# Patient Record
Sex: FEMALE | Race: WHITE | NOT HISPANIC OR LATINO | URBAN - METROPOLITAN AREA
[De-identification: names, ages, dates, MRNs, and addresses within clinical notes are randomized per-mention and may not be internally consistent; named-entity substitution may affect disease eponyms.]

---

## 2017-01-04 ENCOUNTER — AMBULATORY - HEALTHEAST (OUTPATIENT)
Dept: CARDIOLOGY | Facility: CLINIC | Age: 82
End: 2017-01-04

## 2017-01-04 DIAGNOSIS — I35.9 AORTIC VALVE DISORDER: ICD-10-CM

## 2017-01-21 ENCOUNTER — COMMUNICATION - HEALTHEAST (OUTPATIENT)
Dept: CARDIOLOGY | Facility: CLINIC | Age: 82
End: 2017-01-21

## 2017-01-21 DIAGNOSIS — I48.91 ATRIAL FIBRILLATION (H): ICD-10-CM

## 2017-02-13 ENCOUNTER — AMBULATORY - HEALTHEAST (OUTPATIENT)
Dept: CARDIOLOGY | Facility: CLINIC | Age: 82
End: 2017-02-13

## 2017-02-13 DIAGNOSIS — I35.9 AORTIC VALVE DISORDER: ICD-10-CM

## 2017-03-13 ENCOUNTER — AMBULATORY - HEALTHEAST (OUTPATIENT)
Dept: CARDIOLOGY | Facility: CLINIC | Age: 82
End: 2017-03-13

## 2017-03-13 DIAGNOSIS — I35.9 AORTIC VALVE DISORDER: ICD-10-CM

## 2017-03-15 ENCOUNTER — COMMUNICATION - HEALTHEAST (OUTPATIENT)
Dept: INTERNAL MEDICINE | Facility: CLINIC | Age: 82
End: 2017-03-15

## 2017-03-15 DIAGNOSIS — E53.8 VITAMIN B 12 DEFICIENCY: ICD-10-CM

## 2017-03-22 ENCOUNTER — COMMUNICATION - HEALTHEAST (OUTPATIENT)
Dept: ADMINISTRATIVE | Facility: CLINIC | Age: 82
End: 2017-03-22

## 2017-03-27 ENCOUNTER — OFFICE VISIT - HEALTHEAST (OUTPATIENT)
Dept: INTERNAL MEDICINE | Facility: CLINIC | Age: 82
End: 2017-03-27

## 2017-03-27 DIAGNOSIS — R06.00 DYSPNEA: ICD-10-CM

## 2017-03-27 DIAGNOSIS — Z95.2 H/O AORTIC VALVE REPLACEMENT: ICD-10-CM

## 2017-03-27 DIAGNOSIS — R00.0 TACHYCARDIA: ICD-10-CM

## 2017-03-27 DIAGNOSIS — I48.20 CHRONIC ATRIAL FIBRILLATION (H): ICD-10-CM

## 2017-03-27 DIAGNOSIS — I77.6 VASCULITIS (H): ICD-10-CM

## 2017-03-27 ASSESSMENT — MIFFLIN-ST. JEOR: SCORE: 995.2

## 2017-03-28 LAB
ATRIAL RATE - MUSE: 66 BPM
DIASTOLIC BLOOD PRESSURE - MUSE: NORMAL MMHG
INTERPRETATION ECG - MUSE: NORMAL
P AXIS - MUSE: NORMAL DEGREES
PR INTERVAL - MUSE: NORMAL MS
QRS DURATION - MUSE: 82 MS
QT - MUSE: 296 MS
QTC - MUSE: 391 MS
R AXIS - MUSE: 2 DEGREES
SYSTOLIC BLOOD PRESSURE - MUSE: NORMAL MMHG
T AXIS - MUSE: 97 DEGREES
VENTRICULAR RATE- MUSE: 105 BPM

## 2017-03-30 ENCOUNTER — AMBULATORY - HEALTHEAST (OUTPATIENT)
Dept: CARDIOLOGY | Facility: CLINIC | Age: 82
End: 2017-03-30

## 2017-03-30 DIAGNOSIS — Z95.0 PACEMAKER: ICD-10-CM

## 2017-04-04 ENCOUNTER — HOSPITAL ENCOUNTER (OUTPATIENT)
Dept: CARDIOLOGY | Facility: CLINIC | Age: 82
Discharge: HOME | End: 2017-04-04
Attending: INTERNAL MEDICINE

## 2017-04-04 DIAGNOSIS — R06.00 DYSPNEA: ICD-10-CM

## 2017-04-04 DIAGNOSIS — Z95.2 H/O AORTIC VALVE REPLACEMENT: ICD-10-CM

## 2017-04-04 ASSESSMENT — MIFFLIN-ST. JEOR: SCORE: 995.2

## 2017-04-05 LAB
AORTIC VALVE MEAN VELOCITY: 116 CM/S
ASCENDING AORTA: 3.2 CM
AV DIMENSIONLESS INDEX VTI: 0.9
AV MEAN GRADIENT: 6 MMHG
AV PEAK GRADIENT: 11 MMHG
AV VALVE AREA: 2.4 CM2
AV VELOCITY RATIO: 0.8
BSA FOR ECHO PROCEDURE: 1.6 M2
CV BLOOD PRESSURE: NORMAL MMHG
CV ECHO HEIGHT: 63 IN
CV ECHO WEIGHT: 127 LBS
DOP CALC AO PEAK VEL: 166 CM/S
DOP CALC AO VTI: 38.3 CM
DOP CALC LVOT AREA: 2.83 CM2
DOP CALC LVOT DIAMETER: 1.9 CM
DOP CALC LVOT PEAK VEL: 135 CM/S
DOP CALC LVOT STROKE VOLUME: 92.4 CM3
DOP CALCLVOT PEAK VEL VTI: 32.6 CM
EJECTION FRACTION: 58 % (ref 55–75)
FRACTIONAL SHORTENING: 45.2 % (ref 28–44)
INTERVENTRICULAR SEPTUM IN END DIASTOLE: 0.8 CM (ref 0.6–0.9)
IVS/PW RATIO: 1
LA AREA 1: 17.2 CM2
LA AREA 2: 19.8 CM2
LEFT ATRIUM LENGTH: 4.76 CM
LEFT ATRIUM SIZE: 3.9 CM
LEFT ATRIUM VOLUME INDEX: 38 ML/M2
LEFT ATRIUM VOLUME: 60.8 CM3
LEFT VENTRICLE CARDIAC INDEX: 5.5 L/MIN/M2
LEFT VENTRICLE CARDIAC OUTPUT: 8.9 L/MIN
LEFT VENTRICLE DIASTOLIC VOLUME INDEX: 66.3 CM3/M2 (ref 34–74)
LEFT VENTRICLE DIASTOLIC VOLUME: 106 CM3 (ref 46–106)
LEFT VENTRICLE HEART RATE: 96 BPM
LEFT VENTRICLE MASS INDEX: 63.3 G/M2
LEFT VENTRICLE SYSTOLIC VOLUME INDEX: 28.1 CM3/M2 (ref 11–31)
LEFT VENTRICLE SYSTOLIC VOLUME: 45 CM3 (ref 14–42)
LEFT VENTRICULAR INTERNAL DIMENSION IN DIASTOLE: 4.2 CM (ref 3.8–5.2)
LEFT VENTRICULAR INTERNAL DIMENSION IN SYSTOLE: 2.3 CM (ref 2.2–3.5)
LEFT VENTRICULAR MASS: 101.3 G
LEFT VENTRICULAR OUTFLOW TRACT MEAN GRADIENT: 4 MMHG
LEFT VENTRICULAR OUTFLOW TRACT MEAN VELOCITY: 96.8 CM/S
LEFT VENTRICULAR OUTFLOW TRACT PEAK GRADIENT: 7 MMHG
LEFT VENTRICULAR POSTERIOR WALL IN END DIASTOLE: 0.8 CM (ref 0.6–0.9)
LV STROKE VOLUME INDEX: 57.7 ML/M2
MV AVERAGE E/E' RATIO: 19.4 CM/S
MV DECELERATION TIME: 195 MS
MV E'TISSUE VEL-LAT: 10 CM/S
MV E'TISSUE VEL-MED: 4.84 CM/S
MV LATERAL E/E' RATIO: 14.4
MV MEDIAL E/E' RATIO: 29.8
MV PEAK E VELOCITY: 144 CM/S
NUC REST DIASTOLIC VOLUME INDEX: 2032 LBS
NUC REST SYSTOLIC VOLUME INDEX: 63 IN
PR MAX PG: 6 MMHG
PR PEAK VELOCITY: 121 CM/S
PR PEAK VELOCITY: 121 CM/S
TRICUSPID REGURGITATION PEAK PRESSURE GRADIENT: 36.7 MMHG
TRICUSPID VALVE ANULAR PLANE SYSTOLIC EXCURSION: 1.1 CM
TRICUSPID VALVE PEAK REGURGITANT VELOCITY: 303 CM/S

## 2017-04-10 ENCOUNTER — AMBULATORY - HEALTHEAST (OUTPATIENT)
Dept: CARDIOLOGY | Facility: CLINIC | Age: 82
End: 2017-04-10

## 2017-04-10 DIAGNOSIS — I35.9 AORTIC VALVE DISORDER: ICD-10-CM

## 2017-05-08 ENCOUNTER — AMBULATORY - HEALTHEAST (OUTPATIENT)
Dept: CARDIOLOGY | Facility: CLINIC | Age: 82
End: 2017-05-08

## 2017-05-08 DIAGNOSIS — I35.9 AORTIC VALVE DISORDER: ICD-10-CM

## 2017-05-09 ENCOUNTER — COMMUNICATION - HEALTHEAST (OUTPATIENT)
Dept: CARDIOLOGY | Facility: CLINIC | Age: 82
End: 2017-05-09

## 2017-05-09 DIAGNOSIS — I48.91 ATRIAL FIBRILLATION (H): ICD-10-CM

## 2017-05-11 ENCOUNTER — OFFICE VISIT - HEALTHEAST (OUTPATIENT)
Dept: INTERNAL MEDICINE | Facility: CLINIC | Age: 82
End: 2017-05-11

## 2017-05-11 DIAGNOSIS — I48.20 CHRONIC ATRIAL FIBRILLATION (H): ICD-10-CM

## 2017-05-11 DIAGNOSIS — I77.6 VASCULITIS (H): ICD-10-CM

## 2017-05-11 DIAGNOSIS — M19.041: ICD-10-CM

## 2017-05-11 DIAGNOSIS — R00.0 TACHYCARDIA: ICD-10-CM

## 2017-05-11 DIAGNOSIS — Z95.2 H/O AORTIC VALVE REPLACEMENT: ICD-10-CM

## 2017-05-11 ASSESSMENT — MIFFLIN-ST. JEOR: SCORE: 995.74

## 2017-05-15 ENCOUNTER — AMBULATORY - HEALTHEAST (OUTPATIENT)
Dept: CARDIOLOGY | Facility: CLINIC | Age: 82
End: 2017-05-15

## 2017-05-15 DIAGNOSIS — Z95.0 PACEMAKER: ICD-10-CM

## 2017-05-16 LAB — HCC DEVICE COMMENTS: NORMAL

## 2017-05-22 ENCOUNTER — COMMUNICATION - HEALTHEAST (OUTPATIENT)
Dept: INTERNAL MEDICINE | Facility: CLINIC | Age: 82
End: 2017-05-22

## 2017-05-22 ENCOUNTER — OFFICE VISIT - HEALTHEAST (OUTPATIENT)
Dept: CARDIOLOGY | Facility: CLINIC | Age: 82
End: 2017-05-22

## 2017-05-22 DIAGNOSIS — I48.20 CHRONIC ATRIAL FIBRILLATION (H): ICD-10-CM

## 2017-05-22 DIAGNOSIS — I25.83 CORONARY ATHEROSCLEROSIS DUE TO LIPID RICH PLAQUE: ICD-10-CM

## 2017-05-22 DIAGNOSIS — I77.6 VASCULITIS (H): ICD-10-CM

## 2017-05-22 DIAGNOSIS — I35.9 AORTIC VALVE DISORDER: ICD-10-CM

## 2017-05-22 DIAGNOSIS — I49.5 SINOATRIAL NODE DYSFUNCTION (H): ICD-10-CM

## 2017-05-22 ASSESSMENT — MIFFLIN-ST. JEOR: SCORE: 1004.27

## 2017-06-05 ENCOUNTER — AMBULATORY - HEALTHEAST (OUTPATIENT)
Dept: CARDIOLOGY | Facility: CLINIC | Age: 82
End: 2017-06-05

## 2017-06-05 DIAGNOSIS — I35.9 AORTIC VALVE DISORDER: ICD-10-CM

## 2017-06-12 ENCOUNTER — RECORDS - HEALTHEAST (OUTPATIENT)
Dept: ADMINISTRATIVE | Facility: OTHER | Age: 82
End: 2017-06-12

## 2017-07-03 ENCOUNTER — AMBULATORY - HEALTHEAST (OUTPATIENT)
Dept: CARDIOLOGY | Facility: CLINIC | Age: 82
End: 2017-07-03

## 2017-07-03 DIAGNOSIS — I35.9 AORTIC VALVE DISORDER: ICD-10-CM

## 2017-07-31 ENCOUNTER — AMBULATORY - HEALTHEAST (OUTPATIENT)
Dept: CARDIOLOGY | Facility: CLINIC | Age: 82
End: 2017-07-31

## 2017-07-31 DIAGNOSIS — I35.9 AORTIC VALVE DISORDER: ICD-10-CM

## 2017-08-01 ENCOUNTER — AMBULATORY - HEALTHEAST (OUTPATIENT)
Dept: CARDIOLOGY | Facility: CLINIC | Age: 82
End: 2017-08-01

## 2017-08-01 DIAGNOSIS — Z95.0 CARDIAC PACEMAKER IN SITU: ICD-10-CM

## 2017-08-01 LAB — HCC DEVICE COMMENTS: NORMAL

## 2017-08-01 ASSESSMENT — MIFFLIN-ST. JEOR: SCORE: 1004.27

## 2017-08-04 ENCOUNTER — COMMUNICATION - HEALTHEAST (OUTPATIENT)
Dept: CARDIOLOGY | Facility: CLINIC | Age: 82
End: 2017-08-04

## 2017-08-04 DIAGNOSIS — I48.91 ATRIAL FIBRILLATION (H): ICD-10-CM

## 2017-08-15 ENCOUNTER — COMMUNICATION - HEALTHEAST (OUTPATIENT)
Dept: INTERNAL MEDICINE | Facility: CLINIC | Age: 82
End: 2017-08-15

## 2017-08-15 DIAGNOSIS — I77.6 VASCULITIS (H): ICD-10-CM

## 2017-08-28 ENCOUNTER — AMBULATORY - HEALTHEAST (OUTPATIENT)
Dept: CARDIOLOGY | Facility: CLINIC | Age: 82
End: 2017-08-28

## 2017-08-28 DIAGNOSIS — I35.9 AORTIC VALVE DISORDER: ICD-10-CM

## 2017-09-25 ENCOUNTER — AMBULATORY - HEALTHEAST (OUTPATIENT)
Dept: CARDIOLOGY | Facility: CLINIC | Age: 82
End: 2017-09-25

## 2017-09-25 DIAGNOSIS — I35.9 AORTIC VALVE DISORDER: ICD-10-CM

## 2017-10-23 ENCOUNTER — AMBULATORY - HEALTHEAST (OUTPATIENT)
Dept: CARDIOLOGY | Facility: CLINIC | Age: 82
End: 2017-10-23

## 2017-10-23 DIAGNOSIS — I35.9 AORTIC VALVE DISORDER: ICD-10-CM

## 2017-10-30 ENCOUNTER — AMBULATORY - HEALTHEAST (OUTPATIENT)
Dept: CARDIOLOGY | Facility: CLINIC | Age: 82
End: 2017-10-30

## 2017-10-30 DIAGNOSIS — Z95.0 CARDIAC PACEMAKER IN SITU: ICD-10-CM

## 2017-10-30 LAB — HCC DEVICE COMMENTS: NORMAL

## 2017-11-13 ENCOUNTER — AMBULATORY - HEALTHEAST (OUTPATIENT)
Dept: CARDIOLOGY | Facility: CLINIC | Age: 82
End: 2017-11-13

## 2017-11-13 DIAGNOSIS — I35.9 AORTIC VALVE DISORDER: ICD-10-CM

## 2017-11-27 ENCOUNTER — COMMUNICATION - HEALTHEAST (OUTPATIENT)
Dept: CARDIOLOGY | Facility: CLINIC | Age: 82
End: 2017-11-27

## 2017-11-27 DIAGNOSIS — I48.91 ATRIAL FIBRILLATION (H): ICD-10-CM

## 2017-12-01 ENCOUNTER — RECORDS - HEALTHEAST (OUTPATIENT)
Dept: GENERAL RADIOLOGY | Facility: CLINIC | Age: 82
End: 2017-12-01

## 2017-12-01 ENCOUNTER — OFFICE VISIT - HEALTHEAST (OUTPATIENT)
Dept: INTERNAL MEDICINE | Facility: CLINIC | Age: 82
End: 2017-12-01

## 2017-12-01 DIAGNOSIS — I77.6 VASCULITIS (H): ICD-10-CM

## 2017-12-01 DIAGNOSIS — Z23 NEED FOR IMMUNIZATION AGAINST INFLUENZA: ICD-10-CM

## 2017-12-01 DIAGNOSIS — R07.89 OTHER CHEST PAIN: ICD-10-CM

## 2017-12-01 DIAGNOSIS — R10.11 RUQ ABDOMINAL PAIN: ICD-10-CM

## 2017-12-01 DIAGNOSIS — R07.89 ATYPICAL CHEST PAIN: ICD-10-CM

## 2017-12-01 DIAGNOSIS — Z95.2 S/P AVR: ICD-10-CM

## 2017-12-01 DIAGNOSIS — K21.9 GERD (GASTROESOPHAGEAL REFLUX DISEASE): ICD-10-CM

## 2017-12-01 DIAGNOSIS — I48.20 CHRONIC ATRIAL FIBRILLATION (H): ICD-10-CM

## 2017-12-01 ASSESSMENT — MIFFLIN-ST. JEOR: SCORE: 1008.81

## 2017-12-02 ENCOUNTER — COMMUNICATION - HEALTHEAST (OUTPATIENT)
Dept: INTERNAL MEDICINE | Facility: CLINIC | Age: 82
End: 2017-12-02

## 2017-12-02 DIAGNOSIS — I77.6 VASCULITIS (H): ICD-10-CM

## 2017-12-11 ENCOUNTER — AMBULATORY - HEALTHEAST (OUTPATIENT)
Dept: CARDIOLOGY | Facility: CLINIC | Age: 82
End: 2017-12-11

## 2017-12-11 DIAGNOSIS — I35.9 AORTIC VALVE DISORDER: ICD-10-CM

## 2017-12-12 ENCOUNTER — HOSPITAL ENCOUNTER (OUTPATIENT)
Dept: CT IMAGING | Facility: CLINIC | Age: 82
Discharge: HOME | End: 2017-12-12
Attending: INTERNAL MEDICINE

## 2017-12-12 DIAGNOSIS — R10.11 RUQ ABDOMINAL PAIN: ICD-10-CM

## 2017-12-22 ENCOUNTER — COMMUNICATION - HEALTHEAST (OUTPATIENT)
Dept: INTERNAL MEDICINE | Facility: CLINIC | Age: 82
End: 2017-12-22

## 2017-12-29 ENCOUNTER — AMBULATORY - HEALTHEAST (OUTPATIENT)
Dept: CARDIOLOGY | Facility: CLINIC | Age: 82
End: 2017-12-29

## 2017-12-29 DIAGNOSIS — I35.9 AORTIC VALVE DISORDER: ICD-10-CM

## 2018-01-29 ENCOUNTER — AMBULATORY - HEALTHEAST (OUTPATIENT)
Dept: CARDIOLOGY | Facility: CLINIC | Age: 83
End: 2018-01-29

## 2018-01-29 DIAGNOSIS — I35.9 AORTIC VALVE DISEASE: ICD-10-CM

## 2018-01-29 DIAGNOSIS — I35.9 AORTIC VALVE DISORDER: ICD-10-CM

## 2018-01-29 LAB — INTERNATIONAL NORMALIZATION RATIO, POC - HISTORICAL: 2.3

## 2018-02-06 ENCOUNTER — AMBULATORY - HEALTHEAST (OUTPATIENT)
Dept: CARDIOLOGY | Facility: CLINIC | Age: 83
End: 2018-02-06

## 2018-02-06 DIAGNOSIS — Z95.0 CARDIAC PACEMAKER IN SITU: ICD-10-CM

## 2018-02-06 LAB — HCC DEVICE COMMENTS: NORMAL

## 2018-02-26 ENCOUNTER — AMBULATORY - HEALTHEAST (OUTPATIENT)
Dept: CARDIOLOGY | Facility: CLINIC | Age: 83
End: 2018-02-26

## 2018-02-26 DIAGNOSIS — I35.9 AORTIC VALVE DISORDER: ICD-10-CM

## 2018-02-26 LAB — INTERNATIONAL NORMALIZATION RATIO, POC - HISTORICAL: 3

## 2018-02-27 ENCOUNTER — COMMUNICATION - HEALTHEAST (OUTPATIENT)
Dept: CARDIOLOGY | Facility: CLINIC | Age: 83
End: 2018-02-27

## 2018-02-27 ENCOUNTER — AMBULATORY - HEALTHEAST (OUTPATIENT)
Dept: CARDIOLOGY | Facility: CLINIC | Age: 83
End: 2018-02-27

## 2018-02-27 DIAGNOSIS — I48.91 ATRIAL FIBRILLATION (H): ICD-10-CM

## 2018-03-14 ENCOUNTER — COMMUNICATION - HEALTHEAST (OUTPATIENT)
Dept: CARDIOLOGY | Facility: CLINIC | Age: 83
End: 2018-03-14

## 2018-03-20 ENCOUNTER — COMMUNICATION - HEALTHEAST (OUTPATIENT)
Dept: INTERNAL MEDICINE | Facility: CLINIC | Age: 83
End: 2018-03-20

## 2018-03-20 DIAGNOSIS — I77.6 VASCULITIS (H): ICD-10-CM

## 2018-03-23 ENCOUNTER — OFFICE VISIT - HEALTHEAST (OUTPATIENT)
Dept: INTERNAL MEDICINE | Facility: CLINIC | Age: 83
End: 2018-03-23

## 2018-03-23 ENCOUNTER — COMMUNICATION - HEALTHEAST (OUTPATIENT)
Dept: INTERNAL MEDICINE | Facility: CLINIC | Age: 83
End: 2018-03-23
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
## 2018-03-23 DIAGNOSIS — S20.219A CHEST WALL CONTUSION: ICD-10-CM

## 2018-03-23 DIAGNOSIS — K43.9 ABDOMINAL WALL HERNIA: ICD-10-CM

## 2018-03-23 DIAGNOSIS — Z95.2 H/O AORTIC VALVE REPLACEMENT: ICD-10-CM

## 2018-03-23 DIAGNOSIS — I77.6 VASCULITIS (H): ICD-10-CM

## 2018-03-23 DIAGNOSIS — I49.5 SINOATRIAL NODE DYSFUNCTION (H): ICD-10-CM

## 2018-03-23 DIAGNOSIS — I48.20 CHRONIC ATRIAL FIBRILLATION (H): ICD-10-CM

## 2018-03-23 LAB
ALBUMIN SERPL-MCNC: 3.5 G/DL (ref 3.5–5)
ALP SERPL-CCNC: 83 U/L (ref 45–120)
ALT SERPL W P-5'-P-CCNC: 16 U/L (ref 0–45)
ANION GAP SERPL CALCULATED.3IONS-SCNC: 10 MMOL/L (ref 5–18)
AST SERPL W P-5'-P-CCNC: 23 U/L (ref 0–40)
BASOPHILS # BLD AUTO: 0.1 THOU/UL (ref 0–0.2)
BASOPHILS NFR BLD AUTO: 1 % (ref 0–2)
BILIRUB DIRECT SERPL-MCNC: 0.2 MG/DL
BILIRUB SERPL-MCNC: 0.5 MG/DL (ref 0–1)
BUN SERPL-MCNC: 18 MG/DL (ref 8–28)
C REACTIVE PROTEIN LHE: 0.3 MG/DL (ref 0–0.8)
CALCIUM SERPL-MCNC: 9.5 MG/DL (ref 8.5–10.5)
CHLORIDE BLD-SCNC: 99 MMOL/L (ref 98–107)
CO2 SERPL-SCNC: 27 MMOL/L (ref 22–31)
CREAT SERPL-MCNC: 0.79 MG/DL (ref 0.6–1.1)
EOSINOPHIL # BLD AUTO: 0.3 THOU/UL (ref 0–0.4)
EOSINOPHIL NFR BLD AUTO: 3 % (ref 0–6)
ERYTHROCYTE [DISTWIDTH] IN BLOOD BY AUTOMATED COUNT: 12 % (ref 11–14.5)
ERYTHROCYTE [SEDIMENTATION RATE] IN BLOOD BY WESTERGREN METHOD: 27 MM/HR (ref 0–20)
GFR SERPL CREATININE-BSD FRML MDRD: >60 ML/MIN/1.73M2
GLUCOSE BLD-MCNC: 117 MG/DL (ref 70–125)
HCT VFR BLD AUTO: 38.4 % (ref 35–47)
HGB BLD-MCNC: 13 G/DL (ref 12–16)
LYMPHOCYTES # BLD AUTO: 2.1 THOU/UL (ref 0.8–4.4)
LYMPHOCYTES NFR BLD AUTO: 19 % (ref 20–40)
MCH RBC QN AUTO: 33.6 PG (ref 27–34)
MCHC RBC AUTO-ENTMCNC: 33.8 G/DL (ref 32–36)
MCV RBC AUTO: 99 FL (ref 80–100)
MONOCYTES # BLD AUTO: 0.8 THOU/UL (ref 0–0.9)
MONOCYTES NFR BLD AUTO: 7 % (ref 2–10)
NEUTROPHILS # BLD AUTO: 7.6 THOU/UL (ref 2–7.7)
NEUTROPHILS NFR BLD AUTO: 70 % (ref 50–70)
PLATELET # BLD AUTO: 211 THOU/UL (ref 140–440)
PMV BLD AUTO: 8.2 FL (ref 7–10)
POTASSIUM BLD-SCNC: 3.9 MMOL/L (ref 3.5–5)
PROT SERPL-MCNC: 7.6 G/DL (ref 6–8)
RBC # BLD AUTO: 3.86 MILL/UL (ref 3.8–5.4)
SODIUM SERPL-SCNC: 136 MMOL/L (ref 136–145)
WBC: 10.9 THOU/UL (ref 4–11)

## 2018-03-23 ASSESSMENT — MIFFLIN-ST. JEOR: SCORE: 1009.17

## 2018-03-26 ENCOUNTER — AMBULATORY - HEALTHEAST (OUTPATIENT)
Dept: CARDIOLOGY | Facility: CLINIC | Age: 83
End: 2018-03-26

## 2018-03-26 DIAGNOSIS — I35.9 AORTIC VALVE DISORDER: ICD-10-CM

## 2018-03-26 LAB — INTERNATIONAL NORMALIZATION RATIO, POC - HISTORICAL: 3.8

## 2018-03-29 ENCOUNTER — COMMUNICATION - HEALTHEAST (OUTPATIENT)
Dept: INTERNAL MEDICINE | Facility: CLINIC | Age: 83
End: 2018-03-29

## 2018-03-29 DIAGNOSIS — E53.8 VITAMIN B 12 DEFICIENCY: ICD-10-CM

## 2018-04-09 ENCOUNTER — AMBULATORY - HEALTHEAST (OUTPATIENT)
Dept: CARDIOLOGY | Facility: CLINIC | Age: 83
End: 2018-04-09

## 2018-04-09 DIAGNOSIS — I35.9 AORTIC VALVE DISORDER: ICD-10-CM

## 2018-04-09 LAB — INTERNATIONAL NORMALIZATION RATIO, POC - HISTORICAL: 2

## 2018-04-25 ENCOUNTER — OFFICE VISIT - HEALTHEAST (OUTPATIENT)
Dept: CARDIOLOGY | Facility: CLINIC | Age: 83
End: 2018-04-25

## 2018-04-25 ENCOUNTER — AMBULATORY - HEALTHEAST (OUTPATIENT)
Dept: CARDIOLOGY | Facility: CLINIC | Age: 83
End: 2018-04-25

## 2018-04-25 DIAGNOSIS — Z95.0 CARDIAC PACEMAKER IN SITU: ICD-10-CM

## 2018-04-25 DIAGNOSIS — I77.6 VASCULITIS (H): ICD-10-CM

## 2018-04-25 DIAGNOSIS — I25.83 CORONARY ATHEROSCLEROSIS DUE TO LIPID RICH PLAQUE: ICD-10-CM

## 2018-04-25 DIAGNOSIS — I49.5 SINOATRIAL NODE DYSFUNCTION (H): ICD-10-CM

## 2018-04-25 DIAGNOSIS — Z95.2 H/O AORTIC VALVE REPLACEMENT: ICD-10-CM

## 2018-04-25 DIAGNOSIS — I35.9 AORTIC VALVE DISORDER: ICD-10-CM

## 2018-04-25 LAB — HCC DEVICE COMMENTS: NORMAL

## 2018-04-25 ASSESSMENT — MIFFLIN-ST. JEOR: SCORE: 1010.62

## 2018-05-07 ENCOUNTER — AMBULATORY - HEALTHEAST (OUTPATIENT)
Dept: CARDIOLOGY | Facility: CLINIC | Age: 83
End: 2018-05-07

## 2018-05-07 DIAGNOSIS — I35.9 AORTIC VALVE DISORDER: ICD-10-CM

## 2018-05-07 LAB — INTERNATIONAL NORMALIZATION RATIO, POC - HISTORICAL: 2.4

## 2018-05-10 ENCOUNTER — RECORDS - HEALTHEAST (OUTPATIENT)
Dept: ADMINISTRATIVE | Facility: OTHER | Age: 83
End: 2018-05-10

## 2018-05-25 ENCOUNTER — COMMUNICATION - HEALTHEAST (OUTPATIENT)
Dept: INTERNAL MEDICINE | Facility: CLINIC | Age: 83
End: 2018-05-25

## 2018-05-25 DIAGNOSIS — R00.0 TACHYCARDIA: ICD-10-CM

## 2018-06-04 ENCOUNTER — AMBULATORY - HEALTHEAST (OUTPATIENT)
Dept: CARDIOLOGY | Facility: CLINIC | Age: 83
End: 2018-06-04

## 2018-06-04 ENCOUNTER — HOSPITAL ENCOUNTER (OUTPATIENT)
Dept: CARDIOLOGY | Facility: CLINIC | Age: 83
Discharge: HOME | End: 2018-06-04
Attending: INTERNAL MEDICINE

## 2018-06-04 ENCOUNTER — COMMUNICATION - HEALTHEAST (OUTPATIENT)
Dept: CARDIOLOGY | Facility: CLINIC | Age: 83
End: 2018-06-04

## 2018-06-04 DIAGNOSIS — I35.9 AORTIC VALVE DISORDER: ICD-10-CM

## 2018-06-04 DIAGNOSIS — Z95.2 H/O AORTIC VALVE REPLACEMENT: ICD-10-CM

## 2018-06-04 LAB
AORTIC ROOT: 2.8 CM
AORTIC VALVE MEAN VELOCITY: 171 CM/S
ASCENDING AORTA: 3 CM
AV CUSP SEPERATION: 1 CM
AV CUSP SEPERATION: 1 CM
AV DIMENSIONLESS INDEX VTI: 0.5
AV MEAN GRADIENT: 12 MMHG
AV PEAK GRADIENT: 18 MMHG
AV VALVE AREA: 1.5 CM2
AV VELOCITY RATIO: 0.6
BSA FOR ECHO PROCEDURE: 1.62 M2
CV ECHO HEIGHT: 63 IN
CV ECHO WEIGHT: 130 LBS
DOP CALC AO PEAK VEL: 212 CM/S
DOP CALC AO VTI: 50.5 CM
DOP CALC LVOT AREA: 2.83 CM2
DOP CALC LVOT DIAMETER: 1.9 CM
DOP CALC LVOT PEAK VEL: 121 CM/S
DOP CALC LVOT STROKE VOLUME: 74 CM3
DOP CALC MV VTI: 35.6 CM
DOP CALCLVOT PEAK VEL VTI: 26.1 CM
EJECTION FRACTION: 68 % (ref 55–75)
FRACTIONAL SHORTENING: 36.6 % (ref 28–44)
INTERNATIONAL NORMALIZATION RATIO, POC - HISTORICAL: 2.2
INTERVENTRICULAR SEPTUM IN END DIASTOLE: 1.1 CM (ref 0.6–0.9)
IVS/PW RATIO: 1
LA AREA 1: 15.8 CM2
LA AREA 2: 18.7 CM2
LEFT ATRIUM LENGTH: 5.06 CM
LEFT ATRIUM SIZE: 3.6 CM
LEFT ATRIUM VOLUME INDEX: 30.6 ML/M2
LEFT ATRIUM VOLUME: 49.6 ML
LEFT VENTRICLE CARDIAC INDEX: 4.3 L/MIN/M2
LEFT VENTRICLE CARDIAC OUTPUT: 7 L/MIN
LEFT VENTRICLE DIASTOLIC VOLUME INDEX: 32.7 CM3/M2 (ref 34–74)
LEFT VENTRICLE DIASTOLIC VOLUME: 53 CM3 (ref 46–106)
LEFT VENTRICLE HEART RATE: 95 BPM
LEFT VENTRICLE MASS INDEX: 93.4 G/M2
LEFT VENTRICLE SYSTOLIC VOLUME INDEX: 10.5 CM3/M2 (ref 11–31)
LEFT VENTRICLE SYSTOLIC VOLUME: 17 CM3 (ref 14–42)
LEFT VENTRICULAR INTERNAL DIMENSION IN DIASTOLE: 4.1 CM (ref 3.8–5.2)
LEFT VENTRICULAR INTERNAL DIMENSION IN SYSTOLE: 2.6 CM (ref 2.2–3.5)
LEFT VENTRICULAR MASS: 151.3 G
LEFT VENTRICULAR OUTFLOW TRACT MEAN GRADIENT: 4 MMHG
LEFT VENTRICULAR OUTFLOW TRACT MEAN VELOCITY: 97.9 CM/S
LEFT VENTRICULAR OUTFLOW TRACT PEAK GRADIENT: 6 MMHG
LEFT VENTRICULAR POSTERIOR WALL IN END DIASTOLE: 1.1 CM (ref 0.6–0.9)
LV STROKE VOLUME INDEX: 45.7 ML/M2
MITRAL VALVE DECELERATION SLOPE: 6680 MM/S2
MITRAL VALVE E/A RATIO: 1.3
MITRAL VALVE MEAN INFLOW VELOCITY: 63 CM/S
MITRAL VALVE PEAK VELOCITY: 152 CM/S
MITRAL VALVE PRESSURE HALF-TIME: 68 MS
MV AREA VTI: 2.08 CM2
MV AVERAGE E/E' RATIO: 18.3 CM/S
MV DECELERATION TIME: 215 MS
MV E'TISSUE VEL-LAT: 8.87 CM/S
MV E'TISSUE VEL-MED: 7.21 CM/S
MV LATERAL E/E' RATIO: 16.6
MV MEAN GRADIENT: 2 MMHG
MV MEDIAL E/E' RATIO: 20.4
MV PEAK A VELOCITY: 109 CM/S
MV PEAK E VELOCITY: 147 CM/S
MV PEAK GRADIENT: 9.2 MMHG
MV VALVE AREA BY CONTINUITY EQUATION: 2.1 CM2
MV VALVE AREA PRESSURE 1/2 METHOD: 3.2 CM2
NUC REST DIASTOLIC VOLUME INDEX: 2080 LBS
NUC REST SYSTOLIC VOLUME INDEX: 63 IN
PR MAX PG: 7 MMHG
PR PEAK VELOCITY: 149 CM/S
TRICUSPID REGURGITATION PEAK PRESSURE GRADIENT: 26.4 MMHG
TRICUSPID VALVE ANULAR PLANE SYSTOLIC EXCURSION: 1.5 CM
TRICUSPID VALVE PEAK REGURGITANT VELOCITY: 257 CM/S

## 2018-06-04 ASSESSMENT — MIFFLIN-ST. JEOR: SCORE: 1008.81

## 2018-06-14 ENCOUNTER — COMMUNICATION - HEALTHEAST (OUTPATIENT)
Dept: INTERNAL MEDICINE | Facility: CLINIC | Age: 83
End: 2018-06-14

## 2018-06-14 DIAGNOSIS — I77.6 VASCULITIS (H): ICD-10-CM

## 2018-07-02 ENCOUNTER — AMBULATORY - HEALTHEAST (OUTPATIENT)
Dept: CARDIOLOGY | Facility: CLINIC | Age: 83
End: 2018-07-02

## 2018-07-02 DIAGNOSIS — I35.9 AORTIC VALVE DISORDER: ICD-10-CM

## 2018-07-02 LAB — INTERNATIONAL NORMALIZATION RATIO, POC - HISTORICAL: 3

## 2018-07-12 ENCOUNTER — COMMUNICATION - HEALTHEAST (OUTPATIENT)
Dept: CARDIOLOGY | Facility: CLINIC | Age: 83
End: 2018-07-12

## 2018-07-12 DIAGNOSIS — I48.91 ATRIAL FIBRILLATION (H): ICD-10-CM

## 2018-07-25 ENCOUNTER — AMBULATORY - HEALTHEAST (OUTPATIENT)
Dept: CARDIOLOGY | Facility: CLINIC | Age: 83
End: 2018-07-25

## 2018-07-25 DIAGNOSIS — Z95.0 CARDIAC PACEMAKER IN SITU: ICD-10-CM

## 2018-07-25 LAB
HCC DEVICE COMMENTS: NORMAL
HCC DEVICE IMPLANTING PROVIDER: NORMAL
HCC DEVICE MANUFACTURE: NORMAL
HCC DEVICE MODEL: NORMAL
HCC DEVICE SERIAL NUMBER: NORMAL
HCC DEVICE TYPE: NORMAL

## 2018-07-30 ENCOUNTER — AMBULATORY - HEALTHEAST (OUTPATIENT)
Dept: CARDIOLOGY | Facility: CLINIC | Age: 83
End: 2018-07-30

## 2018-07-30 DIAGNOSIS — I35.9 AORTIC VALVE DISORDER: ICD-10-CM

## 2018-07-30 LAB — INTERNATIONAL NORMALIZATION RATIO, POC - HISTORICAL: 2.1

## 2018-07-31 ENCOUNTER — COMMUNICATION - HEALTHEAST (OUTPATIENT)
Dept: INTERNAL MEDICINE | Facility: CLINIC | Age: 83
End: 2018-07-31

## 2018-07-31 DIAGNOSIS — I48.20 CHRONIC ATRIAL FIBRILLATION (H): ICD-10-CM

## 2018-08-27 ENCOUNTER — AMBULATORY - HEALTHEAST (OUTPATIENT)
Dept: CARDIOLOGY | Facility: CLINIC | Age: 83
End: 2018-08-27

## 2018-08-27 DIAGNOSIS — I35.9 AORTIC VALVE DISORDER: ICD-10-CM

## 2018-08-27 LAB — INTERNATIONAL NORMALIZATION RATIO, POC - HISTORICAL: 2.7

## 2018-09-07 ENCOUNTER — COMMUNICATION - HEALTHEAST (OUTPATIENT)
Dept: INTERNAL MEDICINE | Facility: CLINIC | Age: 83
End: 2018-09-07

## 2018-09-07 DIAGNOSIS — I77.6 VASCULITIS (H): ICD-10-CM

## 2018-09-14 ENCOUNTER — AMBULATORY - HEALTHEAST (OUTPATIENT)
Dept: INTERNAL MEDICINE | Facility: CLINIC | Age: 83
End: 2018-09-14

## 2018-09-14 ENCOUNTER — COMMUNICATION - HEALTHEAST (OUTPATIENT)
Dept: SCHEDULING | Facility: CLINIC | Age: 83
End: 2018-09-14

## 2018-09-24 ENCOUNTER — AMBULATORY - HEALTHEAST (OUTPATIENT)
Dept: CARDIOLOGY | Facility: CLINIC | Age: 83
End: 2018-09-24

## 2018-09-24 DIAGNOSIS — I35.9 AORTIC VALVE DISORDER: ICD-10-CM

## 2018-09-24 LAB — INTERNATIONAL NORMALIZATION RATIO, POC - HISTORICAL: 3

## 2018-10-17 ENCOUNTER — RECORDS - HEALTHEAST (OUTPATIENT)
Dept: ADMINISTRATIVE | Facility: OTHER | Age: 83
End: 2018-10-17

## 2018-10-22 ENCOUNTER — AMBULATORY - HEALTHEAST (OUTPATIENT)
Dept: CARDIOLOGY | Facility: CLINIC | Age: 83
End: 2018-10-22

## 2018-10-22 DIAGNOSIS — I35.9 AORTIC VALVE DISORDER: ICD-10-CM

## 2018-10-22 LAB — INTERNATIONAL NORMALIZATION RATIO, POC - HISTORICAL: 3.6

## 2018-10-31 ENCOUNTER — AMBULATORY - HEALTHEAST (OUTPATIENT)
Dept: CARDIOLOGY | Facility: CLINIC | Age: 83
End: 2018-10-31

## 2018-10-31 DIAGNOSIS — Z95.0 CARDIAC PACEMAKER IN SITU: ICD-10-CM

## 2018-11-02 ENCOUNTER — OFFICE VISIT - HEALTHEAST (OUTPATIENT)
Dept: INTERNAL MEDICINE | Facility: CLINIC | Age: 83
End: 2018-11-02

## 2018-11-02 ENCOUNTER — RECORDS - HEALTHEAST (OUTPATIENT)
Dept: GENERAL RADIOLOGY | Facility: CLINIC | Age: 83
End: 2018-11-02

## 2018-11-02 DIAGNOSIS — I25.9 IHD (ISCHEMIC HEART DISEASE): ICD-10-CM

## 2018-11-02 DIAGNOSIS — J41.0 SIMPLE CHRONIC BRONCHITIS (H): ICD-10-CM

## 2018-11-02 DIAGNOSIS — I77.6 VASCULITIS (H): ICD-10-CM

## 2018-11-02 DIAGNOSIS — Z95.2 H/O AORTIC VALVE REPLACEMENT: ICD-10-CM

## 2018-11-02 DIAGNOSIS — K90.9 MALABSORPTION: ICD-10-CM

## 2018-11-02 DIAGNOSIS — K43.9 ABDOMINAL WALL HERNIA: ICD-10-CM

## 2018-11-02 DIAGNOSIS — Z23 NEED FOR PROPHYLACTIC VACCINATION AND INOCULATION AGAINST INFLUENZA: ICD-10-CM

## 2018-11-02 DIAGNOSIS — I48.20 CHRONIC ATRIAL FIBRILLATION (H): ICD-10-CM

## 2018-11-02 DIAGNOSIS — R09.89 DECREASED RADIAL PULSE: ICD-10-CM

## 2018-11-02 LAB
ALBUMIN SERPL-MCNC: 3.7 G/DL (ref 3.5–5)
ALBUMIN SERPL-MCNC: 3.7 G/DL (ref 3.5–5)
ALP SERPL-CCNC: 78 U/L (ref 45–120)
ALT SERPL W P-5'-P-CCNC: 18 U/L (ref 0–45)
ANION GAP SERPL CALCULATED.3IONS-SCNC: 11 MMOL/L (ref 5–18)
AST SERPL W P-5'-P-CCNC: 27 U/L (ref 0–40)
BASOPHILS # BLD AUTO: 0.1 THOU/UL (ref 0–0.2)
BASOPHILS NFR BLD AUTO: 1 % (ref 0–2)
BILIRUB DIRECT SERPL-MCNC: 0.1 MG/DL
BILIRUB SERPL-MCNC: 0.5 MG/DL (ref 0–1)
BUN SERPL-MCNC: 15 MG/DL (ref 8–28)
C REACTIVE PROTEIN LHE: 1.2 MG/DL (ref 0–0.8)
CALCIUM SERPL-MCNC: 9.9 MG/DL (ref 8.5–10.5)
CHLORIDE BLD-SCNC: 103 MMOL/L (ref 98–107)
CO2 SERPL-SCNC: 27 MMOL/L (ref 22–31)
CREAT SERPL-MCNC: 0.79 MG/DL (ref 0.6–1.1)
EOSINOPHIL # BLD AUTO: 0.8 THOU/UL (ref 0–0.4)
EOSINOPHIL NFR BLD AUTO: 8 % (ref 0–6)
ERYTHROCYTE [DISTWIDTH] IN BLOOD BY AUTOMATED COUNT: 11.4 % (ref 11–14.5)
ERYTHROCYTE [SEDIMENTATION RATE] IN BLOOD BY WESTERGREN METHOD: 30 MM/HR (ref 0–20)
FOLATE SERPL-MCNC: 8.5 NG/ML
GFR SERPL CREATININE-BSD FRML MDRD: >60 ML/MIN/1.73M2
GLUCOSE BLD-MCNC: 113 MG/DL (ref 70–125)
HCT VFR BLD AUTO: 39.6 % (ref 35–47)
HGB BLD-MCNC: 13.5 G/DL (ref 12–16)
LYMPHOCYTES # BLD AUTO: 1.8 THOU/UL (ref 0.8–4.4)
LYMPHOCYTES NFR BLD AUTO: 19 % (ref 20–40)
MCH RBC QN AUTO: 34.4 PG (ref 27–34)
MCHC RBC AUTO-ENTMCNC: 34.1 G/DL (ref 32–36)
MCV RBC AUTO: 101 FL (ref 80–100)
MONOCYTES # BLD AUTO: 0.7 THOU/UL (ref 0–0.9)
MONOCYTES NFR BLD AUTO: 7 % (ref 2–10)
NEUTROPHILS # BLD AUTO: 6.2 THOU/UL (ref 2–7.7)
NEUTROPHILS NFR BLD AUTO: 65 % (ref 50–70)
PHOSPHATE SERPL-MCNC: 3.2 MG/DL (ref 2.5–4.5)
PLATELET # BLD AUTO: 219 THOU/UL (ref 140–440)
PMV BLD AUTO: 8.1 FL (ref 7–10)
POTASSIUM BLD-SCNC: 4.5 MMOL/L (ref 3.5–5)
PROT SERPL-MCNC: 7.5 G/DL (ref 6–8)
RBC # BLD AUTO: 3.92 MILL/UL (ref 3.8–5.4)
SODIUM SERPL-SCNC: 141 MMOL/L (ref 136–145)
WBC: 9.5 THOU/UL (ref 4–11)

## 2018-11-02 ASSESSMENT — MIFFLIN-ST. JEOR: SCORE: 981.77

## 2018-11-05 ENCOUNTER — COMMUNICATION - HEALTHEAST (OUTPATIENT)
Dept: INTERNAL MEDICINE | Facility: CLINIC | Age: 83
End: 2018-11-05

## 2018-11-05 ENCOUNTER — AMBULATORY - HEALTHEAST (OUTPATIENT)
Dept: CARDIOLOGY | Facility: CLINIC | Age: 83
End: 2018-11-05

## 2018-11-05 DIAGNOSIS — I35.9 AORTIC VALVE DISORDER: ICD-10-CM

## 2018-11-05 DIAGNOSIS — E53.8 VITAMIN B 12 DEFICIENCY: ICD-10-CM

## 2018-11-05 LAB — INTERNATIONAL NORMALIZATION RATIO, POC - HISTORICAL: 1.8

## 2018-11-13 ENCOUNTER — COMMUNICATION - HEALTHEAST (OUTPATIENT)
Dept: CARDIOLOGY | Facility: CLINIC | Age: 83
End: 2018-11-13

## 2018-11-13 DIAGNOSIS — I48.91 ATRIAL FIBRILLATION (H): ICD-10-CM

## 2018-11-19 ENCOUNTER — AMBULATORY - HEALTHEAST (OUTPATIENT)
Dept: CARDIOLOGY | Facility: CLINIC | Age: 83
End: 2018-11-19

## 2018-11-19 DIAGNOSIS — I35.9 AORTIC VALVE DISORDER: ICD-10-CM

## 2018-11-19 LAB — INTERNATIONAL NORMALIZATION RATIO, POC - HISTORICAL: 3.8

## 2018-11-29 ENCOUNTER — RECORDS - HEALTHEAST (OUTPATIENT)
Dept: ADMINISTRATIVE | Facility: OTHER | Age: 83
End: 2018-11-29

## 2018-12-03 ENCOUNTER — AMBULATORY - HEALTHEAST (OUTPATIENT)
Dept: CARDIOLOGY | Facility: CLINIC | Age: 83
End: 2018-12-03

## 2018-12-03 DIAGNOSIS — I35.9 AORTIC VALVE DISORDER: ICD-10-CM

## 2018-12-03 LAB — INTERNATIONAL NORMALIZATION RATIO, POC - HISTORICAL: 2.3

## 2018-12-08 ENCOUNTER — COMMUNICATION - HEALTHEAST (OUTPATIENT)
Dept: INTERNAL MEDICINE | Facility: CLINIC | Age: 83
End: 2018-12-08

## 2018-12-08 DIAGNOSIS — I77.6 VASCULITIS (H): ICD-10-CM

## 2019-01-02 ENCOUNTER — RECORDS - HEALTHEAST (OUTPATIENT)
Dept: GENERAL RADIOLOGY | Facility: CLINIC | Age: 84
End: 2019-01-02

## 2019-01-02 ENCOUNTER — COMMUNICATION - HEALTHEAST (OUTPATIENT)
Dept: SCHEDULING | Facility: CLINIC | Age: 84
End: 2019-01-02

## 2019-01-02 ENCOUNTER — OFFICE VISIT - HEALTHEAST (OUTPATIENT)
Dept: INTERNAL MEDICINE | Facility: CLINIC | Age: 84
End: 2019-01-02

## 2019-01-02 DIAGNOSIS — D84.9 IMMUNOSUPPRESSION (H): ICD-10-CM

## 2019-01-02 DIAGNOSIS — J18.9 PNEUMONIA, UNSPECIFIED ORGANISM: ICD-10-CM

## 2019-01-02 DIAGNOSIS — I25.9 IHD (ISCHEMIC HEART DISEASE): ICD-10-CM

## 2019-01-02 DIAGNOSIS — I77.6 VASCULITIS (H): ICD-10-CM

## 2019-01-02 DIAGNOSIS — Z95.2 H/O AORTIC VALVE REPLACEMENT: ICD-10-CM

## 2019-01-02 DIAGNOSIS — J18.9 PNEUMONIA OF BOTH LUNGS DUE TO INFECTIOUS ORGANISM, UNSPECIFIED PART OF LUNG: ICD-10-CM

## 2019-01-02 LAB
FLUAV AG SPEC QL IA: NORMAL
FLUBV AG SPEC QL IA: NORMAL

## 2019-01-02 ASSESSMENT — MIFFLIN-ST. JEOR: SCORE: 968.53

## 2019-01-03 LAB
ALBUMIN SERPL-MCNC: 3.7 G/DL (ref 3.5–5)
ANION GAP SERPL CALCULATED.3IONS-SCNC: 13 MMOL/L (ref 5–18)
BASOPHILS # BLD AUTO: 0.1 THOU/UL (ref 0–0.2)
BASOPHILS NFR BLD AUTO: 1 % (ref 0–2)
BUN SERPL-MCNC: 11 MG/DL (ref 8–28)
CALCIUM SERPL-MCNC: 9.4 MG/DL (ref 8.5–10.5)
CHLORIDE BLD-SCNC: 97 MMOL/L (ref 98–107)
CO2 SERPL-SCNC: 25 MMOL/L (ref 22–31)
CREAT SERPL-MCNC: 0.78 MG/DL (ref 0.6–1.1)
EOSINOPHIL # BLD AUTO: 0.5 THOU/UL (ref 0–0.4)
EOSINOPHIL NFR BLD AUTO: 6 % (ref 0–6)
ERYTHROCYTE [DISTWIDTH] IN BLOOD BY AUTOMATED COUNT: 11.9 % (ref 11–14.5)
GFR SERPL CREATININE-BSD FRML MDRD: >60 ML/MIN/1.73M2
GLUCOSE BLD-MCNC: 101 MG/DL (ref 70–125)
HCT VFR BLD AUTO: 41.6 % (ref 35–47)
HGB BLD-MCNC: 14.2 G/DL (ref 12–16)
LYMPHOCYTES # BLD AUTO: 1.4 THOU/UL (ref 0.8–4.4)
LYMPHOCYTES NFR BLD AUTO: 17 % (ref 20–40)
MCH RBC QN AUTO: 34.5 PG (ref 27–34)
MCHC RBC AUTO-ENTMCNC: 34.1 G/DL (ref 32–36)
MCV RBC AUTO: 101 FL (ref 80–100)
MONOCYTES # BLD AUTO: 1 THOU/UL (ref 0–0.9)
MONOCYTES NFR BLD AUTO: 12 % (ref 2–10)
NEUTROPHILS # BLD AUTO: 5.3 THOU/UL (ref 2–7.7)
NEUTROPHILS NFR BLD AUTO: 64 % (ref 50–70)
PHOSPHATE SERPL-MCNC: 3.3 MG/DL (ref 2.5–4.5)
PLATELET # BLD AUTO: 163 THOU/UL (ref 140–440)
PMV BLD AUTO: 9.1 FL (ref 7–10)
POTASSIUM BLD-SCNC: 4.1 MMOL/L (ref 3.5–5)
RBC # BLD AUTO: 4.11 MILL/UL (ref 3.8–5.4)
SODIUM SERPL-SCNC: 135 MMOL/L (ref 136–145)
WBC: 8.2 THOU/UL (ref 4–11)

## 2019-01-04 ENCOUNTER — COMMUNICATION - HEALTHEAST (OUTPATIENT)
Dept: INTERNAL MEDICINE | Facility: CLINIC | Age: 84
End: 2019-01-04

## 2019-01-04 DIAGNOSIS — J41.0 SIMPLE CHRONIC BRONCHITIS (H): ICD-10-CM

## 2019-01-11 ENCOUNTER — COMMUNICATION - HEALTHEAST (OUTPATIENT)
Dept: INTERNAL MEDICINE | Facility: CLINIC | Age: 84
End: 2019-01-11

## 2019-01-11 ENCOUNTER — AMBULATORY - HEALTHEAST (OUTPATIENT)
Dept: INTERNAL MEDICINE | Facility: CLINIC | Age: 84
End: 2019-01-11

## 2019-01-21 ENCOUNTER — AMBULATORY - HEALTHEAST (OUTPATIENT)
Dept: CARDIOLOGY | Facility: CLINIC | Age: 84
End: 2019-01-21

## 2019-01-21 DIAGNOSIS — I35.9 AORTIC VALVE DISORDER: ICD-10-CM

## 2019-01-21 LAB — INTERNATIONAL NORMALIZATION RATIO, POC - HISTORICAL: 3.1

## 2019-02-06 ENCOUNTER — AMBULATORY - HEALTHEAST (OUTPATIENT)
Dept: CARDIOLOGY | Facility: CLINIC | Age: 84
End: 2019-02-06

## 2019-02-06 DIAGNOSIS — Z95.0 CARDIAC PACEMAKER IN SITU: ICD-10-CM

## 2019-02-18 ENCOUNTER — AMBULATORY - HEALTHEAST (OUTPATIENT)
Dept: CARDIOLOGY | Facility: CLINIC | Age: 84
End: 2019-02-18

## 2019-02-18 DIAGNOSIS — I35.9 AORTIC VALVE DISORDER: ICD-10-CM

## 2019-02-18 LAB — INTERNATIONAL NORMALIZATION RATIO, POC - HISTORICAL: 1.6

## 2019-02-22 ENCOUNTER — COMMUNICATION - HEALTHEAST (OUTPATIENT)
Dept: INTERNAL MEDICINE | Facility: CLINIC | Age: 84
End: 2019-02-22

## 2019-02-22 DIAGNOSIS — K21.9 GASTROESOPHAGEAL REFLUX DISEASE, ESOPHAGITIS PRESENCE NOT SPECIFIED: ICD-10-CM

## 2019-02-26 ENCOUNTER — COMMUNICATION - HEALTHEAST (OUTPATIENT)
Dept: INTERNAL MEDICINE | Facility: CLINIC | Age: 84
End: 2019-02-26

## 2019-02-26 DIAGNOSIS — I77.6 VASCULITIS (H): ICD-10-CM

## 2019-03-04 ENCOUNTER — AMBULATORY - HEALTHEAST (OUTPATIENT)
Dept: CARDIOLOGY | Facility: CLINIC | Age: 84
End: 2019-03-04

## 2019-03-04 DIAGNOSIS — I35.9 AORTIC VALVE DISORDER: ICD-10-CM

## 2019-03-04 LAB — INTERNATIONAL NORMALIZATION RATIO, POC - HISTORICAL: 4.1

## 2019-03-07 ENCOUNTER — COMMUNICATION - HEALTHEAST (OUTPATIENT)
Dept: INTERNAL MEDICINE | Facility: CLINIC | Age: 84
End: 2019-03-07

## 2019-03-11 ENCOUNTER — AMBULATORY - HEALTHEAST (OUTPATIENT)
Dept: CARDIOLOGY | Facility: CLINIC | Age: 84
End: 2019-03-11

## 2019-03-11 DIAGNOSIS — I35.9 AORTIC VALVE DISORDER: ICD-10-CM

## 2019-03-11 LAB — INTERNATIONAL NORMALIZATION RATIO, POC - HISTORICAL: 1.8

## 2019-03-11 RX ORDER — NITROGLYCERIN 0.4 MG/1
TABLET SUBLINGUAL
Qty: 90 TABLET | Refills: 12 | Status: SHIPPED | OUTPATIENT
Start: 2019-03-11 | End: 2021-08-30

## 2019-03-27 ENCOUNTER — OFFICE VISIT - HEALTHEAST (OUTPATIENT)
Dept: INTERNAL MEDICINE | Facility: CLINIC | Age: 84
End: 2019-03-27

## 2019-03-27 DIAGNOSIS — I25.9 IHD (ISCHEMIC HEART DISEASE): ICD-10-CM

## 2019-03-27 DIAGNOSIS — R25.1 TREMOR: ICD-10-CM

## 2019-03-27 DIAGNOSIS — I48.20 CHRONIC ATRIAL FIBRILLATION (H): ICD-10-CM

## 2019-03-27 DIAGNOSIS — Z95.2 H/O AORTIC VALVE REPLACEMENT: ICD-10-CM

## 2019-03-27 DIAGNOSIS — I77.6 VASCULITIS (H): ICD-10-CM

## 2019-03-27 ASSESSMENT — MIFFLIN-ST. JEOR: SCORE: 981.77

## 2019-04-02 ENCOUNTER — AMBULATORY - HEALTHEAST (OUTPATIENT)
Dept: LAB | Facility: CLINIC | Age: 84
End: 2019-04-02

## 2019-04-02 DIAGNOSIS — I77.6 VASCULITIS (H): ICD-10-CM

## 2019-04-02 DIAGNOSIS — I25.9 IHD (ISCHEMIC HEART DISEASE): ICD-10-CM

## 2019-04-02 LAB
ANION GAP SERPL CALCULATED.3IONS-SCNC: 7 MMOL/L (ref 5–18)
BASOPHILS # BLD AUTO: 0 THOU/UL (ref 0–0.2)
BASOPHILS NFR BLD AUTO: 0 % (ref 0–2)
BUN SERPL-MCNC: 16 MG/DL (ref 8–28)
CALCIUM SERPL-MCNC: 9.2 MG/DL (ref 8.5–10.5)
CHLORIDE BLD-SCNC: 102 MMOL/L (ref 98–107)
CHOLEST SERPL-MCNC: 193 MG/DL
CO2 SERPL-SCNC: 30 MMOL/L (ref 22–31)
CREAT SERPL-MCNC: 0.81 MG/DL (ref 0.6–1.1)
EOSINOPHIL # BLD AUTO: 0.3 THOU/UL (ref 0–0.4)
EOSINOPHIL NFR BLD AUTO: 5 % (ref 0–6)
ERYTHROCYTE [DISTWIDTH] IN BLOOD BY AUTOMATED COUNT: 12 % (ref 11–14.5)
ERYTHROCYTE [SEDIMENTATION RATE] IN BLOOD BY WESTERGREN METHOD: 49 MM/HR (ref 0–20)
FASTING STATUS PATIENT QL REPORTED: YES
GFR SERPL CREATININE-BSD FRML MDRD: >60 ML/MIN/1.73M2
GLUCOSE BLD-MCNC: 90 MG/DL (ref 70–125)
HCT VFR BLD AUTO: 36.1 % (ref 35–47)
HDLC SERPL-MCNC: 58 MG/DL
HGB BLD-MCNC: 12.1 G/DL (ref 12–16)
LDLC SERPL CALC-MCNC: 106 MG/DL
LYMPHOCYTES # BLD AUTO: 1.4 THOU/UL (ref 0.8–4.4)
LYMPHOCYTES NFR BLD AUTO: 26 % (ref 20–40)
MCH RBC QN AUTO: 34 PG (ref 27–34)
MCHC RBC AUTO-ENTMCNC: 33.4 G/DL (ref 32–36)
MCV RBC AUTO: 102 FL (ref 80–100)
MONOCYTES # BLD AUTO: 0.6 THOU/UL (ref 0–0.9)
MONOCYTES NFR BLD AUTO: 10 % (ref 2–10)
NEUTROPHILS # BLD AUTO: 3.4 THOU/UL (ref 2–7.7)
NEUTROPHILS NFR BLD AUTO: 60 % (ref 50–70)
PLATELET # BLD AUTO: 181 THOU/UL (ref 140–440)
PMV BLD AUTO: 8.2 FL (ref 7–10)
POTASSIUM BLD-SCNC: 4.2 MMOL/L (ref 3.5–5)
RBC # BLD AUTO: 3.55 MILL/UL (ref 3.8–5.4)
SODIUM SERPL-SCNC: 139 MMOL/L (ref 136–145)
TRIGL SERPL-MCNC: 144 MG/DL
WBC: 5.7 THOU/UL (ref 4–11)

## 2019-04-05 ENCOUNTER — AMBULATORY - HEALTHEAST (OUTPATIENT)
Dept: INTERNAL MEDICINE | Facility: CLINIC | Age: 84
End: 2019-04-05

## 2019-04-05 ENCOUNTER — COMMUNICATION - HEALTHEAST (OUTPATIENT)
Dept: INTERNAL MEDICINE | Facility: CLINIC | Age: 84
End: 2019-04-05

## 2019-04-05 DIAGNOSIS — I25.10 ATHEROSCLEROSIS OF NATIVE CORONARY ARTERY OF NATIVE HEART WITHOUT ANGINA PECTORIS: ICD-10-CM

## 2019-04-08 ENCOUNTER — COMMUNICATION - HEALTHEAST (OUTPATIENT)
Dept: INTERNAL MEDICINE | Facility: CLINIC | Age: 84
End: 2019-04-08

## 2019-04-08 ENCOUNTER — AMBULATORY - HEALTHEAST (OUTPATIENT)
Dept: CARDIOLOGY | Facility: CLINIC | Age: 84
End: 2019-04-08

## 2019-04-08 DIAGNOSIS — I35.9 AORTIC VALVE DISORDER: ICD-10-CM

## 2019-04-08 LAB — INTERNATIONAL NORMALIZATION RATIO, POC - HISTORICAL: 4.6

## 2019-04-22 ENCOUNTER — AMBULATORY - HEALTHEAST (OUTPATIENT)
Dept: CARDIOLOGY | Facility: CLINIC | Age: 84
End: 2019-04-22

## 2019-04-22 DIAGNOSIS — I35.9 AORTIC VALVE DISORDER: ICD-10-CM

## 2019-04-22 LAB — INTERNATIONAL NORMALIZATION RATIO, POC - HISTORICAL: 2.1

## 2019-05-06 ENCOUNTER — AMBULATORY - HEALTHEAST (OUTPATIENT)
Dept: CARDIOLOGY | Facility: CLINIC | Age: 84
End: 2019-05-06

## 2019-05-06 DIAGNOSIS — I35.9 AORTIC VALVE DISORDER: ICD-10-CM

## 2019-05-06 LAB — INTERNATIONAL NORMALIZATION RATIO, POC - HISTORICAL: 2.3

## 2019-05-07 ENCOUNTER — AMBULATORY - HEALTHEAST (OUTPATIENT)
Dept: CARDIOLOGY | Facility: CLINIC | Age: 84
End: 2019-05-07

## 2019-05-07 ENCOUNTER — OFFICE VISIT - HEALTHEAST (OUTPATIENT)
Dept: CARDIOLOGY | Facility: CLINIC | Age: 84
End: 2019-05-07

## 2019-05-07 DIAGNOSIS — I25.83 CORONARY ATHEROSCLEROSIS DUE TO LIPID RICH PLAQUE: ICD-10-CM

## 2019-05-07 DIAGNOSIS — I48.20 CHRONIC ATRIAL FIBRILLATION (H): ICD-10-CM

## 2019-05-07 DIAGNOSIS — Z95.0 CARDIAC PACEMAKER IN SITU: ICD-10-CM

## 2019-05-07 DIAGNOSIS — Z95.2 H/O AORTIC VALVE REPLACEMENT: ICD-10-CM

## 2019-05-07 DIAGNOSIS — I77.6 VASCULITIS (H): ICD-10-CM

## 2019-05-07 DIAGNOSIS — I49.5 SINOATRIAL NODE DYSFUNCTION (H): ICD-10-CM

## 2019-05-07 DIAGNOSIS — Z95.1 S/P CABG (CORONARY ARTERY BYPASS GRAFT): ICD-10-CM

## 2019-05-07 ASSESSMENT — MIFFLIN-ST. JEOR: SCORE: 977.51

## 2019-05-20 ENCOUNTER — HOSPITAL ENCOUNTER (OUTPATIENT)
Dept: CARDIOLOGY | Facility: CLINIC | Age: 84
Discharge: HOME | End: 2019-05-20
Attending: INTERNAL MEDICINE

## 2019-05-20 DIAGNOSIS — Z95.2 H/O AORTIC VALVE REPLACEMENT: ICD-10-CM

## 2019-05-20 ASSESSMENT — MIFFLIN-ST. JEOR: SCORE: 977.05

## 2019-05-21 LAB
AORTIC ROOT: 3.1 CM
AORTIC VALVE MEAN VELOCITY: 174 CM/S
ASCENDING AORTA: 2.9 CM
AV DIMENSIONLESS INDEX VTI: 0.4
AV MEAN GRADIENT: 13 MMHG
AV PEAK GRADIENT: 21.5 MMHG
AV VALVE AREA: 1.1 CM2
AV VELOCITY RATIO: 0.4
BSA FOR ECHO PROCEDURE: 1.57 M2
CV BLOOD PRESSURE: ABNORMAL MMHG
CV ECHO HEIGHT: 63 IN
CV ECHO WEIGHT: 123 LBS
DOP CALC AO PEAK VEL: 232 CM/S
DOP CALC AO VTI: 52 CM
DOP CALC LVOT AREA: 2.54 CM2
DOP CALC LVOT DIAMETER: 1.8 CM
DOP CALC LVOT PEAK VEL: 96.9 CM/S
DOP CALC LVOT STROKE VOLUME: 57.7 CM3
DOP CALC MV VTI: 37.4 CM
DOP CALCLVOT PEAK VEL VTI: 22.7 CM
EJECTION FRACTION: 54 % (ref 55–75)
FRACTIONAL SHORTENING: 26.8 % (ref 28–44)
INTERVENTRICULAR SEPTUM IN END DIASTOLE: 1 CM (ref 0.6–0.9)
IVS/PW RATIO: 1.3
LA AREA 1: 18.9 CM2
LA AREA 2: 22.5 CM2
LEFT ATRIUM LENGTH: 5.79 CM
LEFT ATRIUM SIZE: 4.8 CM
LEFT ATRIUM VOLUME INDEX: 39.8 ML/M2
LEFT ATRIUM VOLUME: 62.4 ML
LEFT VENTRICLE CARDIAC INDEX: 2.9 L/MIN/M2
LEFT VENTRICLE CARDIAC OUTPUT: 4.5 L/MIN
LEFT VENTRICLE DIASTOLIC VOLUME INDEX: 40.1 CM3/M2 (ref 29–61)
LEFT VENTRICLE DIASTOLIC VOLUME: 63 CM3 (ref 46–106)
LEFT VENTRICLE HEART RATE: 78 BPM
LEFT VENTRICLE MASS INDEX: 72.7 G/M2
LEFT VENTRICLE SYSTOLIC VOLUME INDEX: 18.5 CM3/M2 (ref 8–24)
LEFT VENTRICLE SYSTOLIC VOLUME: 29 CM3 (ref 14–42)
LEFT VENTRICULAR INTERNAL DIMENSION IN DIASTOLE: 4.1 CM (ref 3.8–5.2)
LEFT VENTRICULAR INTERNAL DIMENSION IN SYSTOLE: 3 CM (ref 2.2–3.5)
LEFT VENTRICULAR MASS: 114.1 G
LEFT VENTRICULAR OUTFLOW TRACT MEAN GRADIENT: 2 MMHG
LEFT VENTRICULAR OUTFLOW TRACT MEAN VELOCITY: 62 CM/S
LEFT VENTRICULAR OUTFLOW TRACT PEAK GRADIENT: 4 MMHG
LEFT VENTRICULAR POSTERIOR WALL IN END DIASTOLE: 0.8 CM (ref 0.6–0.9)
LV STROKE VOLUME INDEX: 36.8 ML/M2
MITRAL VALVE DECELERATION SLOPE: 4050 MM/S2
MITRAL VALVE MEAN INFLOW VELOCITY: 84.4 CM/S
MITRAL VALVE PEAK VELOCITY: 161 CM/S
MITRAL VALVE PRESSURE HALF-TIME: 116 MS
MV AREA VTI: 1.54 CM2
MV AVERAGE E/E' RATIO: 14.9 CM/S
MV DECELERATION TIME: 257 MS
MV E'TISSUE VEL-LAT: 12 CM/S
MV E'TISSUE VEL-MED: 7.12 CM/S
MV LATERAL E/E' RATIO: 11.8
MV MEAN GRADIENT: 4 MMHG
MV MEDIAL E/E' RATIO: 19.9
MV PEAK E VELOCITY: 142 CM/S
MV PEAK GRADIENT: 10.4 MMHG
MV VALVE AREA BY CONTINUITY EQUATION: 1.5 CM2
MV VALVE AREA PRESSURE 1/2 METHOD: 1.9 CM2
NUC REST DIASTOLIC VOLUME INDEX: 1968 LBS
NUC REST SYSTOLIC VOLUME INDEX: 63 IN
TRICUSPID REGURGITATION PEAK PRESSURE GRADIENT: 28.9 MMHG
TRICUSPID VALVE ANULAR PLANE SYSTOLIC EXCURSION: 1.5 CM
TRICUSPID VALVE PEAK REGURGITANT VELOCITY: 269 CM/S

## 2019-05-24 ENCOUNTER — COMMUNICATION - HEALTHEAST (OUTPATIENT)
Dept: ANTICOAGULATION | Facility: CLINIC | Age: 84
End: 2019-05-24

## 2019-05-24 DIAGNOSIS — I35.9 AORTIC VALVE DISORDER: ICD-10-CM

## 2019-05-24 DIAGNOSIS — Z95.2 H/O AORTIC VALVE REPLACEMENT: ICD-10-CM

## 2019-05-24 DIAGNOSIS — I48.20 CHRONIC ATRIAL FIBRILLATION (H): ICD-10-CM

## 2019-06-03 ENCOUNTER — COMMUNICATION - HEALTHEAST (OUTPATIENT)
Dept: ANTICOAGULATION | Facility: CLINIC | Age: 84
End: 2019-06-03

## 2019-06-03 ENCOUNTER — AMBULATORY - HEALTHEAST (OUTPATIENT)
Dept: CARDIOLOGY | Facility: CLINIC | Age: 84
End: 2019-06-03

## 2019-06-03 DIAGNOSIS — I48.20 CHRONIC ATRIAL FIBRILLATION (H): ICD-10-CM

## 2019-06-03 DIAGNOSIS — Z95.2 H/O AORTIC VALVE REPLACEMENT: ICD-10-CM

## 2019-06-03 DIAGNOSIS — Z79.01 LONG TERM CURRENT USE OF ANTICOAGULANT THERAPY: ICD-10-CM

## 2019-06-03 LAB — POC INR - HE - HISTORICAL: 1.6 (ref 0.9–1.1)

## 2019-06-17 ENCOUNTER — COMMUNICATION - HEALTHEAST (OUTPATIENT)
Dept: ANTICOAGULATION | Facility: CLINIC | Age: 84
End: 2019-06-17

## 2019-06-17 ENCOUNTER — AMBULATORY - HEALTHEAST (OUTPATIENT)
Dept: CARDIOLOGY | Facility: CLINIC | Age: 84
End: 2019-06-17

## 2019-06-17 DIAGNOSIS — Z95.2 H/O AORTIC VALVE REPLACEMENT: ICD-10-CM

## 2019-06-17 DIAGNOSIS — I48.91 ATRIAL FIBRILLATION (H): ICD-10-CM

## 2019-06-17 DIAGNOSIS — I48.20 CHRONIC ATRIAL FIBRILLATION (H): ICD-10-CM

## 2019-06-17 LAB — POC INR - HE - HISTORICAL: 2.1 (ref 0.9–1.1)

## 2019-06-27 ENCOUNTER — COMMUNICATION - HEALTHEAST (OUTPATIENT)
Dept: INTERNAL MEDICINE | Facility: CLINIC | Age: 84
End: 2019-06-27

## 2019-06-27 DIAGNOSIS — R00.0 TACHYCARDIA: ICD-10-CM

## 2019-07-11 ENCOUNTER — RECORDS - HEALTHEAST (OUTPATIENT)
Dept: ADMINISTRATIVE | Facility: OTHER | Age: 84
End: 2019-07-11

## 2019-07-22 ENCOUNTER — AMBULATORY - HEALTHEAST (OUTPATIENT)
Dept: CARDIOLOGY | Facility: CLINIC | Age: 84
End: 2019-07-22

## 2019-07-22 ENCOUNTER — COMMUNICATION - HEALTHEAST (OUTPATIENT)
Dept: ANTICOAGULATION | Facility: CLINIC | Age: 84
End: 2019-07-22

## 2019-07-22 DIAGNOSIS — Z95.2 H/O AORTIC VALVE REPLACEMENT: ICD-10-CM

## 2019-07-22 DIAGNOSIS — I48.20 CHRONIC ATRIAL FIBRILLATION (H): ICD-10-CM

## 2019-07-22 DIAGNOSIS — I48.91 ATRIAL FIBRILLATION (H): ICD-10-CM

## 2019-07-22 LAB — POC INR - HE - HISTORICAL: 4.8 (ref 0.9–1.1)

## 2019-07-30 ENCOUNTER — AMBULATORY - HEALTHEAST (OUTPATIENT)
Dept: LAB | Facility: CLINIC | Age: 84
End: 2019-07-30

## 2019-07-30 ENCOUNTER — COMMUNICATION - HEALTHEAST (OUTPATIENT)
Dept: ANTICOAGULATION | Facility: CLINIC | Age: 84
End: 2019-07-30

## 2019-07-30 DIAGNOSIS — Z95.2 H/O AORTIC VALVE REPLACEMENT: ICD-10-CM

## 2019-07-30 DIAGNOSIS — I48.91 ATRIAL FIBRILLATION (H): ICD-10-CM

## 2019-07-30 DIAGNOSIS — I48.20 CHRONIC ATRIAL FIBRILLATION (H): ICD-10-CM

## 2019-07-30 LAB — INR PPP: 2 (ref 0.9–1.1)

## 2019-08-07 ENCOUNTER — AMBULATORY - HEALTHEAST (OUTPATIENT)
Dept: CARDIOLOGY | Facility: CLINIC | Age: 84
End: 2019-08-07

## 2019-08-07 DIAGNOSIS — Z95.0 CARDIAC PACEMAKER IN SITU: ICD-10-CM

## 2019-08-13 ENCOUNTER — COMMUNICATION - HEALTHEAST (OUTPATIENT)
Dept: ANTICOAGULATION | Facility: CLINIC | Age: 84
End: 2019-08-13

## 2019-08-13 ENCOUNTER — AMBULATORY - HEALTHEAST (OUTPATIENT)
Dept: LAB | Facility: CLINIC | Age: 84
End: 2019-08-13

## 2019-08-13 DIAGNOSIS — I48.91 ATRIAL FIBRILLATION (H): ICD-10-CM

## 2019-08-13 DIAGNOSIS — I48.20 CHRONIC ATRIAL FIBRILLATION (H): ICD-10-CM

## 2019-08-13 DIAGNOSIS — Z95.2 H/O AORTIC VALVE REPLACEMENT: ICD-10-CM

## 2019-08-13 LAB — INR PPP: 2.8 (ref 0.9–1.1)

## 2019-09-06 ENCOUNTER — OFFICE VISIT - HEALTHEAST (OUTPATIENT)
Dept: INTERNAL MEDICINE | Facility: CLINIC | Age: 84
End: 2019-09-06

## 2019-09-06 DIAGNOSIS — I49.5 SINOATRIAL NODE DYSFUNCTION (H): ICD-10-CM

## 2019-09-06 DIAGNOSIS — I25.9 IHD (ISCHEMIC HEART DISEASE): ICD-10-CM

## 2019-09-06 DIAGNOSIS — I77.6 VASCULITIS (H): ICD-10-CM

## 2019-09-06 DIAGNOSIS — I48.91 ATRIAL FIBRILLATION, UNSPECIFIED TYPE (H): ICD-10-CM

## 2019-09-06 ASSESSMENT — MIFFLIN-ST. JEOR: SCORE: 986.31

## 2019-09-10 ENCOUNTER — COMMUNICATION - HEALTHEAST (OUTPATIENT)
Dept: ANTICOAGULATION | Facility: CLINIC | Age: 84
End: 2019-09-10

## 2019-09-10 ENCOUNTER — AMBULATORY - HEALTHEAST (OUTPATIENT)
Dept: LAB | Facility: CLINIC | Age: 84
End: 2019-09-10

## 2019-09-10 DIAGNOSIS — I48.20 CHRONIC ATRIAL FIBRILLATION (H): ICD-10-CM

## 2019-09-10 DIAGNOSIS — Z95.2 H/O AORTIC VALVE REPLACEMENT: ICD-10-CM

## 2019-09-10 DIAGNOSIS — I48.91 ATRIAL FIBRILLATION, UNSPECIFIED TYPE (H): ICD-10-CM

## 2019-09-10 LAB — INR PPP: 1.6 (ref 0.9–1.1)

## 2019-09-24 ENCOUNTER — COMMUNICATION - HEALTHEAST (OUTPATIENT)
Dept: ANTICOAGULATION | Facility: CLINIC | Age: 84
End: 2019-09-24

## 2019-09-24 ENCOUNTER — AMBULATORY - HEALTHEAST (OUTPATIENT)
Dept: NURSING | Facility: CLINIC | Age: 84
End: 2019-09-24

## 2019-09-24 ENCOUNTER — AMBULATORY - HEALTHEAST (OUTPATIENT)
Dept: LAB | Facility: CLINIC | Age: 84
End: 2019-09-24

## 2019-09-24 DIAGNOSIS — Z95.2 H/O AORTIC VALVE REPLACEMENT: ICD-10-CM

## 2019-09-24 DIAGNOSIS — Z23 FLU VACCINE NEED: ICD-10-CM

## 2019-09-24 DIAGNOSIS — I48.20 CHRONIC ATRIAL FIBRILLATION (H): ICD-10-CM

## 2019-09-24 DIAGNOSIS — I48.91 ATRIAL FIBRILLATION (H): ICD-10-CM

## 2019-09-24 LAB — INR PPP: 2.5 (ref 0.9–1.1)

## 2019-10-08 ENCOUNTER — COMMUNICATION - HEALTHEAST (OUTPATIENT)
Dept: ANTICOAGULATION | Facility: CLINIC | Age: 84
End: 2019-10-08

## 2019-10-08 ENCOUNTER — AMBULATORY - HEALTHEAST (OUTPATIENT)
Dept: LAB | Facility: CLINIC | Age: 84
End: 2019-10-08

## 2019-10-08 DIAGNOSIS — I48.91 ATRIAL FIBRILLATION (H): ICD-10-CM

## 2019-10-08 DIAGNOSIS — Z95.2 H/O AORTIC VALVE REPLACEMENT: ICD-10-CM

## 2019-10-08 DIAGNOSIS — I48.20 CHRONIC ATRIAL FIBRILLATION (H): ICD-10-CM

## 2019-10-08 LAB — INR PPP: 2 (ref 0.9–1.1)

## 2019-11-05 ENCOUNTER — COMMUNICATION - HEALTHEAST (OUTPATIENT)
Dept: ANTICOAGULATION | Facility: CLINIC | Age: 84
End: 2019-11-05

## 2019-11-05 ENCOUNTER — AMBULATORY - HEALTHEAST (OUTPATIENT)
Dept: LAB | Facility: CLINIC | Age: 84
End: 2019-11-05

## 2019-11-05 DIAGNOSIS — I48.91 ATRIAL FIBRILLATION (H): ICD-10-CM

## 2019-11-05 DIAGNOSIS — Z95.2 H/O AORTIC VALVE REPLACEMENT: ICD-10-CM

## 2019-11-05 DIAGNOSIS — I48.20 CHRONIC ATRIAL FIBRILLATION (H): ICD-10-CM

## 2019-11-05 LAB — INR PPP: 1.9 (ref 0.9–1.1)

## 2019-11-07 ENCOUNTER — AMBULATORY - HEALTHEAST (OUTPATIENT)
Dept: CARDIOLOGY | Facility: CLINIC | Age: 84
End: 2019-11-07

## 2019-11-07 DIAGNOSIS — Z95.0 CARDIAC PACEMAKER IN SITU: ICD-10-CM

## 2019-11-07 DIAGNOSIS — I48.91 ATRIAL FIBRILLATION, UNSPECIFIED TYPE (H): ICD-10-CM

## 2019-11-12 ENCOUNTER — COMMUNICATION - HEALTHEAST (OUTPATIENT)
Dept: INTERNAL MEDICINE | Facility: CLINIC | Age: 84
End: 2019-11-12

## 2019-11-13 ENCOUNTER — COMMUNICATION - HEALTHEAST (OUTPATIENT)
Dept: INTERNAL MEDICINE | Facility: CLINIC | Age: 84
End: 2019-11-13

## 2019-11-13 DIAGNOSIS — E53.8 VITAMIN B 12 DEFICIENCY: ICD-10-CM

## 2019-11-14 ENCOUNTER — OFFICE VISIT - HEALTHEAST (OUTPATIENT)
Dept: INTERNAL MEDICINE | Facility: CLINIC | Age: 84
End: 2019-11-14

## 2019-11-14 DIAGNOSIS — J98.01 BRONCHOSPASM: ICD-10-CM

## 2019-11-14 DIAGNOSIS — I77.6 VASCULITIS (H): ICD-10-CM

## 2019-11-14 DIAGNOSIS — I25.83 CORONARY ATHEROSCLEROSIS DUE TO LIPID RICH PLAQUE: ICD-10-CM

## 2019-11-14 DIAGNOSIS — K21.00 GASTROESOPHAGEAL REFLUX DISEASE WITH ESOPHAGITIS: ICD-10-CM

## 2019-11-14 DIAGNOSIS — I48.91 ATRIAL FIBRILLATION, UNSPECIFIED TYPE (H): ICD-10-CM

## 2019-11-14 DIAGNOSIS — I49.5 SINOATRIAL NODE DYSFUNCTION (H): ICD-10-CM

## 2019-11-14 LAB
ALBUMIN SERPL-MCNC: 3.7 G/DL (ref 3.5–5)
ALP SERPL-CCNC: 70 U/L (ref 45–120)
ALT SERPL W P-5'-P-CCNC: 14 U/L (ref 0–45)
ANION GAP SERPL CALCULATED.3IONS-SCNC: 11 MMOL/L (ref 5–18)
AST SERPL W P-5'-P-CCNC: 27 U/L (ref 0–40)
BASOPHILS # BLD AUTO: 0 THOU/UL (ref 0–0.2)
BASOPHILS NFR BLD AUTO: 0 % (ref 0–2)
BILIRUB DIRECT SERPL-MCNC: 0.3 MG/DL
BILIRUB SERPL-MCNC: 0.6 MG/DL (ref 0–1)
BUN SERPL-MCNC: 16 MG/DL (ref 8–28)
CALCIUM SERPL-MCNC: 9.2 MG/DL (ref 8.5–10.5)
CHLORIDE BLD-SCNC: 105 MMOL/L (ref 98–107)
CHOLEST SERPL-MCNC: 142 MG/DL
CO2 SERPL-SCNC: 27 MMOL/L (ref 22–31)
CREAT SERPL-MCNC: 0.75 MG/DL (ref 0.6–1.1)
EOSINOPHIL # BLD AUTO: 0.2 THOU/UL (ref 0–0.4)
EOSINOPHIL NFR BLD AUTO: 2 % (ref 0–6)
ERYTHROCYTE [DISTWIDTH] IN BLOOD BY AUTOMATED COUNT: 12.4 % (ref 11–14.5)
ERYTHROCYTE [SEDIMENTATION RATE] IN BLOOD BY WESTERGREN METHOD: 23 MM/HR (ref 0–20)
FASTING STATUS PATIENT QL REPORTED: YES
GFR SERPL CREATININE-BSD FRML MDRD: >60 ML/MIN/1.73M2
GLUCOSE BLD-MCNC: 104 MG/DL (ref 70–125)
HCT VFR BLD AUTO: 35.4 % (ref 35–47)
HDLC SERPL-MCNC: 69 MG/DL
HGB BLD-MCNC: 12.2 G/DL (ref 12–16)
LDLC SERPL CALC-MCNC: 54 MG/DL
LYMPHOCYTES # BLD AUTO: 1.4 THOU/UL (ref 0.8–4.4)
LYMPHOCYTES NFR BLD AUTO: 14 % (ref 20–40)
MCH RBC QN AUTO: 34 PG (ref 27–34)
MCHC RBC AUTO-ENTMCNC: 34.4 G/DL (ref 32–36)
MCV RBC AUTO: 99 FL (ref 80–100)
MONOCYTES # BLD AUTO: 0.6 THOU/UL (ref 0–0.9)
MONOCYTES NFR BLD AUTO: 6 % (ref 2–10)
NEUTROPHILS # BLD AUTO: 7.6 THOU/UL (ref 2–7.7)
NEUTROPHILS NFR BLD AUTO: 78 % (ref 50–70)
PLATELET # BLD AUTO: 219 THOU/UL (ref 140–440)
PMV BLD AUTO: 8.7 FL (ref 7–10)
POTASSIUM BLD-SCNC: 3.7 MMOL/L (ref 3.5–5)
PROT SERPL-MCNC: 7.1 G/DL (ref 6–8)
RBC # BLD AUTO: 3.57 MILL/UL (ref 3.8–5.4)
SODIUM SERPL-SCNC: 143 MMOL/L (ref 136–145)
TRIGL SERPL-MCNC: 96 MG/DL
WBC: 9.7 THOU/UL (ref 4–11)

## 2019-11-14 ASSESSMENT — MIFFLIN-ST. JEOR: SCORE: 995.2

## 2019-11-15 ENCOUNTER — COMMUNICATION - HEALTHEAST (OUTPATIENT)
Dept: INTERNAL MEDICINE | Facility: CLINIC | Age: 84
End: 2019-11-15

## 2019-11-19 ENCOUNTER — COMMUNICATION - HEALTHEAST (OUTPATIENT)
Dept: ANTICOAGULATION | Facility: CLINIC | Age: 84
End: 2019-11-19

## 2019-11-19 ENCOUNTER — AMBULATORY - HEALTHEAST (OUTPATIENT)
Dept: LAB | Facility: CLINIC | Age: 84
End: 2019-11-19

## 2019-11-19 DIAGNOSIS — I48.91 ATRIAL FIBRILLATION, UNSPECIFIED TYPE (H): ICD-10-CM

## 2019-11-19 DIAGNOSIS — Z95.2 H/O AORTIC VALVE REPLACEMENT: ICD-10-CM

## 2019-11-19 DIAGNOSIS — I48.20 CHRONIC ATRIAL FIBRILLATION (H): ICD-10-CM

## 2019-11-19 LAB — INR PPP: 2.2 (ref 0.9–1.1)

## 2019-12-03 ENCOUNTER — AMBULATORY - HEALTHEAST (OUTPATIENT)
Dept: LAB | Facility: CLINIC | Age: 84
End: 2019-12-03

## 2019-12-03 ENCOUNTER — COMMUNICATION - HEALTHEAST (OUTPATIENT)
Dept: ANTICOAGULATION | Facility: CLINIC | Age: 84
End: 2019-12-03

## 2019-12-03 DIAGNOSIS — I48.20 CHRONIC ATRIAL FIBRILLATION (H): ICD-10-CM

## 2019-12-03 DIAGNOSIS — Z95.2 H/O AORTIC VALVE REPLACEMENT: ICD-10-CM

## 2019-12-03 DIAGNOSIS — I48.91 ATRIAL FIBRILLATION, UNSPECIFIED TYPE (H): ICD-10-CM

## 2019-12-03 LAB — INR PPP: 2.4 (ref 0.9–1.1)

## 2019-12-23 ENCOUNTER — COMMUNICATION - HEALTHEAST (OUTPATIENT)
Dept: CARDIOLOGY | Facility: CLINIC | Age: 84
End: 2019-12-23

## 2019-12-23 ENCOUNTER — AMBULATORY - HEALTHEAST (OUTPATIENT)
Dept: CARDIOLOGY | Facility: CLINIC | Age: 84
End: 2019-12-23

## 2019-12-23 DIAGNOSIS — I48.91 ATRIAL FIBRILLATION, UNSPECIFIED TYPE (H): ICD-10-CM

## 2019-12-23 DIAGNOSIS — Z95.0 CARDIAC PACEMAKER IN SITU: ICD-10-CM

## 2019-12-26 ENCOUNTER — OFFICE VISIT - HEALTHEAST (OUTPATIENT)
Dept: CARDIOLOGY | Facility: CLINIC | Age: 84
End: 2019-12-26

## 2019-12-26 DIAGNOSIS — Z95.1 S/P CABG (CORONARY ARTERY BYPASS GRAFT): ICD-10-CM

## 2019-12-26 DIAGNOSIS — I48.20 CHRONIC ATRIAL FIBRILLATION (H): ICD-10-CM

## 2019-12-26 DIAGNOSIS — Z95.2 H/O AORTIC VALVE REPLACEMENT: ICD-10-CM

## 2019-12-26 DIAGNOSIS — I25.83 CORONARY ATHEROSCLEROSIS DUE TO LIPID RICH PLAQUE: ICD-10-CM

## 2019-12-26 DIAGNOSIS — R06.09 DOE (DYSPNEA ON EXERTION): ICD-10-CM

## 2019-12-26 DIAGNOSIS — R60.9 EDEMA, UNSPECIFIED TYPE: ICD-10-CM

## 2019-12-26 DIAGNOSIS — Z95.0 CARDIAC PACEMAKER IN SITU: ICD-10-CM

## 2019-12-26 DIAGNOSIS — I49.5 SINOATRIAL NODE DYSFUNCTION (H): ICD-10-CM

## 2019-12-26 DIAGNOSIS — R07.89 OTHER CHEST PAIN: ICD-10-CM

## 2019-12-26 LAB
ANION GAP SERPL CALCULATED.3IONS-SCNC: 10 MMOL/L (ref 5–18)
BUN SERPL-MCNC: 20 MG/DL (ref 8–28)
CALCIUM SERPL-MCNC: 9.1 MG/DL (ref 8.5–10.5)
CHLORIDE BLD-SCNC: 102 MMOL/L (ref 98–107)
CO2 SERPL-SCNC: 27 MMOL/L (ref 22–31)
CREAT SERPL-MCNC: 0.81 MG/DL (ref 0.6–1.1)
GFR SERPL CREATININE-BSD FRML MDRD: >60 ML/MIN/1.73M2
GLUCOSE BLD-MCNC: 85 MG/DL (ref 70–125)
POTASSIUM BLD-SCNC: 4 MMOL/L (ref 3.5–5)
SODIUM SERPL-SCNC: 139 MMOL/L (ref 136–145)

## 2019-12-26 ASSESSMENT — MIFFLIN-ST. JEOR: SCORE: 977.05

## 2019-12-27 LAB
ATRIAL RATE - MUSE: 107 BPM
BNP SERPL-MCNC: 188 PG/ML (ref 0–163)
DIASTOLIC BLOOD PRESSURE - MUSE: NORMAL
INTERPRETATION ECG - MUSE: NORMAL
P AXIS - MUSE: NORMAL
PR INTERVAL - MUSE: NORMAL
QRS DURATION - MUSE: 90 MS
QT - MUSE: 344 MS
QTC - MUSE: 446 MS
R AXIS - MUSE: 14 DEGREES
SYSTOLIC BLOOD PRESSURE - MUSE: NORMAL
T AXIS - MUSE: 78 DEGREES
VENTRICULAR RATE- MUSE: 101 BPM

## 2019-12-31 ENCOUNTER — COMMUNICATION - HEALTHEAST (OUTPATIENT)
Dept: ANTICOAGULATION | Facility: CLINIC | Age: 84
End: 2019-12-31

## 2019-12-31 ENCOUNTER — AMBULATORY - HEALTHEAST (OUTPATIENT)
Dept: LAB | Facility: CLINIC | Age: 84
End: 2019-12-31

## 2019-12-31 DIAGNOSIS — Z95.2 H/O AORTIC VALVE REPLACEMENT: ICD-10-CM

## 2019-12-31 DIAGNOSIS — I48.20 CHRONIC ATRIAL FIBRILLATION (H): ICD-10-CM

## 2019-12-31 LAB — INR PPP: 2.5 (ref 0.9–1.1)

## 2020-01-06 ENCOUNTER — COMMUNICATION - HEALTHEAST (OUTPATIENT)
Dept: INTERNAL MEDICINE | Facility: CLINIC | Age: 85
End: 2020-01-06

## 2020-01-06 DIAGNOSIS — R00.0 TACHYCARDIA: ICD-10-CM

## 2020-01-17 ENCOUNTER — COMMUNICATION - HEALTHEAST (OUTPATIENT)
Dept: INTERNAL MEDICINE | Facility: CLINIC | Age: 85
End: 2020-01-17

## 2020-01-17 DIAGNOSIS — I77.6 VASCULITIS (H): ICD-10-CM

## 2020-02-11 ENCOUNTER — AMBULATORY - HEALTHEAST (OUTPATIENT)
Dept: CARDIOLOGY | Facility: CLINIC | Age: 85
End: 2020-02-11

## 2020-02-11 ENCOUNTER — OFFICE VISIT - HEALTHEAST (OUTPATIENT)
Dept: CARDIOLOGY | Facility: CLINIC | Age: 85
End: 2020-02-11

## 2020-02-11 DIAGNOSIS — I50.9 CONGESTIVE HEART FAILURE (H): ICD-10-CM

## 2020-02-11 DIAGNOSIS — Z95.0 CARDIAC PACEMAKER IN SITU: ICD-10-CM

## 2020-02-11 DIAGNOSIS — I49.5 SINOATRIAL NODE DYSFUNCTION (H): ICD-10-CM

## 2020-02-11 DIAGNOSIS — I77.6 VASCULITIS (H): ICD-10-CM

## 2020-02-11 DIAGNOSIS — E78.00 PURE HYPERCHOLESTEROLEMIA: ICD-10-CM

## 2020-02-11 DIAGNOSIS — I35.9 AORTIC VALVE DISORDER: ICD-10-CM

## 2020-02-11 DIAGNOSIS — I48.20 CHRONIC ATRIAL FIBRILLATION (H): ICD-10-CM

## 2020-02-11 DIAGNOSIS — I25.83 CORONARY ATHEROSCLEROSIS DUE TO LIPID RICH PLAQUE: ICD-10-CM

## 2020-02-11 ASSESSMENT — MIFFLIN-ST. JEOR: SCORE: 977.05

## 2020-02-18 ENCOUNTER — AMBULATORY - HEALTHEAST (OUTPATIENT)
Dept: LAB | Facility: CLINIC | Age: 85
End: 2020-02-18

## 2020-02-18 ENCOUNTER — COMMUNICATION - HEALTHEAST (OUTPATIENT)
Dept: ANTICOAGULATION | Facility: CLINIC | Age: 85
End: 2020-02-18

## 2020-02-18 DIAGNOSIS — I48.20 CHRONIC ATRIAL FIBRILLATION (H): ICD-10-CM

## 2020-02-18 DIAGNOSIS — Z95.2 H/O AORTIC VALVE REPLACEMENT: ICD-10-CM

## 2020-02-18 LAB — INR PPP: 3.3 (ref 0.9–1.1)

## 2020-02-26 ENCOUNTER — COMMUNICATION - HEALTHEAST (OUTPATIENT)
Dept: ANTICOAGULATION | Facility: CLINIC | Age: 85
End: 2020-02-26

## 2020-02-26 ENCOUNTER — OFFICE VISIT - HEALTHEAST (OUTPATIENT)
Dept: INTERNAL MEDICINE | Facility: CLINIC | Age: 85
End: 2020-02-26

## 2020-02-26 DIAGNOSIS — I35.9 AORTIC VALVE DISORDER: ICD-10-CM

## 2020-02-26 DIAGNOSIS — Z95.2 H/O AORTIC VALVE REPLACEMENT: ICD-10-CM

## 2020-02-26 DIAGNOSIS — I25.83 CORONARY ATHEROSCLEROSIS DUE TO LIPID RICH PLAQUE: ICD-10-CM

## 2020-02-26 DIAGNOSIS — I48.20 CHRONIC ATRIAL FIBRILLATION (H): ICD-10-CM

## 2020-02-26 DIAGNOSIS — K21.9 GASTROESOPHAGEAL REFLUX DISEASE, ESOPHAGITIS PRESENCE NOT SPECIFIED: ICD-10-CM

## 2020-02-26 DIAGNOSIS — R13.10 DYSPHAGIA, UNSPECIFIED TYPE: ICD-10-CM

## 2020-02-26 DIAGNOSIS — Z95.2 AORTIC VALVE REPLACED: ICD-10-CM

## 2020-02-26 DIAGNOSIS — I77.6 VASCULITIS (H): ICD-10-CM

## 2020-02-26 LAB — INR PPP: 1.8 (ref 0.9–1.1)

## 2020-02-26 ASSESSMENT — MIFFLIN-ST. JEOR: SCORE: 977.05

## 2020-02-28 ENCOUNTER — COMMUNICATION - HEALTHEAST (OUTPATIENT)
Dept: INTERNAL MEDICINE | Facility: CLINIC | Age: 85
End: 2020-02-28

## 2020-02-28 ENCOUNTER — AMBULATORY - HEALTHEAST (OUTPATIENT)
Dept: LAB | Facility: CLINIC | Age: 85
End: 2020-02-28

## 2020-02-28 DIAGNOSIS — I48.20 CHRONIC ATRIAL FIBRILLATION (H): ICD-10-CM

## 2020-02-28 DIAGNOSIS — I77.6 VASCULITIS (H): ICD-10-CM

## 2020-02-28 LAB
ALBUMIN SERPL-MCNC: 4 G/DL (ref 3.5–5)
ALBUMIN UR-MCNC: NEGATIVE MG/DL
ALP SERPL-CCNC: 75 U/L (ref 45–120)
ALT SERPL W P-5'-P-CCNC: 20 U/L (ref 0–45)
ANION GAP SERPL CALCULATED.3IONS-SCNC: 11 MMOL/L (ref 5–18)
APPEARANCE UR: CLEAR
AST SERPL W P-5'-P-CCNC: 35 U/L (ref 0–40)
BASOPHILS # BLD AUTO: 0 THOU/UL (ref 0–0.2)
BASOPHILS NFR BLD AUTO: 0 % (ref 0–2)
BILIRUB DIRECT SERPL-MCNC: 0.3 MG/DL
BILIRUB SERPL-MCNC: 0.8 MG/DL (ref 0–1)
BILIRUB UR QL STRIP: NEGATIVE
BUN SERPL-MCNC: 18 MG/DL (ref 8–28)
C REACTIVE PROTEIN LHE: 0.1 MG/DL (ref 0–0.8)
CALCIUM SERPL-MCNC: 9.4 MG/DL (ref 8.5–10.5)
CHLORIDE BLD-SCNC: 100 MMOL/L (ref 98–107)
CO2 SERPL-SCNC: 28 MMOL/L (ref 22–31)
COLOR UR AUTO: NORMAL
CREAT SERPL-MCNC: 0.79 MG/DL (ref 0.6–1.1)
EOSINOPHIL # BLD AUTO: 0.3 THOU/UL (ref 0–0.4)
EOSINOPHIL NFR BLD AUTO: 3 % (ref 0–6)
ERYTHROCYTE [DISTWIDTH] IN BLOOD BY AUTOMATED COUNT: 14.1 % (ref 11–14.5)
ERYTHROCYTE [SEDIMENTATION RATE] IN BLOOD BY WESTERGREN METHOD: 21 MM/HR (ref 0–20)
GFR SERPL CREATININE-BSD FRML MDRD: >60 ML/MIN/1.73M2
GLUCOSE BLD-MCNC: 90 MG/DL (ref 70–125)
GLUCOSE UR STRIP-MCNC: NEGATIVE MG/DL
HCT VFR BLD AUTO: 37.4 % (ref 35–47)
HGB BLD-MCNC: 12.3 G/DL (ref 12–16)
HGB UR QL STRIP: NEGATIVE
KETONES UR STRIP-MCNC: NEGATIVE MG/DL
LEUKOCYTE ESTERASE UR QL STRIP: NEGATIVE
LYMPHOCYTES # BLD AUTO: 1.7 THOU/UL (ref 0.8–4.4)
LYMPHOCYTES NFR BLD AUTO: 20 % (ref 20–40)
MCH RBC QN AUTO: 32.6 PG (ref 27–34)
MCHC RBC AUTO-ENTMCNC: 32.9 G/DL (ref 32–36)
MCV RBC AUTO: 99 FL (ref 80–100)
MONOCYTES # BLD AUTO: 0.9 THOU/UL (ref 0–0.9)
MONOCYTES NFR BLD AUTO: 10 % (ref 2–10)
NEUTROPHILS # BLD AUTO: 5.8 THOU/UL (ref 2–7.7)
NEUTROPHILS NFR BLD AUTO: 67 % (ref 50–70)
NITRATE UR QL: NEGATIVE
PH UR STRIP: 7 [PH] (ref 4.5–8)
PLATELET # BLD AUTO: 228 THOU/UL (ref 140–440)
PMV BLD AUTO: 10.8 FL (ref 8.5–12.5)
POTASSIUM BLD-SCNC: 4.3 MMOL/L (ref 3.5–5)
PROT SERPL-MCNC: 7.1 G/DL (ref 6–8)
RBC # BLD AUTO: 3.77 MILL/UL (ref 3.8–5.4)
SODIUM SERPL-SCNC: 139 MMOL/L (ref 136–145)
SP GR UR STRIP: 1 (ref 1–1.03)
UROBILINOGEN UR STRIP-ACNC: NORMAL
WBC: 8.7 THOU/UL (ref 4–11)

## 2020-03-05 ENCOUNTER — COMMUNICATION - HEALTHEAST (OUTPATIENT)
Dept: ANTICOAGULATION | Facility: CLINIC | Age: 85
End: 2020-03-05

## 2020-03-05 DIAGNOSIS — I48.20 CHRONIC ATRIAL FIBRILLATION (H): ICD-10-CM

## 2020-03-06 ENCOUNTER — HOSPITAL ENCOUNTER (OUTPATIENT)
Dept: CARDIOLOGY | Facility: CLINIC | Age: 85
Discharge: HOME | End: 2020-03-06
Attending: INTERNAL MEDICINE

## 2020-03-06 DIAGNOSIS — I35.9 AORTIC VALVE DISORDER: ICD-10-CM

## 2020-03-06 LAB
AORTIC ROOT: 2.9 CM
AORTIC VALVE MEAN VELOCITY: 108 CM/S
ASCENDING AORTA: 3 CM
AV DIMENSIONLESS INDEX VTI: 0.5
AV MEAN GRADIENT: 6 MMHG
AV PEAK GRADIENT: 10.4 MMHG
AV VALVE AREA: 1.1 CM2
AV VELOCITY RATIO: 0.5
BSA FOR ECHO PROCEDURE: 1.57 M2
CV BLOOD PRESSURE: ABNORMAL MMHG
CV ECHO HEIGHT: 63 IN
CV ECHO WEIGHT: 123 LBS
DOP CALC AO PEAK VEL: 161 CM/S
DOP CALC AO VTI: 39.6 CM
DOP CALC LVOT AREA: 2.27 CM2
DOP CALC LVOT DIAMETER: 1.7 CM
DOP CALC LVOT PEAK VEL: 80.7 CM/S
DOP CALC LVOT STROKE VOLUME: 42.9 CM3
DOP CALCLVOT PEAK VEL VTI: 18.9 CM
ECHO EJECTION FRACTION ESTIMATED: 50 %
EJECTION FRACTION: 43 % (ref 55–75)
FRACTIONAL SHORTENING: 26 % (ref 28–44)
INTERVENTRICULAR SEPTUM IN END DIASTOLE: 0.91 CM (ref 0.6–0.9)
IVS/PW RATIO: 0.8
LA AREA 1: 18.5 CM2
LA AREA 2: 21.7 CM2
LEFT ATRIUM LENGTH: 5.2 CM
LEFT ATRIUM SIZE: 3.8 CM
LEFT ATRIUM TO AORTIC ROOT RATIO: 1.31 NO UNITS
LEFT ATRIUM VOLUME INDEX: 41.8 ML/M2
LEFT ATRIUM VOLUME: 65.6 ML
LEFT VENTRICLE CARDIAC INDEX: 1.8 L/MIN/M2
LEFT VENTRICLE CARDIAC OUTPUT: 2.8 L/MIN
LEFT VENTRICLE DIASTOLIC VOLUME INDEX: 54.5 CM3/M2 (ref 29–61)
LEFT VENTRICLE DIASTOLIC VOLUME: 85.6 CM3 (ref 46–106)
LEFT VENTRICLE HEART RATE: 65 BPM
LEFT VENTRICLE MASS INDEX: 100.1 G/M2
LEFT VENTRICLE SYSTOLIC VOLUME INDEX: 30.9 CM3/M2 (ref 8–24)
LEFT VENTRICLE SYSTOLIC VOLUME: 48.5 CM3 (ref 14–42)
LEFT VENTRICULAR INTERNAL DIMENSION IN DIASTOLE: 4.5 CM (ref 3.8–5.2)
LEFT VENTRICULAR INTERNAL DIMENSION IN SYSTOLE: 3.33 CM (ref 2.2–3.5)
LEFT VENTRICULAR MASS: 157.2 G
LEFT VENTRICULAR OUTFLOW TRACT MEAN GRADIENT: 2 MMHG
LEFT VENTRICULAR OUTFLOW TRACT MEAN VELOCITY: 59.1 CM/S
LEFT VENTRICULAR OUTFLOW TRACT PEAK GRADIENT: 3 MMHG
LEFT VENTRICULAR POSTERIOR WALL IN END DIASTOLE: 1.13 CM (ref 0.6–0.9)
LV STROKE VOLUME INDEX: 27.3 ML/M2
MV AVERAGE E/E' RATIO: 21.8 CM/S
MV DECELERATION TIME: 219 MS
MV E'TISSUE VEL-LAT: 8.19 CM/S
MV E'TISSUE VEL-MED: 5.84 CM/S
MV LATERAL E/E' RATIO: 18.7
MV MEDIAL E/E' RATIO: 26.2
MV PEAK E VELOCITY: 153 CM/S
NUC REST DIASTOLIC VOLUME INDEX: 1968 LBS
NUC REST SYSTOLIC VOLUME INDEX: 63 IN
TRICUSPID REGURGITATION PEAK PRESSURE GRADIENT: 27.9 MMHG
TRICUSPID VALVE ANULAR PLANE SYSTOLIC EXCURSION: 0.9 CM
TRICUSPID VALVE PEAK REGURGITANT VELOCITY: 264 CM/S

## 2020-03-06 ASSESSMENT — MIFFLIN-ST. JEOR: SCORE: 977.05

## 2020-03-09 ENCOUNTER — COMMUNICATION - HEALTHEAST (OUTPATIENT)
Dept: CARDIOLOGY | Facility: CLINIC | Age: 85
End: 2020-03-09

## 2020-03-09 DIAGNOSIS — I48.91 ATRIAL FIBRILLATION (H): ICD-10-CM

## 2020-03-17 ENCOUNTER — AMBULATORY - HEALTHEAST (OUTPATIENT)
Dept: LAB | Facility: CLINIC | Age: 85
End: 2020-03-17

## 2020-03-17 ENCOUNTER — COMMUNICATION - HEALTHEAST (OUTPATIENT)
Dept: ANTICOAGULATION | Facility: CLINIC | Age: 85
End: 2020-03-17

## 2020-03-17 DIAGNOSIS — I48.20 CHRONIC ATRIAL FIBRILLATION (H): ICD-10-CM

## 2020-03-17 DIAGNOSIS — Z95.2 H/O AORTIC VALVE REPLACEMENT: ICD-10-CM

## 2020-03-17 LAB — INR PPP: 2.8 (ref 0.9–1.1)

## 2020-03-19 ENCOUNTER — COMMUNICATION - HEALTHEAST (OUTPATIENT)
Dept: INTERNAL MEDICINE | Facility: CLINIC | Age: 85
End: 2020-03-19

## 2020-03-27 ENCOUNTER — COMMUNICATION - HEALTHEAST (OUTPATIENT)
Dept: INTERNAL MEDICINE | Facility: CLINIC | Age: 85
End: 2020-03-27

## 2020-03-27 DIAGNOSIS — E53.8 VITAMIN B 12 DEFICIENCY: ICD-10-CM

## 2020-04-02 ENCOUNTER — COMMUNICATION - HEALTHEAST (OUTPATIENT)
Dept: INTERNAL MEDICINE | Facility: CLINIC | Age: 85
End: 2020-04-02

## 2020-04-02 DIAGNOSIS — R00.0 TACHYCARDIA: ICD-10-CM

## 2020-04-10 ENCOUNTER — COMMUNICATION - HEALTHEAST (OUTPATIENT)
Dept: INTERNAL MEDICINE | Facility: CLINIC | Age: 85
End: 2020-04-10

## 2020-04-10 DIAGNOSIS — I25.10 ATHEROSCLEROSIS OF NATIVE CORONARY ARTERY OF NATIVE HEART WITHOUT ANGINA PECTORIS: ICD-10-CM

## 2020-04-10 DIAGNOSIS — K21.9 GASTROESOPHAGEAL REFLUX DISEASE, ESOPHAGITIS PRESENCE NOT SPECIFIED: ICD-10-CM

## 2020-04-28 ENCOUNTER — COMMUNICATION - HEALTHEAST (OUTPATIENT)
Dept: ANTICOAGULATION | Facility: CLINIC | Age: 85
End: 2020-04-28

## 2020-04-28 ENCOUNTER — AMBULATORY - HEALTHEAST (OUTPATIENT)
Dept: LAB | Facility: CLINIC | Age: 85
End: 2020-04-28

## 2020-04-28 DIAGNOSIS — I48.20 CHRONIC ATRIAL FIBRILLATION (H): ICD-10-CM

## 2020-04-28 DIAGNOSIS — Z95.2 H/O AORTIC VALVE REPLACEMENT: ICD-10-CM

## 2020-04-28 LAB — INR PPP: 2.4 (ref 0.9–1.1)

## 2020-05-12 ENCOUNTER — AMBULATORY - HEALTHEAST (OUTPATIENT)
Dept: CARDIOLOGY | Facility: CLINIC | Age: 85
End: 2020-05-12

## 2020-05-12 DIAGNOSIS — I49.5 SINOATRIAL NODE DYSFUNCTION (H): ICD-10-CM

## 2020-05-12 DIAGNOSIS — Z95.0 CARDIAC PACEMAKER IN SITU: ICD-10-CM

## 2020-06-11 ENCOUNTER — COMMUNICATION - HEALTHEAST (OUTPATIENT)
Dept: ANTICOAGULATION | Facility: CLINIC | Age: 85
End: 2020-06-11

## 2020-06-16 ENCOUNTER — AMBULATORY - HEALTHEAST (OUTPATIENT)
Dept: LAB | Facility: CLINIC | Age: 85
End: 2020-06-16

## 2020-06-16 ENCOUNTER — COMMUNICATION - HEALTHEAST (OUTPATIENT)
Dept: ANTICOAGULATION | Facility: CLINIC | Age: 85
End: 2020-06-16

## 2020-06-16 DIAGNOSIS — I48.20 CHRONIC ATRIAL FIBRILLATION (H): ICD-10-CM

## 2020-06-16 DIAGNOSIS — Z95.2 H/O AORTIC VALVE REPLACEMENT: ICD-10-CM

## 2020-06-16 LAB — INR PPP: 3 (ref 0.9–1.1)

## 2020-06-25 ENCOUNTER — COMMUNICATION - HEALTHEAST (OUTPATIENT)
Dept: INTERNAL MEDICINE | Facility: CLINIC | Age: 85
End: 2020-06-25

## 2020-06-25 DIAGNOSIS — I48.20 CHRONIC ATRIAL FIBRILLATION (H): ICD-10-CM

## 2020-06-25 DIAGNOSIS — Z95.2 H/O AORTIC VALVE REPLACEMENT: ICD-10-CM

## 2020-06-29 ENCOUNTER — COMMUNICATION - HEALTHEAST (OUTPATIENT)
Dept: ANTICOAGULATION | Facility: CLINIC | Age: 85
End: 2020-06-29

## 2020-06-29 ENCOUNTER — AMBULATORY - HEALTHEAST (OUTPATIENT)
Dept: LAB | Facility: CLINIC | Age: 85
End: 2020-06-29

## 2020-06-29 DIAGNOSIS — I48.20 CHRONIC ATRIAL FIBRILLATION (H): ICD-10-CM

## 2020-06-29 DIAGNOSIS — Z95.2 H/O AORTIC VALVE REPLACEMENT: ICD-10-CM

## 2020-06-29 LAB — INR PPP: 2.7 (ref 0.9–1.1)

## 2020-07-13 ENCOUNTER — OFFICE VISIT - HEALTHEAST (OUTPATIENT)
Dept: INTERNAL MEDICINE | Facility: CLINIC | Age: 85
End: 2020-07-13

## 2020-07-13 ENCOUNTER — COMMUNICATION - HEALTHEAST (OUTPATIENT)
Dept: ANTICOAGULATION | Facility: CLINIC | Age: 85
End: 2020-07-13

## 2020-07-13 DIAGNOSIS — R25.1 TREMOR: ICD-10-CM

## 2020-07-13 DIAGNOSIS — I35.9 AORTIC VALVE DISORDER: ICD-10-CM

## 2020-07-13 DIAGNOSIS — J69.0 ASPIRATION PNEUMONIA DUE TO GASTRIC SECRETIONS, UNSPECIFIED LATERALITY, UNSPECIFIED PART OF LUNG (H): ICD-10-CM

## 2020-07-13 DIAGNOSIS — Z95.2 H/O AORTIC VALVE REPLACEMENT: ICD-10-CM

## 2020-07-13 DIAGNOSIS — F41.9 ANXIETY: ICD-10-CM

## 2020-07-13 DIAGNOSIS — I48.20 CHRONIC ATRIAL FIBRILLATION (H): ICD-10-CM

## 2020-07-13 DIAGNOSIS — Z90.3 S/P PARTIAL GASTRECTOMY: ICD-10-CM

## 2020-07-13 DIAGNOSIS — E53.8 VITAMIN B 12 DEFICIENCY: ICD-10-CM

## 2020-07-13 DIAGNOSIS — R20.8 BURNING SENSATION OF FEET: ICD-10-CM

## 2020-07-13 DIAGNOSIS — Z95.1 S/P CABG (CORONARY ARTERY BYPASS GRAFT): ICD-10-CM

## 2020-07-13 LAB
BASOPHILS # BLD AUTO: 0.1 THOU/UL (ref 0–0.2)
BASOPHILS NFR BLD AUTO: 1 % (ref 0–2)
EOSINOPHIL # BLD AUTO: 0.2 THOU/UL (ref 0–0.4)
EOSINOPHIL NFR BLD AUTO: 3 % (ref 0–6)
ERYTHROCYTE [DISTWIDTH] IN BLOOD BY AUTOMATED COUNT: 12 % (ref 11–14.5)
ERYTHROCYTE [SEDIMENTATION RATE] IN BLOOD BY WESTERGREN METHOD: 23 MM/HR (ref 0–20)
HCT VFR BLD AUTO: 36.4 % (ref 35–47)
HGB BLD-MCNC: 12.6 G/DL (ref 12–16)
LYMPHOCYTES # BLD AUTO: 1.8 THOU/UL (ref 0.8–4.4)
LYMPHOCYTES NFR BLD AUTO: 20 % (ref 20–40)
MCH RBC QN AUTO: 33.1 PG (ref 27–34)
MCHC RBC AUTO-ENTMCNC: 34.5 G/DL (ref 32–36)
MCV RBC AUTO: 96 FL (ref 80–100)
MONOCYTES # BLD AUTO: 0.6 THOU/UL (ref 0–0.9)
MONOCYTES NFR BLD AUTO: 6 % (ref 2–10)
NEUTROPHILS # BLD AUTO: 6.1 THOU/UL (ref 2–7.7)
NEUTROPHILS NFR BLD AUTO: 71 % (ref 50–70)
PLATELET # BLD AUTO: 208 THOU/UL (ref 140–440)
PMV BLD AUTO: 8.1 FL (ref 7–10)
RBC # BLD AUTO: 3.8 MILL/UL (ref 3.8–5.4)
WBC: 8.7 THOU/UL (ref 4–11)

## 2020-07-13 ASSESSMENT — MIFFLIN-ST. JEOR: SCORE: 972.52

## 2020-07-14 LAB
ALBUMIN SERPL-MCNC: 3.8 G/DL (ref 3.5–5)
ALP SERPL-CCNC: 70 U/L (ref 45–120)
ALT SERPL W P-5'-P-CCNC: 16 U/L (ref 0–45)
ANION GAP SERPL CALCULATED.3IONS-SCNC: 6 MMOL/L (ref 5–18)
AST SERPL W P-5'-P-CCNC: 25 U/L (ref 0–40)
BILIRUB SERPL-MCNC: 0.5 MG/DL (ref 0–1)
BUN SERPL-MCNC: 16 MG/DL (ref 8–28)
CALCIUM SERPL-MCNC: 9.6 MG/DL (ref 8.5–10.5)
CHLORIDE BLD-SCNC: 99 MMOL/L (ref 98–107)
CO2 SERPL-SCNC: 27 MMOL/L (ref 22–31)
CREAT SERPL-MCNC: 1.05 MG/DL (ref 0.6–1.1)
GFR SERPL CREATININE-BSD FRML MDRD: 50 ML/MIN/1.73M2
GLUCOSE BLD-MCNC: 107 MG/DL (ref 70–125)
POTASSIUM BLD-SCNC: 4 MMOL/L (ref 3.5–5)
PROT SERPL-MCNC: 7.2 G/DL (ref 6–8)
SODIUM SERPL-SCNC: 132 MMOL/L (ref 136–145)

## 2020-07-16 LAB — METHYLMALONATE SERPL-SCNC: 0.18 UMOL/L (ref 0–0.4)

## 2020-07-20 ENCOUNTER — COMMUNICATION - HEALTHEAST (OUTPATIENT)
Dept: INTERNAL MEDICINE | Facility: CLINIC | Age: 85
End: 2020-07-20

## 2020-08-06 ENCOUNTER — COMMUNICATION - HEALTHEAST (OUTPATIENT)
Dept: CARDIOLOGY | Facility: CLINIC | Age: 85
End: 2020-08-06

## 2020-08-06 DIAGNOSIS — I48.91 ATRIAL FIBRILLATION (H): ICD-10-CM

## 2020-08-12 ENCOUNTER — AMBULATORY - HEALTHEAST (OUTPATIENT)
Dept: CARDIOLOGY | Facility: CLINIC | Age: 85
End: 2020-08-12

## 2020-08-12 DIAGNOSIS — Z95.0 CARDIAC PACEMAKER IN SITU: ICD-10-CM

## 2020-08-12 DIAGNOSIS — I49.5 SINOATRIAL NODE DYSFUNCTION (H): ICD-10-CM

## 2020-08-17 ENCOUNTER — RECORDS - HEALTHEAST (OUTPATIENT)
Dept: ADMINISTRATIVE | Facility: OTHER | Age: 85
End: 2020-08-17

## 2020-08-18 ENCOUNTER — COMMUNICATION - HEALTHEAST (OUTPATIENT)
Dept: ANTICOAGULATION | Facility: CLINIC | Age: 85
End: 2020-08-18

## 2020-08-18 ENCOUNTER — AMBULATORY - HEALTHEAST (OUTPATIENT)
Dept: LAB | Facility: CLINIC | Age: 85
End: 2020-08-18

## 2020-08-18 DIAGNOSIS — Z95.2 H/O AORTIC VALVE REPLACEMENT: ICD-10-CM

## 2020-08-18 DIAGNOSIS — I48.20 CHRONIC ATRIAL FIBRILLATION (H): ICD-10-CM

## 2020-08-18 LAB — INR PPP: 1.9 (ref 0.9–1.1)

## 2020-09-10 ENCOUNTER — AMBULATORY - HEALTHEAST (OUTPATIENT)
Dept: LAB | Facility: CLINIC | Age: 85
End: 2020-09-10

## 2020-09-10 ENCOUNTER — COMMUNICATION - HEALTHEAST (OUTPATIENT)
Dept: ANTICOAGULATION | Facility: CLINIC | Age: 85
End: 2020-09-10

## 2020-09-10 DIAGNOSIS — Z95.2 H/O AORTIC VALVE REPLACEMENT: ICD-10-CM

## 2020-09-10 DIAGNOSIS — I48.20 CHRONIC ATRIAL FIBRILLATION (H): ICD-10-CM

## 2020-09-10 LAB — INR PPP: 4 (ref 0.9–1.1)

## 2020-09-22 ENCOUNTER — AMBULATORY - HEALTHEAST (OUTPATIENT)
Dept: LAB | Facility: CLINIC | Age: 85
End: 2020-09-22

## 2020-09-22 ENCOUNTER — COMMUNICATION - HEALTHEAST (OUTPATIENT)
Dept: ANTICOAGULATION | Facility: CLINIC | Age: 85
End: 2020-09-22

## 2020-09-22 DIAGNOSIS — I48.20 CHRONIC ATRIAL FIBRILLATION (H): ICD-10-CM

## 2020-09-22 DIAGNOSIS — Z95.2 H/O AORTIC VALVE REPLACEMENT: ICD-10-CM

## 2020-10-05 LAB — INR PPP: 2.4 (ref 0.9–1.1)

## 2020-10-12 ENCOUNTER — COMMUNICATION - HEALTHEAST (OUTPATIENT)
Dept: ANTICOAGULATION | Facility: CLINIC | Age: 85
End: 2020-10-12

## 2020-10-15 ENCOUNTER — COMMUNICATION - HEALTHEAST (OUTPATIENT)
Dept: ANTICOAGULATION | Facility: CLINIC | Age: 85
End: 2020-10-15

## 2020-10-15 ENCOUNTER — AMBULATORY - HEALTHEAST (OUTPATIENT)
Dept: LAB | Facility: CLINIC | Age: 85
End: 2020-10-15

## 2020-10-15 DIAGNOSIS — I48.20 CHRONIC ATRIAL FIBRILLATION (H): ICD-10-CM

## 2020-10-15 DIAGNOSIS — Z95.2 H/O AORTIC VALVE REPLACEMENT: ICD-10-CM

## 2020-10-15 LAB — INR PPP: 3.6 (ref 0.9–1.1)

## 2020-10-16 ENCOUNTER — COMMUNICATION - HEALTHEAST (OUTPATIENT)
Dept: INTERNAL MEDICINE | Facility: CLINIC | Age: 85
End: 2020-10-16

## 2020-10-16 DIAGNOSIS — R20.8 BURNING SENSATION OF FEET: ICD-10-CM

## 2020-10-18 RX ORDER — GABAPENTIN 300 MG/1
CAPSULE ORAL
Qty: 60 CAPSULE | Refills: 11 | Status: SHIPPED | OUTPATIENT
Start: 2020-10-18 | End: 2021-10-28

## 2020-10-27 ENCOUNTER — COMMUNICATION - HEALTHEAST (OUTPATIENT)
Dept: ANTICOAGULATION | Facility: CLINIC | Age: 85
End: 2020-10-27

## 2020-10-27 ENCOUNTER — AMBULATORY - HEALTHEAST (OUTPATIENT)
Dept: LAB | Facility: CLINIC | Age: 85
End: 2020-10-27

## 2020-10-27 DIAGNOSIS — Z95.2 H/O AORTIC VALVE REPLACEMENT: ICD-10-CM

## 2020-10-27 DIAGNOSIS — I48.20 CHRONIC ATRIAL FIBRILLATION (H): ICD-10-CM

## 2020-10-27 LAB — INR PPP: 1.8 (ref 0.9–1.1)

## 2020-11-10 ENCOUNTER — COMMUNICATION - HEALTHEAST (OUTPATIENT)
Dept: INTERNAL MEDICINE | Facility: CLINIC | Age: 85
End: 2020-11-10

## 2020-11-12 ENCOUNTER — AMBULATORY - HEALTHEAST (OUTPATIENT)
Dept: CARDIOLOGY | Facility: CLINIC | Age: 85
End: 2020-11-12

## 2020-11-12 DIAGNOSIS — Z95.0 CARDIAC PACEMAKER IN SITU: ICD-10-CM

## 2020-11-12 DIAGNOSIS — I49.5 SINOATRIAL NODE DYSFUNCTION (H): ICD-10-CM

## 2020-11-13 ENCOUNTER — AMBULATORY - HEALTHEAST (OUTPATIENT)
Dept: LAB | Facility: CLINIC | Age: 85
End: 2020-11-13

## 2020-11-13 ENCOUNTER — COMMUNICATION - HEALTHEAST (OUTPATIENT)
Dept: ANTICOAGULATION | Facility: CLINIC | Age: 85
End: 2020-11-13

## 2020-11-13 DIAGNOSIS — Z95.2 H/O AORTIC VALVE REPLACEMENT: ICD-10-CM

## 2020-11-13 DIAGNOSIS — I48.20 CHRONIC ATRIAL FIBRILLATION (H): ICD-10-CM

## 2020-11-13 LAB — INR PPP: 2.6 (ref 0.9–1.1)

## 2020-12-01 ENCOUNTER — AMBULATORY - HEALTHEAST (OUTPATIENT)
Dept: LAB | Facility: CLINIC | Age: 85
End: 2020-12-01

## 2020-12-01 ENCOUNTER — COMMUNICATION - HEALTHEAST (OUTPATIENT)
Dept: ANTICOAGULATION | Facility: CLINIC | Age: 85
End: 2020-12-01

## 2020-12-01 DIAGNOSIS — I48.20 CHRONIC ATRIAL FIBRILLATION (H): ICD-10-CM

## 2020-12-01 DIAGNOSIS — Z95.2 H/O AORTIC VALVE REPLACEMENT: ICD-10-CM

## 2020-12-01 LAB — INR PPP: 4.1 (ref 0.9–1.1)

## 2020-12-08 ENCOUNTER — COMMUNICATION - HEALTHEAST (OUTPATIENT)
Dept: ANTICOAGULATION | Facility: CLINIC | Age: 85
End: 2020-12-08

## 2020-12-08 ENCOUNTER — AMBULATORY - HEALTHEAST (OUTPATIENT)
Dept: LAB | Facility: CLINIC | Age: 85
End: 2020-12-08

## 2020-12-08 DIAGNOSIS — I48.20 CHRONIC ATRIAL FIBRILLATION (H): ICD-10-CM

## 2020-12-08 DIAGNOSIS — Z95.2 H/O AORTIC VALVE REPLACEMENT: ICD-10-CM

## 2020-12-08 LAB — INR PPP: 1.8 (ref 0.9–1.1)

## 2020-12-18 ENCOUNTER — COMMUNICATION - HEALTHEAST (OUTPATIENT)
Dept: INTERNAL MEDICINE | Facility: CLINIC | Age: 85
End: 2020-12-18

## 2020-12-18 DIAGNOSIS — R00.0 TACHYCARDIA: ICD-10-CM

## 2020-12-19 RX ORDER — METOPROLOL SUCCINATE 25 MG/1
TABLET, EXTENDED RELEASE ORAL
Qty: 45 TABLET | Refills: 1 | Status: SHIPPED | OUTPATIENT
Start: 2020-12-19 | End: 2021-09-24

## 2020-12-28 ENCOUNTER — COMMUNICATION - HEALTHEAST (OUTPATIENT)
Dept: ANTICOAGULATION | Facility: CLINIC | Age: 85
End: 2020-12-28

## 2020-12-28 ENCOUNTER — AMBULATORY - HEALTHEAST (OUTPATIENT)
Dept: LAB | Facility: CLINIC | Age: 85
End: 2020-12-28

## 2020-12-28 DIAGNOSIS — Z95.2 H/O AORTIC VALVE REPLACEMENT: ICD-10-CM

## 2020-12-28 DIAGNOSIS — I48.91 ATRIAL FIBRILLATION (H): ICD-10-CM

## 2020-12-28 DIAGNOSIS — I48.20 CHRONIC ATRIAL FIBRILLATION (H): ICD-10-CM

## 2020-12-28 LAB — INR PPP: 1.8 (ref 0.9–1.1)

## 2021-01-14 ENCOUNTER — COMMUNICATION - HEALTHEAST (OUTPATIENT)
Dept: INTERNAL MEDICINE | Facility: CLINIC | Age: 86
End: 2021-01-14

## 2021-01-14 DIAGNOSIS — Z79.01 LONG TERM (CURRENT) USE OF ANTICOAGULANTS: ICD-10-CM

## 2021-01-14 DIAGNOSIS — Z95.2 H/O AORTIC VALVE REPLACEMENT: ICD-10-CM

## 2021-01-14 DIAGNOSIS — I48.20 CHRONIC ATRIAL FIBRILLATION (H): ICD-10-CM

## 2021-01-15 ENCOUNTER — COMMUNICATION - HEALTHEAST (OUTPATIENT)
Dept: ANTICOAGULATION | Facility: CLINIC | Age: 86
End: 2021-01-15

## 2021-01-15 ENCOUNTER — OFFICE VISIT - HEALTHEAST (OUTPATIENT)
Dept: INTERNAL MEDICINE | Facility: CLINIC | Age: 86
End: 2021-01-15

## 2021-01-15 DIAGNOSIS — I50.9 ACUTE ON CHRONIC CONGESTIVE HEART FAILURE, UNSPECIFIED HEART FAILURE TYPE (H): ICD-10-CM

## 2021-01-15 DIAGNOSIS — I48.19 PERSISTENT ATRIAL FIBRILLATION (H): ICD-10-CM

## 2021-01-15 DIAGNOSIS — Z95.2 H/O AORTIC VALVE REPLACEMENT: ICD-10-CM

## 2021-01-15 LAB — INR PPP: 1.9 (ref 0.9–1.1)

## 2021-01-15 ASSESSMENT — MIFFLIN-ST. JEOR: SCORE: 981.59

## 2021-02-03 ENCOUNTER — AMBULATORY - HEALTHEAST (OUTPATIENT)
Dept: LAB | Facility: CLINIC | Age: 86
End: 2021-02-03

## 2021-02-03 ENCOUNTER — COMMUNICATION - HEALTHEAST (OUTPATIENT)
Dept: ANTICOAGULATION | Facility: CLINIC | Age: 86
End: 2021-02-03

## 2021-02-03 ENCOUNTER — COMMUNICATION - HEALTHEAST (OUTPATIENT)
Dept: CARDIOLOGY | Facility: CLINIC | Age: 86
End: 2021-02-03

## 2021-02-03 ENCOUNTER — OFFICE VISIT - HEALTHEAST (OUTPATIENT)
Dept: CARDIOLOGY | Facility: CLINIC | Age: 86
End: 2021-02-03

## 2021-02-03 DIAGNOSIS — I48.19 PERSISTENT ATRIAL FIBRILLATION (H): ICD-10-CM

## 2021-02-03 DIAGNOSIS — Z95.2 H/O AORTIC VALVE REPLACEMENT: ICD-10-CM

## 2021-02-03 DIAGNOSIS — Z95.1 S/P CABG (CORONARY ARTERY BYPASS GRAFT): ICD-10-CM

## 2021-02-03 DIAGNOSIS — I50.9 ACUTE ON CHRONIC CONGESTIVE HEART FAILURE, UNSPECIFIED HEART FAILURE TYPE (H): ICD-10-CM

## 2021-02-03 DIAGNOSIS — Z95.0 CARDIAC PACEMAKER IN SITU: ICD-10-CM

## 2021-02-03 DIAGNOSIS — I48.20 CHRONIC ATRIAL FIBRILLATION (H): ICD-10-CM

## 2021-02-03 DIAGNOSIS — I77.6 VASCULITIS (H): ICD-10-CM

## 2021-02-03 DIAGNOSIS — I25.83 CORONARY ATHEROSCLEROSIS DUE TO LIPID RICH PLAQUE: ICD-10-CM

## 2021-02-03 DIAGNOSIS — R06.09 DOE (DYSPNEA ON EXERTION): ICD-10-CM

## 2021-02-03 LAB — INR PPP: 2 (ref 0.9–1.1)

## 2021-02-11 ENCOUNTER — AMBULATORY - HEALTHEAST (OUTPATIENT)
Dept: CARDIOLOGY | Facility: CLINIC | Age: 86
End: 2021-02-11

## 2021-02-11 DIAGNOSIS — I48.19 PERSISTENT ATRIAL FIBRILLATION (H): ICD-10-CM

## 2021-02-11 DIAGNOSIS — Z95.0 CARDIAC PACEMAKER IN SITU: ICD-10-CM

## 2021-02-18 ENCOUNTER — AMBULATORY - HEALTHEAST (OUTPATIENT)
Dept: NURSING | Facility: CLINIC | Age: 86
End: 2021-02-18

## 2021-02-19 ENCOUNTER — COMMUNICATION - HEALTHEAST (OUTPATIENT)
Dept: ANTICOAGULATION | Facility: CLINIC | Age: 86
End: 2021-02-19

## 2021-02-19 ENCOUNTER — HOSPITAL ENCOUNTER (OUTPATIENT)
Dept: CARDIOLOGY | Facility: HOSPITAL | Age: 86
Discharge: HOME | End: 2021-02-19
Attending: INTERNAL MEDICINE

## 2021-02-19 DIAGNOSIS — Z95.2 H/O AORTIC VALVE REPLACEMENT: ICD-10-CM

## 2021-02-19 DIAGNOSIS — I48.19 PERSISTENT ATRIAL FIBRILLATION (H): ICD-10-CM

## 2021-02-19 DIAGNOSIS — Z79.01 LONG TERM (CURRENT) USE OF ANTICOAGULANTS: ICD-10-CM

## 2021-02-19 LAB
AORTIC ROOT: 2.9 CM
AORTIC VALVE MEAN VELOCITY: 117 CM/S
ASCENDING AORTA: 3.9 CM
AV DIMENSIONLESS INDEX VTI: 0.6
AV MEAN GRADIENT: 6 MMHG
AV PEAK GRADIENT: 11.4 MMHG
AV VALVE AREA: 1.8 CM2
AV VELOCITY RATIO: 0.7
BSA FOR ECHO PROCEDURE: 1.58 M2
CV BLOOD PRESSURE: ABNORMAL MMHG
CV ECHO HEIGHT: 63 IN
CV ECHO WEIGHT: 124 LBS
DOP CALC AO PEAK VEL: 169 CM/S
DOP CALC AO VTI: 38.1 CM
DOP CALC LVOT AREA: 2.83 CM2
DOP CALC LVOT DIAMETER: 1.9 CM
DOP CALC LVOT PEAK VEL: 124 CM/S
DOP CALC LVOT STROKE VOLUME: 69.7 CM3
DOP CALC MV VTI: 39.6 CM
DOP CALCLVOT PEAK VEL VTI: 24.6 CM
EJECTION FRACTION: 50 % (ref 55–75)
FRACTIONAL SHORTENING: 29.5 % (ref 28–44)
INTERVENTRICULAR SEPTUM IN END DIASTOLE: 0.96 CM (ref 0.6–0.9)
IVS/PW RATIO: 1
LA AREA 1: 25.3 CM2
LA AREA 2: 19.2 CM2
LEFT ATRIUM LENGTH: 5.4 CM
LEFT ATRIUM SIZE: 4.9 CM
LEFT ATRIUM VOLUME INDEX: 48.4 ML/M2
LEFT ATRIUM VOLUME: 76.5 ML
LEFT VENTRICLE CARDIAC INDEX: 3 L/MIN/M2
LEFT VENTRICLE CARDIAC OUTPUT: 4.8 L/MIN
LEFT VENTRICLE DIASTOLIC VOLUME INDEX: 56.6 CM3/M2 (ref 29–61)
LEFT VENTRICLE DIASTOLIC VOLUME: 89.5 CM3 (ref 46–106)
LEFT VENTRICLE HEART RATE: 69 BPM
LEFT VENTRICLE MASS INDEX: 90.9 G/M2
LEFT VENTRICLE SYSTOLIC VOLUME INDEX: 28.1 CM3/M2 (ref 8–24)
LEFT VENTRICLE SYSTOLIC VOLUME: 44.4 CM3 (ref 14–42)
LEFT VENTRICULAR INTERNAL DIMENSION IN DIASTOLE: 4.55 CM (ref 3.8–5.2)
LEFT VENTRICULAR INTERNAL DIMENSION IN SYSTOLE: 3.21 CM (ref 2.2–3.5)
LEFT VENTRICULAR MASS: 143.6 G
LEFT VENTRICULAR OUTFLOW TRACT MEAN GRADIENT: 3 MMHG
LEFT VENTRICULAR OUTFLOW TRACT MEAN VELOCITY: 85.5 CM/S
LEFT VENTRICULAR OUTFLOW TRACT PEAK GRADIENT: 6 MMHG
LEFT VENTRICULAR POSTERIOR WALL IN END DIASTOLE: 0.93 CM (ref 0.6–0.9)
LV STROKE VOLUME INDEX: 44.1 ML/M2
MITRAL VALVE DECELERATION SLOPE: 7810 MM/S2
MITRAL VALVE MEAN INFLOW VELOCITY: 94.8 CM/S
MITRAL VALVE PEAK VELOCITY: 171 CM/S
MITRAL VALVE PRESSURE HALF-TIME: 53 MS
MV AREA VTI: 1.76 CM2
MV AVERAGE E/E' RATIO: 19.2 CM/S
MV DECELERATION TIME: 141 MS
MV E'TISSUE VEL-LAT: 10.3 CM/S
MV E'TISSUE VEL-MED: 7.06 CM/S
MV LATERAL E/E' RATIO: 16.2
MV MEAN GRADIENT: 5 MMHG
MV MEDIAL E/E' RATIO: 23.7
MV PEAK E VELOCITY: 167 CM/S
MV PEAK GRADIENT: 11.7 MMHG
MV VALVE AREA BY CONTINUITY EQUATION: 1.8 CM2
MV VALVE AREA PRESSURE 1/2 METHOD: 4.2 CM2
NUC REST DIASTOLIC VOLUME INDEX: 1984 LBS
NUC REST SYSTOLIC VOLUME INDEX: 63 IN
PR MAX PG: 6 MMHG
PR PEAK VELOCITY: 125 CM/S
TRICUSPID REGURGITATION PEAK PRESSURE GRADIENT: 55.7 MMHG
TRICUSPID VALVE ANULAR PLANE SYSTOLIC EXCURSION: 1.5 CM
TRICUSPID VALVE PEAK REGURGITANT VELOCITY: 373 CM/S

## 2021-02-19 ASSESSMENT — MIFFLIN-ST. JEOR: SCORE: 981.59

## 2021-02-22 ENCOUNTER — COMMUNICATION - HEALTHEAST (OUTPATIENT)
Dept: INTERNAL MEDICINE | Facility: CLINIC | Age: 86
End: 2021-02-22

## 2021-02-22 DIAGNOSIS — E53.8 VITAMIN B 12 DEFICIENCY: ICD-10-CM

## 2021-02-23 RX ORDER — CYANOCOBALAMIN 1000 UG/ML
INJECTION, SOLUTION INTRAMUSCULAR; SUBCUTANEOUS
Qty: 3 ML | Refills: 3 | Status: SHIPPED | OUTPATIENT
Start: 2021-02-23 | End: 2022-03-29

## 2021-02-25 ENCOUNTER — COMMUNICATION - HEALTHEAST (OUTPATIENT)
Dept: ANTICOAGULATION | Facility: CLINIC | Age: 86
End: 2021-02-25

## 2021-03-02 ENCOUNTER — AMBULATORY - HEALTHEAST (OUTPATIENT)
Dept: LAB | Facility: CLINIC | Age: 86
End: 2021-03-02

## 2021-03-02 ENCOUNTER — COMMUNICATION - HEALTHEAST (OUTPATIENT)
Dept: ANTICOAGULATION | Facility: CLINIC | Age: 86
End: 2021-03-02

## 2021-03-02 DIAGNOSIS — I48.19 PERSISTENT ATRIAL FIBRILLATION (H): ICD-10-CM

## 2021-03-02 DIAGNOSIS — Z79.01 LONG TERM (CURRENT) USE OF ANTICOAGULANTS: ICD-10-CM

## 2021-03-02 DIAGNOSIS — Z95.2 H/O AORTIC VALVE REPLACEMENT: ICD-10-CM

## 2021-03-02 LAB — INR PPP: 2.4 (ref 0.9–1.1)

## 2021-03-11 ENCOUNTER — AMBULATORY - HEALTHEAST (OUTPATIENT)
Dept: NURSING | Facility: CLINIC | Age: 86
End: 2021-03-11

## 2021-03-22 ENCOUNTER — COMMUNICATION - HEALTHEAST (OUTPATIENT)
Dept: INTERNAL MEDICINE | Facility: CLINIC | Age: 86
End: 2021-03-22

## 2021-03-22 DIAGNOSIS — I25.10 ATHEROSCLEROSIS OF NATIVE CORONARY ARTERY OF NATIVE HEART WITHOUT ANGINA PECTORIS: ICD-10-CM

## 2021-03-23 RX ORDER — ATORVASTATIN CALCIUM 10 MG/1
10 TABLET, FILM COATED ORAL DAILY
Qty: 90 TABLET | Refills: 3 | Status: ON HOLD | OUTPATIENT
Start: 2021-03-23 | End: 2021-09-28

## 2021-03-30 ENCOUNTER — COMMUNICATION - HEALTHEAST (OUTPATIENT)
Dept: ANTICOAGULATION | Facility: CLINIC | Age: 86
End: 2021-03-30

## 2021-03-30 ENCOUNTER — OFFICE VISIT - HEALTHEAST (OUTPATIENT)
Dept: INTERNAL MEDICINE | Facility: CLINIC | Age: 86
End: 2021-03-30

## 2021-03-30 DIAGNOSIS — E55.9 VITAMIN D DEFICIENCY: ICD-10-CM

## 2021-03-30 DIAGNOSIS — Z79.01 LONG TERM (CURRENT) USE OF ANTICOAGULANTS: ICD-10-CM

## 2021-03-30 DIAGNOSIS — Z95.1 S/P CABG (CORONARY ARTERY BYPASS GRAFT): ICD-10-CM

## 2021-03-30 DIAGNOSIS — I48.19 PERSISTENT ATRIAL FIBRILLATION (H): ICD-10-CM

## 2021-03-30 DIAGNOSIS — E78.5 HYPERLIPIDEMIA LDL GOAL <100: ICD-10-CM

## 2021-03-30 DIAGNOSIS — Z95.2 H/O AORTIC VALVE REPLACEMENT: ICD-10-CM

## 2021-03-30 DIAGNOSIS — R06.02 SHORTNESS OF BREATH: ICD-10-CM

## 2021-03-30 DIAGNOSIS — I27.20 PULMONARY HYPERTENSION (H): ICD-10-CM

## 2021-03-30 LAB
ALBUMIN SERPL-MCNC: 3.6 G/DL (ref 3.5–5)
ALP SERPL-CCNC: 82 U/L (ref 45–120)
ALT SERPL W P-5'-P-CCNC: 12 U/L (ref 0–45)
ANION GAP SERPL CALCULATED.3IONS-SCNC: 9 MMOL/L (ref 5–18)
AST SERPL W P-5'-P-CCNC: 24 U/L (ref 0–40)
BASOPHILS # BLD AUTO: 0 THOU/UL (ref 0–0.2)
BASOPHILS NFR BLD AUTO: 1 % (ref 0–2)
BILIRUB SERPL-MCNC: 0.7 MG/DL (ref 0–1)
BNP SERPL-MCNC: 482 PG/ML (ref 0–167)
BUN SERPL-MCNC: 15 MG/DL (ref 8–28)
CALCIUM SERPL-MCNC: 9 MG/DL (ref 8.5–10.5)
CHLORIDE BLD-SCNC: 104 MMOL/L (ref 98–107)
CO2 SERPL-SCNC: 27 MMOL/L (ref 22–31)
CREAT SERPL-MCNC: 0.8 MG/DL (ref 0.6–1.1)
EOSINOPHIL # BLD AUTO: 0.3 THOU/UL (ref 0–0.4)
EOSINOPHIL NFR BLD AUTO: 4 % (ref 0–6)
ERYTHROCYTE [DISTWIDTH] IN BLOOD BY AUTOMATED COUNT: 13.3 % (ref 11–14.5)
GFR SERPL CREATININE-BSD FRML MDRD: >60 ML/MIN/1.73M2
GLUCOSE BLD-MCNC: 85 MG/DL (ref 70–125)
HCT VFR BLD AUTO: 37.5 % (ref 35–47)
HGB BLD-MCNC: 12.1 G/DL (ref 12–16)
IMM GRANULOCYTES # BLD: 0 THOU/UL
IMM GRANULOCYTES NFR BLD: 0 %
INR PPP: 2.4 (ref 0.9–1.1)
LDLC SERPL CALC-MCNC: 54 MG/DL
LYMPHOCYTES # BLD AUTO: 1.7 THOU/UL (ref 0.8–4.4)
LYMPHOCYTES NFR BLD AUTO: 22 % (ref 20–40)
MCH RBC QN AUTO: 30.3 PG (ref 27–34)
MCHC RBC AUTO-ENTMCNC: 32.3 G/DL (ref 32–36)
MCV RBC AUTO: 94 FL (ref 80–100)
MONOCYTES # BLD AUTO: 0.8 THOU/UL (ref 0–0.9)
MONOCYTES NFR BLD AUTO: 10 % (ref 2–10)
NEUTROPHILS # BLD AUTO: 5 THOU/UL (ref 2–7.7)
NEUTROPHILS NFR BLD AUTO: 64 % (ref 50–70)
PLATELET # BLD AUTO: 197 THOU/UL (ref 140–440)
PMV BLD AUTO: 10.3 FL (ref 7–10)
POTASSIUM BLD-SCNC: 5.1 MMOL/L (ref 3.5–5)
PROT SERPL-MCNC: 7.3 G/DL (ref 6–8)
RBC # BLD AUTO: 4 MILL/UL (ref 3.8–5.4)
SODIUM SERPL-SCNC: 140 MMOL/L (ref 136–145)
TSH SERPL DL<=0.005 MIU/L-ACNC: 3.43 UIU/ML (ref 0.3–5)
WBC: 7.9 THOU/UL (ref 4–11)

## 2021-03-31 ENCOUNTER — COMMUNICATION - HEALTHEAST (OUTPATIENT)
Dept: INTERNAL MEDICINE | Facility: CLINIC | Age: 86
End: 2021-03-31

## 2021-03-31 LAB — 25(OH)D3 SERPL-MCNC: 16.8 NG/ML (ref 30–80)

## 2021-04-08 ENCOUNTER — AMBULATORY - HEALTHEAST (OUTPATIENT)
Dept: CARDIOLOGY | Facility: CLINIC | Age: 86
End: 2021-04-08

## 2021-04-08 ENCOUNTER — OFFICE VISIT - HEALTHEAST (OUTPATIENT)
Dept: CARDIOLOGY | Facility: CLINIC | Age: 86
End: 2021-04-08

## 2021-04-08 DIAGNOSIS — I25.83 CORONARY ATHEROSCLEROSIS DUE TO LIPID RICH PLAQUE: ICD-10-CM

## 2021-04-08 DIAGNOSIS — Z95.0 CARDIAC PACEMAKER IN SITU: ICD-10-CM

## 2021-04-08 DIAGNOSIS — I77.6 VASCULITIS (H): ICD-10-CM

## 2021-04-08 DIAGNOSIS — I50.9 ACUTE ON CHRONIC CONGESTIVE HEART FAILURE, UNSPECIFIED HEART FAILURE TYPE (H): ICD-10-CM

## 2021-04-08 DIAGNOSIS — I48.91 ATRIAL FIBRILLATION (H): ICD-10-CM

## 2021-04-08 DIAGNOSIS — I48.19 PERSISTENT ATRIAL FIBRILLATION (H): ICD-10-CM

## 2021-04-08 DIAGNOSIS — I35.9 AORTIC VALVE DISORDER: ICD-10-CM

## 2021-04-08 DIAGNOSIS — Z95.1 S/P CABG (CORONARY ARTERY BYPASS GRAFT): ICD-10-CM

## 2021-04-08 DIAGNOSIS — I49.5 SINOATRIAL NODE DYSFUNCTION (H): ICD-10-CM

## 2021-04-15 ENCOUNTER — OFFICE VISIT - HEALTHEAST (OUTPATIENT)
Dept: CARDIOLOGY | Facility: CLINIC | Age: 86
End: 2021-04-15

## 2021-04-15 ENCOUNTER — COMMUNICATION - HEALTHEAST (OUTPATIENT)
Dept: CARDIOLOGY | Facility: CLINIC | Age: 86
End: 2021-04-15

## 2021-04-15 ENCOUNTER — COMMUNICATION - HEALTHEAST (OUTPATIENT)
Dept: INTERNAL MEDICINE | Facility: CLINIC | Age: 86
End: 2021-04-15

## 2021-04-15 DIAGNOSIS — I25.83 CORONARY ATHEROSCLEROSIS DUE TO LIPID RICH PLAQUE: ICD-10-CM

## 2021-04-15 DIAGNOSIS — Z95.2 H/O AORTIC VALVE REPLACEMENT: ICD-10-CM

## 2021-04-15 DIAGNOSIS — Z79.01 LONG TERM (CURRENT) USE OF ANTICOAGULANTS: ICD-10-CM

## 2021-04-15 DIAGNOSIS — I48.20 CHRONIC ATRIAL FIBRILLATION (H): ICD-10-CM

## 2021-04-15 DIAGNOSIS — I50.9 CHRONIC CONGESTIVE HEART FAILURE, UNSPECIFIED HEART FAILURE TYPE (H): ICD-10-CM

## 2021-04-15 DIAGNOSIS — K21.9 GASTROESOPHAGEAL REFLUX DISEASE: ICD-10-CM

## 2021-04-15 RX ORDER — SUCRALFATE 1 G/1
TABLET ORAL
Qty: 120 TABLET | Refills: 11 | Status: SHIPPED | OUTPATIENT
Start: 2021-04-15 | End: 2022-04-22

## 2021-04-15 ASSESSMENT — MIFFLIN-ST. JEOR: SCORE: 972.97

## 2021-04-16 RX ORDER — WARFARIN SODIUM 1 MG/1
TABLET ORAL
Qty: 120 TABLET | Refills: 1 | Status: SHIPPED | OUTPATIENT
Start: 2021-04-16 | End: 2021-09-24 | Stop reason: DRUGHIGH

## 2021-04-24 ENCOUNTER — HOSPITAL ENCOUNTER (OUTPATIENT)
Dept: CT IMAGING | Facility: CLINIC | Age: 86
Discharge: HOME | End: 2021-04-24
Attending: INTERNAL MEDICINE

## 2021-04-24 DIAGNOSIS — I25.83 CORONARY ATHEROSCLEROSIS DUE TO LIPID RICH PLAQUE: ICD-10-CM

## 2021-04-24 ASSESSMENT — MIFFLIN-ST. JEOR: SCORE: 972.52

## 2021-04-26 LAB
BSA FOR ECHO PROCEDURE: 1.57 M2
LEFT VENTRICLE HEART RATE: 76 BPM

## 2021-04-29 ENCOUNTER — AMBULATORY - HEALTHEAST (OUTPATIENT)
Dept: LAB | Facility: CLINIC | Age: 86
End: 2021-04-29

## 2021-04-29 ENCOUNTER — COMMUNICATION - HEALTHEAST (OUTPATIENT)
Dept: ANTICOAGULATION | Facility: CLINIC | Age: 86
End: 2021-04-29

## 2021-04-29 DIAGNOSIS — I48.91 ATRIAL FIBRILLATION (H): ICD-10-CM

## 2021-04-29 DIAGNOSIS — Z95.2 H/O AORTIC VALVE REPLACEMENT: ICD-10-CM

## 2021-04-29 DIAGNOSIS — I48.19 PERSISTENT ATRIAL FIBRILLATION (H): ICD-10-CM

## 2021-04-29 DIAGNOSIS — Z79.01 LONG TERM (CURRENT) USE OF ANTICOAGULANTS: ICD-10-CM

## 2021-04-29 DIAGNOSIS — I50.9 CHRONIC CONGESTIVE HEART FAILURE, UNSPECIFIED HEART FAILURE TYPE (H): ICD-10-CM

## 2021-04-29 LAB
ANION GAP SERPL CALCULATED.3IONS-SCNC: 10 MMOL/L (ref 5–18)
BUN SERPL-MCNC: 25 MG/DL (ref 8–28)
CALCIUM SERPL-MCNC: 9.2 MG/DL (ref 8.5–10.5)
CHLORIDE BLD-SCNC: 100 MMOL/L (ref 98–107)
CO2 SERPL-SCNC: 31 MMOL/L (ref 22–31)
CREAT SERPL-MCNC: 0.94 MG/DL (ref 0.6–1.1)
GFR SERPL CREATININE-BSD FRML MDRD: 57 ML/MIN/1.73M2
GLUCOSE BLD-MCNC: 68 MG/DL (ref 70–125)
INR PPP: 2.4 (ref 0.9–1.1)
POTASSIUM BLD-SCNC: 4.9 MMOL/L (ref 3.5–5)
SODIUM SERPL-SCNC: 141 MMOL/L (ref 136–145)

## 2021-05-20 ENCOUNTER — RECORDS - HEALTHEAST (OUTPATIENT)
Dept: ADMINISTRATIVE | Facility: OTHER | Age: 86
End: 2021-05-20

## 2021-05-20 ENCOUNTER — OFFICE VISIT - HEALTHEAST (OUTPATIENT)
Dept: CARDIOLOGY | Facility: CLINIC | Age: 86
End: 2021-05-20

## 2021-05-20 DIAGNOSIS — I77.6 VASCULITIS (H): ICD-10-CM

## 2021-05-20 DIAGNOSIS — Z95.0 CARDIAC PACEMAKER IN SITU: ICD-10-CM

## 2021-05-20 DIAGNOSIS — I48.21 PERMANENT ATRIAL FIBRILLATION (H): ICD-10-CM

## 2021-05-20 DIAGNOSIS — I25.83 CORONARY ATHEROSCLEROSIS DUE TO LIPID RICH PLAQUE: ICD-10-CM

## 2021-05-20 DIAGNOSIS — I50.9 CHRONIC CONGESTIVE HEART FAILURE, UNSPECIFIED HEART FAILURE TYPE (H): ICD-10-CM

## 2021-05-20 DIAGNOSIS — E78.00 PURE HYPERCHOLESTEROLEMIA: ICD-10-CM

## 2021-05-20 DIAGNOSIS — I35.9 AORTIC VALVE DISORDER: ICD-10-CM

## 2021-05-20 DIAGNOSIS — Z95.1 S/P CABG (CORONARY ARTERY BYPASS GRAFT): ICD-10-CM

## 2021-05-25 ENCOUNTER — COMMUNICATION - HEALTHEAST (OUTPATIENT)
Dept: CARDIOLOGY | Facility: CLINIC | Age: 86
End: 2021-05-25

## 2021-05-25 DIAGNOSIS — I50.9 CHRONIC CONGESTIVE HEART FAILURE, UNSPECIFIED HEART FAILURE TYPE (H): ICD-10-CM

## 2021-05-25 RX ORDER — FUROSEMIDE 20 MG
TABLET ORAL
Qty: 180 TABLET | Refills: 1 | Status: SHIPPED | OUTPATIENT
Start: 2021-05-25 | End: 2021-09-24 | Stop reason: DRUGHIGH

## 2021-05-26 ENCOUNTER — RECORDS - HEALTHEAST (OUTPATIENT)
Dept: ADMINISTRATIVE | Facility: CLINIC | Age: 86
End: 2021-05-26

## 2021-05-27 VITALS — SYSTOLIC BLOOD PRESSURE: 110 MMHG | RESPIRATION RATE: 16 BRPM | DIASTOLIC BLOOD PRESSURE: 72 MMHG | HEART RATE: 76 BPM

## 2021-05-27 VITALS — WEIGHT: 124.6 LBS

## 2021-05-27 NOTE — TELEPHONE ENCOUNTER
----- Message from Hayder Bailey MD sent at 4/5/2019  4:36 PM CDT -----  Please call    I enjoyed seeing you in the office at your appointment.  Your lab looks okay  Your sed rate is high due to your vasculitis.  You need to stay on the methotrexate.  I was very impressed with your artery pulsations.  You are doing well from this standpoint    Your cholesterol is high.  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  We did stop this because of all the difficulty you had been having in the past.  I did send this into your pharmacy.  It is atorvastatin 1 tablet daily.  Keep filling it.  If you have any issues with it do not hesitate to call me.  Please come in fasting at your next appointment if able

## 2021-05-27 NOTE — PROGRESS NOTES
INR 4.6 hold Warfarin  tue and then 1 mg daily. Retest in one week. After talking with pt and discussing history of greens/salads and medication change. Pt will  continue  with current diet and dosing of Warfarin.  Continue with moderation of Vit K and green leafy vegetables. Cautioned to call with increase bruising or bleeding. Reminded to call with medication change especially antibiotic. Call with any questions or concerns or any up coming procedures. Cautioned about using Herbal medication.

## 2021-05-27 NOTE — TELEPHONE ENCOUNTER
No answer and no voice mail. Letter with exact information mailed today.    Cheyanne Jordan, CMA

## 2021-05-27 NOTE — PROGRESS NOTES
Please call    I enjoyed seeing you in the office at your appointment.  Your lab looks okay  Your sed rate is high due to your vasculitis.  You need to stay on the methotrexate.  I was very impressed with your artery pulsations.  You are doing well from this standpoint    Your cholesterol is high.  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  We did stop this because of all the difficulty you had been having in the past.  I did send this into your pharmacy.  It is atorvastatin 1 tablet daily.  Keep filling it.  If you have any issues with it do not hesitate to call me.  Please come in fasting at your next appointment if able

## 2021-05-28 ENCOUNTER — RECORDS - HEALTHEAST (OUTPATIENT)
Dept: ADMINISTRATIVE | Facility: CLINIC | Age: 86
End: 2021-05-28

## 2021-05-28 NOTE — PROGRESS NOTES
In clinic device check with Device RN and follow-up with Dr. Antoine.  Please see link for full device report.  Patient was informed of results and next follow up during today's visit.

## 2021-05-28 NOTE — PATIENT INSTRUCTIONS - HE
INR 2.1 continue on 1 mg daily. After talking with pt and discussing history of greens/salads and medication change. Pt will  continue  with current diet and dosing of Warfarin.  Continue with moderation of Vit K and green leafy vegetables. Cautioned to call with increase bruising or bleeding. Reminded to call with medication change especially antibiotic. Call with any questions or concerns or any up coming procedures. Cautioned about using Herbal medication.

## 2021-05-28 NOTE — PATIENT INSTRUCTIONS - HE
Ms Constanza Coles,  I enjoyed visiting with you again today.  I am glad to hear you are doing well.  Per our conversation we will check the echo.  I will plan on seeing you 1 year or sooner if needed.  Mathew Antoine

## 2021-05-28 NOTE — PROGRESS NOTES
[facility] Follow-up Note    Assessment & Plan        1. Coronary atherosclerosis due to lipid rich plaque  -angiography in 2005 showed normal left main, 50% proximal left anterior descending with a distal 90% left anterior descending lesion, circumflex was normal as was the right coronary artery.  She had a LIMA to the LAD and a vein graft to the first diagonal with her valve surgery in 2005.  No significant symptoms and continue to work on prevention.   2. S/P CABG (coronary artery bypass graft) -as above LIMA and diagonal graft during aortic valve replacement.  If this was 14 years ago and might consider repeat stress testing yearly.   3. Sick Sinus Syndrome-Medtronic pacemaker with Medtronic leads placed in 2011 with about 7% ventricular pacing.  Battery good for another 4 years.   4. H/O aortic valve replacement -severe aortic stenosis with Forte pericardial magna valve placed in 2005.  Performing echo is yearly and she is aware possibly needing a TAVR.   5. Chronic atrial fibrillation (H) -permanent not valvular and asymptomatic on chronic Coumadin.  Does have periods of tachycardia and may need to institute digoxin.   6. Vasculitis (H) -on chronic methotrexate for this.   7.  Pure hypercholesterolemia- recently placed on Lipitor 10 mg with LDL initially of 106 and repeat pending.  8.  Tachycardia- on beta-blocker but if worsens would add digoxin or consider AV aleida ablation.    Plan  1.  Check echocardiogram and TAVR when needed.  2.  Fasting lipids pending in about 3 months.  3.  Follow-up in the 1 year with device check or sooner if needed.  4.  In 2020 we will start stress testing yearly given that she will be 15 years out from bypass surgery.    Subjective  CC: 83-year-old white female here for yearly follow-up today.  She has having some shortness of breath and heavy activity, difficult for her to walk long distances.  We discussed and signed disability parking for her today.  She  "also states that may be several times a week for a few seconds she will feel her heart racing but is not bothersome enough.  There is no syncope, dizziness, significant angina, PND, orthopnea or peripheral edema.    Medications  Current Outpatient Medications   Medication Sig     atorvastatin (LIPITOR) 10 MG tablet Take 1 tablet (10 mg total) by mouth daily.     cyanocobalamin 1,000 mcg/mL injection INJECT 1 ML (1,000 MCG TOTAL) INTO THE SHOULDER, THIGH, OR BUTTOCKS EVERY 30 DAYS.     methotrexate 2.5 MG tablet Take 5 tablets (12.5 mg total) by mouth once a week.     metoprolol succinate (TOPROL-XL) 25 MG TAKE 1/2 TABLET (12.5MG) DAILY     nitroglycerin (NITROSTAT) 0.4 MG SL tablet DISSOLVE 1 TABLET UNDER THE TONGUE AS NEEDED FOR CHEST PAIN     sucralfate (CARAFATE) 1 gram tablet TAKE 1 TABLET BY MOUTH FOUR TIMES DAILY     triamterene-hydrochlorothiazide (DYAZIDE) 37.5-25 mg per capsule TAKE 1 CAPSULE BY MOUTH AS NEEDED.     warfarin (COUMADIN/JANTOVEN) 2 MG tablet TAKE 1/2 TABLET (1MG) BY MOUTH ON TUES, THURS, AND SAT. TAKE 1 TABLET (2MG) ON ALL OTHER DAYS.       Objective  /60 (Patient Site: Left Arm, Patient Position: Sitting, Cuff Size: Adult Regular)   Pulse 80   Resp 16   Ht 5' 3\" (1.6 m)   Wt 123 lb 1.6 oz (55.8 kg)   LMP  (LMP Unknown)   BMI 21.81 kg/m      General Appearance:    Alert, cooperative, no distress, appears stated age   Head:    Normocephalic, without obvious abnormality, atraumatic   Throat:   Lips, mucosa, and tongue normal; teeth and gums normal   Neck:   Supple, symmetrical, trachea midline, no adenopathy;        thyroid:  No enlargement/tenderness/nodules; no carotid    bruit or JVD   Back:     Symmetric, no curvature, ROM normal, no CVA tenderness   Lungs:     Clear to auscultation bilaterally, respirations unlabored   Chest wall:    No tenderness, midline sternotomy scar and left-sided pacer noted   Heart:   Irregularly irregular, S1 and S2 normal, no murmur, rub   or gallop "   Abdomen:     Soft, non-tender, bowel sounds active all four quadrants,     no masses, no organomegaly   Extremities:   Normal, atraumatic, no cyanosis or edema   Pulses:   2+ and symmetric all extremities   Skin:   Skin color, texture, turgor normal, no rashes or lesions     Results    Lab Results personally reviewed   Lab Results   Component Value Date    CHOL 193 04/02/2019     Lab Results   Component Value Date    HDL 58 04/02/2019     Lab Results   Component Value Date    LDLCALC 106 04/02/2019     Lab Results   Component Value Date    TRIG 144 04/02/2019     Lab Results   Component Value Date    WBC 5.7 04/02/2019    HGB 12.1 04/02/2019    HCT 36.1 04/02/2019     04/02/2019     Lab Results   Component Value Date    CREATININE 0.81 04/02/2019    BUN 16 04/02/2019     04/02/2019    K 4.2 04/02/2019    CO2 30 04/02/2019     Review of Systems:   General: WNL  Eyes: WNL  Ears/Nose/Throat: WNL  Lungs: WNL  Heart: Shortness of Breath with activity, Irregular Heartbeat  Stomach: Constipation  Bladder: WNL  Muscle/Joints: Joint Pain  Skin: WNL  Nervous System: Loss of Balance  Mental Health: WNL     Blood: Easy Bruising

## 2021-05-28 NOTE — PROGRESS NOTES
INR 2.1 take 1 mg daily and retest 5/7. After talking with pt and discussing history of greens/salads and medication change. Pt will  continue  with current diet and dosing of Warfarin.  Continue with moderation of Vit K and green leafy vegetables. Cautioned to call with increase bruising or bleeding. Reminded to call with medication change especially antibiotic. Call with any questions or concerns or any up coming procedures. Cautioned about using Herbal medication.

## 2021-05-28 NOTE — PROGRESS NOTES
INR 2.3 pt has been taking 2 mg on tue and 1 mg all other days. Continue that way and retest in 4 weeks. After talking with pt and discussing history of greens/salads and medication change. Pt will  continue  with current diet and dosing of Warfarin.  Continue with moderation of Vit K and green leafy vegetables. Cautioned to call with increase bruising or bleeding. Reminded to call with medication change especially antibiotic. Call with any questions or concerns or any up coming procedures. Cautioned about using Herbal medication.

## 2021-05-29 NOTE — TELEPHONE ENCOUNTER
ANTICOAGULATION  MANAGEMENT    Assessment     Today's INR result of 1.6 is Subtherapeutic (goal INR of 2.0-3.0)        Missed dose(s) may be affecting INR    Increased greens/vitamin K intake may be affecting INR - not planning to continue moving forward.    No new medication/supplements affecting INR    Continues to tolerate warfarin with no reported s/s of bleeding or thromboembolism     Previous INR was Therapeutic     Patient introduced to [facility]    Plan:     Spoke with Constanza regarding INR result and instructed:   Patient reported that she already took her 1 mg warfarin dose today    Warfarin Dosing Instructions:  take additional 1 mg today for today of 2 mg booster dose then continue current warfarin dose    2 mg every Tue; 1 mg all other days     (0 % change)    Instructed patient to follow up no later than: 1-2 weeks, appointment made.    Education provided: importance of consistent vitamin K intake, impact of vitamin K foods on INR, vitamin K content of foods, importance of therapeutic range and importance of taking warfarin as instructed    Constanza verbalizes understanding and agrees to warfarin dosing plan.    Instructed to call the [facility] for any changes, questions or concerns. (#[phone])   ?   Dorcas Nuñez RN    Subjective/Objective:      Constanza Coles, a 84 y.o. female is on warfarin.     Constanza reports:     Home warfarin dose: verbally confirmed home dose with Constanza and updated on anticoagulation calendar     Missed doses: Yes: yesterday     Medication changes:  No     S/S of bleeding or thromboembolism:  No     New Injury or illness:  No     Changes in diet or alcohol consumption:  Yes: more greens ( brussels sprouts) not planning to continue moving forward.     Upcoming surgery, procedure or cardioversion:  No    Anticoagulation Episode Summary     Current INR goal:   2.0-3.0   TTR:   67.4 % (4.5 y)   Next INR check:   6/17/2019   INR from last check:   1.60! (6/3/2019)   Weekly  max warfarin dose:      Target end date:      INR check location:      Preferred lab:      Send INR reminders to:   [facility]    Indications    Atrial fibrillation (H) [I48.91]  H/O aortic valve replacement [Z95.2]           Comments:            Anticoagulation Care Providers     Provider Role Specialty Phone number    Hayder Bailey MD [facility] Internal [facility] [phone]

## 2021-05-29 NOTE — TELEPHONE ENCOUNTER
ANTICOAGULATION  MANAGEMENT    Assessment     Today's INR result of 2.1 is Therapeutic (goal INR of 2.0-3.0)        Warfarin taken as previously instructed    Decreased greens/vitamin K intake may be affecting INR    No new medication/supplements affecting INR    Continues to tolerate warfarin with no reported s/s of bleeding or thromboembolism     Previous INR was Subtherapeutic most likely due to missed dose and increased vitamin k intake.    Plan:     Spoke with Constanza regarding INR result and instructed:     Warfarin Dosing Instructions:  Continue current warfarin dose    2 mg every Tue; 1 mg all other days     (0 % change)    Instructed patient to follow up no later than: 2 weeks for stability - patient stated that she is not coming in 2 weeks but will come in 4 weeks instead. Appointment made for 7/15/19.    Education provided: importance of consistent vitamin K intake, impact of vitamin K foods on INR, potential interaction between warfarin and alcohol, importance of therapeutic range, target INR goal and significance of current INR result and importance of following up for INR monitoring at instructed interval    Constazna verbalizes understanding and agrees to warfarin dosing plan.    Instructed to call the [facility] for any changes, questions or concerns. (#[phone])   ?   Dorcas Nuñez RN    Subjective/Objective:      Constanza BACON Frankit, a 84 y.o. female is on warfarin.     Constanza reports:     Home warfarin dose: as updated on anticoagulation calendar per template     Missed doses: No     Medication changes:  No     S/S of bleeding or thromboembolism:  No     New Injury or illness:  No     Changes in diet or alcohol consumption:  No     Upcoming surgery, procedure or cardioversion:  No    Anticoagulation Episode Summary     Current INR goal:   2.0-3.0   TTR:   67.0 % (4.5 y)   Next INR check:   7/1/2019   INR from last check:   2.10 (6/17/2019)   Weekly max warfarin dose:      Target end date:      INR  check location:      Preferred lab:      Send INR reminders to:   [facility]    Indications    Atrial fibrillation (H) [I48.91]  H/O aortic valve replacement [Z95.2]           Comments:            Anticoagulation Care Providers     Provider Role Specialty Phone number    Hayder Bailey MD [facility] Internal Medicine [phone]

## 2021-05-29 NOTE — TELEPHONE ENCOUNTER
Anticoagulation Transition of Care    Constanza Coles was referred to transition management of warfarin from [facility] to [facility].    Warfarin indication: Atrial Fibrillation   Current INR goal 2.0-3.0   Date of last primary care office visit 3/27/19     If you are agreeable, [facility] will assume warfarin management for your patient with you as the listed referring provider.     Please confirm agreement by routing back your response. No further action is necessary (referral orders,etc).    Dorcas Nuñez RN

## 2021-05-30 ENCOUNTER — RECORDS - HEALTHEAST (OUTPATIENT)
Dept: ADMINISTRATIVE | Facility: CLINIC | Age: 86
End: 2021-05-30

## 2021-05-30 VITALS — WEIGHT: 127 LBS | BODY MASS INDEX: 22.5 KG/M2 | HEIGHT: 63 IN

## 2021-05-30 VITALS — BODY MASS INDEX: 22.5 KG/M2 | WEIGHT: 127 LBS | HEIGHT: 63 IN

## 2021-05-30 NOTE — TELEPHONE ENCOUNTER
ANTICOAGULATION  MANAGEMENT    Assessment     Today's INR result of 2.0 is Therapeutic (goal INR of 2.0-3.0)        Warfarin taken as previously instructed    No new diet changes affecting INR    No new medication/supplements affecting INR    Continues to tolerate warfarin with no reported s/s of bleeding or thromboembolism     Previous INR was Supratherapeutic    Plan:     Spoke with Constanza regarding INR result and instructed:     Warfarin Dosing Instructions:  Continue current warfarin dose 2 mg daily on Tues; and 1 mg daily rest of week  (0 % change)    Instructed patient to follow up no later than: 2 weeks    Education provided: target INR goal and significance of current INR result, importance of following up for INR monitoring at instructed interval, importance of taking warfarin as instructed and importance of notifying clinic for changes in medications    Constanza verbalizes understanding and agrees to warfarin dosing plan.    Instructed to call the [facility] for any changes, questions or concerns. (#[phone])   ?   Georgina Cullen RN    Subjective/Objective:      Constanza Doant, a 84 y.o. female is on warfarin.     Constanza reports:     Home warfarin dose: verbally confirmed home dose with Constanza and updated on anticoagulation calendar     Missed doses: No     Medication changes:  No     S/S of bleeding or thromboembolism:  No     New Injury or illness:  No     Changes in diet or alcohol consumption:  No     Upcoming surgery, procedure or cardioversion:  No    Anticoagulation Episode Summary     Current INR goal:   2.0-3.0   TTR:   66.2 % (4.6 y)   Next INR check:   8/13/2019   INR from last check:   2.00 (7/30/2019)   Weekly max warfarin dose:      Target end date:      INR check location:      Preferred lab:      Send INR reminders to:   [facility]    Indications    Atrial fibrillation (H) [I48.91]  H/O aortic valve replacement [Z95.2]           Comments:            Anticoagulation Care  Providers     Provider Role Specialty Phone number    Hayder Bailey MD [facility] Internal Medicine [phone]

## 2021-05-30 NOTE — TELEPHONE ENCOUNTER
ANTICOAGULATION  MANAGEMENT    Assessment     Today's INR result of 4.8 is Supratherapeutic (goal INR of 2.0-3.0)        Warfarin taken as previously instructed    No new diet changes affecting INR     Day 9 of 10 of Levaquin doses for UTI    Interaction between Levaquin and warfarin may be affecting INR    Continues to tolerate warfarin with no reported s/s of bleeding or thromboembolism     Previous INR was Therapeutic    Plan:     Spoke with Constanza regarding INR result and instructed:   Patient already took her dose this morning    Warfarin Dosing Instructions:  hold warfarin dose tomorrow then continue current warfarin dose    2 mg every Tue; 1 mg all other days      (0 % change)  Also instructed to eat greens/vit k to help bring INR down - patient stated that she has brussels sprouts ( high vit k content)    Instructed patient to follow up no later than: 7-10 days, appointment made.  Instructed to have INR done sooner if signs and symptoms of bleeding is noted.    Education provided: importance of therapeutic range, target INR goal and significance of current INR result, potential interaction between warfarin and levofloxacin and monitoring for bleeding signs and symptoms discussed the importance of having INR check on day 4 of taking antibiotic.    Constanza verbalizes understanding and agrees to warfarin dosing plan.    Instructed to call the [facility] for any changes, questions or concerns. (#[phone])   ?   Dorcas Nuñez RN    Subjective/Objective:      Constanza Doant, a 84 y.o. female is on warfarin.     Constanza reports:     Home warfarin dose: as updated on anticoagulation calendar per template     Missed doses: No     Medication changes:  Yes: Levofloxacin 500 mg 1 daily for 10 days - last dose will be tomorrow.     S/S of bleeding or thromboembolism:  No     New Injury or illness:  Yes: bladder infection     Changes in diet or alcohol consumption:  No     Upcoming surgery, procedure or  cardioversion:  No    Anticoagulation Episode Summary     Current INR goal:   2.0-3.0   TTR:   66.3 % (4.6 y)   Next INR check:   8/1/2019   INR from last check:   4.80! (7/22/2019)   Weekly max warfarin dose:      Target end date:      INR check location:      Preferred lab:      Send INR reminders to:   [facility]    Indications    Atrial fibrillation (H) [I48.91]  H/O aortic valve replacement [Z95.2]           Comments:            Anticoagulation Care Providers     Provider Role Specialty Phone number    Hayder Bailey MD [facility] Internal Medicine [phone]

## 2021-05-30 NOTE — TELEPHONE ENCOUNTER
Refill Approved    Rx renewed per Medication Renewal Policy. Medication was last renewed on 5/28/18.    Tali Mcdaniel, [facility] Triage/Med Refill 6/27/2019     Requested Prescriptions   Pending Prescriptions Disp Refills     metoprolol succinate (TOPROL-XL) 25 MG [Pharmacy Med Name: METOPROLOL SUCC ER 25 TAB** 25 TAB] 30 tablet 11     Sig: TAKE 1/2 TABLET (12.5MG) DAILY       Beta-Blockers Refill Protocol Passed - 6/27/2019  2:33 PM        Passed - PCP or prescribing provider visit in past 12 months or next 3 months     Last office visit with prescriber/PCP: 3/27/2019 Hayder Bailey MD OR same dept: 3/27/2019 Hayder Bailey MD OR same specialty: 3/27/2019 Hayder Bailey MD  Last physical: Visit date not found Last MTM visit: Visit date not found   Next visit within 3 mo: Visit date not found  Next physical within 3 mo: Visit date not found  Prescriber OR PCP: Hayder Bailey MD  Last diagnosis associated with med order: 1. Tachycardia  - metoprolol succinate (TOPROL-XL) 25 MG [Pharmacy Med Name: METOPROLOL SUCC ER 25 TAB** 25 TAB]; TAKE 1/2 TABLET (12.5MG) DAILY  Dispense: 30 tablet; Refill: 11    If protocol passes may refill for 12 months if within 3 months of last provider visit (or a total of 15 months).             Passed - Blood pressure filed in past 12 months     BP Readings from Last 1 Encounters:   05/20/19 109/63

## 2021-05-31 VITALS — WEIGHT: 129 LBS | HEIGHT: 63 IN | BODY MASS INDEX: 22.86 KG/M2

## 2021-05-31 VITALS — HEIGHT: 63 IN | BODY MASS INDEX: 23.04 KG/M2 | WEIGHT: 130 LBS

## 2021-05-31 VITALS — BODY MASS INDEX: 22.86 KG/M2 | WEIGHT: 129 LBS | HEIGHT: 63 IN

## 2021-05-31 VITALS — WEIGHT: 127.12 LBS | HEIGHT: 63 IN | BODY MASS INDEX: 22.52 KG/M2

## 2021-05-31 NOTE — TELEPHONE ENCOUNTER
ANTICOAGULATION  MANAGEMENT    Assessment     Today's INR result of 2.8 is Therapeutic (goal INR of 2.0-3.0)        Warfarin taken as previously instructed    No new diet changes affecting INR    No new medication/supplements affecting INR    Continues to tolerate warfarin with no reported s/s of bleeding or thromboembolism     Previous INR was Therapeutic    Plan:     Left a detailed message for Constanza regarding INR result and instructed:     Warfarin Dosing Instructions:  Continue current warfarin dose    2 mg every Tue; 1 mg all other days      (0 % change)    Instructed patient to follow up no later than: 4 weeks.    Education provided: importance of therapeutic range and target INR goal and significance of current INR result        Instructed to call the [facility] for any changes, questions or concerns. (#[phone])   ?   Dorcas Nuñez RN    Subjective/Objective:      Constanza BACON Frankit, a 84 y.o. female is on warfarin.     Constanza reports:     Home warfarin dose: as updated on anticoagulation calendar per template     Missed doses: No     Medication changes:  No     S/S of bleeding or thromboembolism:  No     New Injury or illness:  No     Changes in diet or alcohol consumption:  No     Upcoming surgery, procedure or cardioversion:  No    Anticoagulation Episode Summary     Current INR goal:   2.0-3.0   TTR:   66.4 % (4.7 y)   Next INR check:   9/10/2019   INR from last check:   2.80 (8/13/2019)   Weekly max warfarin dose:      Target end date:      INR check location:      Preferred lab:      Send INR reminders to:   [facility]    Indications    Atrial fibrillation (H) [I48.91]  H/O aortic valve replacement [Z95.2]           Comments:            Anticoagulation Care Providers     Provider Role Specialty Phone number    Hayder Bailey MD [facility] Internal Medicine [phone]

## 2021-06-01 VITALS — WEIGHT: 130.4 LBS | BODY MASS INDEX: 23.11 KG/M2 | HEIGHT: 63 IN

## 2021-06-01 VITALS — WEIGHT: 130 LBS | HEIGHT: 63 IN | BODY MASS INDEX: 23.04 KG/M2

## 2021-06-01 VITALS — WEIGHT: 130.08 LBS | HEIGHT: 63 IN | BODY MASS INDEX: 23.05 KG/M2

## 2021-06-01 NOTE — PROGRESS NOTES
OFFICE VISIT NOTE  Constanza Coles   84 y.o. female            Assessment/Plan for  Constanza Coles is a 84 y.o. female.  No Patient Care Coordination Note on file.       1. IHD (ischemic heart disease)  Stable without angina  Tolerating Lipitor  Check total cholesterol-nonfasting today  - Cholesterol, Total    2. Atrial fibrillation, unspecified type (H)  Chronic.  On chronic Coumadin.    3. Vasculitis (H)  Controlled symptomatology on methotrexate  - HM1(CBC and Differential)  - Renal Function Profile  - Erythrocyte Sedimentation Rate  - C-Reactive Protein  - HM1 (CBC with Diff)    4. Sick Sinus Syndrome  Pacemaker    5.  Essential tremor.  On beta-blocker.  She can increase PRN.  We discussed primidone    Plan:  Continue current Rx  Laboratory  Clinic return 3 months  Primidone as needed tremor-declined at this time  Consider Valium.  Declined  There are no Patient Instructions on file for this visit.    Diagnoses and all orders for this visit:    IHD (ischemic heart disease)  -     Cholesterol, Total    Atrial fibrillation, unspecified type (H)    Vasculitis (H)  -     HM1(CBC and Differential)  -     Renal Function Profile  -     Erythrocyte Sedimentation Rate  -     C-Reactive Protein  -     HM1 (CBC with Diff)    Sick Sinus Syndrome        Medications after visit  Current Outpatient Medications   Medication Sig Dispense Refill     atorvastatin (LIPITOR) 10 MG tablet Take 1 tablet (10 mg total) by mouth daily. 30 tablet 11     cyanocobalamin 1,000 mcg/mL injection INJECT 1 ML (1,000 MCG TOTAL) INTO THE SHOULDER, THIGH, OR BUTTOCKS EVERY 30 DAYS. 1 mL 4     methotrexate 2.5 MG tablet Take 5 tablets (12.5 mg total) by mouth once a week. 72 tablet 3     metoprolol succinate (TOPROL-XL) 25 MG TAKE 1/2 TABLET (12.5MG) DAILY 45 tablet 2     nitroglycerin (NITROSTAT) 0.4 MG SL tablet DISSOLVE 1 TABLET UNDER THE TONGUE AS NEEDED FOR CHEST PAIN 90 tablet 12     sucralfate (CARAFATE) 1 gram tablet TAKE 1 TABLET BY MOUTH  FOUR TIMES DAILY 360 tablet 11     triamterene-hydrochlorothiazide (DYAZIDE) 37.5-25 mg per capsule TAKE 1 CAPSULE BY MOUTH AS NEEDED. 90 capsule 3     warfarin (COUMADIN/JANTOVEN) 2 MG tablet TAKE 1/2 TABLET (1MG) BY MOUTH ON TUES, THURS, AND SAT. TAKE 1 TABLET (2MG) ON ALL OTHER DAYS. 90 tablet 11     No current facility-administered medications for this visit.                       Hayder Bailey MD  Internal medicine  [facility]  [phone]  [email or web address]    Much or all of the text in this note was generated through the use of Dragon Dictate voice-to-text software. Errors in spelling or words which seem out of context are unintentional.   Sound alike errors, in particular, may have escaped editing.                 Subjective:   Chief Complaint:  Follow-up and Tremors (Bilateral hands)    Patient notes tremor  She notes it is worsening  She has been on long-standing beta-blocker which is been helpful  She has no movement issues    Atrial Fibrillation    -patient has no cardiovascular symptoms such as palpitations, PND, orthopnea, dyspnea, dizziness, syncope.  Patient, weakness, hemiparesis, dysarthria, ataxia.  No embolic symptomatology such as leg pain or cardiac/neurologic symptoms.  Coumadin monitoring-the patient is compliant with taking their Coumadin as directed.  There is no problem with bleeding. There is no excessive bruising, epistaxis, gum bleeding, hemoptysis, melena, hematochezia, hematuria.  The schedule of Coumadin is controlled through ambulatory anticoagulation clinic.  INR is noted in the chart and reviewed      Status post aortic valve replacement.  Bioprosthetic no symptoms of TIA or congestive heart failure    Vasculitis on methotrexate.  Compliant.  No respiratory issues.  No claudication    Review of Systems:     Extensive 10-point review of systems was performed. Please see the HPI for problem specific pertinent review of systems.     Patient  does note her appetite is fine there is no dysphasia    Otherwise, the following systems are noncontributory including constitutional, eyes, ears, nose and throat, cardiovascular, respiratory, gastrointestinal, genitourinary, musculoskeletal,neurological, skin and/or breast, endocrine, hematologic/lymph, allergic/immunologic and psychiatric.              Medications:  Current Outpatient Medications on File Prior to Visit   Medication Sig     atorvastatin (LIPITOR) 10 MG tablet Take 1 tablet (10 mg total) by mouth daily.     cyanocobalamin 1,000 mcg/mL injection INJECT 1 ML (1,000 MCG TOTAL) INTO THE SHOULDER, THIGH, OR BUTTOCKS EVERY 30 DAYS.     methotrexate 2.5 MG tablet Take 5 tablets (12.5 mg total) by mouth once a week.     metoprolol succinate (TOPROL-XL) 25 MG TAKE 1/2 TABLET (12.5MG) DAILY     nitroglycerin (NITROSTAT) 0.4 MG SL tablet DISSOLVE 1 TABLET UNDER THE TONGUE AS NEEDED FOR CHEST PAIN     sucralfate (CARAFATE) 1 gram tablet TAKE 1 TABLET BY MOUTH FOUR TIMES DAILY     triamterene-hydrochlorothiazide (DYAZIDE) 37.5-25 mg per capsule TAKE 1 CAPSULE BY MOUTH AS NEEDED.     warfarin (COUMADIN/JANTOVEN) 2 MG tablet TAKE 1/2 TABLET (1MG) BY MOUTH ON TUES, THURS, AND SAT. TAKE 1 TABLET (2MG) ON ALL OTHER DAYS.     No current facility-administered medications on file prior to visit.             Allergies:  Allergies   Allergen Reactions     Erythromycin Base      Pt is not sure she is allergic to this     Levaquin [Levofloxacin] Nausea And Vomiting     Vancomycin Other (See Comments)     Red man syndrome     Xanax [Alprazolam] Other (See Comments)     confusion     Sulfa (Sulfonamide Antibiotics) Rash       PSFHx: Tobacco Status:  She  reports that she quit smoking about 39 years ago. She has a 30.00 pack-year smoking history. She has never used smokeless tobacco.   Alcohol Status:    Social History     Substance and Sexual Activity   Alcohol Use Yes       reports that she quit smoking about 39 years ago.  "She has a 30.00 pack-year smoking history. She has never used smokeless tobacco. She reports that she drinks alcohol. She reports that she does not use drugs.    Objective:    Pulse 66   Ht 5' 3\" (1.6 m)   Wt 125 lb 0.6 oz (56.7 kg)   LMP  (LMP Unknown)   SpO2 98%   Breastfeeding? No   BMI 22.15 kg/m    Weight:   Wt Readings from Last 3 Encounters:   09/06/19 125 lb 0.6 oz (56.7 kg)   05/20/19 123 lb (55.8 kg)   05/07/19 123 lb 1.6 oz (55.8 kg)     BP Readings from Last 10 Encounters:   05/20/19 109/63   05/07/19 100/60   03/27/19 100/70   11/02/18 138/76   04/25/18 114/60   03/23/18 128/74   12/01/17 110/80   08/01/17 102/70   05/22/17 120/70   05/11/17 114/68     Vitals:    09/06/19 1252   Pulse: 66   SpO2: 98%   Weight: 125 lb 0.6 oz (56.7 kg)   Height: 5' 3\" (1.6 m)         General-appears well, no acute distress.  Pulses are regular.  Rate 78  Lungs clear no wheeze  Cardiac no murmur.  Pulse irregular  Extremities no edema    Readily palpable radial pulses      Review of clinical lab tests  Lab Results   Component Value Date    WBC 5.7 04/02/2019    HGB 12.1 04/02/2019    HCT 36.1 04/02/2019     04/02/2019    CHOL 193 04/02/2019    TRIG 144 04/02/2019    HDL 58 04/02/2019    ALT 18 11/02/2018    AST 27 11/02/2018     04/02/2019    K 4.2 04/02/2019     04/02/2019    CREATININE 0.81 04/02/2019    BUN 16 04/02/2019    CO2 30 04/02/2019    TSH 3.19 03/27/2017    INR 2.80 (H) 08/13/2019       Glucose   Date/Time Value Ref Range Status   04/02/2019 09:15 AM 90 70 - 125 mg/dL Final   01/02/2019 04:20  70 - 125 mg/dL Final   11/02/2018 02:55  70 - 125 mg/dL Final   03/23/2018 03:26  70 - 125 mg/dL Final   12/01/2017 04:09  70 - 125 mg/dL Final   03/27/2017 04:20 PM 85 70 - 125 mg/dL Final   10/14/2016 03:58  70 - 125 mg/dL Final   04/01/2016 03:09  70 - 125 mg/dL Final   10/28/2015 04:21 PM 90 74 - 125 mg/dL Final   08/09/2011 06:33 AM 87 70 - 125 mg/dL Final "     Comment:          Fasting Glucose reference range is 70-99 mg/dL per       American Diabetes Association (ADA) guidelines.     No results found for this or any previous visit (from the past 24 hour(s)).    RADIOLOGY: No results found.    Review of recent consultation-

## 2021-06-01 NOTE — TELEPHONE ENCOUNTER
ANTICOAGULATION  MANAGEMENT    Assessment     Today's INR result of 2.5 is Therapeutic (goal INR of 2.0-3.0)        Warfarin taken as previously instructed    No new diet changes affecting INR    No new medication/supplements affecting INR    Continues to tolerate warfarin with no reported s/s of bleeding or thromboembolism     Previous INR was Subtherapeutic    Plan:     Left a detailed message for Constanza regarding INR result and instructed:     Warfarin Dosing Instructions:  Continue current warfarin dose 2 mg daily on Tues; and 1 mg daily rest of week  (0 % change)    Instructed patient to follow up no later than: two weeks    Education provided: importance of therapeutic range    Instructed to call the [facility] for any changes, questions or concerns. (#[phone])   ?   Pura Fowler RN    Subjective/Objective:      Constanza Doant, a 84 y.o. female is on warfarin.     Constanza reports:     Home warfarin dose: as updated on anticoagulation calendar per template     Missed doses: No     Medication changes:  No     S/S of bleeding or thromboembolism:  No     New Injury or illness:  No     Changes in diet or alcohol consumption:  No     Upcoming surgery, procedure or cardioversion:  No    Anticoagulation Episode Summary     Current INR goal:   2.0-3.0   TTR:   52.1 %   Next INR check:   10/8/2019   INR from last check:   2.50 (9/24/2019)   Weekly max warfarin dose:      Target end date:      INR check location:      Preferred lab:      Send INR reminders to:   [facility]    Indications    Atrial fibrillation (H) [I48.91]  H/O aortic valve replacement [Z95.2]           Comments:            Anticoagulation Care Providers     Provider Role Specialty Phone number    Hayder Bailey MD [facility] Internal Medicine [phone]

## 2021-06-01 NOTE — TELEPHONE ENCOUNTER
ANTICOAGULATION  MANAGEMENT    Assessment     Today's INR result of 1.6 is Subtherapeutic (goal INR of 2.0-3.0)        Warfarin taken as previously instructed    No new diet changes affecting INR     Patient reported that she was taking levofloxacin 500 mg daily for total of 10 days for bladder infection and last dose was taken a week ago.    Interaction between Levofloxacin and warfarin may be affecting INR    Continues to tolerate warfarin with no reported s/s of bleeding or thromboembolism     Previous INR was Therapeutic    Plan:     Spoke with Constanza regarding INR result and instructed:     Warfarin Dosing Instructions:  take one time booster dose of 3 mg today then continue current warfarin dose    2 mg every Tue; 1 mg all other days      (0 % change)    Instructed patient to follow up no later than: 1-2 weeks, appointment made    Education provided: importance of therapeutic range, target INR goal and significance of current INR result, importance of notifying clinic for changes in medications, monitoring for clotting signs and symptoms and when to seek medical attention/emergency care    Constanza verbalizes understanding and agrees to warfarin dosing plan.    Instructed to call the [facility] for any changes, questions or concerns. (#[phone])   ?   Dorcas Nuñez RN    Subjective/Objective:      Constanza Coles, a 84 y.o. female is on warfarin.     Constanza reports:     Home warfarin dose: as updated on anticoagulation calendar per template     Missed doses: No     Medication changes:  Yes: see above     S/S of bleeding or thromboembolism:  No     New Injury or illness:  Yes: bladder infection as reported by patient.     Changes in diet or alcohol consumption:  No     Upcoming surgery, procedure or cardioversion:  No    Anticoagulation Episode Summary     Current INR goal:   2.0-3.0   TTR:   53.8 %   Next INR check:   9/24/2019   INR from last check:   1.60! (9/10/2019)   Weekly max warfarin dose:       Target end date:      INR check location:      Preferred lab:      Send INR reminders to:   [facility]    Indications    Atrial fibrillation (H) [I48.91]  H/O aortic valve replacement [Z95.2]           Comments:            Anticoagulation Care Providers     Provider Role Specialty Phone number    Hayder Bailey MD [facility] Internal Medicine [phone]

## 2021-06-02 VITALS — BODY MASS INDEX: 21.98 KG/M2 | WEIGHT: 124.04 LBS | HEIGHT: 63 IN

## 2021-06-02 VITALS — BODY MASS INDEX: 21.46 KG/M2 | HEIGHT: 63 IN | WEIGHT: 121.12 LBS

## 2021-06-02 NOTE — TELEPHONE ENCOUNTER
ANTICOAGULATION  MANAGEMENT    Assessment     Today's INR result of 2.0 is Therapeutic (goal INR of 2.0-3.0)        Warfarin taken as previously instructed    No new diet changes affecting INR    No new medication/supplements affecting INR    Continues to tolerate warfarin with no reported s/s of bleeding or thromboembolism     Previous INR was Therapeutic    Plan:     Left a detailed message for Constanza regarding INR result and instructed:     Warfarin Dosing Instructions:  Continue current warfarin dose    2 mg every Tue; 1 mg all other days     (0 % change)    Instructed patient to follow up no later than: 3-4 weeks    Education provided: importance of therapeutic range and target INR goal and significance of current INR result        Instructed to call the [facility] for any changes, questions or concerns. (#[phone])   ?   Dorcas Nuñez RN    Subjective/Objective:      Constanza BACON Frankit, a 84 y.o. female is on warfarin.     Constanza reports:     Home warfarin dose: as updated on anticoagulation calendar per template     Missed doses: No     Medication changes:  No     S/S of bleeding or thromboembolism:  No     New Injury or illness:  No     Changes in diet or alcohol consumption:  No     Upcoming surgery, procedure or cardioversion:  No    Anticoagulation Episode Summary     Current INR goal:   2.0-3.0   TTR:   56.0 %   Next INR check:   11/5/2019   INR from last check:   2.00 (10/8/2019)   Weekly max warfarin dose:      Target end date:      INR check location:      Preferred lab:      Send INR reminders to:   [facility]    Indications    Atrial fibrillation (H) [I48.91]  H/O aortic valve replacement [Z95.2]           Comments:            Anticoagulation Care Providers     Provider Role Specialty Phone number    Hayder Bailey MD [facility] Internal Medicine [phone]

## 2021-06-03 ENCOUNTER — RECORDS - HEALTHEAST (OUTPATIENT)
Dept: ADMINISTRATIVE | Facility: CLINIC | Age: 86
End: 2021-06-03

## 2021-06-03 VITALS — WEIGHT: 125.04 LBS | BODY MASS INDEX: 22.16 KG/M2 | HEIGHT: 63 IN | OXYGEN SATURATION: 98 % | HEART RATE: 66 BPM

## 2021-06-03 VITALS — BODY MASS INDEX: 21.79 KG/M2 | WEIGHT: 123 LBS | HEIGHT: 63 IN

## 2021-06-03 VITALS — HEIGHT: 63 IN | BODY MASS INDEX: 21.81 KG/M2 | WEIGHT: 123.1 LBS

## 2021-06-03 NOTE — TELEPHONE ENCOUNTER
ANTICOAGULATION  MANAGEMENT    Assessment     Today's INR result of 2.2 is Therapeutic (goal INR of 2.0-3.0)        Warfarin taken as previously instructed    No new diet changes affecting INR     Noted new prescription on 11/14: Cefuroxime 250 mg two times a day for 10 days    No interaction expected between cefuroxime and warfarin    Continues to tolerate warfarin with no reported s/s of bleeding or thromboembolism     Previous INR was Subtherapeutic    Plan:     Left a detailed message for Constanza regarding INR result and instructed:     Warfarin Dosing Instructions:  Continue current warfarin dose    2 mg every Tue, Fri; 1 mg all other days       (0 % change)    Instructed patient to follow up no later than: 1-2 weeks.    Education provided: importance of therapeutic range and no interaction anticipated between warfarin and cefuroxime      Instructed to call the [facility] for any changes, questions or concerns. [phone])   ?   Dorcas Nuñez RN    Subjective/Objective:      Constanza BACON Frankit, a 84 y.o. female is on warfarin.     Constanza reports:     Home warfarin dose: as updated on anticoagulation calendar per template     Missed doses: No     Medication changes:  No     S/S of bleeding or thromboembolism:  No     New Injury or illness:  No     Changes in diet or alcohol consumption:  No     Upcoming surgery, procedure or cardioversion:  No    Anticoagulation Episode Summary     Current INR goal:   2.0-3.0   TTR:   54.5 %   Next INR check:   12/3/2019   INR from last check:   2.20 (11/19/2019)   Weekly max warfarin dose:      Target end date:      INR check location:      Preferred lab:      Send INR reminders to:   [facility]    Indications    Atrial fibrillation (H) [I48.91]  H/O aortic valve replacement [Z95.2]           Comments:            Anticoagulation Care Providers     Provider Role Specialty Phone number    Hayder Bailey MD [facility] Internal Medicine [phone]

## 2021-06-03 NOTE — TELEPHONE ENCOUNTER
ANTICOAGULATION  MANAGEMENT    Assessment     Today's INR result of 2.4 is Therapeutic (goal INR of 2.0-3.0)        Warfarin taken differently than instructed, but no impact to total weekly dose    No new diet changes affecting INR    No new medication/supplements affecting INR    Continues to tolerate warfarin with no reported s/s of bleeding or thromboembolism     Previous INR was Therapeutic    Plan:     Spoke with Constanza regarding INR result and instructed:     Warfarin Dosing Instructions:  Continue current warfarin dose    2 mg every Tue, Sat; 1 mg all other days     (0 % change)    Instructed patient to follow up no later than: 4 weeks. Appointment made    Education provided: importance of therapeutic range and target INR goal and significance of current INR result    Constanza verbalizes understanding and agrees to warfarin dosing plan.    Instructed to call the [facility] for any changes, questions or concerns. (#[phone])   ?   Dorcas Nuñez RN    Subjective/Objective:      Constanza BACON Frankit, a 84 y.o. female is on warfarin.     Constanza reports:     Home warfarin dose: as updated on anticoagulation calendar per template     Missed doses: No     Medication changes:  No     S/S of bleeding or thromboembolism:  No     New Injury or illness:  No     Changes in diet or alcohol consumption:  No     Upcoming surgery, procedure or cardioversion:  No    Anticoagulation Episode Summary     Current INR goal:   2.0-3.0   TTR:   56.5 % (1 y)   Next INR check:   12/31/2019   INR from last check:   2.40 (12/3/2019)   Weekly max warfarin dose:      Target end date:      INR check location:      Preferred lab:      Send INR reminders to:   [facility]    Indications    Atrial fibrillation (H) [I48.91]  H/O aortic valve replacement [Z95.2]           Comments:            Anticoagulation Care Providers     Provider Role Specialty Phone number    Hayder Bailey MD [facility] Internal Medicine [phone]

## 2021-06-03 NOTE — TELEPHONE ENCOUNTER
Refill Given    Refill given per Policy, patient informed they are overdue for Labs   OV 9/6/19    Rachel Andres, [facility] Triage/Med Refill 11/13/2019    Requested Prescriptions   Pending Prescriptions Disp Refills     cyanocobalamin 1,000 mcg/mL injection [Pharmacy Med Name: CYANOCOBALAMIN 1,000 MCG/ML 1000 SOLN] 1 mL 4     Sig: INJECT 1 ML (1,000 MCG TOTAL) INTO THE SHOULDER, THIGH, OR BUTTOCKS EVERY 30 DAYS.       Cyanocobalamin (Vitamin B12)  Refill Protocol Failed - 11/13/2019  1:55 PM        Failed - Vitamin B12 level in last 12 months     No results found for: [id number]          Passed - PCP or prescribing provider visit in past 12 months       Last office visit with prescriber/PCP: 9/6/2019 Hayder Bailey MD OR same dept: 9/6/2019 Hayder Bailey MD OR same specialty: 9/6/2019 Hayder Bailey MD Last physical: Visit date not found Last MTM visit: Visit date not found    Next visit within 3 mo: Visit date not found  Next physical within 3 mo: Visit date not found  Prescriber OR PCP: Hayder Bailey MD  Last diagnosis associated with med order: 1. Vitamin B 12 deficiency  - cyanocobalamin 1,000 mcg/mL injection [Pharmacy Med Name: CYANOCOBALAMIN 1,000 MCG/ML 1000 SOLN]; INJECT 1 ML (1,000 MCG TOTAL) INTO THE SHOULDER, THIGH, OR BUTTOCKS EVERY 30 DAYS.  Dispense: 1 mL; Refill: 4               Passed - CBC in last 12 months     WBC   Date Value Ref Range Status   04/02/2019 5.7 4.0 - 11.0 thou/uL Final     RBC   Date Value Ref Range Status   04/02/2019 3.55 (L) 3.80 - 5.40 mill/uL Final     Hemoglobin   Date Value Ref Range Status   04/02/2019 12.1 12.0 - 16.0 g/dL Final     Hematocrit   Date Value Ref Range Status   04/02/2019 36.1 35.0 - 47.0 % Final     MCV   Date Value Ref Range Status   04/02/2019 102 (H) 80 - 100 fL Final     MCH   Date Value Ref Range Status   04/02/2019 34.0 27.0 - 34.0 pg Final     MCHC   Date Value Ref Range Status   04/02/2019 33.4 32.0 - 36.0 g/dL Final     RDW    Date Value Ref Range Status   04/02/2019 12.0 11.0 - 14.5 % Final     Platelets   Date Value Ref Range Status   04/02/2019 181 140 - 440 thou/uL Final     MPV   Date Value Ref Range Status   04/02/2019 8.2 7.0 - 10.0 fL Final

## 2021-06-03 NOTE — TELEPHONE ENCOUNTER
New Appointment Needed  What is the reason for the visit:    Same Date/Next Day Appt Request  What is the reason for your visit?: follow up to labwork and has other medical issues    Provider Preference: PCP only  How soon do you need to be seen?: tomorrow  Waitlist offered?: No  Okay to leave a detailed message:  Yes

## 2021-06-03 NOTE — TELEPHONE ENCOUNTER
Spoke with the patient and we were able to schedule an appointment for the patient on Thursday, 11/14/19 at 10:40 am.  She verbalized understanding and had no further questions at this time.  Kiana CHEN CMA/JIMMY....................11:20 AM

## 2021-06-03 NOTE — TELEPHONE ENCOUNTER
ANTICOAGULATION  MANAGEMENT    Assessment     Today's INR result of 1.9 is Subtherapeutic (goal INR of 2.0-3.0)        Warfarin taken as previously instructed    No new diet changes affecting INR    No new medication/supplements affecting INR    Continues to tolerate warfarin with no reported s/s of bleeding or thromboembolism     Previous INR was low Therapeutic.    Will plan to adjust doses due to INR is noted to be trending down.    Plan:     Left a detailed message for Constanza regarding INR result and instructed:     Warfarin Dosing Instructions:  Change warfarin dose to    2 mg every Tue, Fri; 1 mg all other days      (12.5 % change) - this is the closest dose change considering patient's current tablet size.    Instructed patient to follow up no later than: 1-2 weeks.    Education provided: importance of therapeutic range and target INR goal and significance of current INR result    Instructed to call the [facility] for any changes, questions or concerns. (#[phone])   ?   Dorcas Nuñez RN    Subjective/Objective:      Constanza BACON Nikont, a 84 y.o. female is on warfarin.     Constanza reports:     Home warfarin dose: as updated on anticoagulation calendar per template     Missed doses: No     Medication changes:  No     S/S of bleeding or thromboembolism:  No     New Injury or illness:  No     Changes in diet or alcohol consumption:  No     Upcoming surgery, procedure or cardioversion:  No    Anticoagulation Episode Summary     Current INR goal:   2.0-3.0   TTR:   53.8 %   Next INR check:   11/19/2019   INR from last check:   1.90! (11/5/2019)   Weekly max warfarin dose:      Target end date:      INR check location:      Preferred lab:      Send INR reminders to:   [facility]    Indications    Atrial fibrillation (H) [I48.91]  H/O aortic valve replacement [Z95.2]           Comments:            Anticoagulation Care Providers     Provider Role Specialty Phone number    Hayder Bailey MD  [facility] Internal Medicine [phone]

## 2021-06-04 VITALS
WEIGHT: 123 LBS | DIASTOLIC BLOOD PRESSURE: 90 MMHG | BODY MASS INDEX: 21.79 KG/M2 | HEIGHT: 63 IN | RESPIRATION RATE: 18 BRPM | SYSTOLIC BLOOD PRESSURE: 160 MMHG | HEART RATE: 98 BPM

## 2021-06-04 VITALS
HEART RATE: 86 BPM | WEIGHT: 127 LBS | BODY MASS INDEX: 22.5 KG/M2 | DIASTOLIC BLOOD PRESSURE: 60 MMHG | SYSTOLIC BLOOD PRESSURE: 110 MMHG | OXYGEN SATURATION: 95 % | HEIGHT: 63 IN

## 2021-06-04 VITALS — HEIGHT: 63 IN | BODY MASS INDEX: 21.79 KG/M2 | WEIGHT: 123 LBS

## 2021-06-04 NOTE — TELEPHONE ENCOUNTER
ANTICOAGULATION  MANAGEMENT    Assessment     Today's INR result of 2.5 is Therapeutic (goal INR of 2.0-3.0)        Warfarin taken as previously instructed    No new diet changes affecting INR    No new medication/supplements affecting INR    Continues to tolerate warfarin with no reported s/s of bleeding or thromboembolism     Previous INR was Therapeutic    Plan:     Left a detailed message for Constanza regarding INR result and instructed:     Warfarin Dosing Instructions:  Continue current warfarin dose    2 mg every Tue, Sat; 1 mg all other days       (0 % change)    Instructed patient to follow up no later than: 4-6 weeks.    Education provided: importance of therapeutic range        Instructed to call the [facility] for any changes, questions or concerns. (#[phone])   ?   Dorcas Nuñez RN    Subjective/Objective:      Constanza BACON Frankit, a 84 y.o. female is on warfarin.     Constanza reports:     Home warfarin dose: as updated on anticoagulation calendar per template     Missed doses: No     Medication changes:  No     S/S of bleeding or thromboembolism:  No     New Injury or illness:  No     Changes in diet or alcohol consumption:  No     Upcoming surgery, procedure or cardioversion:  No    Anticoagulation Episode Summary     Current INR goal:   2.0-3.0   TTR:   56.5 % (1 y)   Next INR check:   2/11/2020   INR from last check:   2.50 (12/31/2019)   Weekly max warfarin dose:      Target end date:      INR check location:      Preferred lab:      Send INR reminders to:   [facility]    Indications    Atrial fibrillation (H) [I48.91]  H/O aortic valve replacement [Z95.2]           Comments:            Anticoagulation Care Providers     Provider Role Specialty Phone number    Hayder Bailey MD [facility] [phone]

## 2021-06-04 NOTE — TELEPHONE ENCOUNTER
----- Message from Shelby Pacheco sent at 12/23/2019 10:09 AM CST -----  Regarding: LBF Pt  General phone call:    Caller: Constanza  Primary cardiologist: STERLING  Detailed reason for call: Constanza is experiencing breathing issues, swelling, and states she is in afib 90% of the time. LBF is currently booking out into February.    Best phone number: [phone]  Best time to contact: today  Ok to leave a detailed message? yes  Device? yes    Additional Info:       Called patient to address concerns. Pt reports over the last few weeks, she has felt more short of breath than usual, especially with exertion. She states that she has noticed her ankles swelling in the afternoon/evening. She does not weigh herself daily. She denies chest pain or severe shortness of breath. She has been able to sleep. She states that she believes she has been in Afib more lately- she has not sent in an extra device check. She is agreeable to RAC appt as she cannot see LBF until Feb. Per Device RN- sent in remote check for rhythm issues. Msg sent to device specialist to have this arranged. [place] [place]

## 2021-06-05 VITALS — BODY MASS INDEX: 21.97 KG/M2 | HEIGHT: 63 IN | WEIGHT: 124 LBS

## 2021-06-05 VITALS — BODY MASS INDEX: 21.62 KG/M2 | WEIGHT: 122 LBS | HEIGHT: 63 IN

## 2021-06-05 NOTE — TELEPHONE ENCOUNTER
RN cannot approve Refill Request    RN can NOT refill this medication Protocol failed and NO refill given.      Tali Mcdaniel, [facility] Triage/Med Refill 1/17/2020    Requested Prescriptions   Pending Prescriptions Disp Refills     methotrexate 2.5 MG tablet [Pharmacy Med Name: METHOTREXATE SOD 2.5 MG ^^ 2.5 TAB] 72 tablet 3     Sig: TAKE 5 TABLETS (12.5 MG TOTAL) BY MOUTH ONCE A WEEK.       Methotrexate Refill Protocol Failed - 1/17/2020 11:00 AM        Failed - LFT or AST or ALT monthly for 3 months, then every 3 months                             Albumin   Date Value Ref Range Status   11/14/2019 3.7 3.5 - 5.0 g/dL Final     Bilirubin, Total   Date Value Ref Range Status   11/14/2019 0.6 0.0 - 1.0 mg/dL Final     Bilirubin, Direct   Date Value Ref Range Status   11/14/2019 0.3 <=0.5 mg/dL Final     Alkaline Phosphatase   Date Value Ref Range Status   11/14/2019 70 45 - 120 U/L Final     AST   Date Value Ref Range Status   11/14/2019 27 0 - 40 U/L Final     ALT   Date Value Ref Range Status   11/14/2019 14 0 - 45 U/L Final     Protein, Total   Date Value Ref Range Status   11/14/2019 7.1 6.0 - 8.0 g/dL Final                Passed - PCP or prescribing provider visit in past 6 months       Last office visit with prescriber/PCP: 11/14/2019 OR same dept: 11/14/2019 Hayder Bailey MD OR same specialty: 11/14/2019 Hayder Bailey MD  Last physical: Visit date not found  Last MTM visit: Visit date not found        Next appt within 3 mo: Visit date not found  Next physical within 3 mo: Visit date not found  Prescriber OR PCP: Hayder Bailey MD  Last diagnosis associated with med order: 1. Vasculitis (H)  - methotrexate 2.5 MG tablet [Pharmacy Med Name: METHOTREXATE SOD 2.5 MG ^^ 2.5 TAB]; Take 5 tablets (12.5 mg total) by mouth once a week.  Dispense: 72 tablet; Refill: 3     If protocol passes may refill x 1 month (Dose limit of 30mg per week for Methotrexate)          Passed - CBC w/Diff & Plts (Hm1) monthly  for 3 months, then every 3 months     WBC   Date Value Ref Range Status   11/14/2019 9.7 4.0 - 11.0 thou/uL Final     RBC   Date Value Ref Range Status   11/14/2019 3.57 (L) 3.80 - 5.40 mill/uL Final     Hemoglobin   Date Value Ref Range Status   11/14/2019 12.2 12.0 - 16.0 g/dL Final     Hematocrit   Date Value Ref Range Status   11/14/2019 35.4 35.0 - 47.0 % Final     MCV   Date Value Ref Range Status   11/14/2019 99 80 - 100 fL Final     MCH   Date Value Ref Range Status   11/14/2019 34.0 27.0 - 34.0 pg Final     MCHC   Date Value Ref Range Status   11/14/2019 34.4 32.0 - 36.0 g/dL Final     RDW   Date Value Ref Range Status   11/14/2019 12.4 11.0 - 14.5 % Final     Platelets   Date Value Ref Range Status   11/14/2019 219 140 - 440 thou/uL Final     MPV   Date Value Ref Range Status   11/14/2019 8.7 7.0 - 10.0 fL Final     Neutrophils %   Date Value Ref Range Status   11/14/2019 78 (H) 50 - 70 % Final     Lymphocytes %   Date Value Ref Range Status   11/14/2019 14 (L) 20 - 40 % Final     Monocytes %   Date Value Ref Range Status   11/14/2019 6 2 - 10 % Final     Eosinophils %   Date Value Ref Range Status   11/14/2019 2 0 - 6 % Final     Basophils %   Date Value Ref Range Status   11/14/2019 0 0 - 2 % Final     Neutrophils Absolute   Date Value Ref Range Status   11/14/2019 7.6 2.0 - 7.7 thou/uL Final     Lymphocytes Absolute   Date Value Ref Range Status   11/14/2019 1.4 0.8 - 4.4 thou/uL Final     Monocytes Absolute   Date Value Ref Range Status   11/14/2019 0.6 0.0 - 0.9 thou/uL Final     Eosinophils Absolute   Date Value Ref Range Status   11/14/2019 0.2 0.0 - 0.4 thou/uL Final     Basophils Absolute   Date Value Ref Range Status   11/14/2019 0.0 0.0 - 0.2 thou/uL Final                Passed - Serum creatinine in last 12 months     Creatinine   Date Value Ref Range Status   12/26/2019 0.81 0.60 - 1.10 mg/dL Final

## 2021-06-05 NOTE — TELEPHONE ENCOUNTER
Refill Approved    Rx renewed per Medication Renewal Policy. Medication was last renewed on 6/27/19.    Tali Mcdaniel, [facility] Triage/Med Refill 1/7/2020     Requested Prescriptions   Pending Prescriptions Disp Refills     metoprolol succinate (TOPROL-XL) 25 MG [Pharmacy Med Name: METOPROLOL SUCC ER 25 TAB** 25 TAB] 45 tablet 2     Sig: TAKE 1/2 TABLET (12.5MG) BY MOUTH DAILY       Beta-Blockers Refill Protocol Passed - 1/6/2020  2:14 PM        Passed - PCP or prescribing provider visit in past 12 months or next 3 months     Last office visit with prescriber/PCP: 11/14/2019 Hayder Bailey MD OR same dept: 11/14/2019 Hayder Bailey MD OR same specialty: 11/14/2019 Hayder Bailey MD  Last physical: Visit date not found Last MTM visit: Visit date not found   Next visit within 3 mo: Visit date not found  Next physical within 3 mo: Visit date not found  Prescriber OR PCP: Hayder Bailey MD  Last diagnosis associated with med order: 1. Tachycardia  - metoprolol succinate (TOPROL-XL) 25 MG [Pharmacy Med Name: METOPROLOL SUCC ER 25 TAB** 25 TAB]; TAKE 1/2 TABLET (12.5MG) BY MOUTH DAILY  Dispense: 45 tablet; Refill: 2    If protocol passes may refill for 12 months if within 3 months of last provider visit (or a total of 15 months).             Passed - Blood pressure filed in past 12 months     BP Readings from Last 1 Encounters:   12/26/19 160/90

## 2021-06-06 NOTE — PATIENT INSTRUCTIONS - HE
Ms Constanza Coles,  I enjoyed visiting with you again today.  I am glad to hear you are doing well.  Per our conversation recheck the echo of the heart  Valve.  I will plan on seeing you 1 year or sooner if needed.  Mathew Antoine

## 2021-06-06 NOTE — TELEPHONE ENCOUNTER
ANTICOAGULATION  MANAGEMENT    Assessment   2/26 INR result of 1.8 is Subtherapeutic (goal INR of 2.0-3.0)        Warfarin taken as previously instructed    No new diet changes affecting INR    No new medication/supplements affecting INR    Continues to tolerate warfarin with no reported s/s of bleeding or thromboembolism     Previous INR was Supratherapeutic dose adjusted on 2/18.    Plan:     Spoke with Constanza regarding INR result and instructed:     Warfarin Dosing Instructions:  Change warfarin dose to    2 mg every Tue, Sat; 1 mg all other days      (12 % change) dose adjusted back to patient's previous doses     Instructed patient to follow up no later than: 1-2 weeks - patient prefers to call to make appointment.    Education provided: importance of therapeutic range, target INR goal and significance of current INR result and importance of following up for INR monitoring at instructed interval    Constanza verbalizes understanding and agrees to warfarin dosing plan.    Instructed to call the [facility] for any changes, questions or concerns. (#[phone])   ?   Dorcas Nuñez RN    Subjective/Objective:      Constanza Doant, a 84 y.o. female is on warfarin.     Constanza reports:     Home warfarin dose: verbally confirmed home dose with Constanza and updated on anticoagulation calendar     Missed doses: No     Medication changes:  No     S/S of bleeding or thromboembolism:  No     New Injury or illness:  No     Changes in diet or alcohol consumption:  No     Upcoming surgery, procedure or cardioversion:  No    Anticoagulation Episode Summary     Current INR goal:   2.0-3.0   TTR:   55.8 % (1 y)   Next INR check:   3/11/2020   INR from last check:   1.80! (2/26/2020)   Weekly max warfarin dose:      Target end date:      INR check location:      Preferred lab:      Send INR reminders to:   [facility]    Indications    Atrial fibrillation (H) [I48.91]  H/O aortic valve replacement [Z95.2]            Comments:            Anticoagulation Care Providers     Provider Role Specialty Phone number    Hayder Bailey MD [facility] [phone]

## 2021-06-06 NOTE — PROGRESS NOTES
OFFICE VISIT NOTE  Constanza Coles   84 y.o. female            Assessment/Plan for  Constanza Coles is a 84 y.o. female.  No Patient Care Coordination Note on file.       1. Chronic atrial fibrillation  Stable-doing well  Cardiology following-question need for TAVR.  She has a bioprosthesis of longstanding   Pacer, coumadin  - INR    2. Vasculitis (H)  Large vessel  Dx angiogram 2011  mtx with excellent results  Marked improvement and return of pulses  Will decrease to 10mg per week  Rheum consult  -continue or discontinue    3. Gastroesophageal reflux disease, esophagitis presence not specified  Regular use two times a day carafate    4. Dysphagia, unspecified type  Upper gi endo    5. Coronary atherosclerosis due to lipid rich plaque  Stable without angina  This was asvd, not vasculitis      6. Aortic Stenosis  Bioprosthetic  Upcoming echo    7. Aortic valve replaced-bioprosthetic          Plan:    Lab  Rheum  ugi endo  Two times a day carafate  Decrease mtx 10mg a week  Mamta De Leon 0 report from [facility] 2011      Hayder Bailey MD  Internal medicine  [facility]  [phone]  [email or web address]      There are no Patient Instructions on file for this visit.    Diagnoses and all orders for this visit:    Dysphagia, unspecified type    Chronic atrial fibrillation  -     INR    Vasculitis (H)    Gastroesophageal reflux disease, esophagitis presence not specified    Coronary atherosclerosis due to lipid rich plaque    Aortic Stenosis    Aortic valve replaced        Medications after visit  Current Outpatient Medications   Medication Sig Dispense Refill     atorvastatin (LIPITOR) 10 MG tablet Take 1 tablet (10 mg total) by mouth daily. 30 tablet 11     cyanocobalamin 1,000 mcg/mL injection INJECT 1 ML (1,000 MCG TOTAL) INTO THE SHOULDER, THIGH, OR BUTTOCKS EVERY 30 DAYS. 1 mL 3     methotrexate 2.5 MG tablet TAKE 5 TABLETS (12.5 MG TOTAL) BY MOUTH ONCE A WEEK. 72 tablet 3      metoprolol succinate (TOPROL-XL) 25 MG TAKE 1/2 TABLET (12.5MG) BY MOUTH DAILY 45 tablet 3     nitroglycerin (NITROSTAT) 0.4 MG SL tablet DISSOLVE 1 TABLET UNDER THE TONGUE AS NEEDED FOR CHEST PAIN 90 tablet 12     sucralfate (CARAFATE) 1 gram tablet TAKE 1 TABLET BY MOUTH FOUR TIMES DAILY 360 tablet 11     triamterene-hydrochlorothiazide (DYAZIDE) 37.5-25 mg per capsule TAKE 1 CAPSULE BY MOUTH AS NEEDED. 90 capsule 3     warfarin (COUMADIN/JANTOVEN) 2 MG tablet TAKE 1/2 TABLET (1MG) BY MOUTH ON TUES, THURS, AND SAT. TAKE 1 TABLET (2MG) ON ALL OTHER DAYS. 90 tablet 11     No current facility-administered medications for this visit.              This provider spent greater than 40 in for follow-up    Min. face-to-face time with the patient and/or his family.  More than half this time was spent in counseling, discussing, and or coordination of care which was consistent with the nature of this patient's problems which are listed and described in the assessment and plan.        Hayder Bailey MD  Internal medicine  [facility]  [phone]  [email or web address]    Much or all of the text in this note was generated through the use of Dragon Dictate voice-to-text software. Errors in spelling or words which seem out of context are unintentional.   Sound alike errors, in particular, may have escaped editing.                 Subjective:   Chief Complaint:  Follow-up (3 mo. f/u-pt would like to discuss who she should see after you retire)    She is doing well  Her endurance is pretty good  Her bioprosthetic aortic valve is been there for some time and there is some question whether she will need a TAVR      ISCHEMIC HEART DISEASE- Other than reported, the patient is not having any angina, chest pain, epigastric pain, dyspnea, palpitation, lightheadedness, syncope, excessive fatigue, exertional diaphoresis.-The patient is taking medication as prescribed. No change in nitroglycerin  usage    History of vasculitis  Dates to 2011  Had both arm and leg claudication  She had occluded pulses  With treatment-methotrexate these have open and she is done beautifully  Diagnosis by Dr. Leonides Caraballo  Had diagnostic angiogram.  By my recollection serology was negative.  This was clearly large vessel vasculitis.  She never had headache or temporal artery symptomatology.      Hypertension-There are no cardiovascular, respiratory, neurologic complaints.No claudication.There is no orthostasis.Patient is compliant with medications.  Medications reviewed.   No side effects from medication.      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 Please see the HPI for problem specific pertinent review of systems.     Patient does note no melena nor hematochezia.    Otherwise, the following systems are noncontributory including constitutional, eyes, ears, nose and throat, cardiovascular, respiratory, gastrointestinal, genitourinary, musculoskeletal,neurological, skin and/or breast, endocrine, hematologic/lymph, allergic/immunologic and psychiatric.              Medications:  Current Outpatient Medications on File Prior to Visit   Medication Sig     atorvastatin (LIPITOR) 10 MG tablet Take 1 tablet (10 mg total) by mouth daily.     cyanocobalamin 1,000 mcg/mL injection INJECT 1 ML (1,000 MCG TOTAL) INTO THE SHOULDER, THIGH, OR BUTTOCKS EVERY 30 DAYS.     methotrexate 2.5 MG tablet TAKE 5 TABLETS (12.5 MG TOTAL) BY MOUTH ONCE A WEEK.     metoprolol succinate (TOPROL-XL) 25 MG TAKE 1/2 TABLET (12.5MG) BY MOUTH DAILY     nitroglycerin (NITROSTAT) 0.4 MG SL tablet DISSOLVE 1 TABLET UNDER THE TONGUE AS NEEDED FOR CHEST PAIN     sucralfate (CARAFATE) 1 gram  "tablet TAKE 1 TABLET BY MOUTH FOUR TIMES DAILY     triamterene-hydrochlorothiazide (DYAZIDE) 37.5-25 mg per capsule TAKE 1 CAPSULE BY MOUTH AS NEEDED.     warfarin (COUMADIN/JANTOVEN) 2 MG tablet TAKE 1/2 TABLET (1MG) BY MOUTH ON TUES, THURS, AND SAT. TAKE 1 TABLET (2MG) ON ALL OTHER DAYS.     No current facility-administered medications on file prior to visit.             Allergies:  Allergies   Allergen Reactions     Erythromycin Base      Pt is not sure she is allergic to this     Levaquin [Levofloxacin] Nausea And Vomiting     Vancomycin Other (See Comments)     Red man syndrome     Xanax [Alprazolam] Other (See Comments)     confusion     Sulfa (Sulfonamide Antibiotics) Rash       PSFHx: Tobacco Status:  She  reports that she quit smoking about 40 years ago. She has a 30.00 pack-year smoking history. She has never used smokeless tobacco.   Alcohol Status:    Social History     Substance and Sexual Activity   Alcohol Use Yes       reports that she quit smoking about 40 years ago. She has a 30.00 pack-year smoking history. She has never used smokeless tobacco. She reports current alcohol use. She reports that she does not use drugs.    Objective:    /68   Pulse 89   Ht 5' 3\" (1.6 m)   Wt 123 lb (55.8 kg)   LMP  (LMP Unknown)   SpO2 98% Comment: RA  BMI 21.79 kg/m    Weight:   Wt Readings from Last 3 Encounters:   02/26/20 123 lb (55.8 kg)   02/11/20 123 lb (55.8 kg)   12/26/19 123 lb (55.8 kg)     BP Readings from Last 10 Encounters:   02/26/20 128/68   12/26/19 160/90   11/14/19 110/60   05/20/19 109/63   05/07/19 100/60   03/27/19 100/70   11/02/18 138/76   04/25/18 114/60   03/23/18 128/74   12/01/17 110/80         General-appears well, no acute distress.  Pulses regular  Color is good  Good radial brachial femoral pulses  Carotid arteries normal  Pulse irregular regular  Lungs clear  Cardiac regular without murmur-aortic stenosis or aortic insufficiency  Abdomen benign  Extremities no " edema      Review of clinical lab tests  Lab Results   Component Value Date    WBC 9.7 11/14/2019    HGB 12.2 11/14/2019    HCT 35.4 11/14/2019     11/14/2019    CHOL 142 11/14/2019    TRIG 96 11/14/2019    HDL 69 11/14/2019    ALT 14 11/14/2019    AST 27 11/14/2019     12/26/2019    K 4.0 12/26/2019     12/26/2019    CREATININE 0.81 12/26/2019    BUN 20 12/26/2019    CO2 27 12/26/2019    TSH 3.19 03/27/2017    INR 1.80 (H) 02/26/2020     Lab Results   Component Value Date    SEDRATE 23 (H) 11/14/2019     Lab Results   Component Value Date    CRP 1.2 (H) 11/02/2018          Glucose   Date/Time Value Ref Range Status   12/26/2019 12:05 PM 85 70 - 125 mg/dL Final   11/14/2019 12:07  70 - 125 mg/dL Final   04/02/2019 09:15 AM 90 70 - 125 mg/dL Final   01/02/2019 04:20  70 - 125 mg/dL Final   11/02/2018 02:55  70 - 125 mg/dL Final   03/23/2018 03:26  70 - 125 mg/dL Final   12/01/2017 04:09  70 - 125 mg/dL Final   03/27/2017 04:20 PM 85 70 - 125 mg/dL Final   10/14/2016 03:58  70 - 125 mg/dL Final   04/01/2016 03:09  70 - 125 mg/dL Final     Recent Results (from the past 24 hour(s))   INR   Result Value Ref Range    INR 1.80 (H) 0.90 - 1.10       RADIOLOGY: No results found.    Review of recent consultation-

## 2021-06-06 NOTE — TELEPHONE ENCOUNTER
ANTICOAGULATION  MANAGEMENT    Assessment     Today's INR result of 3.3 is Supratherapeutic (goal INR of 2.0-3.0)        Warfarin taken as previously instructed    Increased greens/vitamin K intake may be affecting INR - will plan to adjust doses due to INR is still high despite increased greens intake.    No new medication/supplements affecting INR    Continues to tolerate warfarin with no reported s/s of bleeding or thromboembolism     Previous INR was Therapeutic    Plan:     Spoke with Constanza regarding INR result and instructed:   Patient reported that she already took her warfarin 2 mg dose this morning    Warfarin Dosing Instructions:  Change warfarin dose to    2 mg every Tue; 1 mg all other days      (11 % change)    Instructed patient to follow up no later than: made aware that INR will be checked during scheudled visit with PCP on 2/26/2020.    Education provided: importance of therapeutic range and target INR goal and significance of current INR result    Constanza verbalizes understanding and agrees to warfarin dosing plan.    Instructed to call the [facility] for any changes, questions or concerns. (#[phone])   ?   Dorcas Nuñez RN    Subjective/Objective:      Constanza BACON Frankit, a 84 y.o. female is on warfarin.     Constanza reports:     Home warfarin dose: as updated on anticoagulation calendar per template     Missed doses: No     Medication changes:  No     S/S of bleeding or thromboembolism:  No     New Injury or illness:  No     Changes in diet or alcohol consumption:  Yes: per patient she increased her greens intake.     Upcoming surgery, procedure or cardioversion:  No    Anticoagulation Episode Summary     Current INR goal:   2.0-3.0   TTR:   55.7 % (1 y)   Next INR check:   3/3/2020   INR from last check:   3.30! (2/18/2020)   Weekly max warfarin dose:      Target end date:      INR check location:      Preferred lab:      Send INR reminders to:   [facility]     Atrial fibrillation (H) [I48.91]  H/O aortic valve replacement [Z95.2]           Comments:            Anticoagulation Care Providers     Provider Role Specialty Phone number    Hayder Bailey MD [facility] [phone]

## 2021-06-06 NOTE — PROGRESS NOTES
In clinic device check with Device RN and Dr. Antoine.  Please see link for full device report.  Patient was informed of results and next follow up during today's visit.

## 2021-06-06 NOTE — TELEPHONE ENCOUNTER
Who is calling:  Patient  Reason for Call:  Added Same Day Lab Appointment  Date of last appointment with primary care:   Okay to leave a detailed message: Not needed

## 2021-06-06 NOTE — TELEPHONE ENCOUNTER
Anticoagulation Management    Unable to reach Constanza today.    Today's INR result of 1.8 is Subtherapeutic (goal INR of 2.0-3.0).     Follow up required to discuss dosing instructions and confirm understanding of instructions.    Left message to continue current dose of warfarin 1 mg tonight.       ACN to follow up    Dorcas Nuñez RN

## 2021-06-06 NOTE — TELEPHONE ENCOUNTER
"Anticoagulation Annual Referral Renewal Review    Constanza Coles's chart reviewed for annual renewal of referral to anticoagulation monitoring.        Criteria for anticoagulation nurse and/or pharmacist renewal met   Warfarin indication: Atrial Fibrillation Yes, per indication   Current with INR monitoring/compliant Yes Yes   Date of last office visit 2/26/2020 Yes, had office visit within last year   Time in Therapeutic Range (TTR) 55.8 % No, TTR < 60 %       Constanza Coles did NOT meet all criteria for anticoagulation management program initiated renewal and requires provider review. Using dot phrase, \".acmrenewalprovider\", please advise if Constanza's anticoagulation management referral should be renewed or if patient should be seen in office to review anticoagulation therapy      Dorcas Nuñez RN  2:09 PM      "

## 2021-06-07 NOTE — TELEPHONE ENCOUNTER
RN cannot approve Refill Request    RN can NOT refill this medication Protocol failed and NO refill given.       Tali Mcdaniel, [facility] Triage/Med Refill 3/30/2020    Requested Prescriptions   Pending Prescriptions Disp Refills     cyanocobalamin 1,000 mcg/mL injection [Pharmacy Med Name: CYANOCOBALAMIN 1,000 MCG/ML 1000 SOLN] 1 mL 3     Sig: INJECT 1 ML (1,000 MCG TOTAL) INTO THE SHOULDER, THIGH, OR BUTTOCKS EVERY 30 DAYS.       Cyanocobalamin (Vitamin B12)  Refill Protocol Failed - 3/27/2020  4:39 PM        Failed - Vitamin B12 level in last 12 months     No results found for: [id number]          Passed - PCP or prescribing provider visit in past 12 months       Last office visit with prescriber/PCP: 2/26/2020 Hayder Bailey MD OR same dept: 2/26/2020 Hayder Bailey MD OR same specialty: 2/26/2020 Hayder Bailey MD Last physical: Visit date not found Last MTM visit: Visit date not found    Next visit within 3 mo: Visit date not found  Next physical within 3 mo: Visit date not found  Prescriber OR PCP: Hayder Bailey MD  Last diagnosis associated with med order: 1. Vitamin B 12 deficiency  - cyanocobalamin 1,000 mcg/mL injection [Pharmacy Med Name: CYANOCOBALAMIN 1,000 MCG/ML 1000 SOLN]; INJECT 1 ML (1,000 MCG TOTAL) INTO THE SHOULDER, THIGH, OR BUTTOCKS EVERY 30 DAYS.  Dispense: 1 mL; Refill: 3               Passed - CBC in last 12 months     WBC   Date Value Ref Range Status   02/28/2020 8.7 4.0 - 11.0 thou/uL Final     RBC   Date Value Ref Range Status   02/28/2020 3.77 (L) 3.80 - 5.40 mill/uL Final     Hemoglobin   Date Value Ref Range Status   02/28/2020 12.3 12.0 - 16.0 g/dL Final     Hematocrit   Date Value Ref Range Status   02/28/2020 37.4 35.0 - 47.0 % Final     MCV   Date Value Ref Range Status   02/28/2020 99 80 - 100 fL Final     MCH   Date Value Ref Range Status   02/28/2020 32.6 27.0 - 34.0 pg Final     MCHC   Date Value Ref Range Status   02/28/2020 32.9 32.0 - 36.0 g/dL Final     RDW    Date Value Ref Range Status   02/28/2020 14.1 11.0 - 14.5 % Final     Platelets   Date Value Ref Range Status   02/28/2020 228 140 - 440 thou/uL Final     MPV   Date Value Ref Range Status   02/28/2020 10.8 8.5 - 12.5 fL Final

## 2021-06-07 NOTE — TELEPHONE ENCOUNTER
Refill Request  Did you contact pharmacy: No  Medication name:   Requested Prescriptions     Pending Prescriptions Disp Refills     metoprolol succinate (TOPROL-XL) 25 MG 45 tablet 3     Who prescribed the medication: Hayder Bailey MD   Requested Pharmacy: [facility]   Is patient out of medication: Yes  Patient notified refills processed in 3 business days:  no  Okay to leave a detailed message: yes

## 2021-06-07 NOTE — TELEPHONE ENCOUNTER
ANTICOAGULATION  MANAGEMENT    Assessment     Today's INR result of 2.4 is Therapeutic (goal INR of 2.0-3.0)        Warfarin taken as previously instructed    No new diet changes affecting INR    No new medication/supplements affecting INR    Continues to tolerate warfarin with no reported s/s of bleeding or thromboembolism     Previous INR was Therapeutic    Plan:     Left a detailed message for Constanza regarding INR result and instructed:     Warfarin Dosing Instructions:  Continue current warfarin dose    2 mg every Tue, Sat; 1 mg all other days        (0 % change)    Instructed patient to follow up no later than: 4-6 weeks    Education provided: importance of therapeutic range        Instructed to call the [facility] for any changes, questions or concerns. [phone])   ?   Dorcas Nuñez RN    Subjective/Objective:      Constanza BACON Frankit, a 84 y.o. female is on warfarin.     Constanza reports:     Home warfarin dose: as updated on anticoagulation calendar per template     Missed doses: No     Medication changes:  No     S/S of bleeding or thromboembolism:  No     New Injury or illness:  No     Changes in diet or alcohol consumption:  No     Upcoming surgery, procedure or cardioversion:  No    Anticoagulation Episode Summary     Current INR goal:   2.0-3.0   TTR:   64.6 % (1 y)   Next INR check:   6/2/2020   INR from last check:   2.40 (4/28/2020)   Weekly max warfarin dose:      Target end date:      INR check location:      Preferred lab:      Send INR reminders to:   [facility]    Indications    Atrial fibrillation (H) [I48.91]  H/O aortic valve replacement [Z95.2]           Comments:            Anticoagulation Care Providers     Provider Role Specialty Phone number    Hayder Bailey MD [facility] Internal Medicine [phone]

## 2021-06-07 NOTE — TELEPHONE ENCOUNTER
Patient is overdue for her 3 year Cologuard recall. Last done 10/19/2016.     Please order cologuard if you are in agreement with care plan.     Thank you,    Specialty Scheduling

## 2021-06-07 NOTE — TELEPHONE ENCOUNTER
Refill Approved    Rx renewed per Medication Renewal Policy. Medication was last renewed on 4/5/19.    Tali Mcdaniel, [facility] Triage/Med Refill 4/10/2020     Requested Prescriptions   Pending Prescriptions Disp Refills     sucralfate (CARAFATE) 1 gram tablet [Pharmacy Med Name: SUCRALFATE 1 GM TABS 1 TAB] 360 tablet 11     Sig: TAKE 1 TABLET BY MOUTH FOUR TIMES DAILY       GI Medications Refill Protocol Passed - 4/10/2020 12:27 PM        Passed - PCP or prescribing provider visit in last 12 or next 3 months.     Last office visit with prescriber/PCP: 2/26/2020 Hayder Bailey MD OR same dept: 2/26/2020 Hayder Bailey MD OR same specialty: 2/26/2020 Hayder Bailey MD  Last physical: Visit date not found Last MTM visit: Visit date not found   Next visit within 3 mo: Visit date not found  Next physical within 3 mo: Visit date not found  Prescriber OR PCP: Hayder Bailey MD  Last diagnosis associated with med order: 1. Gastroesophageal reflux disease, esophagitis presence not specified  - sucralfate (CARAFATE) 1 gram tablet [Pharmacy Med Name: SUCRALFATE 1 GM TABS 1 TAB]; TAKE 1 TABLET BY MOUTH FOUR TIMES DAILY  Dispense: 360 tablet; Refill: 11    2. Atherosclerosis of native coronary artery of native heart without angina pectoris  - atorvastatin (LIPITOR) 10 MG tablet [Pharmacy Med Name: ATORVASTATIN 10 MG TABLET** 10 TAB]; Take 1 tablet (10 mg total) by mouth daily.  Dispense: 30 tablet; Refill: 11    If protocol passes may refill for 12 months if within 3 months of last provider visit (or a total of 15 months).                atorvastatin (LIPITOR) 10 MG tablet [Pharmacy Med Name: ATORVASTATIN 10 MG TABLET** 10 TAB] 30 tablet 11     Sig: TAKE 1 TABLET (10 MG TOTAL) BY MOUTH DAILY.       Statins Refill Protocol (Hmg CoA Reductase Inhibitors) Passed - 4/10/2020 12:27 PM        Passed - PCP or prescribing provider visit in past 12 months      Last office visit with prescriber/PCP: 2/26/2020 Hayder Bailey  MD [facility] OR same dept: 2/26/2020 Hayder Bailey MD OR same specialty: 2/26/2020 Hayder Bailey MD  Last physical: Visit date not found Last MTM visit: Visit date not found   Next visit within 3 mo: Visit date not found  Next physical within 3 mo: Visit date not found  Prescriber OR PCP: Hayder Bailey MD  Last diagnosis associated with med order: 1. Gastroesophageal reflux disease, esophagitis presence not specified  - sucralfate (CARAFATE) 1 gram tablet [Pharmacy Med Name: SUCRALFATE 1 GM TABS 1 TAB]; TAKE 1 TABLET BY MOUTH FOUR TIMES DAILY  Dispense: 360 tablet; Refill: 11    2. Atherosclerosis of native coronary artery of native heart without angina pectoris  - atorvastatin (LIPITOR) 10 MG tablet [Pharmacy Med Name: ATORVASTATIN 10 MG TABLET** 10 TAB]; Take 1 tablet (10 mg total) by mouth daily.  Dispense: 30 tablet; Refill: 11    If protocol passes may refill for 12 months if within 3 months of last provider visit (or a total of 15 months).

## 2021-06-08 ENCOUNTER — AMBULATORY - HEALTHEAST (OUTPATIENT)
Dept: LAB | Facility: CLINIC | Age: 86
End: 2021-06-08

## 2021-06-08 ENCOUNTER — COMMUNICATION - HEALTHEAST (OUTPATIENT)
Dept: ANTICOAGULATION | Facility: CLINIC | Age: 86
End: 2021-06-08

## 2021-06-08 DIAGNOSIS — I48.19 PERSISTENT ATRIAL FIBRILLATION (H): ICD-10-CM

## 2021-06-08 DIAGNOSIS — Z95.2 H/O AORTIC VALVE REPLACEMENT: ICD-10-CM

## 2021-06-08 DIAGNOSIS — Z79.01 LONG TERM (CURRENT) USE OF ANTICOAGULANTS: ICD-10-CM

## 2021-06-08 DIAGNOSIS — I48.21 PERMANENT ATRIAL FIBRILLATION (H): ICD-10-CM

## 2021-06-08 LAB — INR PPP: 2 (ref 0.9–1.1)

## 2021-06-08 NOTE — TELEPHONE ENCOUNTER
ANTICOAGULATION  MANAGEMENT PROGRAM    Constanza Coles is overdue for INR check.     Left message to call and schedule INR appointment as soon as possible.      Dorcas Nuñez RN

## 2021-06-08 NOTE — PROGRESS NOTES
INR stable. Discussed continuing management of dose of Warfarin and returning in one month . No changes to diet needed at this time. Continue moderate intake of Vitamin K and call if increase bruising or unexplained bleeding. Call with medication changes or upcoming procedures.

## 2021-06-08 NOTE — TELEPHONE ENCOUNTER
ANTICOAGULATION  MANAGEMENT    Assessment     Today's INR result of 3.0 is Therapeutic (goal INR of 2.0-3.0)        Warfarin taken as previously instructed    No new diet changes affecting INR    No new medication/supplements affecting INR    Continues to tolerate warfarin with no reported s/s of bleeding or thromboembolism     Previous INR was Therapeutic    Plan:     Left a detailed message for Constanza regarding INR result and instructed:     Warfarin Dosing Instructions:  Continue current warfarin dose    2 mg every Tue, Sat; 1 mg all other days        (0 % change)    Instructed patient to follow up no later than: 6 weeks    Education provided: importance of consistent vitamin K intake and importance of therapeutic range      Instructed to call the [facility] for any changes, questions or concerns. (#[phone])   ?   Dorcas Nuñez RN    Subjective/Objective:      Constanza Doant, a 85 y.o. female is on warfarin.     Constanza reports:     Home warfarin dose: as updated on anticoagulation calendar per template     Missed doses: No     Medication changes:  No     S/S of bleeding or thromboembolism:  No     New Injury or illness:  No     Changes in diet or alcohol consumption:  No     Upcoming surgery, procedure or cardioversion:  No    Anticoagulation Episode Summary     Current INR goal:   2.0-3.0   TTR:   72.1 % (1 y)   Next INR check:   7/28/2020   INR from last check:   3.00 (6/16/2020)   Weekly max warfarin dose:      Target end date:      INR check location:      Preferred lab:      Send INR reminders to:   [facility]    Indications    Atrial fibrillation (H) [I48.91]  H/O aortic valve replacement [Z95.2]           Comments:            Anticoagulation Care Providers     Provider Role Specialty Phone number    Hayder Bailey MD [facility] Internal Medicine [phone]

## 2021-06-09 NOTE — TELEPHONE ENCOUNTER
ACN called and spoke with patient and she stated that she will have oral procedure on 6/30 with [facility] [phone].    She stated that she did not get instructions regarding her warfarin doses.    Made aware that ACN will call clinic and get the information.    Dorcas Nuñez RN

## 2021-06-09 NOTE — PROGRESS NOTES
Patient denies any changes in medication or diet.  Denies any signs or symptoms of bleeding.    Will recheck in 1 month.  GLEN

## 2021-06-09 NOTE — TELEPHONE ENCOUNTER
ANTICOAGULATION  MANAGEMENT    Assessment     Today's INR result of 2.7 is Therapeutic (goal INR of 2.0-3.0)        Warfarin taken as previously instructed    No new diet changes affecting INR    No new medication/supplements affecting INR    Continues to tolerate warfarin with no reported s/s of bleeding or thromboembolism     Previous INR was Therapeutic     6/30 Dental procedure ( roots remove from gums ) per [facility], INR needs to be within range - INR faxed to [phone]    Plan:     Spoke with Constanza regarding INR result and instructed:     Warfarin Dosing Instructions:  Continue current warfarin dose    2 mg every Tue, Sat; 1 mg all other days     (0 % change)    Instructed patient to follow up no later than: 4 - 6  weeks    Education provided: importance of therapeutic range and target INR goal and significance of current INR result    Constanza verbalizes understanding and agrees to warfarin dosing plan.    Instructed to call the [facility] for any changes, questions or concerns. (#[phone])   ?   Dorcas Nuñez RN    Subjective/Objective:      Constanza Doant, a 85 y.o. female is on warfarin.     Constanza reports:     Home warfarin dose: as updated on anticoagulation calendar per template     Missed doses: No     Medication changes:  No     S/S of bleeding or thromboembolism:  No     New Injury or illness:  No     Changes in diet or alcohol consumption:  No     Upcoming surgery, procedure or cardioversion:  Yes: dental procedure    Anticoagulation Episode Summary     Current INR goal:   2.0-3.0   TTR:   77.6 % (10.9 mo)   Next INR check:   8/10/2020   INR from last check:   2.70 (6/29/2020)   Weekly max warfarin dose:      Target end date:      INR check location:      Preferred lab:      Send INR reminders to:   [facility]    Indications    Atrial fibrillation (H) [I48.91]  H/O aortic valve replacement [Z95.2]           Comments:            Anticoagulation Care  Providers     Provider Role Specialty Phone number    Hayder Bailey MD [facility] Internal Medicine [phone]
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
## 2021-06-09 NOTE — TELEPHONE ENCOUNTER
ANTICOAGULATION  MANAGEMENT - BPA Interacting Medication Review    Interacting medication(s): Levofloxacin with warfarin.    Duration: 10 days, 7/13 to 7/23     New medication?:  Yes, interaction per [employer]romed[employer], may increase INR and risk of bleeding.    Plan:    Spoke with Constanza  regarding potential interaction. She stated that the medication is only for as needed for  worsening of symptom.    Warfarin instructions: continue current warfarin dose    Follow up INR recommended in:  Instructed patient to have INR checked 4 days of taking it and to call [facility] for update.     Anticoagulation calendar updated    Dorcas Nuñez RN

## 2021-06-09 NOTE — PROGRESS NOTES
OFFICE VISIT NOTE  Constanza Coles   81 y.o. female      Subjective:   Chief Complaint:  medication check and Atrial Fibrillation    Patient has noted tachycardia.  Up to 150 in the gym.  Has a pacemaker.  Chronic A. fib.  History of AVR approximately 2005.  On chronic warfarin.  No bleeding.  Is not on any rate control agent.  Appetite is good.  No melena.  No OTC medication cold medication thyroid dietary etc.  Non-smoker    She is getting short of breath from this.  No PND, orthopnea, edema.  No angina.  No TIA symptoms.    Patient is not having any arm claudication.  She is on methotrexate.  She has never had any immunosuppression from this.    Review of Systems:     Extensive 10-point review of systems was performed. Please see the HPI for problem specific pertinent review of systems.     Patient does note no diarrhea or constipation    History of hypertension.   Hypertension-There are no cardiovascular, respiratory, neurologic complaints.No claudication.There is no orthostasis.Patient is compliant with medications.  Medications reviewed.   No side effects from medication.    Otherwise, the following systems are noncontributory including constitutional, eyes, ears, nose and throat, cardiovascular, respiratory, gastrointestinal, genitourinary, musculoskeletal,neurological, skin and/or breast, endocrine, hematologic/lymph, allergic/immunologic and psychiatric.              Medications:  Current Outpatient Prescriptions   Medication Sig Dispense Refill     cyanocobalamin 1,000 mcg/mL injection INJECT 1 ML (1,000 MCG TOTAL) INTO THE SHOULDER, THIGH, OR BUTTOCKS EVERY 30 DAYS. 1 mL 5     methotrexate 2.5 MG tablet Take 5 tablets (12.5 mg total) by mouth once a week. 72 tablet 0     nitroglycerin (NITROSTAT) 0.4 MG SL tablet DISSOLVE 1 TABLET UNDER THE TONGUE AS NEEDED FOR CHEST PAIN 90 tablet 12     sucralfate (CARAFATE) 1 gram tablet TAKE 1 TABLET BY MOUTH FOUR TIMES DAILY 360 tablet PRN      triamterene-hydrochlorothiazide (DYAZIDE) 37.5-25 mg per capsule Take 1 capsule by mouth as needed. 90 capsule 3     warfarin (COUMADIN) 2 MG tablet Take 1 mg Tue and Sat and 2 mg all other day of the week. 90 tablet 0     metoprolol succinate (TOPROL XL) 25 MG Take 1 tablet (25 mg total) by mouth daily. 30 tablet 11     No current facility-administered medications for this visit.      Current Outpatient Prescriptions on File Prior to Visit   Medication Sig     cyanocobalamin 1,000 mcg/mL injection INJECT 1 ML (1,000 MCG TOTAL) INTO THE SHOULDER, THIGH, OR BUTTOCKS EVERY 30 DAYS.     methotrexate 2.5 MG tablet Take 5 tablets (12.5 mg total) by mouth once a week.     nitroglycerin (NITROSTAT) 0.4 MG SL tablet DISSOLVE 1 TABLET UNDER THE TONGUE AS NEEDED FOR CHEST PAIN     sucralfate (CARAFATE) 1 gram tablet TAKE 1 TABLET BY MOUTH FOUR TIMES DAILY     triamterene-hydrochlorothiazide (DYAZIDE) 37.5-25 mg per capsule Take 1 capsule by mouth as needed.     warfarin (COUMADIN) 2 MG tablet Take 1 mg Tue and Sat and 2 mg all other day of the week.     [DISCONTINUED] methotrexate 2.5 MG tablet TAKE 5 TABLETS (12.5 MG TOTAL) BY MOUTH ONCE A WEEK.     No current facility-administered medications on file prior to visit.        Allergies:  Allergies   Allergen Reactions     Erythromycin Base      Pt is not sure she is allergic to this     Levaquin [Levofloxacin] Nausea And Vomiting     Vancomycin Other (See Comments)     Red man syndrome     Xanax [Alprazolam] Other (See Comments)     confusion     Sulfa (Sulfonamide Antibiotics) Rash       PSFHx: Tobacco Status:  She  reports that she quit smoking about 37 years ago. She has a 30.00 pack-year smoking history. She does not have any smokeless tobacco history on file.   Alcohol Status:    History   Alcohol Use     Yes       reports that she quit smoking about 37 years ago. She has a 30.00 pack-year smoking history. She does not have any smokeless tobacco history on file. She  "reports that she drinks alcohol. Her drug history is not on file.    Objective:    /70  Pulse (!) 105  Ht 5' 3\" (1.6 m)  Wt 127 lb (57.6 kg)  LMP  (LMP Unknown)  SpO2 95%  BMI 22.5 kg/m2  Weight:   Wt Readings from Last 3 Encounters:   03/27/17 127 lb (57.6 kg)   10/14/16 125 lb 1.9 oz (56.8 kg)   07/13/16 124 lb (56.2 kg)     BP Readings from Last 3 Encounters:   03/27/17 110/70   10/14/16 100/70   06/30/16 110/70         General-appears well, no acute distress.  Thin woman looking good.  Good color  Excellent radial arteries bilateral.  Excellent brachial artery bilaterally which is a major plus  Red fingers.  Neck no goiter or adenopathy  Apical pulse irregularly irregular.  Heart rate 110.  Lungs are clear  Cardiac irregular  No aortic insufficiency or murmur noted  PMI midclavicular line  No neck vein distention  No edema    EKG personally reviewed.  A. fib.  QS V1 and V2.  Old ECG for comparison shows a lot of pacemaker rhythm.  Cannot find a narrow complex on her old ECGs      Review of clinical lab tests  Lab Results   Component Value Date    WBC 8.3 10/14/2016    HGB 13.5 10/14/2016    HCT 38.8 10/14/2016     10/14/2016    ALT 18 04/01/2016    AST 31 04/01/2016     10/14/2016    K 4.8 10/14/2016     10/14/2016    CREATININE 0.92 10/14/2016    BUN 13 10/14/2016    CO2 26 10/14/2016    INR 3.2 03/13/2017         Recent Results (from the past 24 hour(s))   Electrocardiogram Perform and Read   Result Value Ref Range    SYSTOLIC BLOOD PRESSURE  mmHg    DIASTOLIC BLOOD PRESSURE  mmHg    VENTRICULAR RATE 105 BPM    ATRIAL RATE 66 BPM    P-R INTERVAL  ms    QRS DURATION 82 ms    Q-T INTERVAL 296 ms    QTC CALCULATION (BEZET) 391 ms    P Axis  degrees    R AXIS 2 degrees    T AXIS 97 degrees    MUSE DIAGNOSIS       Atrial fibrillation with rapid ventricular response with occasional ventricular-paced complexes  Septal infarct , age undetermined  Abnormal ECG  When compared with ECG of " 25-MAR-2015 13:14,  Vent. rate has increased BY  34 BPM                Assessment/Plan for  Constanza Coles is a 81 y.o. female.  No Patient Care Coordination Note on file.       1. Chronic atrial fibrillation  Rapid tachycardia.  Rx beta-blocker low dose.  Should be fine now that she has good circulation in her extremities.  Start with 25 mg.  Will check a hemoglobin.  I will check a BNP because of the associated dyspnea.  I think if we rate control her she will feel better.  I will get a cardiac echo.  I will have cardiology see her.  I will recheck an INR.  - HM1(CBC and Differential)  - Hepatic Profile  - Basic Metabolic Panel  - Erythrocyte Sedimentation Rate  - XR Chest PA and Lateral; Future  - HM1 (CBC with Diff)    2. Vasculitis  Clinical improvement with excellent radial pulses.  Check CBC liver    3. Dyspnea  Note above.  Check echo.  I think this is due to her tachycardia  - BNP(B-type Natriuretic Peptide)  - Echo Complete; Future    4. H/O aortic valve replacement  Clinically doing well without AI.  - Echo Complete; Future    5. Tachycardia  Note above  - Thyroid Cascade  - Electrocardiogram Perform and Read  - metoprolol succinate (TOPROL XL) 25 MG; Take 1 tablet (25 mg total) by mouth daily.  Dispense: 30 tablet; Refill: 11  - Ambulatory referral to Cardiology         Plan:  As outlined  Laboratory including BNP is thyroid  Metoprolol  Cardiac echo  Cardiology 2 weeks  Return visit 6 weeks      Diagnoses and all orders for this visit:    Chronic atrial fibrillation  -     HM1(CBC and Differential)  -     Hepatic Profile  -     Basic Metabolic Panel  -     Erythrocyte Sedimentation Rate  -     XR Chest PA and Lateral; Future; Expected date: 3/27/17  -     HM1 (CBC with Diff)    Vasculitis    Dyspnea  -     BNP(B-type Natriuretic Peptide)  -     Echo Complete; Future; Expected date: 3/27/17    H/O aortic valve replacement  -     Echo Complete; Future; Expected date: 3/27/17  -     INR    Tachycardia  -      Thyroid Cascade  -     Electrocardiogram Perform and Read  -     metoprolol succinate (TOPROL XL) 25 MG; Take 1 tablet (25 mg total) by mouth daily.  Dispense: 30 tablet; Refill: 11  -     Ambulatory referral to Cardiology    Other orders  -     Cancel: Electrocardiogram Perform - Clinic            This provider spent greater than 40 min. face-to-face time with the patient and/or his family.  More than half this time was spent in counseling and or coordination of care with other providers or agencies which were consistent with the nature of this patient's problems which are listed and described in the assessment and plan.    Hayder Bailey MD  Internal medicine  [facility]  [phone]  [email or web address]      This is an electronically verified report by Hayder Bailey M.D.  (Note created with Dragon voice recognition and unintended spelling errors and word substitutions may occur)

## 2021-06-09 NOTE — TELEPHONE ENCOUNTER
"FYI - Status Update  Who is Calling: Patient  Update: Is scheduled for oral surgery 6/30/20, to have some \"roots\" removed from her gums. Requests call back from [facility] to discuss Warfarin dosing.  Okay to leave a detailed message?:  No    "

## 2021-06-09 NOTE — TELEPHONE ENCOUNTER
INR result faxed to [facility] at     Dorcas Nuñez RN

## 2021-06-09 NOTE — PATIENT INSTRUCTIONS - HE
Vit d 2000 units a day   calcium TUMS up to 100O mg a day for your bones   You are due for a pneumonia 23   You are also eligible for shinlgles shot   You are due for dexa scan for osteoporosis screening .

## 2021-06-09 NOTE — TELEPHONE ENCOUNTER
[facility] called and left a detailed message for Constanza and updated of information below.  Instructed to continue current warfarin doses of warfarin then increase greens intake for the weekend. Then call  to schedule INR appointment for 6/29.  Instructed to call [facility] back for any questions or concerns.    Dorcas Nuñez RN

## 2021-06-09 NOTE — TELEPHONE ENCOUNTER
ACN called [facility] and spoke with Shruthi and she stated that as long as the patient's INR is within range , they will do the procedure. They just require INR to be done 24 hours prior to procedure and faxed to [phone].    ACN will update the patient.    Dorcas Nuñez RN

## 2021-06-10 NOTE — PROGRESS NOTES
[facility] Follow-up Note    Assessment & Plan        1. Chronic atrial fibrillation -this is now permanent.  She has 100% on device check and it is asymptomatic.  Of course she does have the pacemaker She is on Coumadin currently and primary clinic is adjusting.     2. Sick Sinus Syndrome -she has permanent programmable pacemaker in place and recent check shows good function.     3. Aortic Stenosis  -this was severe and she underwent a bioprosthetic aortic valve replacement in January 2005 and echo shows this is working well.  Since we are more than 10 years out recheck echo yearly.  Most recent echo just done by Dr. Sanderson showed no issues.     4. Coronary atherosclerosis due to lipid rich plaque -angiography in 2005 showed normal left main, 50% proximal LAD lesion with a distal 90% LAD lesion, circumflex and right coronary artery had no significant disease.  She had LIMA to LAD and a vein graft to the first diagonal.     5.  Vasculitis-on methotrexate and defer to primary.    Plan  1.  Continue current medications.  2.  Follow-up with me one year with repeat echo or sooner if needed.    Subjective  CC: 82-year-old white female being seen in yearly follow-up today.  Since I seen her she developed some palpitations, device check showed atrial fibrillation with heart rates above 100.  She is placed on metoprolol by primary and had an echocardiogram.  Since then she has felt well with no major fatigue, syncope, dizziness, chest discomfort or peripheral edema.    Medications  Current Outpatient Prescriptions   Medication Sig     cyanocobalamin 1,000 mcg/mL injection INJECT 1 ML (1,000 MCG TOTAL) INTO THE SHOULDER, THIGH, OR BUTTOCKS EVERY 30 DAYS.     methotrexate 2.5 MG tablet Take 5 tablets (12.5 mg total) by mouth once a week.     metoprolol succinate (TOPROL XL) 25 MG Take 0.5 tablets (12.5 mg total) by mouth daily.     nitroglycerin (NITROSTAT) 0.4 MG SL tablet DISSOLVE 1 TABLET UNDER THE  "TONGUE AS NEEDED FOR CHEST PAIN     sucralfate (CARAFATE) 1 gram tablet TAKE 1 TABLET BY MOUTH FOUR TIMES DAILY (Patient taking differently: TAKE 1 TABLET BY MOUTH FOUR TIMES DAILY AS NEEDED)     triamterene-hydrochlorothiazide (DYAZIDE) 37.5-25 mg per capsule Take 1 capsule by mouth as needed.     warfarin (COUMADIN) 2 MG tablet TAKE 1/2 TABLET (1MG) ON TUES & SAT. TAKE 1 TABLET (2MG) ALL OTHER DAYS OF THE WEEK.     methotrexate 2.5 MG tablet TAKE 5 TABLETS (12.5 MG TOTAL) BY MOUTH ONCE A WEEK.       Objective  /70 (Patient Site: Left Arm, Patient Position: Sitting, Cuff Size: Adult Regular)  Pulse 72  Resp 16  Ht 5' 3\" (1.6 m)  Wt 129 lb (58.5 kg)  LMP  (LMP Unknown)  BMI 22.85 kg/m2    General Appearance:    Alert, cooperative, no distress, appears stated age   Head:    Normocephalic, without obvious abnormality, atraumatic   Throat:   Lips, mucosa, and tongue normal; teeth and gums normal   Neck:   Supple, symmetrical, trachea midline, no adenopathy;        thyroid:  No enlargement/tenderness/nodules; no carotid    bruit or JVD   Back:     Symmetric, no curvature, ROM normal, no CVA tenderness   Lungs:     Clear to auscultation bilaterally, respirations unlabored   Chest wall:    No tenderness, midline sternotomy scar as well as left-sided pacemaker    Heart:    Regular rate and rhythm, S1 and S2 normal, no murmur, rub   or gallop   Abdomen:     Soft, non-tender, bowel sounds active all four quadrants,     no masses, no organomegaly   Extremities:   Normal, atraumatic, no cyanosis or edema   Pulses:   2+ and symmetric all upper extremities, absent lower    Skin:   Skin color, texture, turgor normal, no rashes or lesions     Results    Lab Results personally reviewed No results found for: CHOL  No results found for: HDL  No results found for: LDLCALC  No results found for: TRIG  Lab Results   Component Value Date    WBC 11.8 (H) 03/27/2017    HGB 13.1 03/27/2017    HCT 37.8 03/27/2017     " 03/27/2017     Lab Results   Component Value Date    CREATININE 0.82 03/27/2017    BUN 14 03/27/2017     03/27/2017    K 3.9 03/27/2017    CO2 28 03/27/2017     Review of Systems:   General: WNL  Eyes: WNL  Ears/Nose/Throat: WNL  Lungs: WNL  Heart: Arm Pain, Irregular Heartbeat  Stomach: WNL  Bladder: WNL  Muscle/Joints: Joint Pain  Skin: WNL  Nervous System: Dizziness  Mental Health: WNL     Blood: Easy Bruising

## 2021-06-10 NOTE — TELEPHONE ENCOUNTER
ANTICOAGULATION  MANAGEMENT    Assessment     Today's INR result of 1.9 is Subtherapeutic (goal INR of 2.0-3.0)        Warfarin taken as previously instructed    No new diet changes affecting INR    No new medication/supplements affecting INR    Continues to tolerate warfarin with no reported s/s of bleeding or thromboembolism     Previous INR was Therapeutic    Plan:     Left detailed message for Constanza regarding INR result and instructed:     Warfarin Dosing Instructions:  Continue current warfarin dose    2 mg every Tue, Sat; 1 mg all other days       (0 % change)    Instructed patient to follow up no later than: 2 weeks    Education provided: importance of therapeutic range        Instructed to call the [facility] for any changes, questions or concerns. (#[phone])   ?   Dorcas Nuñez RN    Subjective/Objective:      Constanza Doant, a 85 y.o. female is on warfarin.     Constanza reports:     Home warfarin dose: as updated on anticoagulation calendar per template     Missed doses: No     Medication changes:  No     S/S of bleeding or thromboembolism:  No     New Injury or illness:  No     Changes in diet or alcohol consumption:  No     Upcoming surgery, procedure or cardioversion:  No    Anticoagulation Episode Summary     Current INR goal:   2.0-3.0   TTR:   78.2 % (1 y)   Next INR check:   9/1/2020   INR from last check:   1.90! (8/18/2020)   Weekly max warfarin dose:      Target end date:      INR check location:      Preferred lab:      Send INR reminders to:   [facility]    Indications    Atrial fibrillation (H) [I48.91]  H/O aortic valve replacement [Z95.2]           Comments:            Anticoagulation Care Providers     Provider Role Specialty Phone number    Hayder Bailey MD [facility] Internal [facility] [phone]

## 2021-06-10 NOTE — PROGRESS NOTES
OFFICE VISIT NOTE  Constanza Doant   81 y.o. female      Subjective:   Chief Complaint:  Atrial Fibrillation (Routine visit)    Constanza feels much better on the metoprolol.  25 mg made her tired.  She is cutting to 12.5  She has had chronic A. fib.  She is no longer having tachycardia in the gym.  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    She has no PND orthopnea.  Her breathing is better on the low-dose metoprolol    She does have a slight tremor recent thyroid was negative.  There is no stiffness nor family history of Parkinson's    Patient is on methotrexate for vasculitis with excellent response and restitution of her upper extremity circulation.  She had stopped it a while will go a number of years ago and she started to have closure of her vessels.  She is back on it without any undue effects.  She has had normal liver profiles and unremarkable CBCs    Patient does report pain in her right second MCP joint.  She does have pain in her other joints.  Her right hand will lock on occasion    Review of Systems:     Extensive 10-point review of systems was performed. Please see the HPI for problem specific pertinent review of systems.     Patient does note unremarkable urination no hematuria    Otherwise, the following systems are noncontributory including constitutional, eyes, ears, nose and throat, cardiovascular, respiratory, gastrointestinal, genitourinary, musculoskeletal,neurological, skin and/or breast, endocrine, hematologic/lymph, allergic/immunologic and psychiatric.              Medications:  Current Outpatient Prescriptions   Medication Sig Dispense Refill     cyanocobalamin 1,000 mcg/mL injection INJECT 1 ML (1,000 MCG TOTAL) INTO THE SHOULDER, THIGH, OR BUTTOCKS EVERY 30 DAYS. 1 mL 5      methotrexate 2.5 MG tablet Take 5 tablets (12.5 mg total) by mouth once a week. 72 tablet 0     metoprolol succinate (TOPROL XL) 25 MG Take 0.5 tablets (12.5 mg total) by mouth daily. 30 tablet 11     nitroglycerin (NITROSTAT) 0.4 MG SL tablet DISSOLVE 1 TABLET UNDER THE TONGUE AS NEEDED FOR CHEST PAIN 90 tablet 12     sucralfate (CARAFATE) 1 gram tablet TAKE 1 TABLET BY MOUTH FOUR TIMES DAILY 360 tablet PRN     triamterene-hydrochlorothiazide (DYAZIDE) 37.5-25 mg per capsule Take 1 capsule by mouth as needed. 90 capsule 3     warfarin (COUMADIN) 2 MG tablet TAKE 1/2 TABLET (1MG) ON TUES & SAT. TAKE 1 TABLET (2MG) ALL OTHER DAYS OF THE WEEK. 90 tablet 0     No current facility-administered medications for this visit.      Current Outpatient Prescriptions on File Prior to Visit   Medication Sig     cyanocobalamin 1,000 mcg/mL injection INJECT 1 ML (1,000 MCG TOTAL) INTO THE SHOULDER, THIGH, OR BUTTOCKS EVERY 30 DAYS.     methotrexate 2.5 MG tablet Take 5 tablets (12.5 mg total) by mouth once a week.     nitroglycerin (NITROSTAT) 0.4 MG SL tablet DISSOLVE 1 TABLET UNDER THE TONGUE AS NEEDED FOR CHEST PAIN     sucralfate (CARAFATE) 1 gram tablet TAKE 1 TABLET BY MOUTH FOUR TIMES DAILY     triamterene-hydrochlorothiazide (DYAZIDE) 37.5-25 mg per capsule Take 1 capsule by mouth as needed.     warfarin (COUMADIN) 2 MG tablet TAKE 1/2 TABLET (1MG) ON TUES & SAT. TAKE 1 TABLET (2MG) ALL OTHER DAYS OF THE WEEK.     [DISCONTINUED] metoprolol succinate (TOPROL XL) 25 MG Take 1 tablet (25 mg total) by mouth daily.     No current facility-administered medications on file prior to visit.        Allergies:  Allergies   Allergen Reactions     Erythromycin Base      Pt is not sure she is allergic to this     Levaquin [Levofloxacin] Nausea And Vomiting     Vancomycin Other (See Comments)     Red man syndrome     Xanax [Alprazolam] Other (See Comments)     confusion     Sulfa (Sulfonamide Antibiotics) Rash       PSFHx: Tobacco Status:  " She  reports that she quit smoking about 37 years ago. She has a 30.00 pack-year smoking history. She does not have any smokeless tobacco history on file.   Alcohol Status:    History   Alcohol Use     Yes       reports that she quit smoking about 37 years ago. She has a 30.00 pack-year smoking history. She does not have any smokeless tobacco history on file. She reports that she drinks alcohol. Her drug history is not on file.    Objective:    /68 (Patient Site: Left Arm, Patient Position: Sitting, Cuff Size: Adult Regular)  Pulse 80  Ht 5' 3\" (1.6 m)  Wt 127 lb 1.9 oz (57.7 kg)  LMP  (LMP Unknown)  SpO2 98%  Breastfeeding? No  BMI 22.52 kg/m2  Weight:   Wt Readings from Last 3 Encounters:   17 127 lb 1.9 oz (57.7 kg)   17 127 lb (57.6 kg)   17 127 lb (57.6 kg)     BP Readings from Last 3 Encounters:   17 114/68   17 114/70   17 110/70         General-appears well, no acute distress.  Right second MCP arthritis.  No synovitis  Some lumbrical atrophy  No obvious tenosynovitis    Pulses are regular.  Apical 80  Lungs clear  I do not hear a murmur of aortic insufficiency  She has good pulses in both her brachial arteries.  Her blood pressure is normal.  Extremities no edema or rash        Review of clinical lab tests  Lab Results   Component Value Date    WBC 11.8 (H) 2017    HGB 13.1 2017    HCT 37.8 2017     2017    ALT 14 2017    AST 23 2017     2017    K 3.9 2017     2017    CREATININE 0.82 2017    BUN 14 2017    CO2 28 2017    TSH 3.19 2017    INR 3.0 (!) 2017      .bnp  Lab Results   Component Value Date     2017       Reading physician: Joao Gonzales MD Ordering physician: Hayder Bailey MD Study date: 17   Patient Information   Patient Name MRN Sex              Age   Constanza Coles [id number] Female 1935 (81 y.o.)   Indications "   Dyspnea, SOB, Wheezing, Tachypnea   Dx: Dyspnea [R06.00 (ICD-10-CM)]; H/O aortic valve replacement [Z95.2 (ICD-10-CM)]   Summary        Left Ventricle: Normal size.The calculated left ventricular ejection fraction is 58%. This represents a normal ejection fraction. The left ventricular wall thickness is normal. E/e' > 15, suggesting elevated LV filling pressures.    Right Ventricle: The right ventricle is mildly dilated. Difficult to evaluate right ventricular systolic function because of technical limitations. TAPSE being low suggest reduced right ventricular systolic function. TAPSE is abnormal, which is consistent with abnormal right ventricular systolic function. Pacer lead is present in the ventricle.    Aortic Valve: No stenosis. No regurgitation is present. There is a bioprosthetic valve present. Mean gradient across the bioprosthesis is 6 mmHg. This would be considered normal for a bioprosthetic valve.      When compared to echocardiogram from March 18, 2016, the mean gradient across the bioprosthetic valve is mildly increased. The right ventricle appears mildly dilated on the current study and the systolic function may be reduced (TAPSE is reduced). The right ventricular systolic pressure estimate is similar to the prior echo.                Assessment/Plan for  Constazna Coles is a 81 y.o. female.  No Patient Care Coordination Note on file.       There are no diagnoses linked to this encounter.   1.  Aortic valve replacement doing well  2.  Chronic A. fib with recent tachycardia-normal thyroid hemoglobin.  Responded well to 12.5 mg of metoprolol  3.  Vasculitis responding to methotrexate  4.  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  On her Coumadin I would not like her on nonsteroidal anti-inflammatories.    Plan:  Continue current medication  Amoxicillin prophylaxis-spoke with dentist through medical assistant  Referral Dr. Gray Cavazos-cortisone injection  Continue metoprolol 12.5 daily  Keep  cardiology follow-up later this month  Clinic visit every 3 months.-Medication monitoring on methotrexate    There are no Patient Instructions on file for this visit.      Diagnoses and all orders for this visit:    Chronic atrial fibrillation    Arthritis of hand, right, degenerative  -     Ambulatory referral to Orthopedics    Tachycardia  -     metoprolol succinate (TOPROL XL) 25 MG; Take 0.5 tablets (12.5 mg total) by mouth daily.  Dispense: 30 tablet; Refill: 11    Vasculitis    H/O aortic valve replacement              Hayder Bailey MD  Internal medicine  [facility]  [phone]  [email or web address]      This is an electronically verified report by Hayder Bailey M.D.  (Note created with Dragon voice recognition and unintended spelling errors and word substitutions may occur)

## 2021-06-11 NOTE — TELEPHONE ENCOUNTER
ANTICOAGULATION  MANAGEMENT    Assessment     Today's INR result of 2.4 is Therapeutic (goal INR of 2.0-3.0)        Warfarin taken as previously instructed    No new diet changes affecting INR    No new medication/supplements affecting INR    Continues to tolerate warfarin with no reported s/s of bleeding or thromboembolism     Previous INR was Supratherapeutic    Plan:     Left detailed message for Constanza regarding INR result and instructed:     Warfarin Dosing Instructions:  Continue current warfarin dose    2 mg every Tue, Sat; 1 mg all other days        (0 % change)    Instructed patient to follow up no later than: 2 weeks.    Education provided: importance of therapeutic range        Instructed to call the [facility] for any changes, questions or concerns. (#[phone])   ?   Dorcas Nuñez RN    Subjective/Objective:      Constanza Doant, a 85 y.o. female is on warfarin.     Constanza reports:     Home warfarin dose: as updated on anticoagulation calendar per template     Missed doses: No     Medication changes:  No     S/S of bleeding or thromboembolism:  No     New Injury or illness:  No     Changes in diet or alcohol consumption:  No     Upcoming surgery, procedure or cardioversion:  No    Anticoagulation Episode Summary     Current INR goal:   2.0-3.0   TTR:   77.1 % (1 y)   Next INR check:   10/6/2020   INR from last check:   2.40 (9/22/2020)   Weekly max warfarin dose:      Target end date:      INR check location:      Preferred lab:      Send INR reminders to:   [facility]    Indications    Atrial fibrillation (H) [I48.91]  H/O aortic valve replacement [Z95.2]           Comments:            Anticoagulation Care Providers     Provider Role Specialty Phone number    Hayder Bailey MD [facility] Internal Medicine [phone]

## 2021-06-11 NOTE — TELEPHONE ENCOUNTER
ANTICOAGULATION  MANAGEMENT    Assessment     Today's INR result of 4.0 is Supratherapeutic (goal INR of 2.0-3.0)        Warfarin taken as previously instructed    No new diet changes affecting INR    No new medication/supplements affecting INR    Continues to tolerate warfarin with no reported s/s of bleeding or thromboembolism     Previous INR was Subtherapeutic    Plan:     Left detailed message for Constanza regarding INR result and instructed:     Warfarin Dosing Instructions:  Hold warfarin dose today then continue current warfarin dose    2 mg every Tue, Sat; 1 mg all other days      (0 % change)    Instructed patient to follow up no later than: 7-10 days    Education provided: importance of therapeutic range and monitoring for bleeding signs and symptoms      Instructed to call the [facility] for any changes, questions or concerns. (#[phone])   ?   Dorcas Nuñez RN    Subjective/Objective:      Constanza BACON Frankit, a 85 y.o. female is on warfarin.     Constanza reports:     Home warfarin dose: as updated on anticoagulation calendar per template     Missed doses: No     Medication changes:  No     S/S of bleeding or thromboembolism:  No     New Injury or illness:  No     Changes in diet or alcohol consumption:  No     Upcoming surgery, procedure or cardioversion:  No    Anticoagulation Episode Summary     Current INR goal:   2.0-3.0   TTR:   77.6 % (1 y)   Next INR check:   9/18/2020   INR from last check:   4.00! (9/10/2020)   Weekly max warfarin dose:      Target end date:      INR check location:      Preferred lab:      Send INR reminders to:   [facility]    Indications    Atrial fibrillation (H) [I48.91]  H/O aortic valve replacement [Z95.2]           Comments:            Anticoagulation Care Providers     Provider Role Specialty Phone number    Hayder Bailey MD [facility] Internal Medicine [phone]

## 2021-06-12 NOTE — TELEPHONE ENCOUNTER
ANTICOAGULATION  MANAGEMENT PROGRAM    Constanza Coles is overdue for INR check.     Spoke with Constanza and scheduled INR appointment on 10/15.      Dorcas Nuñez RN

## 2021-06-12 NOTE — PROGRESS NOTES
INR 3.2 decrease dose to 1 mg Tue, thur, sat and 2 mg all other days. Retest in 4 weeks. INR stable. Discussed continuing management of dose of Warfarin and returning in one month . No changes to diet needed at this time. Continue moderate intake of Vitamin K and call if increase bruising or unexplained bleeding. Call with medication changes or upcoming procedures.

## 2021-06-12 NOTE — TELEPHONE ENCOUNTER
Who is calling:  Constanza  Reason for Call:  Patient returning call to Dorcas Salmon  Date of last appointment with primary care: 7/13/2020  Okay to leave a detailed message: Yes

## 2021-06-12 NOTE — PROGRESS NOTES
INR stable. Discussed continuing management of dose of Warfarin and returning in one month . No changes to diet needed at this time. Continue moderate intake of Vitamin K and call if increase bruising or unexplained bleeding. Call with medication changes or upcoming procedures.  INR 2.7

## 2021-06-12 NOTE — TELEPHONE ENCOUNTER
Patient called and back and she stated that she already took her dose yesterday and reported that he  is the one cutting her pills but she is noticing that the pills are cut unevenly some bigger than other. Recommended to change to change to 1 mg to avoid issues.    [facility]    Instructed to hold warfarin dose today then change back doses to 2 mg Tues , Sat then 1 mg all other days -using the 1 mg tablets.    INR recheck in 1-2 weeks, appointment made.    Dorcas Nuñez RN

## 2021-06-12 NOTE — TELEPHONE ENCOUNTER
Refill Approved    Rx renewed per Medication Renewal Policy. Medication was last renewed on 7/13/2020.    Judi Mckeon, [facility] Triage/Med Refill 10/18/2020     Requested Prescriptions   Pending Prescriptions Disp Refills     gabapentin (NEURONTIN) 300 MG capsule [Pharmacy Med Name: GABAPENTIN 300 MG CAP** 300 Capsule] 60 capsule 3     Sig: TAKE 1 CAPSULE (300 MG TOTAL) BY MOUTH 2 TIMES A DAY.       Gabapentin/Levetiracetam/Tiagabine Refill Protocol  Passed - 10/16/2020  2:41 PM        Passed - PCP or prescribing provider visit in past 12 months or next 3 months     Last office visit with prescriber/PCP: 7/13/2020 Yun Jacinto MD OR same dept: 7/13/2020 Yun Jacinto MD OR same specialty: 7/13/2020 Yun Jacinto MD  Last physical: Visit date not found Last MTM visit: Visit date not found   Next visit within 3 mo: Visit date not found  Next physical within 3 mo: Visit date not found  Prescriber OR PCP: Yun Jacinto MD  Last diagnosis associated with med order: 1. Burning sensation of feet  - gabapentin (NEURONTIN) 300 MG capsule [Pharmacy Med Name: GABAPENTIN 300 MG CAP** 300 Capsule]; TAKE 1 CAPSULE (300 MG TOTAL) BY MOUTH 2 TIMES A DAY.  Dispense: 60 capsule; Refill: 3    If protocol passes may refill for 12 months if within 3 months of last provider visit (or a total of 15 months).

## 2021-06-13 NOTE — TELEPHONE ENCOUNTER
Refill Approved    Rx renewed per Medication Renewal Policy. Medication was last renewed on 1/7/20, last OV 7/13/20.    Aura Tyson, [facility] Triage/Med Refill 12/19/2020     Requested Prescriptions   Pending Prescriptions Disp Refills     metoprolol succinate (TOPROL-XL) 25 MG [Pharmacy Med Name: METOPROLOL SUCC ER 25MG ** 25 Tablet] 45 tablet 3     Sig: TAKE 1/2 TABLET (12.5MG) BY MOUTH DAILY       Beta-Blockers Refill Protocol Passed - 12/18/2020 12:08 PM        Passed - PCP or prescribing provider visit in past 12 months or next 3 months     Last office visit with prescriber/PCP: 2/26/2020 Hayder Bailey MD OR same dept: 7/13/2020 Yun Jacinto MD OR same specialty: 7/13/2020 Yun Jacinto MD  Last physical: Visit date not found Last MTM visit: Visit date not found   Next visit within 3 mo: Visit date not found  Next physical within 3 mo: Visit date not found  Prescriber OR PCP: Hayder Bailey MD  Last diagnosis associated with med order: 1. Tachycardia  - metoprolol succinate (TOPROL-XL) 25 MG [Pharmacy Med Name: METOPROLOL SUCC ER 25MG ** 25 Tablet]; TAKE 1/2 TABLET (12.5MG) BY MOUTH DAILY  Dispense: 45 tablet; Refill: 3    If protocol passes may refill for 12 months if within 3 months of last provider visit (or a total of 15 months).             Passed - Blood pressure filed in past 12 months     BP Readings from Last 1 Encounters:   07/13/20 132/82

## 2021-06-13 NOTE — TELEPHONE ENCOUNTER
ANTICOAGULATION  MANAGEMENT    Assessment     Today's INR result of 2.6 is Therapeutic (goal INR of 2.0-3.0)        Warfarin taken as previously instructed    No new diet changes affecting INR    No new medication/supplements affecting INR    Continues to tolerate warfarin with no reported s/s of bleeding or thromboembolism     Previous INR was Subtherapeutic    Plan:     Left detailed message for Constanza regarding INR result and instructed:     Warfarin Dosing Instructions:  Continue current warfarin dose    2 mg every Tue, Sat; 1 mg all other days       (0 % change)    Instructed patient to follow up no later than: 1-2 weeks    Education provided: importance of therapeutic range        Instructed to call the [facility] for any changes, questions or concerns. (#[phone])   ?   Dorcas Nuñez RN    Subjective/Objective:      Constanza BACON Frankit, a 85 y.o. female is on warfarin.     Constanza reports:     Home warfarin dose: as updated on anticoagulation calendar per template     Missed doses: No     Medication changes:  No     S/S of bleeding or thromboembolism:  No     New Injury or illness:  No     Changes in diet or alcohol consumption:  No     Upcoming surgery, procedure or cardioversion:  No    Anticoagulation Episode Summary     Current INR goal:  2.0-3.0   TTR:  80.3 % (1 y)   Next INR check:  11/27/2020   INR from last check:  2.60 (11/13/2020)   Weekly max warfarin dose:     Target end date:     INR check location:     Preferred lab:     Send INR reminders to:  [facility]    Indications    Atrial fibrillation (H) [I48.91]  H/O aortic valve replacement [Z95.2]           Comments:           Anticoagulation Care Providers     Provider Role Specialty Phone number    Hayder Bailey MD Referring Internal Medicine [phone]    Yun Jacinto MD Responsible Internal Medicine [phone]

## 2021-06-14 NOTE — PROGRESS NOTES
INR 3.4 pt had increased ETOH this weekend. Will increase salads and greens and decrease wine. After talking with pt and discussing history of greens/salads and medication change. Pt will  continue  with current diet and dosing of Warfarin.  Continue with moderation of Vit K and green leafy vegetables. Cautioned to call with increase bruising or bleeding. Reminded to call with medication change especially antibiotic. Call with any questions or concerns or any up coming procedures. Cautioned about using Herbal medication.

## 2021-06-14 NOTE — PATIENT INSTRUCTIONS - HE
Ms Constanza Coles,  I enjoyed visiting with you again today.  I am glad to hear you are doing well.  Per our conversation we will check the echo now and yearly.  I will plan on seeing you 1 year or sooner if needed.  Mathew Antoine

## 2021-06-14 NOTE — TELEPHONE ENCOUNTER
Anticoagulation Management    Unable to reach Constanza today.    Today's INR result of 1.90 is Subtherapeutic (goal INR of 2.0-3.0).     Follow up required to assess for changes .    Left message to take a booster dose of warfarin,  2 mg tonight.    - if warfarin was already taken today, then take the 2 tabs on Saturday 1/16.   - thereafter, resume usual wkly warfarin dose.   - will recheck INR on 2/3/21 during f/u visit with Cardiology @ [facility].    ACN to follow up - on Mon. 1/18/21.    Erika Simental RN

## 2021-06-15 NOTE — TELEPHONE ENCOUNTER
----- Message from Mariel Antoine MD sent at 2/3/2021  2:02 PM CST -----  I am setting is 85-year-old lady up for repeat echo given her aortic valve replacement, can you call her and see if she be willing to have a pharmacological stress nuclear given her bypass was over 15 years ago?  She is declined in the past and I forgot to ask her again today.LF            Called patient and LM to see if she would be interested in a stress test this year. LM for her to call back to discuss. -[facility]

## 2021-06-15 NOTE — TELEPHONE ENCOUNTER
===View-only below this line===  ----- Message -----  From: Nafisa Bullock  Sent: 2/12/2021  11:08 AM CST  To: Edel García RN    Pt called today to say she didn't want the stress test and I gave her information on how to schedule her covid vaccine.         Noted message from scheduling team; no repeat call back placed. -[facility]

## 2021-06-15 NOTE — TELEPHONE ENCOUNTER
ANTICOAGULATION  MANAGEMENT PROGRAM    Constanza Coles is overdue for INR check.     Spoke with Constanza and scheduled INR appointment on 3/2/21 @ [facility].      Erika Simental RN

## 2021-06-15 NOTE — PROGRESS NOTES
INR 1.8 decrease greens After talking with pt and discussing history of greens/salads and medication change. Pt will  continue  with current diet and dosing of Warfarin.  Continue with moderation of Vit K and green leafy vegetables. Cautioned to call with increase bruising or bleeding. Reminded to call with medication change especially antibiotic. Call with any questions or concerns or any up coming procedures. Cautioned about using Herbal medication.

## 2021-06-15 NOTE — TELEPHONE ENCOUNTER
Anticoagulation Management    Unable to reach Constanza today.    Today's INR result of 2.00 is Therapeutic (goal INR of 2.5-3.5).     Follow up required to assess for changes .    left message to take 1mg warfarin dose today,    - I will call tomorrow to give new warfarin dose   - Change warfarin dose to 2 mg daily on Tues/Fri; and 1 mg daily rest of week.   - (12.5 % change)      ACN to follow up - Thurs. 2/4/21.    Erika Simental RN

## 2021-06-15 NOTE — TELEPHONE ENCOUNTER
ANTICOAGULATION  MANAGEMENT    Assessment     Today's INR result of 2.40 is Therapeutic (goal INR of 2.0-3.0)        Warfarin taken as previously instructed    No new diet changes affecting INR    No new medication/supplements affecting INR    - 2nd dose scheduled on 3/11/21.   On 2/18/21 had 1st Pfizer Covid-19 vaccination.    Continues to tolerate warfarin with no reported s/s of bleeding or thromboembolism     Previous INR was Therapeutic at 2.00 on 2/3/21.    Plan:     Left detailed message for Constanza regarding INR result and instructed:      Warfarin Dosing Instructions:    - Continue current warfarin dose 2 mg daily on Tues/Fri; and 1 mg daily rest of week.    Instructed patient to follow up no later than:  4 wks.    Education provided: importance of consistent vitamin K intake and target INR goal and significance of current INR result    Instructed to call the [facility] for any changes, questions or concerns. (#[phone])   ?   Erika Simental RN    Subjective/Objective:      Constanza BACON Nikont, a 85 y.o. female is on warfarin. Constanza Apodaca reports:     Home warfarin dose: as updated on anticoagulation calendar per template     Missed doses: No     Medication changes:  No     S/S of bleeding or thromboembolism:  No     New Injury or illness:  No     Changes in diet or alcohol consumption:  No     Upcoming surgery, procedure or cardioversion:  No    Anticoagulation Episode Summary     Current INR goal:  2.0-3.0   TTR:  66.9 % (1 y)   Next INR check:  3/30/2021   INR from last check:  2.40 (3/2/2021)   Weekly max warfarin dose:     Target end date:     INR check location:     Preferred lab:     Send INR reminders to:  [facility]    Indications    Atrial fibrillation (H) [I48.91]  H/O aortic valve replacement [Z95.2]           Comments:           Anticoagulation Care Providers     Provider Role Specialty Phone number    Hayder Bailey MD Referring Internal Medicine [phone]     Yun Jacinto MD [facility] Internal Medicine [phone]

## 2021-06-15 NOTE — TELEPHONE ENCOUNTER
ANTICOAGULATION  MANAGEMENT    Assessment     Today's INR result of 2.00 is Therapeutic (goal INR of 2.0-3.0)        Warfarin taken as previously instructed    No new diet changes affecting INR    No new medication/supplements affecting INR    - reported currently on Levofloxacin  250mg daily for 10 days, for UTI.  She has 2 days left to complete therapy.    Continues to tolerate warfarin with no reported s/s of bleeding or thromboembolism     Previous INR was Subtherapeutic at 1.90 on 1/15/21.    Plan:     Spoke on phone with Constanza regarding INR result and instructed:      Warfarin Dosing Instructions:    - Change warfarin dose to 2 mg daily on Tues/Fri; and 1 mg daily rest of week.   - (12.5 % change)    Instructed patient to follow up no later than:  1-2 wks.   - will probably come in 3 wks, weather permitting.    Education provided: importance of consistent vitamin K intake, target INR goal and significance of current INR result, potential interaction between warfarin and Levofloxacin and importance of notifying clinic for changes in medications    Constanza verbalizes understanding and agrees to warfarin dosing plan.    Instructed to call the [facility] for any changes, questions or concerns. (#[phone])   ?   Erika Simental RN    Subjective/Objective:      Constanza Doant, a 85 y.o. female is on warfarin. Constanza Apodaca reports:     Home warfarin dose: verbally confirmed home dose with Constanza and updated on anticoagulation calendar     Missed doses: No     Medication changes:  Yes:  Levofloxacinfor UTI     S/S of bleeding or thromboembolism:  No     New Injury or illness:  No     Changes in diet or alcohol consumption:  No     Upcoming surgery, procedure or cardioversion:  No    Anticoagulation Episode Summary     Current INR goal:  2.0-3.0   TTR:  61.3 % (1 y)   Next INR check:  2/17/2021   INR from last check:  2.00 (2/3/2021)   Weekly max warfarin dose:     Target end date:     INR check  location:     Preferred lab:     Send INR reminders to:  [facility]    Indications    Atrial fibrillation (H) [I48.91]  H/O aortic valve replacement [Z95.2]           Comments:           Anticoagulation Care Providers     Provider Role Specialty Phone number    Hayder Bailey MD Referring Internal Medicine [phone]    Yun Jacinto MD Responsible Internal Medicine [phone]

## 2021-06-15 NOTE — TELEPHONE ENCOUNTER
----- Message from Mariel Antoine MD sent at 2/4/2021  8:45 AM CST -----  As per all my recent patients, she is requesting a Covid vaccine, I try to go online with her during her clinic appointment and could not find it at [facility].  Can you please connect with her and let her know.  In addition, I believe her  is [facility] in a year the [facility] is giving out vaccines very easily.  If you can help her with that therapy.LF      Called patient again and left another message to see if she wanted a stress test this year and if she needed resources for COVID-19 vaccine. Writer left the  COVID-19 scheduling phone number along with writer's direct line to call back for assistance. -elver

## 2021-06-15 NOTE — TELEPHONE ENCOUNTER
RN cannot approve Refill Request    RN can NOT refill this medication Protocol failed and NO refill given. Last office visit: Visit date not found Last Physical: Visit date not found Last MTM visit: Visit date not found Last visit same specialty: 1/15/2021 Yun Jacinto MD.  Next visit within 3 mo: Visit date not found  Next physical within 3 mo: Visit date not found      Gray Bethea, [facility] Triage/Med Refill 2/23/2021

## 2021-06-16 ENCOUNTER — COMMUNICATION - HEALTHEAST (OUTPATIENT)
Dept: INTERNAL MEDICINE | Facility: CLINIC | Age: 86
End: 2021-06-16

## 2021-06-16 DIAGNOSIS — R00.0 TACHYCARDIA: ICD-10-CM

## 2021-06-16 PROBLEM — Z95.1 S/P CABG (CORONARY ARTERY BYPASS GRAFT): Status: ACTIVE | Noted: 2019-05-07

## 2021-06-16 PROBLEM — Z95.0 CARDIAC PACEMAKER IN SITU: Status: ACTIVE | Noted: 2017-08-01

## 2021-06-16 PROBLEM — E78.00 PURE HYPERCHOLESTEROLEMIA: Status: ACTIVE | Noted: 2019-05-07

## 2021-06-16 RX ORDER — METOPROLOL SUCCINATE 25 MG/1
TABLET, EXTENDED RELEASE ORAL
Qty: 45 TABLET | Refills: 2 | Status: SHIPPED | OUTPATIENT
Start: 2021-06-16 | End: 2022-03-04

## 2021-06-16 NOTE — PATIENT INSTRUCTIONS - HE
Ms Constanza Coles,  I enjoyed visiting with you again today.  I am concerned with the shortness of breath.  Per our conversation try the FUROSEMIDE two times a day first in AM then maybe mid afternoon and after 3 days go to 1 a day in AM.  Try to limit fluids to 4-6 a day and limit salt.  We will get cat scan looking for blockages.  I will plan on seeing you thereafter.  Mathew Antoine

## 2021-06-16 NOTE — PROGRESS NOTES
In clinic device check with Dr. Antoine.  Please see link for full device report.  Patient was informed of results and next follow up during today's visit.

## 2021-06-16 NOTE — TELEPHONE ENCOUNTER
Refill Approved    Rx renewed per Medication Renewal Policy. Medication was last renewed on 4/10/20.    Gray Bethea, [facility] Triage/Med Refill 3/23/2021     Requested Prescriptions   Pending Prescriptions Disp Refills     atorvastatin (LIPITOR) 10 MG tablet [Pharmacy Med Name: ATORVASTATIN 10MG TABLET** 10 Tablet] 30 tablet 11     Sig: TAKE 1 TABLET (10 MG TOTAL) BY MOUTH DAILY.       Statins Refill Protocol (Hmg CoA Reductase Inhibitors) Passed - 3/22/2021  2:20 PM        Passed - PCP or prescribing provider visit in past 12 months      Last office visit with prescriber/PCP: 2/26/2020 Hayder Bailey MD OR same dept: 7/13/2020 Yun Jacinto MD OR same specialty: 1/15/2021 Yun Jacinto MD  Last physical: Visit date not found Last MTM visit: Visit date not found   Next visit within 3 mo: Visit date not found  Next physical within 3 mo: Visit date not found  Prescriber OR PCP: Hayder Bailey MD  Last diagnosis associated with med order: 1. Atherosclerosis of native coronary artery of native heart without angina pectoris  - atorvastatin (LIPITOR) 10 MG tablet [Pharmacy Med Name: ATORVASTATIN 10MG TABLET** 10 Tablet]; TAKE 1 TABLET (10 MG TOTAL) BY MOUTH DAILY.  Dispense: 30 tablet; Refill: 11    If protocol passes may refill for 12 months if within 3 months of last provider visit (or a total of 15 months).

## 2021-06-16 NOTE — TELEPHONE ENCOUNTER
Left voicemail for patient to call back for recommendations re: labs. AB/RN    ----- Message from Aimee Wiseman CNP sent at 4/15/2021  2:11 PM CDT -----  Can she go to outpatient lab that day?  It is a Saturday.  Otherwise she will need to come back for a BMP another day next week.    ----- Message -----  From: Janet Nichols RN  Sent: 4/15/2021   2:06 PM CDT  To: Aimee Wiseman CNP    They only check a POCT creatinine/GFR when they start the IV for a coronary CTA if it has not been done in the last 30 days, so they likely won't since it was checked on 3/30  ----- Message -----  From: Aimee Wiseman CNP  Sent: 4/15/2021   1:22 PM CDT  To: Janet Nichols RN    I saw Constanza today after Dr. Antoine started lasix last week.  She declined labs today and said she wanted to get labs when she had CTA which is on 4/24.  Do they usually check labs that day before test?  If not, she will need to go to outpatient lab.  I ordered a BMP.     Thanks

## 2021-06-16 NOTE — PROGRESS NOTES
Dr Villanueva called order changed to CCTA without calcium score due to previous CABG and pacemaker.  Discussed A-Fib rate.  Pt having burst of A-Fib in the 110's.  Instructed to give up to 15 mg IVP metoprolol and proceed with test if rates between 40's and 80's.    Eduarda Rodriguez RN

## 2021-06-16 NOTE — PATIENT INSTRUCTIONS - HE
1. Will check labs today    2. Consider repeat chest XR and nocturnal oxygen test if breathing not better.

## 2021-06-16 NOTE — PROGRESS NOTES
INR 3.8 pt had extra ETOH this past weekend. Will hold Tue dose and decrease Wednesday. After talking with pt and discussing history of greens/salads and medication change. Pt will  continue  with current diet and dosing of Warfarin.  Continue with moderation of Vit K and green leafy vegetables. Cautioned to call with increase bruising or bleeding. Reminded to call with medication change especially antibiotic. Call with any questions or concerns or any up coming procedures. Cautioned about using Herbal medication.  Retest in 2 weeks.

## 2021-06-16 NOTE — PROGRESS NOTES
OFFICE VISIT NOTE  Constanza Coles   82 y.o. female            Assessment/Plan for  Constanza Coles is a 82 y.o. female.  No Patient Care Coordination Note on file.       1. Vasculitis  Clinically well-controlled  Continue current Rx-on methotrexate    2. Chronic atrial fibrillation  Anticoagulated therapeutic range..    3. Chest wall contusion  With chest wall bruising about 6 weeks ago by her history.  No residual.  No clinical fracture    4. Fall  Clear-cut trip-no syncope dizziness etc.    5. Sick Sinus Syndrome  Pacemaker with recent check 2/2018.  She is in A. fib.  She has follow-up with Dr. Antoine    6. H/O aortic valve replacement  Bioprosthetic doing well.         Plan:  Routine lab monitoring-methotrexate  Falls prevention at home-rubs  Dr. Antoine-cardiology follow-up  Follow-up internal medicine 3 months  We are on every three-month schedule  Patient Instructions     Reviewed labs  Diagnoses and all orders for this visit:    Vasculitis  -     HM1(CBC and Differential)  -     Basic Metabolic Panel  -     Hepatic Profile  -     Erythrocyte Sedimentation Rate  -     C-Reactive Protein  -     HM1 (CBC with Diff)    Chronic atrial fibrillation    Chest wall contusion    Fall    Sick Sinus Syndrome    H/O aortic valve replacement    Abdominal wall hernia        Medications after visit  Current Outpatient Prescriptions   Medication Sig Dispense Refill     cyanocobalamin 1,000 mcg/mL injection INJECT 1 ML (1,000 MCG TOTAL) INTO THE SHOULDER, THIGH, OR BUTTOCKS EVERY 30 DAYS. 1 mL 5     methotrexate 2.5 MG tablet Take 5 tablets (12.5 mg total) by mouth once a week. 72 tablet 0     metoprolol succinate (TOPROL XL) 25 MG Take 0.5 tablets (12.5 mg total) by mouth daily. 30 tablet 11     nitroglycerin (NITROSTAT) 0.4 MG SL tablet DISSOLVE 1 TABLET UNDER THE TONGUE AS NEEDED FOR CHEST PAIN 90 tablet 12     sucralfate (CARAFATE) 1 gram tablet TAKE 1 TABLET BY MOUTH FOUR TIMES DAILY 360 tablet PRN     warfarin  (COUMADIN) 2 MG tablet Take 1 mg T, thur, sat and 2 mg all other days of the week. 90 tablet 0     No current facility-administered medications for this visit.                       Hayder Bailey MD  Internal medicine  [facility]  [phone]  [email or web address]    Much or all of the text in this note was generated through the use of Dragon Dictate voice-to-text software. Errors in spelling or words which seem out of context are unintentional.   Sound alike errors, in particular, may have escaped editing.                 Subjective:   Chief Complaint:  Vasculitis; Hypertension; Follow-up; Mass (under pacemaker); and Fall (02/10/18. Hit chest on table)    Follow-up chronic medical issues    No claudication in either her arms or her legs  On methotrexate  No infectious symptoms    Sick sinus syndrome-pacemaker A. fib anticoagulation    No PND orthopnea dyspnea  No edema syncope  No chest pain    No TIA symptoms    Coumadin monitoring-the patient is compliant with taking their Coumadin as directed.  There is no problem with bleeding. There is no excessive bruising, epistaxis, gum bleeding, hemoptysis, melena, hematochezia, hematuria.  The schedule of Coumadin is controlled through ambulatory anticoagulation clinic.  INR is noted in the chart and reviewed      Patient had a significant fall.  Tripped over  rub.  Landed on a very hard coffee table-glass.  Did not shatter.  Her chest wall.  Quite bruised.  She did not seek medical consultation.  This was 7 weeks ago.  She does feel a lump-actually that turns out to be her pacemaker.  She did have a follow-up pacemaker check really February which was okay    Review of Systems:     Extensive 10-point review of systems was performed. Please see the HPI for problem specific pertinent review of systems.     Patient does note she is compliant with her medication.  She continues to have stomach issues probably related to her  abdominal wall hernia.  Recent CT was negative except did show bowel within the hernia.  Nonincarcerated    Otherwise, the following systems are noncontributory including constitutional, eyes, ears, nose and throat, cardiovascular, respiratory, gastrointestinal, genitourinary, musculoskeletal,neurological, skin and/or breast, endocrine, hematologic/lymph, allergic/immunologic and psychiatric.              Medications:  Current Outpatient Prescriptions on File Prior to Visit   Medication Sig     cyanocobalamin 1,000 mcg/mL injection INJECT 1 ML (1,000 MCG TOTAL) INTO THE SHOULDER, THIGH, OR BUTTOCKS EVERY 30 DAYS.     methotrexate 2.5 MG tablet Take 5 tablets (12.5 mg total) by mouth once a week.     metoprolol succinate (TOPROL XL) 25 MG Take 0.5 tablets (12.5 mg total) by mouth daily.     nitroglycerin (NITROSTAT) 0.4 MG SL tablet DISSOLVE 1 TABLET UNDER THE TONGUE AS NEEDED FOR CHEST PAIN     sucralfate (CARAFATE) 1 gram tablet TAKE 1 TABLET BY MOUTH FOUR TIMES DAILY     warfarin (COUMADIN) 2 MG tablet Take 1 mg T, thur, sat and 2 mg all other days of the week.     [DISCONTINUED] methotrexate 2.5 MG tablet TAKE 5 TABLETS (12.5 MG TOTAL) BY MOUTH ONCE A WEEK.     No current facility-administered medications on file prior to visit.             Allergies:  Allergies   Allergen Reactions     Erythromycin Base      Pt is not sure she is allergic to this     Levaquin [Levofloxacin] Nausea And Vomiting     Vancomycin Other (See Comments)     Red man syndrome     Xanax [Alprazolam] Other (See Comments)     confusion     Sulfa (Sulfonamide Antibiotics) Rash       PSFHx: Tobacco Status:  She  reports that she quit smoking about 38 years ago. She has a 30.00 pack-year smoking history. She has never used smokeless tobacco.   Alcohol Status:    History   Alcohol Use     Yes       reports that she quit smoking about 38 years ago. She has a 30.00 pack-year smoking history. She has never used smokeless tobacco. She reports that  "she drinks alcohol. She reports that she does not use illicit drugs.    Objective:    /74 (Patient Site: Left Arm, Patient Position: Sitting, Cuff Size: Adult Regular)  Pulse 76  Ht 5' 3\" (1.6 m)  Wt 130 lb 1.3 oz (59 kg)  LMP  (LMP Unknown)  SpO2 99%  Breastfeeding? No  BMI 23.04 kg/m2  Weight:   Wt Readings from Last 3 Encounters:   03/23/18 130 lb 1.3 oz (59 kg)   12/01/17 130 lb (59 kg)   08/01/17 129 lb (58.5 kg)     BP Readings from Last 3 Encounters:   03/23/18 128/74   12/01/17 110/80   08/01/17 102/70         General-appears well, no acute distress.  Pulse irregular regular  Face symmetric  Thin  Individual  Left breast normal  Pacemaker normal left infraclavicular.  The lung she is feeling is clearly the pacemaker.  She is reassured  Lungs clear  Abdomen upper epigastric hernia.  Some tenderness.-Chronic however unchanged.  No hepatosplenomegaly  No edema  Gait normal    Pulses in her arms are excellent.  Blood pressure 120/74 right arm.  Warm limbs.        Review of clinical lab tests  Lab Results   Component Value Date    WBC 10.9 03/23/2018    HGB 13.0 03/23/2018    HCT 38.4 03/23/2018     03/23/2018    ALT 18 12/01/2017    AST 25 12/01/2017     12/01/2017    K 3.7 12/01/2017    CL 97 (L) 12/01/2017    CREATININE 1.09 12/01/2017    BUN 24 12/01/2017    CO2 30 12/01/2017    TSH 3.19 03/27/2017    INR 3.0 02/26/2018       Glucose   Date/Time Value Ref Range Status   12/01/2017 04:09  70 - 125 mg/dL Final   03/27/2017 04:20 PM 85 70 - 125 mg/dL Final   10/14/2016 03:58  70 - 125 mg/dL Final   04/01/2016 03:09  70 - 125 mg/dL Final   10/28/2015 04:21 PM 90 74 - 125 mg/dL Final   08/09/2011 06:33 AM 87 70 - 125 mg/dL Final     Comment:          Fasting Glucose reference range is 70-99 mg/dL per       American Diabetes Association (ADA) guidelines.     Recent Results (from the past 24 hour(s))   Erythrocyte Sedimentation Rate   Result Value Ref Range    Sed Rate 27 " (H) 0 - 20 mm/hr   HM1 (CBC with Diff)   Result Value Ref Range    WBC 10.9 4.0 - 11.0 thou/uL    RBC 3.86 3.80 - 5.40 mill/uL    Hemoglobin 13.0 12.0 - 16.0 g/dL    Hematocrit 38.4 35.0 - 47.0 %    MCV 99 80 - 100 fL    MCH 33.6 27.0 - 34.0 pg    MCHC 33.8 32.0 - 36.0 g/dL    RDW 12.0 11.0 - 14.5 %    Platelets 211 140 - 440 thou/uL    MPV 8.2 7.0 - 10.0 fL    Neutrophils % 70 50 - 70 %    Lymphocytes % 19 (L) 20 - 40 %    Monocytes % 7 2 - 10 %    Eosinophils % 3 0 - 6 %    Basophils % 1 0 - 2 %    Neutrophils Absolute 7.6 2.0 - 7.7 thou/uL    Lymphocytes Absolute 2.1 0.8 - 4.4 thou/uL    Monocytes Absolute 0.8 0.0 - 0.9 thou/uL    Eosinophils Absolute 0.3 0.0 - 0.4 thou/uL    Basophils Absolute 0.1 0.0 - 0.2 thou/uL       RADIOLOGY: No results found.    Review of recent consultation-

## 2021-06-16 NOTE — PROGRESS NOTES
[facility] Follow Up Note    Assessment/Plan:    1. Shortness of breath  This is chronic but has been worsening recently.  Will check to make sure that she is not anemic.  Recent echocardiogram showed ejection fraction of 50% and good function in her tissue aortic valve replacement but moderate pulmonary hypertension.  She denies problems breathing at night.    Patient will follow up with cardiology.  Previously stress test was recommended.    If that is nondiagnostic I would recommend nighttime oxygen testing due to pulmonary hypertension and possible pulmonary function testing.  Previous chest x-ray did showed large lung volumes but patient reports she did not smoke much.  On the chart however it is listed that she has over 20 pack years of smoking.  I did recommend a repeat chest x-ray today to make sure that right lung density has resolved but she refused.  Overall her lung exam today is clear.    No signs of symptoms of congestive heart failure today.    - HM1(CBC and Differential)  - Comprehensive Metabolic Panel  - BNP(B-type Natriuretic Peptide)    2. Persistent atrial fibrillation (H)  She is rate controlled and is on Coumadin  - Comprehensive Metabolic Panel  - Thyroid Stimulating Hormone (TSH)    3. S/P CABG (coronary artery bypass graft)  - Comprehensive Metabolic Panel  - LDL Cholesterol, Direct    4. Pulmonary hypertension (H)  This was noted on echocardiogram, consider nocturnal pulse oximetry testing    5. H/O aortic valve replacement  Recent echo showed good function  - Comprehensive Metabolic Panel    6. Hyperlipidemia LDL goal <100  - LDL Cholesterol, Direct    7. Vitamin D deficiency  She does not take vitamin D supplement.  - Vitamin D, Total (25-Hydroxy)      Chiara Sarabia MD    Chief Complaint:  Chief Complaint   Patient presents with     Shortness of Breath     Recent increase - usually related to Potassium and Iron levels       History of Present Illness:  Constanza is a 85  y.o. female ,pt of Dr Jacinto, with history of atrial fibrillation, congestive heart failure, aortic stenosis and tissue aortic valve replacement, CABG, pacemaker, hyperlipidemia, B12 deficiency, history of aspiration pneumonia and vasculitis.  She is currently here for acute visit and requesting blood work checked due to worsening shortness of breath.    Patient has chronic shortness of breath but it has gotten worse in the last several months and currently stable.  She did see her cardiologist in February who did echocardiogram which showed ejection fraction of 50%, moderate pulmonary hypertension, and normal tissue aortic valve function.  Stress test was recommended for her should but she decided to do for it.    Currently since shortness of breath has not improved and is still bothering her she wanted to have repeat blood work done before she sees her cardiologist again.  Usually sensation of shortness of breath like she eats and is triggered when she is excited or upset.  She does go to a gym and does lower impact aerobic exercises and able to get through it.  She denies any dizziness, lightheadedness or presyncope.  She does have for most part chronic atrial fibrillation and sometimes it can cause shortness of breath but her heart rate is controlled.  She denies any worsening edema or shortness of breath at night.  No cough.  No significant history of smoking.    Of note she was treated with aspiration pneumonia in January and chest x-ray showed increased lateral right basilar opacities and large lung volumes.  She does have significant acid reflux at night but unable to tolerate sleeping at an angle.    Review of Systems:  A comprehensive review of systems was performed and was otherwise negative she denies any blood in the stool or urine.  No abdominal pain.    PFSH:  Social History: Reviewed  Social History     Tobacco Use   Smoking Status Former Smoker     Packs/day: 1.50     Years: 20.00     Pack years:  "     Quit date: 10/29/1979     Years since quittin.4   Smokeless Tobacco Never Used     Social History     Social History Narrative     Jesse    Retired-[employer]    2 children:Casey/Constanza    4 grandchildren    1 great grandchild    Native of [place]    Araseli-COPD/hospice and organic brain syndrome               Past History: Viewed  Current Outpatient Medications   Medication Sig Dispense Refill     atorvastatin (LIPITOR) 10 MG tablet TAKE 1 TABLET (10 MG TOTAL) BY MOUTH DAILY. 90 tablet 3     cyanocobalamin 1,000 mcg/mL injection INJECT 1 ML (1,000 MCG TOTAL) INTO THE SHOULDER, THIGH, OR BUTTOCKS EVERY 30 DAYS. **SYR 27G 1/2\" 1CC** 3 mL 3     gabapentin (NEURONTIN) 300 MG capsule TAKE 1 CAPSULE (300 MG TOTAL) BY MOUTH 2 TIMES A DAY. 60 capsule 11     metoprolol succinate (TOPROL-XL) 25 MG TAKE 1/2 TABLET (12.5MG) BY MOUTH DAILY 45 tablet 1     nitroglycerin (NITROSTAT) 0.4 MG SL tablet DISSOLVE 1 TABLET UNDER THE TONGUE AS NEEDED FOR CHEST PAIN 90 tablet 12     sucralfate (CARAFATE) 1 gram tablet TAKE 1 TABLET BY MOUTH FOUR TIMES DAILY 360 tablet 11     warfarin ANTICOAGULANT (COUMADIN/JANTOVEN) 1 MG tablet Take 1 tablet (1mg) to 2 tablets (2mg) by mouth daily, as directed.  Adjust dose based on INR results. 110 tablet 1     No current facility-administered medications for this visit.        Family History: Reviewed    Physical Exam:    Vitals:    21 1253   BP: 136/70   Patient Site: Left Arm   Patient Position: Sitting   Cuff Size: Adult Regular   Pulse: 76   SpO2: 96%   Weight: 128 lb 4 oz (58.2 kg)     Wt Readings from Last 3 Encounters:   21 128 lb 4 oz (58.2 kg)   21 124 lb (56.2 kg)   21 124 lb (56.2 kg)     Body mass index is 22.72 kg/m .    Constitutional:  Reveals a pleasant nontoxic appearing female.  Vitals:  Per nursing notes.  HEENT:No cervical LAD, no thyromegaly,  conjunctiva is pink, no scleral icterus,oropharynx is clear, no exudates,   Cardiac: " Irregularly irregular rate and rhythm, no murmurs noted.  Legs without edema. Palpation of the radial pulse irregular.  Lungs: Clear to auscultation bl.  Respiratory effort normal.  No wheezes or rails.    Abdomen:positive BS, soft, nontender, nondistended.  No hepato-splenomagaly  Skin:   Without rash, bruise, or palpable lesions.  Rheumatologic: Normal joints and nails of the hands.  Neurologic:  Cranial nerves II-XII intact.     Psychiatric: affect appropriate, memory intact.     Data Review:    Analysis and Summary of Old Records (2): yes      Records Requested (1): no      Other History Summarized (from other people in the room) (2): no    Radiology Tests Summarized (XRAY/CT/MRI/DXA) (1): cxr    Labs Reviewed (1): yes    Medicine Tests Reviewed (EKG/ECHO/COLONOSCOPY/EGD) (1): echo    Independent Review of EKG or X-RAY (2): no

## 2021-06-16 NOTE — TELEPHONE ENCOUNTER
Call placed to Constanza to confirm she is able to get BMP drawn. She does not feel she needs this bloodwork done, but after discussion about starting Lasix and needing to check kidney function, electrolytes,etc, she is reluctantly agreeable. She would rather get BMP done at her primary clinic ([place]) rather than at [facility] prior to her CTA on 4/24. She will call and get an appt for the week ok 4/26. BMP order already placed. AB/RN

## 2021-06-16 NOTE — TELEPHONE ENCOUNTER
Telephone Encounter by Dorcas Nuñez RN at 3/17/2020  2:23 PM     Author: Dorcas Nuñez RN Service: -- Author Type: Registered Nurse    Filed: 3/17/2020  2:29 PM Encounter Date: 3/17/2020 Status: Attested    : Dorcas Nuñez RN (Registered Nurse) Cosigner: Hayder Bailey MD at 3/18/2020  8:40 AM    Attestation signed by Hayder Bailey MD at 3/18/2020  8:40 AM    Agree                  ANTICOAGULATION  MANAGEMENT    Assessment     Today's INR result of 2.8 is Therapeutic (goal INR of 2.0-3.0)        Warfarin taken as previously instructed    No new diet changes affecting INR    No new medication/supplements affecting INR    Continues to tolerate warfarin with no reported s/s of bleeding or thromboembolism     Previous INR was Subtherapeutic    Plan:     Left a detailed message for Constanza regarding INR result and instructed:     Warfarin Dosing Instructions:  Continue current warfarin dose    2 mg every Tue, Sat; 1 mg all other days      (0 % change)    Instructed patient to follow up no later than: 2-3 weeks    Education provided: importance of therapeutic range and target INR goal and significance of current INR result      Instructed to call the [facility] for any changes, questions or concerns. (#[phone])   ?   Dorcas Nuñez RN    Subjective/Objective:      Constanza Doant, a 84 y.o. female is on warfarin.     Constanza reports:     Home warfarin dose: as updated on anticoagulation calendar per template     Missed doses: No     Medication changes:  No     S/S of bleeding or thromboembolism:  No     New Injury or illness:  No     Changes in diet or alcohol consumption:  No     Upcoming surgery, procedure or cardioversion:  No    Anticoagulation Episode Summary     Current INR goal:   2.0-3.0   TTR:   57.8 % (1 y)   Next INR check:   4/21/2020   INR from last check:   2.80 (3/17/2020)   Weekly max warfarin dose:      Target end date:      INR check location:      Preferred lab:       Send INR reminders to:   [facility]    Indications    Atrial fibrillation (H) [I48.91]  H/O aortic valve replacement [Z95.2]           Comments:            Anticoagulation Care Providers     Provider Role Specialty Phone number    Hayder Bailey MD [facility] [phone]

## 2021-06-16 NOTE — TELEPHONE ENCOUNTER
RN cannot approve Refill Request    RN can NOT refill this medication Protocol failed and NO refill given. Last office visit: 1/15/2021 Yun Jacinto MD Last Physical: Visit date not found Last MTM visit: Visit date not found Last visit same specialty: 3/30/2021 Chiara Sarabia MD.  Next visit within 3 mo: Visit date not found  Next physical within 3 mo: Visit date not found      [facility] POLLORaymond Bethea, [facility] Triage/Med Refill 4/16/2021    Requested Prescriptions   Pending Prescriptions Disp Refills     warfarin ANTICOAGULANT (COUMADIN/JANTOVEN) 1 MG tablet [Pharmacy Med Name: WARFARIN SODIUM 1 MG TAB* 1 Tablet] 110 tablet 1     Sig: TAKE 1 TO 2 TABLETS BY MOUTH DAILY, AS DIRECTED. ADJUST DOSE BASED ON INR RESULTS.       Warfarin Refill Protocol  Failed - 4/15/2021  1:17 PM        Failed -  Route to appropriate pool/provider     Last Anticoagulation Summary:   Anticoagulation Episode Summary     Current INR goal:  2.0-3.0   TTR:  65.3 % (11.6 mo)   Next INR check:  4/27/2021   INR from last check:  2.40 (3/30/2021)   Weekly max warfarin dose:     Target end date:     INR check location:     Preferred lab:     Send INR reminders to:  [facility]    Indications    Atrial fibrillation (H) [I48.91]  H/O aortic valve replacement [Z95.2]           Comments:           Anticoagulation Care Providers     Provider Role Specialty Phone number    Hayder Bailey MD Referring Internal Medicine [phone]    Yun Jacinto MD Responsible Internal Medicine [phone]                Passed - Provider visit in last year     Last office visit with prescriber/PCP: 1/15/2021 Yun Jacinto MD OR same dept: 3/30/2021 Chiara Sarabia MD OR same specialty: 3/30/2021 Chiara Sarabia MD  Last physical: Visit date not found Last MTM visit: Visit date not found    Next appt within 3 mo: Visit date not found Next physical within 3 mo: Visit date not found  Prescriber OR PCP: Yun Jacinto MD  Last  diagnosis associated with med order: 1. Chronic atrial fibrillation (H)  - warfarin ANTICOAGULANT (COUMADIN/JANTOVEN) 1 MG tablet [Pharmacy Med Name: WARFARIN SODIUM 1 MG TAB* 1 Tablet]; TAKE 1 TO 2 TABLETS BY MOUTH DAILY, AS DIRECTED. ADJUST DOSE BASED ON INR RESULTS.  Dispense: 110 tablet; Refill: 1    2. H/O aortic valve replacement  - warfarin ANTICOAGULANT (COUMADIN/JANTOVEN) 1 MG tablet [Pharmacy Med Name: WARFARIN SODIUM 1 MG TAB* 1 Tablet]; TAKE 1 TO 2 TABLETS BY MOUTH DAILY, AS DIRECTED. ADJUST DOSE BASED ON INR RESULTS.  Dispense: 110 tablet; Refill: 1    3. Long term (current) use of anticoagulants  - warfarin ANTICOAGULANT (COUMADIN/JANTOVEN) 1 MG tablet [Pharmacy Med Name: WARFARIN SODIUM 1 MG TAB* 1 Tablet]; TAKE 1 TO 2 TABLETS BY MOUTH DAILY, AS DIRECTED. ADJUST DOSE BASED ON INR RESULTS.  Dispense: 110 tablet; Refill: 1    If protocol passes may refill for 6 months if within 3 months of last provider visit (or a total of 9 months).

## 2021-06-16 NOTE — TELEPHONE ENCOUNTER
Telephone Encounter by Dorcas Nuñez RN at 3/9/2020  2:05 PM     Author: Dorcas Nuñez RN Service: -- Author Type: Registered Nurse    Filed: 3/9/2020  2:10 PM Encounter Date: 3/9/2020 Status: Attested    : Dorcas Nuñez RN (Registered Nurse) Cosigner: Hayder Bailey MD at 3/11/2020  8:57 AM    Attestation signed by Hayder Bailey MD at 3/11/2020  8:57 AM    agree                Lab Results   Component Value Date    INR 1.80 (H) 02/26/2020    INR 3.30 (H) 02/18/2020    INR 2.50 (H) 12/31/2019       Patient's current Warfarin doses:    2 mg every Tue, Sat; 1 mg all other days           Next INR check is on 3/11      Patient's last OV with PCP was on 2/26/2020    Warfarin prescription 3 month supply and one refill sent to patient's pharmacy today.    Dorcas Nuñez RN

## 2021-06-16 NOTE — PROGRESS NOTES
INR 3.0. INR stable. Discussed continuing management of dose of Warfarin and returning in one month . No changes to diet needed at this time. Continue moderate intake of Vitamin K and call if increase bruising or unexplained bleeding. Call with medication changes or upcoming procedures.  Increase greens

## 2021-06-16 NOTE — PATIENT INSTRUCTIONS - HE
Constanza Coles,    It was a pleasure to see you today at the [facility].     My recommendations after this visit include:  - No changes to your medications.    - See Dr. Antoine in mid to late May.   - Limit salt in your diet.   - If you have any questions or concerns, please call [phone] to talk with my nurse.    Aimee Khalil, CNP

## 2021-06-16 NOTE — TELEPHONE ENCOUNTER
ANTICOAGULATION  MANAGEMENT    Assessment     Today's INR result of 2.4 is Therapeutic (goal INR of 2.0-3.0)        Warfarin taken as previously instructed    No new diet changes affecting INR    No new medication/supplements affecting INR    Continues to tolerate warfarin with no reported s/s of bleeding or thromboembolism     Previous INR was Therapeutic    Plan:     Left detailed message for Constanza regarding INR result and instructed:      Warfarin Dosing Instructions:  Continue current warfarin dose 2 mg daily on Tuesdays and Fridays; and 1 mg daily rest of week  (0 % change)    Instructed patient to follow up no later than: 4 weeks.    Education provided:     Instructed to call the [facility] for any changes, questions or concerns. (#[phone])   ?   Monika Turner RN    Subjective/Objective:      Constanzamedina Coles, a 85 y.o. female is on warfarin. Constanza Apodaca reports:     Home warfarin dose: as updated on anticoagulation calendar per template     Missed doses: No     Medication changes:  No     S/S of bleeding or thromboembolism:  No     New Injury or illness:  Yes: she seen MD today for some shortness of breath.     Changes in diet or alcohol consumption:  No     Upcoming surgery, procedure or cardioversion:  No    Anticoagulation Episode Summary     Current INR goal:  2.0-3.0   TTR:  66.9 % (1 y)   Next INR check:  4/27/2021   INR from last check:  2.40 (3/30/2021)   Weekly max warfarin dose:     Target end date:     INR check location:     Preferred lab:     Send INR reminders to:  [facility]    Indications    Atrial fibrillation (H) [I48.91]  H/O aortic valve replacement [Z95.2]           Comments:           Anticoagulation Care Providers     Provider Role Specialty Phone number    Hayder Bailey MD Referring Internal Medicine [phone]    Ynu Jacinto MD Responsible Internal Medicine [phone]

## 2021-06-16 NOTE — TELEPHONE ENCOUNTER
Please let pt know results (result letter also composed per pt request):    Kidney, liver, thyroid  functions and sugar are normal.    Potassium is slightly above normal, please decrease potassium high foods (bananas, oranges, potatoes).    LDL cholesterol level is at goal.    Red and white cell counts are normal.    BNP is slightly elevated, which can reflect heart strain, this can be due to pulmonary hypertension, please consider oxygen testing at night to evaluate for O2 drops during sleep.    Vitamin D level is very low, please start vit D supplement 5,000 units daily for 3 months, then decrease dose to 2,000 units and take daily long term.

## 2021-06-16 NOTE — TELEPHONE ENCOUNTER
Telephone Encounter by Dorcas Nuñez RN at 5/24/2019  5:25 PM     Author: Dorcas Nuñez RN Service: -- Author Type: Registered Nurse    Filed: 5/24/2019  5:27 PM Encounter Date: 5/24/2019 Status: Signed    : Dorcas Nuñez RN (Registered Nurse)       Hayder Bailey MD Guidote, Mary Ann, RN   Caller: Unspecified (Today,  5:09 PM)             I agree thank you     Provider Review: Anticoagulation Annual Referral Renewal     ACM Renewal Decision:  Renew ACM warfarin management     INR Range:   Change INR goal range to 2.0-3.0   Anticipated Duration of Therapy (from today):  Long-term anticoagulation     Hayder Bailey MD   5:20 PM    Previous Messages

## 2021-06-16 NOTE — TELEPHONE ENCOUNTER
Telephone Encounter by Kiana Pop CMA at 1/11/2019  1:09 PM     Author: Kiana Pop CMA Service: -- Author Type: Medical Assistant    Filed: 1/11/2019  1:10 PM Encounter Date: 1/11/2019 Status: Signed    : Kiana Pop CMA (Medical Assistant)       Dr. Bailey,  Spoke with the patient and relayed the following message:  Hayder Bailey MD   You; n Internal Medicine Support Pool 6 minutes ago (1:00 PM)      Call patient   I sent in a prescription for an antibiotic- doxycycline 100 mg-1 twice daily for infection 10 days     Return to clinic in 10 days if not better   ER if worse (Routing comment)      She verbalized understanding, but would like to know if she could get some sort of sleeping pills since she states that she hasn't been able to sleep due to the coughing.      Please advise.  Thank you.  Kiana CHEN CMA/JIMMY....................1:10 PM

## 2021-06-17 NOTE — TELEPHONE ENCOUNTER
Telephone Encounter by Dorcas Nuñez RN at 10/15/2020 12:44 PM     Author: Dorcas Nuñez RN Service: -- Author Type: Registered Nurse    Filed: 10/15/2020  3:46 PM Encounter Date: 10/15/2020 Status: Signed    : Dorcas Nuñez RN (Registered Nurse)       ANTICOAGULATION  MANAGEMENT    Assessment     Today's INR result of 3.6 is Supratherapeutic (goal INR of 2.0-3.0)        Warfarin taken as previously instructed    No new diet changes affecting INR    No new medication/supplements affecting INR    Continues to tolerate warfarin with no reported s/s of bleeding or thromboembolism     Previous INR was Therapeutic    Plan:     Left detailed message for Constanza regarding INR result and instructed:     Warfarin Dosing Instructions:  hold warfarin dose today then change warfarin dose to    2 mg every Tue; 1 mg all other days      (11 % change)    Instructed patient to follow up no later than: 1 -2 weeks    Education provided: importance of therapeutic range      Instructed to call the [facility] for any changes, questions or concerns. (#[phone])   ?   Dorcas Nuñez RN    Subjective/Objective:      Constanza Doant, a 85 y.o. female is on warfarin.     Constanza reports:     Home warfarin dose: as updated on anticoagulation calendar per template     Missed doses: No     Medication changes:  No     S/S of bleeding or thromboembolism:  No     New Injury or illness:  No     Changes in diet or alcohol consumption:  No     Upcoming surgery, procedure or cardioversion:  No    Anticoagulation Episode Summary     Current INR goal:  2.0-3.0   TTR:  76.1 % (1 y)   Next INR check:  10/29/2020   INR from last check:  3.60 (10/15/2020)   Weekly max warfarin dose:     Target end date:     INR check location:     Preferred lab:     Send INR reminders to:  [facility]    Indications    Atrial fibrillation (H) [I48.91]  H/O aortic valve replacement [Z95.2]           Comments:           Anticoagulation  Care Providers     Provider Role Specialty Phone number    Hayder Bailey MD [facility] Internal Medicine [phone]

## 2021-06-17 NOTE — PATIENT INSTRUCTIONS - HE
Patient Instructions by Hayder Bailey MD at 11/14/2019 10:40 AM     Author: Hayder Bailey MD Service: -- Author Type: Physician    Filed: 11/14/2019 11:47 AM Encounter Date: 11/14/2019 Status: Addendum    : Hayder Bailey MD (Physician)    Related Notes: Original Note by Hayder Bailey MD (Physician) filed at 11/14/2019 11:45 AM              Patient Instructions    I think if you stay off your feet a little more the next couple, and rest your right upper extremity, I think your sensation problems with improve. Try elevating the head of your bed on something solid and sturdy abut 3-4 inches, which may help your acid reflux symptoms. If you have any chest pain associated with shortness of breath be seen in the ER. Also see info below.  Lifestyle Changes for Controlling GERD  When you have GERD, stomach acid feels as if its backing up toward your mouth. Whether or not you take medication to control your GERD, your symptoms can often be improved with lifestyle changes. Talk to your doctor about the following suggestions, which may help you get relief from your symptoms.  Raise Your Head    Reflux is more likely to strike when youre lying down flat, because stomach fluid can flow backward more easily. Raising the head of your bed 4 to 6 inches can help. To do this:    Slide blocks or books under the legs at the head of your bed. Or, place a wedge under the mattress. Many [employer] stores can make a suitable wedge for you. The wedge should run from your waist to the top of your head.    Dont just prop your head on several pillows. This increases pressure on your stomach. It can make GERD worse.  Watch Your Eating Habits  Certain foods may increase the acid in your stomach or relax the lower esophageal sphincter, making GERD more likely. Its best to avoid the following:    Coffee, tea, and carbonated drinks (with and without caffeine)    Fatty, fried, or spicy food    Mint, chocolate, onions, and tomatoes    Any  other foods that seem to irritate your stomach or cause you pain  Relieve the Pressure    Eat smaller meals, even if you have to eat more often.    Dont lie down right after you eat. Wait a few hours for your stomach to empty.    Avoid tight belts and tight-fitting clothes.    Lose excess weight.  Tobacco and Alcohol  Avoid smoking tobacco and drinking alcohol. They can make GERD symptoms worse.    [phone] [employer]. [address] [place] [address], [place] [address]. All rights reserved. This information is not intended as a substitute for professional medical care. Always follow your healthcare professional's instructions.

## 2021-06-17 NOTE — TELEPHONE ENCOUNTER
Telephone Encounter by Dorcas Nuñez RN at 12/1/2020  2:43 PM     Author: Dorcas Nuñez RN Service: -- Author Type: Registered Nurse    Filed: 12/1/2020  2:55 PM Encounter Date: 12/1/2020 Status: Addendum    : Dorcas Nuñez RN (Registered Nurse)    Related Notes: Original Note by Dorcas Nuñez RN (Registered Nurse) filed at 12/1/2020  2:55 PM       ANTICOAGULATION  MANAGEMENT    Assessment     Today's INR result of 4.1 is Supratherapeutic (goal INR of 2.0-3.0)        Warfarin taken as previously instructed    No new diet changes affecting INR    No new medication/supplements affecting INR    Continues to tolerate warfarin with no reported s/s of bleeding or thromboembolism     Previous INR was Therapeutic    Plan:     Spoke on phone with Constanza regarding INR result and instructed:   Patient reported that she already too her dose today    Warfarin Dosing Instructions:  hold warfarin dose tomorrow then change warfarin dose to    2 mg every Tue; 1 mg all other days      (11 % change)    Instructed patient to follow up no later than: 7-10 days -patient prefers to call to make appointment.    Education provided: potential interaction between warfarin and alcohol, importance of therapeutic range, target INR goal and significance of current INR result and monitoring for bleeding signs and symptoms    Constanza verbalizes understanding and agrees to warfarin dosing plan.    Instructed to call the [facility] for any changes, questions or concerns. (#[phone])   ?   Dorcas Nuñez RN    Subjective/Objective:      Constanza JORDI Frankit, a 85 y.o. female is on warfarin.     Constanza reports:     Home warfarin dose: as updated on anticoagulation calendar per template     Missed doses: No     Medication changes:  No     S/S of bleeding or thromboembolism:  No     New Injury or illness:  No     Changes in diet or alcohol consumption:  No - per patient she regularly drinks alcohol every Sat.     Upcoming  surgery, procedure or cardioversion:  No    Anticoagulation Episode Summary     Current INR goal:  2.0-3.0   TTR:  76.7 % (1 y)   Next INR check:  12/11/2020   INR from last check:  4.10 (12/1/2020)   Weekly max warfarin dose:     Target end date:     INR check location:     Preferred lab:     Send INR reminders to:  [facility]    Indications    Atrial fibrillation (H) [I48.91]  H/O aortic valve replacement [Z95.2]           Comments:           Anticoagulation Care Providers     Provider Role Specialty Phone number    Hayder Bailey MD Referring Internal Medicine [phone]    Yun Jacinto MD Responsible Internal Medicine [phone]

## 2021-06-17 NOTE — TELEPHONE ENCOUNTER
Telephone Encounter by Monika Turner RN at 12/8/2020  3:52 PM     Author: Monika Turner RN Service: -- Author Type: Registered Nurse    Filed: 12/8/2020  3:55 PM Encounter Date: 12/8/2020 Status: Signed    : Monika Turner RN (Registered Nurse)       ANTICOAGULATION  MANAGEMENT    Assessment     Today's INR result of 1.8 is Subtherapeutic (goal INR of 2.0-3.0)        Warfarin taken as previously instructed    No new diet changes affecting INR    No new medication/supplements affecting INR    Continues to tolerate warfarin with no reported s/s of bleeding or thromboembolism     Previous INR was Supratherapeutic    Plan:     Spoke on phone with Constanza regarding INR result and instructed:     Warfarin Dosing Instructions:  Continue current warfarin dose 2 mg daily on Tuesdays; and 1 mg daily rest of week  (0 % change)    Instructed patient to follow up no later than: two abigail    Education provided: importance of therapeutic range, importance of following up for INR monitoring at instructed interval and importance of taking warfarin as instructed    Constanza verbalizes understanding and agrees to warfarin dosing plan.    Instructed to call the [facility] for any changes, questions or concerns. (#[phone])   ?   Monika Turner RN    Subjective/Objective:      Constanza Doant, a 85 y.o. female is on warfarin.     Constanza reports:     Home warfarin dose: verbally confirmed home dose with Constanza and updated on anticoagulation calendar     Missed doses: No     Medication changes:  No     S/S of bleeding or thromboembolism:  No     New Injury or illness:  No     Changes in diet or alcohol consumption:  No     Upcoming surgery, procedure or cardioversion:  No    Anticoagulation Episode Summary     Current INR goal:  2.0-3.0   TTR:  75.6 % (1 y)   Next INR check:  12/22/2020   INR from last check:  1.80 (12/8/2020)   Weekly max warfarin dose:     Target end date:     INR check location:      Preferred lab:     Send INR reminders to:  [facility]    Indications    Atrial fibrillation (H) [I48.91]  H/O aortic valve replacement [Z95.2]           Comments:           Anticoagulation Care Providers     Provider Role Specialty Phone number    Hayder Bailey MD Referring Internal Medicine [phone]    Yun Jacinto MD Responsible Internal Medicine [phone]

## 2021-06-17 NOTE — PATIENT INSTRUCTIONS - HE
Ms Constanza Coles,  I enjoyed visiting with you again today.  I am glad to hear you are doing well.  Per our conversation try the FUROSEMIDE every other day and if issues let me know.  I will plan on seeing you 10 months.  Mathew Antoine

## 2021-06-17 NOTE — PROGRESS NOTES
INR 2.0 will return to usual dose of 1 mg tue, thur , sat and 2 mg all other days. After talking with pt and discussing history of greens/salads and medication change. Pt will  continue  with current diet and dosing of Warfarin.  Continue with moderation of Vit K and green leafy vegetables. Cautioned to call with increase bruising or bleeding. Reminded to call with medication change especially antibiotic. Call with any questions or concerns or any up coming procedures. Cautioned about using Herbal medication.

## 2021-06-17 NOTE — PROGRESS NOTES
[facility] Follow-up Note    Assessment & Plan        1. Aortic Stenosis -this was severe and prompted a bioprosthetic tissue or valve replacement in January 2005.   2. H/O aortic valve replacement -in 2005 she had an Forte pericardial magnum valve placed and echo last year showed gradient only 6 mmHg.  Given that the valve is over 13 years old will recheck echo and check echo yearly.  Should she develop significant stenosis of the valve would need to consider TAVR.   3. Sick Sinus Syndrome -she had a permanent programmable pacemaker placed in has had no issues since then.   4. Vasculitis -controlled well on methotrexate without any symptoms.   5. Coronary atherosclerosis due to lipid rich plaque -angiography in 2005 showed normal left main, 50% proximal left anterior descending with a distal 90% left anterior descending lesion, circumflex was normal as was the right coronary artery.  She had a LIMA to the LAD and a vein graft to the first diagonal with her valve surgery in 2005.   6. Cardiac pacemaker in situ, dual chamber -this is a Medtronic pacemaker with Medtronic leads placed in 2011 with 38% atrial pacing and 35% ventricular pacing.  These are stable and battery should last for another 4 years.   7.  Atrial fibrillation for the most part persistent or permanent with a CHADS 2 VASC score of 3 and on chronic anticoagulation with primary clinic adjusting.    Plan  1.  Recheck echocardiogram.  2.  If echo stable follow with me one year with device check or sooner if needed.    Subjective  CC: 82-year-old white female being seen in yearly follow-up today.  She is exercising at least 3 times a week, goes bowling plays cribbage, and is very active with her .  She denies any significant chest discomfort, palpitations, PND, orthopnea, syncope, dizziness or peripheral edema.    Medications  Current Outpatient Prescriptions   Medication Sig     cyanocobalamin 1,000 mcg/mL injection INJECT 1  "ML (1,000 MCG TOTAL) INTO THE SHOULDER, THIGH, OR BUTTOCKS EVERY 30 DAYS.     methotrexate 2.5 MG tablet Take 5 tablets (12.5 mg total) by mouth once a week.     metoprolol succinate (TOPROL XL) 25 MG Take 0.5 tablets (12.5 mg total) by mouth daily.     nitroglycerin (NITROSTAT) 0.4 MG SL tablet DISSOLVE 1 TABLET UNDER THE TONGUE AS NEEDED FOR CHEST PAIN     sucralfate (CARAFATE) 1 gram tablet TAKE 1 TABLET BY MOUTH FOUR TIMES DAILY     warfarin (COUMADIN) 2 MG tablet Take 1 mg T, thur, sat and 2 mg all other days of the week.       Objective  /60 (Patient Site: Left Arm, Patient Position: Sitting, Cuff Size: Adult Regular)  Pulse 80  Resp 20  Ht 5' 3\" (1.6 m)  Wt 130 lb 6.4 oz (59.1 kg) Comment: shoes on  LMP  (LMP Unknown)  BMI 23.1 kg/m2    General Appearance:    Alert, cooperative, no distress, appears stated age   Head:    Normocephalic, without obvious abnormality, atraumatic   Throat:   Lips, mucosa, and tongue normal; teeth and gums normal   Neck:   Supple, symmetrical, trachea midline, no adenopathy;        thyroid:  No enlargement/tenderness/nodules; no carotid    bruit or JVD   Back:     Symmetric, no curvature, ROM normal, no CVA tenderness   Lungs:     Clear to auscultation bilaterally, respirations unlabored   Chest wall:    No tenderness, midline sternotomy scar as well as left-sided pacemaker noted   Heart:    Regular rate and rhythm, S1 and S2 normal, no murmur, rub   or gallop   Abdomen:     Soft, non-tender, bowel sounds active all four quadrants,     no masses, no organomegaly   Extremities:   Normal, atraumatic, no cyanosis or edema   Pulses:   2+ and symmetric all extremities   Skin:   Skin color, texture, turgor normal, no rashes or lesions     Results    Lab Results personally reviewed No results found for: CHOL  No results found for: HDL  No results found for: LDLCALC  No results found for: TRIG  Lab Results   Component Value Date    WBC 10.9 03/23/2018    HGB 13.0 03/23/2018    " HCT 38.4 03/23/2018     03/23/2018     Lab Results   Component Value Date    CREATININE 0.79 03/23/2018    BUN 18 03/23/2018     03/23/2018    K 3.9 03/23/2018    CO2 27 03/23/2018     Review of Systems:   General: WNL  Eyes: WNL  Ears/Nose/Throat: WNL  Lungs: WNL  Heart: WNL  Stomach: WNL  Bladder: WNL  Muscle/Joints: WNL  Skin: WNL  Nervous System: WNL  Mental Health: WNL     Blood: WNL

## 2021-06-17 NOTE — TELEPHONE ENCOUNTER
Telephone Encounter by Erica Perez RN at 1/14/2021  3:14 PM     Author: Erica Perez RN Service: -- Author Type: Registered Nurse    Filed: 1/14/2021  3:15 PM Encounter Date: 1/14/2021 Status: Signed    : Erica Perez RN (Registered Nurse)       RN cannot approve Refill Request    RN can NOT refill this medication med is not covered by policy/route to provider. Last office visit: 7/13/2020 Yun Jacinto MD Last Physical: Visit date not found Last MTM visit: Visit date not found Last visit same specialty: 7/13/2020 Yun Jacinto MD.  Next visit within 3 mo: Visit date not found  Next physical within 3 mo: Visit date not found      Erica Perez, [facility] Triage/Med Refill 1/14/2021    Requested Prescriptions   Pending Prescriptions Disp Refills   ? warfarin ANTICOAGULANT (COUMADIN/JANTOVEN) 1 MG tablet [Pharmacy Med Name: WARFARIN SODIUM 1 MG TABLET 1 Tablet] 110 tablet 11     Sig: TAKE 1-2 TABLETS (1-2 MG TOTAL) BY MOUTH DAILY. ADJUST DOSE BASED ON INR RESULTS AS DIRECTED.       Warfarin Refill Protocol  Failed - 1/14/2021  1:33 PM        Failed -  Route to appropriate pool/provider     Last Anticoagulation Summary:   Anticoagulation Episode Summary     Current INR goal:  2.0-3.0   TTR:  68.7 % (11.6 mo)   Next INR check:  1/15/2021   INR from last check:  1.80 (12/28/2020)   Weekly max warfarin dose:     Target end date:     INR check location:     Preferred lab:     Send INR reminders to:  [facility] [place]    Indications    Atrial fibrillation (H) [I48.91]  H/O aortic valve replacement [Z95.2]           Comments:           Anticoagulation Care Providers     Provider Role Specialty Phone number    Hayder Bailey MD Referring Internal Medicine [phone]    Yun Jacinto MD Responsible Internal Medicine [phone]                Passed - Provider visit in last year     Last office visit with prescriber/PCP: 7/13/2020 Yun Jacinto MD OR same  dept: Visit date not found OR same specialty: 7/13/2020 Yun Jacinto MD  Last physical: Visit date not found Last MTM visit: Visit date not found    Next appt within 3 mo: Visit date not found Next physical within 3 mo: Visit date not found  Prescriber OR PCP: Yun Jacinto MD  Last diagnosis associated with med order: 1. Chronic atrial fibrillation (H)  - warfarin ANTICOAGULANT (COUMADIN/JANTOVEN) 1 MG tablet [Pharmacy Med Name: WARFARIN SODIUM 1 MG TABLET 1 Tablet]; Take 1-2 tablets (1-2 mg total) by mouth daily. Adjust dose based on INR results as directed.  Dispense: 110 tablet; Refill: 11    2. H/O aortic valve replacement  - warfarin ANTICOAGULANT (COUMADIN/JANTOVEN) 1 MG tablet [Pharmacy Med Name: WARFARIN SODIUM 1 MG TABLET 1 Tablet]; Take 1-2 tablets (1-2 mg total) by mouth daily. Adjust dose based on INR results as directed.  Dispense: 110 tablet; Refill: 11    If protocol passes may refill for 6 months if within 3 months of last provider visit (or a total of 9 months).

## 2021-06-17 NOTE — TELEPHONE ENCOUNTER
Telephone Encounter by Erika Simental RN at 2/19/2021  3:09 PM     Author: Erika Simental RN Service: -- Author Type: Registered Nurse    Filed: 2/19/2021  3:13 PM Encounter Date: 2/19/2021 Status: Attested    : Erika Simental RN (Registered Nurse) Cosigner: Yun Jacinto MD at 2/19/2021  3:20 PM    Attestation signed by Yun Jacinto MD at 2/19/2021  3:20 PM    Agree with plan                 Anticoagulation Annual Referral Renewal Review    Constanza Coles's chart reviewed for annual renewal of referral to anticoagulation monitoring.        Criteria for anticoagulation nurse and/or pharmacist renewal met   Warfarin indication: Atrial Fibrillation and Mechanical AVR Yes, per indication   Current with INR monitoring/compliant Yes Yes   Date of last office visit 1/5/2021 Yes, had office visit within last year   Time in Therapeutic Range (TTR) 61.3 % Yes, TTR > 60%       Constanza Coles met all criteria for anticoagulation management program initiated renewal.  New INR standing orders and anticoagulation referral renewal placed.      Erika Simental RN  3:13 PM

## 2021-06-17 NOTE — TELEPHONE ENCOUNTER
Telephone Encounter by Marvin Das RN at 10/27/2020 12:50 PM     Author: Marvin Das RN Service: -- Author Type: RN, Care Manager    Filed: 10/27/2020 12:56 PM Encounter Date: 10/27/2020 Status: Signed    : Marvin Das RN (RN, Care Manager)       ANTICOAGULATION  MANAGEMENT    Assessment     Today's INR result of 1.8 is Subtherapeutic (goal INR of 2.0-3.0)        Less warfarin taken than instructed which may be affecting INR    No new diet changes affecting INR    No new medication/supplements affecting INR    Continues to tolerate warfarin with no reported s/s of bleeding or thromboembolism     Previous INR was Supratherapeutic    Plan:     Spoke on phone with Constanza regarding INR result and instructed:     Warfarin Dosing Instructions:  Change warfarin dose to 2 mg daily on Tues, Sat; and 1 mg daily rest of week  (12.5 % change from what pt took the past 7 days)    Instructed patient to follow up no later than: 2 weeks.     Education provided: importance of following up for INR monitoring at instructed interval and importance of taking warfarin as instructed    Constanza verbalizes understanding and agrees to warfarin dosing plan.    Instructed to call the [facility] for any changes, questions or concerns. (#[phone])   ?   Marvin Das RN    Subjective/Objective:      Constanza Doant, a 85 y.o. female is on warfarin.     Constanza reports:     Home warfarin dose: verbally confirmed home dose with pt and updated on anticoagulation calendar     Missed doses: No     Medication changes:  No     S/S of bleeding or thromboembolism:  No     New Injury or illness:  No     Changes in diet or alcohol consumption:  No     Upcoming surgery, procedure or cardioversion:  No    Anticoagulation Episode Summary     Current INR goal:  2.0-3.0   TTR:  77.9 % (1 y)   Next INR check:  11/10/2020   INR from last check:  1.80 (10/27/2020)   Weekly max warfarin dose:     Target end date:     INR  check location:     Preferred lab:     Send INR reminders to:  [facility]    Indications    Atrial fibrillation (H) [I48.91]  H/O aortic valve replacement [Z95.2]           Comments:           Anticoagulation Care Providers     Provider Role Specialty Phone number    Hayder Bailey MD [facility] Internal Medicine [phone]

## 2021-06-17 NOTE — TELEPHONE ENCOUNTER
ANTICOAGULATION  MANAGEMENT    Assessment     Today's INR result of 2.40 is Therapeutic (goal INR of 2.0-3.0)        Warfarin taken as previously instructed    No new diet changes affecting INR    No new medication/supplements affecting INR    Continues to tolerate warfarin with no reported s/s of bleeding or thromboembolism     Previous INR was Therapeutic at 2.40 on 3/30/21.    Plan:     Left detailed message for Constanza regarding INR result and instructed:   - advised to call with any additional questions.      Warfarin Dosing Instructions:  (1mg tabs)   - Continue current warfarin dose 2 mg daily on Tues, Fri; and 1 mg daily rest of week.    Instructed patient to follow up no later than:  4-6 wks.    Education provided: importance of consistent vitamin K intake and target INR goal and significance of current INR result    Instructed to call the [facility] for any changes, questions or concerns. (#[phone])   ?   Erika Simental RN    Subjective/Objective:      Constanza Doant, a 85 y.o. female is on warfarin. Constanza Apodaca reports:     Home warfarin dose: as updated on anticoagulation calendar per template     Missed doses: No     Medication changes:  No     S/S of bleeding or thromboembolism:  No     New Injury or illness:  No     Changes in diet or alcohol consumption:  No     Upcoming surgery, procedure or cardioversion:  No    Anticoagulation Episode Summary     Current INR goal:  2.0-3.0   TTR:  66.9 % (1 y)   Next INR check:  6/10/2021   INR from last check:  2.40 (4/29/2021)   Weekly max warfarin dose:     Target end date:     INR check location:     Preferred lab:     Send INR reminders to:  [facility]    Indications    Atrial fibrillation (H) [I48.91]  H/O aortic valve replacement [Z95.2]           Comments:           Anticoagulation Care Providers     Provider Role Specialty Phone number    Hayder Bailey MD Referring Internal Medicine [phone]    Yun Jacinto MD  [facility] Internal Medicine [phone]

## 2021-06-17 NOTE — TELEPHONE ENCOUNTER
Telephone Encounter by Dorcas Nuñez RN at 12/28/2020  4:08 PM     Author: Dorcas Nuñez RN Service: -- Author Type: Registered Nurse    Filed: 12/28/2020  4:23 PM Encounter Date: 12/28/2020 Status: Signed    : Dorcas Nuñez RN (Registered Nurse)       ANTICOAGULATION  MANAGEMENT    Assessment     Today's INR result of 1.8 is Subtherapeutic (goal INR of 2.0-3.0)        Missed dose(s) may be affecting INR    No new diet changes affecting INR    No new medication/supplements affecting INR    Continues to tolerate warfarin with no reported s/s of bleeding or thromboembolism     Previous INR was Subtherapeutic     Patient is not interested in home monitor at this time.    Plan:     Spoke on phone with Constanza regarding INR result and instructed:     Warfarin Dosing Instructions:  2 mg booster dose today then continue current warfarin dose         2 mg every Tue; 1 mg all other days       (0 % change)    Instructed patient to follow up no later than: 2 weeks added to OV with PCP on 1/15/2021    Education provided: importance of therapeutic range, target INR goal and significance of current INR result and importance of following up for INR monitoring at instructed interval    Constanza verbalizes understanding and agrees to warfarin dosing plan.    Instructed to call the [facility] for any changes, questions or concerns. (#[phone])   ?   Dorcas Nuñez RN    Subjective/Objective:      Constanza Doant, a 85 y.o. female is on warfarin.     Constanza reports:     Home warfarin dose: as updated on anticoagulation calendar per template     Missed doses: Yes- missed taking a dose taking on nicolas day     Medication changes:  No     S/S of bleeding or thromboembolism:  No     New Injury or illness:  No     Changes in diet or alcohol consumption:  No     Upcoming surgery, procedure or cardioversion:  No    Anticoagulation Episode Summary     Current INR goal:  2.0-3.0   TTR:  70.1 % (1 y)   Next INR  check:  1/15/2021   INR from last check:  1.80 (12/28/2020)   Weekly max warfarin dose:     Target end date:     INR check location:     Preferred lab:     Send IN[facility]:  [facility]    Indications    Atrial fibrillation (H) [I48.91]  H/O aortic valve replacement [Z95.2]           Comments:           Anticoagulation Care Providers     Provider Role Specialty Phone number    Hayder Bailey MD Referring Internal Medicine [phone]    Yun Jacinto MD Responsible Internal Medicine [phone]

## 2021-06-17 NOTE — TELEPHONE ENCOUNTER
Telephone Encounter by Erika Simental RN at 1/18/2021  9:01 AM     Author: Erika Simental RN Service: -- Author Type: Registered Nurse    Filed: 1/18/2021  4:36 PM Encounter Date: 1/15/2021 Status: Attested Addendum    : Erika Simental RN (Registered Nurse)    Related Notes: Original Note by Erika Simental RN (Registered Nurse) filed at 1/18/2021  4:34 PM    Cosigner: Yun Jacinto MD at 1/18/2021 11:31 PM    Attestation signed by Yun Jacinto MD at 1/18/2021 11:31 PM    Agree with plan             Summary: Dr. Jacinto - FYI      ANTICOAGULATION  MANAGEMENT    Assessment     Today's INR result of 1.90 is Subtherapeutic (goal INR of 2.0-3.0)        Warfarin taken as previously instructed    - per Dr. Jacinto on 1/15/21, INR's have been labile.  Possible consideration for Eliquis and check what her insurance will cover.     - (Constanza called [employer] insurance, reported will not be able to switched due to deductible;$300 first and after $74 for 90 pills.  She only pays $6.00 for warfarin).  Will stick with warfarin for now.    No new diet changes affecting INR    - likes to eat root vegetables.  Not much salads.    No new medication/supplements affecting INR    Continues to tolerate warfarin with no reported s/s of bleeding or thromboembolism     Previous INR was Subtherapeutic at 1.80 on 12/8/20.    Plan:     Spoke on phone with Constanza regarding INR result and instructed:    - needs to be consistent with Vitamin-K intake.    Warfarin Dosing Instructions:  (morning. 1mg tabs)   - reported taking warfarin 2mg on 1/15, as instructed.   - Continue current warfarin dose 2 mg daily on Tuesdays; and 1 mg daily rest of week.    Instructed patient to follow up no later than:  1-2 wks.   - INR scheduled on 2/3/21 during f/u visit with Dr. Antoine @ [facility].    Education provided: importance of consistent vitamin K intake, impact of vitamin K foods on INR and target INR goal and significance of  current INR result    Constanza verbalizes understanding and agrees to warfarin dosing plan.    Instructed to call the [facility] for any changes, questions or concerns. (#[phone])   ?   Erika Simental RN    Subjective/Objective:      Constanza Coles, a 85 y.o. female is on warfarin.     Constanza reports:     Home warfarin dose: as updated on anticoagulation calendar per template     Missed doses: No     Medication changes:  Yes:  Per Ophelia, possible consideration for Eliquis, if INR's continues to be labile.     S/S of bleeding or thromboembolism:  No     New Injury or illness:  No     Changes in diet or alcohol consumption:  No     Upcoming surgery, procedure or cardioversion:  No    Anticoagulation Episode Summary     Current INR goal:  2.0-3.0   TTR:  64.9 % (1 y)   Next INR check:  1/29/2021   INR from last check:  1.90 (1/15/2021)   Weekly max warfarin dose:     Target end date:     INR check location:     Preferred lab:     Send INR reminders to:  [facility]    Indications    Atrial fibrillation (H) [I48.91]  H/O aortic valve replacement [Z95.2]           Comments:           Anticoagulation Care Providers     Provider Role Specialty Phone number    Hayder Bailey MD Referring Internal Medicine [phone]    Yun Jacinto MD Responsible Internal Medicine [phone]

## 2021-06-18 NOTE — LETTER
Letter by Karli Stokes EPS at      Author: Karli Stokes EPS Service: -- Author Type: --    Filed:  Encounter Date: 2/6/2019 Status: (Other)       Constanza Coles  [address] [place] [address]      February 6, 2019      Dear Ms. Coles,    RE: Remote Results    We are writing to you regarding your recent Remote Pacemaker check from home. Your transmission was received successfully. Battery status is satisfactory at this time.     Your results are within normal limits.    Your next device appointment will be a clinic visit.  Please call in February  to schedule.      To schedule or reschedule, please call [phone] and press 1.    NOTE: If you would like to do an extra transmission, please call [phone] and press 3 to speak to a nurse BEFORE transmitting. This ensures that the Device Clinic staff is aware of the reason you are sending a transmission, and can follow-up with you after it has been reviewed.    We will be checking your implanted device from home (remotely) every three months unless otherwise instructed. We will need to see you in the clinic at least once a year. You may need to be seen in the clinic sooner depending on the results of your check.    Please be aware:    The follow-up schedule is like a Physician prescription.    Your remote monitor is paired to your specific implanted device.      Sincerely,    [facility]

## 2021-06-19 NOTE — PROGRESS NOTES
INR 2.1 missed 7/29 dose. After talking with pt and discussing history of greens/salads and medication change. Pt will  continue  with current diet and dosing of Warfarin.  Continue with moderation of Vit K and green leafy vegetables. Cautioned to call with increase bruising or bleeding. Reminded to call with medication change especially antibiotic. Call with any questions or concerns or any up coming procedures. Cautioned about using Herbal medication.

## 2021-06-19 NOTE — LETTER
Letter by Emerita Delaney RDCS at      Author: Emerita Delaney RDCS Service: -- Author Type: --    Filed:  Encounter Date: 11/7/2019 Status: Signed         Constanza Coles  [address]  [place] [address]      November 7, 2019      Dear Ms. Coles,    RE: Remote Results    We are writing to you regarding your recent Remote Pacemaker check from home. Your transmission was received successfully. Battery status is satisfactory at this time.     Your results are showing no significant changes.    Your next device appointment will be a remote check on February 10, 2020.  You can choose the time of day you wish to transmit.    To schedule or reschedule, please call [phone] and press 1.    NOTE: If you would like to do an extra transmission, please call [phone] and press 3 to speak to a nurse BEFORE transmitting. This ensures that the Device Clinic staff is aware of the reason you are sending a transmission, and can follow-up with you after it has been reviewed.    We will be checking your implanted device from home (remotely) every three months unless otherwise instructed. We will need to see you in the clinic at least once a year. You may need to be seen in the clinic sooner depending on the results of your check.    Please be aware:    The follow-up schedule is like a Physician prescription.    Your remote monitor is paired to your specific implanted device.      Sincerely,    [facility]

## 2021-06-19 NOTE — LETTER
Letter by Caitlin Milan at      Author: Caitlin Milan Service: -- Author Type: --    Filed:  Encounter Date: 8/7/2019 Status: (Other)         Constanza Coles  [address] [place] [address]      August 7, 2019      Dear Ms. Coles,    RE: Remote Results    We are writing to you regarding your recent Remote Pacemaker check from home. Your transmission was received successfully. Battery status is satisfactory at this time.     Your results are showing no significant changes.    Your next device appointment will be a remote check on November 7, 2019.  You can choose the time of day you wish to transmit.    To schedule or reschedule, please call [phone] and press 1.    NOTE: If you would like to do an extra transmission, please call [phone] and press 3 to speak to a nurse BEFORE transmitting. This ensures that the Device Clinic staff is aware of the reason you are sending a transmission, and can follow-up with you after it has been reviewed.    We will be checking your implanted device from home (remotely) every three months unless otherwise instructed. We will need to see you in the clinic at least once a year. You may need to be seen in the clinic sooner depending on the results of your check.    Please be aware:    The follow-up schedule is like a Physician prescription.    Your remote monitor is paired to your specific implanted device.      Sincerely,    [facility]

## 2021-06-19 NOTE — LETTER
Letter by Hayder Bailey MD at      Author: Hayder Bailey MD Service: -- Author Type: --    Filed:  Encounter Date: 11/15/2019 Status: Signed         Constanza Coles  [address] [place] [address]             November 15, 2019         Dear Ms. Coles,    Below are the results from your recent visit:    Resulted Orders   Erythrocyte Sedimentation Rate   Result Value Ref Range    Sed Rate 23 (H) 0 - 20 mm/hr   Lipid Cascade   Result Value Ref Range    Cholesterol 142 <=199 mg/dL    Triglycerides 96 <=149 mg/dL    HDL Cholesterol 69 >=50 mg/dL    LDL Calculated 54 <=129 mg/dL    Patient Fasting > 8hrs? Yes    Hepatic Profile   Result Value Ref Range    Bilirubin, Total 0.6 0.0 - 1.0 mg/dL    Bilirubin, Direct 0.3 <=0.5 mg/dL    Protein, Total 7.1 6.0 - 8.0 g/dL    Albumin 3.7 3.5 - 5.0 g/dL    Alkaline Phosphatase 70 45 - 120 U/L    AST 27 0 - 40 U/L    ALT 14 0 - 45 U/L   Basic Metabolic Panel   Result Value Ref Range    Sodium 143 136 - 145 mmol/L    Potassium 3.7 3.5 - 5.0 mmol/L    Chloride 105 98 - 107 mmol/L    CO2 27 22 - 31 mmol/L    Anion Gap, Calculation 11 5 - 18 mmol/L    Glucose 104 70 - 125 mg/dL    Calcium 9.2 8.5 - 10.5 mg/dL    BUN 16 8 - 28 mg/dL    Creatinine 0.75 0.60 - 1.10 mg/dL    GFR MDRD Af Amer >60 >60 mL/min/1.73m2    GFR MDRD Non Af Amer >60 >60 mL/min/1.73m2    Narrative    Fasting Glucose reference range is 70-99 mg/dL per  American Diabetes Association (ADA) guidelines.   HM1 (CBC with Diff)   Result Value Ref Range    WBC 9.7 4.0 - 11.0 thou/uL    RBC 3.57 (L) 3.80 - 5.40 mill/uL    Hemoglobin 12.2 12.0 - 16.0 g/dL    Hematocrit 35.4 35.0 - 47.0 %    MCV 99 80 - 100 fL    MCH 34.0 27.0 - 34.0 pg    MCHC 34.4 32.0 - 36.0 g/dL    RDW 12.4 11.0 - 14.5 %    Platelets 219 140 - 440 thou/uL    MPV 8.7 7.0 - 10.0 fL    Neutrophils % 78 (H) 50 - 70 %    Lymphocytes % 14 (L) 20 - 40 %    Monocytes % 6 2 - 10 %    Eosinophils % 2 0 - 6 %    Basophils % 0 0 - 2 %    Neutrophils  Absolute 7.6 2.0 - 7.7 thou/uL    Lymphocytes Absolute 1.4 0.8 - 4.4 thou/uL    Monocytes Absolute 0.6 0.0 - 0.9 thou/uL    Eosinophils Absolute 0.2 0.0 - 0.4 thou/uL    Basophils Absolute 0.0 0.0 - 0.2 thou/uL       I enjoyed seeing you in the office at your appointment.  I am pleased with the results of your lab tests.    Any results with (*) are not of any significance.  This laboratory looks good  Your hemoglobin is fine  Your sed rate which monitors inflammation/vasculitis is normal.      Please call with questions or contact us using [employer].    Sincerely,        Electronically signed by Hayder Bailey MD

## 2021-06-19 NOTE — LETTER
Letter by Hayder Bailey MD at      Author: Hayder Bailey MD Service: -- Author Type: --    Filed:  Encounter Date: 4/5/2019 Status: (Other)         Constanza Coles  [address] [place] [address]             April 5, 2019         Dear Ms. Coles,    Below are the results from your recent visit:    Resulted Orders   Sedimentation Rate   Result Value Ref Range    Sed Rate 49 (H) 0 - 20 mm/hr   Basic Metabolic Panel   Result Value Ref Range    Sodium 139 136 - 145 mmol/L    Potassium 4.2 3.5 - 5.0 mmol/L    Chloride 102 98 - 107 mmol/L    CO2 30 22 - 31 mmol/L    Anion Gap, Calculation 7 5 - 18 mmol/L    Glucose 90 70 - 125 mg/dL    Calcium 9.2 8.5 - 10.5 mg/dL    BUN 16 8 - 28 mg/dL    Creatinine 0.81 0.60 - 1.10 mg/dL    GFR MDRD Af Amer >60 >60 mL/min/1.73m2    GFR MDRD Non Af Amer >60 >60 mL/min/1.73m2    Narrative    Fasting Glucose reference range is 70-99 mg/dL per  American Diabetes Association (ADA) guidelines.   Lipid Cascade   Result Value Ref Range    Cholesterol 193 <=199 mg/dL    Triglycerides 144 <=149 mg/dL    HDL Cholesterol 58 >=50 mg/dL    LDL Calculated 106 <=129 mg/dL    Patient Fasting > 8hrs? Yes    HM1 (CBC with Diff)   Result Value Ref Range    WBC 5.7 4.0 - 11.0 thou/uL    RBC 3.55 (L) 3.80 - 5.40 mill/uL    Hemoglobin 12.1 12.0 - 16.0 g/dL    Hematocrit 36.1 35.0 - 47.0 %     (H) 80 - 100 fL    MCH 34.0 27.0 - 34.0 pg    MCHC 33.4 32.0 - 36.0 g/dL    RDW 12.0 11.0 - 14.5 %    Platelets 181 140 - 440 thou/uL    MPV 8.2 7.0 - 10.0 fL    Neutrophils % 60 50 - 70 %    Lymphocytes % 26 20 - 40 %    Monocytes % 10 2 - 10 %    Eosinophils % 5 0 - 6 %    Basophils % 0 0 - 2 %    Neutrophils Absolute 3.4 2.0 - 7.7 thou/uL    Lymphocytes Absolute 1.4 0.8 - 4.4 thou/uL    Monocytes Absolute 0.6 0.0 - 0.9 thou/uL    Eosinophils Absolute 0.3 0.0 - 0.4 thou/uL    Basophils Absolute 0.0 0.0 - 0.2 thou/uL       I enjoyed seeing you in the office at your appointment.  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  You need to stay on the methotrexate.  I was very impressed with your artery pulsations.  You are doing well from this standpoint    Your cholesterol is high.  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  We did stop this because of all the difficulty you had been having in the past.  I did send this into your pharmacy.  It is atorvastatin 1 tablet daily.  Keep filling it.  If you have any issues with it do not hesitate to call me.  Please come in fasting at your next appointment if able      Please call with questions or contact us using [employer].    Sincerely,        Electronically signed by Hayder Bailey MD

## 2021-06-20 NOTE — LETTER
Letter by Hayder Bailey MD at      Author: Hayder Bailey MD Service: -- Author Type: --    Filed:  Encounter Date: 2/28/2020 Status: (Other)         Constanza Coles  [address] [place] [address]             February 28, 2020         Dear Ms. Coles,    Below are the results from your recent visit:    Resulted Orders   Urinalysis-UC if Indicated   Result Value Ref Range    Color, UA Straw Colorless, Yellow, Straw, Light Yellow    Clarity, UA Clear Clear    Glucose, UA Negative Negative    Bilirubin, UA Negative Negative    Ketones, UA Negative Negative    Specific Gravity, UA 1.004 1.001 - 1.030    Blood, UA Negative Negative    pH, UA 7.0 4.5 - 8.0    Protein, UA Negative Negative mg/dL    Urobilinogen, UA <2.0 E.U./dL <2.0 E.U./dL, 2.0 E.U./dL    Nitrite, UA Negative Negative    Leukocytes, UA Negative Negative    Narrative    Microscopic not indicated  UC not indicated   C-Reactive Protein (CRP)   Result Value Ref Range    CRP 0.1 0.0 - 0.8 mg/dL   Hepatic Profile   Result Value Ref Range    Bilirubin, Total 0.8 0.0 - 1.0 mg/dL    Bilirubin, Direct 0.3 <=0.5 mg/dL    Protein, Total 7.1 6.0 - 8.0 g/dL    Albumin 4.0 3.5 - 5.0 g/dL    Alkaline Phosphatase 75 45 - 120 U/L    AST 35 0 - 40 U/L    ALT 20 0 - 45 U/L   Basic Metabolic Panel   Result Value Ref Range    Sodium 139 136 - 145 mmol/L    Potassium 4.3 3.5 - 5.0 mmol/L    Chloride 100 98 - 107 mmol/L    CO2 28 22 - 31 mmol/L    Anion Gap, Calculation 11 5 - 18 mmol/L    Glucose 90 70 - 125 mg/dL    Calcium 9.4 8.5 - 10.5 mg/dL    BUN 18 8 - 28 mg/dL    Creatinine 0.79 0.60 - 1.10 mg/dL    GFR MDRD Af Amer >60 >60 mL/min/1.73m2    GFR MDRD Non Af Amer >60 >60 mL/min/1.73m2    Narrative    Fasting Glucose reference range is 70-99 mg/dL per  American Diabetes Association (ADA) guidelines.   Sedimentation Rate   Result Value Ref Range    Sed Rate 21 (H) 0 - 20 mm/hr   HM1 (CBC with Diff)   Result Value Ref Range    WBC 8.7 4.0 - 11.0 thou/uL    RBC  3.77 (L) 3.80 - 5.40 mill/uL    Hemoglobin 12.3 12.0 - 16.0 g/dL    Hematocrit 37.4 35.0 - 47.0 %    MCV 99 80 - 100 fL    MCH 32.6 27.0 - 34.0 pg    MCHC 32.9 32.0 - 36.0 g/dL    RDW 14.1 11.0 - 14.5 %    Platelets 228 140 - 440 thou/uL    MPV 10.8 8.5 - 12.5 fL    Neutrophils % 67 50 - 70 %    Lymphocytes % 20 20 - 40 %    Monocytes % 10 2 - 10 %    Eosinophils % 3 0 - 6 %    Basophils % 0 0 - 2 %    Neutrophils Absolute 5.8 2.0 - 7.7 thou/uL    Lymphocytes Absolute 1.7 0.8 - 4.4 thou/uL    Monocytes Absolute 0.9 0.0 - 0.9 thou/uL    Eosinophils Absolute 0.3 0.0 - 0.4 thou/uL    Basophils Absolute 0.0 0.0 - 0.2 thou/uL       I enjoyed seeing you in the office at your appointment.  I am pleased with the results of your lab tests.    Your laboratory looks good.  Your inflammatory markers remain low.  This is good.      Please call with questions or contact us using [employer].    Sincerely,        Electronically signed by Hayder Bailey MD

## 2021-06-20 NOTE — LETTER
Letter by Caitlin Milan at      Author: Caitlin Milan Service: -- Author Type: --    Filed:  Encounter Date: 8/12/2020 Status: (Other)         Constanza Coles  [address] [place] [address]      August 12, 2020      Dear Ms. Frankisupriya,    RE: Remote Results    We are writing to you regarding your recent Remote Pacemaker check from home. Your transmission was received successfully. Battery status is satisfactory at this time.     Your results are within normal limits.    Your next device appointment will be a remote check on November 11, 2020.  You can choose the time of day you wish to transmit.    To schedule or reschedule, please call [phone] and press 1.    NOTE: If you would like to do an extra transmission, please call [phone] and press 3 to speak to a nurse BEFORE transmitting. This ensures that the Device Clinic staff is aware of the reason you are sending a transmission, and can follow-up with you after it has been reviewed.    We will be checking your implanted device from home (remotely) every three months unless otherwise instructed. We will need to see you in the clinic at least once a year. You may need to be seen in the clinic sooner depending on the results of your check.    Please be aware:    The follow-up schedule is like a Physician prescription.    Your remote monitor is paired to your specific implanted device.      Sincerely,    [facility]

## 2021-06-20 NOTE — LETTER
Letter by Emerita Delaney RDCS at      Author: Emerita Delaney RDCS Service: -- Author Type: --    Filed:  Encounter Date: 12/23/2019 Status: Signed         Constanza Coles  [address]  [place] [address]      December 23, 2019      Dear Ms. Coles,    RE: Remote Results    We are writing to you regarding your recent Remote Pacemaker check from home. Your transmission was received successfully. Battery status is satisfactory at this time.     Your results are showing no significant changes.    Your next device appointment will be a remote check on February 10, 2020.  You can choose the time of day you wish to transmit.    To schedule or reschedule, please call [phone] and press 1.    NOTE: If you would like to do an extra transmission, please call [phone] and press 3 to speak to a nurse BEFORE transmitting. This ensures that the Device Clinic staff is aware of the reason you are sending a transmission, and can follow-up with you after it has been reviewed.    We will be checking your implanted device from home (remotely) every three months unless otherwise instructed. We will need to see you in the clinic at least once a year. You may need to be seen in the clinic sooner depending on the results of your check.    Please be aware:    The follow-up schedule is like a Physician prescription.    Your remote monitor is paired to your specific implanted device.      Sincerely,    [facility]

## 2021-06-20 NOTE — LETTER
Letter by Pastora Ramirez RN at      Author: Pastora Ramirez RN Service: -- Author Type: --    Filed:  Encounter Date: 5/12/2020 Status: (Other)         Constanza Coles  [address]  [place] [address]      May 12, 2020      Dear Ms. Coles,    RE: Remote Results    We are writing to you regarding your recent Remote Pacemaker check from home. Your transmission was received successfully. Battery status is satisfactory at this time.     Your results are within normal limits.    Your next device appointment will be a remote check on 8/12/2020.  You can choose the time of day you wish to transmit.    To schedule or reschedule, please call [phone] and press 1.    NOTE: If you would like to do an extra transmission, please call [phone] and press 3 to speak to a nurse BEFORE transmitting. This ensures that the Device Clinic staff is aware of the reason you are sending a transmission, and can follow-up with you after it has been reviewed.    We will be checking your implanted device from home (remotely) every three months unless otherwise instructed. We will need to see you in the clinic at least once a year. You may need to be seen in the clinic sooner depending on the results of your check.    Please be aware:    The follow-up schedule is like a Physician prescription.    Your remote monitor is paired to your specific implanted device.      Sincerely,    [facility]

## 2021-06-20 NOTE — LETTER
Letter by Yun Jacinto MD at      Author: Yun Jacinto MD Service: -- Author Type: --    Filed:  Encounter Date: 7/20/2020 Status: (Other)         Constanza Coles  [address] [place] [address]             July 20, 2020         Dear Ms. Coles,    Below are the results from your recent visit:    Resulted Orders   Comprehensive Metabolic Panel   Result Value Ref Range    Sodium 132 (L) 136 - 145 mmol/L    Potassium 4.0 3.5 - 5.0 mmol/L    Chloride 99 98 - 107 mmol/L    CO2 27 22 - 31 mmol/L    Anion Gap, Calculation 6 5 - 18 mmol/L    Glucose 107 70 - 125 mg/dL    BUN 16 8 - 28 mg/dL    Creatinine 1.05 0.60 - 1.10 mg/dL    GFR MDRD Af Amer >60 >60 mL/min/1.73m2    GFR MDRD Non Af Amer 50 (L) >60 mL/min/1.73m2    Bilirubin, Total 0.5 0.0 - 1.0 mg/dL    Calcium 9.6 8.5 - 10.5 mg/dL    Protein, Total 7.2 6.0 - 8.0 g/dL    Albumin 3.8 3.5 - 5.0 g/dL    Alkaline Phosphatase 70 45 - 120 U/L    AST 25 0 - 40 U/L    ALT 16 0 - 45 U/L    Narrative    Fasting Glucose reference range is 70-99 mg/dL per  American Diabetes Association (ADA) guidelines.   Erythrocyte Sedimentation Rate   Result Value Ref Range    Sed Rate 23 (H) 0 - 20 mm/hr   Methylmalonic Acid,Serum or Plasma (Vitamin B12 Status)   Result Value Ref Range    MMA Serum/Plasma, Vitamin B12 Status 0.18 0.00 - 0.40 umol/L      Comment:      INTERPRETIVE INFORMATION: MMA Serum/Plasma,                             Vitamin B12 Status    Test developed and characteristics determined by [facility]. See Compliance Statement B: [email or web address]/CS  Performed By: [facility]  [address] [place] [id number]  : Tao Obrien MD, MS   HM1 (CBC with Diff)   Result Value Ref Range    WBC 8.7 4.0 - 11.0 thou/uL    RBC 3.80 3.80 - 5.40 mill/uL    Hemoglobin 12.6 12.0 - 16.0 g/dL    Hematocrit 36.4 35.0 - 47.0 %    MCV 96 80 - 100 fL    MCH 33.1 27.0 - 34.0 pg    MCHC 34.5 32.0 - 36.0 g/dL    RDW 12.0 11.0 - 14.5 %     Platelets 208 140 - 440 thou/uL    MPV 8.1 7.0 - 10.0 fL    Neutrophils % 71 (H) 50 - 70 %    Lymphocytes % 20 20 - 40 %    Monocytes % 6 2 - 10 %    Eosinophils % 3 0 - 6 %    Basophils % 1 0 - 2 %    Neutrophils Absolute 6.1 2.0 - 7.7 thou/uL    Lymphocytes Absolute 1.8 0.8 - 4.4 thou/uL    Monocytes Absolute 0.6 0.0 - 0.9 thou/uL    Eosinophils Absolute 0.2 0.0 - 0.4 thou/uL    Basophils Absolute 0.1 0.0 - 0.2 thou/uL         The electrolytes, kidney tests are all normal .   Blood counts are normal .   Please call with questions or contact us using [employer].    Sincerely,        Electronically signed by Yun Jacinto MD

## 2021-06-21 NOTE — LETTER
Letter by Chiara Sarabia MD at      Author: Chiara Sarabia MD Service: -- Author Type: --    Filed:  Encounter Date: 3/31/2021 Status: (Other)         Constanza Coles  [address] [place] [address]             March 31, 2021         Dear Ms. Coles,    Below are the results from your recent visit:    Kidney, liver, thyroid  functions and sugar are normal.    Potassium is slightly above normal, please decrease potassium high foods (bananas, oranges, potatoes).    LDL cholesterol level is at goal.    Red and white cell counts are normal.    BNP is slightly elevated, which can reflect heart strain, this can be due to pulmonary hypertension, please consider oxygen testing at night to evaluate for O2 drops during sleep.    Vitamin D level is very low, please start vit D supplement 5,000 units daily for 3 months, then decrease dose to 2,000 units and take daily long term.    Resulted Orders   Comprehensive Metabolic Panel   Result Value Ref Range    Sodium 140 136 - 145 mmol/L    Potassium 5.1 (H) 3.5 - 5.0 mmol/L    Chloride 104 98 - 107 mmol/L    CO2 27 22 - 31 mmol/L    Anion Gap, Calculation 9 5 - 18 mmol/L    Glucose 85 70 - 125 mg/dL    BUN 15 8 - 28 mg/dL    Creatinine 0.80 0.60 - 1.10 mg/dL    GFR MDRD Af Amer >60 >60 mL/min/1.73m2    GFR MDRD Non Af Amer >60 >60 mL/min/1.73m2    Bilirubin, Total 0.7 0.0 - 1.0 mg/dL    Calcium 9.0 8.5 - 10.5 mg/dL    Protein, Total 7.3 6.0 - 8.0 g/dL    Albumin 3.6 3.5 - 5.0 g/dL    Alkaline Phosphatase 82 45 - 120 U/L    AST 24 0 - 40 U/L    ALT 12 0 - 45 U/L    Narrative    Fasting Glucose reference range is 70-99 mg/dL per  American Diabetes Association (ADA) guidelines.   BNP(B-type Natriuretic Peptide)   Result Value Ref Range     (H) 0 - 167 pg/mL   Thyroid Stimulating Hormone (TSH)   Result Value Ref Range    TSH 3.43 0.30 - 5.00 uIU/mL   LDL Cholesterol, Direct   Result Value Ref Range    Direct LDL 54 <=129 mg/dl   HM1 (CBC with Diff)   Result  Value Ref Range    WBC 7.9 4.0 - 11.0 thou/uL    RBC 4.00 3.80 - 5.40 mill/uL    Hemoglobin 12.1 12.0 - 16.0 g/dL    Hematocrit 37.5 35.0 - 47.0 %    MCV 94 80 - 100 fL    MCH 30.3 27.0 - 34.0 pg    MCHC 32.3 32.0 - 36.0 g/dL    RDW 13.3 11.0 - 14.5 %    Platelets 197 140 - 440 thou/uL    MPV 10.3 (H) 7.0 - 10.0 fL    Neutrophils % 64 50 - 70 %    Lymphocytes % 22 20 - 40 %    Monocytes % 10 2 - 10 %    Eosinophils % 4 0 - 6 %    Basophils % 1 0 - 2 %    Immature Granulocyte % 0 <=0 %    Neutrophils Absolute 5.0 2.0 - 7.7 thou/uL    Lymphocytes Absolute 1.7 0.8 - 4.4 thou/uL    Monocytes Absolute 0.8 0.0 - 0.9 thou/uL    Eosinophils Absolute 0.3 0.0 - 0.4 thou/uL    Basophils Absolute 0.0 0.0 - 0.2 thou/uL    Immature Granulocyte Absolute 0.0 <=0.0 thou/uL   Vitamin D, Total (25-Hydroxy)   Result Value Ref Range    Vitamin D, Total (25-Hydroxy) 16.8 (L) 30.0 - 80.0 ng/mL    Narrative    Deficiency <10.0 ng/mL  Insufficiency 10.0-29.9 ng/mL  Sufficiency 30.0-80.0 ng/mL  Toxicity (possible) >100.0 ng/mL     Please call with questions or contact us using [employer].    Sincerely,    Electronically signed by Chiara Sarabia MD

## 2021-06-21 NOTE — LETTER
Letter by Caitlin Milan at      Author: Caitlin Milan Service: -- Author Type: --    Filed:  Encounter Date: 11/12/2020 Status: (Other)         Constanza Coles  [address] [place] [address]      November 12, 2020      Dear MsRaymond Doansupriya,    RE: Remote Results    We are writing to you regarding your recent Remote Pacemaker check from home. Your transmission was received successfully. Battery status is satisfactory at this time.     Your results are showing no significant changes.    Your next device appointment will be a remote check on February 11, 2021.  You can choose the time of day you wish to transmit. However, you are still due to see Dr. Antoine. Please call in late December to schedule.     To schedule or reschedule, please call [phone] and press 1.    NOTE: If you would like to do an extra transmission, please call [phone] and press 3 to speak to a nurse BEFORE transmitting. This ensures that the Device Clinic staff is aware of the reason you are sending a transmission, and can follow-up with you after it has been reviewed.    We will be checking your implanted device from home (remotely) every three months unless otherwise instructed. We will need to see you in the clinic at least once a year. You may need to be seen in the clinic sooner depending on the results of your check.    Please be aware:    The follow-up schedule is like a Physician prescription.    Your remote monitor is paired to your specific implanted device.      Sincerely,    [facility]

## 2021-06-21 NOTE — PROGRESS NOTES
INR 3.6 After talking with pt and discussing history of greens/salads and medication change. Pt will  continue  with current diet and dosing of Warfarin.  Continue with moderation of Vit K and green leafy vegetables. Cautioned to call with increase bruising or bleeding. Reminded to call with medication change especially antibiotic. Call with any questions or concerns or any up coming procedures. Cautioned about using Herbal medication.  Decrease dose

## 2021-06-21 NOTE — LETTER
Letter by Caitlin Milan at      Author: Caitlin Milan Service: -- Author Type: --    Filed:  Encounter Date: 2/11/2021 Status: (Other)         Constanza Coles  [address] [place] [address]      February 11, 2021      Dear Ms. Frankisupriya,    RE: Remote Results    We are writing to you regarding your recent Remote Pacemaker check from home. Your transmission was received successfully. Battery status is satisfactory at this time.     Your results are showing no significant changes.    Your next device appointment will be a remote check on May 13, 2021.  You can choose the time of day you wish to transmit.    To schedule or reschedule, please call [phone] and press 1.    NOTE: If you would like to do an extra transmission, please call [phone] and press 3 to speak to a nurse BEFORE transmitting. This ensures that the Device Clinic staff is aware of the reason you are sending a transmission, and can follow-up with you after it has been reviewed.    We will be checking your implanted device from home (remotely) every three months unless otherwise instructed. We will need to see you in the clinic at least once a year. You may need to be seen in the clinic sooner depending on the results of your check.    Please be aware:    The follow-up schedule is like a Physician prescription.    Your remote monitor is paired to your specific implanted device.      Sincerely,    [facility]

## 2021-06-21 NOTE — PROGRESS NOTES
OFFICE VISIT NOTE  Constanza Coles   83 y.o. female            Assessment/Plan for  Constanza Coles is a 83 y.o. female.  No Patient Care Coordination Note on file.       1. Need for prophylactic vaccination and inoculation against influenza     - Influenza High Dose, Seasonal 65+ yrs    2. Vasculitis (H)  Stable  Should take 1 mg folate daily discussed  - HM1(CBC and Differential)  - Erythrocyte Sedimentation Rate  - C-Reactive Protein  - Renal Function Profile  - Hepatic Profile  - HM1 (CBC with Diff)    3. Decreased radial pulse  Chronic stable on right.  Excellent pulses on left  The right sides but it is been in the past    4. Chronic atrial fibrillation (H)  Doing well.  On chronic anticoagulation    5. H/O aortic valve replacement  Bioprosthetic valve.  Stable    6. Abdominal wall hernia  No change in symptomatology intermittent chronic pain without satnam incarceration    7. IHD (ischemic heart disease)  Stable    8. Simple chronic bronchitis (H)  Baseline mild emphysema with bronchitis.  No evidence of pneumonia.  Rx Levaquin  - XR Chest 2 Views; Future  - levoFLOXacin (LEVAQUIN) 500 MG tablet; Take 1 tablet (500 mg total) by mouth daily for 10 days.  Dispense: 10 tablet; Refill: 0    9. Malabsorption  Continue B12 IM administration.  Folic acid 1 mg daily particularly with methotrexate and increasing MCV  - Folate, Serum       Plan:  Continue current Rx  Levaquin  Check chest x-ray done-reviewed with patient  1 mg folate  Laboratory  Clinic visit 3 months     There are no Patient Instructions on file for this visit.    Diagnoses and all orders for this visit:    Need for prophylactic vaccination and inoculation against influenza  -     Influenza High Dose, Seasonal 65+ yrs    Vasculitis (H)  -     HM1(CBC and Differential)  -     Erythrocyte Sedimentation Rate  -     C-Reactive Protein  -     Renal Function Profile  -     Hepatic Profile  -     HM1 (CBC with Diff)    Decreased radial pulse    Chronic atrial  fibrillation (H)    H/O aortic valve replacement    Abdominal wall hernia    IHD (ischemic heart disease)    Simple chronic bronchitis (H)  -     XR Chest 2 Views; Future; Expected date: 11/2/18  -     levoFLOXacin (LEVAQUIN) 500 MG tablet; Take 1 tablet (500 mg total) by mouth daily for 10 days.  Dispense: 10 tablet; Refill: 0    Malabsorption  -     Folate, Serum        Medications after visit  Current Outpatient Prescriptions   Medication Sig Dispense Refill     cyanocobalamin 1,000 mcg/mL injection INJECT 1 ML (1,000 MCG TOTAL) INTO THE SHOULDER, THIGH, OR BUTTOCKS EVERY 30 DAYS. 1 mL 4     methotrexate 2.5 MG tablet Take 5 tablets (12.5 mg total) by mouth once a week. 72 tablet 0     metoprolol succinate (TOPROL-XL) 25 MG TAKE 1/2 TABLET (12.5MG) DAILY 30 tablet 11     nitroglycerin (NITROSTAT) 0.4 MG SL tablet DISSOLVE 1 TABLET UNDER THE TONGUE AS NEEDED FOR CHEST PAIN 90 tablet 12     sucralfate (CARAFATE) 1 gram tablet TAKE 1 TABLET BY MOUTH FOUR TIMES DAILY 360 tablet PRN     triamterene-hydrochlorothiazide (DYAZIDE) 37.5-25 mg per capsule TAKE 1 CAPSULE BY MOUTH AS NEEDED. 90 capsule 3     warfarin (COUMADIN) 2 MG tablet TAKE 1/2 TABLET (1MG) BY MOUTH ON TUES, THURS, AND SAT. TAKE 1 TABLET (2MG) ON ALL OTHER DAYS. 90 tablet 0     levoFLOXacin (LEVAQUIN) 500 MG tablet Take 1 tablet (500 mg total) by mouth daily for 10 days. 10 tablet 0     No current facility-administered medications for this visit.                       Hayder Bailey MD  Internal medicine  [facility]  [phone]  [email or web address]    Much or all of the text in this note was generated through the use of Dragon Dictate voice-to-text software. Errors in spelling or words which seem out of context are unintentional.   Sound alike errors, in particular, may have escaped editing.                 Subjective:   Chief Complaint:  Hot Flashes; Medication Check; Gastroesophageal Reflux (Bile reflux issues);  Follow-up; and Flu Vaccine    Patient with recurrent bronchitis  Non-smoker  Treated for his 6 weeks ago with good response to Levaquin  Now with recurrence-last 3 days-new issue  No pleurisy or hemoptysis      She is having some reflux  She thinks occasionally aspirates  She does not sleep with head of her bed elevated    Patient does have chronic malabsorption.  She has a abdominal wall hernia.  She is minimally symptomatic from that.  She does exercise.  The Carafate has been helping her GI tract.  Her bowels are working okay    Atrial Fibrillation    -patient has no cardiovascular symptoms such as palpitations, PND, orthopnea, dyspnea, dizziness, syncope.  Patient, weakness, hemiparesis, dysarthria, ataxia.  No embolic symptomatology such as leg pain or cardiac/neurologic symptoms.  Coumadin monitoring-the patient is compliant with taking their Coumadin as directed.  There is no problem with bleeding. There is no excessive bruising, epistaxis, gum bleeding, hemoptysis, melena, hematochezia, hematuria.  The schedule of Coumadin is controlled through ambulatory anticoagulation clinic.  INR is noted in the chart and reviewed        Review of Systems:     Extensive 10-point review of systems was performed. Please see the HPI for problem specific pertinent review of systems.     Patient does note appetite is good.  Bowels are regular    Otherwise, the following systems are noncontributory including constitutional, eyes, ears, nose and throat, cardiovascular, respiratory, gastrointestinal, genitourinary, musculoskeletal,neurological, skin and/or breast, endocrine, hematologic/lymph, allergic/immunologic and psychiatric.              Medications:  Current Outpatient Prescriptions on File Prior to Visit   Medication Sig     cyanocobalamin 1,000 mcg/mL injection INJECT 1 ML (1,000 MCG TOTAL) INTO THE SHOULDER, THIGH, OR BUTTOCKS EVERY 30 DAYS.     methotrexate 2.5 MG tablet Take 5 tablets (12.5 mg total) by mouth once a  "week.     metoprolol succinate (TOPROL-XL) 25 MG TAKE 1/2 TABLET (12.5MG) DAILY     nitroglycerin (NITROSTAT) 0.4 MG SL tablet DISSOLVE 1 TABLET UNDER THE TONGUE AS NEEDED FOR CHEST PAIN     sucralfate (CARAFATE) 1 gram tablet TAKE 1 TABLET BY MOUTH FOUR TIMES DAILY     triamterene-hydrochlorothiazide (DYAZIDE) 37.5-25 mg per capsule TAKE 1 CAPSULE BY MOUTH AS NEEDED.     warfarin (COUMADIN) 2 MG tablet TAKE 1/2 TABLET (1MG) BY MOUTH ON TUES, THURS, AND SAT. TAKE 1 TABLET (2MG) ON ALL OTHER DAYS.     [DISCONTINUED] methotrexate 2.5 MG tablet TAKE 5 TABLETS (12.5 MG TOTAL) BY MOUTH ONCE A WEEK.     No current facility-administered medications on file prior to visit.             Allergies:  Allergies   Allergen Reactions     Erythromycin Base      Pt is not sure she is allergic to this     Levaquin [Levofloxacin] Nausea And Vomiting     Vancomycin Other (See Comments)     Red man syndrome     Xanax [Alprazolam] Other (See Comments)     confusion     Sulfa (Sulfonamide Antibiotics) Rash       PSFHx: Tobacco Status:  She  reports that she quit smoking about 39 years ago. She has a 30.00 pack-year smoking history. She has never used smokeless tobacco.   Alcohol Status:    History   Alcohol Use     Yes       reports that she quit smoking about 39 years ago. She has a 30.00 pack-year smoking history. She has never used smokeless tobacco. She reports that she drinks alcohol. She reports that she does not use illicit drugs.    Objective:    /76 (Patient Site: Left Arm)  Pulse 77  Ht 5' 3\" (1.6 m)  Wt 124 lb 0.6 oz (56.3 kg)  LMP  (LMP Unknown)  SpO2 95%  Breastfeeding? No  BMI 21.97 kg/m2  Weight:   Wt Readings from Last 3 Encounters:   11/02/18 124 lb 0.6 oz (56.3 kg)   06/04/18 130 lb (59 kg)   04/25/18 130 lb 6.4 oz (59.1 kg)     BP Readings from Last 10 Encounters:   11/02/18 138/76   04/25/18 114/60   03/23/18 128/74   12/01/17 110/80   08/01/17 102/70   05/22/17 120/70   05/11/17 114/68   04/04/17 114/70 " "  03/27/17 110/70   10/14/16 100/70     Vitals:    11/02/18 1412 11/02/18 1430   BP:  138/76   Patient Site:  Left Arm   Pulse: 77    SpO2: 95%    Weight: 124 lb 0.6 oz (56.3 kg)    Height: 5' 3\" (1.6 m)        Pulse irregular regular  General-appears well, no acute distress.  Skin: Normal. No rash or lesion  Head:  Normocephalic, symmetric  Speech-clear  Eyes: Eyes midline full EOM.  External exams normal.  No icterus  Neck:  No palpable masses, lymphadenopathy or tenderness. No thyromegaly or goiter bilateral equal pulsations  Carotid Arteries:  Equal pulsations bilateral.No Bruit, normal upstroke  Chest Wall: No deformity or pain elicited on compression.  Respiratory:  Normal respiratory effort.  Scattered rhonchi  Some end expiratory wheezing  Heart: Irregular rhythm.  No murmur   extremities-decreased pulses right brachial right radial.  Left brachial radial excellent..         Review of clinical lab tests  Lab Results   Component Value Date    WBC 9.5 11/02/2018    HGB 13.5 11/02/2018    HCT 39.6 11/02/2018     11/02/2018    ALT 16 03/23/2018    AST 23 03/23/2018     03/23/2018    K 3.9 03/23/2018    CL 99 03/23/2018    CREATININE 0.79 03/23/2018    BUN 18 03/23/2018    CO2 27 03/23/2018    TSH 3.19 03/27/2017    INR 3.6 10/22/2018       Glucose   Date/Time Value Ref Range Status   03/23/2018 03:26  70 - 125 mg/dL Final   12/01/2017 04:09  70 - 125 mg/dL Final   03/27/2017 04:20 PM 85 70 - 125 mg/dL Final   10/14/2016 03:58  70 - 125 mg/dL Final   04/01/2016 03:09  70 - 125 mg/dL Final   10/28/2015 04:21 PM 90 74 - 125 mg/dL Final   08/09/2011 06:33 AM 87 70 - 125 mg/dL Final     Comment:          Fasting Glucose reference range is 70-99 mg/dL per       American Diabetes Association (ADA) guidelines.     Recent Results (from the past 24 hour(s))   Erythrocyte Sedimentation Rate   Result Value Ref Range    Sed Rate 30 (H) 0 - 20 mm/hr   HM1 (CBC with Diff)   Result Value Ref " Range    WBC 9.5 4.0 - 11.0 thou/uL    RBC 3.92 3.80 - 5.40 mill/uL    Hemoglobin 13.5 12.0 - 16.0 g/dL    Hematocrit 39.6 35.0 - 47.0 %     (H) 80 - 100 fL    MCH 34.4 (H) 27.0 - 34.0 pg    MCHC 34.1 32.0 - 36.0 g/dL    RDW 11.4 11.0 - 14.5 %    Platelets 219 140 - 440 thou/uL    MPV 8.1 7.0 - 10.0 fL    Neutrophils % 65 50 - 70 %    Lymphocytes % 19 (L) 20 - 40 %    Monocytes % 7 2 - 10 %    Eosinophils % 8 (H) 0 - 6 %    Basophils % 1 0 - 2 %    Neutrophils Absolute 6.2 2.0 - 7.7 thou/uL    Lymphocytes Absolute 1.8 0.8 - 4.4 thou/uL    Monocytes Absolute 0.7 0.0 - 0.9 thou/uL    Eosinophils Absolute 0.8 (H) 0.0 - 0.4 thou/uL    Basophils Absolute 0.1 0.0 - 0.2 thou/uL       RADIOLOGY: Xr Chest 2 Views    Result Date: 11/2/2018  XR CHEST 2 VIEWS 11/2/2018 2:03 PM INDICATION: Simple chronic bronchitis COMPARISON: 12/01/2017 FINDINGS: Heart size and pulmonary vascularity normal. Emphysema with hyperinflated lungs. Minimal atelectasis or scarring in the bases. The lungs otherwise are clear. Prior median sternotomy and aortic valve replacement. Surgical clips at the GE junction. Pacemaker lead tips right atrium and right ventricle.      Review of recent consultation-personally reviewed x-ray

## 2021-06-22 NOTE — TELEPHONE ENCOUNTER
RN cannot approve Refill Request    RN can NOT refill this medication med is not covered by policy/route to provider. Last office visit: 1/2/2019 Hayder Bailey MD Last Physical: Visit date not found Last MTM visit: Visit date not found Last visit same specialty: 1/2/2019 Hayder Bailey MD.  Next visit within 3 mo: Visit date not found  Next physical within 3 mo: Visit date not found      Aura Tyson, [facility] Triage/Med Refill 1/5/2019    Requested Prescriptions   Pending Prescriptions Disp Refills     levoFLOXacin (LEVAQUIN) 500 MG tablet [Pharmacy Med Name: LEVOFLOXACIN 500 MG TABLET 500 TAB] 10 tablet 11     Sig: TAKE 1 TABLET (500 MG TOTAL) BY MOUTH DAILY FOR 10 DAYS.    There is no refill protocol information for this order

## 2021-06-22 NOTE — PROGRESS NOTES
OFFICE VISIT NOTE         Assessment/Plan for  Constanza Coles is a 83 y.o. female.  No Patient Care Coordination Note on file.       1.  Right sided pneumonia in immunocompromised host  2.  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  Status post bioprosthetic aortic valve replacement  4.  Ischemic heart disease stable  5.  Medication allergies including Levaquin and Bactrim.    Plan:  Chest x-ray-done-reviewed  CBC renal  [place] flu screen  IM Rocephin 1 g  Ceftin 500 twice daily times 1 week-notify me if not improved or with diarrhea  Natural yogurt  Calories      There are no Patient Instructions on file for this visit.    Diagnoses and all orders for this visit:    Pneumonia of both lungs due to infectious organism, unspecified part of lung  -     XR Chest 2 Views  -     HM1(CBC and Differential)  -     Renal Function Profile  -     Influenza A/B Rapid Test  -     HM1 (CBC with Diff)    Vasculitis (H)    H/O aortic valve replacement    IHD (ischemic heart disease)    Immunosuppression (H)    Other orders  -     cefTRIAXone injection 1 g (ROCEPHIN)  -     cefuroxime (CEFTIN) 500 MG tablet  Dispense: 14 tablet; Refill: 0        Medications after visit  Current Outpatient Medications   Medication Sig Dispense Refill     cyanocobalamin 1,000 mcg/mL injection INJECT 1 ML (1,000 MCG TOTAL) INTO THE SHOULDER, THIGH, OR BUTTOCKS EVERY 30 DAYS. 1 mL 4     methotrexate 2.5 MG tablet Take 5 tablets (12.5 mg total) by mouth once a week. 72 tablet 0     methotrexate 2.5 MG tablet TAKE 5 TABLETS (12.5 MG TOTAL) BY MOUTH ONCE A WEEK. 20 tablet 2     metoprolol succinate (TOPROL-XL) 25 MG TAKE 1/2 TABLET (12.5MG) DAILY 30 tablet 11     nitroglycerin (NITROSTAT) 0.4 MG SL tablet DISSOLVE 1 TABLET UNDER THE TONGUE AS NEEDED FOR CHEST PAIN 90 tablet 12     sucralfate (CARAFATE) 1 gram tablet TAKE 1 TABLET BY MOUTH FOUR TIMES DAILY 360 tablet PRN     triamterene-hydrochlorothiazide (DYAZIDE) 37.5-25 mg per capsule TAKE 1  CAPSULE BY MOUTH AS NEEDED. 90 capsule 3     warfarin (COUMADIN/JANTOVEN) 2 MG tablet TAKE 1/2 TABLET (1MG) BY MOUTH ON TUES, THURS, AND SAT. TAKE 1 TABLET (2MG) ON ALL OTHER DAYS. 90 tablet 11     cefuroxime (CEFTIN) 500 MG tablet Take 1 tablet (500 mg total) by mouth 2 (two) times a day for 7 days. 14 tablet 0     Current Facility-Administered Medications   Medication Dose Route Frequency Provider Last Rate Last Dose     cefTRIAXone injection 1 g (ROCEPHIN)  1 g Intramuscular Q24H Hayder Bailey MD James J Finell, MD  Internal medicine  [facility]  [phone]    This is an electronically verified report by Hayder Bailey M.D.  (Note created with Dragon voice recognition and unintended spelling errors and word substitutions may occur)         Subjective:   Chief Complaint:  Cough (Nonproductive); Fatigue; Shortness of Breath; and Chest Congestion    New issue  Acute onset of cough fever chills 5 days ago.  Also diarrhea  No shortness of breath  No pleurisy  Cough some mucus but fairly clear  Having trouble sleeping due to the cough  She has diarrhea  It is firming up  She had no stool today.  No recent travel  No pets  No recent antibiotics    Patient is on methotrexate for vasculitis  She is done well with this    She does have a history of bronchopneumonia is  She does not smoke    Patient has an aortic valve replacement and ischemic heart disease.  No chest pain or pleurisy.  Her breathing is okay    PSFHx:: Past Medical History, Social History, and Family History reviewed and updated.  Medications and Allergies reviewed and reconciled.    Medications:  Current Outpatient Medications   Medication Sig Dispense Refill     cyanocobalamin 1,000 mcg/mL injection INJECT 1 ML (1,000 MCG TOTAL) INTO THE SHOULDER, THIGH, OR BUTTOCKS EVERY 30 DAYS. 1 mL 4     methotrexate 2.5 MG tablet Take 5 tablets (12.5 mg total) by mouth once a week. 72 tablet 0      methotrexate 2.5 MG tablet TAKE 5 TABLETS (12.5 MG TOTAL) BY MOUTH ONCE A WEEK. 20 tablet 2     metoprolol succinate (TOPROL-XL) 25 MG TAKE 1/2 TABLET (12.5MG) DAILY 30 tablet 11     nitroglycerin (NITROSTAT) 0.4 MG SL tablet DISSOLVE 1 TABLET UNDER THE TONGUE AS NEEDED FOR CHEST PAIN 90 tablet 12     sucralfate (CARAFATE) 1 gram tablet TAKE 1 TABLET BY MOUTH FOUR TIMES DAILY 360 tablet PRN     triamterene-hydrochlorothiazide (DYAZIDE) 37.5-25 mg per capsule TAKE 1 CAPSULE BY MOUTH AS NEEDED. 90 capsule 3     warfarin (COUMADIN/JANTOVEN) 2 MG tablet TAKE 1/2 TABLET (1MG) BY MOUTH ON TUES, THURS, AND SAT. TAKE 1 TABLET (2MG) ON ALL OTHER DAYS. 90 tablet 11     cefuroxime (CEFTIN) 500 MG tablet Take 1 tablet (500 mg total) by mouth 2 (two) times a day for 7 days. 14 tablet 0     Current Facility-Administered Medications   Medication Dose Route Frequency Provider Last Rate Last Dose     cefTRIAXone injection 1 g (ROCEPHIN)  1 g Intramuscular Q24H Hayder Bailey MD         Allergies:  Allergies   Allergen Reactions     Erythromycin Base      Pt is not sure she is allergic to this     Levaquin [Levofloxacin] Nausea And Vomiting     Vancomycin Other (See Comments)     Red man syndrome     Xanax [Alprazolam] Other (See Comments)     confusion     Sulfa (Sulfonamide Antibiotics) Rash     PSFHx: Tobacco Status:  She  reports that she quit smoking about 39 years ago. She has a 30.00 pack-year smoking history. she has never used smokeless tobacco.    Review of Systems:     Extensive 12-point review of systems was performed. Please see the HPI for problem specific pertinent review of systems.     Patient does note diminished appetite.  She is taking in fluids    Otherwise, the following systems are noncontributory including constitutional, eyes, ears, nose and throat, cardiovascular, respiratory, gastrointestinal, genitourinary, musculoskeletal,neurological, skin and/or breast, endocrine, hematologic/lymph,  "allergic/immunologic and psychiatric.    .  .    Objective:    Pulse 96   Temp 98.2  F (36.8  C)   Ht 5' 3\" (1.6 m)   Wt 121 lb 1.9 oz (54.9 kg)   LMP  (LMP Unknown)   SpO2 99%   Breastfeeding? No   BMI 21.46 kg/m     Weight:   Wt Readings from Last 3 Encounters:   01/02/19 121 lb 1.9 oz (54.9 kg)   11/02/18 124 lb 0.6 oz (56.3 kg)   06/04/18 130 lb (59 kg)     [unfilled]  121 lb 1.9 oz (54.9 kg)    In general-appears fatigued.  No respiratory distress  Psych-affect appropriate for situation    Skin-unremarkable. No rash or petichae  Lymph-no lymphadenopathy  Eyes-sclera white,midline  ENT-throat is unremarkable  Voice is fine  No neck adenopathy    Respiratory rate not increased  Regular 12  Patient has bilateral rhonchi.  Marked right posterior  No wheezing  He is moving air well  No dullness at base  Cardiac exam is regular without murmur  Abdomen soft  Extremities no edema  Skin-no rash or petechiae  Neurologically intact      Personally reviewed x-ray  Right subtle pneumonitis  Left side okay  No effusion  Pacemaker noted      LABS: No results found for this or any previous visit (from the past 24 hour(s)).  RADIOLOGY: Xr Chest 2 Views    Result Date: 1/2/2019  XR CHEST 2 VIEWS 1/2/2019 4:27 PM INDICATION: Pneumonia, unspecified organism COMPARISON: 11/2/2018 FINDINGS: Increased subtle right basilar opacities which may represent pneumonia. Remaining lungs are grossly clear. Large lung volumes. No significant pleural effusion. Stable cardiomediastinal silhouette. Sternotomy and valvular prosthetic. Dual-chamber pacer. Aortic atherosclerosis. Bony demineralization.         Laboratory-CBC ordered    "

## 2021-06-22 NOTE — TELEPHONE ENCOUNTER
"Triage call:   Patient calling with cough, sore throat and difficulty breathing that started Friday evening.  States that she has minimal appetite. Patient also has A.fib \"90% of the time\" and states \"my heart is working twice as hard as it would normally beat.\" Patient indicates that she is short of breath but denies chest pain.     Triaged to be seen in the ER but patient didn't want to go there. She had already made an appointment to see Dr Cantu this afternoon at 3:40 pm and prefers to keep that appointment. Advised her that, if she was going to wait to go to the appointment and not to the ER, she should call back or go straight to the ER if she gets worse. Patient states that she will \"think about it\" but she thinks that she will just wait for her office visit.    Is PCP OK with this plan?? Please advise.     Mariel Villanueva RN [facility] Triage/Med Refill 1/2/2019 10:15 AM    Reason for Disposition    MODERATE difficulty breathing (e.g., speaks in phrases, SOB even at rest, pulse 100-120) of new onset or worse than normal    Protocols used: BREATHING DIFFICULTY-A-OH      "

## 2021-06-23 NOTE — PATIENT INSTRUCTIONS - HE
INR 3.1 decrease dose to 2 mg sat and 1 mg all other days. After talking with pt and discussing history of greens/salads and medication change. Pt will  continue  with current diet and dosing of Warfarin.  Continue with moderation of Vit K and green leafy vegetables. Cautioned to call with increase bruising or bleeding. Reminded to call with medication change especially antibiotic. Call with any questions or concerns or any up coming procedures. Cautioned about using Herbal medication.  Pt had pneumonia.

## 2021-06-23 NOTE — TELEPHONE ENCOUNTER
Was seen on 1/2/19 and dx'd with Pneumonia. Felt better for a couple of days but now is feeling worse again.    Was told to call if not better. (Ceftin 500 twice daily times 1 week-notify me if not improved or with diarrhea)    Tuesday is when symptoms started to worsen    Cough is worsening. Cough is productive and is clear    Cough is keeping her up and preventing her from sleeping    No fever    Only feels short of breath when she does anything    Finished the abx that was prescribed at the appointment on Wed.    No appetite and she states that she has lost 7lbs     She is also c/o of a headache. The headache started yesterday.      Becky Watkins, RN  [facility] Medication Refill and Triage Nurse  1/11/2019  12:33 PM        Reason for Disposition    [1] Continuous (nonstop) coughing interferes with work or school AND [2] no improvement using cough treatment per protocol    Protocols used: PNEUMONIA ON ANTIBIOTIC FOLLOW-UP CALL-A-

## 2021-06-24 NOTE — PROGRESS NOTES
INR 4.1 pt just off antibiotics and retaining fluid. Took and extra diuretic and feeing better. Will hold tue warfarin as pt has taken today's dose. Then decrease to 2 mg sat and tue and 1 mg all other days.

## 2021-06-24 NOTE — TELEPHONE ENCOUNTER
RN cannot approve Refill Request    RN can NOT refill this medication med is not covered by policy/route to provider. Last office visit: 1/2/2019 Hayder Bailey MD Last Physical: Visit date not found Last MTM visit: Visit date not found Last visit same specialty: 1/2/2019 Hayder Bailey MD.  Next visit within 3 mo: Visit date not found  Next physical within 3 mo: Visit date not found      Rachel Andres, [facility] Triage/Med Refill 3/9/2019    Requested Prescriptions   Pending Prescriptions Disp Refills     nitroglycerin (NITROSTAT) 0.4 MG SL tablet [Pharmacy Med Name: NITROSTAT 0.4 MG TAB SL 0.4 TAB] 90 tablet 12     Sig: DISSOLVE 1 TABLET UNDER THE TONGUE AS NEEDED FOR CHEST PAIN    There is no refill protocol information for this order

## 2021-06-24 NOTE — PROGRESS NOTES
INR 1.8 take warfarin at 2 mg tue and sat and 1 mg all other days. Continue current management dosing of Warfarin. Continue  diet of moderate Vitamin K intake. Discussed with pt the need to call with questions or concerns or any change in medication especially herbal medication or OTC. Call with increased bleeding or bruising or any upcoming procedures.

## 2021-06-24 NOTE — PROGRESS NOTES
INR 1.6 increase dose of Warfarin to 2 mg Tue, thurs and sat and 1 mg all other days. Retest in 2 weeks. After talking with pt and discussing history of greens/salads and medication change. Pt will  continue  with current diet and dosing of Warfarin.  Continue with moderation of Vit K and green leafy vegetables. Cautioned to call with increase bruising or bleeding. Reminded to call with medication change especially antibiotic. Call with any questions or concerns or any up coming procedures. Cautioned about using Herbal medication.

## 2021-06-24 NOTE — TELEPHONE ENCOUNTER
Refill Approved    Rx renewed per Medication Renewal Policy. Medication was last renewed on 12/25/17.    Ov: 1/2/19    Becky Watkins, [facility] Triage/Med Refill 2/24/2019     Requested Prescriptions   Pending Prescriptions Disp Refills     sucralfate (CARAFATE) 1 gram tablet [Pharmacy Med Name: SUCRALFATE 1 GM TABS 1 TAB] 360 tablet 11     Sig: TAKE 1 TABLET BY MOUTH FOUR TIMES DAILY    GI Medications Refill Protocol Passed - 2/22/2019 11:24 AM       Passed - PCP or prescribing provider visit in last 12 or next 3 months.    Last office visit with prescriber/PCP: 1/2/2019 Hayder Bailey MD OR same dept: 1/2/2019 Hayder Bailey MD OR same specialty: 1/2/2019 Hayder Bailey MD  Last physical: Visit date not found Last MTM visit: Visit date not found   Next visit within 3 mo: Visit date not found  Next physical within 3 mo: Visit date not found  Prescriber OR PCP: Hayder Bailey MD  Last diagnosis associated with med order: There are no diagnoses linked to this encounter.  If protocol passes may refill for 12 months if within 3 months of last provider visit (or a total of 15 months).

## 2021-06-24 NOTE — TELEPHONE ENCOUNTER
Refill Approved    Rx renewed per Medication Renewal Policy. Medication was last renewed on 8/17/16.    Tali Mcdaniel, [facility] Triage/Med Refill 2/26/2019     Requested Prescriptions   Pending Prescriptions Disp Refills     methotrexate 2.5 MG tablet [Pharmacy Med Name: METHOTREXATE 2.5 MG TAB## 2.5MG TAB] 20 tablet 2     Sig: TAKE 5 TABLETS (12.5 MG TOTAL) BY MOUTH ONCE A WEEK.    Methotrexate Refill Protocol Passed - 2/26/2019  2:11 PM       Passed - LFT or AST or ALT monthly for 3 months, then every 3 months                            Albumin   Date Value Ref Range Status   01/02/2019 3.7 3.5 - 5.0 g/dL Final     Bilirubin, Total   Date Value Ref Range Status   11/02/2018 0.5 0.0 - 1.0 mg/dL Final     Bilirubin, Direct   Date Value Ref Range Status   11/02/2018 0.1 <=0.5 mg/dL Final     Alkaline Phosphatase   Date Value Ref Range Status   11/02/2018 78 45 - 120 U/L Final     AST   Date Value Ref Range Status   11/02/2018 27 0 - 40 U/L Final     ALT   Date Value Ref Range Status   11/02/2018 18 0 - 45 U/L Final     Protein, Total   Date Value Ref Range Status   11/02/2018 7.5 6.0 - 8.0 g/dL Final               Passed - PCP or prescribing provider visit in past 6 months      Last office visit with prescriber/PCP: 1/2/2019 OR same dept: 1/2/2019 Hayder Bailey MD OR same specialty: 1/2/2019 Hayder Bailey MD  Last physical: Visit date not found  Last MTM visit: Visit date not found        Next appt within 3 mo: Visit date not found  Next physical within 3 mo: Visit date not found  Prescriber OR PCP: Hayder Bailey MD  Last diagnosis associated with med order: 1. Vasculitis (H)  - methotrexate 2.5 MG tablet [Pharmacy Med Name: METHOTREXATE 2.5 MG TAB## 2.5MG TAB]; TAKE 5 TABLETS (12.5 MG TOTAL) BY MOUTH ONCE A WEEK.  Dispense: 20 tablet; Refill: 2     If protocol passes may refill x 1 month (Dose limit of 30mg per week for Methotrexate)         Passed - CBC w/Diff & Plts (Hm1) monthly for 3 months, then  every 3 months    WBC   Date Value Ref Range Status   01/02/2019 8.2 4.0 - 11.0 thou/uL Final     RBC   Date Value Ref Range Status   01/02/2019 4.11 3.80 - 5.40 mill/uL Final     Hemoglobin   Date Value Ref Range Status   01/02/2019 14.2 12.0 - 16.0 g/dL Final     Hematocrit   Date Value Ref Range Status   01/02/2019 41.6 35.0 - 47.0 % Final     MCV   Date Value Ref Range Status   01/02/2019 101 (H) 80 - 100 fL Final     MCH   Date Value Ref Range Status   01/02/2019 34.5 (H) 27.0 - 34.0 pg Final     MCHC   Date Value Ref Range Status   01/02/2019 34.1 32.0 - 36.0 g/dL Final     RDW   Date Value Ref Range Status   01/02/2019 11.9 11.0 - 14.5 % Final     Platelets   Date Value Ref Range Status   01/02/2019 163 140 - 440 thou/uL Final     MPV   Date Value Ref Range Status   01/02/2019 9.1 7.0 - 10.0 fL Final     Neutrophils %   Date Value Ref Range Status   01/02/2019 64 50 - 70 % Final     Lymphocytes %   Date Value Ref Range Status   01/02/2019 17 (L) 20 - 40 % Final     Monocytes %   Date Value Ref Range Status   01/02/2019 12 (H) 2 - 10 % Final     Eosinophils %   Date Value Ref Range Status   01/02/2019 6 0 - 6 % Final     Basophils %   Date Value Ref Range Status   01/02/2019 1 0 - 2 % Final     Neutrophils Absolute   Date Value Ref Range Status   01/02/2019 5.3 2.0 - 7.7 thou/uL Final     Lymphocytes Absolute   Date Value Ref Range Status   01/02/2019 1.4 0.8 - 4.4 thou/uL Final     Monocytes Absolute   Date Value Ref Range Status   01/02/2019 1.0 (H) 0.0 - 0.9 thou/uL Final     Eosinophils Absolute   Date Value Ref Range Status   01/02/2019 0.5 (H) 0.0 - 0.4 thou/uL Final     Basophils Absolute   Date Value Ref Range Status   01/02/2019 0.1 0.0 - 0.2 thou/uL Final               Passed - Serum creatinine in last 12 months    Creatinine   Date Value Ref Range Status   01/02/2019 0.78 0.60 - 1.10 mg/dL Final

## 2021-06-24 NOTE — PATIENT INSTRUCTIONS - HE
INR 4.1 hold tue warfarin. And then 1 mg Tue and sat. After talking with pt and discussing history of greens/salads and medication change. Pt will  continue  with current diet and dosing of Warfarin.  Continue with moderation of Vit K and green leafy vegetables. Cautioned to call with increase bruising or bleeding. Reminded to call with medication change especially antibiotic. Call with any questions or concerns or any up coming procedures. Cautioned about using Herbal medication.

## 2021-06-25 NOTE — TELEPHONE ENCOUNTER
Called pt L/M that Dr William only does establish care visits for first visit. Not physicals. Please help pt schedule an establish care visit. Thanks.

## 2021-06-25 NOTE — TELEPHONE ENCOUNTER
ANTICOAGULATION  MANAGEMENT    Assessment     Today's INR result of 2.00 is Therapeutic (goal INR of 2.0-3.0)        Warfarin taken as previously instructed    No new diet changes affecting INR    - had cabbage yesterday    No new medication/supplements affecting INR    Continues to tolerate warfarin with no reported s/s of bleeding or thromboembolism     Previous INR was Therapeutic at 2.40 on 4/29/21.   (last 5 INR's therapeutic).    Plan:     Spoke on phone with Constanza regarding INR result and instructed:      Warfarin Dosing Instructions:   (1mg tabs)   - Continue current warfarin dose 2 mg daily on Tues/Fri; and 1 mg daily rest of week.    Instructed patient to follow up no later than:  4-6 wks.   - INR schedule don 7/15/21 during AWV with Dr. Jacinto.    Education provided: importance of consistent vitamin K intake, impact of vitamin K foods on INR and target INR goal and significance of current INR result    Constanza verbalizes understanding and agrees to warfarin dosing plan.    Instructed to call the [facility] for any changes, questions or concerns. (#[phone])   ?   Erika Simental RN    Subjective/Objective:      Constanza Coles, a 86 y.o. female is on warfarin. Constanza Apodaca reports:     Home warfarin dose: as updated on anticoagulation calendar per template     Missed doses: No     Medication changes:  No     S/S of bleeding or thromboembolism:  No     New Injury or illness:  No     Changes in diet or alcohol consumption:  No     Upcoming surgery, procedure or cardioversion:  No    Anticoagulation Episode Summary     Current INR goal:  2.0-3.0   TTR:  66.9 % (1 y)   Next INR check:  7/20/2021   INR from last check:  2.00 (6/8/2021)   Weekly max warfarin dose:     Target end date:     INR check location:     Preferred lab:     Send INR reminders to:  [facility]    Indications    Atrial fibrillation (H) [I48.91]  H/O aortic valve replacement [Z95.2]           Comments:            Anticoagulation Care Providers     Provider Role Specialty Phone number    Hayder Bailey MD Referring Internal Medicine [phone]    Yun Jacinto MD Responsible Internal Medicine [phone]

## 2021-06-25 NOTE — TELEPHONE ENCOUNTER
Refill Approved    Rx renewed per Medication Renewal Policy. Medication was last renewed on 12/19/2020.    Judi Mckeon, [facility] Triage/Med Refill 6/16/2021     Requested Prescriptions   Pending Prescriptions Disp Refills     metoprolol succinate (TOPROL-XL) 25 MG [Pharmacy Med Name: METOPROLOL SUCC ER 25MG** 25 Tablet] 45 tablet 1     Sig: TAKE A HALF TABLET (12.5MG) BY MOUTH DAILY.       Beta-Blockers Refill Protocol Passed - 6/16/2021  3:32 PM        Passed - PCP or prescribing provider visit in past 12 months or next 3 months     Last office visit with prescriber/PCP: 1/15/2021 Yun Jacinto MD OR same dept: 7/13/2020 Yun Jacinto MD OR same specialty: 3/30/2021 Chiara Sarabia MD  Last physical: Visit date not found Last MTM visit: Visit date not found   Next visit within 3 mo: Visit date not found  Next physical within 3 mo: Visit date not found  Prescriber OR PCP: Yun Jacinto MD  Last diagnosis associated with med order: 1. Tachycardia  - metoprolol succinate (TOPROL-XL) 25 MG [Pharmacy Med Name: METOPROLOL SUCC ER 25MG** 25 Tablet]; TAKE A HALF TABLET (12.5MG) BY MOUTH DAILY.  Dispense: 45 tablet; Refill: 1    If protocol passes may refill for 12 months if within 3 months of last provider visit (or a total of 15 months).             Passed - Blood pressure filed in past 12 months     BP Readings from Last 1 Encounters:   05/20/21 110/72

## 2021-06-25 NOTE — PROGRESS NOTES
Progress Notes by Hayder Bailey MD at 12/1/2017  3:00 PM     Author: Hayder Bailey MD Service: -- Author Type: Physician    Filed: 12/1/2017  4:55 PM Encounter Date: 12/1/2017 Status: Signed    : Hayder Bailey MD (Physician)       OFFICE VISIT NOTE  Constanza Coles   82 y.o. female            Assessment/Plan for  Constanza Coles is a 82 y.o. female.  No Patient Care Coordination Note on file.           1 vasculitis with peripheral vascular disease-improved on methotrexate.  Continue current regimen  2.  Right upper quadrant pain.  History of cholecystectomy with perforation.  Postoperative hernia.  No change examination.  Lower lung field unremarkable.  Check a chest x-ray.  We will get a CT scan.  Routine labs been ordered  3.  GERD.  Increasing frequency.  Discussed dietary modification.  Elevate head of bed  Continue Carafate  4.  Status post AVR.  Doing well.    5.  Chronic A. fib.  On chronic Coumadin.  Cardiovascular stable.  No bleeding  Plan:  Same medicine  Elevate head of bed  CT scan of abdomen  Chest x-ray  Clinic follow-up 6 weeks    Patient Instructions           Patient Instructions    I think if you stay off your feet a little more the next couple, and rest your right upper extremity, I think your sensation problems with improve. Try elevating the head of your bed on something solid and sturdy abut 3-4 inches, which may help your acid reflux symptoms. If you have any chest pain associated with shortness of breath be seen in the ER. Also see info below.  Lifestyle Changes for Controlling GERD  When you have GERD, stomach acid feels as if its backing up toward your mouth. Whether or not you take medication to control your GERD, your symptoms can often be improved with lifestyle changes. Talk to your doctor about the following suggestions, which may help you get relief from your symptoms.  Raise Your Head    Reflux is more likely to strike when youre lying down flat, because stomach fluid  can flow backward more easily. Raising the head of your bed 4 to 6 inches can help. To do this:    Slide blocks or books under the legs at the head of your bed. Or, place a wedge under the mattress. Many [employer] can make a suitable wedge for you. The wedge should run from your waist to the top of your head.    Dont just prop your head on several pillows. This increases pressure on your stomach. It can make GERD worse.  Watch Your Eating Habits  Certain foods may increase the acid in your stomach or relax the lower esophageal sphincter, making GERD more likely. Its best to avoid the following:    Coffee, tea, and carbonated drinks (with and without caffeine)    Fatty, fried, or spicy food    Mint, chocolate, onions, and tomatoes    Any other foods that seem to irritate your stomach or cause you pain  Relieve the Pressure    Eat smaller meals, even if you have to eat more often.    Dont lie down right after you eat. Wait a few hours for your stomach to empty.    Avoid tight belts and tight-fitting clothes.    Lose excess weight.  Tobacco and Alcohol  Avoid smoking tobacco and drinking alcohol. They can make GERD symptoms worse.    4127-4704 [employer]. [address] [place] [address]. All rights reserved. This information is not intended as a substitute for professional medical care. Always follow your healthcare professional's instructions.          Diagnoses and all orders for this visit:    Need for immunization against influenza  -     Influenza High Dose, Seasonal 65+ yrs    RUQ abdominal pain  -     HM1(CBC and Differential)  -     Erythrocyte Sedimentation Rate  -     Cancel: Urinalysis-UC if Indicated  -     Basic Metabolic Panel  -     Hepatic Profile  -     HM1 (CBC with Diff)  -     CT Abdomen With Oral With IV Contrast; Future; Expected date: 12/1/17    Atypical chest pain  -     XR Chest 2 Views; Future; Expected date: 12/1/17    Chronic atrial fibrillation  -     Cancel:  INR    S/P AVR  -     amoxicillin (AMOXIL) 500 MG capsule; Take 4 capsules (2,000 mg total) by mouth once for 1 dose. Take as directed  Dispense: 28 capsule; Refill: 6    GERD (gastroesophageal reflux disease)    Vasculitis  -     C-Reactive Protein        Medications after visit  Current Outpatient Prescriptions   Medication Sig Dispense Refill   ? cyanocobalamin 1,000 mcg/mL injection INJECT 1 ML (1,000 MCG TOTAL) INTO THE SHOULDER, THIGH, OR BUTTOCKS EVERY 30 DAYS. 1 mL 5   ? methotrexate 2.5 MG tablet Take 5 tablets (12.5 mg total) by mouth once a week. 72 tablet 0   ? methotrexate 2.5 MG tablet TAKE 5 TABLETS (12.5 MG TOTAL) BY MOUTH ONCE A WEEK. 20 tablet 3   ? metoprolol succinate (TOPROL XL) 25 MG Take 0.5 tablets (12.5 mg total) by mouth daily. 30 tablet 11   ? nitroglycerin (NITROSTAT) 0.4 MG SL tablet DISSOLVE 1 TABLET UNDER THE TONGUE AS NEEDED FOR CHEST PAIN 90 tablet 12   ? sucralfate (CARAFATE) 1 gram tablet TAKE 1 TABLET BY MOUTH FOUR TIMES DAILY (Patient taking differently: TAKE 1 TABLET BY MOUTH FOUR TIMES DAILY AS NEEDED) 360 tablet PRN   ? warfarin (COUMADIN) 2 MG tablet TAKE 1/2 TABLET BY MOUTH (1MG) ON TUES & SAT. TAKE 1 TABLET (2MG) ALL OTHER DAYS OF THE WEEK. 90 tablet 0   ? amoxicillin (AMOXIL) 500 MG capsule Take 4 capsules (2,000 mg total) by mouth once for 1 dose. Take as directed 28 capsule 6     No current facility-administered medications for this visit.              This provider spent greater than 40 min. face-to-face time with the patient and/or his family.  More than half this time was spent in counseling and or coordination of care with other providers or agencies which were consistent with the nature of this patient's problems which are listed and described in the assessment and plan.        Hayder Bailey MD  Internal medicine  [facility]  [phone]  [email or web address]      This is an electronically verified report by Hayder Bailey  M.D.  (Note created with Dragon voice recognition and unintended spelling errors and word substitutions may occur)               Subjective:   Chief Complaint:  Follow-up (f/u A-fib, wants flu shot)    Patient has right upper quadrant pain  Several months  Worsening  Also involving the right lower anterior and right midaxillary chest wall.  Burning sharp.  Not pleuritic.  No chest pain or shortness of breath  No hemoptysis.  No pleurisy.  Quit smoking 1979.  Appetite is good  She has significant GERD particularly at night which is keeping her up  She does drink  She is no longer smoking  She is taking her Carafate 4 times daily    Bowels are regular to constipated.  No melena no hematochezia  Urination unremarkable    Status post AVR.  Bioprosthesis.  No chest pain shortness of breath syncope.  Chronic A. fib-no PND orthopnea dyspnea syncope palpitation    Compliant with Coumadin.  Coumadin monitoring-the patient is compliant with taking their Coumadin as directed.  There is no problem with bleeding. There is no excessive bruising, epistaxis, gum bleeding, hemoptysis, melena, hematochezia, hematuria.  The schedule of Coumadin is controlled through ambulatory anticoagulation clinic.  INR is noted in the chart and reviewed    Large vessel vasculitis.  No claudication symptomatology.  No headache.    Review of Systems:     Extensive 10-point review of systems was performed. Please see the HPI for problem specific pertinent review of systems.     Patient does note she is compliant with her medication    Otherwise, the following systems are noncontributory including constitutional, eyes, ears, nose and throat, cardiovascular, respiratory, gastrointestinal, genitourinary, musculoskeletal,neurological, skin and/or breast, endocrine, hematologic/lymph, allergic/immunologic and psychiatric.              Medications:  Current Outpatient Prescriptions on File Prior to Visit   Medication Sig   ? cyanocobalamin 1,000 mcg/mL  "injection INJECT 1 ML (1,000 MCG TOTAL) INTO THE SHOULDER, THIGH, OR BUTTOCKS EVERY 30 DAYS.   ? methotrexate 2.5 MG tablet Take 5 tablets (12.5 mg total) by mouth once a week.   ? methotrexate 2.5 MG tablet TAKE 5 TABLETS (12.5 MG TOTAL) BY MOUTH ONCE A WEEK.   ? metoprolol succinate (TOPROL XL) 25 MG Take 0.5 tablets (12.5 mg total) by mouth daily.   ? nitroglycerin (NITROSTAT) 0.4 MG SL tablet DISSOLVE 1 TABLET UNDER THE TONGUE AS NEEDED FOR CHEST PAIN   ? sucralfate (CARAFATE) 1 gram tablet TAKE 1 TABLET BY MOUTH FOUR TIMES DAILY (Patient taking differently: TAKE 1 TABLET BY MOUTH FOUR TIMES DAILY AS NEEDED)   ? warfarin (COUMADIN) 2 MG tablet TAKE 1/2 TABLET BY MOUTH (1MG) ON TUES & SAT. TAKE 1 TABLET (2MG) ALL OTHER DAYS OF THE WEEK.     No current facility-administered medications on file prior to visit.             Allergies:  Allergies   Allergen Reactions   ? Erythromycin Base      Pt is not sure she is allergic to this   ? Levaquin [Levofloxacin] Nausea And Vomiting   ? Vancomycin Other (See Comments)     Red man syndrome   ? Xanax [Alprazolam] Other (See Comments)     confusion   ? Sulfa (Sulfonamide Antibiotics) Rash       PSFHx: Tobacco Status:  She  reports that she quit smoking about 38 years ago. She has a 30.00 pack-year smoking history. She has never used smokeless tobacco.   Alcohol Status:    History   Alcohol Use   ? Yes       reports that she quit smoking about 38 years ago. She has a 30.00 pack-year smoking history. She has never used smokeless tobacco. She reports that she drinks alcohol. She reports that she does not use illicit drugs.    Objective:    /80  Pulse 85  Ht 5' 3\" (1.6 m)  Wt 130 lb (59 kg)  LMP  (LMP Unknown)  SpO2 95%  BMI 23.03 kg/m2  Weight:   Wt Readings from Last 3 Encounters:   12/01/17 130 lb (59 kg)   08/01/17 129 lb (58.5 kg)   05/22/17 129 lb (58.5 kg)     BP Readings from Last 3 Encounters:   12/01/17 110/80   08/01/17 102/70   05/22/17 120/70 "         General-appears well, no acute distress.  Excellent pulses left radial artery  Right pulse present but decreased.  This is improved from what it is been in the past.  Lungs clear  Decreased breath sounds at right base  Cardiac irregular regular  Loud prosthetic aortic closure.  No AI  No SCM  PMI midclavicular line  Apical rate 88  Abdomen scarred  Defect right upper quadrant-old hernia  No palpable mass  Skin overlying lesion darkened-chronic pigmentation.  No skin rash  Extremities-legs-no edema  Gait-kyphotic  Unremarkable gait.      Review of clinical lab tests  Lab Results   Component Value Date    WBC 9.3 12/01/2017    HGB 14.0 12/01/2017    HCT 39.7 12/01/2017     12/01/2017    ALT 14 03/27/2017    AST 23 03/27/2017     03/27/2017    K 3.9 03/27/2017     03/27/2017    CREATININE 0.82 03/27/2017    BUN 14 03/27/2017    CO2 28 03/27/2017    TSH 3.19 03/27/2017    INR 2.5 (!) 11/13/2017       Glucose   Date/Time Value Ref Range Status   03/27/2017 04:20 PM 85 70 - 125 mg/dL Final   10/14/2016 03:58  70 - 125 mg/dL Final   04/01/2016 03:09  70 - 125 mg/dL Final   10/28/2015 04:21 PM 90 74 - 125 mg/dL Final   08/09/2011 06:33 AM 87 70 - 125 mg/dL Final     Comment:          Fasting Glucose reference range is 70-99 mg/dL per       American Diabetes Association (ADA) guidelines.     Recent Results (from the past 24 hour(s))   HM1 (CBC with Diff)   Result Value Ref Range    WBC 9.3 4.0 - 11.0 thou/uL    RBC 4.18 3.80 - 5.40 mill/uL    Hemoglobin 14.0 12.0 - 16.0 g/dL    Hematocrit 39.7 35.0 - 47.0 %    MCV 95 80 - 100 fL    MCH 33.5 27.0 - 34.0 pg    MCHC 35.3 32.0 - 36.0 g/dL    RDW 11.6 11.0 - 14.5 %    Platelets 218 140 - 440 thou/uL    MPV 8.5 7.0 - 10.0 fL    Neutrophils % 64 50 - 70 %    Lymphocytes % 23 20 - 40 %    Monocytes % 8 2 - 10 %    Eosinophils % 5 0 - 6 %    Basophils % 1 0 - 2 %    Neutrophils Absolute 5.9 2.0 - 7.7 thou/uL    Lymphocytes Absolute 2.1 0.8 - 4.4  thou/uL    Monocytes Absolute 0.7 0.0 - 0.9 thou/uL    Eosinophils Absolute 0.5 (H) 0.0 - 0.4 thou/uL    Basophils Absolute 0.1 0.0 - 0.2 thou/uL       RADIOLOGY: No results found.    Review of recent consultation-

## 2021-06-25 NOTE — TELEPHONE ENCOUNTER
New Appointment Needed  What is the reason for the visit:    Same Date/Next Day Appt Request  What is the reason for your visit?: establish care physical    Provider Preference: Dr. William- patient  sees him  How soon do you need to be seen?: first available  Waitlist offered?: No  Okay to leave a detailed message:  Yes

## 2021-06-28 NOTE — PROGRESS NOTES
Progress Notes by Mariel Antoine MD at 2/11/2020  2:50 PM     Author: Mariel Antoine MD Service: -- Author Type: Physician    Filed: 2/11/2020  3:28 PM Encounter Date: 2/11/2020 Status: Signed    : Mariel Antoine MD (Physician)           [facility]  Heart Care Clinic Follow-up Note    Assessment & Plan        1. Aortic Stenosis severe aortic stenosis with Forte pericardial magna valve placed in 2005.  Performing echo is yearly and she is aware possibly needing a TAVR.  Given worsening shortness of breath will recheck echo and consider valve clinic if needed.   2. Cardiac pacemaker in situ, dual chamber -Medtronic pacemaker with Medtronic leads placed in 2011 with about 77% ventricular pacing.  Battery good for another 4 years.   3. Chronic atrial fibrillation-permanent, not valvular and asymptomatic on chronic Coumadin.   4. Congestive heart failure (H) -BNP slightly elevated, most likely acute with preserved ejection fraction of 60 to 65%.  Does not qualify for any studies just yet.   5. Coronary atherosclerosis due to lipid rich plaque  -angiography in 2005 showed normal left main, 50% proximal left anterior descending with a distal 90% left anterior descending lesion, circumflex was normal as was the right coronary artery.  She had a LIMA to the LAD and a vein graft to the first diagonal with her valve surgery in 2005.  Given shortness of breath she was offered stress testing by 1 of my primary partners and she declined this.   6. Pure hypercholesterolemia -LDL of 54 on very low-dose atorvastatin.   7. Vasculitis (H) -methotrexate.     Plan  1.  Echo and if gradient increased refer on for valve clinic.  2.  If shortness of breath persists consider increasing meds by adding diuretic.  3.  Follow-up me 1 year or sooner if needed.    Subjective  CC: 84-year-old white female for follow-up.  Since I seen her she had shortness of breath and ankle swelling around the new year.  Most likely  "secondary to holidays.  This resolved but she still has some shortness breath occasionally at rest.  Denies any chest discomfort, palpitations, or effort related shortness of breath, PND, orthopnea, syncope or dizziness.    Medications  Current Outpatient Medications   Medication Sig   ? atorvastatin (LIPITOR) 10 MG tablet Take 1 tablet (10 mg total) by mouth daily.   ? cyanocobalamin 1,000 mcg/mL injection INJECT 1 ML (1,000 MCG TOTAL) INTO THE SHOULDER, THIGH, OR BUTTOCKS EVERY 30 DAYS.   ? methotrexate 2.5 MG tablet TAKE 5 TABLETS (12.5 MG TOTAL) BY MOUTH ONCE A WEEK.   ? metoprolol succinate (TOPROL-XL) 25 MG TAKE 1/2 TABLET (12.5MG) BY MOUTH DAILY   ? nitroglycerin (NITROSTAT) 0.4 MG SL tablet DISSOLVE 1 TABLET UNDER THE TONGUE AS NEEDED FOR CHEST PAIN   ? sucralfate (CARAFATE) 1 gram tablet TAKE 1 TABLET BY MOUTH FOUR TIMES DAILY   ? triamterene-hydrochlorothiazide (DYAZIDE) 37.5-25 mg per capsule TAKE 1 CAPSULE BY MOUTH AS NEEDED.   ? warfarin (COUMADIN/JANTOVEN) 2 MG tablet TAKE 1/2 TABLET (1MG) BY MOUTH ON TUES, THURS, AND SAT. TAKE 1 TABLET (2MG) ON ALL OTHER DAYS.       Objective  Pulse 72   Resp 16   Ht 5' 3\" (1.6 m)   Wt 123 lb (55.8 kg)   LMP  (LMP Unknown)   BMI 21.79 kg/m      General Appearance:    Alert, cooperative, no distress, appears stated age   Head:    Normocephalic, without obvious abnormality, atraumatic   Throat:   Lips, mucosa, and tongue normal; teeth and gums normal   Neck:   Supple, symmetrical, trachea midline, no adenopathy;        thyroid:  No enlargement/tenderness/nodules; no carotid    bruit or JVD   Back:     Symmetric, no curvature, ROM normal, no CVA tenderness   Lungs:     Clear to auscultation bilaterally, respirations unlabored   Chest wall:    No tenderness, midline sternotomy scar   Heart:   Irregularly irregular, S1 and S2 normal, 2-3/6 systolic ejection murmur, no rub   or gallop   Abdomen:     Soft, non-tender, bowel sounds active all four quadrants,     no " masses, no organomegaly   Extremities:   Normal, atraumatic, no cyanosis or edema   Pulses:   2+ and symmetric all extremities   Skin:   Skin color, texture, turgor normal, no rashes or lesions     Results    Lab Results personally reviewed   Lab Results   Component Value Date    CHOL 142 11/14/2019    CHOL 193 04/02/2019     Lab Results   Component Value Date    HDL 69 11/14/2019    HDL 58 04/02/2019     Lab Results   Component Value Date    LDLCALC 54 11/14/2019    LDLCALC 106 04/02/2019     Lab Results   Component Value Date    TRIG 96 11/14/2019    TRIG 144 04/02/2019     Lab Results   Component Value Date    WBC 9.7 11/14/2019    HGB 12.2 11/14/2019    HCT 35.4 11/14/2019     11/14/2019     Lab Results   Component Value Date    CREATININE 0.81 12/26/2019    BUN 20 12/26/2019     12/26/2019    K 4.0 12/26/2019    CO2 27 12/26/2019     Review of Systems:   General: WNL  Eyes: WNL  Ears/Nose/Throat: WNL  Lungs: WNL  Heart: Chest Pain, Arm Pain  Stomach: WNL  Bladder: WNL  Muscle/Joints: WNL  Skin: WNL  Nervous System: WNL  Mental Health: WNL     Blood: WNL

## 2021-06-28 NOTE — PROGRESS NOTES
Progress Notes by Hayder Bailey MD at 11/14/2019 10:40 AM     Author: Hayder Bailey MD Service: -- Author Type: Physician    Filed: 11/14/2019 12:46 PM Encounter Date: 11/14/2019 Status: Signed    : Hayder Bailey MD (Physician)       OFFICE VISIT NOTE  Constanza Coles   84 y.o. female            Assessment/Plan for  Constanza Coles is a 84 y.o. female.  No Patient Care Coordination Note on file.       1. Coronary atherosclerosis due to lipid rich plaque  Stable without angina    2. Sick Sinus Syndrome  Recent unremarkable pacemaker check, echo    3. Atrial fibrillation, unspecified type (H)  Rate control anticoagulation strategy    4. Vasculitis (H)  On methotrexate with excellent results.  May reconsult with rheumatology discussing discontinuation or continued reduction of methotrexate       5.  Bronchospasm probably secondary to reflux.  She is quite congested.  I would like you to take some prednisone 4 days-declines    She declines inhaler   she will take Ceftin twice daily.    Plan:  Ceftin  Laboratory  Office if worsens  Chest x-ray-declines  Inhaler-declines  Steroid burst-declines    Patient Instructions            Patient Instructions    I think if you stay off your feet a little more the next couple, and rest your right upper extremity, I think your sensation problems with improve. Try elevating the head of your bed on something solid and sturdy abut 3-4 inches, which may help your acid reflux symptoms. If you have any chest pain associated with shortness of breath be seen in the ER. Also see info below.  Lifestyle Changes for Controlling GERD  When you have GERD, stomach acid feels as if its backing up toward your mouth. Whether or not you take medication to control your GERD, your symptoms can often be improved with lifestyle changes. Talk to your doctor about the following suggestions, which may help you get relief from your symptoms.  Raise Your Head    Reflux is more likely to strike  when youre lying down flat, because stomach fluid can flow backward more easily. Raising the head of your bed 4 to 6 inches can help. To do this:    Slide blocks or books under the legs at the head of your bed. Or, place a wedge under the mattress. Many foam stores can make a suitable wedge for you. The wedge should run from your waist to the top of your head.    Dont just prop your head on several pillows. This increases pressure on your stomach. It can make GERD worse.  Watch Your Eating Habits  Certain foods may increase the acid in your stomach or relax the lower esophageal sphincter, making GERD more likely. Its best to avoid the following:    Coffee, tea, and carbonated drinks (with and without caffeine)    Fatty, fried, or spicy food    Mint, chocolate, onions, and tomatoes    Any other foods that seem to irritate your stomach or cause you pain  Relieve the Pressure    Eat smaller meals, even if you have to eat more often.    Dont lie down right after you eat. Wait a few hours for your stomach to empty.    Avoid tight belts and tight-fitting clothes.    Lose excess weight.  Tobacco and Alcohol  Avoid smoking tobacco and drinking alcohol. They can make GERD symptoms worse.    [id number] [employer]. [address] [place] [address], [place] [address]. All rights reserved. This information is not intended as a substitute for professional medical care. Always follow your healthcare professional's instructions.          Diagnoses and all orders for this visit:    Coronary atherosclerosis due to lipid rich plaque  -     Lipid Cascade    Sick Sinus Syndrome    Atrial fibrillation, unspecified type (H)    Vasculitis (H)  -     Erythrocyte Sedimentation Rate  -     Hepatic Profile  -     HM1(CBC and Differential)  -     Basic Metabolic Panel  -     HM1 (CBC with Diff)    Gastroesophageal reflux disease with esophagitis    Bronchospasm  -     cefuroxime (CEFTIN) 250 MG tablet; Take 1 tablet (250 mg total) by mouth  2 (two) times a day for 10 days.  Dispense: 20 tablet; Refill: 0        Medications after visit  Current Outpatient Medications   Medication Sig Dispense Refill   ? atorvastatin (LIPITOR) 10 MG tablet Take 1 tablet (10 mg total) by mouth daily. 30 tablet 11   ? cyanocobalamin 1,000 mcg/mL injection INJECT 1 ML (1,000 MCG TOTAL) INTO THE SHOULDER, THIGH, OR BUTTOCKS EVERY 30 DAYS. 1 mL 3   ? methotrexate 2.5 MG tablet Take 5 tablets (12.5 mg total) by mouth once a week. 72 tablet 3   ? metoprolol succinate (TOPROL-XL) 25 MG TAKE 1/2 TABLET (12.5MG) DAILY 45 tablet 2   ? nitroglycerin (NITROSTAT) 0.4 MG SL tablet DISSOLVE 1 TABLET UNDER THE TONGUE AS NEEDED FOR CHEST PAIN 90 tablet 12   ? sucralfate (CARAFATE) 1 gram tablet TAKE 1 TABLET BY MOUTH FOUR TIMES DAILY 360 tablet 11   ? triamterene-hydrochlorothiazide (DYAZIDE) 37.5-25 mg per capsule TAKE 1 CAPSULE BY MOUTH AS NEEDED. 90 capsule 3   ? warfarin (COUMADIN/JANTOVEN) 2 MG tablet TAKE 1/2 TABLET (1MG) BY MOUTH ON TUES, THURS, AND SAT. TAKE 1 TABLET (2MG) ON ALL OTHER DAYS. 90 tablet 11   ? cefuroxime (CEFTIN) 250 MG tablet Take 1 tablet (250 mg total) by mouth 2 (two) times a day for 10 days. 20 tablet 0     No current facility-administered medications for this visit.                       Hayder Bailey MD  Internal medicine  [facility]  [phone]  [email or web address]    Much or all of the text in this note was generated through the use of Dragon Dictate voice-to-text software. Errors in spelling or words which seem out of context are unintentional.   Sound alike errors, in particular, may have escaped editing.                 Subjective:   Chief Complaint:  Follow-up (has some questions for you, wouldn't say what they were)    Patient with GERD  On Carafate  Did aspirate  Marked bronchospasm and congestive  Coughing quite a bit and wheezing with some shortness of breath  This is acute about 48 hours ago.  She otherwise  is felt okay    ISCHEMIC HEART DISEASE- Other than reported, the patient is not having any angina, chest pain, epigastric pain, dyspnea, palpitation, lightheadedness, syncope, excessive fatigue, exertional diaphoresis.-The patient is taking medication as prescribed. No change in nitroglycerin usage    Status post AVR.  Bioprosthetic with recent unremarkable echo.  No signs of heart failure    Atrial Fibrillation    -patient has no cardiovascular symptoms such as palpitations, PND, orthopnea, dyspnea, dizziness, syncope.  Patient, weakness, hemiparesis, dysarthria, ataxia.  No embolic symptomatology such as leg pain or cardiac/neurologic symptoms.  Coumadin monitoring-the patient is compliant with taking their Coumadin as directed.  There is no problem with bleeding. There is no excessive bruising, epistaxis, gum bleeding, hemoptysis, melena, hematochezia, hematuria.  The schedule of Coumadin is controlled through ambulatory anticoagulation clinic.  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  She has been on methotrexate without side effect.  She is off corticosteroid long-standing      Review of Systems:     Extensive 10-point review of systems was performed. Please see the HPI for problem specific pertinent review of systems.     Patient does note appetite is okay.  Digestion okay.  She has significant chronic GERD    Otherwise, the following systems are noncontributory including constitutional, eyes, ears, nose and throat, cardiovascular, respiratory, gastrointestinal, genitourinary, musculoskeletal,neurological, skin and/or breast, endocrine, hematologic/lymph, allergic/immunologic and psychiatric.              Medications:  Current Outpatient Medications on File Prior to Visit   Medication Sig   ? atorvastatin (LIPITOR) 10 MG tablet Take 1 tablet (10 mg total) by mouth daily.   ? cyanocobalamin 1,000 mcg/mL injection INJECT 1  "ML (1,000 MCG TOTAL) INTO THE SHOULDER, THIGH, OR BUTTOCKS EVERY 30 DAYS.   ? methotrexate 2.5 MG tablet Take 5 tablets (12.5 mg total) by mouth once a week.   ? metoprolol succinate (TOPROL-XL) 25 MG TAKE 1/2 TABLET (12.5MG) DAILY   ? nitroglycerin (NITROSTAT) 0.4 MG SL tablet DISSOLVE 1 TABLET UNDER THE TONGUE AS NEEDED FOR CHEST PAIN   ? sucralfate (CARAFATE) 1 gram tablet TAKE 1 TABLET BY MOUTH FOUR TIMES DAILY   ? triamterene-hydrochlorothiazide (DYAZIDE) 37.5-25 mg per capsule TAKE 1 CAPSULE BY MOUTH AS NEEDED.   ? warfarin (COUMADIN/JANTOVEN) 2 MG tablet TAKE 1/2 TABLET (1MG) BY MOUTH ON TUES, THURS, AND SAT. TAKE 1 TABLET (2MG) ON ALL OTHER DAYS.     No current facility-administered medications on file prior to visit.             Allergies:  Allergies   Allergen Reactions   ? Erythromycin Base      Pt is not sure she is allergic to this   ? Levaquin [Levofloxacin] Nausea And Vomiting   ? Vancomycin Other (See Comments)     Red man syndrome   ? Xanax [Alprazolam] Other (See Comments)     confusion   ? Sulfa (Sulfonamide Antibiotics) Rash       PSFHx: Tobacco Status:  She  reports that she quit smoking about 40 years ago. She has a 30.00 pack-year smoking history. She has never used smokeless tobacco.   Alcohol Status:    Social History     Substance and Sexual Activity   Alcohol Use Yes       reports that she quit smoking about 40 years ago. She has a 30.00 pack-year smoking history. She has never used smokeless tobacco. She reports current alcohol use. She reports that she does not use drugs.    Objective:    /60 (Patient Site: Right Arm, Patient Position: Sitting, Cuff Size: Adult Large)   Pulse 86   Ht 5' 3\" (1.6 m)   Wt 127 lb (57.6 kg)   LMP  (LMP Unknown)   SpO2 95%   BMI 22.50 kg/m    Weight:   Wt Readings from Last 3 Encounters:   11/14/19 127 lb (57.6 kg)   09/06/19 125 lb 0.6 oz (56.7 kg)   05/20/19 123 lb (55.8 kg)     BP Readings from Last 10 Encounters:   11/14/19 110/60   05/20/19 " 109/63   05/07/19 100/60   03/27/19 100/70   11/02/18 138/76   04/25/18 114/60   03/23/18 128/74   12/01/17 110/80   08/01/17 102/70   05/22/17 120/70         General-appears well, no acute distress.  No neck adenopathy  bilat rhonchi and wheeze  Cor irreg  No pause  No murmur  Soft abd   Without edema      Review of clinical lab tests  Lab Results   Component Value Date    WBC 5.7 04/02/2019    HGB 12.1 04/02/2019    HCT 36.1 04/02/2019     04/02/2019    CHOL 193 04/02/2019    TRIG 144 04/02/2019    HDL 58 04/02/2019    ALT 18 11/02/2018    AST 27 11/02/2018     04/02/2019    K 4.2 04/02/2019     04/02/2019    CREATININE 0.81 04/02/2019    BUN 16 04/02/2019    CO2 30 04/02/2019    TSH 3.19 03/27/2017    INR 1.90 (H) 11/05/2019       Glucose   Date/Time Value Ref Range Status   04/02/2019 09:15 AM 90 70 - 125 mg/dL Final   01/02/2019 04:20  70 - 125 mg/dL Final   11/02/2018 02:55  70 - 125 mg/dL Final   03/23/2018 03:26  70 - 125 mg/dL Final   12/01/2017 04:09  70 - 125 mg/dL Final   03/27/2017 04:20 PM 85 70 - 125 mg/dL Final   10/14/2016 03:58  70 - 125 mg/dL Final   04/01/2016 03:09  70 - 125 mg/dL Final   10/28/2015 04:21 PM 90 74 - 125 mg/dL Final   08/09/2011 06:33 AM 87 70 - 125 mg/dL Final     Comment:          Fasting Glucose reference range is 70-99 mg/dL per       American Diabetes Association (ADA) guidelines.     No results found for this or any previous visit (from the past 24 hour(s)).    RADIOLOGY: No results found.    Review of recent consultation-

## 2021-06-28 NOTE — PROGRESS NOTES
Progress Notes by Torrie Alvarez MD at 12/26/2019 10:50 AM     Author: Torrie Alvarez MD Service: -- Author Type: Physician    Filed: 12/26/2019 11:59 AM Encounter Date: 12/26/2019 Status: Signed    : Torrie Alvarez MD (Physician)         Thank you, Dr. Bailey, for asking the [facility] team to see Ms. Constanza Coles to evaluate heart disease.      Assessment/Recommendations   Assessment/Plan:    Dyspnea on exertion in cold weather and chest pain at rest but not with exertion. She does have mild expiratory wheeze on exam, otherwise the exam is benign. Recent device check showed only one brief RVR event last month. Constanza refuses a stress test. Will obtain EKG, BMP and BNP today and arrange for f/u with Dr. Antoine in 1 month.        History of Present Illness/Subjective    Ms. Constanza Coles is a 84 y.o. female who follows with Dr. Antoine for chronic atrial fibrillation, CAD s/p 2 vessel CABG at the time of a pericardial AVR for aortic stenosis in 2005, sick sinus syndrome s/p pacemaker and vasculitis on methotrexate. Constanza comes to [facility] today as she cannot get in to see Dr. Antoine for a month. She has noted dyspnea walking in cold weather for a few months. There is no pnd/orthopnea, syncope or exertional dizziness. She denies cough, f/c/s. She has had some left arm and left upper back discomfort at rest, but not with exertion. At the end of the day she will have some ankle edema as well. She denies any recent medication changes. A device check 12/23 showed rare RVR (device interrogation shows 5 seconds in November). Constanza is felling off after drinking too much wine yesterday at her son's [place] party.     Echo this May shows normal valve function and normal EF. She has been taking her statin every other day instead of daily and her LDL was at goal in November. She saw Dr. Bailey recently and he recommended an inhalor, which she refused.        Physical Examination Review of  Systems   Vitals:    12/26/19 1114   BP: 160/90   Pulse: 98   Resp: 18     Body mass index is 21.79 kg/m .  Wt Readings from Last 3 Encounters:   12/26/19 123 lb (55.8 kg)   11/14/19 127 lb (57.6 kg)   09/06/19 125 lb 0.6 oz (56.7 kg)     [unfilled]  General Appearance:   no distress, normal body habitus   ENT/Mouth: membranes moist, no oral lesions or bleeding gums.      EYES:  no scleral icterus, normal conjunctivae   Neck: no carotid bruits or thyromegaly   Chest/Lungs:   lungs are clear to auscultation, no rales. Mild expiratroy wheezing.  stable sternal scar, equal chest wall expansion    Cardiovascular:   IrRegular. Normal first and second heart sounds with early systolic murmur. No rubs or gallops. The right radial, dorsalis pedis and posterior tibial pulses are intact.  The left radial, dorsalis pedis and posterior tibial pulses are intact. Jugular venous pressure flat, trace edema bilaterally    Abdomen:  no organomegaly, masses, bruits, or tenderness; bowel sounds are present   Extremities: no cyanosis or clubbing   Skin: no xanthelasma, warm.    Neurologic: normal  bilateral, no tremors     Psychiatric: alert and oriented x3, calm     General: WNL  Eyes: WNL  Ears/Nose/Throat: WNL  Lungs: Shortness of Breath  Heart: Arm Pain, Shortness of Breath with activity, Leg Swelling  Stomach: WNL  Bladder: WNL  Muscle/Joints: WNL  Skin: WNL  Nervous System: Dizziness  Mental Health: WNL     Blood: Easy Bleeding, Easy Bruising     Medical History  Surgical History Family History Social History   Past Medical History:   Diagnosis Date   ? Anemia     iron deficiency   ? Chronic atrial fibrillation    ? GERD (gastroesophageal reflux disease)    ? Hypertension    ? IHD (ischemic heart disease)    ? PUD (peptic ulcer disease)     Billroth 1   ? Recurrent cystitis    ? SSS (sick sinus syndrome) (H)     post pacemaker placement   ? Valvular heart disease    ? Vasculitis (H)     Past Surgical History:   Procedure  Laterality Date   ? antrectomy     ? ASCENDING AORTIC ANEURYSM REPAIR W/ TISSUE AORTIC VALVE REPLACEMENT  2005    Dacron graft, Forte pericardial Magna 21mm aortic valve   ? PARTIAL GASTRECTOMY     ? CA CABG, VEIN, SINGLE      Description: CABG (CABG);  Recorded: 2012;  Comments: LIMA^LAD, SVG^1st diag   ? CA PERC CLOS,BART INTERATRIAL COMMUN W/IMPL      Description: Patent Foramen Ovale Closure Percutaneous;  Recorded: 2012;   ? CA RPLCMT PROST AORTIC VALVE OPEN XCP HOMOGRF/STENT      Aortic Valve Replacement with Forte pericardial Magna 21mm 2005    Family History   Problem Relation Age of Onset   ? Liver disease Father    ? No Medical Problems Mother    ? Thyroid disease Sister    ? Lung disease Sister     Social History     Socioeconomic History   ? Marital status:      Spouse name: Not on file   ? Number of children: Not on file   ? Years of education: Not on file   ? Highest education level: Not on file   Occupational History   ? Not on file   Social Needs   ? Financial resource strain: Not on file   ? Food insecurity:     Worry: Not on file     Inability: Not on file   ? Transportation needs:     Medical: Not on file     Non-medical: Not on file   Tobacco Use   ? Smoking status: Former Smoker     Packs/day: 1.50     Years: 20.00     Pack years: 30.00     Last attempt to quit: 10/29/1979     Years since quittin.1   ? Smokeless tobacco: Never Used   Substance and Sexual Activity   ? Alcohol use: Yes   ? Drug use: No   ? Sexual activity: Never   Lifestyle   ? Physical activity:     Days per week: Not on file     Minutes per session: Not on file   ? Stress: Not on file   Relationships   ? Social connections:     Talks on phone: Not on file     Gets together: Not on file     Attends Shinto service: Not on file     Active member of club or organization: Not on file     Attends meetings of clubs or organizations: Not on file     Relationship status: Not on file   ? Intimate  partner violence:     Fear of current or ex partner: Not on file     Emotionally abused: Not on file     Physically abused: Not on file     Forced sexual activity: Not on file   Other Topics Concern   ? Not on file   Social History Narrative     Jesse    Retired-[employer]    2 children:Casey/Constanza    4 grandchildren    1 great grandchild    Native of [place]    Araseli-COPD/hospice and organic brain syndrome                  Medications  Allergies   Current Outpatient Medications   Medication Sig Dispense Refill   ? atorvastatin (LIPITOR) 10 MG tablet Take 1 tablet (10 mg total) by mouth daily. 30 tablet 11   ? cyanocobalamin 1,000 mcg/mL injection INJECT 1 ML (1,000 MCG TOTAL) INTO THE SHOULDER, THIGH, OR BUTTOCKS EVERY 30 DAYS. 1 mL 3   ? methotrexate 2.5 MG tablet Take 5 tablets (12.5 mg total) by mouth once a week. 72 tablet 3   ? metoprolol succinate (TOPROL-XL) 25 MG TAKE 1/2 TABLET (12.5MG) DAILY 45 tablet 2   ? nitroglycerin (NITROSTAT) 0.4 MG SL tablet DISSOLVE 1 TABLET UNDER THE TONGUE AS NEEDED FOR CHEST PAIN 90 tablet 12   ? sucralfate (CARAFATE) 1 gram tablet TAKE 1 TABLET BY MOUTH FOUR TIMES DAILY 360 tablet 11   ? triamterene-hydrochlorothiazide (DYAZIDE) 37.5-25 mg per capsule TAKE 1 CAPSULE BY MOUTH AS NEEDED. 90 capsule 3   ? warfarin (COUMADIN/JANTOVEN) 2 MG tablet TAKE 1/2 TABLET (1MG) BY MOUTH ON TUES, THURS, AND SAT. TAKE 1 TABLET (2MG) ON ALL OTHER DAYS. 90 tablet 11     No current facility-administered medications for this visit.     Allergies   Allergen Reactions   ? Erythromycin Base      Pt is not sure she is allergic to this   ? Levaquin [Levofloxacin] Nausea And Vomiting   ? Vancomycin Other (See Comments)     Red man syndrome   ? Xanax [Alprazolam] Other (See Comments)     confusion   ? Sulfa (Sulfonamide Antibiotics) Rash         Lab Results    Chemistry/lipid CBC Cardiac Enzymes/BNP/TSH/INR   Lab Results   Component Value Date    CHOL 142 11/14/2019    HDL 69 11/14/2019     LDLCALC 54 11/14/2019    TRIG 96 11/14/2019    CREATININE 0.75 11/14/2019    BUN 16 11/14/2019    K 3.7 11/14/2019     11/14/2019     11/14/2019    CO2 27 11/14/2019    Lab Results   Component Value Date    WBC 9.7 11/14/2019    HGB 12.2 11/14/2019    HCT 35.4 11/14/2019    MCV 99 11/14/2019     11/14/2019    Lab Results   Component Value Date     03/27/2017    TSH 3.19 03/27/2017    INR 2.40 (H) 12/03/2019

## 2021-06-30 NOTE — PROGRESS NOTES
Progress Notes by Aimee Wiseman CNP at 4/15/2021 12:50 PM     Author: Aimee Wiseman CNP Service: -- Author Type: Nurse Practitioner    Filed: 4/15/2021  1:30 PM Encounter Date: 4/15/2021 Status: Signed    : Aimee Wiseman CNP (Nurse Practitioner)             Assessment/Recommendations   Assessment:    1.  Heart failure, ejection fraction 50%: Shortness of breath has improved and returned to baseline since starting Lasix last week.  Lung sounds are clear and she has no edema.  She is following a low-sodium diet.  Her weight has decreased 3 pounds.    2.  Coronary artery disease: History of coronary bypass surgery in 2005.  She will have a CTA on April 24.    3.  Blood pressure elevated today at 156/70 but was normal last week at 110/70.  Continue to monitor.    4.  Atrial fibrillation: Continue warfarin.  She had an INR 2 weeks ago.    Plan:  1.  Continue Lasix 20 mg daily  2.  I recommended BMP today but she declined.  She agrees to have BMP on April 24 when she has CTA.  3.  Low-sodium diet    Constanza Coles will follow up with Dr. Antoine in May.       History of Present Illness/Subjective    Ms. Constanza Coles is a 85 y.o. female seen at [facility] today for follow-up.  She has a history of coronary artery bypass surgery and AVR in 2005, heart failure, permanent atrial fibrillation, pacemaker, and vasculitis.  Echocardiogram on 2/19/2021 showed ejection fraction of 50%, mild decrease in RV function, normal aortic valve, mild mitral stenosis, and moderate pulmonary hypertension.    She was seen by Dr. Antoine last week with increased shortness of breath.  BNP was elevated.  She was started on Lasix.  Shortness of breath with activity has returned to baseline.  She denies lightheadedness, chest pain and lower extremity edema.      She does not weigh herself at home.  Clinic weight is down 3 pounds.  She is following a low-sodium diet.      ECHO:    Results for orders placed during the hospital encounter of 02/19/21   Echo Complete [ECH10] 02/19/2021    Narrative   Normal left ventricular size with borderline increased wall thickness.    Left ventricle ejection fraction is mildly decreased. The calculated   left ventricular ejection fraction is 50%.    Normal right ventricular size. The systolic function is mildly reduced.   TAPSE is abnormal, which is consistent with abnormal right ventricular   systolic function. Pacer lead is present in the ventricle.    There is a 21 mm Meng Forte bioprosthetic valve present in the   aortic position with normal function.    Mild Calcific mitral stenosis.    Moderate pulmonary hypertension present. The estimated systolic   pulmonary artery pressure is 59 mmhg.    The ascending aorta is mildly dilated.    When compared to the previous study dated 3/6/2020, mild mitral stenosis   is now identified and pulmonary hypertension is now present.           Physical Examination Review of Systems   Vitals:    04/15/21 1302   BP: 156/70   Pulse: 68   Resp: 16     Body mass index is 21.63 kg/m .  Wt Readings from Last 3 Encounters:   04/15/21 122 lb 1.6 oz (55.4 kg)   04/08/21 125 lb (56.7 kg)   04/08/21 125 lb (56.7 kg)       General Appearance:     Alert, cooperative and in no acute distress.   ENT/Mouth: membranes moist, no oral lesions or bleeding gums.      EYES:  no scleral icterus, normal conjunctivae   Chest/Lungs:   lungs are clear to auscultation, no rales or wheezing, respirations unlabored   Cardiovascular:   Normal first and second heart sounds, no edema bilateral lower extremities    Abdomen:  Soft, nontender, nondistended, bowel sounds present   Extremities: no cyanosis or clubbing   Skin: warm, dry.    Neurologic: mood and affect are appropriate, alert and oriented x3      General: WNL  Eyes: WNL  Ears/Nose/Throat: WNL  Lungs: WNL  Heart: Shortness of Breath with activity, Irregular Heartbeat  Stomach:  WNL  Bladder: WNL  Muscle/Joints: WNL  Skin: WNL  Nervous System: WNL  Mental Health: WNL     Blood: Easy Bruising     Medical History  Surgical History Family History Social History   Past Medical History:   Diagnosis Date   ? Anemia     iron deficiency   ? Chronic atrial fibrillation (H)    ? GERD (gastroesophageal reflux disease)    ? Hypertension    ? IHD (ischemic heart disease)    ? PUD (peptic ulcer disease)     [place] 1   ? Recurrent cystitis    ? SSS (sick sinus syndrome) (H)     post pacemaker placement   ? Valvular heart disease    ? Vasculitis (H)     Past Surgical History:   Procedure Laterality Date   ? antrectomy  1981   ? ASCENDING AORTIC ANEURYSM REPAIR W/ TISSUE AORTIC VALVE REPLACEMENT  Jan 2005    Dacron graft, Forte pericardial Magna 21mm aortic valve   ? PARTIAL GASTRECTOMY     ? NH CABG, VEIN, SINGLE      Description: CABG (CABG);  Recorded: 04/25/2012;  Comments: LIMA^LAD, SVG^1st diag   ? NH PERC CLOS,BART INTERATRIAL COMMUN W/IMPL      Description: Patent Foramen [place] Closure Percutaneous;  Recorded: 04/25/2012;   ? NH RPLCMT PROST AORTIC VALVE OPEN XCP HOMOGRF/STENT      Aortic Valve Replacement with Forte pericardial Magna 21mm Jan 2005    Family History   Problem Relation Age of Onset   ? Liver disease Father    ? No Medical Problems Mother    ? Thyroid disease Sister    ? Lung disease Sister     Social History     Socioeconomic History   ? Marital status:      Spouse name: Not on file   ? Number of children: Not on file   ? Years of education: Not on file   ? Highest education level: Not on file   Occupational History   ? Not on file   Social Needs   ? Financial resource strain: Not on file   ? Food insecurity     Worry: Not on file     Inability: Not on file   ? Transportation needs     Medical: Not on file     Non-medical: Not on file   Tobacco Use   ? Smoking status: Former Smoker     Packs/day: 1.50     Years: 20.00     Pack years: 30.00     Quit date: 10/29/1979      "Years since quittin.4   ? Smokeless tobacco: Never Used   Substance and Sexual Activity   ? Alcohol use: Yes   ? Drug use: No   ? Sexual activity: Never   Lifestyle   ? Physical activity     Days per week: Not on file     Minutes per session: Not on file   ? Stress: Not on file   Relationships   ? Social connections     Talks on phone: Not on file     Gets together: Not on file     Attends Hinduism service: Not on file     Active member of club or organization: Not on file     Attends meetings of clubs or organizations: Not on file     Relationship status: Not on file   ? Intimate partner violence     Fear of current or ex partner: Not on file     Emotionally abused: Not on file     Physically abused: Not on file     Forced sexual activity: Not on file   Other Topics Concern   ? Not on file   Social History Narrative     Jesse    Retired-[employer]    2 children:Casey/Constanza    4 grandchildren    1 great grandchild    Native of [place]    Araseli-COPD/hospice and organic brain syndrome                  Medications  Allergies   Current Outpatient Medications   Medication Sig Dispense Refill   ? atorvastatin (LIPITOR) 10 MG tablet TAKE 1 TABLET (10 MG TOTAL) BY MOUTH DAILY. 90 tablet 3   ? cholecalciferol, vitamin D3, (VITAMIN D3) 5,000 unit Tab Take 1 tablet by mouth daily.     ? cyanocobalamin 1,000 mcg/mL injection INJECT 1 ML (1,000 MCG TOTAL) INTO THE SHOULDER, THIGH, OR BUTTOCKS EVERY 30 DAYS. **SYR 27G 1/2\" 1CC** 3 mL 3   ? furosemide (LASIX) 20 MG tablet Take 20 mg by mouth daily.     ? gabapentin (NEURONTIN) 300 MG capsule TAKE 1 CAPSULE (300 MG TOTAL) BY MOUTH 2 TIMES A DAY. 60 capsule 11   ? metoprolol succinate (TOPROL-XL) 25 MG TAKE 1/2 TABLET (12.5MG) BY MOUTH DAILY 45 tablet 1   ? nitroglycerin (NITROSTAT) 0.4 MG SL tablet DISSOLVE 1 TABLET UNDER THE TONGUE AS NEEDED FOR CHEST PAIN 90 tablet 12   ? sucralfate (CARAFATE) 1 gram tablet TAKE 1 TABLET BY MOUTH FOUR TIMES DAILY 360 tablet " 11   ? warfarin ANTICOAGULANT (COUMADIN/JANTOVEN) 1 MG tablet Take 1 tablet (1mg) to 2 tablets (2mg) by mouth daily, as directed.  Adjust dose based on INR results. 110 tablet 1     No current facility-administered medications for this visit.     Allergies   Allergen Reactions   ? Erythromycin Base      Pt is not sure she is allergic to this   ? Levaquin [Levofloxacin] Nausea And Vomiting   ? Vancomycin Other (See Comments)     Red man syndrome   ? Xanax [Alprazolam] Other (See Comments)     confusion   ? Sulfa (Sulfonamide Antibiotics) Rash         Lab Results    Chemistry/lipid CBC Cardiac Enzymes/BNP/TSH/INR   Lab Results   Component Value Date    CHOL 142 11/14/2019    HDL 69 11/14/2019    LDLCALC 54 11/14/2019    TRIG 96 11/14/2019    CREATININE 0.80 03/30/2021    BUN 15 03/30/2021    K 5.1 (H) 03/30/2021     03/30/2021     03/30/2021    CO2 27 03/30/2021    Lab Results   Component Value Date    WBC 7.9 03/30/2021    HGB 12.1 03/30/2021    HCT 37.5 03/30/2021    MCV 94 03/30/2021     03/30/2021    Lab Results   Component Value Date     (H) 03/30/2021    TSH 3.43 03/30/2021    INR 2.40 (H) 03/30/2021

## 2021-06-30 NOTE — PROGRESS NOTES
Progress Notes by Mariel Antoine MD at 5/20/2021  1:10 PM     Author: Mariel Antoine MD Service: -- Author Type: Physician    Filed: 5/20/2021  2:08 PM Encounter Date: 5/20/2021 Status: Signed    : Mariel Antoine MD (Physician)           [facility]  [facility] Follow-up Note    Assessment & Plan        1. Aortic Stenosis -due to severe aortic stenosis she had a 21 mm Forte pericardial magna valve placed in 2005 and based on echo last year working well with a dimensionless index of 0.7, mean gradient of 6 mmHg and mean velocity of 1.2 m/s.  Given that this tissue valve was placed over 15 years ago we will recheck echo yearly. In addition, I have asked her to take aspirin daily and she declines telling me that she has horrible allergic reactions and stomach upset from this.   2. Coronary atherosclerosis due to lipid rich plaque -angiography in 2005 showed normal left main, 50% proximal left anterior descending with a distal 90% left anterior descending lesion, circumflex was normal as was the right coronary artery. She had a LIMA to the LAD and a vein graft to the first diagonal with her valve surgery in 2005.  Given her shortness of breath she was offered stress testing in the past and she declined.  The shortness of breath is worsened and CTA showed normal left main, occluded LAD with a graft to the distal portion, circumflex with a possible proximal 70 to 90% lesion, and right coronary artery with only about a 50% lesion.  Her symptoms have resolved, suspect the specificity of the coronary CTA is not that accurate and she would decline invasive intervention in any event and thus medical therapy.  Should symptoms worsen, would pursue angiography.   3. Chronic congestive heart failure, unspecified heart failure type (H)-acute on chronic in setting of preserved ejection fraction 50%, symptoms have resolved.  I told her she could try the furosemide every other day.   4. Cardiac  "pacemaker in situ, dual chamber - Medtronic device with Medtronic leads placed in 2011 with only 6% ventricular pacing and noted to be in atrial fibrillation.  Rate responsiveness was made more aggressive symptoms have resolved.    5. Permanent atrial fibrillation (H) )-permanent, asymptomatic, chronic and not valvular and on chronic anticoagulation with most recent INR adjusted by [facility] of 2.4.  CTA suggest possible thrombus in left atrial appendage but on chronic anticoagulation with good INR's.   6. Pure hypercholesterolemia-cholesterol 142 with LDL of 54 from 2019 which is acceptable.   7. S/P CABG (coronary artery bypass graft) -2005 she had a LIMA to the LAD and vein graft to the first diagonal. Given that bypass was over 15 years ago we offered her pharmacological nuclear stress test which she declined but pursued CTA showing these grafts are still patent.   8. Vasculitis (H)-so noted and asymptomatic with no good distal pulses.  No longer on methotrexate and no longer seeing rheumatology.     Plan  1.  Try furosemide every other day or only as needed.  2.  Follow-up with me in 10 months or sooner if needed.    Subjective  CC: 86-year-old white female being seen in follow-up today.  Since I placed on furosemide her symptoms improved.  In addition, pacemaker changes helped her out.  She is able to function well without any complaints. Patient complains of no syncope, dizziness, fatigue, fevers, chest pain, palpitations, shortness of breath, PND, orthopnea, nausea, vomiting, or edema.    Medications  Current Outpatient Medications   Medication Sig   ? atorvastatin (LIPITOR) 10 MG tablet TAKE 1 TABLET (10 MG TOTAL) BY MOUTH DAILY.   ? cholecalciferol, vitamin D3, (VITAMIN D3) 5,000 unit Tab Take 1 tablet by mouth daily.   ? cyanocobalamin 1,000 mcg/mL injection INJECT 1 ML (1,000 MCG TOTAL) INTO THE SHOULDER, THIGH, OR BUTTOCKS EVERY 30 DAYS. **SYR 27G 1/2\" 1CC**   ? furosemide (LASIX) 20 MG tablet " Take 20 mg by mouth daily.   ? gabapentin (NEURONTIN) 300 MG capsule TAKE 1 CAPSULE (300 MG TOTAL) BY MOUTH 2 TIMES A DAY.   ? metoprolol succinate (TOPROL-XL) 25 MG TAKE 1/2 TABLET (12.5MG) BY MOUTH DAILY   ? nitroglycerin (NITROSTAT) 0.4 MG SL tablet DISSOLVE 1 TABLET UNDER THE TONGUE AS NEEDED FOR CHEST PAIN   ? sucralfate (CARAFATE) 1 gram tablet TAKE 1 TABLET BY MOUTH FOUR TIMES DAILY   ? warfarin ANTICOAGULANT (COUMADIN/JANTOVEN) 1 MG tablet TAKE 1 TO 2 TABLETS BY MOUTH DAILY, AS DIRECTED. ADJUST DOSE BASED ON INR RESULTS.       Objective  /72 (Patient Site: Left Arm, Patient Position: Sitting, Cuff Size: Adult Small)   Pulse 76   Resp 16   Wt 124 lb 9.6 oz (56.5 kg)   LMP  (LMP Unknown)   BMI 22.07 kg/m      General Appearance:    Alert, cooperative, no distress, appears stated age   Head:    Normocephalic, without obvious abnormality, atraumatic   Throat:   Lips, mucosa, and tongue normal; teeth and gums normal   Neck:   Supple, symmetrical, trachea midline, no adenopathy;        thyroid:  No enlargement/tenderness/nodules; no carotid    bruit or JVD   Back:     Symmetric, no curvature, ROM normal, no CVA tenderness   Lungs:     Clear to auscultation bilaterally, respirations unlabored   Chest wall:    No tenderness, median sternotomy scar   Heart:   Irregularly irregular, S1 and S2 normal, 1/6 systolic ejection murmur, no rub   or gallop   Abdomen:     Soft, non-tender, bowel sounds active all four quadrants,     no masses, no organomegaly   Extremities:   Normal, atraumatic, no cyanosis or edema   Pulses:  Absent and symmetric all extremities   Skin:   Skin color, texture, turgor normal, no rashes or lesions     Results    Lab Results personally reviewed   Lab Results   Component Value Date    CHOL 142 11/14/2019    CHOL 193 04/02/2019     Lab Results   Component Value Date    HDL 69 11/14/2019    HDL 58 04/02/2019     Lab Results   Component Value Date    LDLCALC 54 11/14/2019    LDLCALC 106  04/02/2019     Lab Results   Component Value Date    TRIG 96 11/14/2019    TRIG 144 04/02/2019     Lab Results   Component Value Date    WBC 7.9 03/30/2021    HGB 12.1 03/30/2021    HCT 37.5 03/30/2021     03/30/2021     Lab Results   Component Value Date    CREATININE 0.94 04/29/2021    BUN 25 04/29/2021     04/29/2021    K 4.9 04/29/2021    CO2 31 04/29/2021     Review of Systems:   General: WNL  Eyes: WNL  Ears/Nose/Throat: WNL  Lungs: WNL  Heart: Irregular Heartbeat  Stomach: WNL  Bladder: WNL  Muscle/Joints: WNL  Skin: WNL  Nervous System: WNL  Mental Health: WNL     Blood: Easy Bruising

## 2021-06-30 NOTE — PROGRESS NOTES
Progress Notes by Mariel Antoine MD at 4/8/2021  1:30 PM     Author: Mariel Antoine MD Service: -- Author Type: Physician    Filed: 4/8/2021  1:57 PM Encounter Date: 4/8/2021 Status: Signed    : Mariel Antoine MD (Physician)           [facility]  [facility] Follow-up Note    Assessment & Plan        1. S/P CABG (coronary artery bypass graft)-2005 she had a LIMA to the LAD and vein graft to the first diagonal. Given that bypass was over 15 years ago we offered her pharmacological nuclear stress test which she declines, given increased shortness of breath will perform CTA.   2. Coronary atherosclerosis due to lipid rich plaque  -angiography in 2005 showed normal left main, 50% proximal left anterior descending with a distal 90% left anterior descending lesion, circumflex was normal as was the right coronary artery. She had a LIMA to the LAD and a vein graft to the first diagonal with her valve surgery in 2005.  Given her shortness of breath she was offered stress testing in the past and she declined.  The shortness of breath is worsened and she now is willing to undergo coronary CTA, if this shows significant disease will need to schedule angiography which might be difficult given her vasculitis.    3. Acute on chronic congestive heart failure, unspecified heart failure type (H) as of breath is increased, BNP is higher than it has been in the past at 400.  It will never be entirely normal given her A. fib but given increase will try furosemide 20 mg p.o. twice daily, fluid and salt restriction, and follow-up with heart failure nurse practitioners to make sure we are not putting her into renal failure.   4. Cardiac pacemaker in situ, dual chamber- Medtronic device with Medtronic leads placed in 2011 with at this point time only 6% ventricular pacing and noted to be in atrial fibrillation.  Rate responsiveness was made more aggressive today as this might help with her shortness of breath.    5. Atrial fibrillation (H) )-permanent, asymptomatic, chronic and not valvular and on chronic anticoagulation with [facility]   6. Aortic Stenosis -due to severe aortic stenosis she had a 21 mm Forte pericardial magna valve placed in 2005 and based on echo last year working well with a dimensionless index of 0.7, mean gradient of 6 mmHg and mean velocity of 1.2 m/s.  Given that this tissue valve was placed over 15 years ago we will recheck echo yearly. In addition, I have asked her to take aspirin daily and she declines telling me that she has horrible allergic reactions and stomach upset from this.   7. Vasculitis (H) -presumed diagnosis and on methotrexate in past with no good pulses.  At this point time very asymptomatic with no good distal pulses felt on exam and no longer on methotrexate and no longer seeing rheumatology.  Defer to primary and would like to start aspirin would but as above once again she declines aspirin therapy.     Plan  1.  Coronary CTA and if significant obstructive lesion seen pursue invasive angiography.  2.  Lasix, 20 mg p.o. twice daily for about 3 days then 20 mg a day, try for 5 pound weight loss with fluid and salt restriction.  3.  Follow-up with heart failure nurse practitioners.  4.  Follow-up with me thereafter.  5.  Rate responsiveness made more aggressive on pacer check today.    Subjective  CC: 85-year-old white female being seen as an add-on visit.  Since have seen her shortness of breath is worsened to the point she can barely walk 50 to 100 yards without getting winded.  She is not really able to work out as much as he used to.  She feels her heart racing more.  No PND, orthopnea, syncope, dizziness, chest discomfort or palpitations.    Medications  Current Outpatient Medications   Medication Sig   ? atorvastatin (LIPITOR) 10 MG tablet TAKE 1 TABLET (10 MG TOTAL) BY MOUTH DAILY.   ? cyanocobalamin 1,000 mcg/mL injection INJECT  "1 ML (1,000 MCG TOTAL) INTO THE SHOULDER, THIGH, OR BUTTOCKS EVERY 30 DAYS. **SYR 27G 1/2\" 1CC**   ? gabapentin (NEURONTIN) 300 MG capsule TAKE 1 CAPSULE (300 MG TOTAL) BY MOUTH 2 TIMES A DAY.   ? metoprolol succinate (TOPROL-XL) 25 MG TAKE 1/2 TABLET (12.5MG) BY MOUTH DAILY   ? nitroglycerin (NITROSTAT) 0.4 MG SL tablet DISSOLVE 1 TABLET UNDER THE TONGUE AS NEEDED FOR CHEST PAIN   ? sucralfate (CARAFATE) 1 gram tablet TAKE 1 TABLET BY MOUTH FOUR TIMES DAILY   ? warfarin ANTICOAGULANT (COUMADIN/JANTOVEN) 1 MG tablet Take 1 tablet (1mg) to 2 tablets (2mg) by mouth daily, as directed.  Adjust dose based on INR results.   ? furosemide (LASIX) 20 MG tablet Take 1 tablet (20 mg total) by mouth 2 (two) times a day.       Objective  /70 (Patient Site: Right Arm, Patient Position: Sitting, Cuff Size: Adult Small)   Pulse 74   Resp 16   Wt 125 lb (56.7 kg)   LMP  (LMP Unknown)   SpO2 95%   BMI 22.14 kg/m      General Appearance:    Alert, cooperative, no distress, appears stated age   Head:    Normocephalic, without obvious abnormality, atraumatic   Throat:   Lips, mucosa, and tongue normal; teeth and gums normal   Neck:   Supple, symmetrical, trachea midline, no adenopathy;        thyroid:  No enlargement/tenderness/nodules; no carotid    bruit or JVD   Back:     Symmetric, no curvature, ROM normal, no CVA tenderness   Lungs:     Clear to auscultation bilaterally, respirations unlabored   Chest wall:    No tenderness or deformity   Heart:    Regular rate and rhythm, S1 and S2 normal, no murmur, rub   or gallop   Abdomen:     Soft, non-tender, bowel sounds active all four quadrants,     no masses, no organomegaly   Extremities:   Normal, atraumatic, no cyanosis or edema   Pulses:   2+ and symmetric all extremities   Skin:   Skin color, texture, turgor normal, no rashes or lesions     Results    Lab Results personally reviewed   Lab Results   Component Value Date    CHOL 142 11/14/2019    CHOL 193 04/02/2019 "     Lab Results   Component Value Date    HDL 69 11/14/2019    HDL 58 04/02/2019     Lab Results   Component Value Date    LDLCALC 54 11/14/2019    LDLCALC 106 04/02/2019     Lab Results   Component Value Date    TRIG 96 11/14/2019    TRIG 144 04/02/2019     Lab Results   Component Value Date    WBC 7.9 03/30/2021    HGB 12.1 03/30/2021    HCT 37.5 03/30/2021     03/30/2021     Lab Results   Component Value Date    CREATININE 0.80 03/30/2021    BUN 15 03/30/2021     03/30/2021    K 5.1 (H) 03/30/2021    CO2 27 03/30/2021     Review of Systems:   General: WNL  Eyes: WNL  Ears/Nose/Throat: WNL  Lungs: Shortness of Breath  Heart: Shortness of Breath with activity, Irregular Heartbeat(palpitations)  Stomach: WNL  Bladder: WNL  Muscle/Joints: WNL  Skin: WNL  Nervous System: WNL  Mental Health: Anxiety     Blood: Easy Bruising

## 2021-06-30 NOTE — PROGRESS NOTES
Progress Notes by Mariel Antoine MD at 2/3/2021  1:10 PM     Author: Mariel Antoine MD Service: -- Author Type: Physician    Filed: 2/3/2021  2:03 PM Encounter Date: 2/3/2021 Status: Signed    : Mariel Antoine MD (Physician)           [facility]  Heart [facility] Clinic Follow-up Note    Assessment & Plan        1. Coronary atherosclerosis due to lipid rich plaque   -angiography in 2005 showed normal left main, 50% proximal left anterior descending with a distal 90% left anterior descending lesion, circumflex was normal as was the right coronary artery. She had a LIMA to the LAD and a vein graft to the first diagonal with her valve surgery in 2005.  Given her shortness of breath she was offered stress testing in the past and she declined..    2. S/P CABG (coronary artery bypass graft) -as above 2005 she had a LIMA to the LAD and vein graft to the first diagonal.  Given that bypass was over 15 years ago we will contact her and offer her pharmacological nuclear stress test.  Would like to do this yearly.   3. H/O aortic valve replacement -due to severe aortic stenosis she had a 21 mm Forte pericardial magna valve placed in 2005 and based on echo last year working well with a dimensionless index of 0.5, mean gradient of 6 mmHg and mean velocity of 1.1 m/s.  Given that this tissue valve was placed over 15 years ago we will recheck echo now.  In addition, I have asked her to take aspirin daily and she declines telling me that she has horrible allergic reactions and stomach upset from this.   4. Cardiac pacemaker in situ, dual chamber dual-chamber Medtronic device with Medtronic leads placed in 2011 with at this point time only 6% ventricular pacing and noted to be in atrial fibrillation.   5. Persistent atrial fibrillation (H)-permanent, asymptomatic, chronic and not valvular and on chronic anticoagulation with most recent INR adjusted by [facility] of 1.9, her INR has been somewhat labile due  to antibiotics for aspiration pneumonia.   6. Vasculitis (H)-presumed diagnosis and on methotrexate in past with no good pulses.  At this point time very asymptomatic with no good distal pulses felt on exam and no longer on methotrexate and no longer seeing rheumatology.  Defer to primary and would like to start aspirin would but as above once again she declines aspirin therapy.   7. Acute on chronic congestive heart failure, unspecified heart failure type (H)-no significant signs or symptoms currently.   8. GRANDE (dyspnea on exertion)-this is not cardiac, comes and goes at rest or during activity.  She tells me she has been diagnosed with aspiration pneumonia.  She is on Carafate as well as antibiotics and I wonder if her proton pump inhibitor would be helpful and I defer to primary.   9.  Pure hypercholesterolemia-cholesterol 142 with an LDL of 48 but this is from November 2019 and will need to be rechecked.    Plan  1.  I asked her to take aspirin which she declines, this would help her vascular disease as well as her coronary disease as well as her valve replacement.  2.  Check echo given that valve was over 15 years ago.  3.  Check pharmacological stress nuclear if she is willing given the bypass was over 15 years ago.  4.  Follow-up with me in 1 year with device check or sooner if needed.  I did let her know that echo and device checks will most likely be done in [facility], I also told her she may end up seeing me there next year and she was fine with that.  5.  If vascular issues become more intense will need to back off on beta-blocker since this can cause unopposed alpha constriction.    Subjective  CC: 85-year-old white female for yearly follow-up today.  She is still living independently with her , she is still quite active going for walks, going to restaurants, going to bars when she can.  She does admit to shortness of breath episodically, not associated always with activity, could  come on at rest.  Apparently this is been diagnosed as aspiration pneumonia.  She tells me she gets an occasional cramp or ache in her left lower extremity, not necessarily with exercise or not necessarily nighttime leg cramps.  No significant chest discomfort, palpitations, syncope, dizziness or peripheral edema.    Medications  Current Outpatient Medications   Medication Sig   ? atorvastatin (LIPITOR) 10 MG tablet TAKE 1 TABLET (10 MG TOTAL) BY MOUTH DAILY.   ? cyanocobalamin 1,000 mcg/mL injection INJECT 1 ML (1,000 MCG TOTAL) INTO THE SHOULDER, THIGH, OR BUTTOCKS EVERY 30 DAYS.   ? gabapentin (NEURONTIN) 300 MG capsule TAKE 1 CAPSULE (300 MG TOTAL) BY MOUTH 2 TIMES A DAY.   ? levoFLOXacin (LEVAQUIN) 250 MG tablet    ? metoprolol succinate (TOPROL-XL) 25 MG TAKE 1/2 TABLET (12.5MG) BY MOUTH DAILY   ? nitroglycerin (NITROSTAT) 0.4 MG SL tablet DISSOLVE 1 TABLET UNDER THE TONGUE AS NEEDED FOR CHEST PAIN   ? sucralfate (CARAFATE) 1 gram tablet TAKE 1 TABLET BY MOUTH FOUR TIMES DAILY   ? warfarin ANTICOAGULANT (COUMADIN/JANTOVEN) 1 MG tablet Take 1 tablet (1mg) to 2 tablets (2mg) by mouth daily, as directed.  Adjust dose based on INR results.       Objective  /50 (Patient Site: Left Arm, Patient Position: Sitting, Cuff Size: Adult Small)   Pulse 78   Resp 16   Wt 124 lb (56.2 kg)   LMP  (LMP Unknown)   SpO2 98%   BMI 21.97 kg/m      General Appearance:    Alert, cooperative, no distress, appears stated age   Head:    Normocephalic, without obvious abnormality, atraumatic   Throat:   Lips, mucosa, and tongue normal; teeth and gums normal   Neck:   Supple, symmetrical, trachea midline, no adenopathy;        thyroid:  No enlargement/tenderness/nodules; no carotid    bruit or JVD   Back:     Symmetric, no curvature, ROM normal, no CVA tenderness   Lungs:     Clear to auscultation bilaterally, respirations unlabored   Chest wall:    No tenderness, midline sternotomy scar   Heart:   Irregularly irregular, S1  and S2 normal, 1/6 systolic ejection murmur, no rub   or gallop   Abdomen:     Soft, non-tender, bowel sounds active all four quadrants,     no masses, no organomegaly   Extremities:   Normal, atraumatic, no cyanosis or edema   Pulses:   2+ and symmetric all bilateral brachial extremities, absent radial, ulnar, dorsalis pedis as well as posterior tibial pulses   Skin:   Skin color, texture, turgor normal, no rashes or lesions     Results    Lab Results personally reviewed   Lab Results   Component Value Date    CHOL 142 11/14/2019    CHOL 193 04/02/2019     Lab Results   Component Value Date    HDL 69 11/14/2019    HDL 58 04/02/2019     Lab Results   Component Value Date    LDLCALC 54 11/14/2019    LDLCALC 106 04/02/2019     Lab Results   Component Value Date    TRIG 96 11/14/2019    TRIG 144 04/02/2019     Lab Results   Component Value Date    WBC 8.7 07/13/2020    HGB 12.6 07/13/2020    HCT 36.4 07/13/2020     07/13/2020     Lab Results   Component Value Date    CREATININE 1.05 07/13/2020    BUN 16 07/13/2020     (L) 07/13/2020    K 4.0 07/13/2020    CO2 27 07/13/2020     Review of Systems:   General: WNL  Eyes: WNL  Ears/Nose/Throat: WNL  Lungs: Cough  Heart: Shortness of Breath with activity, Irregular Heartbeat(Palpitations)  Stomach: WNL  Bladder: Frequent Urination at Night  Muscle/Joints: WNL  Skin: WNL  Nervous System: WNL  Mental Health: WNL     Blood: WNL

## 2021-07-03 NOTE — ADDENDUM NOTE
Addendum Note by Alf Allred MLT at 3/27/2019  2:20 PM     Author: Alf Allred MLT Service: -- Author Type:     Filed: 3/27/2019  3:45 PM Encounter Date: 3/27/2019 Status: Signed    : Alf Allred MLT ()    Addended by: ALF ALLRED on: 3/27/2019 03:45 PM        Modules accepted: Orders

## 2021-07-03 NOTE — ADDENDUM NOTE
Addendum Note by Ryan Hansen RN at 10/16/2020  9:25 AM     Author: Ryan Hansen RN Service: -- Author Type: Registered Nurse    Filed: 10/16/2020  9:25 AM Encounter Date: 10/15/2020 Status: Signed    : Ryan Hansen RN (Registered Nurse)    Addended by: RYAN HANSEN on: 10/16/2020 09:25 AM        Modules accepted: Orders

## 2021-07-13 ENCOUNTER — ANTICOAGULATION THERAPY VISIT (OUTPATIENT)
Dept: ANTICOAGULATION | Facility: CLINIC | Age: 86
End: 2021-07-13

## 2021-07-13 ENCOUNTER — LAB (OUTPATIENT)
Dept: LAB | Facility: CLINIC | Age: 86
End: 2021-07-13
Payer: COMMERCIAL

## 2021-07-13 DIAGNOSIS — I48.91 ATRIAL FIBRILLATION (H): Primary | ICD-10-CM

## 2021-07-13 DIAGNOSIS — I48.91 ATRIAL FIBRILLATION (H): ICD-10-CM

## 2021-07-13 DIAGNOSIS — Z95.2 H/O AORTIC VALVE REPLACEMENT: ICD-10-CM

## 2021-07-13 LAB — INR BLD: 2.6 (ref 0.9–1.1)

## 2021-07-13 PROCEDURE — 36415 COLL VENOUS BLD VENIPUNCTURE: CPT

## 2021-07-13 PROCEDURE — 85610 PROTHROMBIN TIME: CPT

## 2021-07-13 NOTE — PROGRESS NOTES
Anticoagulation Management    Unable to reach Constanza today.    Today's INR result of 2.60 is therapeutic (goal INR of 2.0-3.0).  Result received from: Clinic Lab    Follow up required to assess for changes     - check INR in 6 wks.   - (last 10 INR's therapeutic)   - INR scheduled on 8/18/21 during OV with Dr. Jacinto.    Left message to continue current dose of warfarin 2 mg tonight.      Anticoagulation clinic to follow up - 7/14/21.    Erika Simental RN

## 2021-07-13 NOTE — PROGRESS NOTES
Standing POCT INR order placed.     Smiley Lovelace, RN  [facility] Anticoagulation  [phone]

## 2021-07-14 NOTE — PROGRESS NOTES
ANTICOAGULATION MANAGEMENT     Constanza Doansupriya 86 year old female is on warfarin with therapeutic INR result. (Goal INR 2.0-3.0)    Recent labs: (last 7 days)     07/13/21  1459   INR 2.6*       ASSESSMENT     Source(s): Patient/Caregiver Call and Template       Warfarin doses taken: Warfarin taken as instructed    Diet: No new diet changes identified    New illness, injury, or hospitalization: No    Medication/supplement changes: None noted    Signs or symptoms of bleeding or clotting: No    Previous INR: Therapeutic last 2(+) visits    Additional findings: None     PLAN     Recommended plan for no diet, medication or health factor changes affecting INR     Dosing Instructions:   - Continue your current warfarin dose with next INR in 6-8 weeks       Summary  As of 7/13/2021    Full warfarin instructions:  2 mg every Tue, Fri; 1 mg all other days   Next INR check:  8/24/2021             Telephone call with Constanza who verbalizes understanding and agrees to plan    Lab visit scheduled - On 8/18/21 during yearly OV with Dr. Jacinto.    Education provided: Importance of consistent vitamin K intake and Target INR goal and significance of current INR result    Plan made per [facility] anticoagulation protocol     Erika Simental RN  Anticoagulation Clinic  7/14/2021    _______________________________________________________________________     Anticoagulation Episode Summary     Current INR goal:  2.0-3.0   TTR:  66.8 % (1 y)   Target end date:     Send INR reminders to:  [facility]    Indications    Atrial fibrillation (H) [I48.91]  H/O aortic valve replacement [Z95.2]           Comments:           Anticoagulation Care Providers     Provider Role Specialty Phone number    Hayder Bailey MD Referring Internal Medicine     Yun Jacinto MD Responsible Internal Medicine [phone]

## 2021-07-14 NOTE — PROGRESS NOTES
Anticoagulation-Extended Recheck Request    Under [facility] Anticoagulation protocol, Anticoagulation team may extend INR recheck interval + 1 week for each stable in range INR to max of 6 weeks. Extended interval rechecks between 8-12 weeks for patients on stable maintenance therapy require an approval (one time) from referring provider.    Anticoagulation clinic suggests consideration of extended intervals in medically stable patients, with standard INR goals, and no change in warfarin dose for last 4-6 months    Constanza Doansupriya, 86 year old, female has been on:    Current recheck interval: 8 week per prior [facility] protocol  Compliant: Yes  Stable warfarin dose since: 2/3/21  INR Goal: 2.0-3.0  Time in Therapeutic range: 67%    Lab Results   Component Value Date    INR 2.6 07/13/2021    INR 2.40 04/29/2021    INR 2.40 03/30/2021    INR 2.40 03/02/2021    INR 2.00 02/03/2021    INR 1.90 01/15/2021    INR 1.80 12/28/2020    INR 1.80 12/08/2020    INR 4.10 12/01/2020         Please advise if Constanza is approved to have extended interval rechecks every 8 weeks while on stable maintenance therapy    Thank you,    Erika Simental RN

## 2021-08-18 ENCOUNTER — ANTICOAGULATION THERAPY VISIT (OUTPATIENT)
Dept: ANTICOAGULATION | Facility: CLINIC | Age: 86
End: 2021-08-18

## 2021-08-18 ENCOUNTER — OFFICE VISIT (OUTPATIENT)
Dept: INTERNAL MEDICINE | Facility: CLINIC | Age: 86
End: 2021-08-18
Payer: COMMERCIAL

## 2021-08-18 VITALS
HEART RATE: 60 BPM | SYSTOLIC BLOOD PRESSURE: 110 MMHG | BODY MASS INDEX: 23.24 KG/M2 | DIASTOLIC BLOOD PRESSURE: 66 MMHG | HEIGHT: 61 IN | OXYGEN SATURATION: 98 % | WEIGHT: 123.1 LBS

## 2021-08-18 DIAGNOSIS — Z00.00 ENCOUNTER FOR MEDICARE ANNUAL WELLNESS EXAM: Primary | ICD-10-CM

## 2021-08-18 DIAGNOSIS — E53.8 VITAMIN B12 DEFICIENCY (NON ANEMIC): ICD-10-CM

## 2021-08-18 DIAGNOSIS — I48.11 LONGSTANDING PERSISTENT ATRIAL FIBRILLATION (H): ICD-10-CM

## 2021-08-18 DIAGNOSIS — I50.41 ACUTE COMBINED SYSTOLIC (CONGESTIVE) AND DIASTOLIC (CONGESTIVE) HEART FAILURE (H): ICD-10-CM

## 2021-08-18 DIAGNOSIS — Z95.2 H/O AORTIC VALVE REPLACEMENT: Primary | ICD-10-CM

## 2021-08-18 DIAGNOSIS — N18.31 STAGE 3A CHRONIC KIDNEY DISEASE (H): ICD-10-CM

## 2021-08-18 DIAGNOSIS — E55.9 VITAMIN D INSUFFICIENCY: ICD-10-CM

## 2021-08-18 PROBLEM — N18.30 CHRONIC KIDNEY DISEASE, STAGE 3 (H): Status: ACTIVE | Noted: 2021-08-18

## 2021-08-18 LAB
HOLD SPECIMEN: NORMAL
INR BLD: 2.4 (ref 0.9–1.1)

## 2021-08-18 PROCEDURE — 80053 COMPREHEN METABOLIC PANEL: CPT | Performed by: INTERNAL MEDICINE

## 2021-08-18 PROCEDURE — 36415 COLL VENOUS BLD VENIPUNCTURE: CPT | Performed by: INTERNAL MEDICINE

## 2021-08-18 PROCEDURE — 85610 PROTHROMBIN TIME: CPT | Performed by: INTERNAL MEDICINE

## 2021-08-18 PROCEDURE — 99397 PER PM REEVAL EST PAT 65+ YR: CPT | Performed by: INTERNAL MEDICINE

## 2021-08-18 PROCEDURE — 82306 VITAMIN D 25 HYDROXY: CPT | Performed by: INTERNAL MEDICINE

## 2021-08-18 PROCEDURE — 83921 ORGANIC ACID SINGLE QUANT: CPT | Performed by: INTERNAL MEDICINE

## 2021-08-18 PROCEDURE — 84443 ASSAY THYROID STIM HORMONE: CPT | Performed by: INTERNAL MEDICINE

## 2021-08-18 ASSESSMENT — MIFFLIN-ST. JEOR: SCORE: 945.76

## 2021-08-18 ASSESSMENT — ACTIVITIES OF DAILY LIVING (ADL): CURRENT_FUNCTION: NO ASSISTANCE NEEDED

## 2021-08-18 NOTE — LETTER
August 26, 2021      Constanza Coles  [address] [place] [address]        Dear ,    We are writing to inform you of your test results.    {results letter list:926850}    Resulted Orders   Vitamin D Deficiency   Result Value Ref Range    Vitamin D, Total (25-Hydroxy) 70 30 - 80 ug/L    Narrative    Deficiency <10.0 ug/L  Insufficiency 10.0-29.9 ug/L  Sufficiency 30.0-80.0 ug/L  Toxicity (possible) >100.0 ug/L    Comprehensive metabolic panel   Result Value Ref Range    Sodium 140 136 - 145 mmol/L    Potassium 4.4 3.5 - 5.0 mmol/L    Chloride 101 98 - 107 mmol/L    Carbon Dioxide (CO2) 27 22 - 31 mmol/L    Anion Gap 12 5 - 18 mmol/L    Urea Nitrogen 18 8 - 28 mg/dL    Creatinine 0.98 0.60 - 1.10 mg/dL    Calcium 9.6 8.5 - 10.5 mg/dL    Glucose 82 70 - 125 mg/dL    Alkaline Phosphatase 70 45 - 120 U/L    AST 25 0 - 40 U/L    ALT 13 0 - 45 U/L    Protein Total 7.4 6.0 - 8.0 g/dL    Albumin 3.9 3.5 - 5.0 g/dL    Bilirubin Total 0.6 0.0 - 1.0 mg/dL    GFR Estimate 53 (L) >60 mL/min/1.73m2      Comment:      As of July 11, 2021, eGFR is calculated by the CKD-EPI creatinine equation, without race adjustment. eGFR can be influenced by muscle mass, exercise, and diet. The reported eGFR is an estimation only and is only applicable if the renal function is stable.   TSH   Result Value Ref Range    TSH 2.16 0.30 - 5.00 uIU/mL   Extra Purple Top Tube   Result Value Ref Range    Hold Specimen JIC        If you have any questions or concerns, please call the clinic at the number listed above.       Sincerely,      Yun Jacinto MD

## 2021-08-18 NOTE — PROGRESS NOTES
Anticoagulation Management    Unable to reach Constanza today.    Today's INR result of 2.4 is therapeutic (goal INR of 2.0-3.0).  Result received from: Clinic Lab    Follow up required to assess for changes     Left message to continue current dose of warfarin 1 mg tonight.      Anticoagulation clinic to follow up - on Thurs. 8/19/21.    Erika Simental RN

## 2021-08-18 NOTE — LETTER
August 19, 2021      Constanza Coles  [address] [place] [address]        Dear ,    We are writing to inform you of your test results.    The electrolytes, kidney tests are all normal .   Liver tests are normal   Thyroid test is normal     Resulted Orders   Comprehensive metabolic panel   Result Value Ref Range    Sodium 140 136 - 145 mmol/L    Potassium 4.4 3.5 - 5.0 mmol/L    Chloride 101 98 - 107 mmol/L    Carbon Dioxide (CO2) 27 22 - 31 mmol/L    Anion Gap 12 5 - 18 mmol/L    Urea Nitrogen 18 8 - 28 mg/dL    Creatinine 0.98 0.60 - 1.10 mg/dL    Calcium 9.6 8.5 - 10.5 mg/dL    Glucose 82 70 - 125 mg/dL    Alkaline Phosphatase 70 45 - 120 U/L    AST 25 0 - 40 U/L    ALT 13 0 - 45 U/L    Protein Total 7.4 6.0 - 8.0 g/dL    Albumin 3.9 3.5 - 5.0 g/dL    Bilirubin Total 0.6 0.0 - 1.0 mg/dL    GFR Estimate 53 (L) >60 mL/min/1.73m2      Comment:      As of July 11, 2021, eGFR is calculated by the CKD-EPI creatinine equation, without race adjustment. eGFR can be influenced by muscle mass, exercise, and diet. The reported eGFR is an estimation only and is only applicable if the renal function is stable.   TSH   Result Value Ref Range    TSH 2.16 0.30 - 5.00 uIU/mL   Extra Purple Top Tube   Result Value Ref Range    Hold Specimen JIC        If you have any questions or concerns, please call the clinic at the number listed above.       Sincerely,      Yun Jacinto MD

## 2021-08-18 NOTE — PROGRESS NOTES
"ANTICOAGULATION MANAGEMENT     Constanza Coles 86 year old female is on warfarin with therapeutic INR result. (Goal INR 2.0-3.0)    Recent labs: (last 7 days)     21  1627   INR 2.4*       ASSESSMENT     Source(s): Chart Review, Patient/Caregiver Call and Template       Warfarin doses taken: Warfarin taken as instructed    Diet: No new diet changes identified    New illness, injury, or hospitalization: {No/Yes:919987::\"No\"}    Medication/supplement changes: {Medication changes/interactions:640818::\"None noted\"}    Signs or symptoms of bleeding or clotting: {No/Yes:778390::\"No\"}    Previous INR: Therapeutic last 2(+) visits    Additional findings: {additional findings:390374::\"None\"}     PLAN     Recommended plan for {accassessment:682400::\"no diet, medication or health factor changes\"} affecting INR     Dosing Instructions: Continue your current warfarin dose with next INR in 6 weeks       Summary  As of 2021    Full warfarin instructions:  2 mg every Tue, Fri; 1 mg all other days   Next INR check:  21.             {Contact type and understandin}    {accappointmentoffer:538780}    Education provided: {ACCeducation:956904}    Plan made {Anticoag protocol:934696::\"per [facility] anticoagulation protocol\"}    Erika Simental RN  Anticoagulation Clinic  2021    _______________________________________________________________________     Anticoagulation Episode Summary     Current INR goal:  2.0-3.0   TTR:  68.5 % (1 y)   Target end date:     Send INR reminders to:  [facility]    Indications    Atrial fibrillation (H) [I48.91]  H/O aortic valve replacement [Z95.2]           Comments:           Anticoagulation Care Providers     Provider Role Specialty Phone number    Hayder Bailey MD Referring Internal Medicine [phone]    Yun Jacinto MD Responsible Internal Medicine [phone]          "

## 2021-08-18 NOTE — PATIENT INSTRUCTIONS
Patient Education   Personalized Prevention Plan  You are due for the preventive services outlined below.  Your care team is available to assist you in scheduling these services.  If you have already completed any of these items, please share that information with your care team to update in your medical record.  Health Maintenance Due   Topic Date Due     Heart Failure Action Plan  Never done     ANNUAL REVIEW OF HM ORDERS  Never done     Discuss Advance Care Planning  Never done     Complete Blood Count  Never done     Zoster (Shingles) Vaccine (1 of 2) Never done     Annual Wellness Visit  Never done     Diptheria Tetanus Pertussis (DTAP/TDAP/TD) Vaccine (1 - Tdap) 02/07/2016     Cholesterol Lab  11/14/2020     Osteoporosis Screening  11/17/2020     PHQ-2  01/01/2021     FALL RISK ASSESSMENT  07/13/2021

## 2021-08-18 NOTE — PROGRESS NOTES
"SUBJECTIVE:   Constanza Coles is a 86 year old female who presents for Preventive Visit.  Still has issues with her duaghter .   doest lseep well   Otherwise in excellent health.    .no shortness of breath ,no  chest pain ,no  Falls, no urinary incontinence , no weight changes , sleep and moodhave been good . Independent in ADLS and IADLS     Patient has been advised of split billing requirements and indicates understanding: Yes   Are you in the first 12 months of your Medicare coverage?  No    Healthy Habits:     In general, how would you rate your overall health?  Fair    Frequency of exercise:  2-3 days/week    Duration of exercise:  45-60 minutes    Do you usually eat at least 4 servings of fruit and vegetables a day, include whole grains    & fiber and avoid regularly eating high fat or \"junk\" foods?  No    Taking medications regularly:  Yes    Medication side effects:  None    Ability to successfully perform activities of daily living:  No assistance needed    Home Safety:  No safety concerns identified    Hearing Impairment:  No hearing concerns    In the past 6 months, have you been bothered by leaking of urine? Yes    In general, how would you rate your overall mental or emotional health?  Good      PHQ-2 Total Score: 0    Additional concerns today:  No    Do you feel safe in your environment? Yes    Have you ever done Advance Care Planning? (For example, a Health Directive, POLST, or a discussion with a medical provider or your loved ones about your wishes): Yes, advance care planning is on file.       Fall risk  Fallen 2 or more times in the past year?: No  Any fall with injury in the past year?: No    Cognitive Screening   1) Repeat 3 items (Leader, Season, Table)    2) Clock draw: patient declined clock  3) 3 item recall: Recalls 2 objects   Results: declined clock, recalled 2 words    Mini-CogTM Copyright S Dina. Licensed by the author for use in [facility]; reprinted with permission " (maria [email or web address]@[facility]). All rights reserved.      Do you have sleep apnea, excessive snoring or daytime drowsiness?: yes    Reviewed and updated as needed this visit by clinical staff  Tobacco   Meds              Reviewed and updated as needed this visit by Provider                Social History     Tobacco Use     Smoking status: Former Smoker     Packs/day: 1.50     Years: 20.00     Pack years: 30.00     Quit date: 10/29/1979     Years since quittin.8     Smokeless tobacco: Never Used   Substance Use Topics     Alcohol use: Yes         Alcohol Use 2021   Prescreen: >3 drinks/day or >7 drinks/week? No               Current providers sharing in care for this patient include:   Patient Care Team:  Yun Jacinto MD as PCP - General  Yun Jacinto MD as Assigned PCP  Mathew Antoine MD as Assigned Heart and Vascular Provider    The following health maintenance items are reviewed in Epic and correct as of today:  Health Maintenance Due   Topic Date Due     HF ACTION PLAN  Never done     ANNUAL REVIEW OF HM ORDERS  Never done     ADVANCE CARE PLANNING  Never done     CBC  Never done     ZOSTER IMMUNIZATION (1 of 2) Never done     MEDICARE ANNUAL WELLNESS VISIT  Never done     DTAP/TDAP/TD IMMUNIZATION (1 - Tdap) 2016     LIPID  2020     DEXA  2020     Current Outpatient Medications   Medication     atorvastatin (LIPITOR) 10 MG tablet     cholecalciferol, vitamin D3, (VITAMIN D3) 5,000 unit Tab     cyanocobalamin 1,000 mcg/mL injection     furosemide (LASIX) 20 MG tablet     gabapentin (NEURONTIN) 300 MG capsule     metoprolol succinate (TOPROL-XL) 25 MG     nitroglycerin (NITROSTAT) 0.4 MG SL tablet     sucralfate (CARAFATE) 1 gram tablet     warfarin ANTICOAGULANT (COUMADIN/JANTOVEN) 1 MG tablet     metoprolol succinate (TOPROL-XL) 25 MG     No current facility-administered medications for this visit.             OBJECTIVE:   /66 (BP Location: Left arm, Patient Position: Sitting, Cuff  "Size: Adult Regular)   Pulse 60   Ht 1.549 m (5' 1\")   Wt 55.8 kg (123 lb 1.6 oz)   SpO2 98%   BMI 23.26 kg/m   Estimated body mass index is 23.26 kg/m  as calculated from the following:    Height as of this encounter: 1.549 m (5' 1\").    Weight as of this encounter: 55.8 kg (123 lb 1.6 oz).  Physical Exam  GENERAL APPEARANCE: healthy, alert and no distress  EYES: Eyes grossly normal to inspection, PERRL and conjunctivae and sclerae normal  HENT: ear canals and TM's normal, nose and mouth without ulcers or lesions, oropharynx clear and oral mucous membranes moist  NECK: no adenopathy, no asymmetry, masses, or scars and thyroid normal to palpation  RESP: lungs clear to auscultation - no rales, rhonchi or wheezes  BREAST: normal without masses, tenderness or nipple discharge and no palpable axillary masses or adenopathy  CV: regular rate and rhythm, normal S1 S2, no S3 or S4, no murmur, click or rub, no peripheral edema and peripheral pulses strong  ABDOMEN: soft, nontender, no hepatosplenomegaly, no masses and bowel sounds normal  MS: no musculoskeletal defects are noted and gait is age appropriate without ataxia  SKIN: no suspicious lesions or rashes  NEURO: Normal strength and tone, sensory exam grossly normal, mentation intact and speech normal  PSYCH: mentation appears normal and affect normal/bright    Diagnostic Test Results:  Labs reviewed in Epic    ASSESSMENT / PLAN:   1. Encounter for Medicare annual wellness exam      2. Longstanding persistent atrial fibrillation (H)  Well anticoagulated is due for blood work  - INR; Future  - Comprehensive metabolic panel; Future  - Comprehensive metabolic panel    3. Stage 3a chronic kidney disease  Stable chronic kidney disease    4. Vitamin D insufficiency    - Vitamin D Deficiency; Future  - TSH; Future  - Vitamin D Deficiency  - TSH    5. Vitamin B12 deficiency (non anemic)    - Methylmalonic Acid; Future  - Methylmalonic Acid    6. 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 Atrial fibrillation (H)    - INR point of care   overall she is doing really well her  is younger than her.  I do not see any concerns she really is past the age of any further cancer screening.  I do strongly advise a bone density scan she at first declines it but then changes her mind and requested be done.  Patient has been advised of split billing requirements and indicates understanding: Yes  COUNSELING:  Reviewed preventive health counseling, as reflected in patient instructions    Estimated body mass index is 23.26 kg/m  as calculated from the following:    Height as of this encounter: 1.549 m (5' 1\").    Weight as of this encounter: 55.8 kg (123 lb 1.6 oz).        She reports that she quit smoking about 41 years ago. She has a 30.00 pack-year smoking history. She has never used smokeless tobacco.      Appropriate preventive services were discussed with this patient, including applicable screening as appropriate for cardiovascular disease, diabetes, osteopenia/osteoporosis, and glaucoma.  As appropriate for age/gender, discussed screening for colorectal cancer, prostate cancer, breast cancer, and cervical cancer. Checklist reviewing preventive services available has been given to the patient.    Reviewed patients plan of care and provided an AVS. The Basic Care Plan (routine screening as documented in Health Maintenance) for Constanza meets the Care Plan requirement. This Care Plan has been established and reviewed with the Patient.    Counseling Resources:  ATP IV Guidelines  Pooled Cohorts Equation Calculator  Breast Cancer Risk Calculator  Breast Cancer: Medication to Reduce Risk  FRAX Risk Assessment  ICSI Preventive Guidelines  Dietary Guidelines for Americans, 2010  [employer]'s MyPlate  ASA Prophylaxis  Lung CA Screening    Yun Jacinto MD  [facility]    Identified Health Risks:  "

## 2021-08-19 ENCOUNTER — TELEPHONE (OUTPATIENT)
Dept: INTERNAL MEDICINE | Facility: CLINIC | Age: 86
End: 2021-08-19

## 2021-08-19 ENCOUNTER — ANCILLARY PROCEDURE (OUTPATIENT)
Dept: CARDIOLOGY | Facility: CLINIC | Age: 86
End: 2021-08-19
Attending: INTERNAL MEDICINE
Payer: COMMERCIAL

## 2021-08-19 DIAGNOSIS — Z95.0 CARDIAC PACEMAKER IN SITU: ICD-10-CM

## 2021-08-19 DIAGNOSIS — Z78.0 POST-MENOPAUSAL: Primary | ICD-10-CM

## 2021-08-19 DIAGNOSIS — I49.5 SSS (SICK SINUS SYNDROME) (H): ICD-10-CM

## 2021-08-19 LAB
ALBUMIN SERPL-MCNC: 3.9 G/DL (ref 3.5–5)
ALP SERPL-CCNC: 70 U/L (ref 45–120)
ALT SERPL W P-5'-P-CCNC: 13 U/L (ref 0–45)
ANION GAP SERPL CALCULATED.3IONS-SCNC: 12 MMOL/L (ref 5–18)
AST SERPL W P-5'-P-CCNC: 25 U/L (ref 0–40)
BILIRUB SERPL-MCNC: 0.6 MG/DL (ref 0–1)
BUN SERPL-MCNC: 18 MG/DL (ref 8–28)
CALCIUM SERPL-MCNC: 9.6 MG/DL (ref 8.5–10.5)
CHLORIDE BLD-SCNC: 101 MMOL/L (ref 98–107)
CO2 SERPL-SCNC: 27 MMOL/L (ref 22–31)
CREAT SERPL-MCNC: 0.98 MG/DL (ref 0.6–1.1)
GFR SERPL CREATININE-BSD FRML MDRD: 53 ML/MIN/1.73M2
GLUCOSE BLD-MCNC: 82 MG/DL (ref 70–125)
MDC_IDC_LEAD_IMPLANT_DT: NORMAL
MDC_IDC_LEAD_IMPLANT_DT: NORMAL
MDC_IDC_LEAD_LOCATION: NORMAL
MDC_IDC_LEAD_LOCATION: NORMAL
MDC_IDC_LEAD_MFG: NORMAL
MDC_IDC_LEAD_MFG: NORMAL
MDC_IDC_LEAD_MODEL: NORMAL
MDC_IDC_LEAD_MODEL: NORMAL
MDC_IDC_LEAD_POLARITY_TYPE: NORMAL
MDC_IDC_LEAD_POLARITY_TYPE: NORMAL
MDC_IDC_LEAD_SERIAL: NORMAL
MDC_IDC_LEAD_SERIAL: NORMAL
MDC_IDC_MSMT_BATTERY_DTM: NORMAL
MDC_IDC_MSMT_BATTERY_IMPEDANCE: 2675 OHM
MDC_IDC_MSMT_BATTERY_REMAINING_LONGEVITY: 30 MO
MDC_IDC_MSMT_BATTERY_STATUS: NORMAL
MDC_IDC_MSMT_BATTERY_VOLTAGE: 2.73 V
MDC_IDC_MSMT_LEADCHNL_RA_IMPEDANCE_VALUE: 67 OHM
MDC_IDC_MSMT_LEADCHNL_RV_IMPEDANCE_VALUE: 476 OHM
MDC_IDC_MSMT_LEADCHNL_RV_PACING_THRESHOLD_AMPLITUDE: 0.5 V
MDC_IDC_MSMT_LEADCHNL_RV_PACING_THRESHOLD_PULSEWIDTH: 0.4 MS
MDC_IDC_MSMT_LEADCHNL_RV_SENSING_INTR_AMPL: 8 MV
MDC_IDC_PG_IMPLANT_DTM: NORMAL
MDC_IDC_PG_MFG: NORMAL
MDC_IDC_PG_MODEL: NORMAL
MDC_IDC_PG_SERIAL: NORMAL
MDC_IDC_PG_TYPE: NORMAL
MDC_IDC_SESS_CLINIC_NAME: NORMAL
MDC_IDC_SESS_DTM: NORMAL
MDC_IDC_SESS_TYPE: NORMAL
MDC_IDC_SET_BRADY_HYSTRATE: NORMAL
MDC_IDC_SET_BRADY_LOWRATE: 50 {BEATS}/MIN
MDC_IDC_SET_BRADY_MAX_TRACKING_RATE: 110 {BEATS}/MIN
MDC_IDC_SET_BRADY_MODE: NORMAL
MDC_IDC_SET_LEADCHNL_RV_PACING_AMPLITUDE: 1.25 V
MDC_IDC_SET_LEADCHNL_RV_PACING_CAPTURE_MODE: NORMAL
MDC_IDC_SET_LEADCHNL_RV_PACING_POLARITY: NORMAL
MDC_IDC_SET_LEADCHNL_RV_PACING_PULSEWIDTH: 0.4 MS
MDC_IDC_SET_LEADCHNL_RV_SENSING_POLARITY: NORMAL
MDC_IDC_SET_LEADCHNL_RV_SENSING_SENSITIVITY: 4 MV
MDC_IDC_SET_ZONE_DETECTION_INTERVAL: 333.33 MS
MDC_IDC_SET_ZONE_TYPE: NORMAL
MDC_IDC_SET_ZONE_TYPE: NORMAL
MDC_IDC_STAT_AT_BURDEN_PERCENT: 0 %
MDC_IDC_STAT_AT_DTM_END: NORMAL
MDC_IDC_STAT_AT_DTM_START: NORMAL
MDC_IDC_STAT_BRADY_DTM_END: NORMAL
MDC_IDC_STAT_BRADY_DTM_START: NORMAL
MDC_IDC_STAT_BRADY_RV_PERCENT_PACED: 5 %
MDC_IDC_STAT_EPISODE_RECENT_COUNT: 0
MDC_IDC_STAT_EPISODE_RECENT_COUNT: 1
MDC_IDC_STAT_EPISODE_RECENT_COUNT_DTM_END: NORMAL
MDC_IDC_STAT_EPISODE_RECENT_COUNT_DTM_END: NORMAL
MDC_IDC_STAT_EPISODE_RECENT_COUNT_DTM_START: NORMAL
MDC_IDC_STAT_EPISODE_RECENT_COUNT_DTM_START: NORMAL
MDC_IDC_STAT_EPISODE_TYPE: NORMAL
MDC_IDC_STAT_EPISODE_TYPE: NORMAL
POTASSIUM BLD-SCNC: 4.4 MMOL/L (ref 3.5–5)
PROT SERPL-MCNC: 7.4 G/DL (ref 6–8)
SODIUM SERPL-SCNC: 140 MMOL/L (ref 136–145)
TSH SERPL DL<=0.005 MIU/L-ACNC: 2.16 UIU/ML (ref 0.3–5)

## 2021-08-19 PROCEDURE — 93296 REM INTERROG EVL PM/IDS: CPT | Performed by: INTERNAL MEDICINE

## 2021-08-19 PROCEDURE — 93294 REM INTERROG EVL PM/LDLS PM: CPT | Performed by: INTERNAL MEDICINE

## 2021-08-19 NOTE — TELEPHONE ENCOUNTER
Reason for Call: Request for an order or referral:    Order or referral being requested: bone density     Date needed: as soon as possible    Has the patient been seen by the PCP for this problem? YES    Additional comments: pt changed her mind after her visit yesterday and would like to get a done density  done.  Please put in order and scheduling will call her.    Phone number Patient can be reached at:  Cell number on file:    Telephone Information:   Mobile [phone]       Best Time:  any    Can we leave a detailed message on this number?  YES    Call taken on 8/19/2021 at 11:45 AM by Pam J. Behr

## 2021-08-19 NOTE — PROGRESS NOTES
ANTICOAGULATION MANAGEMENT therapeutic    Constanza Doansupriya 86 year old female is on warfarin with  INR result. (Goal INR 2.0-3.0)    Recent labs: (last 7 days)     08/18/21  1627   INR 2.4*       ASSESSMENT     Source(s): Chart Review, Patient/Caregiver Call and Template       Warfarin doses taken: Warfarin taken as instructed    Diet: No new diet changes identified    New illness, injury, or hospitalization: No    Medication/supplement changes: None noted    Signs or symptoms of bleeding or clotting: No    Previous INR: Therapeutic last 2(+) visits    Additional findings: None     PLAN     Recommended plan for no diet, medication or health factor changes affecting INR     Dosing Instructions: Continue your current warfarin dose with next INR in 6 weeks       Summary  As of 8/18/2021    Full warfarin instructions:  2 mg every Tue, Fri; 1 mg all other days   Next INR check:  9/29/2021             Telephone call with Constanza who verbalizes understanding and agrees to plan    Lab visit scheduled - INR scheduled on 9/30/21 @ [facility].    Education provided: Importance of consistent vitamin K intake and Target INR goal and significance of current INR result    Plan made per [facility] anticoagulation protocol    Erika Simental RN  Anticoagulation Clinic  8/19/2021    _______________________________________________________________________     Anticoagulation Episode Summary     Current INR goal:  2.0-3.0   TTR:  68.7 % (1 y)   Target end date:     Send INR reminders to:  [facility]    Indications    Atrial fibrillation (H) [I48.91]  H/O aortic valve replacement [Z95.2]           Comments:           Anticoagulation Care Providers     Provider Role Specialty Phone number    Hayder Bailey MD Referring Internal Medicine [phone]    Yun Jacinto MD Responsible Internal Medicine [phone]

## 2021-08-20 LAB — DEPRECATED CALCIDIOL+CALCIFEROL SERPL-MC: 70 UG/L (ref 30–80)

## 2021-08-24 ENCOUNTER — ANCILLARY PROCEDURE (OUTPATIENT)
Dept: BONE DENSITY | Facility: CLINIC | Age: 86
End: 2021-08-24
Attending: INTERNAL MEDICINE
Payer: COMMERCIAL

## 2021-08-24 DIAGNOSIS — Z78.0 POST-MENOPAUSAL: ICD-10-CM

## 2021-08-24 PROCEDURE — 77080 DXA BONE DENSITY AXIAL: CPT | Performed by: INTERNAL MEDICINE

## 2021-08-24 PROCEDURE — 77081 DXA BONE DENSITY APPENDICULR: CPT | Mod: 59 | Performed by: INTERNAL MEDICINE

## 2021-08-30 ENCOUNTER — TELEPHONE (OUTPATIENT)
Dept: CARDIOLOGY | Facility: CLINIC | Age: 86
End: 2021-08-30

## 2021-08-30 DIAGNOSIS — I25.10 CORONARY ATHEROSCLEROSIS: Primary | ICD-10-CM

## 2021-08-30 DIAGNOSIS — Z95.1 S/P CABG (CORONARY ARTERY BYPASS GRAFT): ICD-10-CM

## 2021-08-30 RX ORDER — NITROGLYCERIN 0.4 MG/1
TABLET SUBLINGUAL
Qty: 25 TABLET | Refills: 1 | Status: SHIPPED | OUTPATIENT
Start: 2021-08-30 | End: 2022-07-27

## 2021-08-30 NOTE — TELEPHONE ENCOUNTER
"Per Dr. Antoine's 5-20-21 visit note:  \"Coronary atherosclerosis due to lipid rich plaque -angiography in 2005 showed normal left main, 50% proximal left anterior descending with a distal 90% left anterior descending lesion, circumflex was normal as was the right coronary artery. She had a LIMA to the LAD and a vein graft to the first diagonal with her valve surgery in 2005.  Given her shortness of breath she was offered stress testing in the past and she declined.  The shortness of breath is worsened and CTA showed normal left main, occluded LAD with a graft to the distal portion, circumflex with a possible proximal 70 to 90% lesion, and right coronary artery with only about a 50% lesion.  Her symptoms have resolved, suspect the specificity of the coronary CTA is not that accurate and she would decline invasive intervention in any event and thus medical therapy.  Should symptoms worsen, would pursue angiography.\"    Plan  1.  Try furosemide every other day or only as needed.  2.  Follow-up with me in 10 months or sooner if needed.  "

## 2021-08-30 NOTE — TELEPHONE ENCOUNTER
"Rec'd return call from patient - informed her of Dr. Antoine's response/recommendations - patient denied new or worsening SOB or CP - patient explained that the sx  \"left upper back pain are few and far between\" - patient confirmed 9-21-21 follow-up and would like to further discuss recommendations at visit - patient agreed to call back during interim if she develops any new or worsening sx and again verbalized understanding of use of prn NTG SL.  mg  "

## 2021-08-30 NOTE — TELEPHONE ENCOUNTER
----- Message from Marzena Deleon sent at 8/30/2021  9:37 AM CDT -----  Regarding: STERLING PT  General phone call:    Caller: Constanza   Primary cardiologist: STERLING   Detailed reason for call: She told him she didn't want a stent and her PMD said she should = what does she need to do?   Best phone number: [phone]  Best time to contact: any  Ok to leave a detailedmessage? yes  Device? Pacer     Additional Info:

## 2021-08-30 NOTE — TELEPHONE ENCOUNTER
"Return call to patient who stated her \"PCP thinks she should have an angiogram done because she had a CT that showed a blockage on the back of her heart\" - patient reported episode of \"left upper back pain\" that occurred 8-26-21 after lunch that required NTG SL x 1 dose - patient explained that her NTG Rx is from 2019 but it worked and this is the first episode of pain she has had since May visit - patient denied SOB, nausea, dizziness, diaphoresis during episode.    Instructed patient to sched first available follow-up with Dr. Antoine and reassured her that update would be forwarded for any additional recommendations - reviewed prn use of SL NTG and when to call 911 - confirmed preferred pharmacy for new Rx -  patient verbalized understanding and agreed with plan - call transf to sched.    Please review patient update - any new orders prior to 9-21-21 follow-up appt?  mg  "

## 2021-08-30 NOTE — TELEPHONE ENCOUNTER
Msg rec'd 8-30-21 @ 1237:  If this lady of mine who I know chronically is having increased symptoms such as shortness of breath and chest discomfort, I would recommend we pursue angiography to look at that proximal circumflex.  If she does not want to do this, then schedule appointment for me to discuss with her.  She should be instructed to present to emergency department if there are recurrent symptoms.  Mathew Guardado MD

## 2021-09-01 LAB — METHYLMALONATE SERPL-SCNC: 0.28 UMOL/L (ref 0–0.4)

## 2021-09-02 ENCOUNTER — TELEPHONE (OUTPATIENT)
Dept: INTERNAL MEDICINE | Facility: CLINIC | Age: 86
End: 2021-09-02

## 2021-09-03 ENCOUNTER — VIRTUAL VISIT (OUTPATIENT)
Dept: INTERNAL MEDICINE | Facility: CLINIC | Age: 86
End: 2021-09-03
Payer: COMMERCIAL

## 2021-09-03 DIAGNOSIS — M81.0 AGE-RELATED OSTEOPOROSIS WITHOUT CURRENT PATHOLOGICAL FRACTURE: Primary | ICD-10-CM

## 2021-09-03 PROCEDURE — 99212 OFFICE O/P EST SF 10 MIN: CPT | Mod: 95 | Performed by: INTERNAL MEDICINE

## 2021-09-03 RX ORDER — ALENDRONATE SODIUM 70 MG/1
70 TABLET ORAL
Qty: 4 TABLET | Refills: 0 | Status: SHIPPED | OUTPATIENT
Start: 2021-09-03 | End: 2021-09-23

## 2021-09-03 NOTE — PROGRESS NOTES
"Constanza is a 86 year old who is being evaluated via a billable telephone visit.      What phone number would you like to be contacted at? [phone]    How would you like to obtain your AVS? Mail a copy    Assessment & Plan     Age-related osteoporosis without current pathological fracture  Spent some time discussing the pathophysiology of osteoporosis risks versus benefit of treatment.  She is concerned about GI stomach side effects given her past history of intolerance to medication.  I did tell her we will send in 4 tablets she will try it if it doesn't seem to she can leave a message.  And we could consider Prolia.  She is doing great weightbearing exercises and taking vitamin D her last vitamin D level was normal  - alendronate (FOSAMAX) 70 MG tablet  Dispense: 4 tablet; Refill: 0        No LOS data to display   Time spent doing chart review, history and exam, documentation and further activities per the note           No follow-ups on file.    Yun Jacinto MD  [facility]    Subjective   Constanza is a 86 year old who presents for the following health issues     HPI     Pt will like to go over results           Review of Systems   Constitutional, HEENT, cardiovascular, pulmonary, gi and gu systems are negative, except as otherwise noted.      Objective    Vitals - Patient Reported  Weight (Patient Reported): 55.8 kg (123 lb)  Height (Patient Reported): 157.5 cm (5' 2\")  BMI (Based on Pt Reported Ht/Wt): 22.5      Vitals:  No vitals were obtained today due to virtual visit.    Physical Exam   healthy, alert and no distress  PSYCH: Alert and oriented times 3; coherent speech, normal   rate and volume, able to articulate logical thoughts, able   to abstract reason, no tangential thoughts, no hallucinations   or delusions  Her affect is normal  RESP: No cough, no audible wheezing, able to talk in full sentences  Remainder of exam unable to be completed due to telephone visits         "      Phone call duration: 10 minutes

## 2021-09-20 ENCOUNTER — TRANSFERRED RECORDS (OUTPATIENT)
Dept: HEALTH INFORMATION MANAGEMENT | Facility: CLINIC | Age: 86
End: 2021-09-20

## 2021-09-21 ENCOUNTER — OFFICE VISIT (OUTPATIENT)
Dept: CARDIOLOGY | Facility: CLINIC | Age: 86
End: 2021-09-21
Payer: COMMERCIAL

## 2021-09-21 ENCOUNTER — TELEPHONE (OUTPATIENT)
Dept: CARDIOLOGY | Facility: CLINIC | Age: 86
End: 2021-09-21

## 2021-09-21 VITALS
HEART RATE: 77 BPM | RESPIRATION RATE: 14 BRPM | OXYGEN SATURATION: 99 % | BODY MASS INDEX: 23.68 KG/M2 | SYSTOLIC BLOOD PRESSURE: 130 MMHG | DIASTOLIC BLOOD PRESSURE: 74 MMHG | WEIGHT: 125.3 LBS

## 2021-09-21 DIAGNOSIS — I48.11 LONGSTANDING PERSISTENT ATRIAL FIBRILLATION (H): ICD-10-CM

## 2021-09-21 DIAGNOSIS — I25.83 CORONARY ATHEROSCLEROSIS DUE TO LIPID RICH PLAQUE: Primary | ICD-10-CM

## 2021-09-21 DIAGNOSIS — I77.6 VASCULITIS (H): ICD-10-CM

## 2021-09-21 DIAGNOSIS — I49.5 SINOATRIAL NODE DYSFUNCTION (H): ICD-10-CM

## 2021-09-21 DIAGNOSIS — E78.00 PURE HYPERCHOLESTEROLEMIA: ICD-10-CM

## 2021-09-21 DIAGNOSIS — I35.9 AORTIC VALVE DISORDER: ICD-10-CM

## 2021-09-21 DIAGNOSIS — Z95.1 S/P CABG (CORONARY ARTERY BYPASS GRAFT): ICD-10-CM

## 2021-09-21 DIAGNOSIS — N18.31 STAGE 3A CHRONIC KIDNEY DISEASE (H): ICD-10-CM

## 2021-09-21 DIAGNOSIS — I25.83 CORONARY ATHEROSCLEROSIS DUE TO LIPID RICH PLAQUE: ICD-10-CM

## 2021-09-21 DIAGNOSIS — I48.91 ATRIAL FIBRILLATION (H): ICD-10-CM

## 2021-09-21 DIAGNOSIS — Z95.2 H/O AORTIC VALVE REPLACEMENT: Primary | ICD-10-CM

## 2021-09-21 PROCEDURE — 99214 OFFICE O/P EST MOD 30 MIN: CPT | Performed by: INTERNAL MEDICINE

## 2021-09-21 NOTE — PATIENT INSTRUCTIONS
Ms Constanza Coles,  I enjoyed visiting with you again today.  I am concerned with the shortness of breath.  Per our conversation I think pursuing the angiography is very reasonable.  I will plan on seeing you thereafter.  Mathew Antoine

## 2021-09-21 NOTE — TELEPHONE ENCOUNTER
Pt arranged for Angiogram on Monday 9/27/21. Verbal order from Dr. Antoine to HOLD Warfarin, Friday, Sat and Sunday. Give Lovenox injection 1mg/kg 12 hours a part x 2 on Saturday only. MEAGANRn

## 2021-09-21 NOTE — PROGRESS NOTES
[facility]  Heart Care Clinic Follow-up Note    Assessment & Plan        (I25.10,  I25.83) Coronary atherosclerosis due to lipid rich plaque  (primary encounter diagnosis)  Comment: angiography in 2005 showed normal left main, 50% proximal left anterior descending with a distal 90% left anterior descending lesion, circumflex was normal as was the right coronary artery. She had a LIMA to the LAD and a vein graft to the first diagonal with her valve surgery in 2005.  Given her shortness of breath she was offered stress testing in the past and she declined.  The shortness of breath worsened and CTA showed normal left main, occluded LAD with a graft to the distal portion, circumflex with a possible proximal 70 to 90% lesion, and right coronary artery with only about a 50% lesion.  Suspect the specificity of the coronary CTA is not that accurate and she declined invasive intervention in any event and thus medical therapy. Symptoms worsened, so will pursue angiography.    (I35.9) Aortic Stenosis  Comment: due to severe aortic stenosis she had a 21 mm Forte pericardial magna valve placed in 2005 and based on echo last year working well with a dimensionless index of 0.7, mean gradient of 6 mmHg and mean velocity of 1.2 m/s.  Given that this tissue valve was placed over 15 years ago we will recheck echo yearly. In addition, I have asked her to take aspirin daily and she declines telling me that she has horrible allergic reactions and stomach upset from this.    (I49.5) Sick Sinus Syndrome  Comment: Medtronic device with Medtronic leads placed in 2011 with only 6% ventricular pacing and noted to be in atrial fibrillation.  Rate responsiveness was made more aggressive and symptoms have resolved.     (I48.11) Longstanding persistent atrial fibrillation (H)  Comment: Chronic/permanent, not valvular, asymptomatic, and on chronic Coumadin with INR adjusted by primary.    (I77.6) Vasculitis (H)  Comment: So noted and  "asymptomatic with good distal pulses and no longer on methotrexate or seeing rheumatology.    (N18.31) Stage 3a chronic kidney disease  Comment: Most recent creatinine 0.98 with estimated GFR 53.    (Z95.1) S/P CABG (coronary artery bypass graft)  Comment: As above in 2005 LIMA to LAD and saphenous vein to diagonal.    (E78.00) Pure hypercholesterolemia  Comment: Cholesterol 142 with LDL of 52 from 2019.    Plan  1.  Pursue invasive angiography and follow-up with me thereafter.    Subjective  CC: 86-year-old white female here for follow-up visit to discuss possible angiography.  She tells me she does have shortness of breath which is not activity related unless she really pushes herself.  This did improve with reprogramming her pacer.  She does complain of middle of the back discomfort as well as left shoulder discomfort as well as left arm discomfort.  These are not effort related and she tells me that within 20 minutes of taking a nitroglycerin they may resolve.  There is no angina, palpitations, PND, orthopnea or peripheral edema.    Medications  Current Outpatient Medications   Medication Sig Dispense Refill     alendronate (FOSAMAX) 70 MG tablet Take 1 tablet (70 mg) by mouth every 7 days With large glass of water.  On an empty stomach eat after 1 hour 4 tablet 0     atorvastatin (LIPITOR) 10 MG tablet [ATORVASTATIN (LIPITOR) 10 MG TABLET] TAKE 1 TABLET (10 MG TOTAL) BY MOUTH DAILY. 90 tablet 3     cholecalciferol, vitamin D3, (VITAMIN D3) 5,000 unit Tab [CHOLECALCIFEROL, VITAMIN D3, (VITAMIN D3) 5,000 UNIT TAB] Take 1 tablet by mouth daily.       cyanocobalamin 1,000 mcg/mL injection [CYANOCOBALAMIN 1,000 MCG/ML INJECTION] INJECT 1 ML (1,000 MCG TOTAL) INTO THE SHOULDER, THIGH, OR BUTTOCKS EVERY 30 DAYS. **SYR 27G 1/2\" 1CC** 3 mL 3     furosemide (LASIX) 20 MG tablet [FUROSEMIDE (LASIX) 20 MG TABLET] TAKE 1 TABLET (20 MG TOTAL) BY MOUTH TWO TIMES A DAY. 180 tablet 1     gabapentin (NEURONTIN) 300 MG capsule " [GABAPENTIN (NEURONTIN) 300 MG CAPSULE] TAKE 1 CAPSULE (300 MG TOTAL) BY MOUTH 2 TIMES A DAY. 60 capsule 11     metoprolol succinate (TOPROL-XL) 25 MG [METOPROLOL SUCCINATE (TOPROL-XL) 25 MG] TAKE A HALF TABLET (12.5MG) BY MOUTH DAILY. 45 tablet 2     nitroGLYcerin (NITROSTAT) 0.4 MG sublingual tablet [NITROGLYCERIN (NITROSTAT) 0.4 MG SL TABLET] DISSOLVE 1 TABLET UNDER THE TONGUE AS NEEDED FOR CHEST PAIN 25 tablet 1     sucralfate (CARAFATE) 1 gram tablet [SUCRALFATE (CARAFATE) 1 GRAM TABLET] TAKE 1 TABLET BY MOUTH FOUR TIMES DAILY 120 tablet 11     warfarin ANTICOAGULANT (COUMADIN/JANTOVEN) 1 MG tablet [WARFARIN ANTICOAGULANT (COUMADIN/JANTOVEN) 1 MG TABLET] TAKE 1 TO 2 TABLETS BY MOUTH DAILY, AS DIRECTED. ADJUST DOSE BASED ON INR RESULTS. 120 tablet 1     metoprolol succinate (TOPROL-XL) 25 MG [METOPROLOL SUCCINATE (TOPROL-XL) 25 MG] TAKE 1/2 TABLET (12.5MG) BY MOUTH DAILY 45 tablet 1       Objective  /74 (BP Location: Right arm, Patient Position: Sitting, Cuff Size: Adult Regular)   Pulse 77   Resp 14   Wt 56.8 kg (125 lb 4.8 oz)   SpO2 99%   BMI 23.68 kg/m      General Appearance:    Alert, cooperative, no distress, appears stated age   Head:    Normocephalic, without obvious abnormality, atraumatic   Throat:   Lips, mucosa, and tongue normal; teeth and gums normal   Neck:   Supple, symmetrical, trachea midline, no adenopathy;        thyroid:  No enlargement/tenderness/nodules; no carotid    bruit or JVD   Back:     Symmetric, no curvature, ROM normal, no CVA tenderness   Lungs:     Clear to auscultation bilaterally, respirations unlabored   Chest wall:    No tenderness, midline sternotomy scar as well as left-sided pacemaker   Heart:    Regular rate and rhythm, S1 and S2 normal, 1/6 systolic ejection murmur, no  rub   or gallop   Abdomen:     Soft, non-tender, bowel sounds active all four quadrants,     no masses, no organomegaly   Extremities:   Normal, atraumatic, no cyanosis or edema   Pulses:    2+ and symmetric all extremities   Skin:   Skin color, texture, turgor normal, no rashes or lesions     Results    Lab Results personally reviewed   Lab Results   Component Value Date    CHOL 142 11/14/2019    CHOL 193 04/02/2019     Lab Results   Component Value Date    HDL 69 11/14/2019    HDL 58 04/02/2019     No components found for: LDLCALC  Lab Results   Component Value Date    TRIG 96 11/14/2019    TRIG 144 04/02/2019     Lab Results   Component Value Date    WBC 7.9 03/30/2021    HGB 12.1 03/30/2021    HCT 37.5 03/30/2021     03/30/2021     Lab Results   Component Value Date    BUN 18 08/18/2021     08/18/2021    CO2 27 08/18/2021     Review of Systems:   Enc Vitals  BP: 130/74  Pulse: 77  Resp: 14  SpO2: 99 %  Weight: 56.8 kg (125 lb 4.8 oz)

## 2021-09-21 NOTE — TELEPHONE ENCOUNTER
===View-only below this line===  ----- Message -----  From: Rajan López  Sent: 9/21/2021   4:13 PM CDT  To: Arnoldo Peoples RN  Subject: [facility] ORDERING                                     CORS POSS PCI WITH MY/CJP     630AM ADMIT ON 9/27    H&P ON 9/21 WITH LBF     ALERTS: MDT DEVICE, CABG + GRAFTS, WARFARIN     COVID TESTING ON 9/24 AT [facility]

## 2021-09-21 NOTE — LETTER
9/21/2021    Yun Jacinto MD  [facility] [address] [facility] [address]    RE: Constanza Coles       Dear Colleague,    I had the pleasure of seeing Constanza Coles in the [facility].        [facility]  Heart Care Clinic Follow-up Note    Assessment & Plan        (I25.10,  I25.83) Coronary atherosclerosis due to lipid rich plaque  (primary encounter diagnosis)  Comment: angiography in 2005 showed normal left main, 50% proximal left anterior descending with a distal 90% left anterior descending lesion, circumflex was normal as was the right coronary artery. She had a LIMA to the LAD and a vein graft to the first diagonal with her valve surgery in 2005.  Given her shortness of breath she was offered stress testing in the past and she declined.  The shortness of breath worsened and CTA showed normal left main, occluded LAD with a graft to the distal portion, circumflex with a possible proximal 70 to 90% lesion, and right coronary artery with only about a 50% lesion.  Suspect the specificity of the coronary CTA is not that accurate and she declined invasive intervention in any event and thus medical therapy. Symptoms worsened, so will pursue angiography.    (I35.9) Aortic Stenosis  Comment: due to severe aortic stenosis she had a 21 mm Forte pericardial magna valve placed in 2005 and based on echo last year working well with a dimensionless index of 0.7, mean gradient of 6 mmHg and mean velocity of 1.2 m/s.  Given that this tissue valve was placed over 15 years ago we will recheck echo yearly. In addition, I have asked her to take aspirin daily and she declines telling me that she has horrible allergic reactions and stomach upset from this.    (I49.5) Sick Sinus Syndrome  Comment: Medtronic device with Medtronic leads placed in 2011 with only 6% ventricular pacing and noted to be in atrial fibrillation.  Rate  responsiveness was made more aggressive and symptoms have resolved.     (I48.11) Longstanding persistent atrial fibrillation (H)  Comment: Chronic/permanent, not valvular, asymptomatic, and on chronic Coumadin with INR adjusted by primary.    (I77.6) Vasculitis (H)  Comment: So noted and asymptomatic with good distal pulses and no longer on methotrexate or seeing rheumatology.    (N18.31) Stage 3a chronic kidney disease  Comment: Most recent creatinine 0.98 with estimated GFR 53.    (Z95.1) S/P CABG (coronary artery bypass graft)  Comment: As above in 2005 LIMA to LAD and saphenous vein to diagonal.    (E78.00) Pure hypercholesterolemia  Comment: Cholesterol 142 with LDL of 52 from 2019.    Plan  1.  Pursue invasive angiography and follow-up with me thereafter.    Subjective  CC: 86-year-old white female here for follow-up visit to discuss possible angiography.  She tells me she does have shortness of breath which is not activity related unless she really pushes herself.  This did improve with reprogramming her pacer.  She does complain of middle of the back discomfort as well as left shoulder discomfort as well as left arm discomfort.  These are not effort related and she tells me that within 20 minutes of taking a nitroglycerin they may resolve.  There is no angina, palpitations, PND, orthopnea or peripheral edema.    Medications  Current Outpatient Medications   Medication Sig Dispense Refill     alendronate (FOSAMAX) 70 MG tablet Take 1 tablet (70 mg) by mouth every 7 days With large glass of water.  On an empty stomach eat after 1 hour 4 tablet 0     atorvastatin (LIPITOR) 10 MG tablet [ATORVASTATIN (LIPITOR) 10 MG TABLET] TAKE 1 TABLET (10 MG TOTAL) BY MOUTH DAILY. 90 tablet 3     cholecalciferol, vitamin D3, (VITAMIN D3) 5,000 unit Tab [CHOLECALCIFEROL, VITAMIN D3, (VITAMIN D3) 5,000 UNIT TAB] Take 1 tablet by mouth daily.       cyanocobalamin 1,000 mcg/mL injection [CYANOCOBALAMIN 1,000 MCG/ML INJECTION]  "INJECT 1 ML (1,000 MCG TOTAL) INTO THE SHOULDER, THIGH, OR BUTTOCKS EVERY 30 DAYS. **SYR 27G 1/2\" 1CC** 3 mL 3     furosemide (LASIX) 20 MG tablet [FUROSEMIDE (LASIX) 20 MG TABLET] TAKE 1 TABLET (20 MG TOTAL) BY MOUTH TWO TIMES A DAY. 180 tablet 1     gabapentin (NEURONTIN) 300 MG capsule [GABAPENTIN (NEURONTIN) 300 MG CAPSULE] TAKE 1 CAPSULE (300 MG TOTAL) BY MOUTH 2 TIMES A DAY. 60 capsule 11     metoprolol succinate (TOPROL-XL) 25 MG [METOPROLOL SUCCINATE (TOPROL-XL) 25 MG] TAKE A HALF TABLET (12.5MG) BY MOUTH DAILY. 45 tablet 2     nitroGLYcerin (NITROSTAT) 0.4 MG sublingual tablet [NITROGLYCERIN (NITROSTAT) 0.4 MG SL TABLET] DISSOLVE 1 TABLET UNDER THE TONGUE AS NEEDED FOR CHEST PAIN 25 tablet 1     sucralfate (CARAFATE) 1 gram tablet [SUCRALFATE (CARAFATE) 1 GRAM TABLET] TAKE 1 TABLET BY MOUTH FOUR TIMES DAILY 120 tablet 11     warfarin ANTICOAGULANT (COUMADIN/JANTOVEN) 1 MG tablet [WARFARIN ANTICOAGULANT (COUMADIN/JANTOVEN) 1 MG TABLET] TAKE 1 TO 2 TABLETS BY MOUTH DAILY, AS DIRECTED. ADJUST DOSE BASED ON INR RESULTS. 120 tablet 1     metoprolol succinate (TOPROL-XL) 25 MG [METOPROLOL SUCCINATE (TOPROL-XL) 25 MG] TAKE 1/2 TABLET (12.5MG) BY MOUTH DAILY 45 tablet 1       Objective  /74 (BP Location: Right arm, Patient Position: Sitting, Cuff Size: Adult Regular)   Pulse 77   Resp 14   Wt 56.8 kg (125 lb 4.8 oz)   SpO2 99%   BMI 23.68 kg/m      General Appearance:    Alert, cooperative, no distress, appears stated age   Head:    Normocephalic, without obvious abnormality, atraumatic   Throat:   Lips, mucosa, and tongue normal; teeth and gums normal   Neck:   Supple, symmetrical, trachea midline, no adenopathy;        thyroid:  No enlargement/tenderness/nodules; no carotid    bruit or JVD   Back:     Symmetric, no curvature, ROM normal, no CVA tenderness   Lungs:     Clear to auscultation bilaterally, respirations unlabored   Chest wall:    No tenderness, midline sternotomy scar as well as left-sided " pacemaker   Heart:    Regular rate and rhythm, S1 and S2 normal, 1/6 systolic ejection murmur, no  rub   or gallop   Abdomen:     Soft, non-tender, bowel sounds active all four quadrants,     no masses, no organomegaly   Extremities:   Normal, atraumatic, no cyanosis or edema   Pulses:   2+ and symmetric all extremities   Skin:   Skin color, texture, turgor normal, no rashes or lesions     Results    Lab Results personally reviewed   Lab Results   Component Value Date    CHOL 142 11/14/2019    CHOL 193 04/02/2019     Lab Results   Component Value Date    HDL 69 11/14/2019    HDL 58 04/02/2019     No components found for: LDLCALC  Lab Results   Component Value Date    TRIG 96 11/14/2019    TRIG 144 04/02/2019     Lab Results   Component Value Date    WBC 7.9 03/30/2021    HGB 12.1 03/30/2021    HCT 37.5 03/30/2021     03/30/2021     Lab Results   Component Value Date    BUN 18 08/18/2021     08/18/2021    CO2 27 08/18/2021     Review of Systems:   Enc Vitals  BP: 130/74  Pulse: 77  Resp: 14  SpO2: 99 %  Weight: 56.8 kg (125 lb 4.8 oz)                                              Thank you for allowing me to participate in the care of your patient.      Sincerely,     RY FRANCE MD     [facility]  cc:   No referring provider defined for this encounter.

## 2021-09-21 NOTE — TELEPHONE ENCOUNTER
Verbal order from LBF to see patient to arrange CORS poss PCI, non-urgent. Met with patient  Alerts Warfarin, Medtronic device and +Grafts    Case request placed and covid test ordered. MEAGAN,RN

## 2021-09-21 NOTE — H&P (VIEW-ONLY)
[facility]  Heart Care Clinic Follow-up Note    Assessment & Plan        (I25.10,  I25.83) Coronary atherosclerosis due to lipid rich plaque  (primary encounter diagnosis)  Comment: angiography in 2005 showed normal left main, 50% proximal left anterior descending with a distal 90% left anterior descending lesion, circumflex was normal as was the right coronary artery. She had a LIMA to the LAD and a vein graft to the first diagonal with her valve surgery in 2005.  Given her shortness of breath she was offered stress testing in the past and she declined.  The shortness of breath worsened and CTA showed normal left main, occluded LAD with a graft to the distal portion, circumflex with a possible proximal 70 to 90% lesion, and right coronary artery with only about a 50% lesion.  Suspect the specificity of the coronary CTA is not that accurate and she declined invasive intervention in any event and thus medical therapy. Symptoms worsened, so will pursue angiography.    (I35.9) Aortic Stenosis  Comment: due to severe aortic stenosis she had a 21 mm Forte pericardial magna valve placed in 2005 and based on echo last year working well with a dimensionless index of 0.7, mean gradient of 6 mmHg and mean velocity of 1.2 m/s.  Given that this tissue valve was placed over 15 years ago we will recheck echo yearly. In addition, I have asked her to take aspirin daily and she declines telling me that she has horrible allergic reactions and stomach upset from this.    (I49.5) Sick Sinus Syndrome  Comment: Medtronic device with Medtronic leads placed in 2011 with only 6% ventricular pacing and noted to be in atrial fibrillation.  Rate responsiveness was made more aggressive and symptoms have resolved.     (I48.11) Longstanding persistent atrial fibrillation (H)  Comment: Chronic/permanent, not valvular, asymptomatic, and on chronic Coumadin with INR adjusted by primary.    (I77.6) Vasculitis (H)  Comment: So noted and  "asymptomatic with good distal pulses and no longer on methotrexate or seeing rheumatology.    (N18.31) Stage 3a chronic kidney disease  Comment: Most recent creatinine 0.98 with estimated GFR 53.    (Z95.1) S/P CABG (coronary artery bypass graft)  Comment: As above in 2005 LIMA to LAD and saphenous vein to diagonal.    (E78.00) Pure hypercholesterolemia  Comment: Cholesterol 142 with LDL of 52 from 2019.    Plan  1.  Pursue invasive angiography and follow-up with me thereafter.    Subjective  CC: 86-year-old white female here for follow-up visit to discuss possible angiography.  She tells me she does have shortness of breath which is not activity related unless she really pushes herself.  This did improve with reprogramming her pacer.  She does complain of middle of the back discomfort as well as left shoulder discomfort as well as left arm discomfort.  These are not effort related and she tells me that within 20 minutes of taking a nitroglycerin they may resolve.  There is no angina, palpitations, PND, orthopnea or peripheral edema.    Medications  Current Outpatient Medications   Medication Sig Dispense Refill     alendronate (FOSAMAX) 70 MG tablet Take 1 tablet (70 mg) by mouth every 7 days With large glass of water.  On an empty stomach eat after 1 hour 4 tablet 0     atorvastatin (LIPITOR) 10 MG tablet [ATORVASTATIN (LIPITOR) 10 MG TABLET] TAKE 1 TABLET (10 MG TOTAL) BY MOUTH DAILY. 90 tablet 3     cholecalciferol, vitamin D3, (VITAMIN D3) 5,000 unit Tab [CHOLECALCIFEROL, VITAMIN D3, (VITAMIN D3) 5,000 UNIT TAB] Take 1 tablet by mouth daily.       cyanocobalamin 1,000 mcg/mL injection [CYANOCOBALAMIN 1,000 MCG/ML INJECTION] INJECT 1 ML (1,000 MCG TOTAL) INTO THE SHOULDER, THIGH, OR BUTTOCKS EVERY 30 DAYS. **SYR 27G 1/2\" 1CC** 3 mL 3     furosemide (LASIX) 20 MG tablet [FUROSEMIDE (LASIX) 20 MG TABLET] TAKE 1 TABLET (20 MG TOTAL) BY MOUTH TWO TIMES A DAY. 180 tablet 1     gabapentin (NEURONTIN) 300 MG capsule " [GABAPENTIN (NEURONTIN) 300 MG CAPSULE] TAKE 1 CAPSULE (300 MG TOTAL) BY MOUTH 2 TIMES A DAY. 60 capsule 11     metoprolol succinate (TOPROL-XL) 25 MG [METOPROLOL SUCCINATE (TOPROL-XL) 25 MG] TAKE A HALF TABLET (12.5MG) BY MOUTH DAILY. 45 tablet 2     nitroGLYcerin (NITROSTAT) 0.4 MG sublingual tablet [NITROGLYCERIN (NITROSTAT) 0.4 MG SL TABLET] DISSOLVE 1 TABLET UNDER THE TONGUE AS NEEDED FOR CHEST PAIN 25 tablet 1     sucralfate (CARAFATE) 1 gram tablet [SUCRALFATE (CARAFATE) 1 GRAM TABLET] TAKE 1 TABLET BY MOUTH FOUR TIMES DAILY 120 tablet 11     warfarin ANTICOAGULANT (COUMADIN/JANTOVEN) 1 MG tablet [WARFARIN ANTICOAGULANT (COUMADIN/JANTOVEN) 1 MG TABLET] TAKE 1 TO 2 TABLETS BY MOUTH DAILY, AS DIRECTED. ADJUST DOSE BASED ON INR RESULTS. 120 tablet 1     metoprolol succinate (TOPROL-XL) 25 MG [METOPROLOL SUCCINATE (TOPROL-XL) 25 MG] TAKE 1/2 TABLET (12.5MG) BY MOUTH DAILY 45 tablet 1       Objective  /74 (BP Location: Right arm, Patient Position: Sitting, Cuff Size: Adult Regular)   Pulse 77   Resp 14   Wt 56.8 kg (125 lb 4.8 oz)   SpO2 99%   BMI 23.68 kg/m      General Appearance:    Alert, cooperative, no distress, appears stated age   Head:    Normocephalic, without obvious abnormality, atraumatic   Throat:   Lips, mucosa, and tongue normal; teeth and gums normal   Neck:   Supple, symmetrical, trachea midline, no adenopathy;        thyroid:  No enlargement/tenderness/nodules; no carotid    bruit or JVD   Back:     Symmetric, no curvature, ROM normal, no CVA tenderness   Lungs:     Clear to auscultation bilaterally, respirations unlabored   Chest wall:    No tenderness, midline sternotomy scar as well as left-sided pacemaker   Heart:    Regular rate and rhythm, S1 and S2 normal, 1/6 systolic ejection murmur, no  rub   or gallop   Abdomen:     Soft, non-tender, bowel sounds active all four quadrants,     no masses, no organomegaly   Extremities:   Normal, atraumatic, no cyanosis or edema   Pulses:    2+ and symmetric all extremities   Skin:   Skin color, texture, turgor normal, no rashes or lesions     Results    Lab Results personally reviewed   Lab Results   Component Value Date    CHOL 142 11/14/2019    CHOL 193 04/02/2019     Lab Results   Component Value Date    HDL 69 11/14/2019    HDL 58 04/02/2019     No components found for: LDLCALC  Lab Results   Component Value Date    TRIG 96 11/14/2019    TRIG 144 04/02/2019     Lab Results   Component Value Date    WBC 7.9 03/30/2021    HGB 12.1 03/30/2021    HCT 37.5 03/30/2021     03/30/2021     Lab Results   Component Value Date    BUN 18 08/18/2021     08/18/2021    CO2 27 08/18/2021     Review of Systems:   Enc Vitals  BP: 130/74  Pulse: 77  Resp: 14  SpO2: 99 %  Weight: 56.8 kg (125 lb 4.8 oz)

## 2021-09-23 DIAGNOSIS — M81.0 AGE-RELATED OSTEOPOROSIS WITHOUT CURRENT PATHOLOGICAL FRACTURE: ICD-10-CM

## 2021-09-23 RX ORDER — ALENDRONATE SODIUM 70 MG/1
70 TABLET ORAL
Qty: 12 TABLET | Refills: 3 | Status: SHIPPED | OUTPATIENT
Start: 2021-09-23 | End: 2021-11-09

## 2021-09-23 NOTE — TELEPHONE ENCOUNTER
Reason for Call:  Medication or medication refill:    Do you use a [facility]?  Name of the pharmacy and phone number for the current request: [facility] drug-updated pharm    Name of the medication requested:   alendronate (FOSAMAX) 70 MG tablet 4 tablet 0 9/3/2021  --   Sig - Route: Take 1 tablet (70 mg) by mouth every 7 days With large glass of water.  On an empty stomach eat after 1 hour - Oral         Other request:     Can we leave a detailed message on this number? Not Applicable    Phone number patient can be reached at: Home number on file [phone] (home)    Best Time: any    Call taken on 9/23/2021 at 1:16 PM by Pam J. Behr

## 2021-09-24 ENCOUNTER — PREP FOR PROCEDURE (OUTPATIENT)
Dept: CARDIOLOGY | Facility: CLINIC | Age: 86
End: 2021-09-24

## 2021-09-24 ENCOUNTER — LAB (OUTPATIENT)
Dept: LAB | Facility: CLINIC | Age: 86
End: 2021-09-24
Payer: COMMERCIAL

## 2021-09-24 DIAGNOSIS — I25.10 CORONARY ATHEROSCLEROSIS: ICD-10-CM

## 2021-09-24 DIAGNOSIS — R06.09 DOE (DYSPNEA ON EXERTION): Primary | ICD-10-CM

## 2021-09-24 DIAGNOSIS — I25.83 CORONARY ATHEROSCLEROSIS DUE TO LIPID RICH PLAQUE: ICD-10-CM

## 2021-09-24 DIAGNOSIS — Z95.1 S/P CABG (CORONARY ARTERY BYPASS GRAFT): ICD-10-CM

## 2021-09-24 PROCEDURE — U0003 INFECTIOUS AGENT DETECTION BY NUCLEIC ACID (DNA OR RNA); SEVERE ACUTE RESPIRATORY SYNDROME CORONAVIRUS 2 (SARS-COV-2) (CORONAVIRUS DISEASE [COVID-19]), AMPLIFIED PROBE TECHNIQUE, MAKING USE OF HIGH THROUGHPUT TECHNOLOGIES AS DESCRIBED BY CMS-2020-01-R: HCPCS

## 2021-09-24 PROCEDURE — U0005 INFEC AGEN DETEC AMPLI PROBE: HCPCS

## 2021-09-24 RX ORDER — LIDOCAINE 40 MG/G
CREAM TOPICAL
Status: CANCELLED | OUTPATIENT
Start: 2021-09-24

## 2021-09-24 RX ORDER — FENTANYL CITRATE 50 UG/ML
25 INJECTION, SOLUTION INTRAMUSCULAR; INTRAVENOUS
Status: CANCELLED | OUTPATIENT
Start: 2021-09-24

## 2021-09-24 RX ORDER — SODIUM CHLORIDE 9 MG/ML
INJECTION, SOLUTION INTRAVENOUS CONTINUOUS
Status: CANCELLED | OUTPATIENT
Start: 2021-09-24

## 2021-09-24 RX ORDER — WARFARIN SODIUM 1 MG/1
1-2 TABLET ORAL SEE ADMIN INSTRUCTIONS
COMMUNITY
End: 2022-01-07

## 2021-09-24 RX ORDER — FUROSEMIDE 20 MG
20 TABLET ORAL 2 TIMES DAILY PRN
COMMUNITY
End: 2022-03-31

## 2021-09-24 RX ORDER — DIAZEPAM 5 MG
5 TABLET ORAL
Status: CANCELLED | OUTPATIENT
Start: 2021-09-24

## 2021-09-24 NOTE — TELEPHONE ENCOUNTER
"PC with patient and discussion. Pt did not take her Warfarin today, and is holding until procedure over as instructed. Pt aware of Lovenox injection , on Saturday only for 2 doses as \"bridging\" injection, 12 hours a part [id number], and , etc. Pt verbalized understanding. Rx sent to verified pharmacy on file. Terrance RHODES     Educaiton for procedure completed via phone. No questions at this time. Pt verbalized understanding. Pt states that she can not and will not take Aspirin pre-procedure as it makes her vomit. Staff message sent to providers to inform. Also states that in the past she had an angiogram through the wrist and she has vasculitis in the arms/shoulders and it made the angiogram challenging. Staff message sent to interventionalists. Will place orders and document education.CMM,Rn   "

## 2021-09-24 NOTE — PROGRESS NOTES
Constanza BACON [facility]  [address] [place] [address]  [phone] (home)     Procedure cardiologist:  Dr. Mcclendon or Dr. Regan   PCP:  Yun Jacinto  H&P completed by:  Dr. Antoine 9/21/21  Admit date 09/27/21  Arrival time:  0630  Anticoagulation: Warfarin. HOLD Friday, Saturday, Sunday, and Monday morning of procedure. Give Lovenox 60 mg every 12 hours, take 2 doses on Saturday only.  CPAP: No  Previous PCI: Yes.  Bypass Grafts: Yes  Renal Issues: No  Diabetic?: No  Device?: Yes  Type:  Medtronic Permanent Pacemaker   Covid-19 Test Date: 9/24/21  Location: [facility] (viewable in Epic) Patient understands after they get their test, they are to self-isolate at home until their procedure. They will only be called back if there results are positive.  Ambulatory?: Independently  ++ Patient has vascularitis in her shoulders/arms    Angiogram Teaching    Reason for Visit:  Telephone call to discuss pre-procedure education in preparation for: Coronary Angiogram with Possible Percutaneous Coronary Intervention     Procedure Prep:  Primary Cardiologist note dated: 9/21/21  EKG results obtained, dated: Morning of procedure  Hemogram results obtained: Morning of procedure  Basic Metabolic Panel results obtained: Morning of procedure   Lipid Profile results obtained: Morning of procedure  INR: Morning of procedure    Pre-procedure instructions  Patient instructed to be NPO after midnight.  Patient instructed to shower the evening before or the morning of the procedure.  Leave all valuables at home (jewlery, rings, watches, large amounts of money).  Patient understands there is one visitor allowed during patients stay. Visitor will need to wear a mask their entire stay and remain in the restricted area per guidelines.   Patient instructed to arrange for transportation home following procedure from a responsible family member of friend. No driving for at least 24 hours post-procedure.  Patient instructed to have a  responsible adult with them for 24 hours post-procedure.  Post-procedure follow up process.  Conscious sedation discussed.    Pre-procedure medication instructions  Patient instructed on antiplatelet medication.  Continue medications as scheduled, with a small amount of water on the day of the procedure unless indicated.  Patient instructed to take 325 mg of Aspirin am of procedure: Yes   Patient states that she will not take the Aspirin, as it makes her vomit.  Other medication: Morning of procedure please HOLD all vitamins, minerals, and supplements. Discussion of aspirin, pt will not take due to vomiting. Please HOLD Furosemide morning of procedure. HOLD Warfarin Friday, Saturday, Sunday, and Monday. Take Lovenox 60 mg every 12 hours, for 2 shots on Saturday only. Will be instructed when to resume post-procedure. Instructed to take Metoprolol, Carafate, and Gabapentin morning of procedure.     *PATIENTS RECORDS AVAILABLE IN [facility] UNLESS OTHERWISE INDICATED*      Patient Active Problem List   Diagnosis     Coronary Artery Disease     Aortic Stenosis     Sick Sinus Syndrome     Congestive Heart Failure     H/O aortic valve replacement     Atrial fibrillation (H)     Vitamin B 12 deficiency     Vasculitis (H)     Cardiac pacemaker in situ, dual chamber     S/P CABG (coronary artery bypass graft)     Pure hypercholesterolemia     Chronic kidney disease, stage 3       Current Outpatient Medications   Medication Sig Dispense Refill     alendronate (FOSAMAX) 70 MG tablet Take 1 tablet (70 mg) by mouth every 7 days With large glass of water.  On an empty stomach eat after 1 hour 12 tablet 3     atorvastatin (LIPITOR) 10 MG tablet [ATORVASTATIN (LIPITOR) 10 MG TABLET] TAKE 1 TABLET (10 MG TOTAL) BY MOUTH DAILY. 90 tablet 3     cholecalciferol, vitamin D3, (VITAMIN D3) 5,000 unit Tab [CHOLECALCIFEROL, VITAMIN D3, (VITAMIN D3) 5,000 UNIT TAB] Take 1 tablet by mouth daily.       cyanocobalamin 1,000 mcg/mL injection  "[CYANOCOBALAMIN 1,000 MCG/ML INJECTION] INJECT 1 ML (1,000 MCG TOTAL) INTO THE SHOULDER, THIGH, OR BUTTOCKS EVERY 30 DAYS. **SYR 27G 1/2\" 1CC** 3 mL 3     enoxaparin ANTICOAGULANT (LOVENOX ANTICOAGULANT) 60 MG/0.6ML syringe Inject 0.6 mLs (60 mg) Subcutaneous every 12 hours for 2 doses Give Lovenox 60 mg subcutaneous every 12 hours, on Saturday only for 2 doses. 1.2 mL 0     furosemide (LASIX) 20 MG tablet Take 20 mg by mouth 2 times daily as needed       gabapentin (NEURONTIN) 300 MG capsule [GABAPENTIN (NEURONTIN) 300 MG CAPSULE] TAKE 1 CAPSULE (300 MG TOTAL) BY MOUTH 2 TIMES A DAY. 60 capsule 11     metoprolol succinate (TOPROL-XL) 25 MG [METOPROLOL SUCCINATE (TOPROL-XL) 25 MG] TAKE A HALF TABLET (12.5MG) BY MOUTH DAILY. 45 tablet 2     nitroGLYcerin (NITROSTAT) 0.4 MG sublingual tablet [NITROGLYCERIN (NITROSTAT) 0.4 MG SL TABLET] DISSOLVE 1 TABLET UNDER THE TONGUE AS NEEDED FOR CHEST PAIN 25 tablet 1     sucralfate (CARAFATE) 1 gram tablet [SUCRALFATE (CARAFATE) 1 GRAM TABLET] TAKE 1 TABLET BY MOUTH FOUR TIMES DAILY 120 tablet 11     warfarin ANTICOAGULANT (COUMADIN) 1 MG tablet Take 1 mg by mouth daily 1 mg  5 days a week; 2 mg Tuesday and Friday.         Allergies   Allergen Reactions     Aspirin Nausea and Vomiting     Vomiting. Pt will NOT take Aspirin.      Erythromycin Base [Erythromycin] Unknown     Pt is not sure she is allergic to this     Levaquin [Levofloxacin] Nausea and Vomiting     Vancomycin Other (See Comments)     Red man syndrome     Xanax [Alprazolam] Other (See Comments)     confusion     Sulfa (Sulfonamide Antibiotics) [Sulfa Drugs] Rash       Plan: Patient education completed. Opportunity to ask questions and have them answered. None further at this time. Patient verbalized understanding of responsible adult for 24 hours and  post-procedure at time of discharge. Patient states that she will be accompanied by her . Pt ready for procedure.       NENA Peoples RN "

## 2021-09-25 LAB — SARS-COV-2 RNA RESP QL NAA+PROBE: NEGATIVE

## 2021-09-27 ENCOUNTER — HOSPITAL ENCOUNTER (OUTPATIENT)
Facility: HOSPITAL | Age: 86
Setting detail: OBSERVATION
Discharge: HOME | End: 2021-09-28
Attending: INTERNAL MEDICINE | Admitting: INTERNAL MEDICINE
Payer: COMMERCIAL

## 2021-09-27 DIAGNOSIS — R06.09 DOE (DYSPNEA ON EXERTION): ICD-10-CM

## 2021-09-27 DIAGNOSIS — I25.10 CORONARY ATHEROSCLEROSIS: ICD-10-CM

## 2021-09-27 DIAGNOSIS — I35.9 AORTIC VALVE DISORDER: ICD-10-CM

## 2021-09-27 DIAGNOSIS — Z95.1 S/P CABG (CORONARY ARTERY BYPASS GRAFT): ICD-10-CM

## 2021-09-27 DIAGNOSIS — I25.83 CORONARY ATHEROSCLEROSIS DUE TO LIPID RICH PLAQUE: ICD-10-CM

## 2021-09-27 DIAGNOSIS — I48.11 LONGSTANDING PERSISTENT ATRIAL FIBRILLATION (H): Primary | ICD-10-CM

## 2021-09-27 PROBLEM — Z98.61 PERCUTANEOUS TRANSLUMINAL CORONARY ANGIOPLASTY STATUS: Status: ACTIVE | Noted: 2021-09-27

## 2021-09-27 LAB
ACT BLD: 360 SECONDS (ref 74–150)
ANION GAP SERPL CALCULATED.3IONS-SCNC: 6 MMOL/L (ref 5–18)
ATRIAL RATE - MUSE: 100 BPM
ATRIAL RATE - MUSE: 73 BPM
BUN SERPL-MCNC: 21 MG/DL (ref 8–28)
CALCIUM SERPL-MCNC: 9.3 MG/DL (ref 8.5–10.5)
CHLORIDE BLD-SCNC: 103 MMOL/L (ref 98–107)
CO2 SERPL-SCNC: 29 MMOL/L (ref 22–31)
CREAT SERPL-MCNC: 0.88 MG/DL (ref 0.6–1.1)
DIASTOLIC BLOOD PRESSURE - MUSE: NORMAL MMHG
DIASTOLIC BLOOD PRESSURE - MUSE: NORMAL MMHG
ERYTHROCYTE [DISTWIDTH] IN BLOOD BY AUTOMATED COUNT: 14.1 % (ref 10–15)
GFR SERPL CREATININE-BSD FRML MDRD: 61 ML/MIN/1.73M2
GLUCOSE BLD-MCNC: 91 MG/DL (ref 70–125)
HCT VFR BLD AUTO: 36.3 % (ref 35–47)
HGB BLD-MCNC: 11.9 G/DL (ref 11.7–15.7)
INR PPP: 1.22 (ref 0.9–1.15)
INTERPRETATION ECG - MUSE: NORMAL
INTERPRETATION ECG - MUSE: NORMAL
MCH RBC QN AUTO: 30 PG (ref 26.5–33)
MCHC RBC AUTO-ENTMCNC: 32.8 G/DL (ref 31.5–36.5)
MCV RBC AUTO: 91 FL (ref 78–100)
P AXIS - MUSE: NORMAL DEGREES
P AXIS - MUSE: NORMAL DEGREES
PLATELET # BLD AUTO: 203 10E3/UL (ref 150–450)
POTASSIUM BLD-SCNC: 4 MMOL/L (ref 3.5–5)
PR INTERVAL - MUSE: NORMAL MS
PR INTERVAL - MUSE: NORMAL MS
QRS DURATION - MUSE: 86 MS
QRS DURATION - MUSE: 94 MS
QT - MUSE: 394 MS
QT - MUSE: 418 MS
QTC - MUSE: 434 MS
QTC - MUSE: 457 MS
R AXIS - MUSE: -26 DEGREES
R AXIS - MUSE: -27 DEGREES
RBC # BLD AUTO: 3.97 10E6/UL (ref 3.8–5.2)
SODIUM SERPL-SCNC: 138 MMOL/L (ref 136–145)
SYSTOLIC BLOOD PRESSURE - MUSE: NORMAL MMHG
SYSTOLIC BLOOD PRESSURE - MUSE: NORMAL MMHG
T AXIS - MUSE: 81 DEGREES
T AXIS - MUSE: 90 DEGREES
VENTRICULAR RATE- MUSE: 65 BPM
VENTRICULAR RATE- MUSE: 81 BPM
WBC # BLD AUTO: 8.6 10E3/UL (ref 4–11)

## 2021-09-27 PROCEDURE — 99153 MOD SED SAME PHYS/QHP EA: CPT | Performed by: INTERNAL MEDICINE

## 2021-09-27 PROCEDURE — G0378 HOSPITAL OBSERVATION PER HR: HCPCS

## 2021-09-27 PROCEDURE — 92928 PRQ TCAT PLMT NTRAC ST 1 LES: CPT | Mod: LC | Performed by: INTERNAL MEDICINE

## 2021-09-27 PROCEDURE — 93010 ELECTROCARDIOGRAM REPORT: CPT | Mod: 77 | Performed by: INTERNAL MEDICINE

## 2021-09-27 PROCEDURE — 99152 MOD SED SAME PHYS/QHP 5/>YRS: CPT | Performed by: INTERNAL MEDICINE

## 2021-09-27 PROCEDURE — 93010 ELECTROCARDIOGRAM REPORT: CPT | Performed by: INTERNAL MEDICINE

## 2021-09-27 PROCEDURE — 93459 L HRT ART/GRFT ANGIO: CPT | Mod: 26 | Performed by: INTERNAL MEDICINE

## 2021-09-27 PROCEDURE — 85610 PROTHROMBIN TIME: CPT | Performed by: INTERNAL MEDICINE

## 2021-09-27 PROCEDURE — 250N000013 HC RX MED GY IP 250 OP 250 PS 637: Performed by: INTERNAL MEDICINE

## 2021-09-27 PROCEDURE — 96374 THER/PROPH/DIAG INJ IV PUSH: CPT

## 2021-09-27 PROCEDURE — C1760 CLOSURE DEV, VASC: HCPCS | Performed by: INTERNAL MEDICINE

## 2021-09-27 PROCEDURE — 99214 OFFICE O/P EST MOD 30 MIN: CPT | Performed by: INTERNAL MEDICINE

## 2021-09-27 PROCEDURE — 255N000002 HC RX 255 OP 636: Performed by: INTERNAL MEDICINE

## 2021-09-27 PROCEDURE — C1887 CATHETER, GUIDING: HCPCS | Performed by: INTERNAL MEDICINE

## 2021-09-27 PROCEDURE — 99207 PR CDG-CODE CATEGORY CHANGED: CPT | Performed by: INTERNAL MEDICINE

## 2021-09-27 PROCEDURE — C1874 STENT, COATED/COV W/DEL SYS: HCPCS | Performed by: INTERNAL MEDICINE

## 2021-09-27 PROCEDURE — 93005 ELECTROCARDIOGRAM TRACING: CPT

## 2021-09-27 PROCEDURE — 999N000054 HC STATISTIC EKG NON-CHARGEABLE

## 2021-09-27 PROCEDURE — C1769 GUIDE WIRE: HCPCS | Performed by: INTERNAL MEDICINE

## 2021-09-27 PROCEDURE — 36415 COLL VENOUS BLD VENIPUNCTURE: CPT | Performed by: INTERNAL MEDICINE

## 2021-09-27 PROCEDURE — C1894 INTRO/SHEATH, NON-LASER: HCPCS | Performed by: INTERNAL MEDICINE

## 2021-09-27 PROCEDURE — 272N000001 HC OR GENERAL SUPPLY STERILE: Performed by: INTERNAL MEDICINE

## 2021-09-27 PROCEDURE — 250N000011 HC RX IP 250 OP 636: Performed by: INTERNAL MEDICINE

## 2021-09-27 PROCEDURE — 85347 COAGULATION TIME ACTIVATED: CPT

## 2021-09-27 PROCEDURE — C1725 CATH, TRANSLUMIN NON-LASER: HCPCS | Performed by: INTERNAL MEDICINE

## 2021-09-27 PROCEDURE — 85027 COMPLETE CBC AUTOMATED: CPT | Performed by: INTERNAL MEDICINE

## 2021-09-27 PROCEDURE — 250N000009 HC RX 250: Performed by: INTERNAL MEDICINE

## 2021-09-27 PROCEDURE — C9600 PERC DRUG-EL COR STENT SING: HCPCS | Performed by: INTERNAL MEDICINE

## 2021-09-27 PROCEDURE — 93459 L HRT ART/GRFT ANGIO: CPT | Performed by: INTERNAL MEDICINE

## 2021-09-27 PROCEDURE — 258N000003 HC RX IP 258 OP 636: Performed by: INTERNAL MEDICINE

## 2021-09-27 PROCEDURE — 80048 BASIC METABOLIC PNL TOTAL CA: CPT | Performed by: INTERNAL MEDICINE

## 2021-09-27 DEVICE — STENT CORONARY DES SYNERGY XD MR US 3.00X24MM H7493941824300: Type: IMPLANTABLE DEVICE | Site: CORONARY | Status: FUNCTIONAL

## 2021-09-27 RX ORDER — SODIUM CHLORIDE 9 MG/ML
INJECTION, SOLUTION INTRAVENOUS CONTINUOUS
Status: DISCONTINUED | OUTPATIENT
Start: 2021-09-27 | End: 2021-09-27 | Stop reason: HOSPADM

## 2021-09-27 RX ORDER — ATROPINE SULFATE 0.1 MG/ML
0.5 INJECTION INTRAVENOUS
Status: ACTIVE | OUTPATIENT
Start: 2021-09-27 | End: 2021-09-27

## 2021-09-27 RX ORDER — ATORVASTATIN CALCIUM 40 MG/1
40 TABLET, FILM COATED ORAL DAILY
Status: DISCONTINUED | OUTPATIENT
Start: 2021-09-28 | End: 2021-09-28 | Stop reason: HOSPADM

## 2021-09-27 RX ORDER — HEPARIN SODIUM 1000 [USP'U]/ML
INJECTION, SOLUTION INTRAVENOUS; SUBCUTANEOUS
Status: DISCONTINUED | OUTPATIENT
Start: 2021-09-27 | End: 2021-09-27 | Stop reason: HOSPADM

## 2021-09-27 RX ORDER — ONDANSETRON 4 MG/1
4 TABLET, ORALLY DISINTEGRATING ORAL EVERY 6 HOURS PRN
Status: DISCONTINUED | OUTPATIENT
Start: 2021-09-27 | End: 2021-09-28 | Stop reason: HOSPADM

## 2021-09-27 RX ORDER — PRASUGREL 10 MG/1
10 TABLET, FILM COATED ORAL DAILY
Status: DISCONTINUED | OUTPATIENT
Start: 2021-09-28 | End: 2021-09-28 | Stop reason: HOSPADM

## 2021-09-27 RX ORDER — FLUMAZENIL 0.1 MG/ML
0.2 INJECTION, SOLUTION INTRAVENOUS
Status: ACTIVE | OUTPATIENT
Start: 2021-09-27 | End: 2021-09-27

## 2021-09-27 RX ORDER — NITROGLYCERIN 0.4 MG/1
0.4 TABLET SUBLINGUAL EVERY 5 MIN PRN
Status: DISCONTINUED | OUTPATIENT
Start: 2021-09-27 | End: 2021-09-27

## 2021-09-27 RX ORDER — KETOCONAZOLE 20 MG/ML
SHAMPOO TOPICAL DAILY PRN
COMMUNITY
Start: 2021-09-20 | End: 2023-09-15

## 2021-09-27 RX ORDER — NITROGLYCERIN 0.4 MG/1
0.4 TABLET SUBLINGUAL EVERY 5 MIN PRN
Status: DISCONTINUED | OUTPATIENT
Start: 2021-09-27 | End: 2021-09-28 | Stop reason: HOSPADM

## 2021-09-27 RX ORDER — ALENDRONATE SODIUM 70 MG/1
70 TABLET ORAL
Status: DISCONTINUED | OUTPATIENT
Start: 2021-09-27 | End: 2021-09-27 | Stop reason: CLARIF

## 2021-09-27 RX ORDER — OXYCODONE HYDROCHLORIDE 5 MG/1
10 TABLET ORAL EVERY 4 HOURS PRN
Status: DISCONTINUED | OUTPATIENT
Start: 2021-09-27 | End: 2021-09-28 | Stop reason: HOSPADM

## 2021-09-27 RX ORDER — LIDOCAINE 40 MG/G
CREAM TOPICAL
Status: DISCONTINUED | OUTPATIENT
Start: 2021-09-27 | End: 2021-09-27 | Stop reason: HOSPADM

## 2021-09-27 RX ORDER — NITROGLYCERIN 5 MG/ML
VIAL (ML) INTRAVENOUS
Status: DISCONTINUED | OUTPATIENT
Start: 2021-09-27 | End: 2021-09-27 | Stop reason: HOSPADM

## 2021-09-27 RX ORDER — NALOXONE HYDROCHLORIDE 0.4 MG/ML
0.4 INJECTION, SOLUTION INTRAMUSCULAR; INTRAVENOUS; SUBCUTANEOUS
Status: ACTIVE | OUTPATIENT
Start: 2021-09-27 | End: 2021-09-27

## 2021-09-27 RX ORDER — PRASUGREL 10 MG/1
TABLET, FILM COATED ORAL
Status: DISCONTINUED | OUTPATIENT
Start: 2021-09-27 | End: 2021-09-27 | Stop reason: HOSPADM

## 2021-09-27 RX ORDER — METOPROLOL TARTRATE 1 MG/ML
5-10 INJECTION, SOLUTION INTRAVENOUS
Status: DISCONTINUED | OUTPATIENT
Start: 2021-09-27 | End: 2021-09-28 | Stop reason: HOSPADM

## 2021-09-27 RX ORDER — ONDANSETRON 2 MG/ML
4 INJECTION INTRAMUSCULAR; INTRAVENOUS EVERY 6 HOURS PRN
Status: DISCONTINUED | OUTPATIENT
Start: 2021-09-27 | End: 2021-09-28 | Stop reason: HOSPADM

## 2021-09-27 RX ORDER — ACETAMINOPHEN 325 MG/1
650 TABLET ORAL EVERY 4 HOURS PRN
Status: DISCONTINUED | OUTPATIENT
Start: 2021-09-27 | End: 2021-09-28 | Stop reason: HOSPADM

## 2021-09-27 RX ORDER — FENTANYL CITRATE 50 UG/ML
INJECTION, SOLUTION INTRAMUSCULAR; INTRAVENOUS
Status: DISCONTINUED | OUTPATIENT
Start: 2021-09-27 | End: 2021-09-27 | Stop reason: HOSPADM

## 2021-09-27 RX ORDER — WARFARIN SODIUM 1 MG/1
1 TABLET ORAL DAILY
Status: DISCONTINUED | OUTPATIENT
Start: 2021-09-27 | End: 2021-09-28 | Stop reason: HOSPADM

## 2021-09-27 RX ORDER — IODIXANOL 320 MG/ML
INJECTION, SOLUTION INTRAVASCULAR
Status: DISCONTINUED | OUTPATIENT
Start: 2021-09-27 | End: 2021-09-27 | Stop reason: HOSPADM

## 2021-09-27 RX ORDER — HYDRALAZINE HYDROCHLORIDE 20 MG/ML
10 INJECTION INTRAMUSCULAR; INTRAVENOUS EVERY 4 HOURS PRN
Status: DISCONTINUED | OUTPATIENT
Start: 2021-09-27 | End: 2021-09-28 | Stop reason: HOSPADM

## 2021-09-27 RX ORDER — FUROSEMIDE 20 MG
20 TABLET ORAL 2 TIMES DAILY PRN
Status: DISCONTINUED | OUTPATIENT
Start: 2021-09-27 | End: 2021-09-28 | Stop reason: HOSPADM

## 2021-09-27 RX ORDER — GABAPENTIN 300 MG/1
300 CAPSULE ORAL 2 TIMES DAILY
Status: DISCONTINUED | OUTPATIENT
Start: 2021-09-27 | End: 2021-09-28 | Stop reason: HOSPADM

## 2021-09-27 RX ORDER — DIAZEPAM 5 MG
5 TABLET ORAL
Status: COMPLETED | OUTPATIENT
Start: 2021-09-27 | End: 2021-09-27

## 2021-09-27 RX ORDER — FENTANYL CITRATE 50 UG/ML
25 INJECTION, SOLUTION INTRAMUSCULAR; INTRAVENOUS
Status: DISCONTINUED | OUTPATIENT
Start: 2021-09-27 | End: 2021-09-27 | Stop reason: HOSPADM

## 2021-09-27 RX ORDER — ATORVASTATIN CALCIUM 40 MG/1
40 TABLET, FILM COATED ORAL DAILY
Status: DISCONTINUED | OUTPATIENT
Start: 2021-09-27 | End: 2021-09-27

## 2021-09-27 RX ORDER — SUCRALFATE 1 G/1
1 TABLET ORAL
Status: DISCONTINUED | OUTPATIENT
Start: 2021-09-27 | End: 2021-09-28 | Stop reason: HOSPADM

## 2021-09-27 RX ORDER — SODIUM CHLORIDE 9 MG/ML
INJECTION, SOLUTION INTRAVENOUS CONTINUOUS
Status: ACTIVE | OUTPATIENT
Start: 2021-09-27 | End: 2021-09-27

## 2021-09-27 RX ORDER — FENTANYL CITRATE 50 UG/ML
25 INJECTION, SOLUTION INTRAMUSCULAR; INTRAVENOUS
Status: DISCONTINUED | OUTPATIENT
Start: 2021-09-27 | End: 2021-09-28 | Stop reason: HOSPADM

## 2021-09-27 RX ORDER — NALOXONE HYDROCHLORIDE 0.4 MG/ML
0.2 INJECTION, SOLUTION INTRAMUSCULAR; INTRAVENOUS; SUBCUTANEOUS
Status: ACTIVE | OUTPATIENT
Start: 2021-09-27 | End: 2021-09-27

## 2021-09-27 RX ORDER — ZOLPIDEM TARTRATE 5 MG/1
5 TABLET ORAL
Status: DISCONTINUED | OUTPATIENT
Start: 2021-09-27 | End: 2021-09-28 | Stop reason: HOSPADM

## 2021-09-27 RX ORDER — EPTIFIBATIDE 2 MG/ML
INJECTION, SOLUTION INTRAVENOUS
Status: DISCONTINUED | OUTPATIENT
Start: 2021-09-27 | End: 2021-09-27 | Stop reason: HOSPADM

## 2021-09-27 RX ORDER — ACETAMINOPHEN 500 MG
1000 TABLET ORAL EVERY 6 HOURS PRN
COMMUNITY

## 2021-09-27 RX ORDER — OXYCODONE HYDROCHLORIDE 5 MG/1
5 TABLET ORAL EVERY 4 HOURS PRN
Status: DISCONTINUED | OUTPATIENT
Start: 2021-09-27 | End: 2021-09-28 | Stop reason: HOSPADM

## 2021-09-27 RX ADMIN — WARFARIN SODIUM 1 MG: 1 TABLET ORAL at 22:24

## 2021-09-27 RX ADMIN — ONDANSETRON 4 MG: 2 INJECTION INTRAMUSCULAR; INTRAVENOUS at 21:09

## 2021-09-27 RX ADMIN — DIAZEPAM 5 MG: 5 TABLET ORAL at 08:32

## 2021-09-27 RX ADMIN — ACETAMINOPHEN 650 MG: 325 TABLET ORAL at 15:03

## 2021-09-27 RX ADMIN — SODIUM CHLORIDE: 9 INJECTION, SOLUTION INTRAVENOUS at 07:21

## 2021-09-27 ASSESSMENT — COLUMBIA-SUICIDE SEVERITY RATING SCALE - C-SSRS
2. HAVE YOU ACTUALLY HAD ANY THOUGHTS OF KILLING YOURSELF IN THE PAST MONTH?: NO
3. HAVE YOU BEEN THINKING ABOUT HOW YOU MIGHT KILL YOURSELF?: NO
4. HAVE YOU HAD THESE THOUGHTS AND HAD SOME INTENTION OF ACTING ON THEM?: NO
6. HAVE YOU EVER DONE ANYTHING, STARTED TO DO ANYTHING, OR PREPARED TO DO ANYTHING TO END YOUR LIFE?: NO
5. HAVE YOU STARTED TO WORK OUT OR WORKED OUT THE DETAILS OF HOW TO KILL YOURSELF? DO YOU INTEND TO CARRY OUT THIS PLAN?: NO
1. IN THE PAST MONTH, HAVE YOU WISHED YOU WERE DEAD OR WISHED YOU COULD GO TO SLEEP AND NOT WAKE UP?: NO

## 2021-09-27 ASSESSMENT — MIFFLIN-ST. JEOR
SCORE: 948.93
SCORE: 966.84

## 2021-09-27 NOTE — Clinical Note
The first balloon was inserted into the circumflex and distal circumflex.Max pressure = 12 teto. Total duration = 14 seconds.

## 2021-09-27 NOTE — Clinical Note
The first balloon was inserted into the circumflex and distal circumflex.Max pressure = 8 teto. Total duration = 8 seconds.     Max pressure = 12 teto. Total duration = 8 seconds.    Balloon reinflated a second time: Max pressure = 12 teto. Total duration = 8 seconds.  Balloon reinflated a third time: Max pressure = 12 teto. Total duration = 7 seconds.  Balloon reinflated a fourth time: Max pressure = 12 teto. Total duration = 7 seconds.

## 2021-09-27 NOTE — PRE-PROCEDURE
GENERAL PRE-PROCEDURE:   Procedure:  Coronary angiogram with possible PCI  Date/Time:  9/27/2021 7:30 AM    Written consent obtained?: Yes    Risks and benefits: Risks, benefits and alternatives were discussed    Consent given by:  Patient  Patient states understanding of procedure being performed: Yes    Patient's understanding of procedure matches consent: Yes    Procedure consent matches procedure scheduled: Yes    Expected level of sedation:  Moderate  Appropriately NPO:  Yes  ASA Class:  3 (Severe aortic stenosis; s/p bioprosthetic AVR, CAD; s/p CABG (LIMA-LAD, SVG-D1), Permanent AF, Hypercholestremia, CKD Stage III, Hx of vasculitis)  Mallampati  :  Grade 1- soft palate, uvula, tonsillar pillars, and posterior pharyngeal wall visible  Lungs:  Lungs clear with good breath sounds bilaterally  Heart:  A-fib  History & Physical reviewed:  History and physical reviewed and no updates needed  Statement of review:  I have reviewed the lab findings, diagnostic data, medications, and the plan for sedation

## 2021-09-27 NOTE — PHARMACY-ADMISSION MEDICATION HISTORY
"Pharmacy Note - Admission Medication History     ______________________________________________________________________    Prior To Admission (PTA) med list completed and updated in EMR.       PTA Med List   Medication Sig Last Dose     acetaminophen (TYLENOL) 500 MG tablet Take 1,000 mg by mouth every 6 hours as needed for mild pain Past Week     alendronate (FOSAMAX) 70 MG tablet Take 1 tablet (70 mg) by mouth every 7 days With large glass of water.  On an empty stomach eat after 1 hour 9/23/2021     atorvastatin (LIPITOR) 10 MG tablet [ATORVASTATIN (LIPITOR) 10 MG TABLET] TAKE 1 TABLET (10 MG TOTAL) BY MOUTH DAILY. 9/27/2021     cholecalciferol, vitamin D3, (VITAMIN D3) 5,000 unit Tab [CHOLECALCIFEROL, VITAMIN D3, (VITAMIN D3) 5,000 UNIT TAB] Take 1 tablet by mouth daily. 9/25/2021     cyanocobalamin 1,000 mcg/mL injection [CYANOCOBALAMIN 1,000 MCG/ML INJECTION] INJECT 1 ML (1,000 MCG TOTAL) INTO THE SHOULDER, THIGH, OR BUTTOCKS EVERY 30 DAYS. **SYR 27G 1/2\" 1CC** 9/12/2021     furosemide (LASIX) 20 MG tablet Take 20 mg by mouth 2 times daily as needed More than a month     gabapentin (NEURONTIN) 300 MG capsule [GABAPENTIN (NEURONTIN) 300 MG CAPSULE] TAKE 1 CAPSULE (300 MG TOTAL) BY MOUTH 2 TIMES A DAY. 9/26/2021     ketoconazole (NIZORAL) 2 % external shampoo Apply topically daily Past Week     metoprolol succinate (TOPROL-XL) 25 MG [METOPROLOL SUCCINATE (TOPROL-XL) 25 MG] TAKE A HALF TABLET (12.5MG) BY MOUTH DAILY. 9/27/2021     nitroGLYcerin (NITROSTAT) 0.4 MG sublingual tablet [NITROGLYCERIN (NITROSTAT) 0.4 MG SL TABLET] DISSOLVE 1 TABLET UNDER THE TONGUE AS NEEDED FOR CHEST PAIN More than a month     sucralfate (CARAFATE) 1 gram tablet [SUCRALFATE (CARAFATE) 1 GRAM TABLET] TAKE 1 TABLET BY MOUTH FOUR TIMES DAILY (Patient taking differently: Take 1 g by mouth 2 times daily as needed ) Past Week     warfarin ANTICOAGULANT (COUMADIN) 1 MG tablet Take 1 mg by mouth daily 1 mg  5 days a week; 2 mg Tuesday and " Friday. 9/22/2021       Information source(s): Patient, Clinic records and [facility]    Method of interview communication: in-person    Patient was asked about OTC/herbal products specifically.  PTA med list reflects this.    Based on the pharmacist's assessment, the PTA med list information appears reliable    Allergies were reviewed, assessed, and updated with the patient.      Patient does not anticipate needing any multi-use medications during admission.     Thank you for the opportunity to participate in the care of this patient.      Gurinder Stokes [facility]     9/27/2021     12:50 PM

## 2021-09-27 NOTE — Clinical Note
The first balloon was inserted into the circumflex and distal circumflex.Max pressure = 10 teto. Total duration = 8 seconds.     Max pressure = 10 teto. Total duration = 11 seconds.

## 2021-09-28 ENCOUNTER — DOCUMENTATION ONLY (OUTPATIENT)
Dept: ANTICOAGULATION | Facility: CLINIC | Age: 86
End: 2021-09-28

## 2021-09-28 VITALS
OXYGEN SATURATION: 96 % | HEIGHT: 62 IN | RESPIRATION RATE: 18 BRPM | SYSTOLIC BLOOD PRESSURE: 128 MMHG | WEIGHT: 124.25 LBS | TEMPERATURE: 98.6 F | HEART RATE: 93 BPM | BODY MASS INDEX: 22.87 KG/M2 | DIASTOLIC BLOOD PRESSURE: 81 MMHG

## 2021-09-28 DIAGNOSIS — Z95.2 H/O AORTIC VALVE REPLACEMENT: ICD-10-CM

## 2021-09-28 DIAGNOSIS — I48.91 ATRIAL FIBRILLATION (H): Primary | ICD-10-CM

## 2021-09-28 LAB
ATRIAL RATE - MUSE: 277 BPM
DIASTOLIC BLOOD PRESSURE - MUSE: NORMAL MMHG
INTERPRETATION ECG - MUSE: NORMAL
P AXIS - MUSE: NORMAL DEGREES
PR INTERVAL - MUSE: NORMAL MS
QRS DURATION - MUSE: 92 MS
QT - MUSE: 370 MS
QTC - MUSE: 457 MS
R AXIS - MUSE: -34 DEGREES
SYSTOLIC BLOOD PRESSURE - MUSE: NORMAL MMHG
T AXIS - MUSE: 97 DEGREES
VENTRICULAR RATE- MUSE: 92 BPM

## 2021-09-28 PROCEDURE — G0378 HOSPITAL OBSERVATION PER HR: HCPCS

## 2021-09-28 PROCEDURE — 99214 OFFICE O/P EST MOD 30 MIN: CPT | Performed by: NURSE PRACTITIONER

## 2021-09-28 PROCEDURE — 99214 OFFICE O/P EST MOD 30 MIN: CPT | Performed by: HOSPITALIST

## 2021-09-28 PROCEDURE — 250N000013 HC RX MED GY IP 250 OP 250 PS 637: Performed by: INTERNAL MEDICINE

## 2021-09-28 PROCEDURE — 97166 OT EVAL MOD COMPLEX 45 MIN: CPT | Mod: GO

## 2021-09-28 PROCEDURE — 97535 SELF CARE MNGMENT TRAINING: CPT | Mod: GO

## 2021-09-28 PROCEDURE — 99207 PR CDG-CODE CATEGORY CHANGED: CPT | Performed by: HOSPITALIST

## 2021-09-28 PROCEDURE — 999N000054 HC STATISTIC EKG NON-CHARGEABLE

## 2021-09-28 PROCEDURE — 93010 ELECTROCARDIOGRAM REPORT: CPT | Performed by: INTERNAL MEDICINE

## 2021-09-28 PROCEDURE — 93005 ELECTROCARDIOGRAM TRACING: CPT

## 2021-09-28 RX ORDER — ATORVASTATIN CALCIUM 40 MG/1
40 TABLET, FILM COATED ORAL DAILY
Qty: 90 TABLET | Refills: 3 | Status: SHIPPED | OUTPATIENT
Start: 2021-09-28 | End: 2022-07-20

## 2021-09-28 RX ORDER — PRASUGREL 10 MG/1
10 TABLET, FILM COATED ORAL DAILY
Qty: 90 TABLET | Refills: 3 | Status: SHIPPED | OUTPATIENT
Start: 2021-09-28 | End: 2022-08-16

## 2021-09-28 RX ADMIN — GABAPENTIN 300 MG: 300 CAPSULE ORAL at 09:28

## 2021-09-28 RX ADMIN — ATORVASTATIN CALCIUM 40 MG: 40 TABLET, FILM COATED ORAL at 09:29

## 2021-09-28 RX ADMIN — METOPROLOL SUCCINATE 12.5 MG: 25 TABLET, EXTENDED RELEASE ORAL at 09:29

## 2021-09-28 RX ADMIN — PRASUGREL 10 MG: 10 TABLET, FILM COATED ORAL at 09:28

## 2021-09-28 ASSESSMENT — ACTIVITIES OF DAILY LIVING (ADL): PREVIOUS_RESPONSIBILITIES: LAUNDRY

## 2021-09-28 NOTE — PLAN OF CARE
Cardiac Rehab Discharge Summary    Reason for therapy discharge:    Discharged to home.    Progress towards therapy goal(s). See goals on Care Plan in [facility] electronic health record for goal details.  Goals partially met.  Barriers to achieving goals:   discharge on same date as initial evaluation.    Therapy recommendation(s):    Continued therapy is recommended.  Rationale/Recommendations:  Outpt. Cardiac rehab but pt. declined.

## 2021-09-28 NOTE — CONSULTS
[facility] CONSULT NOTE      Yun Jacinto, [phone]  Patient YOB: 1935  Admit date: 9/27/2021  Date of Service: 9/27/2021    REASON FOR CONSULT:  CAD status PCI, hypertension, hyperlipidemia, vasculitis    REQUESTING PHYSICIAN:  Dr. Mcclendon    ASSESSMENT AND PLAN:  Patient is a 86 year old female with past medical history of coronary artery disease status post CABG in 2005, sick sinus syndrome status post pacemaker in 2011, persistent atrial fibrillation on Coumadin, CKD stage III, hyperlipidemia who underwent elective coronary angiogram and admitted.    Progressive dyspnea on exertion  CAD status post CABG in 2005.   Status post PCI;  --Copied from cath report  - multi-vessel CAD involving Lcx (s/p DESx1 today), mRCA, and ostial/proximal L-LAD; low-normal L-sided filling pressures; AV pullback gradient P/M 1/15 but visually no significant gradient by waveform, which would correspond w/ TTE findings  - she can't/refuses to take ASA, but also isn't a good re-do OHS candidate given age and co-morbidities, after discussing w/  and the patient, we proceeded w/ PCI to Lcx today. Eptifibatide boluses X2 given and loaded w/ prasugrel in the cath lab  - if tolerates warfarin/prasugrel, can stage PCI's to RCA and L-LAD  - ideally would continue prasugrel/warfarin indefinitely  - okay to bridge w/ lovenox again on discharge - would give warfarin tonight (ordered), lovenox starting tomorrow (not ordered) and until INR is therapeutic  - continue aggressive risk factor modification     Persistent atrial fibrillation;  --Coumadin resumed tonight.  Per intervention cardiology, okay to bridge with Lovenox on discharge until INR therapeutic  --Heart rate fairly controlled  --INR in a.m.    Hypertension, hyperlipidemia;  --Blood pressure fairly controlled.  Home medications    CKD stage III per report;  --BMP in a.m.    HPI:  Patient is a 86 year old female with history significant for coronary artery  disease status post CABG in , sick sinus syndrome status post pacemaker in , persistent atrial fibrillation on Coumadin, CKD stage III, hyperlipidemia who underwent elective coronary angiogram and admitted.    During my visit, patient sitting upright on the bed, reported eager to go home.  Patient reported had supper and felt abdominal upset but no vomiting, also reported this is not new.  Reported chronic shortness of breath, denied worsening.  Denied chest pain, dizziness.  Denied fever or chills.  Reported mild discomfort on right groin PCI site.    PMH:  Patient Active Problem List   Diagnosis     Coronary Artery Disease     Aortic Stenosis     Sick Sinus Syndrome     Congestive Heart Failure     H/O aortic valve replacement     Atrial fibrillation (H)     Vitamin B 12 deficiency     Vasculitis (H)     Cardiac pacemaker in situ, dual chamber     S/P CABG (coronary artery bypass graft)     Pure hypercholesterolemia     Chronic kidney disease, stage 3     Percutaneous transluminal coronary angioplasty status       ALLERGIES:  Allergies   Allergen Reactions     Aspirin Nausea and Vomiting     Vomiting. Pt will NOT take Aspirin.      Erythromycin Base [Erythromycin] Unknown     Pt is not sure she is allergic to this     Levaquin [Levofloxacin] Nausea and Vomiting     Vancomycin Other (See Comments)     Red man syndrome     Xanax [Alprazolam] Other (See Comments)     confusion     Sulfa (Sulfonamide Antibiotics) [Sulfa Drugs] Rash       MEDICATIONS:  Reviewed.    SOCIAL HISTORY:  Reviewed  Social History     Socioeconomic History     Marital status:      Spouse name: Not on file     Number of children: Not on file     Years of education: Not on file     Highest education level: Not on file   Occupational History     Not on file   Tobacco Use     Smoking status: Former Smoker     Packs/day: 1.50     Years: 20.00     Pack years: 30.00     Quit date: 10/29/1979     Years since quittin.9     Smokeless  "tobacco: Never Used   Substance and Sexual Activity     Alcohol use: Yes     Comment: 1-2 week     Drug use: No     Sexual activity: Never   Other Topics Concern     Parent/sibling w/ CABG, MI or angioplasty before 65F 55M? Not Asked   Social History Narrative     Not on file     Social Determinants of Health     Financial Resource Strain:      Difficulty of Paying Living Expenses:    Food Insecurity:      Worried About Running Out of Food in the Last Year:      Ran Out of Food in the Last Year:    Transportation Needs:      Lack of Transportation (Medical):      Lack of Transportation (Non-Medical):    Physical Activity:      Days of Exercise per Week:      Minutes of Exercise per Session:    Stress:      Feeling of Stress :    Social Connections:      Frequency of Communication with Friends and Family:      Frequency of Social Gatherings with Friends and Family:      Attends Congregational Services:      Active Member of Clubs or Organizations:      Attends Club or Organization Meetings:      Marital Status:    Intimate Partner Violence:      Fear of Current or Ex-Partner:      Emotionally Abused:      Physically Abused:      Sexually Abused:        FAMILY HISTORY:  Family History   Problem Relation Age of Onset     Liver Disease Father      No Known Problems Mother      Thyroid Disease Sister      LUNG DISEASE Sister          ROS:  12 point review of systems reviewed and is negative except for what has already been reported in HPI.      PHYSICAL EXAM:  GENERAL: Not in acute distress  /89 (BP Location: Right arm)   Pulse 95   Temp 97.8  F (36.6  C) (Oral)   Resp 18   Ht 1.575 m (5' 2\")   Wt 56.4 kg (124 lb 4 oz)   SpO2 96%   BMI 22.73 kg/m    No intake/output data recorded.  No intake/output data recorded.  HEENT: NC/AT      Eyes- round and reactive to light bilaterally      Neck- supple, no JVP elevation      Sclera- anicteric      Oropharynx- moist and pink  CHEST: Clear to auscultation bilateral ant, no " robert  HEART: S1S2 normal, irregular  ABDMN: Soft. Non-tender, non-distended.  No guarding or rigidity. Bowel sounds- active.  Right groin dressing dry/clean/intact  EXTRM: No pedal oedema  NEURO: Cranial nerves II-XII grossly intact. No focal neurological deficit.  INTGM: No skin rash, no cyanosis or clubbing  MUSCULOSKELETAL: No upper or lower extremity joints swelling or tenderness appreciated    DIAGNOSTIC DATA:  Lab Results   Component Value Date     09/27/2021     08/18/2021     04/29/2021    CO2 29 09/27/2021    CO2 27 08/18/2021    CO2 31 04/29/2021    BUN 21 09/27/2021    BUN 18 08/18/2021    BUN 25 04/29/2021     Lab Results   Component Value Date    WBC 8.6 09/27/2021    WBC 7.9 03/30/2021    WBC 8.7 07/13/2020    WBC 8.7 02/28/2020    HGB 11.9 09/27/2021    HGB 12.1 03/30/2021    HGB 12.6 07/13/2020    HCT 36.3 09/27/2021    HCT 37.5 03/30/2021    HCT 36.4 07/13/2020    MCV 91 09/27/2021    MCV 94 03/30/2021    MCV 96 07/13/2020     09/27/2021     03/30/2021     07/13/2020       Patient's new lab studies reviewed personally.  Patient's new Radiology reports reviewed personally.    Thank you Dr. Mcclendon for allowing us to participate in Constanza Coles's care. [facility] will follow her along with you.    Note created using dragon voice recognition software so sounds alike errors may have escaped editing.     9/27/2021  ROBB RAY MD  HOSPITALIST, [facility]  PAGER NO. [phone]

## 2021-09-28 NOTE — PLAN OF CARE
"PRIMARY DIAGNOSIS: \"GENERIC\" NURSING  OUTPATIENT/OBSERVATION GOALS TO BE MET BEFORE DISCHARGE:  1. ADLs back to baseline: Yes    2. Activity and level of assistance: Ambulating independently.    3. Pain status: Pain free.    4. Return to near baseline physical activity: Yes     Discharge Planner Nurse   Safe discharge environment identified: Yes  Barriers to discharge: No       Entered by: Carmen Billingsley 09/28/2021 6:52 AM     Please review provider order for any additional goals.   Nurse to notify provider when observation goals have been met and patient is ready for discharge.  "

## 2021-09-28 NOTE — DISCHARGE INSTRUCTIONS
Interventional Cardiology  Coronary Angiogram/Angioplasty/Stent/Atherectomy Discharge Instructions -   Femoral Approach    The instructions below are to help you understand how to take care of yourself. There is also information about when to call the doctor or emergency services    **Do not stop your aspirin or platelet inhibitor unless directed by your Cardiologist.  These medications help to prevent platelets in your blood from sticking together and forming a clot.  Examples of these medications are:  Ticagrelor (Brilinta), Clopidigrel (Plavix), Prasugrel (Effient)          For the first 72 hours after your procedure:    No driving for 24 hours    Do not lift more than 15 pounds.  If you usually lift 50 pounds or more daily, please contact your cardiologist    Avoid any hard work or tiring activities, this includes, yard work, jogging, biking, sexual activity    During the day get up and walk around every 2 hours    You may return to work after 72 hours if you are feeling well and your job does not involve heavy lifting          Groin Site / Wound Care / Bleeding      You may take off the dressing on your groin the day after your procedure    Keep the area dry and clean    It is ok to shower with regular soap.  Pat dry, do not rub    You do not need to use a bandage    No tub/pool or hot tub for 1 week    If your groin starts to bleed or begins to swell suddenly after leaving the hospital, lie flat and apply firm pressure above the site for 15 minutes.  If bleeding continues, call 9-1-1    Bruising around the groin area is normal.  It may take 2-3 weeks for this to go away.  It is normal for the bruised area to turn green and/or yellow as it is healing.  A small lump may also be present and may last 2-3 months.    Your leg may be sore or stiff for a few days.  You may take Tylenol or a pain medicine recommended by your doctor    If you have a fever over 100.4, that lasts more than one day - call your  cardiologist.      Our Cardiac Rehab staff may visit briefly with you while your in the hospital.  If they miss you, someone will contact you after you are home.  You are encouraged to enroll in an Outpatient Cardiac Rehab Program     ? No elective dental work for 6 weeks after having a stent.     ? If you are on metformin, ActoPlus Met , Glucophage , Glucovance , Avandamet , Fortamet , Glumetza , Janumet , Riomet , PrandiMet, OR Metaglip , resume this medication only after your kidney function test is done and you are told to do so by your primary care provider.    ? No driving for 24 hours      Your Procedural Physician was: {CV Physicians:[id number]}  the phone number is: [phone]      [facility]:  [phone]  If you are calling after hours, please listen to the entire voicemail, a live  will answer at the end of the message

## 2021-09-28 NOTE — PLAN OF CARE
Problem: Adult Inpatient Plan of Care  Goal: Plan of Care Review  Outcome: Adequate for Discharge  Goal: Patient-Specific Goal (Individualized)  Outcome: Adequate for Discharge  Goal: Absence of Hospital-Acquired Illness or Injury  Outcome: Adequate for Discharge  Intervention: Identify and Manage Fall Risk  Recent Flowsheet Documentation  Taken 9/28/2021 0807 by Ale Pop RN  Safety Promotion/Fall Prevention:   activity supervised   clutter free environment maintained  Goal: Optimal Comfort and Wellbeing  Outcome: Adequate for Discharge  Goal: Readiness for Transition of Care  Outcome: Adequate for Discharge     Problem: Arrhythmia/Dysrhythmia (Cardiac Catheterization)  Goal: Stable Heart Rate and Rhythm  Outcome: Adequate for Discharge     Problem: Bleeding (Cardiac Catheterization)  Goal: Absence of Bleeding  Outcome: Adequate for Discharge     Problem: Contrast-Induced Injury Risk (Cardiac Catheterization)  Goal: Absence of Contrast-Induced Injury  Outcome: Adequate for Discharge     Problem: Embolism (Cardiac Catheterization)  Goal: Absence of Embolism Signs and Symptoms  Outcome: Adequate for Discharge     Problem: Ongoing Anesthesia/Sedation Effects (Cardiac Catheterization)  Goal: Anesthesia/Sedation Recovery  Outcome: Adequate for Discharge  Intervention: Optimize Anesthesia Recovery  Recent Flowsheet Documentation  Taken 9/28/2021 0807 by Ale Pop RN  Safety Promotion/Fall Prevention:   activity supervised   clutter free environment maintained     Problem: Pain (Cardiac Catheterization)  Goal: Acceptable Pain Control  Outcome: Adequate for Discharge     Problem: Vascular Access Protection (Cardiac Catheterization)  Goal: Absence of Vascular Access Complication  Outcome: Adequate for Discharge  Intervention: Prevent Access Site Complications  Recent Flowsheet Documentation  Taken 9/28/2021 0807 by Ale Pop RN  Activity Management: activity adjusted per tolerance   Discharge to  home  here to  IV and Telemetry removed Angiogram discharge instructions discussed and verbalized understanding groin site stable on inspection pulses are good  denied any numbness or pain  discharge  medications and changes discussed  patient says she does know how to administer Lovenox  during education, Appointment schedules discussed .walked with rehab and tolerated the activity well.

## 2021-09-28 NOTE — PROGRESS NOTES
[facility]    Medicine Progress Note - Hospitalist Service       Date of Admission:  9/27/2021    Assessment & Plan           Constanza Coles is a 86 year old female admitted on 9/27/2021. She has history of coronary artery disease status post CABG in 2005, sick sinus syndrome status post pacemaker in 2011, persistent atrial fibrillation on Coumadin, CKD stage III, hyperlipidemia who underwent elective coronary angiogram and admitted. [facility] consulted for HTN, HLD and history of vasculitis    Ok with [facility] for discharge today    #Progressive GRANDE  #CAD, hx CABG 2005  Patient here for scheduled angiogram for progressive GRANDE, this was done 9/27, had PCI/ALLAN to Lcx  Apparently does not tolerate ASA with vomiting and refuses to take ASA  Cardiology started her on prasugrel (in addition to her usual warfarin) and if tolerates this, then additional interventions may be pursued  Cardiology did recommend bridging with lovenox until INR therapeutic again however patient is refusing lab draws. INR yesterday was 1.22.  Defer bridging to cardiology  Takes lasix as needed at home    #A fib  Rate controlled  On metoprolol and warfarin at home  As above patient refusing INR this morning    #CKD 3  BMP ordered but patient refusing lab draws  Contrast administered yesterday with angiogram. Would recommend she avoid dehydration.    #Dyspepsia  Home sucralfate    #Neuropathy  Home gabapentin    #HLD  Home statin    #Hx vasculitis  Not on immunomodulators       Diet: Regular Diet Adult    DVT Prophylaxis: Moderate risk. Coumadin and bridging with Lovenox   Merlos Catheter: Not present  Central Lines: None  Code Status: Full Code      Disposition Plan   Disposition: Home later today  Discharge barriers: None  Medically ready to discharge today: Yes  Estimated discharge date: 09/28/2021     The patient's care was discussed with the Patient and Primary team.    Pita Jeffrey MD  Hospitalist Service  [facility]  [facility]  Text page via [facility] Paging/Directory      Clinically Significant Risk Factors Present on Admission             # Coagulation Defect: home medication list includes an anticoagulant medication       ____________        Physical Exam   Vital Signs: Temp: 98.4  F (36.9  C) Temp src: Oral BP: 135/87 Pulse: 91   Resp: 18 SpO2: 96 % O2 Device: None (Room air)    Weight: 124 lbs 4 oz  General: in no apparent distress, non-toxic and alert female lying in hospital bed oriented x3  HEENT: Head normocephalic atraumatic, oral mucosa moist. Sclerae anicteric  CV: Irregularly irregular rhythm, normal rate, no murmurs  Resp: No wheezes, no rales or rhonchi, no focal consolidations  GI: Belly soft, nondistended, nontender, bowel sounds present  Skin: No rashes or lesions  Extremities: No peripheral edema  Psych: Normal affect, mood euthymic  Neuro: CNII-XII grossly intact, moving all 4 extremities      Data   Recent Results (from the past 24 hour(s))   Activated clotting time celite, POCT    Collection Time: 09/27/21  9:49 AM   Result Value Ref Range    Activated Clotting Time (Celite) POCT 360 (H) 74 - 150 seconds   ECG 12-lead, with Muse    Collection Time: 09/27/21 11:46 AM   Result Value Ref Range    Systolic Blood Pressure  mmHg    Diastolic Blood Pressure  mmHg    Ventricular Rate 65 BPM    Atrial Rate 73 BPM    WI Interval  ms    QRS Duration 94 ms     ms    QTc 434 ms    P Axis  degrees    R AXIS -26 degrees    T Axis 81 degrees    Interpretation ECG       Atrial fibrillation  Minimal voltage criteria for LVH, may be normal variant  Abnormal ECG  When compared with ECG of 27-SEP-2021 07:25,  No significant change was found  Confirmed by YOEL ENAMORADO MD LOC:JN ([id number]) on 9/27/2021 3:39:15 PM     ECG 12-lead, with Muse    Collection Time: 09/28/21  6:57 AM   Result Value Ref Range    Systolic Blood Pressure  mmHg    Diastolic Blood Pressure  mmHg    Ventricular Rate 92 BPM    Atrial Rate 277 BPM    WI  Interval  ms    QRS Duration 92 ms     ms    QTc 457 ms    P Axis  degrees    R AXIS -34 degrees    T Axis 97 degrees    Interpretation ECG       Atrial fibrillation with a competing junctional pacemaker  Left axis deviation  Abnormal QRS-T angle, consider primary T wave abnormality  Abnormal ECG  When compared with ECG of 27-SEP-2021 11:46,  No significant change was found       ____________  Interval History   Data reviewed today: I reviewed all medications, new labs and imaging results over the last 24 hours. I personally reviewed no images or EKG's today.  She has no complaints.  She is worried about her pulse rate of 101bpm on the vitals monitor. Reassurance provided.  She is eager to go home and confirms she does not wish to have labs drawn this morning.  Discussed with Sandy with cardiology

## 2021-09-28 NOTE — PROGRESS NOTES
ANTICOAGULATION  MANAGEMENT: Discharge Review    Constanza Coles chart reviewed for anticoagulation continuity of care    Outpatient surgery/procedure on 9/27-28/21 for persistent atrial fibrillation.   9/27/21 s/p Percutaneous Transluminal coronary angioplasty.    Discharge disposition: Home    Results:    Recent labs: (last 7 days)     09/27/21  0712   INR 1.22*     Anticoagulation inpatient management:     held warfarin on 9/24, 25, 26.   Bridged with Lovenox 60mg every 12 hrs (was advised to only do 2 doses on Saturday, 9/25/21).    Anticoagulation discharge instructions:     Warfarin dosing: home regimen continued   Bridging: bridging with enoxaparin (Lovenox)   INR goal change: No      Medication changes affecting anticoagulation: Yes:     Effient (Prasugrel 10mg daily started on 9/28/21.  Can increase risk for bleeding.   Atorvastatin dose increased to 40mg dipti.    Additional factors affecting anticoagulation: No    Plan     No adjustment to anticoagulation plan needed    Patient not contacted - scheduled INR on 9/30/21 @ [facility]    No adjustment to Anticoagulation Calendar was required    Erika Simental RN

## 2021-09-28 NOTE — PLAN OF CARE
Problem: Adult Inpatient Plan of Care  Goal: Plan of Care Review  Outcome: No Change  Problem: Bleeding (Cardiac Catheterization)  Goal: Absence of Bleeding  Outcome: No Change  Problem: Vascular Access Protection (Cardiac Catheterization)  Goal: Absence of Vascular Access Complication  Outcome: No Change  Intervention: Prevent Access Site Complications      Patient reports tenderness at angio site when touched. Hematoma present but soft, pressure dressing in place. No change in site since procedure. VSS. CMS intact. Controlled A. Fib on tele.    Patient irritable, frustrated, and rude. Refused labs this morning, sticky note left for physician. Refused morning medication as well. Patient did apologize for behavior at the end of the shift. Continuing to monitor.    Carmen Billingsley RN

## 2021-09-28 NOTE — PLAN OF CARE
PRIMARY DIAGNOSIS: RECOVERY (Angio done on right groin)  OUTPATIENT/OBSERVATION GOALS TO BE MET BEFORE DISCHARGE:  1. TR band status: N/A  2. CMS (circulation, motion, sensation) are WDL: Yes  3. Bleeding or hematoma present at site: Yes, formed in CSC, stable since. Pressure dressing in place.      4. Activity restriction education reviewed with patient: Patient understands limits  5. Stable vital signs:  Yes  6. ADLs back to baseline:  Yes  7. Activity and level of assistance: Ambulating independently.  8. Interpretation of rhythm per telemetry tech: Normal sinus  and Atrial fibrillation - controlled     Discharge Planner Nurse   Safe discharge environment identified: Yes  Barriers to discharge: No       Entered by: Carmen Billingsley 09/28/2021 6:50 AM     Please review provider order for any additional goals.   Nurse to notify provider when observation goals have been met and patient is ready for discharge.

## 2021-09-28 NOTE — DISCHARGE SUMMARY
ASSESSMENT:   1. Coronary artery disease with status post CABG x2 in 2005 (LIMA to LAD and SVG to diagonal): Coronary angiogram on 9/27/2021 showed 99% lesion in distal circumflex which was successfully treated with drug-eluting stent.  Coronary angiogram also showed 70% lesion in origin of LIMA to the LAD graft and 95% lesion in RCA.  History of allergy to aspirin.  2. Dyslipidemia with LDL goal less than 70: Most recent direct LDL is 54 in March 2021.  Most recent AST/ALT are stable in August 2021.  3. Persistent atrial fibrillation: INR was 1.22 on 9/28/2021.  Patient declined INR check today.  4. Chronic kidney disease stage III: Most recent BMP stable with potassium 4.0 and creatinine 0.88 on 9/27/2021.  5. History of severe aortic stenosis with status post AVR in 2005  6. Sick sinus syndrome with status post pacemaker in 2011  7. Vasculitis: Currently not on steroid therapy.  She follows up with rheumatology.    PLAN:   1. Discharge home today  2. Given allergic to aspirin, patient was recommended to stay on warfarin and prasugrel indefinitely  3. Discussed with pharmacist.  Resumed home warfarin.  INR check on 9/30/2021 as scheduled  4. Start Lovenox 60 mg/0.6 mL subcutaneous injection every 12 hours until INR is therapeutic  5. Increase atorvastatin to 40 mg daily  6. If tolerates warfarin/prasugrel, consider staged PCI to RCA and LAD  7. She declined repeat BMP this morning      HISTORY OF PRESENT ILLNESS  Ms. Constanza Coles is a 86 year old female with history of coronary artery disease with status post CABG x2 in 2005, history of allergic to aspirin, dyslipidemia, aortic stenosis with status post AVR in 2005, sick sinus syndrome status post pacemaker in 2011, persistent atrial fibrillation on warfarin therapy, chronic kidney disease stage III, and vasculitis who is seen today for post PCI discharge.    PAST 24H EVENTS:  Patient underwent successful PCI/ALLAN to lesion in distal circumflex with no  complications.  Dr. Mcclendon has recommended patient to discharge home with Lovenox bridge until her INR is therapeutic.  Her INR was 1.22 yesterday.  She declined her INR and BMP check this morning.  Patient is planning to have her INR checked at INR clinic on 9/30/2021.  After discussing with pharmacist and hospitalist, we agreed upon resuming her home dose warfarin and discharged on Lovenox injection until her INR is therapeutic.    SUBJECTIVE:   Post-procedure the patient denies chest pain, shortness of breath, or palpitations.  Right femoral vascular access site is soft without hematoma, ecchymosis or drainage. Reportable signs and symptoms and activity restrictions were discussed with the patient verbalizes understanding. Post PCI teaching was performed which included lifestyle change and risk factor modification. The patient was also educated antiplatelet therapy and their role in the treatment of coronary artery disease. Patient was receptive to teaching and denies needs at time of discharge.    TELE:   Atrial fibrillation with controlled with pressure response heart rate in 80s with abnormal QRS-T angle- no significant changes found.    ECG:   EKG showed atrial fibrillation with a competing junctional pacemaker left axis deviation    PHYSICAL EXAMINATION:   Vitals:    09/27/21 1918 09/28/21 0034 09/28/21 0432 09/28/21 0905   BP: 129/89 136/72 135/87 128/81   BP Location: Right arm Right arm Right arm Right arm   Pulse: 95 92 91 93   Resp: 18 18 18 18   Temp: 97.8  F (36.6  C) 98.3  F (36.8  C) 98.4  F (36.9  C) 98.6  F (37  C)   TempSrc: Oral Oral Oral Oral   SpO2: 96% 95% 96% 96%   Weight:       Height:          General Appearance:    Patient is alert and no acute distress, normal body habitus   Chest/Lungs:   Normal respiratory effort. Respirations are even and unlabored. Lungs are clear to auscultation, no rales or wheezing. No chest wall tenderness.    Cardiovascular:    Normal S1, S2 with no murmurs,  rubs, or gallops; the carotid, radial, dorsalis pedis and posterior tibial pulses are intact   Abdomen:   soft, non-tender to palpation, non-distended. + bowel sounds.   Extremities: No edema    Skin:  Right femoral site WNL   Neuro/Psych: Alert and oriented x3, calm but patient is anxious to go home     MEDICATIONS:   Current Outpatient Medications   Medication Sig Dispense Refill     atorvastatin (LIPITOR) 40 MG tablet Take 1 tablet (40 mg) by mouth daily 90 tablet 3     enoxaparin ANTICOAGULANT (LOVENOX) 60 MG/0.6ML syringe Inject 0.6 mLs (60 mg) Subcutaneous 2 times daily Until your INR level is therapeutic or 2.0 6 mL 0     prasugrel (EFFIENT) 10 MG TABS tablet Take 1 tablet (10 mg) by mouth daily Do not crush or break tablet. Dose to start tomorrow. 90 tablet 3       PAST MEDICAL HISTORY:   Past Medical History:   Diagnosis Date     Anemia     iron deficiency     Chronic atrial fibrillation (H)      GERD (gastroesophageal reflux disease)      Hypertension      IHD (ischemic heart disease)      PUD (peptic ulcer disease)     [place] 1     Recurrent cystitis      SSS (sick sinus syndrome) (H)     post pacemaker placement     Valvular heart disease      Vasculitis (H)           ALLERGIES:   Allergies   Allergen Reactions     Aspirin Nausea and Vomiting     Vomiting. Pt will NOT take Aspirin.      Erythromycin Base [Erythromycin] Unknown     Pt is not sure she is allergic to this     Levaquin [Levofloxacin] Nausea and Vomiting     Vancomycin Other (See Comments)     Red man syndrome     Xanax [Alprazolam] Other (See Comments)     confusion     Sulfa (Sulfonamide Antibiotics) [Sulfa Drugs] Rash       This note has been dictated using voice recognition software. Any grammatical, typographical, or context distortions are unintentional and inherent to the software

## 2021-09-28 NOTE — PROGRESS NOTES
09/28/21 0930   Quick Adds   Type of Visit Initial Occupational Therapy Evaluation   Living Environment   People in home spouse   Current Living Arrangements house   Home Accessibility stairs to enter home;other (see comments)  (stairs to downstairs)   Transportation Anticipated family or friend will provide   Self-Care   Usual Activity Tolerance good   Current Activity Tolerance good   Regular Exercise Yes   Activity/Exercise Type other (see comments)  (gym)   Exercise Amount/Frequency 3-5 times/wk   Equipment Currently Used at Home none   Activity/Exercise/Self-Care Comment independent   Instrumental Activities of Daily Living (IADL)   Previous Responsibilities laundry   Disability/Function   Hearing Difficulty or Deaf no   Wear Glasses or Blind no   Dressing/Bathing Difficulty no   Toileting issues no   Doing Errands Independently Difficulty (such as shopping) no   Fall history within last six months no   General Information   Onset of Illness/Injury or Date of Surgery 09/27/21   Patient/Family Therapy Goal Statement (OT) return home today   Existing Precautions/Restrictions   (groin stent)   Cognitive Status Examination   Cognitive Status Comments  listened to precautions with stent, symptoms of cardiac intol. but pt. states she will not attend cardiac rehab when suggested   Pain Assessment   Patient Currently in Pain No   Transfers   Transfers No deficits identified   Balance   Balance Assessment no deficits were identified   Clinical Impression   Criteria for Skilled Therapeutic Interventions Met (OT) yes;meets criteria;skilled treatment is necessary   OT Diagnosis Stent placement   OT Problem List-Impairments impacting ADL post-surgical precautions;activity tolerance impaired   Assessment of Occupational Performance 1-3 Performance Deficits   Identified Performance Deficits amb. distance, stairs   Planned Therapy Interventions (OT) home program guidelines;strengthening;progressive activity/exercise;risk  factor education   Clinical Decision Making Complexity (OT) moderate complexity   Predicted Duration of Therapy Daily   Risk & Benefits of therapy have been explained evaluation/treatment results reviewed;patient;spouse/significant other   OT Discharge Planning    OT Discharge Recommendation (DC Rec) home with outpatient cardiac rehab   Total Evaluation Time (Minutes)   Total Evaluation Time (Minutes) 15

## 2021-09-30 ENCOUNTER — ANTICOAGULATION THERAPY VISIT (OUTPATIENT)
Dept: ANTICOAGULATION | Facility: CLINIC | Age: 86
End: 2021-09-30

## 2021-09-30 ENCOUNTER — LAB (OUTPATIENT)
Dept: LAB | Facility: CLINIC | Age: 86
End: 2021-09-30
Payer: COMMERCIAL

## 2021-09-30 DIAGNOSIS — I48.91 ATRIAL FIBRILLATION (H): Primary | ICD-10-CM

## 2021-09-30 DIAGNOSIS — Z95.2 H/O AORTIC VALVE REPLACEMENT: ICD-10-CM

## 2021-09-30 LAB — INR BLD: 1.2 (ref 0.9–1.1)

## 2021-09-30 PROCEDURE — 36415 COLL VENOUS BLD VENIPUNCTURE: CPT

## 2021-09-30 PROCEDURE — 85610 PROTHROMBIN TIME: CPT

## 2021-09-30 NOTE — PROGRESS NOTES
ANTICOAGULATION MANAGEMENT     Constanza Coles 86 year old female is on warfarin with subtherapeutic INR result. (Goal INR 2.0-3.0)    Recent labs: (last 7 days)     09/30/21  1155   INR 1.2*       ASSESSMENT     Source(s): Chart Review and Patient/Caregiver Call       Warfarin doses taken: Warfarin recently held for 3 days which may be affecting INR    Warfarin dose was held on 9/24, 25,26.    Diet: No new diet changes identified    New illness, injury, or hospitalization: Yes:    On 9/27/21 s/p Percutaneous Transluminal Coronary Angioplasty for persistent atrial fibrillation.  (stent placement)    Medication/supplement changes: Yes.    Effient (Prasugrel )10mg daily started on 9/28/21.  Can increase risk for bleeding.   Atorvastatin dose increased to 40mg dipti.   she continues bridging with Lovenox till INR is =>2.0    Signs or symptoms of bleeding or clotting: No    Previous INR: Therapeutic last 7 INR visits    Additional findings: None     PLAN     Recommended plan for temporary change(s) affecting INR     Dosing Instructions: Booster dose then continue your current warfarin dose with next INR in 5-7 days       Summary  As of 9/30/2021    Full warfarin instructions:  9/30: 3 mg; Otherwise 2 mg every Tue, Fri; 1 mg all other days   Next INR check:  10/7/2021             Telephone call with Constanza ([phone]) who verbalizes understanding and agrees to plan    Lab visit scheduled - INR on 10/5/21 [facility]    Education provided: Importance of consistent vitamin K intake, Goal range and significance of current result and Potential interaction between warfarin and Effient (Prasugrel)    Plan made per [facility] anticoagulation protocol    Erika Simental, RN  Anticoagulation Clinic  9/30/2021    _______________________________________________________________________     Anticoagulation Episode Summary     Current INR goal:  2.0-3.0   TTR:  65.9 % (1 y)   Target end date:     Send INR  reminders to:  [facility]    Indications    Atrial fibrillation (H) [I48.91]  H/O aortic valve replacement [Z95.2]           Comments:           Anticoagulation Care Providers     Provider Role Specialty Phone number    Hayder Bailey MD Referring Internal Medicine [phone]    Yun Jacinto MD Responsible Internal Medicine [phone]

## 2021-10-01 ENCOUNTER — DOCUMENTATION ONLY (OUTPATIENT)
Dept: OTHER | Facility: CLINIC | Age: 86
End: 2021-10-01

## 2021-10-05 ENCOUNTER — LAB (OUTPATIENT)
Dept: CARDIOLOGY | Facility: CLINIC | Age: 86
End: 2021-10-05
Payer: COMMERCIAL

## 2021-10-05 ENCOUNTER — ANTICOAGULATION THERAPY VISIT (OUTPATIENT)
Dept: ANTICOAGULATION | Facility: CLINIC | Age: 86
End: 2021-10-05

## 2021-10-05 DIAGNOSIS — I48.91 ATRIAL FIBRILLATION (H): Primary | ICD-10-CM

## 2021-10-05 DIAGNOSIS — Z79.01 LONG TERM CURRENT USE OF ANTICOAGULANT THERAPY: Primary | ICD-10-CM

## 2021-10-05 DIAGNOSIS — Z95.2 H/O AORTIC VALVE REPLACEMENT: ICD-10-CM

## 2021-10-05 LAB — INR POINT OF CARE: 1.7 (ref 0.86–1.14)

## 2021-10-05 PROCEDURE — 36416 COLLJ CAPILLARY BLOOD SPEC: CPT | Performed by: INTERNAL MEDICINE

## 2021-10-05 PROCEDURE — 85610 PROTHROMBIN TIME: CPT | Performed by: INTERNAL MEDICINE

## 2021-10-05 NOTE — PROGRESS NOTES
ANTICOAGULATION MANAGEMENT     Constanza Doant 86 year old female is on warfarin with subtherapeutic INR result. (Goal INR 2.0-3.0)    Recent labs: (last 7 days)     10/05/21  1319   INR 1.7*       ASSESSMENT     Source(s): Chart Review and Patient/Caregiver Call       Warfarin doses taken: Warfarin taken as instructed    Diet: No new diet changes identified    New illness, injury, or hospitalization: No   S/p stent placement coronary angioplasty on 9/27/21.    Medication/supplement changes: None noted    Was started on Effient on 9/28/21, which can potentially increase risk for bleeding.    Signs or symptoms of bleeding or clotting: No    Previous INR: Subtherapeutic at 1.2 on 9/30/21     Additional findings:      PLAN     Recommended plan for temporary change(s) affecting INR     Dosing Instructions: (1mg tabs)   - continue to bridge with Lovenox every 12 hrs, till INR =>2.0   - Booster dose today with 3mg warfarin,   - tomorrow, advised another booster dose with 2mg warfarin.   - then continue your current warfarin dose with next INR in 3 days       Summary  As of 10/5/2021    Full warfarin instructions:  10/5: 3 mg; 10/6: 2 mg; Otherwise 2 mg every Tue, Fri; 1 mg all other days   Next INR check:  10/19/2021             Telephone call with Constanza ([phone])who verbalizes understanding and agrees to plan   She wanted to stop injections with Lovenox.  However, I informed her with recent Stent placement will need to continue till in is =>2.0   Will advise 2 day booster doses with warfarin.    Lab visit scheduled - INR on 10/8/21 during f/u visit with [facility] @ [facility].    Education provided: Importance of consistent vitamin K intake, Goal range and significance of current result and Potential interaction between warfarin and Effient    Plan made per [facility]agulation protocol    Erika Simental, RN  Anticoagulation Clinic  10/5/2021    _______________________________________________________________________      Anticoagulation Episode Summary     Current INR goal:  2.0-3.0   TTR:  64.8 % (1 y)   Target end date:     Send INR reminders to:  [facility]    Indications    Atrial fibrillation (H) [I48.91]  H/O aortic valve replacement [Z95.2]           Comments:           Anticoagulation Care Providers     Provider Role Specialty Phone number    Hayder Bailey MD Referring Internal Medicine [phone]    Yun Jacinto MD Responsible Internal Medicine [phone]

## 2021-10-08 ENCOUNTER — LAB (OUTPATIENT)
Dept: CARDIOLOGY | Facility: CLINIC | Age: 86
End: 2021-10-08
Payer: COMMERCIAL

## 2021-10-08 ENCOUNTER — OFFICE VISIT (OUTPATIENT)
Dept: CARDIOLOGY | Facility: CLINIC | Age: 86
End: 2021-10-08
Payer: COMMERCIAL

## 2021-10-08 ENCOUNTER — ANTICOAGULATION THERAPY VISIT (OUTPATIENT)
Dept: ANTICOAGULATION | Facility: CLINIC | Age: 86
End: 2021-10-08

## 2021-10-08 VITALS
DIASTOLIC BLOOD PRESSURE: 64 MMHG | RESPIRATION RATE: 12 BRPM | HEIGHT: 62 IN | SYSTOLIC BLOOD PRESSURE: 130 MMHG | HEART RATE: 76 BPM | BODY MASS INDEX: 22.63 KG/M2 | WEIGHT: 123 LBS

## 2021-10-08 DIAGNOSIS — Z95.2 H/O AORTIC VALVE REPLACEMENT: ICD-10-CM

## 2021-10-08 DIAGNOSIS — E78.00 PURE HYPERCHOLESTEROLEMIA: ICD-10-CM

## 2021-10-08 DIAGNOSIS — I48.91 ATRIAL FIBRILLATION (H): Primary | ICD-10-CM

## 2021-10-08 DIAGNOSIS — I25.10 CORONARY ARTERY DISEASE INVOLVING NATIVE CORONARY ARTERY OF NATIVE HEART WITHOUT ANGINA PECTORIS: Primary | ICD-10-CM

## 2021-10-08 DIAGNOSIS — I25.10 ATHEROSCLEROSIS OF NATIVE CORONARY ARTERY OF NATIVE HEART WITHOUT ANGINA PECTORIS: ICD-10-CM

## 2021-10-08 DIAGNOSIS — Z79.01 LONG TERM CURRENT USE OF ANTICOAGULANT THERAPY: Primary | ICD-10-CM

## 2021-10-08 LAB — INR POINT OF CARE: 2.4 (ref 0.86–1.14)

## 2021-10-08 PROCEDURE — 99214 OFFICE O/P EST MOD 30 MIN: CPT | Performed by: NURSE PRACTITIONER

## 2021-10-08 PROCEDURE — 85610 PROTHROMBIN TIME: CPT

## 2021-10-08 ASSESSMENT — MIFFLIN-ST. JEOR: SCORE: 961.17

## 2021-10-08 NOTE — PROGRESS NOTES
ANTICOAGULATION MANAGEMENT     Constanza Coles 86 year old female is on warfarin with therapeutic INR result. (Goal INR 2.0-3.0)    Recent labs: (last 7 days)     10/08/21  1258   INR 2.4*       ASSESSMENT     Source(s): Chart Review, Patient/Caregiver Call and Template       Warfarin doses taken: Warfarin taken as instructed    Diet: No new diet changes identified    New illness, injury, or hospitalization: No    Medication/supplement changes: None noted    Signs or symptoms of bleeding or clotting: No    Previous INR: Subtherapeutic at 1.7 on 10/5/21.    Additional findings: None     PLAN     Recommended plan for no diet, medication or health factor changes affecting INR     Dosing Instructions:   - Continue your current warfarin dose with next INR in 2 weeks       Summary  As of 10/8/2021    Full warfarin instructions:  2 mg every Tue, Fri; 1 mg all other days   Next INR check:  10/22/2021             Detailed voice message left for Constanza ([phone]) with dosing instructions and follow up date.   Advised to call back with any questions @ [phone]    Lab visit scheduled - INR and FluShot on 10/19/21 @ [facility]     Education provided: Please call back if any changes to your diet, medications or how you've been taking warfarin, Importance of consistent vitamin K intake and Goal range and significance of current result    Plan made per [facility] anticoagulation protocol    Erika Simental RN  Anticoagulation Clinic  10/8/2021    _______________________________________________________________________     Anticoagulation Episode Summary     Current INR goal:  2.0-3.0   TTR:  65.3 % (1 y)   Target end date:     Send INR reminders to:  [facility]    Indications    Atrial fibrillation (H) [I48.91]  H/O aortic valve replacement [Z95.2]           Comments:           Anticoagulation Care Providers     Provider Role Specialty Phone number    Hayder Bailey MD Referring Internal Medicine [phone]    Yun Jacinto,  MD [facility] [phone]

## 2021-10-08 NOTE — PATIENT INSTRUCTIONS
Constanza Coles,    It was a pleasure to see you today at the [facility].     My recommendations after this visit include:  - No changes to your medications   - See Dr. Antoine in 2 months.  Make this appointment in the morning and come in fasting so your cholesterol can be checked.   - If you have any questions or concerns, please call [phone] to talk with my nurse.    Aimee Khalil, CNP

## 2021-10-08 NOTE — LETTER
10/8/2021    Yun Jacinto MD  [facility] [place] [address] [facility] [address]    RE: Constanza Coles       Dear Colleague,    I had the pleasure of seeing Constanza Coles in the [facility].    HEART CARE PROGRESS NOTE      [facility]  [phone]      Assessment/Recommendations   1.  Coronary artery disease:  PCI on September 27, 2021 with drug-eluting stent to left circumflex.  She is allergic to aspirin.  She was prescribed Effient post procedure and takes along with her warfarin for atrial fibrillation.  If she tolerates warfarin and Effient, consider PCI to LAD and RCA.  We discussed the importance of antiplatelet therapy and talking with her cardiologist prior to stopping these medications for any reason.      Risk factor modification and lifestyle management topics were discussed including heart healthy diet and exercise.  She is not interested in cardiac rehab.  She goes to the gym a few times each week.    2.  Dyslipidemia: Constanza Coles is on high intensity statin therapy with atorvastatin 40 mg daily.  Her last lipid profile was in 2019 with LDL was 54.  Recommend rechecking cholesterol in 2 months.  We discussed a diet low in saturated fat, weight loss, and exercise along with medication for better control of cholesterol.     3.  Persistent atrial fibrillation: Continue warfarin.  INR today.    Constanza will follow up with Dr. Antoine in 2 months with lipid profile.        History of Present Illness/Subjective    Constanza Coles is seen at [facility] for post coronary intervention follow up.  She has a history of coronary bypass surgery and aortic valve replacement in 2005, sick sinus syndrome status post pacemaker, persistent atrial fibrillation, chronic kidney disease, dyslipidemia, and vasculitis.  She had increased dyspnea on exertion and abnormal CTA.  PCI on September 27, 2021  with drug-eluting stent to left circumflex.  She is allergic to aspirin.  She was prescribed Effient post procedure and takes along with her warfarin for atrial fibrillation.  If she tolerates warfarin and Effient, consider PCI to LAD and RCA.      She denies any chest pain since PCI.  She has chronic dyspnea on exertion and is at baseline.  She denies fatigue, lightheadedness and lower extremity edema.      Cardiac Catheterization    Result Date: 9/28/2021  Constanza Coles is a 86 year old old female with aortic stenosis and CAD   s/p AVR/CABG '05 w/ 21mm CE bioproshtesis and L-LAD and V-D, HL, CKD, ASA   and other allergies, vasculitis (no longer on steroids or seeing Rheum)   who is here for progressive GRANDE, abnormal CTA cors.      - multi-vessel CAD involving Lcx (s/p DESx1 today), mRCA, and   ostial/proximal L-LAD; low-normal L-sided filling pressures; AV pullback   gradient P/M 1/15 but visually no significant gradient by waveform, which   would correspond w/ TTE findings  - she can't/refuses to take ASA, but also isn't a good re-do OHS candidate   given age and co-morbidities, after discussing w/  and the   patient, we proceeded w/ PCI to Lcx today. Eptifibatide boluses X2 given   and loaded w/ prasugrel in the cath lab  - if tolerates warfarin/prasugrel, can stage PCI's to RCA and L-LAD  - ideally would continue prasugrel/warfarin indefinitely  - okay to bridge w/ lovenox again on discharge - would give warfarin   tonight (ordered), lovenox starting tomorrow (not ordered) and until INR   is therapeutic  - continue aggressive risk factor modification          Findings:  LM:no obstruction  LAD:severe disease proximally, fills distally competitively via patent   L-LAD and V-D  Lcx:mid-vessel 40-50% narrowing including into OM, distal 95-99% narrowing   supplying co-dominant lPDA  RCA:co-dominant, mid-vessel 95% Ca2+ narrowing, AM branch w/ tandem 85%   lesions    Grafts:  L-LAD: patent but w/ at least  "mod-severe 60-70% narrowing. Of note, L ICA   appears to come off of the L subclavian artery  V-D: patent    LVEDP:8    AV pullback graident P/M 1/15 but visually no significant gradient    R Femoral artery    PCI:  LMT was engaged w/ a 6F EBU 3.0 Guide catheter and the lesion in Lcx was   wired w/ a Forte wire, ballooned w/ a 2.5x12mm Emerge, stented w/ a   3.0x24mm Synergy EES at 11 teto, and post-dilated w/ a 3.0x12mm NC Emerge   at 8-12 teto. Final angiography showed no dissection or perforation and a   ROMAINE 3 flow.      Closure:   Angioseal        Physical Examination Review of Systems   /64   Pulse 76   Resp 12   Ht 1.575 m (5' 2\")   Wt 55.8 kg (123 lb)   BMI 22.50 kg/m    Body mass index is 22.5 kg/m .  Wt Readings from Last 3 Encounters:   10/08/21 55.8 kg (123 lb)   09/27/21 56.4 kg (124 lb 4 oz)   09/21/21 56.8 kg (125 lb 4.8 oz)       General Appearance:     Alert, cooperative and in no acute distress.   ENT/Mouth: membranes moist, no oral lesions or bleeding gums.      EYES:  no scleral icterus, normal conjunctivae   Chest/Lungs:   lungs are clear to auscultation, no rales or wheezing, respirations unlabored   Cardiovascular:   Irregular. Normal first and second heart sounds, no edema bilateral lower extremities    Abdomen:  Soft, nontender, nondistended, bowel sounds present   Extremities: no cyanosis or clubbing   Skin:  Neurologic: warm, dry.  mood and affect are appropriate, alert and oriented x3     Puncture Site:  She reports no pain or bruising of femoral puncture site. CMS intact.           Please refer above for cardiac ROS details.      Medical History  Surgical History Family History Social History   Past Medical History:   Diagnosis Date     Anemia     iron deficiency     Chronic atrial fibrillation (H)      GERD (gastroesophageal reflux disease)      Hypertension      IHD (ischemic heart disease)      PUD (peptic ulcer disease)     [place] 1     Recurrent cystitis      SSS (sick " sinus syndrome) (H)     post pacemaker placement     Valvular heart disease      Vasculitis (H)      Past Surgical History:   Procedure Laterality Date     ASCENDING AORTIC ANEURYSM REPAIR W/ TISSUE AORTIC VALVE REPLACEMENT  2005    Dacron graft, Forte pericardial Magna 21mm aortic valve     C CABG, VEIN, SINGLE      Description: CABG (CABG);  Recorded: 2012;  Comments: LIMA^LAD, SVG^1st diag     C REPLACE AORT VALV,PROSTH VALV      Aortic Valve Replacement with Forte pericardial Magna 21mm 2005      SECTION       CV CORONARY ANGIOGRAM N/A 2021    Procedure: Coronary Angiogram;  Surgeon: Shane Mcclendon MD;  Location: [facility] CATH LAB CV     CV LEFT HEART CATH N/A 2021    Procedure: Left Heart Cath;  Surgeon: Shane Mcclendon MD;  Location: [facility] CATH LAB CV     CV PCI N/A 2021    Procedure: Percutaneous Coronary Intervention;  Surgeon: Shane Mcclendon MD;  Location: [facility] CATH LAB CV     GALLBLADDER SURGERY      partial colon removal     HYSTERECTOMY       IR AORTIC ARCH 4 VESSEL ANGIOGRAM  9/15/2011     IR MISCELLANEOUS PROCEDURE  2005     OTHER SURGICAL HISTORY      antrectomy     PARTIAL GASTRECTOMY       WI PERC CLOS,BART INTERATRIAL COMMUN W/IMPL      Description: Patent Foramen [place]vale Closure Percutaneous;  Recorded: 2012;     Family History   Problem Relation Age of Onset     Liver Disease Father      No Known Problems Mother      Thyroid Disease Sister      LUNG DISEASE Sister     Social History     Socioeconomic History     Marital status:      Spouse name: Not on file     Number of children: Not on file     Years of education: Not on file     Highest education level: Not on file   Occupational History     Not on file   Tobacco Use     Smoking status: Former Smoker     Packs/day: 1.50     Years: 20.00     Pack years: 30.00     Quit date: 10/29/1979     Years since quittin.9     Smokeless tobacco: Never Used   Substance and  "Sexual Activity     Alcohol use: Yes     Comment: 1-2 week     Drug use: No     Sexual activity: Never   Other Topics Concern     Parent/sibling w/ CABG, MI or angioplasty before 65F 55M? Not Asked   Social History Narrative     Not on file     Social Determinants of Health     Financial Resource Strain:      Difficulty of Paying Living Expenses:    Food Insecurity:      Worried About Running Out of Food in the Last Year:      Ran Out of Food in the Last Year:    Transportation Needs:      Lack of Transportation (Medical):      Lack of Transportation (Non-Medical):    Physical Activity:      Days of Exercise per Week:      Minutes of Exercise per Session:    Stress:      Feeling of Stress :    Social Connections:      Frequency of Communication with Friends and Family:      Frequency of Social Gatherings with Friends and Family:      Attends Synagogue Services:      Active Member of Clubs or Organizations:      Attends Club or Organization Meetings:      Marital Status:    Intimate Partner Violence:      Fear of Current or Ex-Partner:      Emotionally Abused:      Physically Abused:      Sexually Abused:           Medications  Allergies   Current Outpatient Medications   Medication Sig Dispense Refill     acetaminophen (TYLENOL) 500 MG tablet Take 1,000 mg by mouth every 6 hours as needed for mild pain       alendronate (FOSAMAX) 70 MG tablet Take 1 tablet (70 mg) by mouth every 7 days With large glass of water.  On an empty stomach eat after 1 hour 12 tablet 3     atorvastatin (LIPITOR) 40 MG tablet Take 1 tablet (40 mg) by mouth daily 90 tablet 3     cholecalciferol, vitamin D3, (VITAMIN D3) 5,000 unit Tab [CHOLECALCIFEROL, VITAMIN D3, (VITAMIN D3) 5,000 UNIT TAB] Take 1 tablet by mouth daily.       cyanocobalamin 1,000 mcg/mL injection [CYANOCOBALAMIN 1,000 MCG/ML INJECTION] INJECT 1 ML (1,000 MCG TOTAL) INTO THE SHOULDER, THIGH, OR BUTTOCKS EVERY 30 DAYS. **SYR 27G 1/2\" 1CC** 3 mL 3     furosemide (LASIX) 20 MG " tablet Take 20 mg by mouth 2 times daily as needed       gabapentin (NEURONTIN) 300 MG capsule [GABAPENTIN (NEURONTIN) 300 MG CAPSULE] TAKE 1 CAPSULE (300 MG TOTAL) BY MOUTH 2 TIMES A DAY. 60 capsule 11     ketoconazole (NIZORAL) 2 % external shampoo Apply topically daily       metoprolol succinate (TOPROL-XL) 25 MG [METOPROLOL SUCCINATE (TOPROL-XL) 25 MG] TAKE A HALF TABLET (12.5MG) BY MOUTH DAILY. 45 tablet 2     nitroGLYcerin (NITROSTAT) 0.4 MG sublingual tablet [NITROGLYCERIN (NITROSTAT) 0.4 MG SL TABLET] DISSOLVE 1 TABLET UNDER THE TONGUE AS NEEDED FOR CHEST PAIN 25 tablet 1     prasugrel (EFFIENT) 10 MG TABS tablet Take 1 tablet (10 mg) by mouth daily Do not crush or break tablet. Dose to start tomorrow. 90 tablet 3     sucralfate (CARAFATE) 1 gram tablet [SUCRALFATE (CARAFATE) 1 GRAM TABLET] TAKE 1 TABLET BY MOUTH FOUR TIMES DAILY (Patient taking differently: Take 1 g by mouth 2 times daily as needed ) 120 tablet 11     warfarin ANTICOAGULANT (COUMADIN) 1 MG tablet Take 1 mg by mouth daily 1 mg  5 days a week; 2 mg Tuesday and Friday.      Allergies   Allergen Reactions     Aspirin Nausea and Vomiting     Vomiting. Pt will NOT take Aspirin.      Erythromycin Base [Erythromycin] Unknown     Pt is not sure she is allergic to this     Levaquin [Levofloxacin] Nausea and Vomiting     Vancomycin Other (See Comments)     Red man syndrome     Xanax [Alprazolam] Other (See Comments)     confusion     Sulfa (Sulfonamide Antibiotics) [Sulfa Drugs] Rash         Lab Results    Chemistry/lipid CBC Cardiac Enzymes/BNP/TSH/INR   Recent Labs   Lab Test 03/30/21  1315 11/14/19  1207   CHOL  --  142   HDL  --  69   LDL 54 54   TRIG  --  96     Recent Labs   Lab Test 03/30/21  1315 11/14/19  1207 04/02/19  0915   LDL 54 54 106     Recent Labs   Lab Test 09/27/21  0712      POTASSIUM 4.0   CHLORIDE 103   CO2 29   GLC 91   BUN 21   CR 0.88   GFRESTIMATED 61   MARY 9.3     Recent Labs   Lab Test 09/27/21  0712 08/18/21  1626  04/29/21  1254   CR 0.88 0.98 0.94     No results for input(s): A1C in the last 82263 hours. Recent Labs   Lab Test 09/27/21  0712   WBC 8.6   HGB 11.9   HCT 36.3   MCV 91        Recent Labs   Lab Test 09/27/21  0712 03/30/21  1315 07/13/20  1532   HGB 11.9 12.1 12.6    No results for input(s): TROPONINI in the last 94828 hours.  Recent Labs   Lab Test 03/30/21  1315 12/26/19  1205   * 188*     Recent Labs   Lab Test 08/18/21  1626   TSH 2.16     Recent Labs   Lab Test 10/08/21  1258 10/05/21  1319 09/30/21  1155   INR 2.4* 1.7* 1.2*                                           .      Thank you for allowing me to participate in the care of your patient.      Sincerely,     Aimee Wiseman NP     [facility]  cc:   No referring provider defined for this encounter.

## 2021-10-08 NOTE — PROGRESS NOTES
HEART CARE PROGRESS NOTE      [facility]  [phone]      Assessment/Recommendations   1.  Coronary artery disease:  PCI on September 27, 2021 with drug-eluting stent to left circumflex.  She is allergic to aspirin.  She was prescribed Effient post procedure and takes along with her warfarin for atrial fibrillation.  If she tolerates warfarin and Effient, consider PCI to LAD and RCA.  We discussed the importance of antiplatelet therapy and talking with her cardiologist prior to stopping these medications for any reason.      Risk factor modification and lifestyle management topics were discussed including heart healthy diet and exercise.  She is not interested in cardiac rehab.  She goes to the gym a few times each week.    2.  Dyslipidemia: Constanza Coles is on high intensity statin therapy with atorvastatin 40 mg daily.  Her last lipid profile was in 2019 with LDL was 54.  Recommend rechecking cholesterol in 2 months.  We discussed a diet low in saturated fat, weight loss, and exercise along with medication for better control of cholesterol.     3.  Persistent atrial fibrillation: Continue warfarin.  INR today.    Constanza will follow up with Dr. Antoine in 2 months with lipid profile.        History of Present Illness/Subjective    Constanza Coles is seen at [facility] for post coronary intervention follow up.  She has a history of coronary bypass surgery and aortic valve replacement in 2005, sick sinus syndrome status post pacemaker, persistent atrial fibrillation, chronic kidney disease, dyslipidemia, and vasculitis.  She had increased dyspnea on exertion and abnormal CTA.  PCI on September 27, 2021 with drug-eluting stent to left circumflex.  She is allergic to aspirin.  She was prescribed Effient post procedure and takes along with her warfarin for atrial fibrillation.  If she tolerates warfarin and Effient, consider PCI to LAD and RCA.      She denies any chest pain  since PCI.  She has chronic dyspnea on exertion and is at baseline.  She denies fatigue, lightheadedness and lower extremity edema.      Cardiac Catheterization    Result Date: 9/28/2021  Constanza Coles is a 86 year old old female with aortic stenosis and CAD   s/p AVR/CABG '05 w/ 21mm CE bioproshtesis and L-LAD and V-D, HL, CKD, ASA   and other allergies, vasculitis (no longer on steroids or seeing Rheum)   who is here for progressive GRANDE, abnormal CTA cors.      - multi-vessel CAD involving Lcx (s/p DESx1 today), mRCA, and   ostial/proximal L-LAD; low-normal L-sided filling pressures; AV pullback   gradient P/M 1/15 but visually no significant gradient by waveform, which   would correspond w/ TTE findings  - she can't/refuses to take ASA, but also isn't a good re-do OHS candidate   given age and co-morbidities, after discussing w/  and the   patient, we proceeded w/ PCI to Lcx today. Eptifibatide boluses X2 given   and loaded w/ prasugrel in the cath lab  - if tolerates warfarin/prasugrel, can stage PCI's to RCA and L-LAD  - ideally would continue prasugrel/warfarin indefinitely  - okay to bridge w/ lovenox again on discharge - would give warfarin   tonight (ordered), lovenox starting tomorrow (not ordered) and until INR   is therapeutic  - continue aggressive risk factor modification          Findings:  LM:no obstruction  LAD:severe disease proximally, fills distally competitively via patent   L-LAD and V-D  Lcx:mid-vessel 40-50% narrowing including into OM, distal 95-99% narrowing   supplying co-dominant lPDA  RCA:co-dominant, mid-vessel 95% Ca2+ narrowing, AM branch w/ tandem 85%   lesions    Grafts:  L-LAD: patent but w/ at least mod-severe 60-70% narrowing. Of note, L ICA   appears to come off of the L subclavian artery  V-D: patent    LVEDP:8    AV pullback graident P/M 1/15 but visually no significant gradient    R Femoral artery    PCI:  LMT was engaged w/ a 6F EBU 3.0 Guide catheter and the  "lesion in Lcx was   wired w/ a Forte wire, ballooned w/ a 2.5x12mm Emerge, stented w/ a   3.0x24mm Synergy EES at 11 teto, and post-dilated w/ a 3.0x12mm NC Emerge   at 8-12 teto. Final angiography showed no dissection or perforation and a   ROMAINE 3 flow.      Closure:   Angioseal        Physical Examination Review of Systems   /64   Pulse 76   Resp 12   Ht 1.575 m (5' 2\")   Wt 55.8 kg (123 lb)   BMI 22.50 kg/m    Body mass index is 22.5 kg/m .  Wt Readings from Last 3 Encounters:   10/08/21 55.8 kg (123 lb)   09/27/21 56.4 kg (124 lb 4 oz)   09/21/21 56.8 kg (125 lb 4.8 oz)       General Appearance:     Alert, cooperative and in no acute distress.   ENT/Mouth: membranes moist, no oral lesions or bleeding gums.      EYES:  no scleral icterus, normal conjunctivae   Chest/Lungs:   lungs are clear to auscultation, no rales or wheezing, respirations unlabored   Cardiovascular:   Irregular. Normal first and second heart sounds, no edema bilateral lower extremities    Abdomen:  Soft, nontender, nondistended, bowel sounds present   Extremities: no cyanosis or clubbing   Skin:  Neurologic: warm, dry.  mood and affect are appropriate, alert and oriented x3     Puncture Site:  She reports no pain or bruising of femoral puncture site. CMS intact.           Please refer above for cardiac ROS details.      Medical History  Surgical History Family History Social History   Past Medical History:   Diagnosis Date     Anemia     iron deficiency     Chronic atrial fibrillation (H)      GERD (gastroesophageal reflux disease)      Hypertension      IHD (ischemic heart disease)      PUD (peptic ulcer disease)     Billroth 1     Recurrent cystitis      SSS (sick sinus syndrome) (H)     post pacemaker placement     Valvular heart disease      Vasculitis (H)      Past Surgical History:   Procedure Laterality Date     ASCENDING AORTIC ANEURYSM REPAIR W/ TISSUE AORTIC VALVE REPLACEMENT  Jan 2005    Dacron graft, Forte pericardial " Magna 21mm aortic valve     C CABG, VEIN, SINGLE      Description: CABG (CABG);  Recorded: 2012;  Comments: LIMA^LAD, SVG^1st diag     C REPLACE AORT VALV,PROSTH VALV      Aortic Valve Replacement with Forte pericardial Magna 21mm 2005      SECTION       CV CORONARY ANGIOGRAM N/A 2021    Procedure: Coronary Angiogram;  Surgeon: Shane Mcclendon MD;  Location: [facility] CATH LAB CV     CV LEFT HEART CATH N/A 2021    Procedure: Left Heart Cath;  Surgeon: Shane Mcclendon MD;  Location: [facility] CATH LAB CV     CV PCI N/A 2021    Procedure: Percutaneous Coronary Intervention;  Surgeon: Shane Mcclendon MD;  Location: [facility] CATH LAB CV     GALLBLADDER SURGERY      partial colon removal     HYSTERECTOMY       IR AORTIC ARCH 4 VESSEL ANGIOGRAM  9/15/2011     IR MISCELLANEOUS PROCEDURE  2005     OTHER SURGICAL HISTORY      antrectomy     PARTIAL GASTRECTOMY       VT PERC CLOS,BART INTERATRIAL COMMUN W/IMPL      Description: Patent Foramen Ovale Closure Percutaneous;  Recorded: 2012;     Family History   Problem Relation Age of Onset     Liver Disease Father      No Known Problems Mother      Thyroid Disease Sister      LUNG DISEASE Sister     Social History     Socioeconomic History     Marital status:      Spouse name: Not on file     Number of children: Not on file     Years of education: Not on file     Highest education level: Not on file   Occupational History     Not on file   Tobacco Use     Smoking status: Former Smoker     Packs/day: 1.50     Years: 20.00     Pack years: 30.00     Quit date: 10/29/1979     Years since quittin.9     Smokeless tobacco: Never Used   Substance and Sexual Activity     Alcohol use: Yes     Comment: 1-2 week     Drug use: No     Sexual activity: Never   Other Topics Concern     Parent/sibling w/ CABG, MI or angioplasty before 65F 55M? Not Asked   Social History Narrative     Not on file     Social Determinants of Health  "    Financial Resource Strain:      Difficulty of Paying Living Expenses:    Food Insecurity:      Worried About Running Out of Food in the Last Year:      Ran Out of Food in the Last Year:    Transportation Needs:      Lack of Transportation (Medical):      Lack of Transportation (Non-Medical):    Physical Activity:      Days of Exercise per Week:      Minutes of Exercise per Session:    Stress:      Feeling of Stress :    Social Connections:      Frequency of Communication with Friends and Family:      Frequency of Social Gatherings with Friends and Family:      Attends Protestant Services:      Active Member of Clubs or Organizations:      Attends Club or Organization Meetings:      Marital Status:    Intimate Partner Violence:      Fear of Current or Ex-Partner:      Emotionally Abused:      Physically Abused:      Sexually Abused:           Medications  Allergies   Current Outpatient Medications   Medication Sig Dispense Refill     acetaminophen (TYLENOL) 500 MG tablet Take 1,000 mg by mouth every 6 hours as needed for mild pain       alendronate (FOSAMAX) 70 MG tablet Take 1 tablet (70 mg) by mouth every 7 days With large glass of water.  On an empty stomach eat after 1 hour 12 tablet 3     atorvastatin (LIPITOR) 40 MG tablet Take 1 tablet (40 mg) by mouth daily 90 tablet 3     cholecalciferol, vitamin D3, (VITAMIN D3) 5,000 unit Tab [CHOLECALCIFEROL, VITAMIN D3, (VITAMIN D3) 5,000 UNIT TAB] Take 1 tablet by mouth daily.       cyanocobalamin 1,000 mcg/mL injection [CYANOCOBALAMIN 1,000 MCG/ML INJECTION] INJECT 1 ML (1,000 MCG TOTAL) INTO THE SHOULDER, THIGH, OR BUTTOCKS EVERY 30 DAYS. **SYR 27G 1/2\" 1CC** 3 mL 3     furosemide (LASIX) 20 MG tablet Take 20 mg by mouth 2 times daily as needed       gabapentin (NEURONTIN) 300 MG capsule [GABAPENTIN (NEURONTIN) 300 MG CAPSULE] TAKE 1 CAPSULE (300 MG TOTAL) BY MOUTH 2 TIMES A DAY. 60 capsule 11     ketoconazole (NIZORAL) 2 % external shampoo Apply topically daily   "     metoprolol succinate (TOPROL-XL) 25 MG [METOPROLOL SUCCINATE (TOPROL-XL) 25 MG] TAKE A HALF TABLET (12.5MG) BY MOUTH DAILY. 45 tablet 2     nitroGLYcerin (NITROSTAT) 0.4 MG sublingual tablet [NITROGLYCERIN (NITROSTAT) 0.4 MG SL TABLET] DISSOLVE 1 TABLET UNDER THE TONGUE AS NEEDED FOR CHEST PAIN 25 tablet 1     prasugrel (EFFIENT) 10 MG TABS tablet Take 1 tablet (10 mg) by mouth daily Do not crush or break tablet. Dose to start tomorrow. 90 tablet 3     sucralfate (CARAFATE) 1 gram tablet [SUCRALFATE (CARAFATE) 1 GRAM TABLET] TAKE 1 TABLET BY MOUTH FOUR TIMES DAILY (Patient taking differently: Take 1 g by mouth 2 times daily as needed ) 120 tablet 11     warfarin ANTICOAGULANT (COUMADIN) 1 MG tablet Take 1 mg by mouth daily 1 mg  5 days a week; 2 mg Tuesday and Friday.      Allergies   Allergen Reactions     Aspirin Nausea and Vomiting     Vomiting. Pt will NOT take Aspirin.      Erythromycin Base [Erythromycin] Unknown     Pt is not sure she is allergic to this     Levaquin [Levofloxacin] Nausea and Vomiting     Vancomycin Other (See Comments)     Red man syndrome     Xanax [Alprazolam] Other (See Comments)     confusion     Sulfa (Sulfonamide Antibiotics) [Sulfa Drugs] Rash         Lab Results    Chemistry/lipid CBC Cardiac Enzymes/BNP/TSH/INR   Recent Labs   Lab Test 03/30/21  1315 11/14/19  1207   CHOL  --  142   HDL  --  69   LDL 54 54   TRIG  --  96     Recent Labs   Lab Test 03/30/21  1315 11/14/19  1207 04/02/19  0915   LDL 54 54 106     Recent Labs   Lab Test 09/27/21  0712      POTASSIUM 4.0   CHLORIDE 103   CO2 29   GLC 91   BUN 21   CR 0.88   GFRESTIMATED 61   MARY 9.3     Recent Labs   Lab Test 09/27/21  0712 08/18/21  1626 04/29/21  1254   CR 0.88 0.98 0.94     No results for input(s): A1C in the last 58326 hours. Recent Labs   Lab Test 09/27/21  0712   WBC 8.6   HGB 11.9   HCT 36.3   MCV 91        Recent Labs   Lab Test 09/27/21  0712 03/30/21  1315 07/13/20  1532   HGB 11.9 12.1 12.6     No results for input(s): TROPONINI in the last 77054 hours.  Recent Labs   Lab Test 03/30/21  1315 12/26/19  1205   * 188*     Recent Labs   Lab Test 08/18/21  1626   TSH 2.16     Recent Labs   Lab Test 10/08/21  1258 10/05/21  1319 09/30/21  1155   INR 2.4* 1.7* 1.2*                                           .

## 2021-10-19 ENCOUNTER — LAB (OUTPATIENT)
Dept: LAB | Facility: CLINIC | Age: 86
End: 2021-10-19

## 2021-10-19 ENCOUNTER — IMMUNIZATION (OUTPATIENT)
Dept: FAMILY MEDICINE | Facility: CLINIC | Age: 86
End: 2021-10-19
Payer: COMMERCIAL

## 2021-10-19 ENCOUNTER — ANTICOAGULATION THERAPY VISIT (OUTPATIENT)
Dept: ANTICOAGULATION | Facility: CLINIC | Age: 86
End: 2021-10-19

## 2021-10-19 DIAGNOSIS — Z95.2 H/O AORTIC VALVE REPLACEMENT: ICD-10-CM

## 2021-10-19 DIAGNOSIS — I48.91 ATRIAL FIBRILLATION (H): Primary | ICD-10-CM

## 2021-10-19 LAB — INR BLD: 3 (ref 0.9–1.1)

## 2021-10-19 PROCEDURE — 90662 IIV NO PRSV INCREASED AG IM: CPT

## 2021-10-19 PROCEDURE — 85610 PROTHROMBIN TIME: CPT

## 2021-10-19 PROCEDURE — 36416 COLLJ CAPILLARY BLOOD SPEC: CPT

## 2021-10-19 PROCEDURE — G0008 ADMIN INFLUENZA VIRUS VAC: HCPCS

## 2021-10-19 NOTE — PROGRESS NOTES
ANTICOAGULATION MANAGEMENT     Constanza Coles 86 year old female is on warfarin with therapeutic INR result. (Goal INR 2.0-3.0)    Recent labs: (last 7 days)     10/19/21  1352   INR 3.0*       ASSESSMENT     Source(s): Chart Review, Patient/Caregiver Call and Template       Warfarin doses taken: Warfarin taken as instructed    Diet: No new diet changes identified    New illness, injury, or hospitalization: No    Medication/supplement changes: None noted    - immunized with Quad. High Dose Flu Shot 10/19/21.    Signs or symptoms of bleeding or clotting: No    Previous INR: Therapeutic last visit; previously outside of goal range    Additional findings: None     PLAN     Recommended plan for ongoing change(s) affecting INR     Dosing Instructions:  (1mg tabs)    Continue your current warfarin dose with next INR in 1-2 weeks       Summary  As of 10/19/2021    Full warfarin instructions:  2 mg every Tue; 1 mg all other days   Next INR check:               Telephone call with Constanza ( [phone]) who verbalizes understanding and agrees to plan    Lab visit scheduled - INR on 10/26/21 @ [facility]   (also scheduled for Pfizer Covid-19 booster shot).    Education provided: Importance of consistent vitamin K intake, Goal range and significance of current result and Potential interaction between warfarin and Prasurgrel (Effient)    Plan made per [facility] anticoagulation protocol    Erika Simental RN  Anticoagulation Clinic  10/19/2021    _______________________________________________________________________     Anticoagulation Episode Summary     Current INR goal:  2.0-3.0   TTR:  68.3 % (1 y)   Target end date:     Send INR reminders to:  [facility]    Indications    Atrial fibrillation (H) [I48.91]  H/O aortic valve replacement [Z95.2]           Comments:           Anticoagulation Care Providers     Provider Role Specialty Phone number    Hayder Bailey MD Referring Internal Medicine [phone]    Yun Jacinto MD  [facility] Internal Medicine [phone]

## 2021-10-25 ENCOUNTER — TRANSFERRED RECORDS (OUTPATIENT)
Dept: HEALTH INFORMATION MANAGEMENT | Facility: CLINIC | Age: 86
End: 2021-10-25
Payer: COMMERCIAL

## 2021-10-26 ENCOUNTER — ANTICOAGULATION THERAPY VISIT (OUTPATIENT)
Dept: ANTICOAGULATION | Facility: CLINIC | Age: 86
End: 2021-10-26

## 2021-10-26 ENCOUNTER — IMMUNIZATION (OUTPATIENT)
Dept: NURSING | Facility: CLINIC | Age: 86
End: 2021-10-26
Payer: COMMERCIAL

## 2021-10-26 ENCOUNTER — LAB (OUTPATIENT)
Dept: LAB | Facility: CLINIC | Age: 86
End: 2021-10-26
Payer: COMMERCIAL

## 2021-10-26 DIAGNOSIS — Z95.2 H/O AORTIC VALVE REPLACEMENT: ICD-10-CM

## 2021-10-26 DIAGNOSIS — R20.8 BURNING SENSATION OF FEET: ICD-10-CM

## 2021-10-26 DIAGNOSIS — I48.91 ATRIAL FIBRILLATION (H): Primary | ICD-10-CM

## 2021-10-26 LAB — INR BLD: 2.5 (ref 0.9–1.1)

## 2021-10-26 PROCEDURE — 85610 PROTHROMBIN TIME: CPT

## 2021-10-26 PROCEDURE — 0004A PR COVID VAC PFIZER DIL RECON 30 MCG/0.3 ML IM: CPT

## 2021-10-26 PROCEDURE — 91300 PR COVID VAC PFIZER DIL RECON 30 MCG/0.3 ML IM: CPT

## 2021-10-26 NOTE — PROGRESS NOTES
ANTICOAGULATION MANAGEMENT     Constanza Doansupriya 86 year old female is on warfarin with therapeutic INR result. (Goal INR 2.0-3.0)    Recent labs: (last 7 days)     10/26/21  1159   INR 2.5*       ASSESSMENT     Source(s): Chart Review, Patient/Caregiver Call and Template       Warfarin doses taken: Warfarin taken as instructed    Diet: No new diet changes identified    New illness, injury, or hospitalization: No    Medication/supplement changes: None noted    Today, eported booster vaccination with Covid-19.    Signs or symptoms of bleeding or clotting: No    Previous INR: Therapeutic last visit at 3.0; previously outside of goal range at 1.2    Additional findings: None     PLAN     Recommended plan for no diet, medication or health factor changes affecting INR     Dosing Instructions:   (mornings. 1mg tabs)    Continue your current warfarin dose with next INR in 2-3 weeks       Summary  As of 10/26/2021    Full warfarin instructions:  2 mg every Tue; 1 mg all other days   Next INR check:  11/16/2021             Telephone call with Constanza ([phone]) who verbalizes understanding and agrees to plan    Lab visit scheduled - INR on 11/16/21 @ [facility].    Education provided: Importance of consistent vitamin K intake and Goal range and significance of current result    Plan made per [facility] anticoagulation protocol    Erika Simental RN  Anticoagulation Clinic  10/26/2021    _______________________________________________________________________     Anticoagulation Episode Summary     Current INR goal:  2.0-3.0   TTR:  68.4 % (1 y)   Target end date:     Send INR reminders to:  [facility]    Indications    Atrial fibrillation (H) [I48.91]  H/O aortic valve replacement [Z95.2]           Comments:           Anticoagulation Care Providers     Provider Role Specialty Phone number    Hayder Bailey MD Referring Internal Medicine [phone]    Yun Jacinto MD Responsible Internal Medicine [phone]

## 2021-10-28 ENCOUNTER — NURSE TRIAGE (OUTPATIENT)
Dept: NURSING | Facility: CLINIC | Age: 86
End: 2021-10-28

## 2021-10-28 RX ORDER — GABAPENTIN 300 MG/1
CAPSULE ORAL
Qty: 180 CAPSULE | Refills: 3 | Status: SHIPPED | OUTPATIENT
Start: 2021-10-28 | End: 2022-07-18

## 2021-10-28 NOTE — TELEPHONE ENCOUNTER
"Pt reports \"stools getting angel luis and angel luis since Sunday. Pt denies taking iron pills or Pepto Bismol. Pt reports she is on warfarin and Prasugrel. Pt reports \"pains in stomach since Sunday, intermittent\", pt rates pain \"2-3\". Pt states she \"tired\" but denies weakness, faintness or dizziness.    Advised Constanza to go to the ER now per protocol. If any weakness, faintness or dizziness another adult should drive. Explained to pt that internal bleed can be life threatening and should be evaluated now in the ER.     Constanza refuses ER visit at this time. States \"I think I will go tomorrow\".     Reason for Disposition    Tarry or jet black-colored stool    Tarry or jet black-colored stool (not dark green)    Additional Information    Negative: Shock suspected (e.g., cold/pale/clammy skin, too weak to stand, low BP, rapid pulse)    Negative: Difficult to awaken or acting confused (e.g., disoriented, slurred speech)    Negative: Passed out (i.e., lost consciousness, collapsed and was not responding)    Negative: [1] Vomiting AND [2] contains red blood or black (\"coffee ground\") material  (Exception: few red streaks in vomit that only happened once)    Negative: Sounds like a life-threatening emergency to the triager    Negative: Diarrhea is main symptom    Negative: Stool color other than brown or tan is main concern  (no bleeding and no melena)    Protocols used: STOOLS - UNUSUAL COLOR-A-AH, RECTAL BLEEDING-A-AH      "

## 2021-10-28 NOTE — TELEPHONE ENCOUNTER
Routing refill request to provider for review/approval because:  Drug not on the [facility] refill protocol     Last Written Prescription Date:  10/18/20  Last Fill Quantity: 60,  # refills: 11   Last office visit provider:  8/18/21     Requested Prescriptions   Pending Prescriptions Disp Refills     gabapentin (NEURONTIN) 300 MG capsule [Pharmacy Med Name: GABAPENTIN 300 MG CAP** 300 Capsule] 60 capsule 11     Sig: TAKE 1 CAPSULE (300 MG TOTAL) BY MOUTH 2 TIMES A DAY.       There is no refill protocol information for this order          Constanza Brooks RN 10/28/21 9:34 AM

## 2021-10-29 ENCOUNTER — HOSPITAL ENCOUNTER (OUTPATIENT)
Facility: CLINIC | Age: 86
Setting detail: OBSERVATION
Discharge: HOME | End: 2021-10-31
Attending: EMERGENCY MEDICINE | Admitting: INTERNAL MEDICINE
Payer: COMMERCIAL

## 2021-10-29 DIAGNOSIS — K92.2 GASTROINTESTINAL HEMORRHAGE, UNSPECIFIED GASTROINTESTINAL HEMORRHAGE TYPE: ICD-10-CM

## 2021-10-29 LAB
ABO/RH(D): NORMAL
ANION GAP SERPL CALCULATED.3IONS-SCNC: 8 MMOL/L (ref 5–18)
ANTIBODY SCREEN: NEGATIVE
ATRIAL RATE - MUSE: 69 BPM
BASOPHILS # BLD AUTO: 0 10E3/UL (ref 0–0.2)
BASOPHILS NFR BLD AUTO: 1 %
BUN SERPL-MCNC: 14 MG/DL (ref 8–28)
CALCIUM SERPL-MCNC: 9.1 MG/DL (ref 8.5–10.5)
CHLORIDE BLD-SCNC: 105 MMOL/L (ref 98–107)
CO2 SERPL-SCNC: 27 MMOL/L (ref 22–31)
CREAT SERPL-MCNC: 0.76 MG/DL (ref 0.6–1.1)
DIASTOLIC BLOOD PRESSURE - MUSE: NORMAL MMHG
EOSINOPHIL # BLD AUTO: 0.4 10E3/UL (ref 0–0.7)
EOSINOPHIL NFR BLD AUTO: 6 %
ERYTHROCYTE [DISTWIDTH] IN BLOOD BY AUTOMATED COUNT: 14.5 % (ref 10–15)
GFR SERPL CREATININE-BSD FRML MDRD: 72 ML/MIN/1.73M2
GLUCOSE BLD-MCNC: 93 MG/DL (ref 70–125)
HCT VFR BLD AUTO: 30.9 % (ref 35–47)
HGB BLD-MCNC: 10.7 G/DL (ref 11.7–15.7)
HGB BLD-MCNC: 9.9 G/DL (ref 11.7–15.7)
IMM GRANULOCYTES # BLD: 0 10E3/UL
IMM GRANULOCYTES NFR BLD: 0 %
INR PPP: 1.93 (ref 0.85–1.15)
INR PPP: 1.94 (ref 0.85–1.15)
INTERNAL QC CHECK POCT: ABNORMAL
INTERPRETATION ECG - MUSE: NORMAL
LYMPHOCYTES # BLD AUTO: 1.3 10E3/UL (ref 0.8–5.3)
LYMPHOCYTES NFR BLD AUTO: 21 %
MCH RBC QN AUTO: 29.9 PG (ref 26.5–33)
MCHC RBC AUTO-ENTMCNC: 32 G/DL (ref 31.5–36.5)
MCV RBC AUTO: 93 FL (ref 78–100)
MONOCYTES # BLD AUTO: 0.8 10E3/UL (ref 0–1.3)
MONOCYTES NFR BLD AUTO: 12 %
NEUTROPHILS # BLD AUTO: 3.8 10E3/UL (ref 1.6–8.3)
NEUTROPHILS NFR BLD AUTO: 60 %
NRBC # BLD AUTO: 0 10E3/UL
NRBC BLD AUTO-RTO: 0 /100
OCCULT BLOOD POCT: POSITIVE
P AXIS - MUSE: NORMAL DEGREES
PLATELET # BLD AUTO: 181 10E3/UL (ref 150–450)
POTASSIUM BLD-SCNC: 3.8 MMOL/L (ref 3.5–5)
PR INTERVAL - MUSE: NORMAL MS
QRS DURATION - MUSE: 90 MS
QT - MUSE: 388 MS
QTC - MUSE: 430 MS
R AXIS - MUSE: -28 DEGREES
RBC # BLD AUTO: 3.31 10E6/UL (ref 3.8–5.2)
SARS-COV-2 RNA RESP QL NAA+PROBE: NEGATIVE
SODIUM SERPL-SCNC: 140 MMOL/L (ref 136–145)
SPECIMEN EXPIRATION DATE: NORMAL
SYSTOLIC BLOOD PRESSURE - MUSE: NORMAL MMHG
T AXIS - MUSE: 80 DEGREES
TEST CARD EXPIRATION DATE: ABNORMAL
TEST CARD LOT NUMBER: ABNORMAL
VENTRICULAR RATE- MUSE: 74 BPM
WBC # BLD AUTO: 6.2 10E3/UL (ref 4–11)

## 2021-10-29 PROCEDURE — 99222 1ST HOSP IP/OBS MODERATE 55: CPT | Mod: AI

## 2021-10-29 PROCEDURE — G0378 HOSPITAL OBSERVATION PER HR: HCPCS

## 2021-10-29 PROCEDURE — 87635 SARS-COV-2 COVID-19 AMP PRB: CPT | Performed by: EMERGENCY MEDICINE

## 2021-10-29 PROCEDURE — 250N000011 HC RX IP 250 OP 636: Performed by: STUDENT IN AN ORGANIZED HEALTH CARE EDUCATION/TRAINING PROGRAM

## 2021-10-29 PROCEDURE — 250N000011 HC RX IP 250 OP 636: Performed by: INTERNAL MEDICINE

## 2021-10-29 PROCEDURE — 96374 THER/PROPH/DIAG INJ IV PUSH: CPT

## 2021-10-29 PROCEDURE — 250N000011 HC RX IP 250 OP 636: Performed by: EMERGENCY MEDICINE

## 2021-10-29 PROCEDURE — C9803 HOPD COVID-19 SPEC COLLECT: HCPCS

## 2021-10-29 PROCEDURE — 36415 COLL VENOUS BLD VENIPUNCTURE: CPT | Performed by: EMERGENCY MEDICINE

## 2021-10-29 PROCEDURE — 85018 HEMOGLOBIN: CPT

## 2021-10-29 PROCEDURE — 80048 BASIC METABOLIC PNL TOTAL CA: CPT | Performed by: EMERGENCY MEDICINE

## 2021-10-29 PROCEDURE — 86900 BLOOD TYPING SEROLOGIC ABO: CPT | Performed by: EMERGENCY MEDICINE

## 2021-10-29 PROCEDURE — 93005 ELECTROCARDIOGRAM TRACING: CPT | Performed by: EMERGENCY MEDICINE

## 2021-10-29 PROCEDURE — 85610 PROTHROMBIN TIME: CPT | Performed by: EMERGENCY MEDICINE

## 2021-10-29 PROCEDURE — C9113 INJ PANTOPRAZOLE SODIUM, VIA: HCPCS | Performed by: STUDENT IN AN ORGANIZED HEALTH CARE EDUCATION/TRAINING PROGRAM

## 2021-10-29 PROCEDURE — 85610 PROTHROMBIN TIME: CPT | Performed by: STUDENT IN AN ORGANIZED HEALTH CARE EDUCATION/TRAINING PROGRAM

## 2021-10-29 PROCEDURE — C9113 INJ PANTOPRAZOLE SODIUM, VIA: HCPCS | Performed by: INTERNAL MEDICINE

## 2021-10-29 PROCEDURE — 250N000013 HC RX MED GY IP 250 OP 250 PS 637: Performed by: INTERNAL MEDICINE

## 2021-10-29 PROCEDURE — 96376 TX/PRO/DX INJ SAME DRUG ADON: CPT

## 2021-10-29 PROCEDURE — C9113 INJ PANTOPRAZOLE SODIUM, VIA: HCPCS | Performed by: EMERGENCY MEDICINE

## 2021-10-29 PROCEDURE — 250N000013 HC RX MED GY IP 250 OP 250 PS 637: Performed by: EMERGENCY MEDICINE

## 2021-10-29 PROCEDURE — 250N000013 HC RX MED GY IP 250 OP 250 PS 637: Performed by: STUDENT IN AN ORGANIZED HEALTH CARE EDUCATION/TRAINING PROGRAM

## 2021-10-29 PROCEDURE — 36415 COLL VENOUS BLD VENIPUNCTURE: CPT | Performed by: STUDENT IN AN ORGANIZED HEALTH CARE EDUCATION/TRAINING PROGRAM

## 2021-10-29 PROCEDURE — 85025 COMPLETE CBC W/AUTO DIFF WBC: CPT | Performed by: EMERGENCY MEDICINE

## 2021-10-29 PROCEDURE — 99285 EMERGENCY DEPT VISIT HI MDM: CPT | Mod: 25

## 2021-10-29 PROCEDURE — 96375 TX/PRO/DX INJ NEW DRUG ADDON: CPT

## 2021-10-29 PROCEDURE — 120N000001 HC R&B MED SURG/OB

## 2021-10-29 PROCEDURE — 258N000003 HC RX IP 258 OP 636: Performed by: STUDENT IN AN ORGANIZED HEALTH CARE EDUCATION/TRAINING PROGRAM

## 2021-10-29 PROCEDURE — 82272 OCCULT BLD FECES 1-3 TESTS: CPT | Performed by: EMERGENCY MEDICINE

## 2021-10-29 PROCEDURE — 258N000003 HC RX IP 258 OP 636: Performed by: INTERNAL MEDICINE

## 2021-10-29 RX ORDER — AMOXICILLIN 250 MG
1 CAPSULE ORAL 2 TIMES DAILY PRN
Status: DISCONTINUED | OUTPATIENT
Start: 2021-10-29 | End: 2021-10-31 | Stop reason: HOSPADM

## 2021-10-29 RX ORDER — ACETAMINOPHEN 325 MG/1
975 TABLET ORAL EVERY 6 HOURS PRN
Status: DISCONTINUED | OUTPATIENT
Start: 2021-10-29 | End: 2021-10-30

## 2021-10-29 RX ORDER — ONDANSETRON 2 MG/ML
4 INJECTION INTRAMUSCULAR; INTRAVENOUS EVERY 6 HOURS PRN
Status: DISCONTINUED | OUTPATIENT
Start: 2021-10-29 | End: 2021-10-31 | Stop reason: HOSPADM

## 2021-10-29 RX ORDER — LORAZEPAM 0.5 MG/1
0.5 TABLET ORAL ONCE
Status: COMPLETED | OUTPATIENT
Start: 2021-10-29 | End: 2021-10-29

## 2021-10-29 RX ORDER — POLYETHYLENE GLYCOL 3350 17 G/17G
17 POWDER, FOR SOLUTION ORAL DAILY PRN
Status: DISCONTINUED | OUTPATIENT
Start: 2021-10-29 | End: 2021-10-31 | Stop reason: HOSPADM

## 2021-10-29 RX ORDER — LIDOCAINE 40 MG/G
CREAM TOPICAL
Status: DISCONTINUED | OUTPATIENT
Start: 2021-10-29 | End: 2021-10-31 | Stop reason: HOSPADM

## 2021-10-29 RX ORDER — BISACODYL 10 MG
10 SUPPOSITORY, RECTAL RECTAL DAILY PRN
Status: DISCONTINUED | OUTPATIENT
Start: 2021-10-29 | End: 2021-10-31 | Stop reason: HOSPADM

## 2021-10-29 RX ORDER — SODIUM CHLORIDE 9 MG/ML
INJECTION, SOLUTION INTRAVENOUS CONTINUOUS
Status: DISCONTINUED | OUTPATIENT
Start: 2021-10-29 | End: 2021-10-31 | Stop reason: HOSPADM

## 2021-10-29 RX ORDER — AMOXICILLIN 250 MG
2 CAPSULE ORAL 2 TIMES DAILY PRN
Status: DISCONTINUED | OUTPATIENT
Start: 2021-10-29 | End: 2021-10-31 | Stop reason: HOSPADM

## 2021-10-29 RX ORDER — HYDROXYZINE HYDROCHLORIDE 25 MG/1
50 TABLET, FILM COATED ORAL EVERY 6 HOURS PRN
Status: DISCONTINUED | OUTPATIENT
Start: 2021-10-29 | End: 2021-10-31 | Stop reason: HOSPADM

## 2021-10-29 RX ORDER — HYDROXYZINE HYDROCHLORIDE 25 MG/1
25 TABLET, FILM COATED ORAL DAILY PRN
Status: DISCONTINUED | OUTPATIENT
Start: 2021-10-29 | End: 2021-10-31 | Stop reason: HOSPADM

## 2021-10-29 RX ADMIN — HYDROXYZINE HYDROCHLORIDE 25 MG: 25 TABLET ORAL at 22:17

## 2021-10-29 RX ADMIN — SODIUM CHLORIDE: 9 INJECTION, SOLUTION INTRAVENOUS at 18:40

## 2021-10-29 RX ADMIN — LORAZEPAM 0.5 MG: 0.5 TABLET ORAL at 14:32

## 2021-10-29 RX ADMIN — ONDANSETRON 4 MG: 2 INJECTION INTRAMUSCULAR; INTRAVENOUS at 20:22

## 2021-10-29 RX ADMIN — PANTOPRAZOLE SODIUM 40 MG: 40 INJECTION, POWDER, FOR SOLUTION INTRAVENOUS at 21:08

## 2021-10-29 RX ADMIN — PHYTONADIONE 2 MG: 10 INJECTION, EMULSION INTRAMUSCULAR; INTRAVENOUS; SUBCUTANEOUS at 18:40

## 2021-10-29 RX ADMIN — PANTOPRAZOLE SODIUM 40 MG: 40 INJECTION, POWDER, FOR SOLUTION INTRAVENOUS at 14:43

## 2021-10-29 ASSESSMENT — ACTIVITIES OF DAILY LIVING (ADL)
ADLS_ACUITY_SCORE: 3
ADLS_ACUITY_SCORE: 3
HEARING_DIFFICULTY_OR_DEAF: NO
DIFFICULTY_COMMUNICATING: NO
ADLS_ACUITY_SCORE: 3
ADLS_ACUITY_SCORE: 4
FALL_HISTORY_WITHIN_LAST_SIX_MONTHS: NO
ADLS_ACUITY_SCORE: 4
TOILETING_ISSUES: NO
CONCENTRATING,_REMEMBERING_OR_MAKING_DECISIONS_DIFFICULTY: NO
DOING_ERRANDS_INDEPENDENTLY_DIFFICULTY: NO
ADLS_ACUITY_SCORE: 3
ADLS_ACUITY_SCORE: 4
ADLS_ACUITY_SCORE: 3
WEAR_GLASSES_OR_BLIND: NO
DIFFICULTY_EATING/SWALLOWING: NO
DEPENDENT_IADLS:: INDEPENDENT
DRESSING/BATHING_DIFFICULTY: NO
WALKING_OR_CLIMBING_STAIRS_DIFFICULTY: NO
ADLS_ACUITY_SCORE: 4
ADLS_ACUITY_SCORE: 3

## 2021-10-29 ASSESSMENT — MIFFLIN-ST. JEOR: SCORE: 961.17

## 2021-10-29 NOTE — PHARMACY-ADMISSION MEDICATION HISTORY
"Pharmacy Note - Admission Medication History    Pertinent Provider Information:     Patient was taking Alendronate in the middle of the night and going back to bed.  It could irritate the esophagus.  I suggested not to lay down after taking   ______________________________________________________________________    Prior To Admission (PTA) med list completed and updated in EMR.       PTA Med List   Medication Sig Note Last Dose     acetaminophen (TYLENOL) 500 MG tablet Take 1,000 mg by mouth every 6 hours as needed for mild pain       alendronate (FOSAMAX) 70 MG tablet Take 1 tablet (70 mg) by mouth every 7 days With large glass of water.  On an empty stomach eat after 1 hour 10/29/2021: Patient was taking in the middle of the night and going back to bed.  It could irritate the esophagus.  I suggested not to lay down after taking 10/21/2021     atorvastatin (LIPITOR) 40 MG tablet Take 1 tablet (40 mg) by mouth daily  10/28/2021 at Unknown time     cholecalciferol, vitamin D3, (VITAMIN D3) 5,000 unit Tab [CHOLECALCIFEROL, VITAMIN D3, (VITAMIN D3) 5,000 UNIT TAB] Take 1 tablet by mouth daily.  10/28/2021 at Unknown time     cyanocobalamin 1,000 mcg/mL injection [CYANOCOBALAMIN 1,000 MCG/ML INJECTION] INJECT 1 ML (1,000 MCG TOTAL) INTO THE SHOULDER, THIGH, OR BUTTOCKS EVERY 30 DAYS. **SYR 27G 1/2\" 1CC**  ~2 weeks ago     furosemide (LASIX) 20 MG tablet Take 20 mg by mouth 2 times daily as needed       gabapentin (NEURONTIN) 300 MG capsule TAKE 1 CAPSULE (300 MG TOTAL) BY MOUTH 2 TIMES A DAY.  10/28/2021 at Unknown time     ketoconazole (NIZORAL) 2 % external shampoo Apply topically daily as needed        metoprolol succinate (TOPROL-XL) 25 MG [METOPROLOL SUCCINATE (TOPROL-XL) 25 MG] TAKE A HALF TABLET (12.5MG) BY MOUTH DAILY.  10/28/2021 at AM     nitroGLYcerin (NITROSTAT) 0.4 MG sublingual tablet [NITROGLYCERIN (NITROSTAT) 0.4 MG SL TABLET] DISSOLVE 1 TABLET UNDER THE TONGUE AS NEEDED FOR CHEST PAIN       prasugrel " (EFFIENT) 10 MG TABS tablet Take 1 tablet (10 mg) by mouth daily Do not crush or break tablet. Dose to start tomorrow.  10/28/2021 at Unknown time     sucralfate (CARAFATE) 1 gram tablet [SUCRALFATE (CARAFATE) 1 GRAM TABLET] TAKE 1 TABLET BY MOUTH FOUR TIMES DAILY  10/28/2021 at Unknown time     warfarin ANTICOAGULANT (COUMADIN) 1 MG tablet Take 1-2 mg by mouth See Admin Instructions 2mg on Tuesday and 1mg the rest of the days of the week  10/28/2021 at Unknown time       Information source(s): Patient and CareEverywhere/SureScripts  Method of interview communication: phone    Summary of Changes to PTA Med List  New: none  Discontinued: none  Changed: warfarin    Patient was asked about OTC/herbal products specifically.  PTA med list reflects this.    In the past week, patient estimated taking medication this percent of the time:  greater than 90%.    Allergies were reviewed, assessed, and updated with the patient.      Patient does not anticipate needing any multi-use medications during admission.    The information provided in this note is only as accurate as the sources available at the time of the update(s).    Thank you for the opportunity to participate in the care of this patient.    Erica Gonzales RPH  10/29/2021 2:48 PM

## 2021-10-29 NOTE — TELEPHONE ENCOUNTER
Pt being seen in the ED today, 10/29/2021 for this problem, now.    Claudette Scott RN  [facility] Nurse Advisor  10/29/2021 at 12:20 PM

## 2021-10-29 NOTE — H&P
Resident/Fellow Attestation      I, Radha Ford, was present with the medical/USHA student who participated in the service and in the documentation of the note.  I have verified the history and personally performed the physical exam and medical decision making.  I agree with the assessment and plan of care as documented in the note.      Radha Ford MD PGY1      10/29/2021  [facility]      Admission History and Physical   Constanza Coles,  1935, MRN [id number]    [facility]  Gastrointestinal hemorrhage, unspecified gastrointestinal hemorrhage type [K92.2]    PCP: Yun Jacinto, [phone]   Code status:  Prior       Extended Emergency Contact Information  Primary Emergency Contact: Jesse Coles  Address: [address] [place] [address] [facility]  Home Phone: [phone]  Relation: Spouse  Secondary Emergency Contact: Klaus Denis  Address: [address] [place] [id number] [facility]  Mobile Phone: [phone]  Relation: Son       Chief Complaint: Melena for 1 week     Assessment and Plan:   Constanza Coles is a 86 year old female with a past medical history of atrial fibrillation treated with warfarin, hypertension, cardiac pacemaker () for sick sinus syndrome, CAD, and recent coronary angiogram and left heart cath (21) who presented to the [facility] Emergency Department on the day of admission for evaluation of black stools for 1 week.     Melena: Upper GI bleed vs lower GI bleed  Etiologies include angiodysplasia 2/2 to aortic stenosis vs marginal ulcer at anastomosis site vs peptic ulcer from H.pylori vs antithrombotic use vs esophagitis vs esophageal varices   - Occult blood test positive. Hemoglobin on admission was 9.9. Baseline is above 12.  - GI consult; NPO after midnight for possible EGD 10/30  -IVF 100mL/hr  -IV protonix 40mg b.i.d   -Vitamin K to be dosed by pharmacy for warfarin reversal to best prepare  "patient for EGD tomorrow. INR on admission was 1.9.    Anemia  -Likely due to acute blood loss from GI bleed  -Trend hemoglobin q6h    Headache  -975mg acetaminophen prn q6hr    Atrial fibrillation  Anticoagulation  CAD  Pacemaker for sick sinus syndrome:  - Chronic warfarin 2 mg on Tuesday and 1 mg on the rest of the days of the week. INR on admission is 1.92.  -Stop warfarin at this time for possible EGD.    GERD  Peptic ulcer disease  -Protonix 40mg bid    Hypertension  Likely hypertensive due to anxiety in ED  Monitor vitals  Continue home metoprolol    FEN:  Fluids: maintenance 100mL/hr NS  Diet: NPO until GI consult   PPX: Mechanical- no pharmaceutical ppx due to GI bleed- patient's INR is 1.9   Disposition: Home   Status: inpatient    Patient discussed with attending physician, Dr. Richy Erickson, who agrees with the plan.     Keesha Love, Medical Student 4th year      HPI:    Constanza Coles is a 86 year old old female with a past medical history of atrial fibrillation treated with warfarin, hypertension, cardiac pacemaker (2011) for sick sinus syndrome, CAD, and recent coronary angiogram and left heart cath (9/27/21) who presented to the [facility] Emergency Department on the day of admission with complaints of melana for about 1 weeks.      Patient states she had \"half of her stomach removed\" about 40 years ago due to an acute GI bleed. She says she had green emesis x2 last week. Sporadic episodes of green emesis is somewhat normal for her since the surgery. She denies any pain associated with the dark stool. She says she has a headache since getting to the ED.     Denies abdominal pain, hemoptysis, dizziness, SOB, chest pain, vision changes, numbness/tingling, weakness, dysuria.   History is provided by patient, who is a fair historian.      Emergency Department Course:    Upon presentation to the Emergency Department the patient's vital signs were    ED Triage Vitals [10/29/21 1115]   Enc Vitals Group      " BP (!) 185/120      Pulse 85      Resp 16      Temp 97.6  F (36.4  C)      Temp src       SpO2 97 %      Weight       Height       Head Circumference       Peak Flow       Pain Score       Pain Loc       Pain Edu?       Excl. in GC?      Initial evaluation in the Emergency Department showed decrease hemoglobin from 11.9 (2021) to 9.9 and INR of 1.9. She was given 40mg of protonix for suspected GI bleed. Additionally 0.5mg of Ativan was given for anxiety about IV placement.   Patient was admitted to the hospital for further workup and management.     Medical History    Reviewed, changes/additions include none.  Surgical History  She  has a past surgical history that includes IR Miscellaneous Procedure (2005); IR Aortic Arch 4 Vessel Angiogram (9/15/2011); CABG, VEIN, SINGLE; REPLACE AORT VALV,PROSTH VALV; Pr Perc Clos,Rosas Interatrial Commun W/Impl; Ascending Aortic Aneurysm Repair W/ Tissue Aortic Valve Replacement (2005); other surgical history (); Partial Gastrectomy; Hysterectomy;  section; Gallbladder surgery; Coronary Angiogram (N/A, 2021); Percutaneous Coronary Intervention (N/A, 2021); and Left Heart Cath (N/A, 2021).      Reviewed, changes/additions include none.  Social History & Habits  she  reports that she quit smoking about 42 years ago. She has a 30.00 pack-year smoking history. She has never used smokeless tobacco. She reports current alcohol use. She reports that she does not use drugs.    Reviewed, changes/additions include none.     Code Status: Full code   Marital Status:   Surrogate decision maker/POA:  (Jesse) information is above        Allergies  Allergies   Allergen Reactions     Aspirin Nausea and Vomiting     Vomiting. Pt will NOT take Aspirin.      Erythromycin Base [Erythromycin] Unknown     Pt is not sure she is allergic to this     Levaquin [Levofloxacin] Nausea and Vomiting     Vancomycin Other (See Comments)     Red man syndrome      Xanax [Alprazolam] Other (See Comments)     confusion     Sulfa (Sulfonamide Antibiotics) [Sulfa Drugs] Rash    Family History  family history includes LUNG DISEASE in her sister; Liver Disease in her father; No Known Problems in her mother; Thyroid Disease in her sister. Psychosocial Needs  Social History     Social History Narrative     Not on file     Additional psychosocial needs reviewed per nursing assessment.       Prior to Admission Medications   (Not in a hospital admission)           Review of Systems:  Constitutional: negative  Eyes: negative  Respiratory: negative  Cardiovascular: negative except for irregular heart beat and palpitations  Gastrointestinal: negative  Genitourinary:negative  Hematologic/lymphatic: negative Physical Exam:   Temp:  [97.6  F (36.4  C)] 97.6  F (36.4  C)  Pulse:  [75-85] 75  Resp:  [14-16] 14  BP: (110-185)/() 110/79  SpO2:  [97 %-98 %] 98 %  General appearance: alert, appears stated age and cooperative  Head: Normocephalic, without obvious abnormality, atraumatic  Eyes: PERRLA b/l, EOMI b/l, non-injected, clear sclera   Lungs: clear to auscultation bilaterally  Heart: Irregular rate and rhythm.   Abdomen: soft, non-tender; bowel sounds normal; no masses,  no organomegaly  Pulses: 2+ and symmetric     Pertinent Labs  Lab Results: personally reviewed.   Results for orders placed or performed during the hospital encounter of 10/29/21   INR     Status: Abnormal   Result Value Ref Range    INR 1.93 (H) 0.85 - 1.15   Basic metabolic panel     Status: Normal   Result Value Ref Range    Sodium 140 136 - 145 mmol/L    Potassium 3.8 3.5 - 5.0 mmol/L    Chloride 105 98 - 107 mmol/L    Carbon Dioxide (CO2) 27 22 - 31 mmol/L    Anion Gap 8 5 - 18 mmol/L    Urea Nitrogen 14 8 - 28 mg/dL    Creatinine 0.76 0.60 - 1.10 mg/dL    Calcium 9.1 8.5 - 10.5 mg/dL    Glucose 93 70 - 125 mg/dL    GFR Estimate 72 >60 mL/min/1.73m2   CBC with platelets and differential     Status: Abnormal    Result Value Ref Range    WBC Count 6.2 4.0 - 11.0 10e3/uL    RBC Count 3.31 (L) 3.80 - 5.20 10e6/uL    Hemoglobin 9.9 (L) 11.7 - 15.7 g/dL    Hematocrit 30.9 (L) 35.0 - 47.0 %    MCV 93 78 - 100 fL    MCH 29.9 26.5 - 33.0 pg    MCHC 32.0 31.5 - 36.5 g/dL    RDW 14.5 10.0 - 15.0 %    Platelet Count 181 150 - 450 10e3/uL    % Neutrophils 60 %    % Lymphocytes 21 %    % Monocytes 12 %    % Eosinophils 6 %    % Basophils 1 %    % Immature Granulocytes 0 %    NRBCs per 100 WBC 0 <1 /100    Absolute Neutrophils 3.8 1.6 - 8.3 10e3/uL    Absolute Lymphocytes 1.3 0.8 - 5.3 10e3/uL    Absolute Monocytes 0.8 0.0 - 1.3 10e3/uL    Absolute Eosinophils 0.4 0.0 - 0.7 10e3/uL    Absolute Basophils 0.0 0.0 - 0.2 10e3/uL    Absolute Immature Granulocytes 0.0 <=0.0 10e3/uL    Absolute NRBCs 0.0 10e3/uL   ECG 12-LEAD WITH MUSE (LHE)     Status: None   Result Value Ref Range    Systolic Blood Pressure  mmHg    Diastolic Blood Pressure  mmHg    Ventricular Rate 74 BPM    Atrial Rate 69 BPM    AZ Interval  ms    QRS Duration 90 ms     ms    QTc 430 ms    P Axis  degrees    R AXIS -28 degrees    T Axis 80 degrees    Interpretation ECG       Atrial fibrillation  Abnormal ECG  When compared with ECG of 28-SEP-2021 06:57,  No significant change was found  Confirmed by SEE ED PROVIDER NOTE FOR, ECG INTERPRETATION (4000),  MARINE EATON ([id number]) on 10/29/2021 12:40:57 PM     Occult blood stool POCT     Status: Abnormal   Result Value Ref Range    Occult Blood POCT Positive (A) Negative    Internal QC Check POCT Valid Valid    Test Card Lot Number [id number]     Test Card Expiration Date 7/22    Adult Type and Screen     Status: None   Result Value Ref Range    ABO/RH(D) O POS     Antibody Screen Negative Negative    SPECIMEN EXPIRATION DATE [id number]    ABO/Rh type and screen     Status: None    Narrative    The following orders were created for panel order ABO/Rh type and screen.  Procedure                                Abnormality         Status                     ---------                               -----------         ------                     Adult Type and Screen[[id number]]                            Final result                 Please view results for these tests on the individual orders.   CBC with Platelets & Differential     Status: Abnormal    Narrative    The following orders were created for panel order CBC with Platelets & Differential.  Procedure                               Abnormality         Status                     ---------                               -----------         ------                     CBC with platelets and d...[[id number]]  Abnormal            Final result                 Please view results for these tests on the individual orders.        Pertinent Radiology:  Radiology Results: personally reviewed.   No results found.    Cardiographics:   EKG Results: personally reviewed.   EKG: atrial fibrillation, rate 69.    Keesha Love  10/29/2021

## 2021-10-29 NOTE — CONSULTS
Care Management Initial Consult    General Information  Assessment completed with: Patient,    Type of CM/SW Visit: Initial Assessment    Primary Care Provider verified and updated as needed: Yes   Readmission within the last 30 days: no previous admission in last 30 days      Reason for Consult: discharge planning  Advance Care Planning: Advance Care Planning Reviewed: present on chart          Communication Assessment  Patient's communication style: spoken language (English or Bilingual)    Hearing Difficulty or Deaf: no   Wear Glasses or Blind: no    Cognitive  Cognitive/Neuro/Behavioral: WDL                      Living Environment:   People in home: spouse     Current living Arrangements: house      Able to return to prior arrangements: yes       Family/Social Support:  Care provided by: self  Provides care for: no one  Marital Status:             Description of Support System: Involved, Supportive    Support Assessment: Adequate social supports    Current Resources:   Patient receiving home care services: No     Community Resources: None  Equipment currently used at home: none  Supplies currently used at home: None    Employment/Financial:  Employment Status: retired        Financial Concerns: No concerns identified   Referral to Financial Counselor: No       Lifestyle & Psychosocial Needs:  Social Determinants of Health     Tobacco Use: Medium Risk     Smoking Tobacco Use: Former Smoker     Smokeless Tobacco Use: Never Used   Alcohol Use:      Frequency of Alcohol Consumption:      Average Number of Drinks:      Frequency of Binge Drinking:    Financial Resource Strain:      Difficulty of Paying Living Expenses:    Food Insecurity:      Worried About Running Out of Food in the Last Year:      Ran Out of Food in the Last Year:    Transportation Needs:      Lack of Transportation (Medical):      Lack of Transportation (Non-Medical):    Physical Activity:      Days of Exercise per Week:      Minutes  of Exercise per Session:    Stress:      Feeling of Stress :    Social Connections:      Frequency of Communication with Friends and Family:      Frequency of Social Gatherings with Friends and Family:      Attends [employer] Services:      Active Member of Clubs or Organizations:      Attends Club or Organization Meetings:      Marital Status:    Intimate Partner Violence:      Fear of Current or Ex-Partner:      Emotionally Abused:      Physically Abused:      Sexually Abused:    Depression: Not at risk     PHQ-2 Score: 0   Housing Stability:      Unable to Pay for Housing in the Last Year:      Number of Places Lived in the Last Year:      Unstable Housing in the Last Year:        Functional Status:  Prior to admission patient needed assistance:   Dependent ADLs:: Independent  Dependent IADLs:: Independent  Assesssment of Functional Status: At functional baseline    Mental Health Status:  Mental Health Status: No Current Concerns       Chemical Dependency Status:  Chemical Dependency Status: No Current Concerns             Values/Beliefs:  Spiritual, Cultural Beliefs, [employer] Practices, Values that affect care: no               Additional Information:  RADHA assessed. Pt resides at home with spouse, she is independent with ADLs/IADLs at baseline and exercises at the gym 3-5x/week. She does not require services or equipment. Discharge plan to return home, no anticipated CM needs.    Gretel Jorgensen LGSW

## 2021-10-29 NOTE — ED PROVIDER NOTES
EMERGENCY DEPARTMENT ENCOUNTER            IMPRESSION:  Gastrointestinal hemorrhage, suspect upper GI  Anticoagulation      MEDICAL DECISION MAKING:  Patient evaluated for change in stool color.  She has a history of GI bleed.  She reports solid dark-colored stool.  She is on Coumadin for atrial fibrillation.  She has been feeling weak    On exam her vital signs are normal.  She appears slightly pale.  Heart rate is regular.  There is no abdominal tenderness.  Stool culture provided by the patient is dark black    An IV was established    Guaiac was positive    INR 1.93    Hemoglobin is 9.9 dropped 2 units    Chemistry is normal    Patient received proton pump inhibitor and type and cross in the emergency department.    She is high risk due to upper GI source and on anticoagulation.  She will be admitted to the residency service      =================================================================  CHIEF COMPLAINT:  Chief Complaint   Patient presents with     Melena         HPI  Constanza Coles is a 86 year old female with a history of atrial fibrillation, HTN, cardiac pacemaker, CAD, chronic kidney disease stage 3, and recent coronary angiogram and left heart cath (9/27/21) who presents to the ED by via walk in for evaluation of melena.     Patient presents with complaints of dark stools for about 1 week. Stools have been solid and patient has been having 1 bowel movement per day. She has had this before in the past, so she brought a stool sample in to the ED today. Additionally, patient reports she has been feeling fatigued and has a headache. She has not talked to her doctor about her symptoms and called the nurse line who advised she come in. Of note, patient states that she had half of her stomach removed about 40 years ago.      She denies any recent diet changes, Pepto bismol use, or iron supplement use. Denies abdominal pain, diarrhea, or additional complaints at this time. Patient has not had a colonoscopy  in awhile. She is anticoagulated on coumadin for atrial fibrillation, most recent INR the other day was 2.5.       I, Torrie Tyrone am serving as a scribe to document services personally performed by Dr. Graham Ivy MD, based on my observation and the provider's statements to me. I, Dr. Graham Ivy MD attest that Torrie Hansen is acting in a scribe capacity, has observed my performance of the services and has documented them in accordance with my direction.      REVIEW OF SYSTEMS   Constitutional: Endorses fatigue. Denies fever, chills, unintentional weight loss.  Eyes: Denies visual changes or discharge    HENT: Denies sore throat, ear pain or neck pain  Respiratory: Denies cough or shortness of breath    Cardiovascular: Denies chest pain, palpitations or leg swelling  GI: Endorses dark solid stools (about 1 week). Denies abdominal pain, nausea, vomiting.   : Denies hematuria, dysuria, or flank pain  Musculoskeletal: Denies any new back pain or new muscle/joint pains  Skin: Denies rash or wound  Neurologic: Endorses headache. Denies new weakness, focal weakness, or sensory changes    Lymphatic: Denies swollen glands    Psychiatric: Denies depression, suicidal ideation or homicidal ideation.    Remainder of systems reviewed, unless noted in HPI all others negative.      PAST MEDICAL HISTORY:  Past Medical History:   Diagnosis Date     Anemia     iron deficiency     Chronic atrial fibrillation (H)      GERD (gastroesophageal reflux disease)      Hypertension      IHD (ischemic heart disease)      PUD (peptic ulcer disease)     Billroth 1     Recurrent cystitis      SSS (sick sinus syndrome) (H)     post pacemaker placement     Valvular heart disease      Vasculitis (H)        PAST SURGICAL HISTORY:  Past Surgical History:   Procedure Laterality Date     ASCENDING AORTIC ANEURYSM REPAIR W/ TISSUE AORTIC VALVE REPLACEMENT  Jan 2005    Dacron graft, Forte pericardial Magna 21mm aortic valve     C CABG, VEIN, SINGLE       Description: CABG (CABG);  Recorded: 2012;  Comments: LIMA^LAD, SVG^1st diag     C REPLACE AORT VALV,PROSTH VALV      Aortic Valve Replacement with Forte pericardial Magna 21mm 2005      SECTION       CV CORONARY ANGIOGRAM N/A 2021    Procedure: Coronary Angiogram;  Surgeon: Shane Mcclendon MD;  Location: [facility] CATH LAB CV     CV LEFT HEART CATH N/A 2021    Procedure: Left Heart Cath;  Surgeon: Shane Mcclendon MD;  Location: [facility] CATH LAB CV     CV PCI N/A 2021    Procedure: Percutaneous Coronary Intervention;  Surgeon: Shane Mcclendon MD;  Location: [facility] CATH LAB CV     GALLBLADDER SURGERY      partial colon removal     HYSTERECTOMY       IR AORTIC ARCH 4 VESSEL ANGIOGRAM  9/15/2011     IR MISCELLANEOUS PROCEDURE  2005     OTHER SURGICAL HISTORY      antrectomy     PARTIAL GASTRECTOMY       AK PERC CLOS,BART INTERATRIAL COMMUN W/IMPL      Description: Patent Foramen Ovale Closure Percutaneous;  Recorded: 2012;         CURRENT MEDICATIONS:    gabapentin (NEURONTIN) 300 MG capsule  acetaminophen (TYLENOL) 500 MG tablet  alendronate (FOSAMAX) 70 MG tablet  atorvastatin (LIPITOR) 40 MG tablet  cholecalciferol, vitamin D3, (VITAMIN D3) 5,000 unit Tab  cyanocobalamin 1,000 mcg/mL injection  furosemide (LASIX) 20 MG tablet  ketoconazole (NIZORAL) 2 % external shampoo  metoprolol succinate (TOPROL-XL) 25 MG  nitroGLYcerin (NITROSTAT) 0.4 MG sublingual tablet  prasugrel (EFFIENT) 10 MG TABS tablet  sucralfate (CARAFATE) 1 gram tablet  warfarin ANTICOAGULANT (COUMADIN) 1 MG tablet        ALLERGIES:  Allergies   Allergen Reactions     Aspirin Nausea and Vomiting     Vomiting. Pt will NOT take Aspirin.      Erythromycin Base [Erythromycin] Unknown     Pt is not sure she is allergic to this     Levaquin [Levofloxacin] Nausea and Vomiting     Vancomycin Other (See Comments)     Red man syndrome     Xanax [Alprazolam] Other (See Comments)     confusion     Sulfa  (Sulfonamide Antibiotics) [Sulfa Drugs] Rash       FAMILY HISTORY:  Family History   Problem Relation Age of Onset     Liver Disease Father      No Known Problems Mother      Thyroid Disease Sister      LUNG DISEASE Sister        SOCIAL HISTORY:   Social History     Socioeconomic History     Marital status:      Spouse name: Not on file     Number of children: Not on file     Years of education: Not on file     Highest education level: Not on file   Occupational History     Not on file   Tobacco Use     Smoking status: Former Smoker     Packs/day: 1.50     Years: 20.00     Pack years: 30.00     Quit date: 10/29/1979     Years since quittin.0     Smokeless tobacco: Never Used   Substance and Sexual Activity     Alcohol use: Yes     Comment: 1-2 week     Drug use: No     Sexual activity: Never   Other Topics Concern     Parent/sibling w/ CABG, MI or angioplasty before 65F 55M? Not Asked   Social History Narrative     Not on file     Social Determinants of Health     Financial Resource Strain:      Difficulty of Paying Living Expenses:    Food Insecurity:      Worried About Running Out of Food in the Last Year:      Ran Out of Food in the Last Year:    Transportation Needs:      Lack of Transportation (Medical):      Lack of Transportation (Non-Medical):    Physical Activity:      Days of Exercise per Week:      Minutes of Exercise per Session:    Stress:      Feeling of Stress :    Social Connections:      Frequency of Communication with Friends and Family:      Frequency of Social Gatherings with Friends and Family:      Attends Anab[employer]ist Services:      Active Member of Clubs or Organizations:      Attends Club or Organization Meetings:      Marital Status:    Intimate Partner Violence:      Fear of Current or Ex-Partner:      Emotionally Abused:      Physically Abused:      Sexually Abused:        PHYSICAL EXAM:    /79   Pulse 75   Temp 97.6  F (36.4  C)   Resp 14   SpO2 98%     Constitutional:  Awake, alert, in no acute distress  Head: Normocephalic, atraumatic.  ENT: Mucous membranes moist. Posterior oropharynx appears normal.  Eyes: Pupils midrange and reactive ,no conjunctival discharge  Neck: No lymphadenopathy, no stridor, supple, soft tissue swelling  Respiratory: Respirations even, unlabored. Lungs clear to ascultation bilaterally, in no acute respiratory distress.  Cardiovascular: Regular rate and rhythm.+2 radial pulses, equal bilaterally.  No murmurs.   GI: Abdomen soft, non-tender to palpation in all 4 quadrants. No guarding or rebound. Bowel sounds intact on all 4 quadrants.  Stool specimen test positive  Back: No CVA tenderness.    Musculoskeletal: Moves all 4 extremities equally, strength symmetrical on bilateral uppers and lowers.  No peripheral edema  Integument: Warm, dry. No rash. No bruising or petechiae.  Lymphatic: No cervical lymphadenopathy  Neurologic: Alert & oriented x 3. Normal speech. Grossly normal motor and sensory function. No focal deficits noted.  Psychiatric: Normal mood and affect. Normal judgement.    ED COURSE:  12:11 PM Met with patient for initial interview and exam. Plan for care in the ED was discussed. I saw the patient while wearing a surgical mask and gloves.   12:57 PM Updated patient on workup results and recommended admission.  1:10 PM Patient agreeable with admission.       LAB:  All pertinent labs reviewed and interpreted.  Results for orders placed or performed during the hospital encounter of 10/29/21   Result Value Ref Range    INR 1.93 (H) 0.85 - 1.15   Basic metabolic panel   Result Value Ref Range    Sodium 140 136 - 145 mmol/L    Potassium 3.8 3.5 - 5.0 mmol/L    Chloride 105 98 - 107 mmol/L    Carbon Dioxide (CO2) 27 22 - 31 mmol/L    Anion Gap 8 5 - 18 mmol/L    Urea Nitrogen 14 8 - 28 mg/dL    Creatinine 0.76 0.60 - 1.10 mg/dL    Calcium 9.1 8.5 - 10.5 mg/dL    Glucose 93 70 - 125 mg/dL    GFR Estimate 72 >60 mL/min/1.73m2   CBC with platelets and  differential   Result Value Ref Range    WBC Count 6.2 4.0 - 11.0 10e3/uL    RBC Count 3.31 (L) 3.80 - 5.20 10e6/uL    Hemoglobin 9.9 (L) 11.7 - 15.7 g/dL    Hematocrit 30.9 (L) 35.0 - 47.0 %    MCV 93 78 - 100 fL    MCH 29.9 26.5 - 33.0 pg    MCHC 32.0 31.5 - 36.5 g/dL    RDW 14.5 10.0 - 15.0 %    Platelet Count 181 150 - 450 10e3/uL    % Neutrophils 60 %    % Lymphocytes 21 %    % Monocytes 12 %    % Eosinophils 6 %    % Basophils 1 %    % Immature Granulocytes 0 %    NRBCs per 100 WBC 0 <1 /100    Absolute Neutrophils 3.8 1.6 - 8.3 10e3/uL    Absolute Lymphocytes 1.3 0.8 - 5.3 10e3/uL    Absolute Monocytes 0.8 0.0 - 1.3 10e3/uL    Absolute Eosinophils 0.4 0.0 - 0.7 10e3/uL    Absolute Basophils 0.0 0.0 - 0.2 10e3/uL    Absolute Immature Granulocytes 0.0 <=0.0 10e3/uL    Absolute NRBCs 0.0 10e3/uL   ECG 12-LEAD WITH MUSE (LHE)   Result Value Ref Range    Systolic Blood Pressure  mmHg    Diastolic Blood Pressure  mmHg    Ventricular Rate 74 BPM    Atrial Rate 69 BPM    VA Interval  ms    QRS Duration 90 ms     ms    QTc 430 ms    P Axis  degrees    R AXIS -28 degrees    T Axis 80 degrees    Interpretation ECG       Atrial fibrillation  Abnormal ECG  When compared with ECG of 28-SEP-2021 06:57,  No significant change was found  Confirmed by SEE ED PROVIDER NOTE FOR, ECG INTERPRETATION ([id number]),  MARINE EATON ([id number]) on 10/29/2021 12:40:57 PM     Occult blood stool POCT   Result Value Ref Range    Occult Blood POCT Positive (A) Negative    Internal QC Check POCT Valid Valid    Test Card Lot Number [id number]     Test Card Expiration Date 7/22    Adult Type and Screen   Result Value Ref Range    ABO/RH(D) O POS     Antibody Screen Negative Negative    SPECIMEN EXPIRATION DATE [id number]        RADIOLOGY:  Reviewed all pertinent imaging. Please see official radiology report.  No orders to display        EKG:    Time: 11:26    Impression: Atrial fibrillation, abnormal ECG.   Rhythm: Atrial  fibrillation  Rate: 74 bpm  QRS: 90 ms  QTC: 430 ms    Comparison: Compared to previous from  28-SEP-2021: No significant change was found.       I have independently reviewed and interpreted the EKG(s) documented above.    MEDICATIONS GIVEN IN THE EMERGENCY:  Medications - No data to display        NEW PRESCRIPTIONS STARTED AT TODAY'S ER VISIT:  New Prescriptions    No medications on file          FINAL DIAGNOSIS:    ICD-10-CM    1. Gastrointestinal hemorrhage, unspecified gastrointestinal hemorrhage type  K92.2             At the conclusion of the encounter I discussed the results of all of the tests and the disposition. The questions were answered. The patient or family acknowledged understanding and was agreeable with the care plan.     NAME: Constanza Coles  AGE: 86 year old female  YOB: 1935  MRN: [id number]  EVALUATION DATE & TIME: 10/29/2021 11:43 AM    PCP: Yun Jacinto    ED PROVIDER: RAGHAVENDRA Ramos Erin Cutts, am serving as a scribe to document services personally performed by Dr. Graham Ivy based on my observation and the provider's statements to me. I, Graham Ivy MD attest that Torrie Hansen is acting in a scribe capacity, has observed my performance of the services and has documented them in accordance with my direction.    Graham Ivy M.D.  Emergency Medicine  [facility] EMERGENCY ROOM  [address] [facility] [address]  [phone]  Dept: [phone]  10/29/2021         Graham Ivy MD  10/29/21 1337       Graham Ivy MD  10/29/21 [id number]

## 2021-10-30 ENCOUNTER — ANESTHESIA EVENT (OUTPATIENT)
Dept: SURGERY | Facility: CLINIC | Age: 86
End: 2021-10-30
Payer: COMMERCIAL

## 2021-10-30 ENCOUNTER — ANESTHESIA (OUTPATIENT)
Dept: SURGERY | Facility: CLINIC | Age: 86
End: 2021-10-30
Payer: COMMERCIAL

## 2021-10-30 PROBLEM — Z79.01 ANTICOAGULATED: Status: ACTIVE | Noted: 2021-10-30

## 2021-10-30 PROBLEM — G44.209 TENSION HEADACHE: Status: ACTIVE | Noted: 2021-10-30

## 2021-10-30 PROBLEM — K92.1 MELENA: Status: ACTIVE | Noted: 2021-10-30

## 2021-10-30 PROBLEM — D62 ANEMIA DUE TO BLOOD LOSS, ACUTE: Status: ACTIVE | Noted: 2021-10-30

## 2021-10-30 LAB
HGB BLD-MCNC: 8.9 G/DL (ref 11.7–15.7)
HGB BLD-MCNC: 9 G/DL (ref 11.7–15.7)
INR PPP: 1.61 (ref 0.85–1.15)
UPPER GI ENDOSCOPY: NORMAL

## 2021-10-30 PROCEDURE — 250N000011 HC RX IP 250 OP 636: Performed by: STUDENT IN AN ORGANIZED HEALTH CARE EDUCATION/TRAINING PROGRAM

## 2021-10-30 PROCEDURE — C9113 INJ PANTOPRAZOLE SODIUM, VIA: HCPCS | Performed by: STUDENT IN AN ORGANIZED HEALTH CARE EDUCATION/TRAINING PROGRAM

## 2021-10-30 PROCEDURE — 370N000017 HC ANESTHESIA TECHNICAL FEE, PER MIN: Performed by: INTERNAL MEDICINE

## 2021-10-30 PROCEDURE — 250N000013 HC RX MED GY IP 250 OP 250 PS 637: Performed by: INTERNAL MEDICINE

## 2021-10-30 PROCEDURE — C9113 INJ PANTOPRAZOLE SODIUM, VIA: HCPCS | Performed by: INTERNAL MEDICINE

## 2021-10-30 PROCEDURE — 258N000003 HC RX IP 258 OP 636: Performed by: INTERNAL MEDICINE

## 2021-10-30 PROCEDURE — 250N000013 HC RX MED GY IP 250 OP 250 PS 637

## 2021-10-30 PROCEDURE — 250N000009 HC RX 250: Performed by: NURSE ANESTHETIST, CERTIFIED REGISTERED

## 2021-10-30 PROCEDURE — 258N000003 HC RX IP 258 OP 636: Performed by: STUDENT IN AN ORGANIZED HEALTH CARE EDUCATION/TRAINING PROGRAM

## 2021-10-30 PROCEDURE — 250N000011 HC RX IP 250 OP 636: Performed by: INTERNAL MEDICINE

## 2021-10-30 PROCEDURE — 250N000011 HC RX IP 250 OP 636: Performed by: NURSE ANESTHETIST, CERTIFIED REGISTERED

## 2021-10-30 PROCEDURE — G0378 HOSPITAL OBSERVATION PER HR: HCPCS

## 2021-10-30 PROCEDURE — 258N000003 HC RX IP 258 OP 636: Performed by: NURSE ANESTHETIST, CERTIFIED REGISTERED

## 2021-10-30 PROCEDURE — 36415 COLL VENOUS BLD VENIPUNCTURE: CPT | Performed by: INTERNAL MEDICINE

## 2021-10-30 PROCEDURE — 85018 HEMOGLOBIN: CPT | Performed by: INTERNAL MEDICINE

## 2021-10-30 PROCEDURE — 85018 HEMOGLOBIN: CPT | Performed by: STUDENT IN AN ORGANIZED HEALTH CARE EDUCATION/TRAINING PROGRAM

## 2021-10-30 PROCEDURE — 85610 PROTHROMBIN TIME: CPT | Performed by: STUDENT IN AN ORGANIZED HEALTH CARE EDUCATION/TRAINING PROGRAM

## 2021-10-30 PROCEDURE — 99232 SBSQ HOSP IP/OBS MODERATE 35: CPT | Mod: GC

## 2021-10-30 PROCEDURE — 96376 TX/PRO/DX INJ SAME DRUG ADON: CPT

## 2021-10-30 PROCEDURE — 120N000001 HC R&B MED SURG/OB

## 2021-10-30 PROCEDURE — 360N000075 HC SURGERY LEVEL 2, PER MIN: Performed by: INTERNAL MEDICINE

## 2021-10-30 PROCEDURE — 36415 COLL VENOUS BLD VENIPUNCTURE: CPT | Performed by: STUDENT IN AN ORGANIZED HEALTH CARE EDUCATION/TRAINING PROGRAM

## 2021-10-30 PROCEDURE — 272N000001 HC OR GENERAL SUPPLY STERILE: Performed by: INTERNAL MEDICINE

## 2021-10-30 RX ORDER — ACETAMINOPHEN 500 MG
1000 TABLET ORAL EVERY 6 HOURS PRN
Status: DISCONTINUED | OUTPATIENT
Start: 2021-10-30 | End: 2021-10-31 | Stop reason: HOSPADM

## 2021-10-30 RX ORDER — ONDANSETRON 2 MG/ML
4 INJECTION INTRAMUSCULAR; INTRAVENOUS EVERY 30 MIN PRN
Status: CANCELLED | OUTPATIENT
Start: 2021-10-30

## 2021-10-30 RX ORDER — NALOXONE HYDROCHLORIDE 0.4 MG/ML
0.2 INJECTION, SOLUTION INTRAMUSCULAR; INTRAVENOUS; SUBCUTANEOUS
Status: DISCONTINUED | OUTPATIENT
Start: 2021-10-30 | End: 2021-10-31 | Stop reason: HOSPADM

## 2021-10-30 RX ORDER — OXYCODONE HYDROCHLORIDE 5 MG/1
5 TABLET ORAL EVERY 4 HOURS PRN
Status: CANCELLED | OUTPATIENT
Start: 2021-10-30

## 2021-10-30 RX ORDER — NITROGLYCERIN 0.4 MG/1
0.4 TABLET SUBLINGUAL EVERY 5 MIN PRN
Status: DISCONTINUED | OUTPATIENT
Start: 2021-10-30 | End: 2021-10-31 | Stop reason: HOSPADM

## 2021-10-30 RX ORDER — SODIUM CHLORIDE, SODIUM LACTATE, POTASSIUM CHLORIDE, CALCIUM CHLORIDE 600; 310; 30; 20 MG/100ML; MG/100ML; MG/100ML; MG/100ML
INJECTION, SOLUTION INTRAVENOUS CONTINUOUS
Status: CANCELLED | OUTPATIENT
Start: 2021-10-30

## 2021-10-30 RX ORDER — LIDOCAINE 40 MG/G
CREAM TOPICAL
Status: CANCELLED | OUTPATIENT
Start: 2021-10-30

## 2021-10-30 RX ORDER — ACETAMINOPHEN 325 MG/1
650 TABLET ORAL EVERY 6 HOURS PRN
Status: DISCONTINUED | OUTPATIENT
Start: 2021-10-30 | End: 2021-10-31 | Stop reason: HOSPADM

## 2021-10-30 RX ORDER — METOPROLOL SUCCINATE 25 MG/1
25 TABLET, EXTENDED RELEASE ORAL DAILY
Status: DISCONTINUED | OUTPATIENT
Start: 2021-10-30 | End: 2021-10-30 | Stop reason: CLARIF

## 2021-10-30 RX ORDER — BISACODYL 5 MG
10 TABLET, DELAYED RELEASE (ENTERIC COATED) ORAL ONCE
Status: COMPLETED | OUTPATIENT
Start: 2021-10-30 | End: 2021-10-30

## 2021-10-30 RX ORDER — SUCRALFATE 1 G/1
1 TABLET ORAL
Status: DISCONTINUED | OUTPATIENT
Start: 2021-10-30 | End: 2021-10-31 | Stop reason: HOSPADM

## 2021-10-30 RX ORDER — FENTANYL CITRATE 50 UG/ML
25 INJECTION, SOLUTION INTRAMUSCULAR; INTRAVENOUS EVERY 5 MIN PRN
Status: CANCELLED | OUTPATIENT
Start: 2021-10-30

## 2021-10-30 RX ORDER — NALOXONE HYDROCHLORIDE 0.4 MG/ML
0.4 INJECTION, SOLUTION INTRAMUSCULAR; INTRAVENOUS; SUBCUTANEOUS
Status: DISCONTINUED | OUTPATIENT
Start: 2021-10-30 | End: 2021-10-31 | Stop reason: HOSPADM

## 2021-10-30 RX ORDER — PROPOFOL 10 MG/ML
INJECTION, EMULSION INTRAVENOUS CONTINUOUS PRN
Status: DISCONTINUED | OUTPATIENT
Start: 2021-10-30 | End: 2021-10-30

## 2021-10-30 RX ORDER — HYDROMORPHONE HCL IN WATER/PF 6 MG/30 ML
0.2 PATIENT CONTROLLED ANALGESIA SYRINGE INTRAVENOUS EVERY 5 MIN PRN
Status: CANCELLED | OUTPATIENT
Start: 2021-10-30

## 2021-10-30 RX ORDER — PROPOFOL 10 MG/ML
INJECTION, EMULSION INTRAVENOUS PRN
Status: DISCONTINUED | OUTPATIENT
Start: 2021-10-30 | End: 2021-10-30

## 2021-10-30 RX ORDER — MAGNESIUM CARB/ALUMINUM HYDROX 105-160MG
296 TABLET,CHEWABLE ORAL ONCE
Status: COMPLETED | OUTPATIENT
Start: 2021-10-31 | End: 2021-10-31

## 2021-10-30 RX ORDER — LIDOCAINE HYDROCHLORIDE 10 MG/ML
INJECTION, SOLUTION INFILTRATION; PERINEURAL PRN
Status: DISCONTINUED | OUTPATIENT
Start: 2021-10-30 | End: 2021-10-30

## 2021-10-30 RX ORDER — GABAPENTIN 300 MG/1
300 CAPSULE ORAL 2 TIMES DAILY
Status: DISCONTINUED | OUTPATIENT
Start: 2021-10-30 | End: 2021-10-31 | Stop reason: HOSPADM

## 2021-10-30 RX ORDER — FLUMAZENIL 0.1 MG/ML
0.2 INJECTION, SOLUTION INTRAVENOUS
Status: ACTIVE | OUTPATIENT
Start: 2021-10-30 | End: 2021-10-30

## 2021-10-30 RX ORDER — ONDANSETRON 4 MG/1
4 TABLET, ORALLY DISINTEGRATING ORAL EVERY 30 MIN PRN
Status: CANCELLED | OUTPATIENT
Start: 2021-10-30

## 2021-10-30 RX ORDER — POLYETHYLENE GLYCOL 3350 17 G/17G
238 POWDER, FOR SOLUTION ORAL ONCE
Status: COMPLETED | OUTPATIENT
Start: 2021-10-30 | End: 2021-10-30

## 2021-10-30 RX ADMIN — PANTOPRAZOLE SODIUM 40 MG: 40 INJECTION, POWDER, FOR SOLUTION INTRAVENOUS at 21:10

## 2021-10-30 RX ADMIN — ONDANSETRON 4 MG: 2 INJECTION INTRAMUSCULAR; INTRAVENOUS at 21:37

## 2021-10-30 RX ADMIN — PANTOPRAZOLE SODIUM 40 MG: 40 INJECTION, POWDER, FOR SOLUTION INTRAVENOUS at 10:08

## 2021-10-30 RX ADMIN — PROPOFOL 20 MG: 10 INJECTION, EMULSION INTRAVENOUS at 11:03

## 2021-10-30 RX ADMIN — PHENYLEPHRINE HYDROCHLORIDE 50 MCG: 10 INJECTION INTRAVENOUS at 10:53

## 2021-10-30 RX ADMIN — POLYETHYLENE GLYCOL 3350 238 G: 17 POWDER, FOR SOLUTION ORAL at 14:34

## 2021-10-30 RX ADMIN — LIDOCAINE HYDROCHLORIDE 50 MG: 10 INJECTION, SOLUTION INFILTRATION; PERINEURAL at 10:33

## 2021-10-30 RX ADMIN — METOPROLOL SUCCINATE 12.5 MG: 25 TABLET, EXTENDED RELEASE ORAL at 14:22

## 2021-10-30 RX ADMIN — PROPOFOL 150 MCG/KG/MIN: 10 INJECTION, EMULSION INTRAVENOUS at 10:33

## 2021-10-30 RX ADMIN — ONDANSETRON 4 MG: 2 INJECTION INTRAMUSCULAR; INTRAVENOUS at 07:47

## 2021-10-30 RX ADMIN — SODIUM CHLORIDE: 9 INJECTION, SOLUTION INTRAVENOUS at 05:51

## 2021-10-30 RX ADMIN — SODIUM CHLORIDE: 9 INJECTION, SOLUTION INTRAVENOUS at 21:18

## 2021-10-30 RX ADMIN — BISACODYL 10 MG: 5 TABLET, COATED ORAL at 14:21

## 2021-10-30 RX ADMIN — HYDROXYZINE HYDROCHLORIDE 25 MG: 25 TABLET ORAL at 22:32

## 2021-10-30 RX ADMIN — ONDANSETRON 4 MG: 2 INJECTION INTRAMUSCULAR; INTRAVENOUS at 15:40

## 2021-10-30 ASSESSMENT — ACTIVITIES OF DAILY LIVING (ADL)
ADLS_ACUITY_SCORE: 3

## 2021-10-30 ASSESSMENT — ENCOUNTER SYMPTOMS: DYSRHYTHMIAS: 1

## 2021-10-30 NOTE — CONSULTS
"[facility] Consultation     Constanza Coles  [address] [place] [address]  86 year old female     Admission Date/Time: 10/29/2021  Primary Care Provider: Yun Jacinto     We were asked to see the patient in consultation by Dr. Vernon for evaluation of melena.    ASSESSMENT:    Constanza Coles is a 86 year old female with PMH of CAD, atrial fibrillation, cardiac pacemaker, CKD 3, recent coronary angiogram and left heart cath (9/27/21), on warfarin and prasugrel, who was admitted on 10/29/21 for melena.    1. Melena  - Concerns for upper GI bleed  - Pt has previous history of chronic nonspecific gastritis, possibly related to her hx of bile acid reflux.  - Anticoagulation with warfarin and prasugrel could also exacerbate bleeds further  - PPI BID initiated in the ED. She does not take PPI at home but probably should.     RECOMMENDATIONS:  - EGD today  - Diet: NPO  - Further recommendations following the EGD   Case discussed with Dr. Thomas, please review MD addendum below.    Gurinder López PA-C  [facility]  [phone]  ________________________________________________________________________        CC: Melena     HPI:  Constanza Coles is a 86 year old female with PMH of CAD, atrial fibrillation, cardiac pacemaker, CKD 3, recent coronary angiogram and left heart cath (9/27/21), on warfarin and prasugrel, who was admitted on 10/29/21 for melena.    Patient reports dark stools for the past 1 week with associated symptoms of fatigue, headache, and dizziness. No abdominal pains, diarrhea, constipation, bloody stools, fevers, or chills.  She called the nurse line who advised going into the ED. Pt is not on iron supplementation or pepto bismol. She has a history of partial gastrectomy (?) for gastric ulcers many years ago (40+ years ago). She reports a history of chronic bile acid reflux and will have periodic bouts of nausea/vomiting of \"just bile\". She is not on acid reducing medications " but takes carafate. She was recently started on Fosamax about 3-4 weeks ago and questions if this is the cause of her melena. She has since discontinued it. She denies NSAID use. Of note, she recently had a coronary angiogram with cardiac stent placement on 9/27/21. She is on warfarin and prasugrel.     In the ED: Vitals stable. Hgb 9.9 -> 8.9  (previously ~12 in 09/2021), MCV 93, WBC normal. INR 1.93. BUN 14, Cr normal. FOBT positive. Pantoprazole 40mg BID initiated.      Previous [facility] workup  - EGD 05/2016 - for iron deficiency anemia, findings noted mild nonspecific chronic gastritis. Duodenal biopsies were normal.   - EGD 11/2013 - findings noted gastritis, biopsies consistent with reactive gastritis with superimposed nonspecific chronic gastritis.   - EGD 06/2004 - reactive gastritis, possible angioectasia      ROS: A comprehensive ten point review of systems was negative aside from those in mentioned in the HPI.      PAST MEDICAL HISTORY:  Patient Active Problem List    Diagnosis Date Noted     Gastrointestinal hemorrhage, unspecified gastrointestinal hemorrhage type 10/29/2021     Priority: Medium     Percutaneous transluminal coronary angioplasty status 09/27/2021     Priority: Medium     Chronic kidney disease, stage 3 (H) 08/18/2021     Priority: Medium     S/P CABG (coronary artery bypass graft) 05/07/2019     Priority: Medium     Pure hypercholesterolemia 05/07/2019     Priority: Medium     Cardiac pacemaker in situ, dual chamber 08/01/2017     Priority: Medium     Medtronic VEDR01 Versa, DOI 08/09/2011. RA and RV Leads: Medtronic 4076   CapSureFix Novus, DOI 01/17/2005.         Coronary Artery Disease      Priority: Medium     Created by Conversion  Replacement Utility updated for latest IMO load         Aortic Stenosis      Priority: Medium     Created by Conversion  Replacement Utility updated for latest IMO load         Vasculitis (H) 04/01/2016     Priority: Medium     Vitamin B 12 deficiency  2016     Priority: Medium     Atrial fibrillation (H) 2015     Priority: Medium     H/O aortic valve replacement 2014     Priority: Medium     Sick Sinus Syndrome      Priority: Medium     Created by Conversion         SOCIAL HISTORY:  Social History     Tobacco Use     Smoking status: Former Smoker     Packs/day: 1.50     Years: 20.00     Pack years: 30.00     Quit date: 10/29/1979     Years since quittin.0     Smokeless tobacco: Never Used   Substance Use Topics     Alcohol use: Yes     Comment: 1-2 week     Drug use: No     FAMILY HISTORY:  Family History   Problem Relation Age of Onset     Liver Disease Father      No Known Problems Mother      Thyroid Disease Sister      LUNG DISEASE Sister      ALLERGIES:   Allergies   Allergen Reactions     Aspirin Nausea and Vomiting     Vomiting. Pt will NOT take Aspirin.      Erythromycin Base [Erythromycin] Unknown     Pt is not sure she is allergic to this     Levaquin [Levofloxacin] Nausea and Vomiting     Vancomycin Other (See Comments)     Red man syndrome     Xanax [Alprazolam] Other (See Comments)     confusion     Sulfa (Sulfonamide Antibiotics) [Sulfa Drugs] Rash     MEDICATIONS:   Current Facility-Administered Medications   Medication     acetaminophen (TYLENOL) tablet 650 mg     bisacodyl (DULCOLAX) Suppository 10 mg     HOLD: warfarin (COUMADIN) therapy     HOLD: warfarin (COUMADIN) therapy     hydrOXYzine (ATARAX) tablet 25 mg    Or     hydrOXYzine (ATARAX) tablet 50 mg     lidocaine (LMX4) cream     lidocaine 1 % 0.1-1 mL     melatonin tablet 1 mg     ondansetron (ZOFRAN) injection 4 mg     pantoprazole (PROTONIX) IV push injection 40 mg     polyethylene glycol (MIRALAX) Packet 17 g     senna-docusate (SENOKOT-S/PERICOLACE) 8.6-50 MG per tablet 1 tablet    Or     senna-docusate (SENOKOT-S/PERICOLACE) 8.6-50 MG per tablet 2 tablet     sodium chloride (PF) 0.9% PF flush 3 mL     sodium chloride (PF) 0.9% PF flush 3 mL     sodium chloride  "0.9% infusion       PHYSICAL EXAM:   /59 (BP Location: Right arm)   Pulse 80   Temp 97.5  F (36.4  C) (Oral)   Resp 16   Ht 1.575 m (5' 2\")   Wt 55.8 kg (123 lb)   SpO2 95%   BMI 22.50 kg/m     GEN: Alert, oriented x3, communicative and in NAD.  JOSE: AT, anicteric, OP without erythema, exudate, or ulcers.    NECK: Supple.    LYMPH: No LAD noted.  HRT: RRR  LUNGS: CTA  ABD:  ND, +BS, no guarding or pain to palpation, no rebound, no HSM.  SKIN: No rash, jaundice or spider angiomata  MSKL: LE free of edema, strength 5/5 all 4 extrems  NEURO: CN grossly intact, sensation intact to light touch, toes downgoing.     ADDITIONAL DATA:   I reviewed the patient's new clinical lab test results.   Recent Labs   Lab Test 10/30/21  0646 10/29/21  1815 10/29/21  1140 09/30/21  1155 09/27/21  0712 04/29/21  1254 03/30/21  1315   WBC  --   --  6.2  --  8.6  --  7.9   HGB 8.9* 10.7* 9.9*  --  11.9  --  12.1   MCV  --   --  93  --  91  --  94   PLT  --   --  181  --  203  --  197   INR 1.61* 1.94* 1.93*   < > 1.22*   < > 2.40*    < > = values in this interval not displayed.     Recent Labs   Lab Test 10/29/21  1140 09/27/21  0712 08/18/21  1626   POTASSIUM 3.8 4.0 4.4   CHLORIDE 105 103 101   CO2 27 29 27   BUN 14 21 18   ANIONGAP 8 6 12     Recent Labs   Lab Test 08/18/21  1626 03/30/21  1315 07/13/20  1532 02/28/20  1125 02/28/20  1125   ALBUMIN 3.9 3.6 3.8   < > 4.0   BILITOTAL 0.6 0.7 0.5   < > 0.8   ALT 13 12 16   < > 20   AST 25 24 25   < > 35   PROTEIN  --   --   --   --  Negative    < > = values in this interval not displayed.        IMAGING:  I reviewed the patient's new imaging results.        Agree with above note and exam by Gurinder López PA-C  86 F with hx of CKD, stage III, A Fib, CAD s/p CABG, angio with recent stent last month on Effient and warfarin, admitted with melena    Patient notes 1 week of black stools  Has a hx of GI bleed 2/2 ulcers and had an antrectomy with vagotomy 40 years ago, Billroth I " anatomy.   Not on PPI, on carafate prn   Physical exam shows 86 F, A and Ox3, NAD, Chest- irregular. Pulm/ABD exam normal  A/P   86 F with Billroth I anatomy, dual anticoagulation with Effient and warfarin with melena    Melena- concerning for PUD vs AVM. Plan EGD today  Small bowel or colon source also in differential  Keep NPO  Continue IV PPI   Dario Thomas M.D.  [facility]  [phone]- business phone number (for scheduling)

## 2021-10-30 NOTE — ANESTHESIA PREPROCEDURE EVALUATION
Anesthesia Pre-Procedure Evaluation    Patient: Constanza Coles   MRN: [id number] : 1935        Preoperative Diagnosis: Gastrointestinal hemorrhage, unspecified gastrointestinal hemorrhage type [K92.2]    Procedure : Procedure(s):  ESOPHAGOGASTRODUODENOSCOPY (EGD)          Past Medical History:   Diagnosis Date     Anemia     iron deficiency     Chronic atrial fibrillation (H)      GERD (gastroesophageal reflux disease)      Hypertension      IHD (ischemic heart disease)      PUD (peptic ulcer disease)     Billroth 1     Recurrent cystitis      SSS (sick sinus syndrome) (H)     post pacemaker placement     Valvular heart disease      Vasculitis (H)       Past Surgical History:   Procedure Laterality Date     ASCENDING AORTIC ANEURYSM REPAIR W/ TISSUE AORTIC VALVE REPLACEMENT  2005    Dacron graft, Forte pericardial Magna 21mm aortic valve     C CABG, VEIN, SINGLE      Description: CABG (CABG);  Recorded: 2012;  Comments: LIMA^LAD, SVG^1st diag     C REPLACE AORT VALV,PROSTH VALV      Aortic Valve Replacement with Forte pericardial Magna 21mm 2005      SECTION       CV CORONARY ANGIOGRAM N/A 2021    Procedure: Coronary Angiogram;  Surgeon: Shane Mcclendon MD;  Location: [facility] CATH LAB CV     CV LEFT HEART CATH N/A 2021    Procedure: Left Heart Cath;  Surgeon: Shane Mcclendon MD;  Location: [facility] CATH LAB CV     CV PCI N/A 2021    Procedure: Percutaneous Coronary Intervention;  Surgeon: Shane Mcclendon MD;  Location: [facility] CATH LAB CV     GALLBLADDER SURGERY      partial colon removal     HYSTERECTOMY       IR AORTIC ARCH 4 VESSEL ANGIOGRAM  9/15/2011     IR MISCELLANEOUS PROCEDURE  2005     OTHER SURGICAL HISTORY      antrectomy     PARTIAL GASTRECTOMY       MS PERC CLOS,BART INTERATRIAL COMMUN W/IMPL      Description: Patent Foramen Ovale Closure Percutaneous;  Recorded: 2012;      Allergies   Allergen Reactions     Aspirin Nausea and  Vomiting     Vomiting. Pt will NOT take Aspirin.      Erythromycin Base [Erythromycin] Unknown     Pt is not sure she is allergic to this     Levaquin [Levofloxacin] Nausea and Vomiting     Vancomycin Other (See Comments)     Red man syndrome     Xanax [Alprazolam] Other (See Comments)     confusion     Sulfa (Sulfonamide Antibiotics) [Sulfa Drugs] Rash      Social History     Tobacco Use     Smoking status: Former Smoker     Packs/day: 1.50     Years: 20.00     Pack years: 30.00     Quit date: 10/29/1979     Years since quittin.0     Smokeless tobacco: Never Used   Substance Use Topics     Alcohol use: Yes     Comment: 1-2 week      Wt Readings from Last 1 Encounters:   10/29/21 55.8 kg (123 lb)        Anesthesia Evaluation   Pt has had prior anesthetic.     No history of anesthetic complications       ROS/MED HX  ENT/Pulmonary:  - neg pulmonary ROS     Neurologic:  - neg neurologic ROS     Cardiovascular:     (+) hypertension--CAD --stent-21. Taking blood thinners pacemaker, dysrhythmias, a-fib, valvular problems/murmurs     METS/Exercise Tolerance:  Comment: History of Aortic Valve Replacement    Last ECHO 21 shows LVEF of 50%   Hematologic:       Musculoskeletal:       GI/Hepatic: Comment: History of partial gastrectomy in the past    (+) GERD,     Renal/Genitourinary:     (+) renal disease, type: CRI,     Endo:       Psychiatric/Substance Use:  - neg psychiatric ROS     Infectious Disease:  - neg infectious disease ROS     Malignancy:  - neg malignancy ROS     Other:            Physical Exam    Airway        Mallampati: II   TM distance: > 3 FB   Neck ROM: full   Mouth opening: > 3 cm    Respiratory Devices and Support         Dental  no notable dental history         Cardiovascular          Rhythm and rate: irregular     Pulmonary   pulmonary exam normal                OUTSIDE LABS:  CBC:   Lab Results   Component Value Date    WBC 6.2 10/29/2021    WBC 8.6 2021    HGB 8.9 (L) 10/30/2021     HGB 10.7 (L) 10/29/2021    HCT 30.9 (L) 10/29/2021    HCT 36.3 09/27/2021     10/29/2021     09/27/2021     BMP:   Lab Results   Component Value Date     10/29/2021     09/27/2021    POTASSIUM 3.8 10/29/2021    POTASSIUM 4.0 09/27/2021    CHLORIDE 105 10/29/2021    CHLORIDE 103 09/27/2021    CO2 27 10/29/2021    CO2 29 09/27/2021    BUN 14 10/29/2021    BUN 21 09/27/2021    CR 0.76 10/29/2021    CR 0.88 09/27/2021    GLC 93 10/29/2021    GLC 91 09/27/2021     COAGS:   Lab Results   Component Value Date    INR 1.61 (H) 10/30/2021     POC: No results found for: BGM, HCG, HCGS  HEPATIC:   Lab Results   Component Value Date    ALBUMIN 3.9 08/18/2021    PROTTOTAL 7.4 08/18/2021    ALT 13 08/18/2021    AST 25 08/18/2021    ALKPHOS 70 08/18/2021    BILITOTAL 0.6 08/18/2021     OTHER:   Lab Results   Component Value Date    MARY 9.1 10/29/2021    PHOS 3.3 01/02/2019    TSH 2.16 08/18/2021    CRP 0.1 02/28/2020    SED 23 (H) 07/13/2020       Anesthesia Plan    ASA Status:  3, emergent    NPO Status:  NPO Appropriate    Anesthesia Type: MAC.   Induction: Intravenous.   Maintenance: TIVA.        Consents    Anesthesia Plan(s) and associated risks, benefits, and realistic alternatives discussed. Questions answered and patient/representative(s) expressed understanding.     - Discussed with:  Patient         Postoperative Care    Pain management: IV analgesics, Oral pain medications, Multi-modal analgesia.   PONV prophylaxis: Ondansetron (or other 5HT-3)     Comments:                Nico Nails MD

## 2021-10-30 NOTE — ANESTHESIA POSTPROCEDURE EVALUATION
Patient: Constanza Coles    Procedure: Procedure(s):  ESOPHAGOGASTRODUODENOSCOPY (EGD)       Diagnosis:Gastrointestinal hemorrhage, unspecified gastrointestinal hemorrhage type [K92.2]  Diagnosis Additional Information: No value filed.    Anesthesia Type:  MAC    Note:  Disposition: Inpatient   Postop Pain Control: Uneventful            Sign Out: Well controlled pain   PONV: No   Neuro/Psych: Uneventful            Sign Out: Acceptable/Baseline neuro status   Airway/Respiratory: Uneventful            Sign Out: Acceptable/Baseline resp. status   CV/Hemodynamics: Uneventful            Sign Out: Acceptable CV status; No obvious hypovolemia; No obvious fluid overload   Other NRE: NONE   DID A NON-ROUTINE EVENT OCCUR? No           Last vitals:  Vitals Value Taken Time   /68 10/30/21 1215   Temp 36.6  C (97.8  F) 10/30/21 1215   Pulse 74 10/30/21 1215   Resp 16 10/30/21 1215   SpO2 95 % 10/30/21 1215       Electronically Signed By: Nico Nails MD  October 30, 2021  1:15 PM

## 2021-10-30 NOTE — PRE-PROCEDURE
GENERAL PRE-PROCEDURE:   Procedure:  Esophagogastroduodenoscopy   Date/Time:  10/30/2021 10:31 AM    Verbal consent obtained?: Yes    Written consent obtained?: Yes    Risks and benefits: Risks, benefits and alternatives were discussed    Consent given by:  Patient  Patient states understanding of procedure being performed: Yes    Patient's understanding of procedure matches consent: Yes    Procedure consent matches procedure scheduled: Yes    Expected level of sedation:  Moderate  Appropriately NPO:  Yes  ASA Class:  3  Mallampati  :  Grade 3- soft palate visible, posterior pharyngeal wall not visible  Lungs:  Lungs clear with good breath sounds bilaterally  Heart:  Normal heart sounds and rate and a-fib  History & Physical reviewed:  History and physical reviewed and no updates needed  Statement of review:  I have reviewed the lab findings, diagnostic data, medications, and the plan for sedation

## 2021-10-30 NOTE — H&P (VIEW-ONLY)
"[facility] Consultation     Constanza Coles  [address] [place] [address]  86 year old female     Admission Date/Time: 10/29/2021  Primary Care Provider: Yun Jacinto     We were asked to see the patient in consultation by Dr. Vernon for evaluation of melena.    ASSESSMENT:    Constanza Coles is a 86 year old female with PMH of CAD, atrial fibrillation, cardiac pacemaker, CKD 3, recent coronary angiogram and left heart cath (9/27/21), on warfarin and prasugrel, who was admitted on 10/29/21 for melena.    1. Melena  - Concerns for upper GI bleed  - Pt has previous history of chronic nonspecific gastritis, possibly related to her hx of bile acid reflux.  - Anticoagulation with warfarin and prasugrel could also exacerbate bleeds further  - PPI BID initiated in the ED. She does not take PPI at home but probably should.     RECOMMENDATIONS:  - EGD today  - Diet: NPO  - Further recommendations following the EGD   Case discussed with Dr. Thomas, please review MD addendum below.    Gurinedr López PA-C  [facility]  [phone]  ________________________________________________________________________        CC: Melena     HPI:  Constanza Coles is a 86 year old female with PMH of CAD, atrial fibrillation, cardiac pacemaker, CKD 3, recent coronary angiogram and left heart cath (9/27/21), on warfarin and prasugrel, who was admitted on 10/29/21 for melena.    Patient reports dark stools for the past 1 week with associated symptoms of fatigue, headache, and dizziness. No abdominal pains, diarrhea, constipation, bloody stools, fevers, or chills.  She called the nurse line who advised going into the ED. Pt is not on iron supplementation or pepto bismol. She has a history of partial gastrectomy (?) for gastric ulcers many years ago (40+ years ago). She reports a history of chronic bile acid reflux and will have periodic bouts of nausea/vomiting of \"just bile\". She is not on acid reducing medications " but takes carafate. She was recently started on Fosamax about 3-4 weeks ago and questions if this is the cause of her melena. She has since discontinued it. She denies NSAID use. Of note, she recently had a coronary angiogram with cardiac stent placement on 9/27/21. She is on warfarin and prasugrel.     In the ED: Vitals stable. Hgb 9.9 -> 8.9  (previously ~12 in 09/2021), MCV 93, WBC normal. INR 1.93. BUN 14, Cr normal. FOBT positive. Pantoprazole 40mg BID initiated.      Previous [facility] workup  - EGD 05/2016 - for iron deficiency anemia, findings noted mild nonspecific chronic gastritis. Duodenal biopsies were normal.   - EGD 11/2013 - findings noted gastritis, biopsies consistent with reactive gastritis with superimposed nonspecific chronic gastritis.   - EGD 06/2004 - reactive gastritis, possible angioectasia      ROS: A comprehensive ten point review of systems was negative aside from those in mentioned in the HPI.      PAST MEDICAL HISTORY:  Patient Active Problem List    Diagnosis Date Noted     Gastrointestinal hemorrhage, unspecified gastrointestinal hemorrhage type 10/29/2021     Priority: Medium     Percutaneous transluminal coronary angioplasty status 09/27/2021     Priority: Medium     Chronic kidney disease, stage 3 (H) 08/18/2021     Priority: Medium     S/P CABG (coronary artery bypass graft) 05/07/2019     Priority: Medium     Pure hypercholesterolemia 05/07/2019     Priority: Medium     Cardiac pacemaker in situ, dual chamber 08/01/2017     Priority: Medium     Medtronic VEDR01 Versa, DOI 08/09/2011. RA and RV Leads: Medtronic 4076   CapSureFix Novus, DOI 01/17/2005.         Coronary Artery Disease      Priority: Medium     Created by Conversion  Replacement Utility updated for latest IMO load         Aortic Stenosis      Priority: Medium     Created by Conversion  Replacement Utility updated for latest IMO load         Vasculitis (H) 04/01/2016     Priority: Medium     Vitamin B 12 deficiency  2016     Priority: Medium     Atrial fibrillation (H) 2015     Priority: Medium     H/O aortic valve replacement 2014     Priority: Medium     Sick Sinus Syndrome      Priority: Medium     Created by Conversion         SOCIAL HISTORY:  Social History     Tobacco Use     Smoking status: Former Smoker     Packs/day: 1.50     Years: 20.00     Pack years: 30.00     Quit date: 10/29/1979     Years since quittin.0     Smokeless tobacco: Never Used   Substance Use Topics     Alcohol use: Yes     Comment: 1-2 week     Drug use: No     FAMILY HISTORY:  Family History   Problem Relation Age of Onset     Liver Disease Father      No Known Problems Mother      Thyroid Disease Sister      LUNG DISEASE Sister      ALLERGIES:   Allergies   Allergen Reactions     Aspirin Nausea and Vomiting     Vomiting. Pt will NOT take Aspirin.      Erythromycin Base [Erythromycin] Unknown     Pt is not sure she is allergic to this     Levaquin [Levofloxacin] Nausea and Vomiting     Vancomycin Other (See Comments)     Red man syndrome     Xanax [Alprazolam] Other (See Comments)     confusion     Sulfa (Sulfonamide Antibiotics) [Sulfa Drugs] Rash     MEDICATIONS:   Current Facility-Administered Medications   Medication     acetaminophen (TYLENOL) tablet 650 mg     bisacodyl (DULCOLAX) Suppository 10 mg     HOLD: warfarin (COUMADIN) therapy     HOLD: warfarin (COUMADIN) therapy     hydrOXYzine (ATARAX) tablet 25 mg    Or     hydrOXYzine (ATARAX) tablet 50 mg     lidocaine (LMX4) cream     lidocaine 1 % 0.1-1 mL     melatonin tablet 1 mg     ondansetron (ZOFRAN) injection 4 mg     pantoprazole (PROTONIX) IV push injection 40 mg     polyethylene glycol (MIRALAX) Packet 17 g     senna-docusate (SENOKOT-S/PERICOLACE) 8.6-50 MG per tablet 1 tablet    Or     senna-docusate (SENOKOT-S/PERICOLACE) 8.6-50 MG per tablet 2 tablet     sodium chloride (PF) 0.9% PF flush 3 mL     sodium chloride (PF) 0.9% PF flush 3 mL     sodium chloride  "0.9% infusion       PHYSICAL EXAM:   /59 (BP Location: Right arm)   Pulse 80   Temp 97.5  F (36.4  C) (Oral)   Resp 16   Ht 1.575 m (5' 2\")   Wt 55.8 kg (123 lb)   SpO2 95%   BMI 22.50 kg/m     GEN: Alert, oriented x3, communicative and in NAD.  JOSE: AT, anicteric, OP without erythema, exudate, or ulcers.    NECK: Supple.    LYMPH: No LAD noted.  HRT: RRR  LUNGS: CTA  ABD:  ND, +BS, no guarding or pain to palpation, no rebound, no HSM.  SKIN: No rash, jaundice or spider angiomata  MSKL: LE free of edema, strength 5/5 all 4 extrems  NEURO: CN grossly intact, sensation intact to light touch, toes downgoing.     ADDITIONAL DATA:   I reviewed the patient's new clinical lab test results.   Recent Labs   Lab Test 10/30/21  0646 10/29/21  1815 10/29/21  1140 09/30/21  1155 09/27/21  0712 04/29/21  1254 03/30/21  1315   WBC  --   --  6.2  --  8.6  --  7.9   HGB 8.9* 10.7* 9.9*  --  11.9  --  12.1   MCV  --   --  93  --  91  --  94   PLT  --   --  181  --  203  --  197   INR 1.61* 1.94* 1.93*   < > 1.22*   < > 2.40*    < > = values in this interval not displayed.     Recent Labs   Lab Test 10/29/21  1140 09/27/21  0712 08/18/21  1626   POTASSIUM 3.8 4.0 4.4   CHLORIDE 105 103 101   CO2 27 29 27   BUN 14 21 18   ANIONGAP 8 6 12     Recent Labs   Lab Test 08/18/21  1626 03/30/21  1315 07/13/20  1532 02/28/20  1125 02/28/20  1125   ALBUMIN 3.9 3.6 3.8   < > 4.0   BILITOTAL 0.6 0.7 0.5   < > 0.8   ALT 13 12 16   < > 20   AST 25 24 25   < > 35   PROTEIN  --   --   --   --  Negative    < > = values in this interval not displayed.        IMAGING:  I reviewed the patient's new imaging results.        Agree with above note and exam by Gurinder López PA-C  86 F with hx of CKD, stage III, A Fib, CAD s/p CABG, angio with recent stent last month on Effient and warfarin, admitted with melena    Patient notes 1 week of black stools  Has a hx of GI bleed 2/2 ulcers and had an antrectomy with vagotomy 40 years ago, Billroth I " anatomy.   Not on PPI, on carafate prn   Physical exam shows 86 F, A and Ox3, NAD, Chest- irregular. Pulm/ABD exam normal  A/P   86 F with Billroth I anatomy, dual anticoagulation with Effient and warfarin with melena    Melena- concerning for PUD vs AVM. Plan EGD today  Small bowel or colon source also in differential  Keep NPO  Continue IV PPI   Dario Thomas M.D.  [facility]  [phone]- business phone number (for scheduling)

## 2021-10-30 NOTE — PROGRESS NOTES
Daily Progress Note    Assessment & Plan: Constanza Coles is a 86 year old female with a past medical history of atrial fibrillation treated with warfarin, hypertension, cardiac pacemaker (2011) for sick sinus syndrome, CAD, GI bleed with antrectomy about 40 years ago, and recent coronary angiogram and left heart cath (9/27/21) admitted for evaluation of GI bleed.    Principal Problem:    Gastrointestinal hemorrhage, unspecified gastrointestinal hemorrhage type  Active Problems:    Aortic Stenosis    Sick Sinus Syndrome    Atrial fibrillation (H)    Cardiac pacemaker in situ, dual chamber    Melena    Anemia due to blood loss, acute    Tension headache    Anticoagulated      Melena: Upper GI bleed vs lower GI bleed  Patient has had melena for about 1 week. Occult blood test positive. Hemoglobin on admission was 9.9. Baseline is above 12. GI consulted for EGD on 10/30 but found no source of bleeding. Planning for colonoscopy on 10/31. INR after 2mg IV Vitamin K was 1.6 for procedure.   -IVF 100mL/hr, may stop if hgb decreasing quickly because of possible dillution  -Continue IV protonix 40mg BID     Anemia  -Likely due to acute blood loss from GI bleed  -Trend hemoglobin q12h due to anxiety with needles and being a difficult draw     Headache  -975mg acetaminophen prn q6hr     Atrial fibrillation  Anticoagulation  CAD  Pacemaker for sick sinus syndrome:  - Chronic warfarin 2 mg on Tuesday and 1 mg on the rest of the days of the week. INR on admission is 1.92.  -Hold warfarin at this time     GERD  Peptic ulcer disease  -Protonix 40mg bid     Hypertension  Hypertensive on admission likely due to anxiety in ED. Normotensive since then.  - Continue home metoprolol    Diet: Clear fluids  Fluids: NS 0.9 100 mL/hr  VTE Prophylaxis: mechanical    Discharge Planning discussed with [facility] attending.  Barriers to discharge: medical  Anticipated discharge day: 1-2 days  Disposition:  home  Status: inpatient  Length of Stay: 1        Patient discussed with attending physician, Dr. Richy Erickson , who agrees with the plan.     Radha Ford MD PGY1  10/30/2021  [facility]  Pager: [phone] (from 8AM-5:30PM)  Please call the senior pager with any questions or concerns, 24/7: [phone].    Subjective/Interval events:  Patient is s/p EGD today without source of bleeding found. Colonoscopy planned for tomorrow morning. Patient is currently on clear liquids and drinking her bowel prep slowly.    She would like to get out of the hospital as soon as she can. She is accompanied by her  today.    ROS: Patient denies nausea, vomiting, dysuria, , chest pain, and palpitations.    Objective  Vital signs in last 24 hours  Temp:  [97.4  F (36.3  C)-98  F (36.7  C)] 97.7  F (36.5  C)  Pulse:  [71-86] 71  Resp:  [12-18] 16  BP: (101-179)/(56-94) 126/64  SpO2:  [93 %-98 %] 97 %    Physical Exam  General appearance: alert, in no distress, cooperative, appears stated age  Ears: hearing grossly intact  Throat: lips, mucosa, and tongue normal  Lung: clear to auscultation bilaterally, no wheezing, coughing, or use of accessory muscles  Heart: irregular rate and rhythm, hard S1, S2 normal, no murmur, click, rub, or gallop  Abdomen: soft, non-tender, bowel sounds present in all four quadrants, no masses or organomegaly  Extremities: warm, dry, atraumatic, no cyanosis  Psychologic: normal mood    Intake/Output last 3 shifts  I/O last 3 completed shifts:  In: 400 [I.V.:400]  Out: -   Pertinent Labs   Recent Labs   Lab 10/30/21  0646 10/29/21  1815 10/29/21  1140   WBC  --   --  6.2   HGB 8.9* 10.7* 9.9*   HCT  --   --  30.9*   PLT  --   --  181     Recent Labs   Lab 10/29/21  1140      CO2 27   BUN 14     Recent Labs   Lab 10/30/21  0646 10/29/21  1815 10/29/21  1140   INR 1.61* 1.94* 1.93*     Pertinent Radiology     Upper GI endoscopy    10/30/2021  Findings:        LA Grade A (one or more  mucosal breaks less than 5 mm, not extending        between tops of 2 mucosal folds) esophagitis was found in the lower        third of the esophagus.        Evidence of a Billroth I gastroduodenostomy was found. A gastric pouch        was found. The gastroduodenal anastomosis was characterized by healthy        appearing mucosa.        The examined jejunum was normal.    Current Medications.     gabapentin  300 mg Oral BID     [START ON 10/31/2021] magnesium citrate  296 mL Oral Once     metoprolol succinate ER  12.5 mg Oral Daily     pantoprazole (PROTONIX) IV  40 mg Intravenous BID     sodium chloride (PF)  3 mL Intracatheter Q8H     sucralfate  1 g Oral 4x Daily AC & HS     vitamin D3  125 mcg Oral Daily     PRN:acetaminophen, acetaminophen, bisacodyl, flumazenil, HOLD MEDICATION, HOLD MEDICATION, hydrOXYzine **OR** hydrOXYzine, lidocaine 4%, lidocaine (buffered or not buffered), melatonin, naloxone, naloxone, naloxone, naloxone, nitroGLYcerin, ondansetron, polyethylene glycol, senna-docusate **OR** senna-docusate, sodium chloride (PF)

## 2021-10-31 ENCOUNTER — ANESTHESIA (OUTPATIENT)
Dept: SURGERY | Facility: CLINIC | Age: 86
End: 2021-10-31
Payer: COMMERCIAL

## 2021-10-31 ENCOUNTER — ANESTHESIA EVENT (OUTPATIENT)
Dept: SURGERY | Facility: CLINIC | Age: 86
End: 2021-10-31
Payer: COMMERCIAL

## 2021-10-31 VITALS
TEMPERATURE: 97.7 F | WEIGHT: 123 LBS | RESPIRATION RATE: 16 BRPM | HEART RATE: 91 BPM | OXYGEN SATURATION: 95 % | BODY MASS INDEX: 22.63 KG/M2 | HEIGHT: 62 IN | SYSTOLIC BLOOD PRESSURE: 150 MMHG | DIASTOLIC BLOOD PRESSURE: 72 MMHG

## 2021-10-31 LAB
COLONOSCOPY: NORMAL
HGB BLD-MCNC: 9.2 G/DL (ref 11.7–15.7)
INR PPP: 1.45 (ref 0.85–1.15)

## 2021-10-31 PROCEDURE — 99238 HOSP IP/OBS DSCHRG MGMT 30/<: CPT | Mod: GC | Performed by: STUDENT IN AN ORGANIZED HEALTH CARE EDUCATION/TRAINING PROGRAM

## 2021-10-31 PROCEDURE — 250N000011 HC RX IP 250 OP 636: Performed by: NURSE ANESTHETIST, CERTIFIED REGISTERED

## 2021-10-31 PROCEDURE — G0378 HOSPITAL OBSERVATION PER HR: HCPCS

## 2021-10-31 PROCEDURE — 36415 COLL VENOUS BLD VENIPUNCTURE: CPT | Performed by: FAMILY MEDICINE

## 2021-10-31 PROCEDURE — 370N000017 HC ANESTHESIA TECHNICAL FEE, PER MIN: Performed by: INTERNAL MEDICINE

## 2021-10-31 PROCEDURE — 120N000001 HC R&B MED SURG/OB

## 2021-10-31 PROCEDURE — 250N000011 HC RX IP 250 OP 636: Performed by: INTERNAL MEDICINE

## 2021-10-31 PROCEDURE — 360N000075 HC SURGERY LEVEL 2, PER MIN: Performed by: INTERNAL MEDICINE

## 2021-10-31 PROCEDURE — C9113 INJ PANTOPRAZOLE SODIUM, VIA: HCPCS | Performed by: INTERNAL MEDICINE

## 2021-10-31 PROCEDURE — 85018 HEMOGLOBIN: CPT | Performed by: INTERNAL MEDICINE

## 2021-10-31 PROCEDURE — 96376 TX/PRO/DX INJ SAME DRUG ADON: CPT

## 2021-10-31 PROCEDURE — 250N000013 HC RX MED GY IP 250 OP 250 PS 637: Performed by: INTERNAL MEDICINE

## 2021-10-31 PROCEDURE — 272N000001 HC OR GENERAL SUPPLY STERILE: Performed by: INTERNAL MEDICINE

## 2021-10-31 PROCEDURE — 250N000013 HC RX MED GY IP 250 OP 250 PS 637

## 2021-10-31 PROCEDURE — 85610 PROTHROMBIN TIME: CPT | Performed by: FAMILY MEDICINE

## 2021-10-31 RX ORDER — SODIUM CHLORIDE, SODIUM LACTATE, POTASSIUM CHLORIDE, CALCIUM CHLORIDE 600; 310; 30; 20 MG/100ML; MG/100ML; MG/100ML; MG/100ML
INJECTION, SOLUTION INTRAVENOUS CONTINUOUS
Status: DISCONTINUED | OUTPATIENT
Start: 2021-10-31 | End: 2021-10-31 | Stop reason: HOSPADM

## 2021-10-31 RX ORDER — LIDOCAINE 40 MG/G
CREAM TOPICAL
Status: DISCONTINUED | OUTPATIENT
Start: 2021-10-31 | End: 2021-10-31 | Stop reason: HOSPADM

## 2021-10-31 RX ORDER — SODIUM CHLORIDE, SODIUM LACTATE, POTASSIUM CHLORIDE, CALCIUM CHLORIDE 600; 310; 30; 20 MG/100ML; MG/100ML; MG/100ML; MG/100ML
INJECTION, SOLUTION INTRAVENOUS CONTINUOUS
Status: CANCELLED | OUTPATIENT
Start: 2021-10-31

## 2021-10-31 RX ORDER — WARFARIN SODIUM 1 MG/1
1-2 TABLET ORAL SEE ADMIN INSTRUCTIONS
Status: DISCONTINUED | OUTPATIENT
Start: 2021-10-31 | End: 2021-10-31 | Stop reason: DRUGHIGH

## 2021-10-31 RX ORDER — FLUMAZENIL 0.1 MG/ML
0.2 INJECTION, SOLUTION INTRAVENOUS
Status: DISCONTINUED | OUTPATIENT
Start: 2021-10-31 | End: 2021-10-31 | Stop reason: HOSPADM

## 2021-10-31 RX ORDER — WARFARIN SODIUM 1 MG/1
1 TABLET ORAL
Status: DISCONTINUED | OUTPATIENT
Start: 2021-10-31 | End: 2021-10-31 | Stop reason: HOSPADM

## 2021-10-31 RX ORDER — PROPOFOL 10 MG/ML
INJECTION, EMULSION INTRAVENOUS PRN
Status: DISCONTINUED | OUTPATIENT
Start: 2021-10-31 | End: 2021-10-31

## 2021-10-31 RX ORDER — ONDANSETRON 4 MG/1
4 TABLET, ORALLY DISINTEGRATING ORAL EVERY 30 MIN PRN
Status: CANCELLED | OUTPATIENT
Start: 2021-10-31

## 2021-10-31 RX ORDER — PROPOFOL 10 MG/ML
INJECTION, EMULSION INTRAVENOUS CONTINUOUS PRN
Status: DISCONTINUED | OUTPATIENT
Start: 2021-10-31 | End: 2021-10-31

## 2021-10-31 RX ORDER — WARFARIN SODIUM 2 MG/1
2 TABLET ORAL WEEKLY
Status: DISCONTINUED | OUTPATIENT
Start: 2021-11-02 | End: 2021-10-31 | Stop reason: HOSPADM

## 2021-10-31 RX ORDER — ONDANSETRON 2 MG/ML
4 INJECTION INTRAMUSCULAR; INTRAVENOUS EVERY 30 MIN PRN
Status: CANCELLED | OUTPATIENT
Start: 2021-10-31

## 2021-10-31 RX ADMIN — METOPROLOL SUCCINATE 12.5 MG: 25 TABLET, EXTENDED RELEASE ORAL at 11:29

## 2021-10-31 RX ADMIN — PANTOPRAZOLE SODIUM 40 MG: 40 INJECTION, POWDER, FOR SOLUTION INTRAVENOUS at 09:42

## 2021-10-31 RX ADMIN — ONDANSETRON 4 MG: 2 INJECTION INTRAMUSCULAR; INTRAVENOUS at 08:56

## 2021-10-31 RX ADMIN — PROPOFOL 30 MG: 10 INJECTION, EMULSION INTRAVENOUS at 07:57

## 2021-10-31 RX ADMIN — PROPOFOL 100 MCG/KG/MIN: 10 INJECTION, EMULSION INTRAVENOUS at 07:57

## 2021-10-31 RX ADMIN — PROPOFOL 20 MG: 10 INJECTION, EMULSION INTRAVENOUS at 08:10

## 2021-10-31 RX ADMIN — MAGNESIUM CITRATE 296 ML: 1.75 LIQUID ORAL at 03:51

## 2021-10-31 RX ADMIN — PROPOFOL 20 MG: 10 INJECTION, EMULSION INTRAVENOUS at 08:04

## 2021-10-31 ASSESSMENT — ACTIVITIES OF DAILY LIVING (ADL)
ADLS_ACUITY_SCORE: 3

## 2021-10-31 ASSESSMENT — ENCOUNTER SYMPTOMS: DYSRHYTHMIAS: 1

## 2021-10-31 NOTE — PRE-PROCEDURE
GENERAL PRE-PROCEDURE:   Procedure:  Colonoscopy   Date/Time:  10/31/2021 7:23 AM    Verbal consent obtained?: Yes    Written consent obtained?: Yes    Risks and benefits: Risks, benefits and alternatives were discussed    Consent given by:  Patient  Patient states understanding of procedure being performed: Yes    Patient's understanding of procedure matches consent: Yes    Procedure consent matches procedure scheduled: Yes    Expected level of sedation:  Moderate  Appropriately NPO:  Yes  ASA Class:  3  Mallampati  :  Grade 2- soft palate, base of uvula, tonsillar pillars, and portion of posterior pharyngeal wall visible  Lungs:  Lungs clear with good breath sounds bilaterally  Heart:  Normal heart sounds and rate and a-fib  History & Physical reviewed:  History and physical reviewed and no updates needed  Statement of review:  I have reviewed the lab findings, diagnostic data, medications, and the plan for sedation

## 2021-10-31 NOTE — ANESTHESIA CARE TRANSFER NOTE
Patient: Constanza Coles    Procedure: Procedure(s):  COLONOSCOPY       Diagnosis: Gastrointestinal hemorrhage, unspecified gastrointestinal hemorrhage type [K92.2]  Diagnosis Additional Information: No value filed.    Anesthesia Type:   MAC     Note:    Oropharynx: oropharynx clear of all foreign objects  Level of Consciousness: awake  Oxygen Supplementation: room air    Independent Airway: airway patency satisfactory and stable  Dentition: dentition unchanged  Vital Signs Stable: post-procedure vital signs reviewed and stable  Report to RN Given: handoff report given  Patient transferred to: Medical/Surgical Unit    Handoff Report: Identifed the Patient, Identified the Reponsible Provider, Reviewed the pertinent medical history, Discussed the surgical course, Reviewed Intra-OP anesthesia mangement and issues during anesthesia, Set expectations for post-procedure period and Allowed opportunity for questions and acknowledgement of understanding      Vitals:  Vitals Value Taken Time   /86 10/31/21 0841   Temp 36.5  C (97.7  F) 10/31/21 0841   Pulse 108 10/31/21 0841   Resp 16 10/31/21 0841   SpO2 97 % 10/31/21 0841       Electronically Signed By: ALMAS Braxton CRNA  October 31, 2021  8:42 AM

## 2021-10-31 NOTE — ANESTHESIA POSTPROCEDURE EVALUATION
Patient: Constanza Coles    Procedure: Procedure(s):  COLONOSCOPY       Diagnosis:Gastrointestinal hemorrhage, unspecified gastrointestinal hemorrhage type [K92.2]  Diagnosis Additional Information: No value filed.    Anesthesia Type:  MAC    Note:  Disposition: Inpatient   Postop Pain Control: Uneventful            Sign Out: Well controlled pain   PONV: No   Neuro/Psych: Uneventful            Sign Out: Acceptable/Baseline neuro status   Airway/Respiratory: Uneventful            Sign Out: Acceptable/Baseline resp. status   CV/Hemodynamics: Uneventful            Sign Out: Acceptable CV status; No obvious hypovolemia; No obvious fluid overload   Other NRE: NONE   DID A NON-ROUTINE EVENT OCCUR?            Last vitals:  Vitals Value Taken Time   /87 10/31/21 0930   Temp 36.5  C (97.7  F) 10/31/21 0841   Pulse 100 10/31/21 0930   Resp 16 10/31/21 0841   SpO2 95 % 10/31/21 0930       Electronically Signed By: Nico Nails MD  October 31, 2021  10:21 AM

## 2021-10-31 NOTE — DISCHARGE INSTRUCTIONS
If you started having black tarry stool please make an appointment with your PCP and try to call GI for appointment

## 2021-10-31 NOTE — DISCHARGE SUMMARY
Discharge Summary    Primary Care Physician:  Yun Jacinto  Discharge Provider: Aria Vernon MD   Consult/s: GI  Admission Date: 10/29/2021.   Admission Diagnoses:   Gastrointestinal hemorrhage, unspecified gastrointestinal hemorrhage type [K92.2]   Discharge Date: 10/31/2021  Disposition: home  Condition at Discharge: satble  Code Status: Prior  Principal Diagnosis:  Gastrointestinal hemorrhage, unspecified gastrointestinal hemorrhage type  Discharge Diagnoses:    Principal Problem:    Gastrointestinal hemorrhage, unspecified gastrointestinal hemorrhage type  Active Problems:    Aortic Stenosis    Sick Sinus Syndrome    Atrial fibrillation (H)    Cardiac pacemaker in situ, dual chamber    Melena    Anemia due to blood loss, acute    Tension headache    Anticoagulated    Reason for Admission:   Constanza Coles is a 86 year old female who presented on the day of admission with black tarry stool fall 1 week    Hospital Summary:   Constanza Coles is a 86 year old female admitted on 10/29/2021 for dark discolored stool for 1 week that is associated with fatigue headache and dizziness patient is not on iron supplement or Pepto dismal.  Previous history of partial gastrectomy for gastric ulcer many years ago.  Patient on warfarin for A. fib.  Hemoglobin on day of admission was 9.9 then went down to 8.9, previously around 12 in 9/2021 FOBT was positive on admission.  EGD and colonoscopy was performed during admission, no obvious source of bleeding was found.  Hemoglobin stayed stable.  Patient discharged home with recommendation to follow-up with PCP and GI for possible capsule endoscopy if additional bleeding is noted.    Discharge Medications:       Review of your medicines      CONTINUE these medicines which have NOT CHANGED      Dose / Directions   acetaminophen 500 MG tablet  Commonly known as: TYLENOL      Dose: 1,000 mg  Take 1,000 mg by mouth every 6 hours as needed for mild pain  Refills: 0     alendronate 70  "MG tablet  Commonly known as: FOSAMAX  Used for: Age-related osteoporosis without current pathological fracture      Dose: 70 mg  Take 1 tablet (70 mg) by mouth every 7 days With large glass of water.  On an empty stomach eat after 1 hour  Quantity: 12 tablet  Refills: 3     atorvastatin 40 MG tablet  Commonly known as: LIPITOR  Used for: S/P CABG (coronary artery bypass graft)      Dose: 40 mg  Take 1 tablet (40 mg) by mouth daily  Quantity: 90 tablet  Refills: 3     cyanocobalamin 1000 MCG/ML injection  Commonly known as: CYANOCOBALAMIN  Used for: Vitamin B 12 deficiency      [CYANOCOBALAMIN 1,000 MCG/ML INJECTION] INJECT 1 ML (1,000 MCG TOTAL) INTO THE SHOULDER, THIGH, OR BUTTOCKS EVERY 30 DAYS. **SYR 27G 1/2\" 1CC**  Quantity: 3 mL  Refills: 3     furosemide 20 MG tablet  Commonly known as: LASIX      Dose: 20 mg  Take 20 mg by mouth 2 times daily as needed  Refills: 0     gabapentin 300 MG capsule  Commonly known as: NEURONTIN  Used for: Burning sensation of feet      TAKE 1 CAPSULE (300 MG TOTAL) BY MOUTH 2 TIMES A DAY.  Quantity: 180 capsule  Refills: 3     ketoconazole 2 % external shampoo  Commonly known as: NIZORAL      Apply topically daily as needed  Refills: 0     metoprolol succinate ER 25 MG 24 hr tablet  Commonly known as: TOPROL-XL  Used for: Tachycardia      [METOPROLOL SUCCINATE (TOPROL-XL) 25 MG] TAKE A HALF TABLET (12.5MG) BY MOUTH DAILY.  Quantity: 45 tablet  Refills: 2     nitroGLYcerin 0.4 MG sublingual tablet  Commonly known as: NITROSTAT  Used for: Coronary Artery Disease, S/P CABG (coronary artery bypass graft)      [NITROGLYCERIN (NITROSTAT) 0.4 MG SL TABLET] DISSOLVE 1 TABLET UNDER THE TONGUE AS NEEDED FOR CHEST PAIN  Quantity: 25 tablet  Refills: 1     prasugrel 10 MG Tabs tablet  Commonly known as: EFFIENT  Used for: S/P CABG (coronary artery bypass graft)      Dose: 10 mg  Take 1 tablet (10 mg) by mouth daily Do not crush or break tablet. Dose to start tomorrow.  Quantity: 90 " tablet  Refills: 3     sucralfate 1 GM tablet  Commonly known as: CARAFATE  Used for: Gastroesophageal reflux disease      [SUCRALFATE (CARAFATE) 1 GRAM TABLET] TAKE 1 TABLET BY MOUTH FOUR TIMES DAILY  Quantity: 120 tablet  Refills: 11     vitamin D3 125 MCG (5000 UT) tablet  Commonly known as: CHOLECALCIFEROL      Dose: 1 tablet  [CHOLECALCIFEROL, VITAMIN D3, (VITAMIN D3) 5,000 UNIT TAB] Take 1 tablet by mouth daily.  Refills: 0     warfarin ANTICOAGULANT 1 MG tablet  Commonly known as: COUMADIN      Dose: 1-2 mg  Take as directed. If you are unsure how to take this medication, talk to your nurse or doctor.  Original instructions: Take 1-2 mg by mouth See Admin Instructions 2mg on Tuesday and 1mg the rest of the days of the week  Refills: 0            Coumadin Management:    Most Recent INR:   INR   Date/Time Value Ref Range Status   10/31/2021 10:05 AM 1.45 (H) 0.85 - 1.15 Final     INR Protime   Date/Time Value Ref Range Status   10/08/2021 12:58 PM 2.4 (A) 0.86 - 1.14 Final      Most Recent Coumadin Dose:     Changes to Home Medications and Reasonin)     Significant Laboratory Studies:   Recent Labs   Lab 10/29/21  1140      CO2 27   BUN 14     Recent Labs   Lab 10/31/21  1005 10/30/21  1812 10/30/21  0646 10/29/21  1815 10/29/21  1140   WBC  --   --   --   --  6.2   HGB 9.2* 9.0* 8.9*   < > 9.9*   HCT  --   --   --   --  30.9*   PLT  --   --   --   --  181    < > = values in this interval not displayed.     Significant Radiology Studies:    No results found.  Discharge Instructions:  Follow up contact phone number for patient: [phone]   Follow up appointment with Primary Care Physician: Yun Jacinto within one week  Follow up appointment with Specialist: GI if bleeding noted  Follow up test / procedure to do as outpatient: Hgb   Diet: regular  Activity: As tolerated   Restrictions: none  Wound / drain care:NA  The following tests have been done with results pending: None     Physical Exam:     Temp:  [97.5  F (36.4  C)-98.4  F (36.9  C)] 97.7  F (36.5  C)  Pulse:  [] 91  Resp:  [16] 16  BP: (114-150)/(64-87) 150/72  SpO2:  [92 %-97 %] 95 %    General Appearance:    Alert, cooperative, no distress, appears stated age   Head:    Normocephalic, without obvious abnormality, atraumatic   Eyes:    PERRL, conjunctiva/corneas clear           Throat:   Lips, mucosa, and tongue normal; teeth and gums normal   Neck:   Supple, symmetrical, trachea midline, no adenopathy;     thyroid:  no enlargement/tenderness/nodules; no carotid    bruit or JVD       Lungs:     Clear to auscultation bilaterally, respirations unlabored   Chest Wall:    No tenderness or deformity    Heart:    Regular rate and rhythm, S1 and S2 normal, no murmur, rub   or gallop       Abdomen:     Soft, non-tender, bowel sounds active all four quadrants,     no masses, no organomegaly           Extremities:   Extremities normal, atraumatic, no cyanosis or edema   Pulses:   2+ and symmetric all extremities   Skin:   Skin color, texture, turgor normal, no rashes or lesions   Lymph nodes:   Cervical, supraclavicular, and axillary nodes normal   Neurologic:   CNII-XII grossly intact       This note was created with help of Dragon dictation system. Grammatical/typing errors are not intentional.     Patient discussed with attending physician, Dr. Richy Erickson , who agrees with the plan.      Aria Vernon MD PGY2 10/31/2021  [facility]

## 2021-10-31 NOTE — PROGRESS NOTES
No evidence for bleeding on colonoscopy or EGD.     Ok to restart anticoagulation  If additional bleeding is noted, can perform capsule endoscopy or change anticoagulation plan    GI will sign off, please call if questions or concerns    Dario Thomas M.D.  [facility]  [phone]- business phone number (for scheduling)

## 2021-10-31 NOTE — ANESTHESIA PREPROCEDURE EVALUATION
Anesthesia Pre-Procedure Evaluation    Patient: Constanza Coles   MRN: [id number] : 1935        Preoperative Diagnosis: Gastrointestinal hemorrhage, unspecified gastrointestinal hemorrhage type [K92.2]    Procedure : Procedure(s):  ESOPHAGOGASTRODUODENOSCOPY (EGD)          Past Medical History:   Diagnosis Date     Anemia     iron deficiency     Chronic atrial fibrillation (H)      GERD (gastroesophageal reflux disease)      Hypertension      IHD (ischemic heart disease)      PUD (peptic ulcer disease)     Billroth 1     Recurrent cystitis      SSS (sick sinus syndrome) (H)     post pacemaker placement     Valvular heart disease      Vasculitis (H)       Past Surgical History:   Procedure Laterality Date     ASCENDING AORTIC ANEURYSM REPAIR W/ TISSUE AORTIC VALVE REPLACEMENT  2005    Dacron graft, Forte pericardial Magna 21mm aortic valve     C CABG, VEIN, SINGLE      Description: CABG (CABG);  Recorded: 2012;  Comments: LIMA^LAD, SVG^1st diag     C REPLACE AORT VALV,PROSTH VALV      Aortic Valve Replacement with Forte pericardial Magna 21mm 2005      SECTION       CV CORONARY ANGIOGRAM N/A 2021    Procedure: Coronary Angiogram;  Surgeon: Shane Mcclendon MD;  Location: [facility] CATH LAB CV     CV LEFT HEART CATH N/A 2021    Procedure: Left Heart Cath;  Surgeon: Shane Mcclendon MD;  Location: [facility] CATH LAB CV     CV PCI N/A 2021    Procedure: Percutaneous Coronary Intervention;  Surgeon: Shane Mcclendon MD;  Location: [facility] CATH LAB CV     GALLBLADDER SURGERY      partial colon removal     HYSTERECTOMY       IR AORTIC ARCH 4 VESSEL ANGIOGRAM  9/15/2011     IR MISCELLANEOUS PROCEDURE  2005     OTHER SURGICAL HISTORY      antrectomy     PARTIAL GASTRECTOMY       RI PERC CLOS,BART INTERATRIAL COMMUN W/IMPL      Description: Patent Foramen Ovale Closure Percutaneous;  Recorded: 2012;      Allergies   Allergen Reactions     Aspirin Nausea and  Vomiting     Vomiting. Pt will NOT take Aspirin.      Erythromycin Base [Erythromycin] Unknown     Pt is not sure she is allergic to this     Levaquin [Levofloxacin] Nausea and Vomiting     Vancomycin Other (See Comments)     Red man syndrome     Xanax [Alprazolam] Other (See Comments)     confusion     Sulfa (Sulfonamide Antibiotics) [Sulfa Drugs] Rash      Social History     Tobacco Use     Smoking status: Former Smoker     Packs/day: 1.50     Years: 20.00     Pack years: 30.00     Quit date: 10/29/1979     Years since quittin.0     Smokeless tobacco: Never Used   Substance Use Topics     Alcohol use: Yes     Comment: 1-2 week      Wt Readings from Last 1 Encounters:   10/29/21 55.8 kg (123 lb)        Anesthesia Evaluation   Pt has had prior anesthetic. Type: MAC.    No history of anesthetic complications       ROS/MED HX  ENT/Pulmonary:  - neg pulmonary ROS     Neurologic:  - neg neurologic ROS     Cardiovascular:     (+) hypertension--CAD --stent-21. Taking blood thinners pacemaker, dysrhythmias, a-fib, valvular problems/murmurs     METS/Exercise Tolerance:  Comment: History of Aortic Valve Replacement    Last ECHO 21 shows LVEF of 50%   Hematologic:       Musculoskeletal:       GI/Hepatic: Comment: History of partial gastrectomy in the past    (+) GERD,     Renal/Genitourinary:     (+) renal disease, type: CRI,     Endo:       Psychiatric/Substance Use:  - neg psychiatric ROS     Infectious Disease:  - neg infectious disease ROS     Malignancy:  - neg malignancy ROS     Other:            Physical Exam    Airway        Mallampati: II   TM distance: > 3 FB   Neck ROM: full   Mouth opening: > 3 cm    Respiratory Devices and Support         Dental  no notable dental history         Cardiovascular          Rhythm and rate: irregular     Pulmonary   pulmonary exam normal                OUTSIDE LABS:  CBC:   Lab Results   Component Value Date    WBC 6.2 10/29/2021    WBC 8.6 2021    HGB 9.0 (L)  10/30/2021    HGB 8.9 (L) 10/30/2021    HCT 30.9 (L) 10/29/2021    HCT 36.3 09/27/2021     10/29/2021     09/27/2021     BMP:   Lab Results   Component Value Date     10/29/2021     09/27/2021    POTASSIUM 3.8 10/29/2021    POTASSIUM 4.0 09/27/2021    CHLORIDE 105 10/29/2021    CHLORIDE 103 09/27/2021    CO2 27 10/29/2021    CO2 29 09/27/2021    BUN 14 10/29/2021    BUN 21 09/27/2021    CR 0.76 10/29/2021    CR 0.88 09/27/2021    GLC 93 10/29/2021    GLC 91 09/27/2021     COAGS:   Lab Results   Component Value Date    INR 1.61 (H) 10/30/2021     POC: No results found for: BGM, HCG, HCGS  HEPATIC:   Lab Results   Component Value Date    ALBUMIN 3.9 08/18/2021    PROTTOTAL 7.4 08/18/2021    ALT 13 08/18/2021    AST 25 08/18/2021    ALKPHOS 70 08/18/2021    BILITOTAL 0.6 08/18/2021     OTHER:   Lab Results   Component Value Date    MARY 9.1 10/29/2021    PHOS 3.3 01/02/2019    TSH 2.16 08/18/2021    CRP 0.1 02/28/2020    SED 23 (H) 07/13/2020       Anesthesia Plan    ASA Status:  3, emergent    NPO Status:  NPO Appropriate    Anesthesia Type: MAC.   Induction: Intravenous.   Maintenance: TIVA.        Consents    Anesthesia Plan(s) and associated risks, benefits, and realistic alternatives discussed. Questions answered and patient/representative(s) expressed understanding.     - Discussed with:  Patient         Postoperative Care    Pain management: IV analgesics, Oral pain medications, Multi-modal analgesia.   PONV prophylaxis: Ondansetron (or other 5HT-3)     Comments:                    Nico Nails MD

## 2021-11-01 ENCOUNTER — PATIENT OUTREACH (OUTPATIENT)
Dept: CARE COORDINATION | Facility: CLINIC | Age: 86
End: 2021-11-01

## 2021-11-01 ENCOUNTER — TELEPHONE (OUTPATIENT)
Dept: INTERNAL MEDICINE | Facility: CLINIC | Age: 86
End: 2021-11-01

## 2021-11-01 ENCOUNTER — PATIENT OUTREACH (OUTPATIENT)
Dept: CARE COORDINATION | Facility: CLINIC | Age: 86
End: 2021-11-01
Payer: COMMERCIAL

## 2021-11-01 DIAGNOSIS — Z71.89 OTHER SPECIFIED COUNSELING: ICD-10-CM

## 2021-11-01 NOTE — TELEPHONE ENCOUNTER
Reason for Call:  Other call back    Detailed comments: PT JUST HAD AN UPPER GI AND COLONOSCOMY. HAD VIT K TO THICKEN BLOOD. PT IS WONDERING ABOUT DOSAGE AND IF ANY CHANGES NEED TO BE MADE. PT WOULD LIKE TO SPEAK TO TITUS IF POSSIBLE    Phone Number Patient can be reached at: Home number on file [phone] (home)    Best Time: ANYTIME    Can we leave a detailed message on this number? YES    Call taken on 11/1/2021 at 4:33 PM by Caitlin Hager

## 2021-11-01 NOTE — PROGRESS NOTES
Clinic Care Coordination Contact  [facility]/Voicemail       Clinical Data: Care Coordinator Outreach  Outreach attempted x 1.  Left message on patient's voicemail with call back information and requested return call.  Care Coordinator will try to reach patient again in 1-2 business days.      Claudette Adamson  [phone]  Care

## 2021-11-02 ENCOUNTER — ANTICOAGULATION THERAPY VISIT (OUTPATIENT)
Dept: ANTICOAGULATION | Facility: CLINIC | Age: 86
End: 2021-11-02

## 2021-11-02 ENCOUNTER — LAB (OUTPATIENT)
Dept: LAB | Facility: CLINIC | Age: 86
End: 2021-11-02
Payer: COMMERCIAL

## 2021-11-02 DIAGNOSIS — Z95.2 H/O AORTIC VALVE REPLACEMENT: ICD-10-CM

## 2021-11-02 DIAGNOSIS — I48.91 ATRIAL FIBRILLATION (H): Primary | ICD-10-CM

## 2021-11-02 LAB — INR BLD: 1.6 (ref 0.9–1.1)

## 2021-11-02 PROCEDURE — 36416 COLLJ CAPILLARY BLOOD SPEC: CPT

## 2021-11-02 PROCEDURE — 85610 PROTHROMBIN TIME: CPT

## 2021-11-02 NOTE — PROGRESS NOTES
Clinic Care Coordination Contact  [facility]/Voicemail       Clinical Data: Care Coordinator Outreach  Outreach attempted x 2.  Left message on patient's voicemail with call back information and requested return call.   Care Coordinator will do no further outreaches at this time.      Claudette Adamson  [phone]  Care

## 2021-11-02 NOTE — PROGRESS NOTES
ANTICOAGULATION MANAGEMENT     Constanza Coles 86 year old female is on warfarin with subtherapeutic INR result. (Goal INR 2.0-3.0)    Recent labs: (last 7 days)     11/02/21  1208   INR 1.6*       ASSESSMENT     Source(s): Chart Review and Patient/Caregiver Call       Warfarin doses taken: Warfarin taken as instructed    Diet: No new diet changes identified    New illness, injury, or hospitalization: Yes: gi bleed, warfarin held 10/29-31 then resumed 11/1    Medication/supplement changes: None noted    Signs or symptoms of bleeding or clotting: No    Previous INR: Subtherapeutic    Additional findings: None     PLAN     Recommended plan for temporary change(s) affecting INR     Dosing Instructions: Booster dose then continue your current warfarin dose with next INR in 1 week       Summary  As of 11/2/2021    Full warfarin instructions:  11/2: 2 mg; Otherwise 2 mg every Fri; 1 mg all other days   Next INR check:  11/9/2021             Telephone call with Constanza who verbalizes understanding and agrees to plan    Check at provider office visit 11/9    Education provided: None required    Plan made per [facility] anticoagulation protocol    Karol Spence RN  Anticoagulation Clinic  11/2/2021    _______________________________________________________________________     Anticoagulation Episode Summary     Current INR goal:  2.0-3.0   TTR:  69.5 % (1 y)   Target end date:     Send INR reminders to:  [facility]    Indications    Atrial fibrillation (H) [I48.91]  H/O aortic valve replacement [Z95.2]           Comments:           Anticoagulation Care Providers     Provider Role Specialty Phone number    Hayder Bailey MD Referring Internal Medicine [phone]    Yun Jacinto MD Responsible Internal Medicine [phone]

## 2021-11-09 ENCOUNTER — ANTICOAGULATION THERAPY VISIT (OUTPATIENT)
Dept: ANTICOAGULATION | Facility: CLINIC | Age: 86
End: 2021-11-09

## 2021-11-09 ENCOUNTER — OFFICE VISIT (OUTPATIENT)
Dept: INTERNAL MEDICINE | Facility: CLINIC | Age: 86
End: 2021-11-09
Payer: COMMERCIAL

## 2021-11-09 ENCOUNTER — TELEPHONE (OUTPATIENT)
Dept: CARDIOLOGY | Facility: CLINIC | Age: 86
End: 2021-11-09

## 2021-11-09 VITALS
OXYGEN SATURATION: 99 % | BODY MASS INDEX: 22.45 KG/M2 | RESPIRATION RATE: 16 BRPM | HEIGHT: 62 IN | HEART RATE: 76 BPM | SYSTOLIC BLOOD PRESSURE: 120 MMHG | WEIGHT: 122 LBS | DIASTOLIC BLOOD PRESSURE: 68 MMHG

## 2021-11-09 DIAGNOSIS — E78.2 MIXED HYPERLIPIDEMIA: ICD-10-CM

## 2021-11-09 DIAGNOSIS — I48.91 ATRIAL FIBRILLATION (H): Primary | ICD-10-CM

## 2021-11-09 DIAGNOSIS — K92.2 GASTROINTESTINAL HEMORRHAGE, UNSPECIFIED GASTROINTESTINAL HEMORRHAGE TYPE: Primary | ICD-10-CM

## 2021-11-09 DIAGNOSIS — Z95.2 H/O AORTIC VALVE REPLACEMENT: ICD-10-CM

## 2021-11-09 DIAGNOSIS — Z95.820 S/P PERCUTANEOUS TRANSLUMINAL ANGIOPLASTY (PTA) WITH STENT PLACEMENT: ICD-10-CM

## 2021-11-09 LAB
ALBUMIN SERPL-MCNC: 3.7 G/DL (ref 3.5–5)
ALP SERPL-CCNC: 74 U/L (ref 45–120)
ALT SERPL W P-5'-P-CCNC: 18 U/L (ref 0–45)
ANION GAP SERPL CALCULATED.3IONS-SCNC: 12 MMOL/L (ref 5–18)
AST SERPL W P-5'-P-CCNC: 26 U/L (ref 0–40)
BILIRUB SERPL-MCNC: 0.9 MG/DL (ref 0–1)
BUN SERPL-MCNC: 12 MG/DL (ref 8–28)
CALCIUM SERPL-MCNC: 9.6 MG/DL (ref 8.5–10.5)
CHLORIDE BLD-SCNC: 102 MMOL/L (ref 98–107)
CHOLEST SERPL-MCNC: 116 MG/DL
CO2 SERPL-SCNC: 29 MMOL/L (ref 22–31)
CREAT SERPL-MCNC: 0.8 MG/DL (ref 0.6–1.1)
ERYTHROCYTE [DISTWIDTH] IN BLOOD BY AUTOMATED COUNT: 14.1 % (ref 10–15)
FASTING STATUS PATIENT QL REPORTED: YES
GFR SERPL CREATININE-BSD FRML MDRD: 68 ML/MIN/1.73M2
GLUCOSE BLD-MCNC: 96 MG/DL (ref 70–125)
HCT VFR BLD AUTO: 32.2 % (ref 35–47)
HDLC SERPL-MCNC: 53 MG/DL
HGB BLD-MCNC: 10.1 G/DL (ref 11.7–15.7)
INR BLD: 2.5 (ref 0.9–1.1)
LDLC SERPL CALC-MCNC: 46 MG/DL
MCH RBC QN AUTO: 29.4 PG (ref 26.5–33)
MCHC RBC AUTO-ENTMCNC: 31.4 G/DL (ref 31.5–36.5)
MCV RBC AUTO: 94 FL (ref 78–100)
PLATELET # BLD AUTO: 263 10E3/UL (ref 150–450)
POTASSIUM BLD-SCNC: 4.3 MMOL/L (ref 3.5–5)
PROT SERPL-MCNC: 7.5 G/DL (ref 6–8)
RBC # BLD AUTO: 3.44 10E6/UL (ref 3.8–5.2)
SODIUM SERPL-SCNC: 143 MMOL/L (ref 136–145)
TRIGL SERPL-MCNC: 84 MG/DL
WBC # BLD AUTO: 7.6 10E3/UL (ref 4–11)

## 2021-11-09 PROCEDURE — 80061 LIPID PANEL: CPT | Performed by: INTERNAL MEDICINE

## 2021-11-09 PROCEDURE — 36415 COLL VENOUS BLD VENIPUNCTURE: CPT | Performed by: INTERNAL MEDICINE

## 2021-11-09 PROCEDURE — 99495 TRANSJ CARE MGMT MOD F2F 14D: CPT | Performed by: INTERNAL MEDICINE

## 2021-11-09 PROCEDURE — 85027 COMPLETE CBC AUTOMATED: CPT | Performed by: INTERNAL MEDICINE

## 2021-11-09 PROCEDURE — 85610 PROTHROMBIN TIME: CPT | Performed by: INTERNAL MEDICINE

## 2021-11-09 PROCEDURE — 80053 COMPREHEN METABOLIC PANEL: CPT | Performed by: INTERNAL MEDICINE

## 2021-11-09 ASSESSMENT — MIFFLIN-ST. JEOR: SCORE: 956.64

## 2021-11-09 NOTE — TELEPHONE ENCOUNTER
----- Message from Mathew Mccray MD sent at 11/9/2021  3:14 PM CST -----  Thank you for your note.Anemia does not look too bad where it could be contributing to shortness of breath but I certainly think a left atrial appendage occlusion device and discontinuation of Coumadin would be in order.If we stop Coumadin, ideally I would want to get her back on aspirin which he declines vehemently.I would be more than happy to see her, and will include my nurse and schedule her in on this communication so that my next available will be for her as well as possibly to consider left atrial appendage occlusion device.LF  ----- Message -----  From: Yun Jacinto MD  Sent: 11/9/2021   2:40 PM CST  To: Mathew Mccray MD    Hi dr mccray , this delightful lady was admitted for a GI bleed (  melena) , upper Gi/colon negative She is on warfarin and prasugel.  She feels that she is increasingly SOB on exertion . I suspect it is due to her anemia and I have sent her to GI for a capsule endoscopy,  but she feels it happened before her melena and ever since the stent was put in ( sept) . She would like to see you sooner than her December appointment to discuss this  . Thanks Yun

## 2021-11-09 NOTE — TELEPHONE ENCOUNTER
Noted messages from physician to physician communication. Pt scheduled 12/20 with device check. Msg sent to device  pool and  pool with high importance to see if she can be seen sooner at [facility] request. -elver

## 2021-11-09 NOTE — PROGRESS NOTES
ANTICOAGULATION MANAGEMENT     Constanza Coles 86 year old female is on warfarin with therapeutic INR result. (Goal INR 2.0-3.0)    Recent labs: (last 7 days)     11/09/21  1349   INR 2.5*       ASSESSMENT     Source(s): Chart Review and Template       Warfarin doses taken: Warfarin taken as instructed    Diet: No new diet changes identified    New illness, injury, or hospitalization: Yes: Hospitalized 10/29-10/31 for GI bleed.     Medication/supplement changes: None noted    Signs or symptoms of bleeding or clotting: No    Previous INR: Subtherapeutic after 3 day hold in the hospital    Additional findings: None     PLAN     Recommended plan for temporary change(s) affecting INR     Dosing Instructions: Continue your current warfarin dose with next INR in 2 weeks       Summary  As of 11/9/2021    Full warfarin instructions:  2 mg every Fri; 1 mg all other days   Next INR check:  11/23/2021             Detailed voice message left for Constanza with dosing instructions and follow up date.     Contact [phone] to schedule and with any changes, questions or concerns.     Education provided: Please call back if any changes to your diet, medications or how you've been taking warfarin and Contact [phone] with any changes, questions or concerns.     Plan made per [facility] anticoagulation protocol    Sol Allen RN  Anticoagulation Clinic  11/9/2021    _______________________________________________________________________     Anticoagulation Episode Summary     Current INR goal:  2.0-3.0   TTR:  68.6 % (1 y)   Target end date:     Send INR reminders to:  [facility]    Indications    Atrial fibrillation (H) [I48.91]  H/O aortic valve replacement [Z95.2]           Comments:           Anticoagulation Care Providers     Provider Role Specialty Phone number    Hayder Bailey MD Referring Internal Medicine [phone]    Yun Jacinto MD Responsible Internal Medicine [phone]

## 2021-11-09 NOTE — PROGRESS NOTES
Assess/plan  1. Gastrointestinal hemorrhage, unspecified gastrointestinal hemorrhage type  She had melena and a drop in her hemoglobin.  She was hemodynamically stable.  She is on 2 blood thinners Coumadin and prasugel.   She has not taken any other anti-inflammatories or aspirin.  She has no abdominal pain that is unchanged she always has had IBS in the past.  Upper endoscopy colonoscopy cannot find the source of bleeding there and I do not see any GI consult clinic referral pending so will refer to GI.  She does need A capsule endoscopy to look for any other source of bleeding.  I did send a message to her cardiologist as if she continues to drop her hemoglobin she may need to stop the warfarin  And get a left atrial appendage procedure.  - CBC with platelets; Future  - Lipid panel reflex to direct LDL Fasting; Future  - Comprehensive metabolic panel; Future  - Adult Gastro Ref - Consult Only; Future  - CBC with platelets  - Lipid panel reflex to direct LDL Fasting  - Comprehensive metabolic panel    2. Mixed hyperlipidemia  She is on high-dose statin  - Lipid panel reflex to direct LDL Fasting; Future  - Lipid panel reflex to direct LDL Fasting    3. S/P percutaneous transluminal angioplasty (PTA) with stent placement  She had drug-eluting stent placement in September she says that she is not been well since then and has had shortness of breath on exertion with some intermittent leg ankle edema.  She relates it directly post stent procedure.  Recommend she continue to hold Fosamax.  I do not think it is the culprit for her melena.  We can defer management of her osteoporosis to a later date once she is stabilized.    Lolis Apodaca is a 86 year old who presents for the following health issues     HPI   She was admitted with a history of painless melena.  She had no hypotension or or nausea or vomiting.  She has chronic irritable bowel syndrome.  She was found to have a drop in her hemoglobin , upper GI and  "colonoscopy were done and showed no source of bleeding.  She was supposed to get her follow-up outpatient capsule endoscopy.  She continues to have some black stools but she says they are not as tarry black as they were before.  She still has no eating issues she is going to the gym.  She says that she is very short of breath on maximal exertion and she has intermittent edema.  She insists that that is ever since she had her stent put in 2 months ago.  She is on dual blood thinners warfarin as an anticoagulant and prasulegent post stent    Hospital Follow-up Visit:    Hospital/Nursing Home/IP Rehab Facility: [facility]  Date of Admission: 10/29/21  Date of Discharge: 10/31/21  Reason(s) for Admission: gi bleed      Was your hospitalization related to COVID-19? No   Problems taking medications regularly:  None   Medication changes since discharge: none she discontinued fosamax on her own   Problems adhering to non-medication therapy:  None    Summary of hospitalization:  [facility] discharge summary reviewed  Diagnostic Tests/Treatments reviewed.  Follow up needed: GI and cardiology referral needed.  Other Healthcare Providers Involved in Patient s Care:         Specialist appointment - GI and cardiology   Update since discharge: improved.       Post Discharge Medication Reconciliation: discharge medications reconciled, continue medications without change.  Plan of care communicated with patient                Review of Systems   Constitutional, HEENT, , gi and gu systems are negative, except as otherwise noted.      Objective    /68 (BP Location: Left arm, Patient Position: Sitting, Cuff Size: Adult Regular)   Pulse 76   Resp 16   Ht 1.575 m (5' 2\")   Wt 55.3 kg (122 lb)   SpO2 99%   BMI 22.31 kg/m    Body mass index is 22.31 kg/m .  Physical Exam   GENERAL: healthy, alert and no distress  NECK: no adenopathy, no asymmetry, masses, or scars and thyroid normal to " palpation  RESP: lungs clear to auscultation - no rales, rhonchi or wheezes  CV: regular rate and rhythm, normal S1 S2, no S3 or S4, no murmur, click or rub, no peripheral edema and peripheral pulses strong  ABDOMEN: soft, nontender, no hepatosplenomegaly, she has that right upper quadrant ventral hernia it is soft   MS: no gross musculoskeletal defects noted, no edema  She has no edema      Current Outpatient Medications   Medication     acetaminophen (TYLENOL) 500 MG tablet     atorvastatin (LIPITOR) 40 MG tablet     cholecalciferol, vitamin D3, (VITAMIN D3) 5,000 unit Tab     cyanocobalamin 1,000 mcg/mL injection     furosemide (LASIX) 20 MG tablet     gabapentin (NEURONTIN) 300 MG capsule     ketoconazole (NIZORAL) 2 % external shampoo     metoprolol succinate (TOPROL-XL) 25 MG     nitroGLYcerin (NITROSTAT) 0.4 MG sublingual tablet     prasugrel (EFFIENT) 10 MG TABS tablet     sucralfate (CARAFATE) 1 gram tablet     warfarin ANTICOAGULANT (COUMADIN) 1 MG tablet     No current facility-administered medications for this visit.     Patient Active Problem List   Diagnosis     Coronary Artery Disease     Aortic Stenosis     Sick Sinus Syndrome     H/O aortic valve replacement     Atrial fibrillation (H)     Vitamin B 12 deficiency     Vasculitis (H)     Cardiac pacemaker in situ, dual chamber     S/P CABG (coronary artery bypass graft)     Pure hypercholesterolemia     Chronic kidney disease, stage 3 (H)     Percutaneous transluminal coronary angioplasty status     Gastrointestinal hemorrhage, unspecified gastrointestinal hemorrhage type     Melena     Anemia due to blood loss, acute     Tension headache     Anticoagulated     S/P percutaneous transluminal angioplasty (PTA) with stent placement

## 2021-11-10 ENCOUNTER — TELEPHONE (OUTPATIENT)
Dept: INTERNAL MEDICINE | Facility: CLINIC | Age: 86
End: 2021-11-10
Payer: COMMERCIAL

## 2021-11-10 NOTE — TELEPHONE ENCOUNTER
----- Message from Yun Jacinto MD sent at 11/9/2021  9:20 PM CST -----  Please reassure pattient that her blood counts is stable . She was admitted for a GI bleed .

## 2021-11-22 NOTE — PROGRESS NOTES
Addendum to H&P dated 10/29/2021    ROS:  Constitutional: positive for headache, negative otherwise  Skin: negative  Eyes: negative  Respiratory: negative  Cardiovascular: negative except for irregular heart beat and palpitations  Gastrointestinal: melana/dark stools, green emesis xs  Genitourinary: negative  Hematologic/lymphatic: negative  Musculoskeletal: negative  Psych: appropriate mood      Radha Ford MD PGY1  [facility]

## 2021-12-02 ENCOUNTER — TELEPHONE (OUTPATIENT)
Dept: ANTICOAGULATION | Facility: CLINIC | Age: 86
End: 2021-12-02
Payer: COMMERCIAL

## 2021-12-02 NOTE — TELEPHONE ENCOUNTER
ANTICOAGULATION     Constanza Coles is overdue for INR check.      Left message for patient to call and schedule lab appointment as soon as possible. If returning call, please schedule.     Karol Spence RN

## 2021-12-07 ENCOUNTER — ANTICOAGULATION THERAPY VISIT (OUTPATIENT)
Dept: ANTICOAGULATION | Facility: CLINIC | Age: 86
End: 2021-12-07

## 2021-12-07 ENCOUNTER — LAB (OUTPATIENT)
Dept: LAB | Facility: CLINIC | Age: 86
End: 2021-12-07
Payer: COMMERCIAL

## 2021-12-07 DIAGNOSIS — I48.91 ATRIAL FIBRILLATION (H): Primary | ICD-10-CM

## 2021-12-07 DIAGNOSIS — Z95.2 H/O AORTIC VALVE REPLACEMENT: ICD-10-CM

## 2021-12-07 LAB — INR BLD: 1.7 (ref 0.9–1.1)

## 2021-12-07 PROCEDURE — 36416 COLLJ CAPILLARY BLOOD SPEC: CPT

## 2021-12-07 PROCEDURE — 85610 PROTHROMBIN TIME: CPT

## 2021-12-07 NOTE — PROGRESS NOTES
Anticoagulation Management    Unable to reach Constanza today.    Today's INR result of 1.7 is subtherapeutic (goal INR of 2.0-3.0).  Result received from: Clinic Lab    Follow up required to assess for changes     Left message to hold and not take herwarfarin tomorrow morning, till after we talk.   - I will call her at 900a.   - need to verify her warfarin dose, as template had different dose written.   - wrote takes 2mg on Tuesdays.      Anticoagulation clinic to follow up on 12/8.    Erika Simental RN

## 2021-12-07 NOTE — PROGRESS NOTES
ANTICOAGULATION MANAGEMENT     Constanza Doansupriya 86 year old female is on warfarin with subtherapeutic INR result. (Goal INR 2.0-3.0)    Recent labs: (last 7 days)     12/07/21  1110   INR 1.7*       ASSESSMENT     Source(s): Chart Review, Patient/Caregiver Call and Template       Warfarin doses taken: Warfarin taken differently, but did not change total weekly dose     Diet: No new diet changes identified    New illness, injury, or hospitalization: No     Medication/supplement changes: None noted    Signs or symptoms of bleeding or clotting: No    Previous INR: Therapeutic last visit at 2.5 on 11/9/21.; previously outside of goal range at 1.6 on 11/2/21.    Additional findings: None     PLAN     Recommended plan for no diet, medication or health factor changes affecting INR     Dosing Instructions:   (mornings. 1mg tabs)   - takes warfarin in the morning - 2mg dose.   - Booster dose then continue your current warfarin dose with next INR in 1-2 weeks.      Summary  As of 12/7/2021    Full warfarin instructions:  12/8: 3 mg; 12/10: 1 mg; Otherwise 2 mg every Wed; 1 mg all other days   Next INR check:  12/21/2021             Telephone call with  Constanza ([phone]) who verbalizes understanding and agrees to plan    Check at provider office visit - scheduled on 12/20/21 during OV with Dr. Antoine @ [facility].    Education provided: Importance of consistent vitamin K intake and Goal range and significance of current result    Plan made per [facility] anticoagulation protocol    Erika Simental, RN  Anticoagulation Clinic  12/7/2021    _______________________________________________________________________     Anticoagulation Episode Summary     Current INR goal:  2.0-3.0   TTR:  70.3 % (1 y)   Target end date:     Send INR reminders to:  [facility]    Indications    Atrial fibrillation (H) [I48.91]  H/O aortic valve replacement [Z95.2]           Comments:           Anticoagulation Care Providers     Provider Role  Specialty Phone number    Hayder Bailey MD Referring Internal Medicine [phone]    Yun Jaicnto MD Responsible Internal Medicine [phone]

## 2021-12-08 NOTE — PROGRESS NOTES
ANTICOAGULATION MANAGEMENT     Constanza Doant 86 year old female is on warfarin with subtherapeutic INR result. (Goal INR 2.0-3.0)    Recent labs: (last 7 days)     12/07/21  1110   INR 1.7*       ASSESSMENT     Source(s): Chart Review and Patient/Caregiver Call       Warfarin doses taken: Warfarin taken differently, but did not change total weekly dose    Constanza verified she takes 2mg on Tuesdays and 1mg all other days.  Will update anticoagulation calendar.    Diet: No new diet changes identified    New illness, injury, or hospitalization:  Yes.  Constanza reported:   - Given clearance to resume Warfarin and Effient on 11/1/21.   - Recent GI bleed / melena.  - Colonoscopy on 10/31/21 showed Diverticulosis / internal hemorrhoids, and Upper - GI endoscopy on 10/30/21 showed esophagitis    Medication/supplement changes: None noted    Signs or symptoms of bleeding or clotting: No    Previous INR: Therapeutic last visit at 2.5; previously outside of goal range at 1.6    Additional findings: None     PLAN     Recommended plan for no diet, medication or health factor changes affecting INR     Dosing Instructions:   (mornings. 1mg tabs)    Booster dose then continue your current warfarin dose with next INR in 2 weeks       Summary  As of 12/7/2021    Full warfarin instructions:  12/7: 2 mg; 12/8: 2 mg; Otherwise 2 mg every Tue; 1 mg all other days   Next INR check:  12/21/2021             Telephone call with  Constanza ([phone]) who verbalizes understanding and agrees to plan    Check INR at Cardiology office visit - on 12/20/21 @ [facility] [place].    Education provided: Importance of consistent vitamin K intake and Goal range and significance of current result    Plan made per [facility] anticoagulation protocol    Erika Simental, RN  Anticoagulation Clinic  12/8/2021    _______________________________________________________________________     Anticoagulation Episode Summary     Current INR goal:  2.0-3.0   TTR:  70.2 % (1  y)   Target end date:     Send INR reminders to:  [facility]    Indications    Atrial fibrillation (H) [I48.91]  H/O aortic valve replacement [Z95.2]           Comments:           Anticoagulation Care Providers     Provider Role Specialty Phone number    Hayder Bailey MD Referring Internal Medicine [phone]    Yun Jacinto MD Responsible Internal Medicine [phone]

## 2021-12-20 ENCOUNTER — ANTICOAGULATION THERAPY VISIT (OUTPATIENT)
Dept: ANTICOAGULATION | Facility: CLINIC | Age: 86
End: 2021-12-20

## 2021-12-20 ENCOUNTER — OFFICE VISIT (OUTPATIENT)
Dept: CARDIOLOGY | Facility: CLINIC | Age: 86
End: 2021-12-20
Attending: INTERNAL MEDICINE

## 2021-12-20 ENCOUNTER — ANCILLARY PROCEDURE (OUTPATIENT)
Dept: CARDIOLOGY | Facility: CLINIC | Age: 86
End: 2021-12-20
Attending: INTERNAL MEDICINE
Payer: COMMERCIAL

## 2021-12-20 ENCOUNTER — LAB (OUTPATIENT)
Dept: CARDIOLOGY | Facility: CLINIC | Age: 86
End: 2021-12-20
Payer: COMMERCIAL

## 2021-12-20 VITALS
HEIGHT: 62 IN | SYSTOLIC BLOOD PRESSURE: 144 MMHG | DIASTOLIC BLOOD PRESSURE: 80 MMHG | WEIGHT: 121 LBS | BODY MASS INDEX: 22.26 KG/M2 | RESPIRATION RATE: 16 BRPM | HEART RATE: 80 BPM

## 2021-12-20 DIAGNOSIS — I49.5 SINOATRIAL NODE DYSFUNCTION (H): Primary | ICD-10-CM

## 2021-12-20 DIAGNOSIS — I77.6 VASCULITIS (H): ICD-10-CM

## 2021-12-20 DIAGNOSIS — I48.20 CHRONIC ATRIAL FIBRILLATION (H): ICD-10-CM

## 2021-12-20 DIAGNOSIS — Z95.2 H/O AORTIC VALVE REPLACEMENT: ICD-10-CM

## 2021-12-20 DIAGNOSIS — Z95.820 S/P PERCUTANEOUS TRANSLUMINAL ANGIOPLASTY (PTA) WITH STENT PLACEMENT: ICD-10-CM

## 2021-12-20 DIAGNOSIS — I48.91 A-FIB (H): ICD-10-CM

## 2021-12-20 DIAGNOSIS — I25.83 CORONARY ATHEROSCLEROSIS DUE TO LIPID RICH PLAQUE: Primary | ICD-10-CM

## 2021-12-20 DIAGNOSIS — Z95.0 PACEMAKER: ICD-10-CM

## 2021-12-20 DIAGNOSIS — D62 ANEMIA DUE TO BLOOD LOSS, ACUTE: ICD-10-CM

## 2021-12-20 DIAGNOSIS — E78.00 PURE HYPERCHOLESTEROLEMIA: ICD-10-CM

## 2021-12-20 DIAGNOSIS — Z79.01 LONG TERM CURRENT USE OF ANTICOAGULANT THERAPY: Primary | ICD-10-CM

## 2021-12-20 DIAGNOSIS — N18.30 STAGE 3 CHRONIC KIDNEY DISEASE, UNSPECIFIED WHETHER STAGE 3A OR 3B CKD (H): ICD-10-CM

## 2021-12-20 DIAGNOSIS — Z95.0 CARDIAC PACEMAKER IN SITU: ICD-10-CM

## 2021-12-20 DIAGNOSIS — I48.91 ATRIAL FIBRILLATION (H): Primary | ICD-10-CM

## 2021-12-20 DIAGNOSIS — Z95.1 S/P CABG (CORONARY ARTERY BYPASS GRAFT): ICD-10-CM

## 2021-12-20 LAB
INR POINT OF CARE: 2.1 (ref 0.86–1.14)
MDC_IDC_LEAD_IMPLANT_DT: NORMAL
MDC_IDC_LEAD_IMPLANT_DT: NORMAL
MDC_IDC_LEAD_LOCATION: NORMAL
MDC_IDC_LEAD_LOCATION: NORMAL
MDC_IDC_LEAD_MFG: NORMAL
MDC_IDC_LEAD_MFG: NORMAL
MDC_IDC_LEAD_MODEL: NORMAL
MDC_IDC_LEAD_MODEL: NORMAL
MDC_IDC_LEAD_POLARITY_TYPE: NORMAL
MDC_IDC_LEAD_POLARITY_TYPE: NORMAL
MDC_IDC_LEAD_SERIAL: NORMAL
MDC_IDC_LEAD_SERIAL: NORMAL
MDC_IDC_MSMT_BATTERY_DTM: NORMAL
MDC_IDC_MSMT_BATTERY_IMPEDANCE: 2861 OHM
MDC_IDC_MSMT_BATTERY_REMAINING_LONGEVITY: 29 MO
MDC_IDC_MSMT_BATTERY_STATUS: NORMAL
MDC_IDC_MSMT_BATTERY_VOLTAGE: 2.73 V
MDC_IDC_MSMT_LEADCHNL_RA_IMPEDANCE_VALUE: 67 OHM
MDC_IDC_MSMT_LEADCHNL_RV_IMPEDANCE_VALUE: 456 OHM
MDC_IDC_MSMT_LEADCHNL_RV_PACING_THRESHOLD_AMPLITUDE: 0.5 V
MDC_IDC_MSMT_LEADCHNL_RV_PACING_THRESHOLD_AMPLITUDE: 0.62 V
MDC_IDC_MSMT_LEADCHNL_RV_PACING_THRESHOLD_PULSEWIDTH: 0.4 MS
MDC_IDC_MSMT_LEADCHNL_RV_PACING_THRESHOLD_PULSEWIDTH: 0.4 MS
MDC_IDC_MSMT_LEADCHNL_RV_SENSING_INTR_AMPL: 8 MV
MDC_IDC_PG_IMPLANT_DTM: NORMAL
MDC_IDC_PG_MFG: NORMAL
MDC_IDC_PG_MODEL: NORMAL
MDC_IDC_PG_SERIAL: NORMAL
MDC_IDC_PG_TYPE: NORMAL
MDC_IDC_SESS_CLINIC_NAME: NORMAL
MDC_IDC_SESS_DTM: NORMAL
MDC_IDC_SESS_TYPE: NORMAL
MDC_IDC_SET_BRADY_HYSTRATE: NORMAL
MDC_IDC_SET_BRADY_LOWRATE: 50 {BEATS}/MIN
MDC_IDC_SET_BRADY_MAX_TRACKING_RATE: 110 {BEATS}/MIN
MDC_IDC_SET_BRADY_MODE: NORMAL
MDC_IDC_SET_LEADCHNL_RV_PACING_AMPLITUDE: 1.25 V
MDC_IDC_SET_LEADCHNL_RV_PACING_CAPTURE_MODE: NORMAL
MDC_IDC_SET_LEADCHNL_RV_PACING_POLARITY: NORMAL
MDC_IDC_SET_LEADCHNL_RV_PACING_PULSEWIDTH: 0.4 MS
MDC_IDC_SET_LEADCHNL_RV_SENSING_POLARITY: NORMAL
MDC_IDC_SET_LEADCHNL_RV_SENSING_SENSITIVITY: 2.8 MV
MDC_IDC_SET_ZONE_DETECTION_INTERVAL: 333.33 MS
MDC_IDC_SET_ZONE_TYPE: NORMAL
MDC_IDC_SET_ZONE_TYPE: NORMAL
MDC_IDC_STAT_AT_BURDEN_PERCENT: 0 %
MDC_IDC_STAT_AT_DTM_END: NORMAL
MDC_IDC_STAT_AT_DTM_START: NORMAL
MDC_IDC_STAT_BRADY_DTM_END: NORMAL
MDC_IDC_STAT_BRADY_DTM_START: NORMAL
MDC_IDC_STAT_BRADY_RV_PERCENT_PACED: 5 %
MDC_IDC_STAT_EPISODE_RECENT_COUNT: 0
MDC_IDC_STAT_EPISODE_RECENT_COUNT: 1
MDC_IDC_STAT_EPISODE_RECENT_COUNT_DTM_END: NORMAL
MDC_IDC_STAT_EPISODE_RECENT_COUNT_DTM_END: NORMAL
MDC_IDC_STAT_EPISODE_RECENT_COUNT_DTM_START: NORMAL
MDC_IDC_STAT_EPISODE_RECENT_COUNT_DTM_START: NORMAL
MDC_IDC_STAT_EPISODE_TYPE: NORMAL
MDC_IDC_STAT_EPISODE_TYPE: NORMAL

## 2021-12-20 PROCEDURE — 36416 COLLJ CAPILLARY BLOOD SPEC: CPT

## 2021-12-20 PROCEDURE — 99214 OFFICE O/P EST MOD 30 MIN: CPT | Performed by: INTERNAL MEDICINE

## 2021-12-20 PROCEDURE — 93280 PM DEVICE PROGR EVAL DUAL: CPT | Performed by: INTERNAL MEDICINE

## 2021-12-20 PROCEDURE — 85610 PROTHROMBIN TIME: CPT

## 2021-12-20 ASSESSMENT — MIFFLIN-ST. JEOR: SCORE: 952.1

## 2021-12-20 NOTE — PATIENT INSTRUCTIONS
Ms Constanza Coles,  I enjoyed visiting with you again today.  I am glad to hear you are doing well.  Per our conversation think of the umbrella device or Watchman in side of the heart.  I will plan on seeing you 6 months or sooner if needed.  Mathew Antoine

## 2021-12-20 NOTE — PROGRESS NOTES
[facility]  Heart Care Clinic Follow-up Note    Assessment & Plan        (I25.10,  I25.83) Coronary atherosclerosis due to lipid rich plaque  (primary encounter diagnosis)  Comment: Recent angiography September 2021 showed normal left main, significantly occluded proximal LAD, circumflex with a mid 40 to 50% stenosis, distal obtuse marginal artery 195 to 99% stenosis, right coronary artery with a mid 95% and distal tandem 85% lesions.  She had intervention on the circumflex.    (Z95.820) S/P percutaneous transluminal angioplasty (PTA) with stent placement  Comment: Intervention on circumflex done in September 2021, did not improve her symptoms.  Plan was to consider intervention further on the right coronary artery and LAD, given her recent GI bleed she is very tired hospitalization and physicians, she does not want any procedures going forward.  Given her shortness of breath we did pursue CTA which showed the significant circumflex disease prompting the above angiogram.  Once again symptoms did not improve.    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  She had aortic valve replacement at the same time.  Unsure she had exclusion of the left atrial appendage and will try to find that operative report, she has had no recent [place].  If no exclusion of the appendage would strongly recommend left atrial appendage occlusion device given her recent GI bleed.    (Z95.2) H/O aortic valve replacement  Comment: due to severe aortic stenosis she had a 21 mm Forte pericardial magna valve placed in 2005 and based on echo last year working well with a dimensionless index of 0.7, mean gradient of 6 mmHg and mean velocity of 1.2 m/s.  Given that this tissue valve was placed over 15 years ago we will recheck echo yearly. In addition, I have asked her to take aspirin daily and she declines telling me that she has horrible allergic  reactions and stomach upset from this.  We will continue indefinite Effient.    (D62) Anemia due to blood loss, acute  Comment: Presumed GI bleed with melena and hemoglobin down to 8.9.  Unremarkable EGD as well as colonoscopy.  She was supposed to have a PillCam and declined this.    (N18.30) Stage 3 chronic kidney disease, unspecified whether stage 3a or 3b CKD (H)  Comment: This is resolved with a normal creatinine is 0.80.    (Z95.0) Cardiac pacemaker in situ, dual chamber  Comment: Medtronic device with Medtronic leads placed in 2011 with only 5% ventricular pacing and noted to be in atrial fibrillation.  Rate responsiveness was made more aggressive and symptoms have resolved.     (I48.20) Chronic atrial fibrillation (H)  Comment: Chronic/permanent, not valvular, asymptomatic, and on chronic Coumadin with INR adjusted by primary.  INR today 2.1.  Given recent GI bleed I briefly discussed left atrial appendage occlusion device with her, she is not interested right now.  We will see if I can obtain operative report, if this operative report shows exclusion of left atrial appendage will then consider [place].  Otherwise, strongly recommend left atrial appendage occlusion.    (I77.6) Vasculitis (H)  Comment: So noted and asymptomatic with good distal pulses and no longer on methotrexate or seeing rheumatology.    (E78.00) Pure hypercholesterolemia  Comment: Cholesterol excellent at 116 with an LDL of 46.    Plan  1.  Patient declines further intervention on the right coronary artery and LAD.  2.  Patient declines left atrial appendage occlusion device.  3.  Patient declines PillCam looking for source of GI bleed.  4.  We will obtain operative report and see if she had a left atrial appendage occlusion.    Subjective  CC: 86-year-old white female being seen in follow-up today.  Since I saw her she was hospitalized and had intervention on her circumflex.  In addition following that she had melena with negative EGD and  "colonoscopy.  She still admits to shortness of breath and walking quickly.  There is no chest discomfort, palpitations, PND, orthopnea, syncope, dizziness or peripheral edema.    Medications  Current Outpatient Medications   Medication Sig Dispense Refill     acetaminophen (TYLENOL) 500 MG tablet Take 1,000 mg by mouth every 6 hours as needed for mild pain       atorvastatin (LIPITOR) 40 MG tablet Take 1 tablet (40 mg) by mouth daily 90 tablet 3     cholecalciferol, vitamin D3, (VITAMIN D3) 5,000 unit Tab [CHOLECALCIFEROL, VITAMIN D3, (VITAMIN D3) 5,000 UNIT TAB] Take 1 tablet by mouth daily.       cyanocobalamin 1,000 mcg/mL injection [CYANOCOBALAMIN 1,000 MCG/ML INJECTION] INJECT 1 ML (1,000 MCG TOTAL) INTO THE SHOULDER, THIGH, OR BUTTOCKS EVERY 30 DAYS. **SYR 27G 1/2\" 1CC** 3 mL 3     furosemide (LASIX) 20 MG tablet Take 20 mg by mouth 2 times daily as needed       gabapentin (NEURONTIN) 300 MG capsule TAKE 1 CAPSULE (300 MG TOTAL) BY MOUTH 2 TIMES A DAY. 180 capsule 3     ketoconazole (NIZORAL) 2 % external shampoo Apply topically daily as needed        metoprolol succinate (TOPROL-XL) 25 MG [METOPROLOL SUCCINATE (TOPROL-XL) 25 MG] TAKE A HALF TABLET (12.5MG) BY MOUTH DAILY. 45 tablet 2     nitroGLYcerin (NITROSTAT) 0.4 MG sublingual tablet [NITROGLYCERIN (NITROSTAT) 0.4 MG SL TABLET] DISSOLVE 1 TABLET UNDER THE TONGUE AS NEEDED FOR CHEST PAIN 25 tablet 1     prasugrel (EFFIENT) 10 MG TABS tablet Take 1 tablet (10 mg) by mouth daily Do not crush or break tablet. Dose to start tomorrow. 90 tablet 3     sucralfate (CARAFATE) 1 gram tablet [SUCRALFATE (CARAFATE) 1 GRAM TABLET] TAKE 1 TABLET BY MOUTH FOUR TIMES DAILY 120 tablet 11     warfarin ANTICOAGULANT (COUMADIN) 1 MG tablet Take 1-2 mg by mouth See Admin Instructions 2mg on Tuesday and 1mg the rest of the days of the week         Objective  BP (!) 144/80 (BP Location: Left arm, Patient Position: Sitting, Cuff Size: Adult Regular)   Pulse 80   Resp 16   Ht " "1.575 m (5' 2\")   Wt 54.9 kg (121 lb)   BMI 22.13 kg/m      General Appearance:    Alert, cooperative, no distress, appears stated age   Head:    Normocephalic, without obvious abnormality, atraumatic   Throat:   Lips, mucosa, and tongue normal; teeth and gums normal   Neck:   Supple, symmetrical, trachea midline, no adenopathy;        thyroid:  No enlargement/tenderness/nodules; no carotid    bruit or JVD   Back:     Symmetric, no curvature, ROM normal, no CVA tenderness   Lungs:     Clear to auscultation bilaterally, respirations unlabored   Chest wall:    No tenderness, midline sternotomy scar and left-sided pacemaker   Heart:    Regular rate and rhythm, S1 and S2 normal, no murmur, rub   or gallop   Abdomen:     Soft, non-tender, bowel sounds active all four quadrants,     no masses, no organomegaly   Extremities:   Normal, atraumatic, no cyanosis or edema   Pulses:   2+ and symmetric all extremities   Skin:   Skin color, texture, turgor normal, no rashes or lesions     Results    Lab Results personally reviewed   Lab Results   Component Value Date    CHOL 116 11/09/2021    CHOL 142 11/14/2019     Lab Results   Component Value Date    HDL 53 11/09/2021    HDL 69 11/14/2019     No components found for: LDLCALC  Lab Results   Component Value Date    TRIG 84 11/09/2021    TRIG 96 11/14/2019     Lab Results   Component Value Date    WBC 7.6 11/09/2021    HGB 10.1 (L) 11/09/2021    HCT 32.2 (L) 11/09/2021     11/09/2021     Lab Results   Component Value Date    BUN 12 11/09/2021     11/09/2021    CO2 29 11/09/2021         "

## 2021-12-20 NOTE — LETTER
12/20/2021    Yun Jacinto MD  [address] [facility] [address]    RE: Constanza Coles       Dear Colleague,     I had the pleasure of seeing Constanza Coles in the [facility].      [facility]  Heart Care Clinic Follow-up Note    Assessment & Plan        (I25.10,  I25.83) Coronary atherosclerosis due to lipid rich plaque  (primary encounter diagnosis)  Comment: Recent angiography September 2021 showed normal left main, significantly occluded proximal LAD, circumflex with a mid 40 to 50% stenosis, distal obtuse marginal artery 195 to 99% stenosis, right coronary artery with a mid 95% and distal tandem 85% lesions.  She had intervention on the circumflex.    (Z95.820) S/P percutaneous transluminal angioplasty (PTA) with stent placement  Comment: Intervention on circumflex done in September 2021, did not improve her symptoms.  Plan was to consider intervention further on the right coronary artery and LAD, given her recent GI bleed she is very tired hospitalization and physicians, she does not want any procedures going forward.  Given her shortness of breath we did pursue CTA which showed the significant circumflex disease prompting the above angiogram.  Once again symptoms did not improve.    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  She had aortic valve replacement at the same time.  Unsure she had exclusion of the left atrial appendage and will try to find that operative report, she has had no recent [place].  If no exclusion of the appendage would strongly recommend left atrial appendage occlusion device given her recent GI bleed.    (Z95.2) H/O aortic valve replacement  Comment: due to severe aortic stenosis she had a 21 mm Forte pericardial magna valve placed in 2005 and based on echo last year working well with a dimensionless index of 0.7, mean gradient of 6 mmHg and mean velocity of  1.2 m/s.  Given that this tissue valve was placed over 15 years ago we will recheck echo yearly. In addition, I have asked her to take aspirin daily and she declines telling me that she has horrible allergic reactions and stomach upset from this.  We will continue indefinite Effient.    (D62) Anemia due to blood loss, acute  Comment: Presumed GI bleed with melena and hemoglobin down to 8.9.  Unremarkable EGD as well as colonoscopy.  She was supposed to have a PillCam and declined this.    (N18.30) Stage 3 chronic kidney disease, unspecified whether stage 3a or 3b CKD (H)  Comment: This is resolved with a normal creatinine is 0.80.    (Z95.0) Cardiac pacemaker in situ, dual chamber  Comment: Medtronic device with Medtronic leads placed in 2011 with only 5% ventricular pacing and noted to be in atrial fibrillation.  Rate responsiveness was made more aggressive and symptoms have resolved.     (I48.20) Chronic atrial fibrillation (H)  Comment: Chronic/permanent, not valvular, asymptomatic, and on chronic Coumadin with INR adjusted by primary.  INR today 2.1.  Given recent GI bleed I briefly discussed left atrial appendage occlusion device with her, she is not interested right now.  We will see if I can obtain operative report, if this operative report shows exclusion of left atrial appendage will then consider [place].  Otherwise, strongly recommend left atrial appendage occlusion.    (I77.6) Vasculitis (H)  Comment: So noted and asymptomatic with good distal pulses and no longer on methotrexate or seeing rheumatology.    (E78.00) Pure hypercholesterolemia  Comment: Cholesterol excellent at 116 with an LDL of 46.    Plan  1.  Patient declines further intervention on the right coronary artery and LAD.  2.  Patient declines left atrial appendage occlusion device.  3.  Patient declines PillCam looking for source of GI bleed.  4.  We will obtain operative report and see if she had a left atrial appendage  "occlusion.    Subjective  CC: 86-year-old white female being seen in follow-up today.  Since I saw her she was hospitalized and had intervention on her circumflex.  In addition following that she had melena with negative EGD and colonoscopy.  She still admits to shortness of breath and walking quickly.  There is no chest discomfort, palpitations, PND, orthopnea, syncope, dizziness or peripheral edema.    Medications  Current Outpatient Medications   Medication Sig Dispense Refill     acetaminophen (TYLENOL) 500 MG tablet Take 1,000 mg by mouth every 6 hours as needed for mild pain       atorvastatin (LIPITOR) 40 MG tablet Take 1 tablet (40 mg) by mouth daily 90 tablet 3     cholecalciferol, vitamin D3, (VITAMIN D3) 5,000 unit Tab [CHOLECALCIFEROL, VITAMIN D3, (VITAMIN D3) 5,000 UNIT TAB] Take 1 tablet by mouth daily.       cyanocobalamin 1,000 mcg/mL injection [CYANOCOBALAMIN 1,000 MCG/ML INJECTION] INJECT 1 ML (1,000 MCG TOTAL) INTO THE SHOULDER, THIGH, OR BUTTOCKS EVERY 30 DAYS. **SYR 27G 1/2\" 1CC** 3 mL 3     furosemide (LASIX) 20 MG tablet Take 20 mg by mouth 2 times daily as needed       gabapentin (NEURONTIN) 300 MG capsule TAKE 1 CAPSULE (300 MG TOTAL) BY MOUTH 2 TIMES A DAY. 180 capsule 3     ketoconazole (NIZORAL) 2 % external shampoo Apply topically daily as needed        metoprolol succinate (TOPROL-XL) 25 MG [METOPROLOL SUCCINATE (TOPROL-XL) 25 MG] TAKE A HALF TABLET (12.5MG) BY MOUTH DAILY. 45 tablet 2     nitroGLYcerin (NITROSTAT) 0.4 MG sublingual tablet [NITROGLYCERIN (NITROSTAT) 0.4 MG SL TABLET] DISSOLVE 1 TABLET UNDER THE TONGUE AS NEEDED FOR CHEST PAIN 25 tablet 1     prasugrel (EFFIENT) 10 MG TABS tablet Take 1 tablet (10 mg) by mouth daily Do not crush or break tablet. Dose to start tomorrow. 90 tablet 3     sucralfate (CARAFATE) 1 gram tablet [SUCRALFATE (CARAFATE) 1 GRAM TABLET] TAKE 1 TABLET BY MOUTH FOUR TIMES DAILY 120 tablet 11     warfarin ANTICOAGULANT (COUMADIN) 1 MG tablet Take 1-2 mg " "by mouth See Admin Instructions 2mg on Tuesday and 1mg the rest of the days of the week         Objective  BP (!) 144/80 (BP Location: Left arm, Patient Position: Sitting, Cuff Size: Adult Regular)   Pulse 80   Resp 16   Ht 1.575 m (5' 2\")   Wt 54.9 kg (121 lb)   BMI 22.13 kg/m      General Appearance:    Alert, cooperative, no distress, appears stated age   Head:    Normocephalic, without obvious abnormality, atraumatic   Throat:   Lips, mucosa, and tongue normal; teeth and gums normal   Neck:   Supple, symmetrical, trachea midline, no adenopathy;        thyroid:  No enlargement/tenderness/nodules; no carotid    bruit or JVD   Back:     Symmetric, no curvature, ROM normal, no CVA tenderness   Lungs:     Clear to auscultation bilaterally, respirations unlabored   Chest wall:    No tenderness, midline sternotomy scar and left-sided pacemaker   Heart:    Regular rate and rhythm, S1 and S2 normal, no murmur, rub   or gallop   Abdomen:     Soft, non-tender, bowel sounds active all four quadrants,     no masses, no organomegaly   Extremities:   Normal, atraumatic, no cyanosis or edema   Pulses:   2+ and symmetric all extremities   Skin:   Skin color, texture, turgor normal, no rashes or lesions     Results    Lab Results personally reviewed   Lab Results   Component Value Date    CHOL 116 11/09/2021    CHOL 142 11/14/2019     Lab Results   Component Value Date    HDL 53 11/09/2021    HDL 69 11/14/2019     No components found for: LDLCALC  Lab Results   Component Value Date    TRIG 84 11/09/2021    TRIG 96 11/14/2019     Lab Results   Component Value Date    WBC 7.6 11/09/2021    HGB 10.1 (L) 11/09/2021    HCT 32.2 (L) 11/09/2021     11/09/2021     Lab Results   Component Value Date    BUN 12 11/09/2021     11/09/2021    CO2 29 11/09/2021             Thank you for allowing me to participate in the care of your patient.      Sincerely,     RY FRANCE MD     [facility] " [facility]  cc:   Jalil Bhat MD  [address] [place] [address]

## 2021-12-20 NOTE — PROGRESS NOTES
"ANTICOAGULATION MANAGEMENT     Constanza Coles 86 year old female is on warfarin with therapeutic INR result. (Goal INR 2.0-3.0)    Recent labs: (last 7 days)     21  1037   INR 2.1*       ASSESSMENT     Source(s): Chart Review, Patient/Caregiver Call and Template       Warfarin doses taken: Warfarin taken as instructed    Diet: {Diet changes:578756::\"No new diet changes identified\"}    New illness, injury, or hospitalization: {No/Yes:962973::\"No\"}    Medication/supplement changes: {Medication changes/interactions:962784::\"None noted\"}    Signs or symptoms of bleeding or clotting: {No/Yes:697069::\"No\"}    Previous INR: Subtherapeutic at 1.7 on 21.    Additional findings: {additional findings:027709::\"None\"}     PLAN     Recommended plan for {accassessment:716024::\"no diet, medication or health factor changes\"} affecting INR     Dosing Instructions:   (mornings. 1mg tabs)    Continue your current warfarin dose with next INR in 2-3 weeks       Summary  As of 2021    Next INR check:               {Contact type and understandin}    {accappointmentoffer:561538}    Education provided: {ACCeducation:542864}    Plan made {Anticoag protocol:067080::\"per [facility] anticoagulation protocol\"}    Erika Simental RN  Anticoagulation Clinic  2021    _______________________________________________________________________     Anticoagulation Episode Summary     Current INR goal:  2.0-3.0   TTR:  70.2 % (1 y)   Target end date:     Send INR reminders to:  [facility]    Indications    Atrial fibrillation (H) [I48.91]  H/O aortic valve replacement [Z95.2]           Comments:           Anticoagulation Care Providers     Provider Role Specialty Phone number    Hayder Bailey MD Referring Internal Medicine [phone]    Yun Jacinto MD Responsible Internal Medicine [phone]          "

## 2021-12-20 NOTE — PROGRESS NOTES
Anticoagulation Management    Unable to reach Constanza today.    Today's INR result of 2.1 is therapeutic (goal INR of 2.0-3.0).  Result received from: Clinic Lab    Follow up required to assess for changes     Left message to continue current dose of warfarin 1 mg tonight.      Anticoagulation clinic to follow up on 12/21.    Erika Simental RN

## 2021-12-22 ENCOUNTER — TELEPHONE (OUTPATIENT)
Dept: CARDIOLOGY | Facility: CLINIC | Age: 86
End: 2021-12-22
Payer: COMMERCIAL

## 2021-12-22 NOTE — TELEPHONE ENCOUNTER
Christin Good, Edel ABAD RN  Caller: Unspecified (Today,  9:38 AM)  Scanned and routed to        Noted. [place]

## 2021-12-22 NOTE — PROGRESS NOTES
ANTICOAGULATION MANAGEMENT     Constanza Doansupriya 86 year old female is on warfarin with therapeutic INR result. (Goal INR 2.0-3.0)    Recent labs: (last 7 days)     12/20/21  1037   INR 2.1*       ASSESSMENT     Source(s): Chart Review, Patient/Caregiver Call and Template       Warfarin doses taken: Warfarin taken as instructed    Diet: No new diet changes identified    New illness, injury, or hospitalization: No    Medication/supplement changes: None noted    Signs or symptoms of bleeding or clotting: No    Previous INR: Subtherapeutic at 1.7 on 12/7/21.    Additional findings:  Reported she had f/u visit with cardiologist - Dr. Antoine on 12/20/21.     PLAN     Recommended plan for no diet, medication or health factor changes affecting INR     Dosing Instructions: Continue your current warfarin dose with next INR in 2-3 weeks       Summary  As of 12/20/2021    Full warfarin instructions:  2 mg every Tue; 1 mg all other days   Next INR check:  1/10/2022             Telephone call with  Constanza ([phone]) who verbalizes understanding and agrees to plan    Lab visit scheduled - INR on 1/17/22 @ [facility].   (not able to come in 2-3 wks d/t several appts).    Education provided: Importance of consistent vitamin K intake, Goal range and significance of current result, Importance of notifying clinic for changes in medications; a sooner lab recheck maybe needed. and Importance of notifying clinic for diarrhea, nausea/vomiting, reduced intake, and/or illness; a sooner lab recheck maybe needed.    Plan made per [facility] anticoagulation protocol    Erika Simental RN  Anticoagulation Clinic  12/22/2021    _______________________________________________________________________     Anticoagulation Episode Summary     Current INR goal:  2.0-3.0   TTR:  70.6 % (1 y)   Target end date:     Send INR reminders to:  [facility]    Indications    Atrial fibrillation (H) [I48.91]  H/O aortic valve replacement [Z95.2]            Comments:           Anticoagulation Care Providers     Provider Role Specialty Phone number    Hayder Bailey MD Referring Internal Medicine [phone]    Yun Jacinto MD Responsible Internal Medicine [phone]

## 2021-12-22 NOTE — TELEPHONE ENCOUNTER
----- Message from Mathew Antoine MD sent at 12/20/2021 11:28 AM CST -----  Do think you can have the medical records folks try to find this lady's operative report from January 2005?  I am looking to see if she had exclusion of the left atrial appendage at that time.LF        Noted. Routed to medical records team. -[facility] [place]         Candido Johnson,  Any chance we can come up with this operative report from CABG from 2005. Looks like it was done at .   Thanks,  Mal

## 2021-12-22 NOTE — TELEPHONE ENCOUNTER
Message  Received: Today  Mathew Antoine MD Caswell, Mallory J RN  Caller: Unspecified (Today,  9:38 AM)  Thank you, able to review operative report, can we make sure it is part of her current medical record?  In short however and if we can document this in notes, did not have exclusion of left atrial appendage during valve replacement and bypass, will thus need to consider left atrial appendage occlusion when she consents given her GI bleed.  LF                   Copied into encounter. Pt has NOT had left atrial appendage excised. -[facility]

## 2022-01-18 VITALS
BODY MASS INDEX: 21.79 KG/M2 | HEIGHT: 63 IN | OXYGEN SATURATION: 98 % | HEART RATE: 89 BPM | WEIGHT: 123 LBS | DIASTOLIC BLOOD PRESSURE: 70 MMHG | SYSTOLIC BLOOD PRESSURE: 110 MMHG

## 2022-01-18 VITALS
BODY MASS INDEX: 22.14 KG/M2 | DIASTOLIC BLOOD PRESSURE: 70 MMHG | DIASTOLIC BLOOD PRESSURE: 80 MMHG | RESPIRATION RATE: 16 BRPM | BODY MASS INDEX: 22.14 KG/M2 | HEART RATE: 74 BPM | WEIGHT: 125 LBS | SYSTOLIC BLOOD PRESSURE: 140 MMHG | OXYGEN SATURATION: 95 % | HEART RATE: 74 BPM | SYSTOLIC BLOOD PRESSURE: 110 MMHG | OXYGEN SATURATION: 95 % | WEIGHT: 125 LBS | RESPIRATION RATE: 16 BRPM

## 2022-01-18 VITALS
BODY MASS INDEX: 21.62 KG/M2 | SYSTOLIC BLOOD PRESSURE: 132 MMHG | OXYGEN SATURATION: 96 % | DIASTOLIC BLOOD PRESSURE: 82 MMHG | HEART RATE: 96 BPM | WEIGHT: 122 LBS | HEIGHT: 63 IN

## 2022-01-18 VITALS — RESPIRATION RATE: 16 BRPM | HEIGHT: 63 IN | BODY MASS INDEX: 21.79 KG/M2 | WEIGHT: 123 LBS | HEART RATE: 72 BPM

## 2022-01-18 VITALS
TEMPERATURE: 97.6 F | DIASTOLIC BLOOD PRESSURE: 78 MMHG | HEIGHT: 63 IN | SYSTOLIC BLOOD PRESSURE: 136 MMHG | BODY MASS INDEX: 21.97 KG/M2 | OXYGEN SATURATION: 97 % | HEART RATE: 85 BPM | WEIGHT: 124 LBS

## 2022-01-18 VITALS
WEIGHT: 128.25 LBS | OXYGEN SATURATION: 96 % | SYSTOLIC BLOOD PRESSURE: 136 MMHG | HEART RATE: 76 BPM | BODY MASS INDEX: 22.72 KG/M2 | DIASTOLIC BLOOD PRESSURE: 70 MMHG

## 2022-01-18 VITALS
BODY MASS INDEX: 21.97 KG/M2 | HEART RATE: 78 BPM | RESPIRATION RATE: 16 BRPM | SYSTOLIC BLOOD PRESSURE: 160 MMHG | OXYGEN SATURATION: 98 % | WEIGHT: 124 LBS | DIASTOLIC BLOOD PRESSURE: 50 MMHG

## 2022-01-18 VITALS
RESPIRATION RATE: 16 BRPM | HEART RATE: 76 BPM | SYSTOLIC BLOOD PRESSURE: 110 MMHG | WEIGHT: 124.6 LBS | DIASTOLIC BLOOD PRESSURE: 72 MMHG | BODY MASS INDEX: 22.07 KG/M2

## 2022-01-18 VITALS
RESPIRATION RATE: 16 BRPM | SYSTOLIC BLOOD PRESSURE: 156 MMHG | HEART RATE: 68 BPM | DIASTOLIC BLOOD PRESSURE: 70 MMHG | BODY MASS INDEX: 21.63 KG/M2 | WEIGHT: 122.1 LBS | HEIGHT: 63 IN

## 2022-01-20 ENCOUNTER — LAB (OUTPATIENT)
Dept: FAMILY MEDICINE | Facility: CLINIC | Age: 87
End: 2022-01-20
Payer: COMMERCIAL

## 2022-01-20 DIAGNOSIS — Z20.822 SUSPECTED COVID-19 VIRUS INFECTION: ICD-10-CM

## 2022-01-20 PROCEDURE — U0003 INFECTIOUS AGENT DETECTION BY NUCLEIC ACID (DNA OR RNA); SEVERE ACUTE RESPIRATORY SYNDROME CORONAVIRUS 2 (SARS-COV-2) (CORONAVIRUS DISEASE [COVID-19]), AMPLIFIED PROBE TECHNIQUE, MAKING USE OF HIGH THROUGHPUT TECHNOLOGIES AS DESCRIBED BY CMS-2020-01-R: HCPCS

## 2022-01-20 PROCEDURE — U0005 INFEC AGEN DETEC AMPLI PROBE: HCPCS

## 2022-01-21 LAB — SARS-COV-2 RNA RESP QL NAA+PROBE: NEGATIVE

## 2022-01-24 ENCOUNTER — TELEPHONE (OUTPATIENT)
Dept: NURSING | Facility: CLINIC | Age: 87
End: 2022-01-24
Payer: COMMERCIAL

## 2022-01-24 ENCOUNTER — NURSE TRIAGE (OUTPATIENT)
Dept: NURSING | Facility: CLINIC | Age: 87
End: 2022-01-24
Payer: COMMERCIAL

## 2022-01-24 NOTE — TELEPHONE ENCOUNTER
Called multiple times . Home number anu psgeting disconnected. After 'hello' . Even th cell number gets disconnected .

## 2022-01-24 NOTE — TELEPHONE ENCOUNTER
She has called one week ago and wanted antibiotic refills but no one granted that. They replied saying she needs to be seen. She has history of lung problems and heart problems. She usually gets antibiotics and they wanted her to be seen but she said she can't get an appointment. Needs the antibiotics when her lungs fill up it's like pneumonia she has problems breathing. No problems getting them in the past. She usually sees Dr Jacinto. She's explained that she has them on hand incase she needs them and she needs them now. She wants a call back at:   [phone]. Pharmacy: [facility] on [address] in [facility]: [phone] is the pharmacy number. Please call her today.  Thank you,  Brittany Dejesus RN  [facility]       Reason for Disposition    Request for URGENT new prescription or refill of 'essential' medication (i.e., likelihood of harm to patient if not taken) and triager unable to fill per department policy    Additional Information    Negative: Drug overdose and triager unable to answer question    Negative: Caller requesting information unrelated to medicine    Negative: Caller requesting a prescription for Strep throat and has a positive culture result    Negative: Rash while taking a medication or within 3 days of stopping it    Negative: Immunization reaction suspected    Negative: Asthma and having symptoms of asthma (cough, wheezing, etc.)    Negative: Breastfeeding questions about mother's medicines and diet    Negative: MORE THAN A DOUBLE DOSE of a prescription or over-the-counter (OTC) drug    Negative: DOUBLE DOSE (an extra dose or lesser amount) of over-the-counter (OTC) drug and any symptoms (e.g., dizziness, nausea, pain, sleepiness)    Negative: DOUBLE DOSE (an extra dose or lesser amount) of prescription drug and any symptoms (e.g., dizziness, nausea, pain, sleepiness)    Negative: Took another person's prescription drug    Negative: DOUBLE DOSE (an extra dose or lesser  amount) of prescription drug and NO symptoms (Exception: a double dose of antibiotics)    Negative: Diabetes drug error or overdose (e.g., took wrong type of insulin or took extra dose)    Negative: Caller has medication question about med not prescribed by PCP and triager unable to answer question (e.g., compatibility with other med, storage)    Protocols used: MEDICATION QUESTION CALL-A-OH

## 2022-01-24 NOTE — TELEPHONE ENCOUNTER
Coronavirus (COVID-19) Notification     Reason for call  Patient requesting results     Lab Result    Lab test 2019-nCoV rRt-PCR in process        RN Recommendations/Instructions per [facility]  Continue to quarantine and follow the instructions given at your testing visit until you receive the results.     Please Contact your PCP clinic or return to the Emergency department if your:    Symptoms worsen or other concerning symptom's.     Patient informed that if test for COVID19 is POSITIVE,  you will receive a call typically within 48 hours from the test date (date lab collected).  If NEGATIVE result, you will receive a letter in the mail or MyChart.      Derrell Johnson RN

## 2022-01-25 ENCOUNTER — TELEPHONE (OUTPATIENT)
Dept: INTERNAL MEDICINE | Facility: CLINIC | Age: 87
End: 2022-01-25

## 2022-01-25 ENCOUNTER — LAB (OUTPATIENT)
Dept: LAB | Facility: CLINIC | Age: 87
End: 2022-01-25

## 2022-01-25 ENCOUNTER — TELEPHONE (OUTPATIENT)
Dept: CARDIOLOGY | Facility: CLINIC | Age: 87
End: 2022-01-25

## 2022-01-25 ENCOUNTER — OFFICE VISIT (OUTPATIENT)
Dept: INTERNAL MEDICINE | Facility: CLINIC | Age: 87
End: 2022-01-25
Payer: COMMERCIAL

## 2022-01-25 ENCOUNTER — TELEPHONE (OUTPATIENT)
Dept: ANTICOAGULATION | Facility: CLINIC | Age: 87
End: 2022-01-25

## 2022-01-25 VITALS
OXYGEN SATURATION: 100 % | SYSTOLIC BLOOD PRESSURE: 134 MMHG | DIASTOLIC BLOOD PRESSURE: 72 MMHG | HEIGHT: 62 IN | HEART RATE: 76 BPM | BODY MASS INDEX: 22.03 KG/M2 | WEIGHT: 119.7 LBS

## 2022-01-25 DIAGNOSIS — I50.9 CONGESTIVE HEART FAILURE, UNSPECIFIED HF CHRONICITY, UNSPECIFIED HEART FAILURE TYPE (H): ICD-10-CM

## 2022-01-25 DIAGNOSIS — D62 ANEMIA DUE TO BLOOD LOSS, ACUTE: ICD-10-CM

## 2022-01-25 DIAGNOSIS — Z95.2 H/O AORTIC VALVE REPLACEMENT: ICD-10-CM

## 2022-01-25 DIAGNOSIS — K92.2 GASTROINTESTINAL HEMORRHAGE, UNSPECIFIED GASTROINTESTINAL HEMORRHAGE TYPE: Primary | ICD-10-CM

## 2022-01-25 DIAGNOSIS — I48.20 CHRONIC ATRIAL FIBRILLATION (H): ICD-10-CM

## 2022-01-25 DIAGNOSIS — I48.91 ATRIAL FIBRILLATION (H): Primary | ICD-10-CM

## 2022-01-25 DIAGNOSIS — N18.30 STAGE 3 CHRONIC KIDNEY DISEASE, UNSPECIFIED WHETHER STAGE 3A OR 3B CKD (H): ICD-10-CM

## 2022-01-25 DIAGNOSIS — J69.0 ASPIRATION PNEUMONIA DUE TO GASTRIC SECRETIONS, UNSPECIFIED LATERALITY, UNSPECIFIED PART OF LUNG (H): Primary | ICD-10-CM

## 2022-01-25 DIAGNOSIS — J43.9 PULMONARY EMPHYSEMA, UNSPECIFIED EMPHYSEMA TYPE (H): ICD-10-CM

## 2022-01-25 PROBLEM — J44.9 COPD (CHRONIC OBSTRUCTIVE PULMONARY DISEASE) (H): Status: ACTIVE | Noted: 2022-01-25

## 2022-01-25 LAB
ERYTHROCYTE [DISTWIDTH] IN BLOOD BY AUTOMATED COUNT: 15.7 % (ref 10–15)
HCT VFR BLD AUTO: 25.6 % (ref 35–47)
HGB BLD-MCNC: 7.3 G/DL (ref 11.7–15.7)
INR BLD: 3.5 (ref 0.9–1.1)
MCH RBC QN AUTO: 23.1 PG (ref 26.5–33)
MCHC RBC AUTO-ENTMCNC: 28.5 G/DL (ref 31.5–36.5)
MCV RBC AUTO: 81 FL (ref 78–100)
PLATELET # BLD AUTO: 237 10E3/UL (ref 150–450)
RBC # BLD AUTO: 3.16 10E6/UL (ref 3.8–5.2)
WBC # BLD AUTO: 8.3 10E3/UL (ref 4–11)

## 2022-01-25 PROCEDURE — 36416 COLLJ CAPILLARY BLOOD SPEC: CPT

## 2022-01-25 PROCEDURE — 85027 COMPLETE CBC AUTOMATED: CPT | Performed by: INTERNAL MEDICINE

## 2022-01-25 PROCEDURE — 85610 PROTHROMBIN TIME: CPT

## 2022-01-25 PROCEDURE — 99214 OFFICE O/P EST MOD 30 MIN: CPT | Performed by: INTERNAL MEDICINE

## 2022-01-25 PROCEDURE — 36415 COLL VENOUS BLD VENIPUNCTURE: CPT | Performed by: INTERNAL MEDICINE

## 2022-01-25 RX ORDER — ALBUTEROL SULFATE 90 UG/1
2 AEROSOL, METERED RESPIRATORY (INHALATION) EVERY 6 HOURS
Qty: 18 G | Refills: 1 | Status: SHIPPED | OUTPATIENT
Start: 2022-01-25 | End: 2022-03-31

## 2022-01-25 RX ORDER — LEVOFLOXACIN 250 MG/1
250 TABLET, FILM COATED ORAL DAILY
Qty: 10 TABLET | Refills: 3 | Status: SHIPPED | OUTPATIENT
Start: 2022-01-25 | End: 2022-03-04

## 2022-01-25 ASSESSMENT — MIFFLIN-ST. JEOR: SCORE: 946.21

## 2022-01-25 NOTE — PROGRESS NOTES
Assessment & Plan     Aspiration pneumonia due to gastric secretions, unspecified laterality, unspecified part of lung (H)  I am not really clear if she truly has aspiration but she definitely has COPD exacerbation.  She declines inhalers or prednisone.  She has good oxygenation at rest.  She is speaking in full sentences.  And she is not more dyspneic than her normal baseline.  But the bilateral wheeze is quite significant.  We will give her the levofloxacin she is insistent that works wonders.  I did send her an albuterol inhaler I did feel that if she needed and if she was having acute shortness of breath she should use it.  - levofloxacin (LEVAQUIN) 250 MG tablet  Dispense: 10 tablet; Refill: 3  - albuterol (PROAIR HFA/PROVENTIL HFA/VENTOLIN HFA) 108 (90 Base) MCG/ACT inhaler  Dispense: 18 g; Refill: 1  - CBC with platelets  - CBC with platelets    Chronic atrial fibrillation (H)  She is high risk for stroke so is on Coumadin.  Today's INR was 3.5.  She will need an adjustment in her dose.  But she cannot be stopped due to stroke risk.  I did send a message to Dr. Jacobo he will we will discuss left atrial appendage occlusion with her.    Anemia due to blood loss, acute  She was found to be chronically anemic but her anemia dropped to 7 with melena.  Improved to 10 with after transfusion.  She was hospitalized had a normal endoscopy colonoscopy.  Was referred for pill camera which she declined  She strongly did denies melena now.  She says she does not feel weak or faint.  She has been eating and drinking normally.  Her hemoglobin today which she initially did not want to get checked was 7.3.  She did call her with the results she is okay to get a blood transfusion she does not want to go to the hospital.  I did refer her back to GI as well.  I do believe that the culprit is Coumadin and she may have a slow GI bleed happening is not picked up by endoscopy colonoscopy.  Stage 3 chronic kidney disease,  unspecified whether stage 3a or 3b CKD (H)      Pulmonary emphysema, unspecified emphysema type (H)      Congestive heart failure, unspecified HF chronicity, unspecified heart failure type (H)                     No follow-ups on file.    Yun Jacinto MD  [facility]    Lolis Apodaca is a 86 year old who presents for the following health issues she has a remote history of partial gastrectomy due to gastric ulcers.  Ever since then she has had intermittent episodes of severe bile acid reflux.  She said  to give her levofloxacin when she had aspiration.  And developed a pneumonia.  She has calf and feels her lungs are all..  She is mildly short of breath.  But not different from her normal.  She denies fever any productive phlegm any hemoptysis or chest pain.  She has had several episodes of levofloxacin about 3 times a year.  She says that works wonders.  She also has a history of a GI bleed declined pill camera on Coumadin for atrial fibrillation.  Declined GI referral  HPI     Patient Active Problem List   Diagnosis     Coronary Artery Disease     Sick Sinus Syndrome     H/O aortic valve replacement     Atrial fibrillation (H)     Vitamin B 12 deficiency     Cardiac pacemaker in situ, dual chamber     S/P CABG (coronary artery bypass graft)     Pure hypercholesterolemia     Chronic kidney disease, stage 3 (H)     Percutaneous transluminal coronary angioplasty status     Gastrointestinal hemorrhage, unspecified gastrointestinal hemorrhage type     Melena     Anemia due to blood loss, acute     Tension headache     Anticoagulated     S/P percutaneous transluminal angioplasty (PTA) with stent placement     COPD (chronic obstructive pulmonary disease) (H)     CHF (congestive heart failure) (H)     Current Outpatient Medications   Medication     acetaminophen (TYLENOL) 500 MG tablet     albuterol (PROAIR HFA/PROVENTIL HFA/VENTOLIN HFA) 108 (90 Base) MCG/ACT inhaler      "atorvastatin (LIPITOR) 40 MG tablet     cholecalciferol, vitamin D3, (VITAMIN D3) 5,000 unit Tab     cyanocobalamin 1,000 mcg/mL injection     furosemide (LASIX) 20 MG tablet     gabapentin (NEURONTIN) 300 MG capsule     ketoconazole (NIZORAL) 2 % external shampoo     levofloxacin (LEVAQUIN) 250 MG tablet     metoprolol succinate (TOPROL-XL) 25 MG     nitroGLYcerin (NITROSTAT) 0.4 MG sublingual tablet     prasugrel (EFFIENT) 10 MG TABS tablet     sucralfate (CARAFATE) 1 gram tablet     warfarin ANTICOAGULANT (COUMADIN) 1 MG tablet     No current facility-administered medications for this visit.           Review of Systems   Constitutional, HEENT, cardiovascular, pulmonary, gi and gu systems are negative, except as otherwise noted.      Objective    /72 (BP Location: Left arm, Patient Position: Sitting, Cuff Size: Adult Regular)   Pulse 76   Ht 1.575 m (5' 2\")   Wt 54.3 kg (119 lb 11.2 oz)   SpO2 100%   BMI 21.89 kg/m    Body mass index is 21.89 kg/m .  Physical Exam   GENERAL: healthy, alert and no distress  NECK: no adenopathy, no asymmetry, masses, or scars and thyroid normal to palpation  RESP: lungs bilateral coarse rhonchi with expiratory wheeze.  Good air entry to bases.  CV: regular rate and rhythm, normal S1 S2, no S3 or S4, no murmur, click or rub, no peripheral edema and peripheral pulses strong       "

## 2022-01-25 NOTE — TELEPHONE ENCOUNTER
Pt is calling back and she has been having issues with her phone.     She does have a lab only appt today at 1130 and she is asking if she can be added on today for appt.    She will ask when she arrives at clinic today.

## 2022-01-25 NOTE — TELEPHONE ENCOUNTER
Ophelia Rey Dr     - re: Constanza Coles - her INR was supra at 3.5    - I noticed todays Hgb was also low at 7.3, compared to last Hgb of 10.1 on 11/9/21.     - at todays OV with you, did she have any abnormal or active bleeding?    (she wrote NO to all the answer on her template prior to INR check today).    - PLAN: will definite HOLD warfarin dose tonight, however, she usually takes warfarin in the MORNING. so she might have taken her 2mg warfarin dose already.     - I will hold warfarin doses for 2 days on 1/26-27.  Return this Friday, 1/28/22 for INR check due to known interaction with warfarin and Levofloxacin.    - any thoughts on the low Hgb?

## 2022-01-25 NOTE — TELEPHONE ENCOUNTER
ANTICOAGULATION  MANAGEMENT     Interacting Medication Review    Interacting medication(s): Levofloxacin 250mg daily (Levaquin) with warfarin.    Duration: 10 days  (1/25 to 2/4/22)    Indication: Pneumonia, aspiration.    New medication?: Yes, interaction may increase INR and risk of bleeding       PLAN     Continue current warfarin dose. Recommend to check INR on 4th day of ABX    Spoke with Constanza    Anticoagulation Calendar updated    Erika Simental RN

## 2022-01-25 NOTE — TELEPHONE ENCOUNTER
----- Message from Madiha Ascencio RN sent at 1/25/2022  1:54 PM CST -----  Regarding: LBF referral  LBF pt agreeable to [facility] - please follow-up with patient - Thanks  mg

## 2022-01-25 NOTE — TELEPHONE ENCOUNTER
----- Message from Mathew Antoine MD sent at 1/25/2022  1:49 PM CST -----  Looks like agreeable to Watchman, can we notify the appropriate people and proceed?LF  ----- Message -----  From: Yun Jacinto MD  Sent: 1/25/2022   1:38 PM CST  To: Mathew Antoine MD    Thank-you . She is agreeable to get the left atrial appendage occlusion done . We will just adjust her coumadin as she is hemodynamically stable and has no symptoms . Will transfuse blood as an outpatient thanks again   ----- Message -----  From: Mathew Antoine MD  Sent: 1/25/2022   1:25 PM CST  To: Edel García RN, Yun Jacinto MD    I appreciate your message.I have reviewed her cardiothoracic surgery report, she did not have exclusion of the left atrial appendage at that time.Given her permanent atrial fibrillation she does have increased risk of a CVA.I discussed with a left atrial appendage occlusion device, I strongly recommend that we pursue that again given the bleeding.Feel free to push it from year-end, I will have our office contact her as well to see if we can advance this.  Edel, please try to get her in to the left atrial appendage clinic.LF  ----- Message -----  From: Yun Jacinto MD  Sent: 1/25/2022   1:06 PM CST  To: Mathew Antoine MD    Hi Dr Antoine , her repeat hemoglobin is dropped down again to 7.3.  It was hard to convince her to even get a repeat check.  She strongly denies melena.  As you know she declined going back to GI.  Today's INR is 3.5.  My question is that should be hold the Coumadin indefinitely and consider her for a watchman procedure or has she already had that addressed? Thanks

## 2022-01-25 NOTE — TELEPHONE ENCOUNTER
ANTICOAGULATION MANAGEMENT     Constanza Coles 86 year old female is on warfarin with supra therapeutic INR result. (Goal INR 2.0 - 3.0 )    Recent labs: (last 7 days)     01/25/22  1133   INR 3.5*       ASSESSMENT     Source(s): Chart Review, Patient/Caregiver Call and Template       Warfarin doses taken: Warfarin taken as instructed    Diet: No new diet changes identified    New illness, injury, or hospitalization: Yes:   Will schedule blood transfusion as outpatient   Referral to [facility] to schedule BETH closure / watchman device and evaluate if she can be a candidate.  They will call patient to schedule.   OV today with Dr. Jacinto - wheezing d/t aspiration pneumonia.   Hx of Billroth I gastroduodenostomy     Medication/supplement changes:  Yes,    Levofloxacin  250mg daily for 10 days, 1/25 - 2/4.    Signs or symptoms of bleeding or clotting: No    Previous INR: Therapeutic last visit at 2.1; previously outside of goal range at 1.7    Additional findings: Hgb abnormally low today at 7.3 g/dl.  Previous Hgb on 11/9/21 was 10.1 g/dl   - On 10/30/21 - symptoms of melena and s/p EGD and colonoscopy was performed during admission, reflux esophagitis / Diverticolosis, otherwise no obvious source of bleeding was found     PLAN     Recommended plan for temporary change(s) affecting INR     Dosing Instructions:  (mornings.  2mg tabs)   - Reported already has taken 2mg warfarin dose this morning.   - Hold 2 days doses on 1/26-27   - then continue your current warfarin dose with next INR in 4 days       Summary  As of 1/25/2022    Full warfarin instructions:  1/25: Hold; Otherwise 2 mg every Tue; 1 mg all other days   Next INR check:  1/28/2022             Telephone call with  Constanza ([phone]) who verbalizes understanding and agrees to plan    Patient elected to schedule next visit when she hears when she will be scheduled for blood transfusion    Education provided: Goal range and significance of current result,  Potential interaction between warfarin and Levofloxacin, Monitoring for bleeding signs and symptoms and When to seek medical attention/emergency care    Plan made per [facility] anticoagulation protocol    Erika Simental RN  Anticoagulation Clinic  1/25/2022    _______________________________________________________________________     Anticoagulation Episode Summary     Current INR goal:  2.0-3.0   TTR:  76.5 % (1 y)   Target end date:     Send INR reminders to:  [facility]    Indications    Atrial fibrillation (H) [I48.91]  H/O aortic valve replacement [Z95.2]           Comments:           Anticoagulation Care Providers     Provider Role Specialty Phone number    Hayder Bailey MD Referring Internal Medicine [phone]    Yun Jacinto MD Responsible Internal Medicine [phone]

## 2022-01-25 NOTE — TELEPHONE ENCOUNTER
Yun Jacinto MD  [facility]  [facility] . INR was 3.5  please call pt on her cell as home phone may not work and adjst . She still needs to have an INR of 2 but is probably having a slow GI bleed as her hemoglobin dropped . She will get a blood transfusion outpatient and be referred to left atrial appendage clinic

## 2022-01-26 ENCOUNTER — TELEPHONE (OUTPATIENT)
Dept: INTERNAL MEDICINE | Facility: CLINIC | Age: 87
End: 2022-01-26
Payer: COMMERCIAL

## 2022-01-26 NOTE — TELEPHONE ENCOUNTER
Called and spoke with Constanza.     - [facility] - on Thursday, 1/27/22 for blood transfusion.    (patient called to set up her own appt).    Dr. Jacinto,     - I spoke with Constanza, she wants to ensure there is an order for her blood transfusion in place when she goes in for her appt @ [facility].

## 2022-01-26 NOTE — TELEPHONE ENCOUNTER
Reason for Call:  Other call back    Detailed comments: [facility] infusion calling Stating pt has appt for blood infusion for tomorrow at 930 and needing to have blood consent form.  Needs to be faxed back.   It can be a verbal consent with patient on the phone but needs to be signed by Dr Jacinto    I had them fax to [facility] correct form    [phone]      Call taken on 1/26/2022 at 2:05 PM by Pam J. Behr

## 2022-01-26 NOTE — TELEPHONE ENCOUNTER
Can you check on the status of the blood transfusion ? I did order it via an order set but I dont know if it went through  or let me know the number of the transfusion center

## 2022-01-27 ENCOUNTER — INFUSION THERAPY VISIT (OUTPATIENT)
Dept: INFUSION THERAPY | Facility: HOSPITAL | Age: 87
End: 2022-01-27
Attending: INTERNAL MEDICINE
Payer: COMMERCIAL

## 2022-01-27 VITALS
HEART RATE: 90 BPM | DIASTOLIC BLOOD PRESSURE: 71 MMHG | SYSTOLIC BLOOD PRESSURE: 145 MMHG | RESPIRATION RATE: 16 BRPM | TEMPERATURE: 98.1 F | OXYGEN SATURATION: 99 %

## 2022-01-27 DIAGNOSIS — D62 ANEMIA DUE TO BLOOD LOSS, ACUTE: Primary | ICD-10-CM

## 2022-01-27 DIAGNOSIS — I48.20 CHRONIC ATRIAL FIBRILLATION (H): ICD-10-CM

## 2022-01-27 DIAGNOSIS — K92.2 GASTROINTESTINAL HEMORRHAGE, UNSPECIFIED GASTROINTESTINAL HEMORRHAGE TYPE: ICD-10-CM

## 2022-01-27 DIAGNOSIS — Z95.2 H/O AORTIC VALVE REPLACEMENT: ICD-10-CM

## 2022-01-27 LAB
ABO/RH(D): NORMAL
ANTIBODY SCREEN: NEGATIVE
BLD PROD TYP BPU: NORMAL
BLOOD COMPONENT TYPE: NORMAL
CODING SYSTEM: NORMAL
CROSSMATCH: NORMAL
ISSUE DATE AND TIME: NORMAL
SPECIMEN EXPIRATION DATE: NORMAL
UNIT ABO/RH: NORMAL
UNIT NUMBER: NORMAL
UNIT STATUS: NORMAL
UNIT TYPE ISBT: 5100

## 2022-01-27 PROCEDURE — 86923 COMPATIBILITY TEST ELECTRIC: CPT | Performed by: INTERNAL MEDICINE

## 2022-01-27 PROCEDURE — 36415 COLL VENOUS BLD VENIPUNCTURE: CPT | Performed by: INTERNAL MEDICINE

## 2022-01-27 PROCEDURE — 86901 BLOOD TYPING SEROLOGIC RH(D): CPT | Performed by: INTERNAL MEDICINE

## 2022-01-27 PROCEDURE — P9016 RBC LEUKOCYTES REDUCED: HCPCS | Performed by: INTERNAL MEDICINE

## 2022-01-27 PROCEDURE — 36430 TRANSFUSION BLD/BLD COMPNT: CPT

## 2022-01-27 NOTE — TELEPHONE ENCOUNTER
[9:22 AM] Kinjal Godfrey  Hello, I have Constanza  [facility] [phone] coming I have the consent the MD signed but it is not filled out if you guys did a phone consent. Is there a number we can call when the patient is here so the doctoer can talk to the patient and do a phone consent?    [9:25 AM] Magda Rees  I am not too sure. I faxed it out per the care coordinator. To my understanding, the patient will be signing it at the time of appointment.     [9:28 AM] Kinjal Godfrey  She can but there is not two signatures that someone talked to over the phone so I have to assume the doctor just signed it without talking to the patient. therefore it is not a legal consent and I can not use it the way it is. Someone there would have to sign that the doctor actually talked to the patient.    [9:29 AM] Magda Rees  I see. Which department are you from so I can inform the team? You can call our nurse backline [phone].     [9:30 AM] Kinjal Godfrey  I am from [facility] 1st floor infusion [phone] option #2    [9:31 AM] Magda Rees  You can call us when the patient arrives.     [9:31 AM] Kinjal Godfrey  perfect thanks

## 2022-01-27 NOTE — PATIENT INSTRUCTIONS
Call with any questions or concerns. [facility] 1st floor infusion [phone] option #2  Patient Education     Blood Transfusion  Questions and Answers  What is a transfusion?  During your treatment, we may need to give you blood. This is called a transfusion. Blood is given through a needle in the vein. It takes 1 to 4 hours. It may include:    Red blood cells. These help replace blood you may have lost through bleeding or illness. They will increase your blood s ability to carry oxygen.    Platelets. These help blood to clot. They are used for low platelet counts and some bleeding disorders.    Plasma and cryoprecipitate. These help the blood to clot. They are used to treat some bleeding disorders.    Granulocytes (a type of white blood cell). These help your body fight infection.  If you need a transfusion, your doctor will prescribe one. We will ask you to sign a consent form before your first transfusion. Do not sign this form until all your questions have been answered.  Before your transfusion, we will double check your identity for your safety.  What is the risk of getting a disease from a transfusion?  The chances are low. Donors are carefully screened before giving blood. Also, before any donated blood is used, it must be tested for infectious diseases.   Here are example of your risk for infection:    Hepatitis B: 1 in 200,000    Hepatitis C: 1 in 1.8 million    HIV: 1 in 2.3 million    HTLV-I: 1 in 3 million    Bacterial infection:  ? 1 in 500,000 if receiving red blood cells  ? 1 in 75,000 if receiving platelets    Other infections (such as West Nile virus): less than 1 in 7 million    Other diseases (such as Chagas s disease): very rare    Babesiosis: rare, but risk is higher in summer  What are some of the side effects?  While most transfusions have no side effects, you could have some of the symptoms listed below. If youthink you may be having a reaction, tell your doctor or nurse right  away.  Common reactions include:    Fever or chills    Skin rash, hives, itching or flushing    Wheezing or trouble breathing    Chest or back pain    Facial swelling    New or ongoing cough    Bruising    Red or brown urine, or less urine output    Jaundice (yellow eyes and skin)    Irritation at the infusion site.  Some symptoms may occur up to four weeks after a transfusion. If you have any kind of symptom, call your doctor.  What are the risks of not having a transfusion?    Anemia (low red blood cells). This might cause a fast heartbeat and make you feel weak, tired, and breathless. If severe, it might lead to organ failure and death.    Bleeding. If your blood doesn t clot well, blood loss might lead to anemia, brain damage or death.    Weaker immune system. Your body might be less able to fight infection or heal wounds.  Are there other options besides transfusion?  Medicine (such as erythropoietin or iron pills) can cause the body to make more red blood cells. It may take months to replace all of the red blood cells you have lost.  In some cases, you can donate your own blood before surgery. The blood may be given back to you during your surgery. This is called autologous donation.   For informational purposes only. Not to replace the advice of your health care provider.   Copyright ?  2005 [employer]. All rights reserved. [employer] 800225 - REV 04/16.

## 2022-01-27 NOTE — PROGRESS NOTES
Infusion Nursing Note:  Constanza Coles presents today for 1 unit of blood RBC's.    Patient seen by provider today: No   present during visit today: Not Applicable.    Note: Constanza comes in today for a blood transfusion for a hgb 7.3. Constanza has had blood in the past. Per the blood transfusion parameters the patient would not qualify for a transfusion. I called Dr. Jacinto  At [phone]( RN line) because we had to go over her consent. When I spoke to Dr. Jacinto, she wanted her to get one unit of blood for the 7.3 hgb. PIV was placed in her left AC that had great blood return throughout. Type and screen drawn. Blood was hung as ordered. Constanza tolerated the blood with no sign or symptom of a reaction. VSS. AVS printed and reviewed. Constanza left ambulatory to the [facility].      Intravenous Access:  Labs drawn without difficulty.  Peripheral IV placed.    Treatment Conditions:  Lab Results   Component Value Date    HGB 7.3 (LL) 01/25/2022    WBC 8.3 01/25/2022    ANEUTAUTO 3.8 10/29/2021     01/25/2022      Results reviewed, labs did NOT meet treatment parameters: but Dr. Jacinto did want us to transfuse for a hgb of 7.3 when I spoke to her.  Blood transfusion consent signed 01/27/2022.      Post Infusion Assessment:  Patient tolerated infusion without incident.  Blood return noted pre and post infusion.  Site patent and intact, free from redness, edema or discomfort.  No evidence of extravasations.  Access discontinued per protocol.       Discharge Plan:   Copy of AVS reviewed with patient and/or family.    Patient discharged in stable condition accompanied by: self.  Departure Mode: Ambulatory.      BEBE TORRE RN

## 2022-01-27 NOTE — TELEPHONE ENCOUNTER
Form was for a verbal consent. PCP was required to call patient. Not verified so Infusion will call pcp to do a verbal consent over the phone.

## 2022-01-28 ENCOUNTER — DOCUMENTATION ONLY (OUTPATIENT)
Dept: ANTICOAGULATION | Facility: CLINIC | Age: 87
End: 2022-01-28

## 2022-01-28 ENCOUNTER — LAB (OUTPATIENT)
Dept: LAB | Facility: CLINIC | Age: 87
End: 2022-01-28
Payer: COMMERCIAL

## 2022-01-28 ENCOUNTER — ANTICOAGULATION THERAPY VISIT (OUTPATIENT)
Dept: ANTICOAGULATION | Facility: CLINIC | Age: 87
End: 2022-01-28

## 2022-01-28 ENCOUNTER — DOCUMENTATION ONLY (OUTPATIENT)
Dept: INTERNAL MEDICINE | Facility: CLINIC | Age: 87
End: 2022-01-28

## 2022-01-28 DIAGNOSIS — I48.91 ATRIAL FIBRILLATION (H): Primary | ICD-10-CM

## 2022-01-28 DIAGNOSIS — I48.20 CHRONIC ATRIAL FIBRILLATION (H): Primary | ICD-10-CM

## 2022-01-28 DIAGNOSIS — Z95.2 H/O AORTIC VALVE REPLACEMENT: ICD-10-CM

## 2022-01-28 DIAGNOSIS — I48.20 CHRONIC ATRIAL FIBRILLATION (H): ICD-10-CM

## 2022-01-28 LAB — INR BLD: 2.2 (ref 0.9–1.1)

## 2022-01-28 PROCEDURE — 36415 COLL VENOUS BLD VENIPUNCTURE: CPT

## 2022-01-28 PROCEDURE — 85610 PROTHROMBIN TIME: CPT

## 2022-01-28 NOTE — PROGRESS NOTES
ANTICOAGULATION MANAGEMENT     Constanza Coles 86 year old female is on warfarin with therapeutic INR result. (Goal INR 2.0-3.0)    Recent labs: (last 7 days)     01/28/22  1117   INR 2.2*       ASSESSMENT     Source(s): Chart Review, Patient/Caregiver Call and Template       Warfarin doses taken: Warfarin recently held for 2 days which may be affecting INR    Held warfarin on 1/26-27, as instructed.    Diet: No new diet changes identified    New illness, injury, or hospitalization: No   Anemia d/t blood loss.  (Hgb of 7.3 g/dl on 1/25/22)   Getting blood transfusion today -     Medication/supplement changes:  Yes.    Currently on Levofloxacin 250mg daily for 10 days, from 1/25 - 2/4    Signs or symptoms of bleeding or clotting: No    Previous INR: Supratherapeutic at 3.5 on 1/25/22.    Additional findings:  Referral sent to [facility] - to evaluate if Constanza will be candidate for LAAO / watchman device.     PLAN     Recommended plan for ongoing change(s) affecting INR     Dosing Instructions:   (mornings. 2mg tabs)    Continue your current warfarin dose with next INR in 1-2 weeks       Summary  As of 1/28/2022    Full warfarin instructions:  2 mg every Tue; 1 mg all other days   Next INR check:               Telephone call with  Constanza ([phone]) who verbalizes understanding and agrees to plan    Lab visit scheduled - INR and Hgb on 2/8/22 @ [facility]    Education provided: Importance of consistent vitamin K intake, Goal range and significance of current result, Monitoring for bleeding signs and symptoms and When to seek medical attention/emergency care    Plan made per [facility] anticoagulation protocol    Erika Simental, RN  Anticoagulation Clinic  1/28/2022    _______________________________________________________________________     Anticoagulation Episode Summary     Current INR goal:  2.0-3.0   TTR:  77.0 % (1 y)   Target end date:     Send INR reminders to:  [facility]    Indications    Atrial  fibrillation (H) [I48.91]  H/O aortic valve replacement [Z95.2]           Comments:           Anticoagulation Care Providers     Provider Role Specialty Phone number    Hayder Bailey MD Referring Internal Medicine [phone]    Yun Jacinto MD Responsible Internal Medicine [phone]

## 2022-01-28 NOTE — PROGRESS NOTES
Spoke with Constanza,     - reported she had her blood transfusion yesterday.     - will be having telephone visit with GI on 1/31 or 2/7 (was not sure what date:   - waiting to hear from [facility] about LAAO.     - wants to know when should she have her Hgb checked again?    (she is returning to [facility] on 2/8/22 for INR recheck).      - did you want to add future lab for Hgb?

## 2022-01-28 NOTE — PROGRESS NOTES
ANTICOAGULATION MANAGEMENT      Constanza Coles due for annual renewal of referral to anticoagulation monitoring. Order pended for your review and signature.      ANTICOAGULATION SUMMARY      Warfarin indication(s)     Atrial fibrillation, permanent    Heart valve present?  Bioprosthetic AVR       Current goal range   INR: 2.0-3.0     Goal appropriate for indication? Yes, INR 2-3 appropriate for hx of DVT, PE, hypercoagulable state, Afib, LVAD, or bileaflet AVR without risk factors     Current duration of therapy Indefinite/long term therapy   Time in Therapeutic Range (TTR)  (Goal > 60%) 77.0 %       Office visit with referring provider's group within last year Yes on 1/25/2022       Erika Simental RN

## 2022-01-31 ENCOUNTER — TRANSFERRED RECORDS (OUTPATIENT)
Dept: HEALTH INFORMATION MANAGEMENT | Facility: CLINIC | Age: 87
End: 2022-01-31
Payer: COMMERCIAL

## 2022-02-08 ENCOUNTER — LAB (OUTPATIENT)
Dept: LAB | Facility: CLINIC | Age: 87
End: 2022-02-08
Payer: COMMERCIAL

## 2022-02-08 ENCOUNTER — ANTICOAGULATION THERAPY VISIT (OUTPATIENT)
Dept: ANTICOAGULATION | Facility: CLINIC | Age: 87
End: 2022-02-08

## 2022-02-08 DIAGNOSIS — Z95.2 H/O AORTIC VALVE REPLACEMENT: ICD-10-CM

## 2022-02-08 DIAGNOSIS — I48.20 CHRONIC ATRIAL FIBRILLATION (H): ICD-10-CM

## 2022-02-08 DIAGNOSIS — D50.0 IRON DEFICIENCY ANEMIA DUE TO CHRONIC BLOOD LOSS: ICD-10-CM

## 2022-02-08 DIAGNOSIS — I48.91 ATRIAL FIBRILLATION (H): Primary | ICD-10-CM

## 2022-02-08 DIAGNOSIS — N18.30 CHRONIC KIDNEY DISEASE, STAGE 3 (H): Primary | ICD-10-CM

## 2022-02-08 LAB
HGB BLD-MCNC: 8.4 G/DL (ref 11.7–15.7)
INR BLD: 2.1 (ref 0.9–1.1)

## 2022-02-08 PROCEDURE — 36415 COLL VENOUS BLD VENIPUNCTURE: CPT

## 2022-02-08 PROCEDURE — 85610 PROTHROMBIN TIME: CPT

## 2022-02-08 PROCEDURE — 85018 HEMOGLOBIN: CPT

## 2022-02-08 NOTE — PROGRESS NOTES
ANTICOAGULATION MANAGEMENT     Constanza Coles 86 year old female is on warfarin with therapeutic INR result. (Goal INR 2.0-3.0)    Recent labs: (last 7 days)     02/08/22  1146   INR 2.1*       ASSESSMENT     Source(s): Chart Review, Patient/Caregiver Call and Template       Warfarin doses taken: Warfarin taken as instructed    Diet: No new diet changes identified    New illness, injury, or hospitalization: Yes.   Hgb today was 8.4 g/dl.  Post blood transfusion on 1/27/22 d/t hgb of 7.3 g/dl.    Medication/supplement changes: None noted    Completed Levofloxacin on 2/4 for aspiration pneumonia.    Signs or symptoms of bleeding or clotting: No    Previous INR: Therapeutic last visit at 2.2; previously outside of goal range at 3.5    Additional findings:  Yes.    - Scheduled for pill cam study with GI.   - Zunilda still waiting to hear and get scheduled with [facility].     - Referral has been sent to [facility] - to evaluate if Constanza will be candidate for LAAO / watchman device.     PLAN     Recommended plan for ongoing change(s) affecting INR     Dosing Instructions:   (mornings. 1mg tabs)    Continue your current warfarin dose with next INR in 1-2 weeks       Summary  As of 2/8/2022    Full warfarin instructions:  2 mg every Tue; 1 mg all other days   Next INR check:  2/22/2022             Telephone call with  Kathryn ([phone]) who verbalizes understanding and agrees to plan    Lab visit scheduled - INR on 2/22/22 @ [facility]    Education provided: Importance of consistent vitamin K intake, Goal range and significance of current result, Monitoring for bleeding signs and symptoms and When to seek medical attention/emergency care    Plan made per [facility] anticoagulation protocol    Erika Simental, RN  Anticoagulation Clinic  2/8/2022    _______________________________________________________________________     Anticoagulation Episode Summary     Current INR goal:  2.0-3.0   TTR:  78.8 % (1 y)   Target end date:   Indefinite   Send INR reminders to:  [facility]    Indications    Atrial fibrillation (H) [I48.91]  H/O aortic valve replacement [Z95.2]  Chronic atrial fibrillation (H) [I48.20]           Comments:           Anticoagulation Care Providers     Provider Role Specialty Phone number    Hayder Bailey MD Referring Internal Medicine [phone]    Yun Jacinto MD Referring Internal Medicine [phone]

## 2022-02-08 NOTE — PROGRESS NOTES
Dr. Jacinto,     - Constanza stated she had a phone visit with the GI doctor.     - and reported already scheduled for a pill cam study (she was unsure of the date)

## 2022-02-08 NOTE — PROGRESS NOTES
Dr. Jacinto,     - Constanza had Hgb today and was 8.4.   - reported still tired, fatigue and very cold.     - she was a little disappointed hgb did not go up very much from 2 wks ago at 7.3.     - she is returning in 2 wks for INR recheck.  Did you want to check Hgb again also?   - if yes, please add a future order.

## 2022-02-21 NOTE — TELEPHONE ENCOUNTER
Per scheduling notes in recall for consult - msgs left 1-26-22 and 2-9-22 - appt date still pending.  mg

## 2022-02-22 ENCOUNTER — ANTICOAGULATION THERAPY VISIT (OUTPATIENT)
Dept: ANTICOAGULATION | Facility: CLINIC | Age: 87
End: 2022-02-22

## 2022-02-22 ENCOUNTER — TELEPHONE (OUTPATIENT)
Dept: ADMINISTRATIVE | Facility: CLINIC | Age: 87
End: 2022-02-22
Payer: COMMERCIAL

## 2022-02-22 ENCOUNTER — LAB (OUTPATIENT)
Dept: LAB | Facility: CLINIC | Age: 87
End: 2022-02-22
Payer: COMMERCIAL

## 2022-02-22 DIAGNOSIS — I48.20 CHRONIC ATRIAL FIBRILLATION (H): ICD-10-CM

## 2022-02-22 DIAGNOSIS — Z95.2 H/O AORTIC VALVE REPLACEMENT: ICD-10-CM

## 2022-02-22 DIAGNOSIS — D50.0 IRON DEFICIENCY ANEMIA DUE TO CHRONIC BLOOD LOSS: ICD-10-CM

## 2022-02-22 DIAGNOSIS — I48.91 ATRIAL FIBRILLATION (H): Primary | ICD-10-CM

## 2022-02-22 LAB
HGB BLD-MCNC: 8.4 G/DL (ref 11.7–15.7)
INR BLD: 2.5 (ref 0.9–1.1)

## 2022-02-22 PROCEDURE — 85018 HEMOGLOBIN: CPT

## 2022-02-22 PROCEDURE — 85610 PROTHROMBIN TIME: CPT

## 2022-02-22 PROCEDURE — 36415 COLL VENOUS BLD VENIPUNCTURE: CPT

## 2022-02-22 PROCEDURE — 36416 COLLJ CAPILLARY BLOOD SPEC: CPT

## 2022-02-22 NOTE — PROGRESS NOTES
ANTICOAGULATION MANAGEMENT     Constanza Coles 86 year old female is on warfarin with therapeutic INR result. (Goal INR 2.0-3.0)    Recent labs: (last 7 days)     02/22/22  1151   INR 2.5*       ASSESSMENT     Source(s): Chart Review, Patient/Caregiver Call and Template       Warfarin doses taken: Warfarin taken as instructed    Diet: No new diet changes identified    New illness, injury, or hospitalization: No   Reported getting the pill cam study with [facility] on 2/4/22 and has not heard the findings or report yet.   Anemia and melena.    Medication/supplement changes: None noted    Signs or symptoms of bleeding or clotting: No    Previous INR: Therapeutic last 2 INR results.    Additional findings:  Yes.  [facility] (Device schedule with Jen Siddiqui on 3/31/22 re:  Watchman Device     PLAN     Recommended plan for no diet, medication or health factor changes affecting INR     Dosing Instructions:   (mornings. 1mg tabs)    Continue your current warfarin dose with next INR in 4 weeks       Summary  As of 2/22/2022    Full warfarin instructions:  2 mg every Tue; 1 mg all other days   Next INR check:  3/22/2022             Telephone call with  Constanza ([phone]) who verbalizes understanding and agrees to plan    Lab visit scheduled - INR on 3/31/22 @ [facility].    Education provided: Importance of consistent vitamin K intake, Goal range and significance of current result, Monitoring for bleeding signs and symptoms and When to seek medical attention/emergency care    Plan made per [facility] anticoagulation protocol    Erika Simental, RN  Anticoagulation Clinic  2/22/2022    _______________________________________________________________________     Anticoagulation Episode Summary     Current INR goal:  2.0-3.0   TTR:  78.8 % (1 y)   Target end date:  Indefinite   Send INR reminders to:  [facility]    Indications    Atrial fibrillation (H) [I48.91]  H/O aortic valve replacement [Z95.2]  Chronic atrial  fibrillation (H) [I48.20]           Comments:           Anticoagulation Care Providers     Provider Role Specialty Phone number    Hayder Bailey MD Referring Internal Medicine [phone]    Yun Jacinto MD Referring Internal Medicine [phone]

## 2022-02-22 NOTE — TELEPHONE ENCOUNTER
Patient would like consult for Watchman.    Device clinic has tried to contact patient multiple times to schedule with Jen/device without success.

## 2022-02-22 NOTE — TELEPHONE ENCOUNTER
----- Message from Jannet Ferrari sent at 2/22/2022  1:18 PM CST -----  I have been calling her on her cell phone. I just called again and she picked up and then hung up.  Thank you,  Jannet  ----- Message -----  From: Cady Man  Sent: 2/22/2022  12:16 PM CST  To: Cady Man, Maria Antonia Peoples RN, #    Hey!  Patient needs a device check with Jen consult for Watchman. This can be next available. I know you have tried to contact her several times but the AC nurse just messaged me and said that the patient has been having phone issues with her home phone and needs to be called on her cell.    Thank you!  Cady

## 2022-02-23 ENCOUNTER — TRANSFERRED RECORDS (OUTPATIENT)
Dept: HEALTH INFORMATION MANAGEMENT | Facility: CLINIC | Age: 87
End: 2022-02-23
Payer: COMMERCIAL

## 2022-03-04 ENCOUNTER — OFFICE VISIT (OUTPATIENT)
Dept: INTERNAL MEDICINE | Facility: CLINIC | Age: 87
End: 2022-03-04
Payer: COMMERCIAL

## 2022-03-04 VITALS
HEART RATE: 101 BPM | DIASTOLIC BLOOD PRESSURE: 76 MMHG | WEIGHT: 118 LBS | BODY MASS INDEX: 21.58 KG/M2 | SYSTOLIC BLOOD PRESSURE: 148 MMHG | OXYGEN SATURATION: 97 %

## 2022-03-04 DIAGNOSIS — I10 ESSENTIAL HYPERTENSION: ICD-10-CM

## 2022-03-04 DIAGNOSIS — D64.9 ANEMIA, UNSPECIFIED TYPE: Primary | ICD-10-CM

## 2022-03-04 DIAGNOSIS — R00.0 TACHYCARDIA: ICD-10-CM

## 2022-03-04 PROCEDURE — 99214 OFFICE O/P EST MOD 30 MIN: CPT | Performed by: INTERNAL MEDICINE

## 2022-03-04 RX ORDER — METOPROLOL SUCCINATE 25 MG/1
25 TABLET, EXTENDED RELEASE ORAL DAILY
Qty: 90 TABLET | Refills: 2 | Status: SHIPPED | OUTPATIENT
Start: 2022-03-04 | End: 2022-12-01

## 2022-03-04 NOTE — PROGRESS NOTES
Assessment & Plan     Anemia, unspecified type  Up until September 2021 blood counts were normal.  She then developed anemia this was basically post stent insertion and after prasugrel was started.  She had already been on warfarin previously with no bleeding issues she was admitted after she developed melena and had upper GI and lower GI work-up and small bowel pill capsule.  All negative.  She did receive a blood transfusion in the hospital and had no further melena but the hemoglobin dropped again without melena a month after her hospitalization.  She was transfused another blood packed RBC.  And her blood count has been stable at 8.4 but has not gone up.    She declines iron pills, declines iron infusion she said she might consider one infusion .  She declines another RBC because she is symptomatic she says she feels tired and shaky.  But she still manages to do all her work housework and go to the gym.  She is not having any decompensation of congestive heart failure as she is euvolemic.  She is not having syncopal episodes.  We will recheck her blood counts again and see if the trend is going up if it is not she may consider an iron infusion.  Her previously her B12 levels have been normal she does not want them checked.  Will contact cardiology to see if prasugel can be changed to another agent like Plavix  - CBC with platelets  - Iron and iron binding capacity    Tachycardia  Her blood her blood pressure is slightly up and heart rate is up so I did increase metoprolol to full tablet she has been taking half only.  - metoprolol succinate ER (TOPROL-XL) 25 MG 24 hr tablet  Dispense: 90 tablet; Refill: 2    Essential hypertension            I did urge her to take her blood pressures at home         Return for Routine preventive.    Yun Jacinto MD  [facility]   Constanza is a 86 year old who presents for the following health issues   Very pleasant patient with  history of GI blood loss has had extensive work-up with upper and lower endoscopy and small capsule endoscopy she has received 2 blood transfusions with anemia still is persistent.  HPI     Patient Active Problem List   Diagnosis     Coronary Artery Disease     Sick Sinus Syndrome     H/O aortic valve replacement     Atrial fibrillation (H)     Vitamin B 12 deficiency     Cardiac pacemaker in situ, dual chamber     S/P CABG (coronary artery bypass graft)     Pure hypercholesterolemia     Chronic kidney disease, stage 3 (H)     Percutaneous transluminal coronary angioplasty status     Gastrointestinal hemorrhage, unspecified gastrointestinal hemorrhage type     Melena     Anemia due to blood loss, acute     Tension headache     Anticoagulated     S/P percutaneous transluminal angioplasty (PTA) with stent placement     COPD (chronic obstructive pulmonary disease) (H)     CHF (congestive heart failure) (H)     Chronic atrial fibrillation (H)     Current Outpatient Medications   Medication     acetaminophen (TYLENOL) 500 MG tablet     albuterol (PROAIR HFA/PROVENTIL HFA/VENTOLIN HFA) 108 (90 Base) MCG/ACT inhaler     atorvastatin (LIPITOR) 40 MG tablet     cholecalciferol, vitamin D3, (VITAMIN D3) 5,000 unit Tab     cyanocobalamin 1,000 mcg/mL injection     furosemide (LASIX) 20 MG tablet     gabapentin (NEURONTIN) 300 MG capsule     ketoconazole (NIZORAL) 2 % external shampoo     metoprolol succinate ER (TOPROL-XL) 25 MG 24 hr tablet     nitroGLYcerin (NITROSTAT) 0.4 MG sublingual tablet     prasugrel (EFFIENT) 10 MG TABS tablet     sucralfate (CARAFATE) 1 gram tablet     warfarin ANTICOAGULANT (COUMADIN) 1 MG tablet     No current facility-administered medications for this visit.           Review of Systems   Constitutional, HEENT, cardiovascular, pulmonary, gi and gu systems are negative, except as otherwise noted.      Objective    BP (!) 148/76 (BP Location: Left arm, Patient Position: Sitting, Cuff Size: Adult  Regular)   Pulse 101   Wt 53.5 kg (118 lb)   SpO2 97%   BMI 21.58 kg/m    Body mass index is 21.58 kg/m .  Physical Exam   GENERAL: healthy, alert and no distress  NECK: no adenopathy, no asymmetry, masses, or scars and thyroid normal to palpation  RESP: lungs clear to auscultation - no rales, rhonchi or wheezes  CV: irregular rate and rhythm, normal S1 S2, no S3 or S4, no murmur, click or rub, no peripheral edema and peripheral pulses strong  ABDOMEN: soft, nontender, no hepatosplenomegaly, no masses and bowel sounds normal  MS: no gross musculoskeletal defects noted, no edema

## 2022-03-29 DIAGNOSIS — E53.8 VITAMIN B 12 DEFICIENCY: ICD-10-CM

## 2022-03-31 ENCOUNTER — ANCILLARY PROCEDURE (OUTPATIENT)
Dept: CARDIOLOGY | Facility: CLINIC | Age: 87
End: 2022-03-31
Attending: INTERNAL MEDICINE
Payer: COMMERCIAL

## 2022-03-31 ENCOUNTER — ANTICOAGULATION THERAPY VISIT (OUTPATIENT)
Dept: ANTICOAGULATION | Facility: CLINIC | Age: 87
End: 2022-03-31

## 2022-03-31 ENCOUNTER — LAB (OUTPATIENT)
Dept: CARDIOLOGY | Facility: CLINIC | Age: 87
End: 2022-03-31
Payer: COMMERCIAL

## 2022-03-31 ENCOUNTER — OFFICE VISIT (OUTPATIENT)
Dept: CARDIOLOGY | Facility: CLINIC | Age: 87
End: 2022-03-31
Payer: COMMERCIAL

## 2022-03-31 VITALS
DIASTOLIC BLOOD PRESSURE: 68 MMHG | HEART RATE: 72 BPM | RESPIRATION RATE: 16 BRPM | WEIGHT: 114 LBS | BODY MASS INDEX: 20.85 KG/M2 | SYSTOLIC BLOOD PRESSURE: 132 MMHG

## 2022-03-31 DIAGNOSIS — I48.20 CHRONIC ATRIAL FIBRILLATION (H): ICD-10-CM

## 2022-03-31 DIAGNOSIS — Z95.0 CARDIAC PACEMAKER IN SITU: ICD-10-CM

## 2022-03-31 DIAGNOSIS — I48.21 PERMANENT ATRIAL FIBRILLATION (H): Primary | ICD-10-CM

## 2022-03-31 DIAGNOSIS — I48.91 ATRIAL FIBRILLATION (H): Primary | ICD-10-CM

## 2022-03-31 DIAGNOSIS — D50.0 IRON DEFICIENCY ANEMIA DUE TO CHRONIC BLOOD LOSS: ICD-10-CM

## 2022-03-31 DIAGNOSIS — I49.5 SSS (SICK SINUS SYNDROME) (H): ICD-10-CM

## 2022-03-31 DIAGNOSIS — I25.10 ATHEROSCLEROSIS OF NATIVE CORONARY ARTERY OF NATIVE HEART WITHOUT ANGINA PECTORIS: ICD-10-CM

## 2022-03-31 DIAGNOSIS — I49.5 SINOATRIAL NODE DYSFUNCTION (H): ICD-10-CM

## 2022-03-31 DIAGNOSIS — Z95.2 H/O AORTIC VALVE REPLACEMENT: ICD-10-CM

## 2022-03-31 DIAGNOSIS — Z79.01 LONG TERM (CURRENT) USE OF ANTICOAGULANTS: Primary | ICD-10-CM

## 2022-03-31 LAB
HGB BLD-MCNC: 7.9 G/DL (ref 11.7–15.7)
INR POINT OF CARE: 4.6 (ref 0.9–1.1)

## 2022-03-31 PROCEDURE — 93296 REM INTERROG EVL PM/IDS: CPT | Performed by: INTERNAL MEDICINE

## 2022-03-31 PROCEDURE — 85018 HEMOGLOBIN: CPT | Performed by: NURSE PRACTITIONER

## 2022-03-31 PROCEDURE — 36415 COLL VENOUS BLD VENIPUNCTURE: CPT | Performed by: NURSE PRACTITIONER

## 2022-03-31 PROCEDURE — 99215 OFFICE O/P EST HI 40 MIN: CPT | Performed by: NURSE PRACTITIONER

## 2022-03-31 PROCEDURE — 93294 REM INTERROG EVL PM/LDLS PM: CPT | Performed by: INTERNAL MEDICINE

## 2022-03-31 PROCEDURE — 85610 PROTHROMBIN TIME: CPT

## 2022-03-31 NOTE — LETTER
3/31/2022    Yun Jacinto MD  [address] [facility] [address]    RE: Constanza Coles       Dear Colleague,     I had the pleasure of seeing Constanza Coles in the [facility].    HEART CARE ENCOUNTER CONSULTATON NOTE      [facility]  [phone]      Assessment/Recommendations   Assessment/Plan:  1.  Permanent Atrial Fibrillation: Asymptomatic.  Some tendency toward RVR seen on pacemaker interrogation.  Metoprolol was recently increased, so continue to monitor.  Continue metoprolol succinate 25 mg daily.      She has a [id number] score of 4 for age >75, female gender, and CAD.   HAS-BLED score of 3 for age >65, bleeding issues, and concomitant need for antiplatelet therapy.  She has recurrent anemia due to likely GI bleed as well as labile INR.  We discussed alternative agents to warfarin, in particular Eliquis due to its lower risk for bleed.  She is not a good candidate for long-term oral anticoagulation.  We discussed left atrial appendage closure with the Watchman device.    We discussed the need for pre-procedure CT or [place] and post procedure [place].  We discussed the implant procedure including the <2% risk for complication including, but not limited to device embolization, air embolism, myocardial perforation, device thrombosis, ASD, stroke, or death.  We discussed expected recovery and follow-up.  We discussed the medication transition from OAC to aspirin and Plavix/Effient to low-dose daily aspirin.  However, due to history of partial gastrectomy and experiences with severe nausea and vomiting with aspirin, including enteric-coated aspirin, she is unwilling or unable to take aspirin which is required for Watchman implant.  We also discussed the possibility of aspirin in conjunction with a PPI.  She is not interested in pursuing left atrial appendage closure with the Watchman device.    2.  Sick sinus syndrome status post pacemaker implant:  The pacemaker was  "remotely interrogated and is functioning appropriately.  The battery shows estimated longevity of 26 months.  Ventricular lead sensing, impedance, and pacing thresholds are stable and within normal limits.      3.  Coronary artery disease: She denies anginal symptoms.  Discussed need for lifelong antiplatelet therapy due to history of CABG and subsequent stents.       History of Present Illness/Subjective    HPI: Constanza Coles is a 86 year old female who comes in today for consult regarding left atrial appendage closure with the Watchman device.  She has a history of permanent atrial fibrillation, sick sinus syndrome status post dual-chamber pacemaker implant, coronary artery disease in s/p CABG and AVR in 2005 and subsequent PCI, last in September 2021, hypercholesterolemia, stage III chronic kidney disease, vasculitis, and recurrent anemia likely due to GI bleed requiring transfusion.  She has refused PillCam or further GI evaluation.  She is a remote history of ulcers and partial gastrectomy.  She remains on warfarin for stroke prophylaxis.  Of note, she has an allergy to aspirin with \"horrible stomach upset\" and vomiting.  She states she is even unable to take enteric-coated aspirin without vomiting.      She states she is feeling fairly well, though continues to have chronic fatigue and dyspnea on exertion.  She occasionally is aware of a rapid heartbeat, but no sustained palpitations.  She denies chest discomfort, abdominal fullness/bloating or peripheral edema, shortness of breath at rest, lightheadedness, or syncope.    Cardiographics (device interrogation personally reviewed):  Pacemaker Interrogation, remote 3/31/2022:  Pacing mode: VVI 50  Presenting rhythm:AF- 90  Underlying rhythm: NA  V-paced 4%.  Battery: estimated longevity 26 months.  Ventricular leads: Stable with good sensing, impedance, and pacing thresholds  Arrhythmias: 2 VHR, EGM's show AF with RVR  Histograms: Good rate distribution, " though tendency for RVR    ECHO done 2/19/2021:    Normal left ventricular size with borderline increased wall thickness.    Left ventricle ejection fraction is mildly decreased. The calculated left ventricular ejection fraction is 50%.    Normal right ventricular size. The systolic function is mildly reduced. TAPSE is abnormal, which is consistent with abnormal right ventricular systolic function. Pacer lead is present in the ventricle.    There is a 21 mm Meng Forte bioprosthetic valve present in the aortic position with normal function.    Mild Calcific mitral stenosis.    Moderate pulmonary hypertension present. The estimated systolic pulmonary artery pressure is 59 mmhg.    The ascending aorta is mildly dilated.    When compared to the previous study dated 3/6/2020, mild mitral stenosis is now identified and pulmonary hypertension is now present.    Cardiac catheterization done 9/27/2021:  LM:no obstruction  LAD:severe disease proximally, fills distally competitively via patent L-LAD and V-D  Lcx:mid-vessel 40-50% narrowing including into OM, distal 95-99% narrowing supplying co-dominant lPDA  RCA:co-dominant, mid-vessel 95% Ca2+ narrowing, AM branch w/ tandem 85% lesions  - multi-vessel CAD involving Lcx (s/p DESx1 today), mRCA, and ostial/proximal L-LAD; low-normal L-sided filling pressures; AV pullback gradient P/M 1/15 but visually no significant gradient by waveform, which would correspond w/ TTE findings  - she can't/refuses to take ASA, but also isn't a good re-do OHS candidate given age and co-morbidities, after discussing w/  and the patient, we proceeded w/ PCI to Lcx today. Eptifibatide boluses X2 given and loaded w/ prasugrel in the cath lab  - if tolerates warfarin/prasugrel, can stage PCI's to RCA and L-LAD  - ideally would continue prasugrel/warfarin indefinitely  - okay to bridge w/ lovenox again on discharge - would give warfarin tonight (ordered), lovenox starting tomorrow  (not ordered) and until INR is therapeutic  - continue aggressive risk factor modification    I have reviewed and updated the patient's Past Medical History, Social History, Family History and Medication List.     Physical Examination  Review of Systems   Vitals: /68 (BP Location: Right arm, Patient Position: Sitting, Cuff Size: Adult Small)   Pulse 72   Resp 16   Wt 51.7 kg (114 lb)   BMI 20.85 kg/m    BMI= Body mass index is 20.85 kg/m .  Wt Readings from Last 3 Encounters:   03/31/22 51.7 kg (114 lb)   03/04/22 53.5 kg (118 lb)   01/25/22 54.3 kg (119 lb 11.2 oz)       General Appearance:   Alert, frail-appearing and in no acute distress.   HEENT: Atraumatic, normocephalic.  No scleral icterus, normal conjunctivae, EOMs intact, PERRL.  Wearing a mask.   Chest/Lungs:   Chest symmetric, spine straight.  Respirations unlabored.  Lungs are clear to auscultation.   Cardiovascular:   Regular rate and rhythm.  Normal first and second heart sounds with no murmurs, rubs, or gallops; radial and posterior tibial pulses are intact, No edema.   Abdomen:  Soft, nondistended, bowel sounds present.   Extremities: No cyanosis or clubbing.   Musculoskeletal: Moves all extremities.    Skin: Warm, dry, intact.    Neurologic: Mood and affect are appropriate.  Alert and oriented to person, place, time, and situation.     ROS: 10 point ROS neg other than the symptoms noted above in the HPI.         Medical History  Surgical History Family History Social History   Past Medical History:   Diagnosis Date     Anemia     iron deficiency     Chronic atrial fibrillation (H)      GERD (gastroesophageal reflux disease)      Hypertension      IHD (ischemic heart disease)      PUD (peptic ulcer disease)     Billroth 1     Recurrent cystitis      SSS (sick sinus syndrome) (H)     post pacemaker placement     Valvular heart disease      Vasculitis (H)      Past Surgical History:   Procedure Laterality Date     ASCENDING AORTIC ANEURYSM  REPAIR W/ TISSUE AORTIC VALVE REPLACEMENT  2005    Dacron graft, Forte pericardial Magna 21mm aortic valve      SECTION       COLONOSCOPY N/A 10/31/2021    Procedure: COLONOSCOPY;  Surgeon: Dario Thomas MD;  Location: [facility] Main OR     CV CORONARY ANGIOGRAM N/A 2021    Procedure: Coronary Angiogram;  Surgeon: Shane Mcclendon MD;  Location: [facility] CATH LAB CV     CV LEFT HEART CATH N/A 2021    Procedure: Left Heart Cath;  Surgeon: Shane Mcclendon MD;  Location: [facility] LAB CV     CV PCI N/A 2021    Procedure: Percutaneous Coronary Intervention;  Surgeon: Shane Mcclendon MD;  Location: [facility] CV     ESOPHAGOSCOPY, GASTROSCOPY, DUODENOSCOPY (EGD), COMBINED N/A 10/30/2021    Procedure: ESOPHAGOGASTRODUODENOSCOPY (EGD);  Surgeon: Dario Thomas MD;  Location: [facility] Main OR     GALLBLADDER SURGERY      partial colon removal     HYSTERECTOMY       IR AORTIC ARCH 4 VESSEL ANGIOGRAM  9/15/2011     IR MISCELLANEOUS PROCEDURE  2005     OTHER SURGICAL HISTORY      antrectomy     PARTIAL GASTRECTOMY       WA PERC CLOS,BART INTERATRIAL COMMUN W/IMPL      Description: Patent Foramen Ovale Closure Percutaneous;  Recorded: 2012;     ZZC CABG, VEIN, SINGLE      Description: CABG (CABG);  Recorded: 2012;  Comments: LIMA^LAD, SVG^1st diag     ZZC REPLACE AORT VALV,PROSTH VALV      Aortic Valve Replacement with Forte pericardial Magna 21mm 2005     Family History   Problem Relation Age of Onset     Liver Disease Father      No Known Problems Mother      Thyroid Disease Sister      LUNG DISEASE Sister         Social History     Socioeconomic History     Marital status:      Spouse name: Not on file     Number of children: Not on file     Years of education: Not on file     Highest education level: Not on file   Occupational History     Not on file   Tobacco Use     Smoking status: Former Smoker     Packs/day: 1.50     Years: 20.00  "    Pack years: 30.00     Quit date: 10/29/1979     Years since quittin.4     Smokeless tobacco: Never Used   Substance and Sexual Activity     Alcohol use: Yes     Comment: 1-2 week     Drug use: No     Sexual activity: Never   Other Topics Concern     Parent/sibling w/ CABG, MI or angioplasty before 65F 55M? Not Asked   Social History Narrative     Not on file     Social Determinants of Health     Financial Resource Strain: Not on file   Food Insecurity: Not on file   Transportation Needs: Not on file   Physical Activity: Not on file   Stress: Not on file   Social Connections: Not on file   Intimate Partner Violence: Not on file   Housing Stability: Not on file           Medications  Allergies   Current Outpatient Medications   Medication Sig Dispense Refill     acetaminophen (TYLENOL) 500 MG tablet Take 1,000 mg by mouth every 6 hours as needed for mild pain       atorvastatin (LIPITOR) 40 MG tablet Take 1 tablet (40 mg) by mouth daily 90 tablet 3     cholecalciferol, vitamin D3, (VITAMIN D3) 5,000 unit Tab [CHOLECALCIFEROL, VITAMIN D3, (VITAMIN D3) 5,000 UNIT TAB] Take 1 tablet by mouth daily.       cyanocobalamin 1,000 mcg/mL injection [CYANOCOBALAMIN 1,000 MCG/ML INJECTION] INJECT 1 ML (1,000 MCG TOTAL) INTO THE SHOULDER, THIGH, OR BUTTOCKS EVERY 30 DAYS. **SYR 27G 1/2\" 1CC** 3 mL 3     gabapentin (NEURONTIN) 300 MG capsule TAKE 1 CAPSULE (300 MG TOTAL) BY MOUTH 2 TIMES A DAY. 180 capsule 3     ketoconazole (NIZORAL) 2 % external shampoo Apply topically daily as needed        metoprolol succinate ER (TOPROL-XL) 25 MG 24 hr tablet Take 1 tablet (25 mg) by mouth daily 90 tablet 2     nitroGLYcerin (NITROSTAT) 0.4 MG sublingual tablet [NITROGLYCERIN (NITROSTAT) 0.4 MG SL TABLET] DISSOLVE 1 TABLET UNDER THE TONGUE AS NEEDED FOR CHEST PAIN 25 tablet 1     prasugrel (EFFIENT) 10 MG TABS tablet Take 1 tablet (10 mg) by mouth daily Do not crush or break tablet. Dose to start tomorrow. 90 tablet 3     sucralfate " (CARAFATE) 1 gram tablet [SUCRALFATE (CARAFATE) 1 GRAM TABLET] TAKE 1 TABLET BY MOUTH FOUR TIMES DAILY 120 tablet 11     warfarin ANTICOAGULANT (COUMADIN) 1 MG tablet TAKE 1 TO 2 TABLETS BY MOUTH DAILY, AS DIRECTED. ADJUST DOSE BASED ON INR RESULTS. 135 tablet 1       Allergies   Allergen Reactions     Aspirin Nausea and Vomiting     Vomiting. Pt will NOT take Aspirin.      Erythromycin Base [Erythromycin] Unknown     Pt is not sure she is allergic to this     Levaquin [Levofloxacin] Nausea and Vomiting     Vancomycin Other (See Comments)     Red man syndrome     Xanax [Alprazolam] Other (See Comments)     confusion     Sulfa (Sulfonamide Antibiotics) [Sulfa Drugs] Rash          Lab Results    Chemistry/lipid CBC Cardiac Enzymes/BNP/TSH/INR   Recent Labs   Lab Test 11/09/21  1349   CHOL 116   HDL 53   LDL 46   TRIG 84     Recent Labs   Lab Test 11/09/21  1349 03/30/21  1315 11/14/19  1207   LDL 46 54 54     Recent Labs   Lab Test 11/09/21  1349      POTASSIUM 4.3   CHLORIDE 102   CO2 29   GLC 96   BUN 12   CR 0.80   GFRESTIMATED 68   MARY 9.6     Recent Labs   Lab Test 11/09/21  1349 10/29/21  1140 09/27/21  0712   CR 0.80 0.76 0.88     No results for input(s): A1C in the last 96530 hours.       Recent Labs   Lab Test 02/22/22  1150 02/08/22  1146 01/25/22  1210   WBC  --   --  8.3   HGB 8.4*   < > 7.3*   HCT  --   --  25.6*   MCV  --   --  81   PLT  --   --  237    < > = values in this interval not displayed.     Recent Labs   Lab Test 02/22/22  1150 02/08/22  1146 01/25/22  1210   HGB 8.4* 8.4* 7.3*    No results for input(s): TROPONINI in the last 28321 hours.  Recent Labs   Lab Test 03/30/21  1315 12/26/19  1205   * 188*     Recent Labs   Lab Test 08/18/21  1626   TSH 2.16     Recent Labs   Lab Test 02/22/22  1151 02/08/22  1146 01/28/22  1117   INR 2.5* 2.1* 2.2*      50 minutes were spent on the date of encounter performing chart review, history and exam, documentation, and further activities as  noted above.          Thank you for allowing me to participate in the care of your patient.      Sincerely,     ALMAS Gould [facility]  cc:   No referring provider defined for this encounter.

## 2022-03-31 NOTE — PROGRESS NOTES
ANTICOAGULATION MANAGEMENT     Constanza Coles 86 year old female is on warfarin with supratherapeutic INR result. (Goal INR 2.0-3.0)    Recent labs: (last 7 days)     03/31/22  1458   INR 4.6*       ASSESSMENT       Source(s): Chart Review and Patient/Caregiver Call       Warfarin doses taken: Warfarin taken as instructed    Diet: low appetite lately may be affecting diet and INR    New illness, injury, or hospitalization: No    Medication/supplement changes: None noted    Signs or symptoms of bleeding or clotting: No    Previous INR: Therapeutic last visit; previously outside of goal range    Additional findings: anemia       PLAN     Recommended plan for temporary change(s) affecting INR     Dosing Instructions: hold 2 doses then continue your current warfarin dose with next INR in 1 week       Summary  As of 3/31/2022    Full warfarin instructions:  3/31: Hold; 4/1: Hold; Otherwise 2 mg every Tue; 1 mg all other days   Next INR check:  4/8/2022             Telephone call with Constanza who verbalizes understanding and agrees to plan    Lab visit scheduled    Education provided: None required    Plan made per [facility] anticoagulation protocol    Karol Spence RN  Anticoagulation Clinic  3/31/2022    _______________________________________________________________________     Anticoagulation Episode Summary     Current INR goal:  2.0-3.0   TTR:  71.1 % (1 y)   Target end date:  Indefinite   Send INR reminders to:  SHIRA MIDWAY    Indications    Atrial fibrillation (H) [I48.91]  H/O aortic valve replacement [Z95.2]  Chronic atrial fibrillation (H) [I48.20]           Comments:           Anticoagulation Care Providers     Provider Role Specialty Phone number    Hayder Bailey MD Referring Internal Medicine [phone]    Yun Jacinto MD Referring Internal Medicine [phone]

## 2022-03-31 NOTE — PATIENT INSTRUCTIONS
Constanza Coles,    It was a pleasure to see you today at the [facility].     My recommendations after this visit include:    Continue current medications.  Increase greens.      Jen Siddiqui CNP  [facility], Electrophysiology  [phone]  EP nurses [phone]

## 2022-03-31 NOTE — PROGRESS NOTES
HEART CARE ENCOUNTER CONSULTATON NOTE      [facility]  [phone]      Assessment/Recommendations   Assessment/Plan:  1.  Permanent Atrial Fibrillation: Asymptomatic.  Some tendency toward RVR seen on pacemaker interrogation.  Metoprolol was recently increased, so continue to monitor.  Continue metoprolol succinate 25 mg daily.      She has a [id number] score of 4 for age >75, female gender, and CAD.   HAS-BLED score of 3 for age >65, bleeding issues, and concomitant need for antiplatelet therapy.  She has recurrent anemia due to likely GI bleed as well as labile INR.  We discussed alternative agents to warfarin, in particular Eliquis due to its lower risk for bleed.  She is not a good candidate for long-term oral anticoagulation.  We discussed left atrial appendage closure with the Watchman device.    We discussed the need for pre-procedure CT or [place] and post procedure [place].  We discussed the implant procedure including the <2% risk for complication including, but not limited to device embolization, air embolism, myocardial perforation, device thrombosis, ASD, stroke, or death.  We discussed expected recovery and follow-up.  We discussed the medication transition from OAC to aspirin and Plavix/Effient to low-dose daily aspirin.  However, due to history of partial gastrectomy and experiences with severe nausea and vomiting with aspirin, including enteric-coated aspirin, she is unwilling or unable to take aspirin which is required for Watchman implant.  We also discussed the possibility of aspirin in conjunction with a PPI.  She is not interested in pursuing left atrial appendage closure with the Watchman device.    2.  Sick sinus syndrome status post pacemaker implant:  The pacemaker was remotely interrogated and is functioning appropriately.  The battery shows estimated longevity of 26 months.  Ventricular lead sensing, impedance, and pacing thresholds are stable and within normal limits.   "    3.  Coronary artery disease: She denies anginal symptoms.  Discussed need for lifelong antiplatelet therapy due to history of CABG and subsequent stents.       History of Present Illness/Subjective    HPI: Constanza Coles is a 86 year old female who comes in today for consult regarding left atrial appendage closure with the Watchman device.  She has a history of permanent atrial fibrillation, sick sinus syndrome status post dual-chamber pacemaker implant, coronary artery disease in s/p CABG and AVR in 2005 and subsequent PCI, last in September 2021, hypercholesterolemia, stage III chronic kidney disease, vasculitis, and recurrent anemia likely due to GI bleed requiring transfusion.  She has refused PillCam or further GI evaluation.  She is a remote history of ulcers and partial gastrectomy.  She remains on warfarin for stroke prophylaxis.  Of note, she has an allergy to aspirin with \"horrible stomach upset\" and vomiting.  She states she is even unable to take enteric-coated aspirin without vomiting.      She states she is feeling fairly well, though continues to have chronic fatigue and dyspnea on exertion.  She occasionally is aware of a rapid heartbeat, but no sustained palpitations.  She denies chest discomfort, abdominal fullness/bloating or peripheral edema, shortness of breath at rest, lightheadedness, or syncope.    Cardiographics (device interrogation personally reviewed):  Pacemaker Interrogation, remote 3/31/2022:  Pacing mode: VVI 50  Presenting rhythm:AF- 90  Underlying rhythm: NA  V-paced 4%.  Battery: estimated longevity 26 months.  Ventricular leads: Stable with good sensing, impedance, and pacing thresholds  Arrhythmias: 2 VHR, EGM's show AF with RVR  Histograms: Good rate distribution, though tendency for RVR    ECHO done 2/19/2021:    Normal left ventricular size with borderline increased wall thickness.    Left ventricle ejection fraction is mildly decreased. The calculated left ventricular " ejection fraction is 50%.    Normal right ventricular size. The systolic function is mildly reduced. TAPSE is abnormal, which is consistent with abnormal right ventricular systolic function. Pacer lead is present in the ventricle.    There is a 21 mm Meng Forte bioprosthetic valve present in the aortic position with normal function.    Mild Calcific mitral stenosis.    Moderate pulmonary hypertension present. The estimated systolic pulmonary artery pressure is 59 mmhg.    The ascending aorta is mildly dilated.    When compared to the previous study dated 3/6/2020, mild mitral stenosis is now identified and pulmonary hypertension is now present.    Cardiac catheterization done 9/27/2021:  LM:no obstruction  LAD:severe disease proximally, fills distally competitively via patent L-LAD and V-D  Lcx:mid-vessel 40-50% narrowing including into OM, distal 95-99% narrowing supplying co-dominant lPDA  RCA:co-dominant, mid-vessel 95% Ca2+ narrowing, AM branch w/ tandem 85% lesions  - multi-vessel CAD involving Lcx (s/p DESx1 today), mRCA, and ostial/proximal L-LAD; low-normal L-sided filling pressures; AV pullback gradient P/M 1/15 but visually no significant gradient by waveform, which would correspond w/ TTE findings  - she can't/refuses to take ASA, but also isn't a good re-do OHS candidate given age and co-morbidities, after discussing w/  and the patient, we proceeded w/ PCI to Lcx today. Eptifibatide boluses X2 given and loaded w/ prasugrel in the cath lab  - if tolerates warfarin/prasugrel, can stage PCI's to RCA and L-LAD  - ideally would continue prasugrel/warfarin indefinitely  - okay to bridge w/ lovenox again on discharge - would give warfarin tonight (ordered), lovenox starting tomorrow (not ordered) and until INR is therapeutic  - continue aggressive risk factor modification    I have reviewed and updated the patient's Past Medical History, Social History, Family History and Medication List.      Physical Examination  Review of Systems   Vitals: /68 (BP Location: Right arm, Patient Position: Sitting, Cuff Size: Adult Small)   Pulse 72   Resp 16   Wt 51.7 kg (114 lb)   BMI 20.85 kg/m    BMI= Body mass index is 20.85 kg/m .  Wt Readings from Last 3 Encounters:   22 51.7 kg (114 lb)   22 53.5 kg (118 lb)   22 54.3 kg (119 lb 11.2 oz)       General Appearance:   Alert, frail-appearing and in no acute distress.   HEENT: Atraumatic, normocephalic.  No scleral icterus, normal conjunctivae, EOMs intact, PERRL.  Wearing a mask.   Chest/Lungs:   Chest symmetric, spine straight.  Respirations unlabored.  Lungs are clear to auscultation.   Cardiovascular:   Regular rate and rhythm.  Normal first and second heart sounds with no murmurs, rubs, or gallops; radial and posterior tibial pulses are intact, No edema.   Abdomen:  Soft, nondistended, bowel sounds present.   Extremities: No cyanosis or clubbing.   Musculoskeletal: Moves all extremities.    Skin: Warm, dry, intact.    Neurologic: Mood and affect are appropriate.  Alert and oriented to person, place, time, and situation.     ROS: 10 point ROS neg other than the symptoms noted above in the HPI.         Medical History  Surgical History Family History Social History   Past Medical History:   Diagnosis Date     Anemia     iron deficiency     Chronic atrial fibrillation (H)      GERD (gastroesophageal reflux disease)      Hypertension      IHD (ischemic heart disease)      PUD (peptic ulcer disease)     [place] 1     Recurrent cystitis      SSS (sick sinus syndrome) (H)     post pacemaker placement     Valvular heart disease      Vasculitis (H)      Past Surgical History:   Procedure Laterality Date     ASCENDING AORTIC ANEURYSM REPAIR W/ TISSUE AORTIC VALVE REPLACEMENT  2005    Dacron graft, Forte pericardial Magna 21mm aortic valve      SECTION       COLONOSCOPY N/A 10/31/2021    Procedure: COLONOSCOPY;  Surgeon: Martha  Dario Noguera MD;  Location: [facility] Main OR     CV CORONARY ANGIOGRAM N/A 2021    Procedure: Coronary Angiogram;  Surgeon: Shane Mcclendon MD;  Location: [facility] CATH LAB CV     CV LEFT HEART CATH N/A 2021    Procedure: Left Heart Cath;  Surgeon: Shane Mcclendon MD;  Location: [facility] LAB CV     CV PCI N/A 2021    Procedure: Percutaneous Coronary Intervention;  Surgeon: Shane Mcclendon MD;  Location: [facility] LAB CV     ESOPHAGOSCOPY, GASTROSCOPY, DUODENOSCOPY (EGD), COMBINED N/A 10/30/2021    Procedure: ESOPHAGOGASTRODUODENOSCOPY (EGD);  Surgeon: Dario Thomas MD;  Location: [facility] Main OR     GALLBLADDER SURGERY      partial colon removal     HYSTERECTOMY       IR AORTIC ARCH 4 VESSEL ANGIOGRAM  9/15/2011     IR MISCELLANEOUS PROCEDURE  2005     OTHER SURGICAL HISTORY      antrectomy     PARTIAL GASTRECTOMY       AR PERC CLOS,BART INTERATRIAL COMMUN W/IMPL      Description: Patent Foramen Ovale Closure Percutaneous;  Recorded: 2012;     ZZC CABG, VEIN, SINGLE      Description: CABG (CABG);  Recorded: 2012;  Comments: LIMA^LAD, SVG^1st diag     ZZC REPLACE AORT VALV,PROSTH VALV      Aortic Valve Replacement with Forte pericardial Magna 21mm 2005     Family History   Problem Relation Age of Onset     Liver Disease Father      No Known Problems Mother      Thyroid Disease Sister      LUNG DISEASE Sister         Social History     Socioeconomic History     Marital status:      Spouse name: Not on file     Number of children: Not on file     Years of education: Not on file     Highest education level: Not on file   Occupational History     Not on file   Tobacco Use     Smoking status: Former Smoker     Packs/day: 1.50     Years: 20.00     Pack years: 30.00     Quit date: 10/29/1979     Years since quittin.4     Smokeless tobacco: Never Used   Substance and Sexual Activity     Alcohol use: Yes     Comment: 1-2 week     Drug use: No      "Sexual activity: Never   Other Topics Concern     Parent/sibling w/ CABG, MI or angioplasty before 65F 55M? Not Asked   Social History Narrative     Not on file     Social Determinants of Health     Financial Resource Strain: Not on file   Food Insecurity: Not on file   Transportation Needs: Not on file   Physical Activity: Not on file   Stress: Not on file   Social Connections: Not on file   Intimate Partner Violence: Not on file   Housing Stability: Not on file           Medications  Allergies   Current Outpatient Medications   Medication Sig Dispense Refill     acetaminophen (TYLENOL) 500 MG tablet Take 1,000 mg by mouth every 6 hours as needed for mild pain       atorvastatin (LIPITOR) 40 MG tablet Take 1 tablet (40 mg) by mouth daily 90 tablet 3     cholecalciferol, vitamin D3, (VITAMIN D3) 5,000 unit Tab [CHOLECALCIFEROL, VITAMIN D3, (VITAMIN D3) 5,000 UNIT TAB] Take 1 tablet by mouth daily.       cyanocobalamin 1,000 mcg/mL injection [CYANOCOBALAMIN 1,000 MCG/ML INJECTION] INJECT 1 ML (1,000 MCG TOTAL) INTO THE SHOULDER, THIGH, OR BUTTOCKS EVERY 30 DAYS. **SYR 27G 1/2\" 1CC** 3 mL 3     gabapentin (NEURONTIN) 300 MG capsule TAKE 1 CAPSULE (300 MG TOTAL) BY MOUTH 2 TIMES A DAY. 180 capsule 3     ketoconazole (NIZORAL) 2 % external shampoo Apply topically daily as needed        metoprolol succinate ER (TOPROL-XL) 25 MG 24 hr tablet Take 1 tablet (25 mg) by mouth daily 90 tablet 2     nitroGLYcerin (NITROSTAT) 0.4 MG sublingual tablet [NITROGLYCERIN (NITROSTAT) 0.4 MG SL TABLET] DISSOLVE 1 TABLET UNDER THE TONGUE AS NEEDED FOR CHEST PAIN 25 tablet 1     prasugrel (EFFIENT) 10 MG TABS tablet Take 1 tablet (10 mg) by mouth daily Do not crush or break tablet. Dose to start tomorrow. 90 tablet 3     sucralfate (CARAFATE) 1 gram tablet [SUCRALFATE (CARAFATE) 1 GRAM TABLET] TAKE 1 TABLET BY MOUTH FOUR TIMES DAILY 120 tablet 11     warfarin ANTICOAGULANT (COUMADIN) 1 MG tablet TAKE 1 TO 2 TABLETS BY MOUTH DAILY, AS " DIRECTED. ADJUST DOSE BASED ON INR RESULTS. 135 tablet 1       Allergies   Allergen Reactions     Aspirin Nausea and Vomiting     Vomiting. Pt will NOT take Aspirin.      Erythromycin Base [Erythromycin] Unknown     Pt is not sure she is allergic to this     Levaquin [Levofloxacin] Nausea and Vomiting     Vancomycin Other (See Comments)     Red man syndrome     Xanax [Alprazolam] Other (See Comments)     confusion     Sulfa (Sulfonamide Antibiotics) [Sulfa Drugs] Rash          Lab Results    Chemistry/lipid CBC Cardiac Enzymes/BNP/TSH/INR   Recent Labs   Lab Test 11/09/21  1349   CHOL 116   HDL 53   LDL 46   TRIG 84     Recent Labs   Lab Test 11/09/21  1349 03/30/21  1315 11/14/19  1207   LDL 46 54 54     Recent Labs   Lab Test 11/09/21  1349      POTASSIUM 4.3   CHLORIDE 102   CO2 29   GLC 96   BUN 12   CR 0.80   GFRESTIMATED 68   MARY 9.6     Recent Labs   Lab Test 11/09/21  1349 10/29/21  1140 09/27/21  0712   CR 0.80 0.76 0.88     No results for input(s): A1C in the last 02012 hours.       Recent Labs   Lab Test 02/22/22  1150 02/08/22  1146 01/25/22  1210   WBC  --   --  8.3   HGB 8.4*   < > 7.3*   HCT  --   --  25.6*   MCV  --   --  81   PLT  --   --  237    < > = values in this interval not displayed.     Recent Labs   Lab Test 02/22/22  1150 02/08/22  1146 01/25/22  1210   HGB 8.4* 8.4* 7.3*    No results for input(s): TROPONINI in the last 89296 hours.  Recent Labs   Lab Test 03/30/21  1315 12/26/19  1205   * 188*     Recent Labs   Lab Test 08/18/21  1626   TSH 2.16     Recent Labs   Lab Test 02/22/22  1151 02/08/22  1146 01/28/22  1117   INR 2.5* 2.1* 2.2*      50 minutes were spent on the date of encounter performing chart review, history and exam, documentation, and further activities as noted above.

## 2022-04-01 LAB
MDC_IDC_LEAD_IMPLANT_DT: NORMAL
MDC_IDC_LEAD_IMPLANT_DT: NORMAL
MDC_IDC_LEAD_LOCATION: NORMAL
MDC_IDC_LEAD_LOCATION: NORMAL
MDC_IDC_LEAD_MFG: NORMAL
MDC_IDC_LEAD_MFG: NORMAL
MDC_IDC_LEAD_MODEL: NORMAL
MDC_IDC_LEAD_MODEL: NORMAL
MDC_IDC_LEAD_POLARITY_TYPE: NORMAL
MDC_IDC_LEAD_POLARITY_TYPE: NORMAL
MDC_IDC_LEAD_SERIAL: NORMAL
MDC_IDC_LEAD_SERIAL: NORMAL
MDC_IDC_MSMT_BATTERY_DTM: NORMAL
MDC_IDC_MSMT_BATTERY_IMPEDANCE: 3216 OHM
MDC_IDC_MSMT_BATTERY_REMAINING_LONGEVITY: 26 MO
MDC_IDC_MSMT_BATTERY_STATUS: NORMAL
MDC_IDC_MSMT_BATTERY_VOLTAGE: 2.74 V
MDC_IDC_MSMT_LEADCHNL_RA_IMPEDANCE_VALUE: 67 OHM
MDC_IDC_MSMT_LEADCHNL_RV_IMPEDANCE_VALUE: 422 OHM
MDC_IDC_MSMT_LEADCHNL_RV_PACING_THRESHOLD_AMPLITUDE: 0.5 V
MDC_IDC_MSMT_LEADCHNL_RV_PACING_THRESHOLD_PULSEWIDTH: 0.4 MS
MDC_IDC_MSMT_LEADCHNL_RV_SENSING_INTR_AMPL: 8 MV
MDC_IDC_PG_IMPLANT_DTM: NORMAL
MDC_IDC_PG_MFG: NORMAL
MDC_IDC_PG_MODEL: NORMAL
MDC_IDC_PG_SERIAL: NORMAL
MDC_IDC_PG_TYPE: NORMAL
MDC_IDC_SESS_CLINIC_NAME: NORMAL
MDC_IDC_SESS_DTM: NORMAL
MDC_IDC_SESS_TYPE: NORMAL
MDC_IDC_SET_BRADY_HYSTRATE: NORMAL
MDC_IDC_SET_BRADY_LOWRATE: 50 {BEATS}/MIN
MDC_IDC_SET_BRADY_MAX_TRACKING_RATE: 110 {BEATS}/MIN
MDC_IDC_SET_BRADY_MODE: NORMAL
MDC_IDC_SET_LEADCHNL_RV_PACING_AMPLITUDE: 1.25 V
MDC_IDC_SET_LEADCHNL_RV_PACING_CAPTURE_MODE: NORMAL
MDC_IDC_SET_LEADCHNL_RV_PACING_POLARITY: NORMAL
MDC_IDC_SET_LEADCHNL_RV_PACING_PULSEWIDTH: 0.4 MS
MDC_IDC_SET_LEADCHNL_RV_SENSING_POLARITY: NORMAL
MDC_IDC_SET_LEADCHNL_RV_SENSING_SENSITIVITY: 2.8 MV
MDC_IDC_SET_ZONE_DETECTION_INTERVAL: 333.33 MS
MDC_IDC_SET_ZONE_TYPE: NORMAL
MDC_IDC_SET_ZONE_TYPE: NORMAL
MDC_IDC_STAT_AT_BURDEN_PERCENT: 0 %
MDC_IDC_STAT_AT_DTM_END: NORMAL
MDC_IDC_STAT_AT_DTM_START: NORMAL
MDC_IDC_STAT_BRADY_DTM_END: NORMAL
MDC_IDC_STAT_BRADY_DTM_START: NORMAL
MDC_IDC_STAT_BRADY_RV_PERCENT_PACED: 4 %
MDC_IDC_STAT_EPISODE_RECENT_COUNT: 0
MDC_IDC_STAT_EPISODE_RECENT_COUNT: 2
MDC_IDC_STAT_EPISODE_RECENT_COUNT_DTM_END: NORMAL
MDC_IDC_STAT_EPISODE_RECENT_COUNT_DTM_END: NORMAL
MDC_IDC_STAT_EPISODE_RECENT_COUNT_DTM_START: NORMAL
MDC_IDC_STAT_EPISODE_RECENT_COUNT_DTM_START: NORMAL
MDC_IDC_STAT_EPISODE_TYPE: NORMAL
MDC_IDC_STAT_EPISODE_TYPE: NORMAL

## 2022-04-01 RX ORDER — CYANOCOBALAMIN 1000 UG/ML
INJECTION, SOLUTION INTRAMUSCULAR; SUBCUTANEOUS
Qty: 3 ML | Refills: 3 | Status: SHIPPED | OUTPATIENT
Start: 2022-04-01 | End: 2023-05-05

## 2022-04-01 NOTE — TELEPHONE ENCOUNTER
"Routing refill request to provider for review/approval because:  Due to medication information not transferring due to [facility] please review the medication information prior to signing to ensure accuracy.    Last Written Prescription Date:  2/23/2021  Last Fill Quantity: 3 mL,  # refills: 3   Last office visit provider:  3/4/2022 Dr. Jacinto     cyanocobalamin 1,000 mcg/mL injection 3 mL 3 2/23/2021  No   Sig: INJECT 1 ML (1,000 MCG TOTAL) INTO THE SHOULDER, THIGH, OR BUTTOCKS EVERY 30 DAYS. **SYR 27G 1/2\" 1CC**   Sent to pharmacy as: cyanocobalamin (vit B-12) 1,000 mcg/mL injection solution   E-Prescribing Status: Receipt confirmed by pharmacy (2/23/2021  1:31 PM CS         Requested Prescriptions   Pending Prescriptions Disp Refills     cyanocobalamin (CYANOCOBALAMIN) 1000 MCG/ML injection 3 mL 3       Vitamin Supplements (Adult) Protocol Passed - 3/29/2022  4:50 PM        Passed - High dose Vitamin D not ordered        Passed - Recent (12 mo) or future (30 days) visit within the authorizing provider's specialty     Patient has had an office visit with the authorizing provider or a provider within the authorizing providers department within the previous 12 mos or has a future within next 30 days. See \"Patient Info\" tab in inbasket, or \"Choose Columns\" in Meds & Orders section of the refill encounter.              Passed - Medication is active on med list             Eun Larios RN 04/01/22 8:35 AM  "

## 2022-04-04 ENCOUNTER — TELEPHONE (OUTPATIENT)
Dept: INTERNAL MEDICINE | Facility: CLINIC | Age: 87
End: 2022-04-04
Payer: COMMERCIAL

## 2022-04-04 DIAGNOSIS — D50.0 IRON DEFICIENCY ANEMIA DUE TO CHRONIC BLOOD LOSS: Primary | ICD-10-CM

## 2022-04-04 NOTE — TELEPHONE ENCOUNTER
Call patient her iron hemoglobin continues to drop she continues to have some mild intermittent dark maroon stools.  Of note her upper and lower colonoscopies did not show any significant source of bleeding and neither did the probe capsule endoscopy.  She has received 1 blood transfusion.  She is not taking oral iron.  But is taking B12 injections.  She is to get her iron panel done this time but did not get it done for some reason I reorder tests for full iron panel B12 she is coming in for INR and then we can see if she can benefit from iron infusions.  Meanwhile an Eliquis prescription was sent to [facility] to see if an switched from Coumadin to Eliquis.  If that will maybe mitigate her bleeding episodes.  The other option is a watchman procedure but she still is thinking about that

## 2022-04-04 NOTE — TELEPHONE ENCOUNTER
Reason for Call:  Request for results:    Name of test or procedure: Lab Hemoglobin test    Date of test of procedure: 3/31    Location of the test or procedure: [place] [facility]    OK to leave the result message on voice mail or with a family member? YES    Phone number Patient can be reached at:  Home number on file [phone] (home)    Additional comments: pt is very worried as it is low and she is not sure what to do.    Call taken on 4/4/2022 at 10:18 AM by Pam J. Behr

## 2022-04-05 ENCOUNTER — LAB (OUTPATIENT)
Dept: LAB | Facility: CLINIC | Age: 87
End: 2022-04-05
Payer: COMMERCIAL

## 2022-04-05 ENCOUNTER — ANTICOAGULATION THERAPY VISIT (OUTPATIENT)
Dept: ANTICOAGULATION | Facility: CLINIC | Age: 87
End: 2022-04-05

## 2022-04-05 DIAGNOSIS — D50.0 IRON DEFICIENCY ANEMIA DUE TO CHRONIC BLOOD LOSS: ICD-10-CM

## 2022-04-05 DIAGNOSIS — Z95.2 H/O AORTIC VALVE REPLACEMENT: ICD-10-CM

## 2022-04-05 DIAGNOSIS — I48.20 CHRONIC ATRIAL FIBRILLATION (H): ICD-10-CM

## 2022-04-05 DIAGNOSIS — N18.30 CHRONIC KIDNEY DISEASE, STAGE 3 (H): ICD-10-CM

## 2022-04-05 DIAGNOSIS — I48.11 LONGSTANDING PERSISTENT ATRIAL FIBRILLATION (H): Primary | ICD-10-CM

## 2022-04-05 LAB
CREAT UR-MCNC: 22 MG/DL
ERYTHROCYTE [DISTWIDTH] IN BLOOD BY AUTOMATED COUNT: 20 % (ref 10–15)
FERRITIN SERPL-MCNC: 16 NG/ML (ref 10–130)
HCT VFR BLD AUTO: 26.6 % (ref 35–47)
HGB BLD-MCNC: 7.7 G/DL (ref 11.7–15.7)
INR BLD: 2.2 (ref 0.9–1.1)
IRON SATN MFR SERPL: 5 % (ref 20–50)
IRON SERPL-MCNC: 23 UG/DL (ref 42–175)
MCH RBC QN AUTO: 22.4 PG (ref 26.5–33)
MCHC RBC AUTO-ENTMCNC: 28.9 G/DL (ref 31.5–36.5)
MCV RBC AUTO: 78 FL (ref 78–100)
MICROALBUMIN UR-MCNC: 2 MG/DL (ref 0–1.99)
MICROALBUMIN/CREAT UR: 90.9 MG/G CR
PLATELET # BLD AUTO: 247 10E3/UL (ref 150–450)
RBC # BLD AUTO: 3.43 10E6/UL (ref 3.8–5.2)
TIBC SERPL-MCNC: 431 UG/DL (ref 313–563)
TRANSFERRIN SERPL-MCNC: 345 MG/DL (ref 212–360)
WBC # BLD AUTO: 6.4 10E3/UL (ref 4–11)

## 2022-04-05 PROCEDURE — 85610 PROTHROMBIN TIME: CPT

## 2022-04-05 PROCEDURE — 36416 COLLJ CAPILLARY BLOOD SPEC: CPT

## 2022-04-05 PROCEDURE — 83921 ORGANIC ACID SINGLE QUANT: CPT

## 2022-04-05 PROCEDURE — 36415 COLL VENOUS BLD VENIPUNCTURE: CPT

## 2022-04-05 PROCEDURE — 83540 ASSAY OF IRON: CPT

## 2022-04-05 PROCEDURE — 82043 UR ALBUMIN QUANTITATIVE: CPT

## 2022-04-05 PROCEDURE — 85027 COMPLETE CBC AUTOMATED: CPT

## 2022-04-05 PROCEDURE — 82728 ASSAY OF FERRITIN: CPT

## 2022-04-05 PROCEDURE — 84466 ASSAY OF TRANSFERRIN: CPT

## 2022-04-05 NOTE — PROGRESS NOTES
ANTICOAGULATION MANAGEMENT     Constanza Coles 86 year old female is on warfarin with therapeutic INR result. (Goal INR 2.0-3.0)    Recent labs: (last 7 days)     04/05/22  1203   INR 2.2*       ASSESSMENT       Source(s): Chart Review, Patient/Caregiver Call and Template       Warfarin doses taken: Warfarin taken as instructed including held doses last week.     Diet: much improved since last week    New illness, injury, or hospitalization: No    Medication/supplement changes: she was able to  the eliquis RX sent yesterday and inquires how to start this. Note sent to [facility] pharmacist  to advise on transition plan    Signs or symptoms of bleeding or clotting: No    Previous INR: Supratherapeutic    Additional findings: None       PLAN     Recommended plan for no diet, medication or health factor changes affecting INR     Dosing Instructions: Stop warfarin. Awaiting instruction on transition to eliquis       Summary  As of 4/5/2022    Full warfarin instructions:  2 mg every Tue; 1 mg all other days   Next INR check:  4/12/2022             Telephone call with Constanza who verbalizes understanding and agrees to plan         Education provided: Goal range and significance of current result    Plan made per [facility] anticoagulation protocol    Vianey Heart RN  Anticoagulation Clinic  4/5/2022    _______________________________________________________________________     Anticoagulation Episode Summary     Current INR goal:  2.0-3.0   TTR:  70.2 % (1 y)   Target end date:  Indefinite   Send INR reminders to:  [facility]    Indications    Atrial fibrillation (H) [I48.91]  H/O aortic valve replacement [Z95.2]  Chronic atrial fibrillation (H) [I48.20]           Comments:           Anticoagulation Care Providers     Provider Role Specialty Phone number    Hayder Bailey MD Referring Internal Medicine [phone]    Yun Jacinto MD Referring Internal Medicine [phone]

## 2022-04-05 NOTE — PROGRESS NOTES
Anticoagulation Management    Constanza Coles, 86 year old, female is on warfarin. Requested to review for conversion to novel anticoagulant.    Indication for anticoagulation:  Atrial Fibrillation    Goal INR Range: 2-3     Pertinent hx:   21 mm Meng Forte bioprosthetic valve (2005) - DOAC can be considered >3 months from placement per 2020 ACC/AHA vavular heart guidelines    Wt Readings from Last 2 Encounters:   03/31/22 51.7 kg (114 lb)   03/04/22 53.5 kg (118 lb)      Lab Results   Component Value Date    INR 2.2 (H) 04/05/2022    INR 4.6 (A) 03/31/2022     Lab Results   Component Value Date    CR 0.80 11/09/2021    CR 0.76 10/29/2021         Current Outpatient Medications   Medication     acetaminophen (TYLENOL) 500 MG tablet     apixaban ANTICOAGULANT (ELIQUIS ANTICOAGULANT) 5 MG tablet     atorvastatin (LIPITOR) 40 MG tablet     cholecalciferol, vitamin D3, (VITAMIN D3) 5,000 unit Tab     cyanocobalamin (CYANOCOBALAMIN) 1000 MCG/ML injection     gabapentin (NEURONTIN) 300 MG capsule     ketoconazole (NIZORAL) 2 % external shampoo     metoprolol succinate ER (TOPROL-XL) 25 MG 24 hr tablet     nitroGLYcerin (NITROSTAT) 0.4 MG sublingual tablet     prasugrel (EFFIENT) 10 MG TABS tablet     sucralfate (CARAFATE) 1 gram tablet     warfarin ANTICOAGULANT (COUMADIN) 1 MG tablet     No current facility-administered medications for this visit.           Recommendation     Recommend to reduce dose of Apixaban to 2.5 mg BID for age >= 80 and weight <= 60 kg with indication Afib.      Okay to split  5 mg tablets with supply picked up; new Rx can be sent for 2.5 tablets for future refills    To transition to apixaban with INR 2.2 today, recommend to hold warfarin x 2 doses then start Apixaban    Bri Cope, [facility]

## 2022-04-07 ENCOUNTER — TELEPHONE (OUTPATIENT)
Dept: INTERNAL MEDICINE | Facility: CLINIC | Age: 87
End: 2022-04-07
Payer: COMMERCIAL

## 2022-04-07 ENCOUNTER — DOCUMENTATION ONLY (OUTPATIENT)
Dept: ANTICOAGULATION | Facility: CLINIC | Age: 87
End: 2022-04-07
Payer: COMMERCIAL

## 2022-04-07 DIAGNOSIS — I48.20 CHRONIC ATRIAL FIBRILLATION (H): ICD-10-CM

## 2022-04-07 DIAGNOSIS — I48.91 ATRIAL FIBRILLATION (H): Primary | ICD-10-CM

## 2022-04-07 DIAGNOSIS — K92.1 MELENA: Primary | ICD-10-CM

## 2022-04-07 DIAGNOSIS — D62 ANEMIA DUE TO BLOOD LOSS, ACUTE: ICD-10-CM

## 2022-04-07 DIAGNOSIS — Z95.2 H/O AORTIC VALVE REPLACEMENT: ICD-10-CM

## 2022-04-07 NOTE — PROGRESS NOTES
ANTICOAGULATION  MANAGEMENT    Constanza Coles is being discharged from the [facility]ticoagulation [facility] Program ([facility]).    Reason for discharge: warfarin therapy completed taking Eliquis    Anticoagulation episode resolved and INR Standing order discontinued    If patient needs warfarin management in the future, please send a new referral    Karol Spence RN

## 2022-04-07 NOTE — TELEPHONE ENCOUNTER
Reason for Call:  Other call back    Detailed comments: pt called in for her lab results from 4/5 and has additional questions.  I did read to her:  Jacey Apodaca, the iron levels are low although they are not critically low.  I will set you up for iron infusions.  You can also take oral iron and give it another trial if that is more convenient for you.  Please let me know    She said she cant take the oral iron and is confused why Dr Jacinto would say that.  She has questions.    She was also confused on her Eliquis as well and I did transfer her to INR nurse as they did try to call her today.    Phone Number Patient can be reached at: Home number on file [phone] (home)    Best Time: any    Can we leave a detailed message on this number? NO-forgot to ask    Call taken on 4/7/2022 at 3:53 PM by Pam J. Behr

## 2022-04-07 NOTE — PROGRESS NOTES
Constanza stopped warfarin Monday and started  the eliquis the next day. We reviewed that she should  take 2.5 mg (1/2 tab) am and pm daily. I let her know we can switch the rx to 2.5 mg tabs next refill.  I asked that she call us if any questions arise.  She is happy not to watch her diet or come for inrs.  Will discontinue her acc referral.

## 2022-04-07 NOTE — PROGRESS NOTES
Received approval to adjust apixaban dose from Yun Diego MD  You Yesterday (6:43 AM)     Yes . She continues to be anemic as well and have unexplained intermittent GI loss so lower dose is probably a better option anyway thanks

## 2022-04-08 RX ORDER — EPINEPHRINE 1 MG/ML
0.3 INJECTION, SOLUTION, CONCENTRATE INTRAVENOUS EVERY 5 MIN PRN
Status: CANCELLED | OUTPATIENT
Start: 2022-04-11

## 2022-04-08 RX ORDER — NALOXONE HYDROCHLORIDE 0.4 MG/ML
0.2 INJECTION, SOLUTION INTRAMUSCULAR; INTRAVENOUS; SUBCUTANEOUS
Status: CANCELLED | OUTPATIENT
Start: 2022-04-11

## 2022-04-08 RX ORDER — DIPHENHYDRAMINE HYDROCHLORIDE 50 MG/ML
50 INJECTION INTRAMUSCULAR; INTRAVENOUS
Status: CANCELLED
Start: 2022-04-11

## 2022-04-08 RX ORDER — ALBUTEROL SULFATE 0.83 MG/ML
2.5 SOLUTION RESPIRATORY (INHALATION)
Status: CANCELLED | OUTPATIENT
Start: 2022-04-11

## 2022-04-08 RX ORDER — ALBUTEROL SULFATE 90 UG/1
1-2 AEROSOL, METERED RESPIRATORY (INHALATION)
Status: CANCELLED
Start: 2022-04-11

## 2022-04-08 RX ORDER — HEPARIN SODIUM (PORCINE) LOCK FLUSH IV SOLN 100 UNIT/ML 100 UNIT/ML
5 SOLUTION INTRAVENOUS
Status: CANCELLED | OUTPATIENT
Start: 2022-04-11

## 2022-04-08 RX ORDER — HEPARIN SODIUM,PORCINE 10 UNIT/ML
5 VIAL (ML) INTRAVENOUS
Status: CANCELLED | OUTPATIENT
Start: 2022-04-11

## 2022-04-08 RX ORDER — MEPERIDINE HYDROCHLORIDE 25 MG/ML
25 INJECTION INTRAMUSCULAR; INTRAVENOUS; SUBCUTANEOUS EVERY 30 MIN PRN
Status: CANCELLED | OUTPATIENT
Start: 2022-04-11

## 2022-04-08 RX ORDER — METHYLPREDNISOLONE SODIUM SUCCINATE 125 MG/2ML
125 INJECTION, POWDER, LYOPHILIZED, FOR SOLUTION INTRAMUSCULAR; INTRAVENOUS
Status: CANCELLED
Start: 2022-04-11

## 2022-04-08 NOTE — TELEPHONE ENCOUNTER
I have ordered the iron infusions.  She should contact anticoagulation nurse to transfer to [facility].  Please follow-up that the iron infusions were ordered correctly throughout the therapy tab.  And let patients know to expect a phone call.  I would like her to check her hemoglobin again in 1 month

## 2022-04-12 LAB — METHYLMALONATE SERPL-SCNC: 0.2 UMOL/L (ref 0–0.4)

## 2022-04-13 NOTE — TELEPHONE ENCOUNTER
I see that her iron infusions have been scheduled, starting tomorrow.  It looks like she has already transitioned to Eliquis as of April 5.   Milly called re: pt needs surgery scheduled for 4/3 and have questions about medications  please call to advise

## 2022-04-14 ENCOUNTER — INFUSION THERAPY VISIT (OUTPATIENT)
Dept: INFUSION THERAPY | Facility: HOSPITAL | Age: 87
End: 2022-04-14
Attending: INTERNAL MEDICINE
Payer: COMMERCIAL

## 2022-04-14 VITALS
SYSTOLIC BLOOD PRESSURE: 145 MMHG | OXYGEN SATURATION: 98 % | DIASTOLIC BLOOD PRESSURE: 56 MMHG | RESPIRATION RATE: 16 BRPM | HEART RATE: 96 BPM | TEMPERATURE: 97.7 F

## 2022-04-14 DIAGNOSIS — K92.1 MELENA: Primary | ICD-10-CM

## 2022-04-14 DIAGNOSIS — D62 ANEMIA DUE TO BLOOD LOSS, ACUTE: ICD-10-CM

## 2022-04-14 PROCEDURE — 96366 THER/PROPH/DIAG IV INF ADDON: CPT

## 2022-04-14 PROCEDURE — 250N000011 HC RX IP 250 OP 636: Performed by: INTERNAL MEDICINE

## 2022-04-14 PROCEDURE — 258N000003 HC RX IP 258 OP 636: Performed by: INTERNAL MEDICINE

## 2022-04-14 PROCEDURE — 96365 THER/PROPH/DIAG IV INF INIT: CPT

## 2022-04-14 RX ORDER — DIPHENHYDRAMINE HYDROCHLORIDE 50 MG/ML
50 INJECTION INTRAMUSCULAR; INTRAVENOUS
Status: CANCELLED
Start: 2022-04-16

## 2022-04-14 RX ORDER — EPINEPHRINE 1 MG/ML
0.3 INJECTION, SOLUTION INTRAMUSCULAR; SUBCUTANEOUS EVERY 5 MIN PRN
Status: CANCELLED | OUTPATIENT
Start: 2022-04-16

## 2022-04-14 RX ORDER — DIPHENHYDRAMINE HYDROCHLORIDE 50 MG/ML
50 INJECTION INTRAMUSCULAR; INTRAVENOUS
Status: DISCONTINUED | OUTPATIENT
Start: 2022-04-14 | End: 2022-04-14 | Stop reason: HOSPADM

## 2022-04-14 RX ORDER — ALBUTEROL SULFATE 0.83 MG/ML
2.5 SOLUTION RESPIRATORY (INHALATION)
Status: CANCELLED | OUTPATIENT
Start: 2022-04-16

## 2022-04-14 RX ORDER — NALOXONE HYDROCHLORIDE 0.4 MG/ML
0.2 INJECTION, SOLUTION INTRAMUSCULAR; INTRAVENOUS; SUBCUTANEOUS
Status: DISCONTINUED | OUTPATIENT
Start: 2022-04-14 | End: 2022-04-14 | Stop reason: HOSPADM

## 2022-04-14 RX ORDER — ALBUTEROL SULFATE 90 UG/1
1-2 AEROSOL, METERED RESPIRATORY (INHALATION)
Status: DISCONTINUED | OUTPATIENT
Start: 2022-04-14 | End: 2022-04-14 | Stop reason: HOSPADM

## 2022-04-14 RX ORDER — METHYLPREDNISOLONE SODIUM SUCCINATE 125 MG/2ML
125 INJECTION, POWDER, LYOPHILIZED, FOR SOLUTION INTRAMUSCULAR; INTRAVENOUS
Status: CANCELLED
Start: 2022-04-16

## 2022-04-14 RX ORDER — METHYLPREDNISOLONE SODIUM SUCCINATE 125 MG/2ML
125 INJECTION, POWDER, LYOPHILIZED, FOR SOLUTION INTRAMUSCULAR; INTRAVENOUS
Status: DISCONTINUED | OUTPATIENT
Start: 2022-04-14 | End: 2022-04-14 | Stop reason: HOSPADM

## 2022-04-14 RX ORDER — ALBUTEROL SULFATE 0.83 MG/ML
2.5 SOLUTION RESPIRATORY (INHALATION)
Status: DISCONTINUED | OUTPATIENT
Start: 2022-04-14 | End: 2022-04-14 | Stop reason: HOSPADM

## 2022-04-14 RX ORDER — HEPARIN SODIUM,PORCINE 10 UNIT/ML
5 VIAL (ML) INTRAVENOUS
Status: CANCELLED | OUTPATIENT
Start: 2022-04-16

## 2022-04-14 RX ORDER — HEPARIN SODIUM (PORCINE) LOCK FLUSH IV SOLN 100 UNIT/ML 100 UNIT/ML
5 SOLUTION INTRAVENOUS
Status: CANCELLED | OUTPATIENT
Start: 2022-04-16

## 2022-04-14 RX ORDER — MEPERIDINE HYDROCHLORIDE 25 MG/ML
25 INJECTION INTRAMUSCULAR; INTRAVENOUS; SUBCUTANEOUS EVERY 30 MIN PRN
Status: CANCELLED | OUTPATIENT
Start: 2022-04-16

## 2022-04-14 RX ORDER — MEPERIDINE HYDROCHLORIDE 25 MG/ML
25 INJECTION INTRAMUSCULAR; INTRAVENOUS; SUBCUTANEOUS EVERY 30 MIN PRN
Status: DISCONTINUED | OUTPATIENT
Start: 2022-04-14 | End: 2022-04-14 | Stop reason: HOSPADM

## 2022-04-14 RX ORDER — NALOXONE HYDROCHLORIDE 0.4 MG/ML
0.2 INJECTION, SOLUTION INTRAMUSCULAR; INTRAVENOUS; SUBCUTANEOUS
Status: CANCELLED | OUTPATIENT
Start: 2022-04-16

## 2022-04-14 RX ORDER — EPINEPHRINE 1 MG/ML
0.3 INJECTION, SOLUTION INTRAMUSCULAR; SUBCUTANEOUS EVERY 5 MIN PRN
Status: DISCONTINUED | OUTPATIENT
Start: 2022-04-14 | End: 2022-04-14 | Stop reason: HOSPADM

## 2022-04-14 RX ORDER — ALBUTEROL SULFATE 90 UG/1
1-2 AEROSOL, METERED RESPIRATORY (INHALATION)
Status: CANCELLED
Start: 2022-04-16

## 2022-04-14 RX ADMIN — SODIUM CHLORIDE 250 ML: 9 INJECTION, SOLUTION INTRAVENOUS at 12:41

## 2022-04-14 RX ADMIN — IRON SUCROSE 300 MG: 20 INJECTION, SOLUTION INTRAVENOUS at 12:42

## 2022-04-14 NOTE — PROGRESS NOTES
Infusion Nursing Note:  Constanzamedina Coles presents today for Iron Sucrose.    Patient seen by provider today: No   present during visit today: Not Applicable.    Note: N/A.      Intravenous Access:  Peripheral IV placed.    Treatment Conditions:  Not Applicable.      Post Infusion Assessment:  Patient tolerated infusion without incident.       Discharge Plan:   Patient discharged in stable condition accompanied by: self.  Departure Mode: Ambulatory.      Kristine Garvey RN

## 2022-04-19 DIAGNOSIS — K21.9 GASTROESOPHAGEAL REFLUX DISEASE: ICD-10-CM

## 2022-04-21 ENCOUNTER — INFUSION THERAPY VISIT (OUTPATIENT)
Dept: INFUSION THERAPY | Facility: HOSPITAL | Age: 87
End: 2022-04-21
Attending: INTERNAL MEDICINE
Payer: COMMERCIAL

## 2022-04-21 VITALS
OXYGEN SATURATION: 100 % | SYSTOLIC BLOOD PRESSURE: 156 MMHG | RESPIRATION RATE: 18 BRPM | DIASTOLIC BLOOD PRESSURE: 77 MMHG | HEART RATE: 90 BPM | TEMPERATURE: 97.7 F

## 2022-04-21 DIAGNOSIS — D62 ANEMIA DUE TO BLOOD LOSS, ACUTE: ICD-10-CM

## 2022-04-21 DIAGNOSIS — K92.1 MELENA: Primary | ICD-10-CM

## 2022-04-21 PROCEDURE — 96365 THER/PROPH/DIAG IV INF INIT: CPT

## 2022-04-21 PROCEDURE — 258N000003 HC RX IP 258 OP 636: Performed by: INTERNAL MEDICINE

## 2022-04-21 PROCEDURE — 96366 THER/PROPH/DIAG IV INF ADDON: CPT

## 2022-04-21 PROCEDURE — 250N000011 HC RX IP 250 OP 636: Performed by: INTERNAL MEDICINE

## 2022-04-21 RX ORDER — DIPHENHYDRAMINE HYDROCHLORIDE 50 MG/ML
50 INJECTION INTRAMUSCULAR; INTRAVENOUS
Status: DISCONTINUED | OUTPATIENT
Start: 2022-04-21 | End: 2022-04-21 | Stop reason: HOSPADM

## 2022-04-21 RX ORDER — NALOXONE HYDROCHLORIDE 0.4 MG/ML
0.2 INJECTION, SOLUTION INTRAMUSCULAR; INTRAVENOUS; SUBCUTANEOUS
Status: CANCELLED | OUTPATIENT
Start: 2022-04-22

## 2022-04-21 RX ORDER — METHYLPREDNISOLONE SODIUM SUCCINATE 125 MG/2ML
125 INJECTION, POWDER, LYOPHILIZED, FOR SOLUTION INTRAMUSCULAR; INTRAVENOUS
Status: CANCELLED
Start: 2022-04-22

## 2022-04-21 RX ORDER — ALBUTEROL SULFATE 0.83 MG/ML
2.5 SOLUTION RESPIRATORY (INHALATION)
Status: CANCELLED | OUTPATIENT
Start: 2022-04-22

## 2022-04-21 RX ORDER — EPINEPHRINE 1 MG/ML
0.3 INJECTION, SOLUTION INTRAMUSCULAR; SUBCUTANEOUS EVERY 5 MIN PRN
Status: DISCONTINUED | OUTPATIENT
Start: 2022-04-21 | End: 2022-04-21 | Stop reason: HOSPADM

## 2022-04-21 RX ORDER — HEPARIN SODIUM (PORCINE) LOCK FLUSH IV SOLN 100 UNIT/ML 100 UNIT/ML
5 SOLUTION INTRAVENOUS
Status: CANCELLED | OUTPATIENT
Start: 2022-04-22

## 2022-04-21 RX ORDER — ALBUTEROL SULFATE 90 UG/1
1-2 AEROSOL, METERED RESPIRATORY (INHALATION)
Status: DISCONTINUED | OUTPATIENT
Start: 2022-04-21 | End: 2022-04-21 | Stop reason: HOSPADM

## 2022-04-21 RX ORDER — METHYLPREDNISOLONE SODIUM SUCCINATE 125 MG/2ML
125 INJECTION, POWDER, LYOPHILIZED, FOR SOLUTION INTRAMUSCULAR; INTRAVENOUS
Status: DISCONTINUED | OUTPATIENT
Start: 2022-04-21 | End: 2022-04-21 | Stop reason: HOSPADM

## 2022-04-21 RX ORDER — ALBUTEROL SULFATE 0.83 MG/ML
2.5 SOLUTION RESPIRATORY (INHALATION)
Status: DISCONTINUED | OUTPATIENT
Start: 2022-04-21 | End: 2022-04-21 | Stop reason: HOSPADM

## 2022-04-21 RX ORDER — ALBUTEROL SULFATE 90 UG/1
1-2 AEROSOL, METERED RESPIRATORY (INHALATION)
Status: CANCELLED
Start: 2022-04-22

## 2022-04-21 RX ORDER — DIPHENHYDRAMINE HYDROCHLORIDE 50 MG/ML
50 INJECTION INTRAMUSCULAR; INTRAVENOUS
Status: CANCELLED
Start: 2022-04-22

## 2022-04-21 RX ORDER — NALOXONE HYDROCHLORIDE 0.4 MG/ML
0.2 INJECTION, SOLUTION INTRAMUSCULAR; INTRAVENOUS; SUBCUTANEOUS
Status: DISCONTINUED | OUTPATIENT
Start: 2022-04-21 | End: 2022-04-21 | Stop reason: HOSPADM

## 2022-04-21 RX ORDER — MEPERIDINE HYDROCHLORIDE 25 MG/ML
25 INJECTION INTRAMUSCULAR; INTRAVENOUS; SUBCUTANEOUS EVERY 30 MIN PRN
Status: CANCELLED | OUTPATIENT
Start: 2022-04-22

## 2022-04-21 RX ORDER — HEPARIN SODIUM,PORCINE 10 UNIT/ML
5 VIAL (ML) INTRAVENOUS
Status: CANCELLED | OUTPATIENT
Start: 2022-04-22

## 2022-04-21 RX ORDER — EPINEPHRINE 1 MG/ML
0.3 INJECTION, SOLUTION INTRAMUSCULAR; SUBCUTANEOUS EVERY 5 MIN PRN
Status: CANCELLED | OUTPATIENT
Start: 2022-04-22

## 2022-04-21 RX ORDER — MEPERIDINE HYDROCHLORIDE 25 MG/ML
25 INJECTION INTRAMUSCULAR; INTRAVENOUS; SUBCUTANEOUS EVERY 30 MIN PRN
Status: DISCONTINUED | OUTPATIENT
Start: 2022-04-21 | End: 2022-04-21 | Stop reason: HOSPADM

## 2022-04-21 RX ADMIN — SODIUM CHLORIDE 250 ML: 9 INJECTION, SOLUTION INTRAVENOUS at 13:00

## 2022-04-21 RX ADMIN — IRON SUCROSE 300 MG: 20 INJECTION, SOLUTION INTRAVENOUS at 13:16

## 2022-04-21 NOTE — PROGRESS NOTES
Infusion Nursing Note:  Constanza Coles presents today for Iron Sucrose 300mg, #2.    Patient seen by provider today: No   present during visit today: Not Applicable.    Note: states she tolerated previous dose without issues. Still feeling fatigued, no improvement      Intravenous Access:  Peripheral IV placed L AC at pt request.    Treatment Conditions:  Iron sucrose 300mg infused as ordered      Post Infusion Assessment:  Patient tolerated infusion without incident.       Discharge Plan:   Patient discharged in stable condition accompanied by: self.  Departure Mode: Ambulatory.      Becky Niño RN

## 2022-04-22 RX ORDER — SUCRALFATE 1 G/1
TABLET ORAL
Qty: 360 TABLET | Refills: 3 | Status: SHIPPED | OUTPATIENT
Start: 2022-04-22 | End: 2023-08-23

## 2022-04-22 NOTE — TELEPHONE ENCOUNTER
"Last Written Prescription Date:  4/15/21  Last Fill Quantity: 120,  # refills: 11   Last office visit provider:  3/4/22     Requested Prescriptions   Pending Prescriptions Disp Refills     sucralfate (CARAFATE) 1 GM tablet [Pharmacy Med Name: SUCRALFATE 1 GM TABS 1 Tablet] 120 tablet 11     Sig: TAKE 1 TABLET BY MOUTH FOUR TIMES DAILY       Miscellaneous Gastrointestinal Agents Passed - 4/22/2022  3:25 PM        Passed - Recent (12 mo) or future (30 days) visit within the authorizing provider's specialty     Patient has had an office visit with the authorizing provider or a provider within the authorizing providers department within the previous 12 mos or has a future within next 30 days. See \"Patient Info\" tab in inbasket, or \"Choose Columns\" in Meds & Orders section of the refill encounter.              Passed - Medication is active on med list        Passed - Patient is 18 years of age or older             Gray Bethea RN 04/22/22 3:26 PM  "

## 2022-04-28 ENCOUNTER — INFUSION THERAPY VISIT (OUTPATIENT)
Dept: INFUSION THERAPY | Facility: HOSPITAL | Age: 87
End: 2022-04-28
Attending: INTERNAL MEDICINE
Payer: COMMERCIAL

## 2022-04-28 VITALS
HEART RATE: 73 BPM | DIASTOLIC BLOOD PRESSURE: 52 MMHG | RESPIRATION RATE: 18 BRPM | OXYGEN SATURATION: 95 % | SYSTOLIC BLOOD PRESSURE: 103 MMHG

## 2022-04-28 DIAGNOSIS — D62 ANEMIA DUE TO BLOOD LOSS, ACUTE: ICD-10-CM

## 2022-04-28 DIAGNOSIS — K92.1 MELENA: Primary | ICD-10-CM

## 2022-04-28 PROCEDURE — 250N000011 HC RX IP 250 OP 636: Performed by: INTERNAL MEDICINE

## 2022-04-28 PROCEDURE — 258N000003 HC RX IP 258 OP 636: Performed by: INTERNAL MEDICINE

## 2022-04-28 PROCEDURE — 96365 THER/PROPH/DIAG IV INF INIT: CPT

## 2022-04-28 RX ORDER — DIPHENHYDRAMINE HYDROCHLORIDE 50 MG/ML
50 INJECTION INTRAMUSCULAR; INTRAVENOUS
Status: CANCELLED
Start: 2022-04-29

## 2022-04-28 RX ORDER — ALBUTEROL SULFATE 0.83 MG/ML
2.5 SOLUTION RESPIRATORY (INHALATION)
Status: DISCONTINUED | OUTPATIENT
Start: 2022-04-28 | End: 2022-04-28

## 2022-04-28 RX ORDER — ALBUTEROL SULFATE 0.83 MG/ML
2.5 SOLUTION RESPIRATORY (INHALATION)
Status: CANCELLED | OUTPATIENT
Start: 2022-04-29

## 2022-04-28 RX ORDER — METHYLPREDNISOLONE SODIUM SUCCINATE 125 MG/2ML
125 INJECTION, POWDER, LYOPHILIZED, FOR SOLUTION INTRAMUSCULAR; INTRAVENOUS
Status: DISCONTINUED | OUTPATIENT
Start: 2022-04-28 | End: 2022-04-28

## 2022-04-28 RX ORDER — NALOXONE HYDROCHLORIDE 0.4 MG/ML
0.2 INJECTION, SOLUTION INTRAMUSCULAR; INTRAVENOUS; SUBCUTANEOUS
Status: DISCONTINUED | OUTPATIENT
Start: 2022-04-28 | End: 2022-04-28

## 2022-04-28 RX ORDER — HEPARIN SODIUM (PORCINE) LOCK FLUSH IV SOLN 100 UNIT/ML 100 UNIT/ML
5 SOLUTION INTRAVENOUS
Status: CANCELLED | OUTPATIENT
Start: 2022-04-29

## 2022-04-28 RX ORDER — MEPERIDINE HYDROCHLORIDE 25 MG/ML
25 INJECTION INTRAMUSCULAR; INTRAVENOUS; SUBCUTANEOUS EVERY 30 MIN PRN
Status: CANCELLED | OUTPATIENT
Start: 2022-04-29

## 2022-04-28 RX ORDER — NALOXONE HYDROCHLORIDE 0.4 MG/ML
0.2 INJECTION, SOLUTION INTRAMUSCULAR; INTRAVENOUS; SUBCUTANEOUS
Status: CANCELLED | OUTPATIENT
Start: 2022-04-29

## 2022-04-28 RX ORDER — EPINEPHRINE 1 MG/ML
0.3 INJECTION, SOLUTION INTRAMUSCULAR; SUBCUTANEOUS EVERY 5 MIN PRN
Status: DISCONTINUED | OUTPATIENT
Start: 2022-04-28 | End: 2022-04-28

## 2022-04-28 RX ORDER — EPINEPHRINE 1 MG/ML
0.3 INJECTION, SOLUTION INTRAMUSCULAR; SUBCUTANEOUS EVERY 5 MIN PRN
Status: CANCELLED | OUTPATIENT
Start: 2022-04-29

## 2022-04-28 RX ORDER — ALBUTEROL SULFATE 90 UG/1
1-2 AEROSOL, METERED RESPIRATORY (INHALATION)
Status: CANCELLED
Start: 2022-04-29

## 2022-04-28 RX ORDER — MEPERIDINE HYDROCHLORIDE 25 MG/ML
25 INJECTION INTRAMUSCULAR; INTRAVENOUS; SUBCUTANEOUS EVERY 30 MIN PRN
Status: DISCONTINUED | OUTPATIENT
Start: 2022-04-28 | End: 2022-04-28

## 2022-04-28 RX ORDER — METHYLPREDNISOLONE SODIUM SUCCINATE 125 MG/2ML
125 INJECTION, POWDER, LYOPHILIZED, FOR SOLUTION INTRAMUSCULAR; INTRAVENOUS
Status: CANCELLED
Start: 2022-04-29

## 2022-04-28 RX ORDER — HEPARIN SODIUM,PORCINE 10 UNIT/ML
5 VIAL (ML) INTRAVENOUS
Status: CANCELLED | OUTPATIENT
Start: 2022-04-29

## 2022-04-28 RX ORDER — DIPHENHYDRAMINE HYDROCHLORIDE 50 MG/ML
50 INJECTION INTRAMUSCULAR; INTRAVENOUS
Status: DISCONTINUED | OUTPATIENT
Start: 2022-04-28 | End: 2022-04-28

## 2022-04-28 RX ORDER — ALBUTEROL SULFATE 90 UG/1
1-2 AEROSOL, METERED RESPIRATORY (INHALATION)
Status: DISCONTINUED | OUTPATIENT
Start: 2022-04-28 | End: 2022-04-28

## 2022-04-28 RX ADMIN — IRON SUCROSE 300 MG: 20 INJECTION, SOLUTION INTRAVENOUS at 12:36

## 2022-04-28 RX ADMIN — SODIUM CHLORIDE 250 ML: 9 INJECTION, SOLUTION INTRAVENOUS at 12:24

## 2022-04-28 NOTE — PROGRESS NOTES
Infusion Nursing Note:  Constanza Coles presents today for Iron Sucrose 300mg, #3 and final dose.    Patient seen by provider today: No   present during visit today: Not Applicable.    Note: Constanza comes in today ambulatory for her 3rd and final dose of Venofer. She is seated in chair 7. I went over the plan of care and she verbalized understanding. PIV started in her left AC with great blood return throughout. Iron hung as ordered. Constanza had no sign or symptoms of a reaction. PIV removed and covered with coban. Constanza left ambulatory to the [facility] around 1430.      Intravenous Access:  Peripheral IV placed L AC at pt request.    Treatment Conditions:  Iron sucrose 300mg infused as ordered      Post Infusion Assessment:  Patient tolerated infusion without incident.       Discharge Plan:   Patient discharged in stable condition accompanied by: self.  Departure Mode: Ambulatory.      Knijal salas RN

## 2022-05-25 ENCOUNTER — TELEPHONE (OUTPATIENT)
Dept: INTERNAL MEDICINE | Facility: CLINIC | Age: 87
End: 2022-05-25
Payer: COMMERCIAL

## 2022-05-25 NOTE — TELEPHONE ENCOUNTER
Patient is calling, she has an oral procedure coming up on 06-06-22, and she is wondering what to do with the eliquis she is taking.  The patient would like a call back with further instructions.

## 2022-05-26 NOTE — TELEPHONE ENCOUNTER
Attempted to call pt, no answer (1/3). Left message requesting pt to call back clinic. See recent PCP note for this encounter for information to give to pt when she calls back the clinic.         Roma Hamilton RN   [facility]

## 2022-07-18 DIAGNOSIS — R20.8 BURNING SENSATION OF FEET: ICD-10-CM

## 2022-07-18 DIAGNOSIS — Z95.1 S/P CABG (CORONARY ARTERY BYPASS GRAFT): ICD-10-CM

## 2022-07-18 RX ORDER — GABAPENTIN 300 MG/1
CAPSULE ORAL
Qty: 180 CAPSULE | Refills: 3 | Status: SHIPPED | OUTPATIENT
Start: 2022-07-18 | End: 2023-07-18

## 2022-07-18 NOTE — TELEPHONE ENCOUNTER
Routing refill request to provider for review/approval because:  Drug not on the [facility] refill protocol     Last Written Prescription Date:  10/28/021  Last Fill Quantity: 180,  # refills: 3   Last office visit provider:  3/4/22     Requested Prescriptions   Pending Prescriptions Disp Refills     gabapentin (NEURONTIN) 300 MG capsule [Pharmacy Med Name: GABAPENTIN 300 MG CAPS** 300 Capsule] 180 capsule 3     Sig: TAKE 1 CAPSULE (300 MG TOTAL) BY MOUTH 2 TIMES A DAY.       There is no refill protocol information for this order          Tali Mcdaniel, RN 07/18/22 6:13 PM

## 2022-07-20 RX ORDER — ATORVASTATIN CALCIUM 40 MG/1
40 TABLET, FILM COATED ORAL DAILY
Qty: 90 TABLET | Refills: 3 | Status: SHIPPED | OUTPATIENT
Start: 2022-07-20 | End: 2023-08-17

## 2022-07-27 ENCOUNTER — OFFICE VISIT (OUTPATIENT)
Dept: INTERNAL MEDICINE | Facility: CLINIC | Age: 87
End: 2022-07-27
Payer: COMMERCIAL

## 2022-07-27 VITALS
RESPIRATION RATE: 16 BRPM | TEMPERATURE: 98.4 F | BODY MASS INDEX: 20.64 KG/M2 | WEIGHT: 109.3 LBS | DIASTOLIC BLOOD PRESSURE: 57 MMHG | HEIGHT: 61 IN | HEART RATE: 84 BPM | OXYGEN SATURATION: 97 % | SYSTOLIC BLOOD PRESSURE: 112 MMHG

## 2022-07-27 DIAGNOSIS — R63.4 WEIGHT LOSS: ICD-10-CM

## 2022-07-27 DIAGNOSIS — Z23 HIGH PRIORITY FOR 2019-NCOV VACCINE: ICD-10-CM

## 2022-07-27 DIAGNOSIS — R19.5 OCCULT GI BLEEDING: Primary | ICD-10-CM

## 2022-07-27 DIAGNOSIS — D64.9 ANEMIA, UNSPECIFIED TYPE: ICD-10-CM

## 2022-07-27 LAB
ALBUMIN SERPL BCG-MCNC: 4.2 G/DL (ref 3.5–5.2)
ALP SERPL-CCNC: 69 U/L (ref 35–104)
ALT SERPL W P-5'-P-CCNC: 17 U/L (ref 10–35)
ANION GAP SERPL CALCULATED.3IONS-SCNC: 11 MMOL/L (ref 7–15)
AST SERPL W P-5'-P-CCNC: 32 U/L (ref 10–35)
BILIRUB SERPL-MCNC: 0.4 MG/DL
BUN SERPL-MCNC: 25 MG/DL (ref 8–23)
CALCIUM SERPL-MCNC: 9.8 MG/DL (ref 8.8–10.2)
CHLORIDE SERPL-SCNC: 102 MMOL/L (ref 98–107)
CREAT SERPL-MCNC: 0.84 MG/DL (ref 0.51–0.95)
DEPRECATED HCO3 PLAS-SCNC: 28 MMOL/L (ref 22–29)
ERYTHROCYTE [DISTWIDTH] IN BLOOD BY AUTOMATED COUNT: 17.5 % (ref 10–15)
FERRITIN SERPL-MCNC: 43 NG/ML (ref 11–328)
GFR SERPL CREATININE-BSD FRML MDRD: 68 ML/MIN/1.73M2
GLUCOSE SERPL-MCNC: 88 MG/DL (ref 70–99)
HCT VFR BLD AUTO: 35.9 % (ref 35–47)
HGB BLD-MCNC: 11.2 G/DL (ref 11.7–15.7)
IRON BINDING CAPACITY (ROCHE): 352 UG/DL (ref 240–430)
IRON SATN MFR SERPL: 25 % (ref 15–46)
IRON SERPL-MCNC: 89 UG/DL (ref 37–145)
MCH RBC QN AUTO: 28.3 PG (ref 26.5–33)
MCHC RBC AUTO-ENTMCNC: 31.2 G/DL (ref 31.5–36.5)
MCV RBC AUTO: 91 FL (ref 78–100)
PLATELET # BLD AUTO: 209 10E3/UL (ref 150–450)
POTASSIUM SERPL-SCNC: 4.8 MMOL/L (ref 3.4–5.3)
PROT SERPL-MCNC: 7.6 G/DL (ref 6.4–8.3)
RBC # BLD AUTO: 3.96 10E6/UL (ref 3.8–5.2)
SODIUM SERPL-SCNC: 141 MMOL/L (ref 136–145)
TSH SERPL DL<=0.005 MIU/L-ACNC: 3.77 UIU/ML (ref 0.3–4.2)
WBC # BLD AUTO: 7.9 10E3/UL (ref 4–11)

## 2022-07-27 PROCEDURE — 84443 ASSAY THYROID STIM HORMONE: CPT | Performed by: INTERNAL MEDICINE

## 2022-07-27 PROCEDURE — 85027 COMPLETE CBC AUTOMATED: CPT | Performed by: INTERNAL MEDICINE

## 2022-07-27 PROCEDURE — 36415 COLL VENOUS BLD VENIPUNCTURE: CPT | Performed by: INTERNAL MEDICINE

## 2022-07-27 PROCEDURE — 91305 COVID-19,PF,PFIZER (12+ YRS): CPT | Performed by: INTERNAL MEDICINE

## 2022-07-27 PROCEDURE — 0054A COVID-19,PF,PFIZER (12+ YRS): CPT | Performed by: INTERNAL MEDICINE

## 2022-07-27 PROCEDURE — 80053 COMPREHEN METABOLIC PANEL: CPT | Performed by: INTERNAL MEDICINE

## 2022-07-27 PROCEDURE — 99214 OFFICE O/P EST MOD 30 MIN: CPT | Mod: 25 | Performed by: INTERNAL MEDICINE

## 2022-07-27 PROCEDURE — 83540 ASSAY OF IRON: CPT | Performed by: INTERNAL MEDICINE

## 2022-07-27 PROCEDURE — 83550 IRON BINDING TEST: CPT | Performed by: INTERNAL MEDICINE

## 2022-07-27 PROCEDURE — 82728 ASSAY OF FERRITIN: CPT | Performed by: INTERNAL MEDICINE

## 2022-07-27 NOTE — PROGRESS NOTES
Assessment & Plan     Occult GI bleeding  No overt symptoms we will check with hemoglobin to see if she is improved  - REVIEW OF HEALTH MAINTENANCE PROTOCOL ORDERS  - CBC with platelets; Future  - CBC with platelets    Weight loss  She has lost about 15 pounds over the last year.  This could be explained by the multiple admissions first she had angioplasty and then the GI bleeding.  She is also worried about her  who has prostate cancer.  We will continue to monitor  - TSH; Future  - TSH    Anemia, unspecified type    - Comprehensive metabolic panel; Future  - Ferritin; Future  - Iron and iron binding capacity; Future  - Comprehensive metabolic panel  - Ferritin  - Iron and iron binding capacity    High priority for 2019-nCoV vaccine  She should get her COVID booster today.  - COVID-19,PF,PFIZER (12+ Yrs GRAY LABEL)      Most likely her bleeding was due to blood thinner she will have to continue the Eliquis for now she does not want to get left atrial appendage done.  She we will be stopping the Effient after seeing cardiology           Return in about 5 months (around 12/27/2022).    Yun Jacinto MD  [facility]   Constanza is a 87 year old accompanied by her alone , presenting for the following health issues:  Follow Up and Recheck Medication      HPI     3 month follow up  She has a history of melena.  Had extensive work-up including upper endoscopy lower endoscopy and camera scope.  Showing no overt cause of bleeding.  She needed blood transfusion and then she needed iron transfusion.  Today she is here for her follow-up hemoglobin.  She feels tired she has lost a few pounds.  But she has been eating otherwise healthy.  She could not tolerate oral iron she has no further episode of melena or change in bowel habits    Review of Systems   Constitutional, HEENT, cardiovascular, pulmonary, gi and gu systems are negative, except as otherwise noted.      Objective   "  /57 (BP Location: Left arm, Patient Position: Sitting, Cuff Size: Adult Small)   Pulse 84   Temp 98.4  F (36.9  C) (Tympanic)   Resp 16   Ht 1.549 m (5' 1\")   Wt 49.6 kg (109 lb 4.8 oz)   SpO2 97%   BMI 20.65 kg/m    Body mass index is 20.65 kg/m .  Physical Exam   GENERAL: healthy, alert and no distress  NECK: no adenopathy, no asymmetry, masses, or scars and thyroid normal to palpation  RESP: lungs clear to auscultation - no rales, rhonchi or wheezes  CV: regular rate and rhythm, normal S1 S2, no S3 or S4, no murmur, click or rub, no peripheral edema and peripheral pulses strong                .  ..  "

## 2022-08-01 ENCOUNTER — TELEPHONE (OUTPATIENT)
Dept: INTERNAL MEDICINE | Facility: CLINIC | Age: 87
End: 2022-08-01

## 2022-08-01 NOTE — TELEPHONE ENCOUNTER
Test Results        Who ordered the test:    Dr Jacinto    Type of test:   Labs    Date of test:    07/27/2022    Where was the test performed:    [facility]    What are your questions/concerns?:    Asking for the results    Could we send this information to you in [facility] or would you prefer to receive a phone call?:   Patient would prefer a phone call   Okay to leave a detailed message?: Yes at Home number on file [phone] (home)

## 2022-08-02 ENCOUNTER — NURSE TRIAGE (OUTPATIENT)
Dept: NURSING | Facility: CLINIC | Age: 87
End: 2022-08-02

## 2022-08-02 NOTE — TELEPHONE ENCOUNTER
Call from patient asking about her lab results from last week.    Advised will have to send message to Dr. Jacinto and her care team.    Patient was upset that Dr. Jacinto has not reviewed the lab work and made a note yet.  Patient wants the results ASAP.    Reason for Disposition    Caller requesting lab results    Protocols used: PCP CALL - NO TRIAGE-A-

## 2022-08-03 NOTE — TELEPHONE ENCOUNTER
Hemoglobin and iron studies markedly better    Hemoglobin 11.2 compared to 7.7    Ferritin 43 compared to 16    Improving.  Would continue iron

## 2022-08-03 NOTE — TELEPHONE ENCOUNTER
Spoke with pt and she was mostly concerned about her Hbg result last week. This writer informed her of the 11.2 result from last week and pt said that she could wait until PCP returns to office next week for further notes regarding her lab results.       Pt does not need anything further from the [facility] team at this time.

## 2022-08-16 ENCOUNTER — ANCILLARY PROCEDURE (OUTPATIENT)
Dept: CARDIOLOGY | Facility: CLINIC | Age: 87
End: 2022-08-16
Attending: INTERNAL MEDICINE
Payer: COMMERCIAL

## 2022-08-16 VITALS
BODY MASS INDEX: 21.16 KG/M2 | DIASTOLIC BLOOD PRESSURE: 62 MMHG | HEART RATE: 62 BPM | RESPIRATION RATE: 16 BRPM | SYSTOLIC BLOOD PRESSURE: 130 MMHG | WEIGHT: 112 LBS

## 2022-08-16 DIAGNOSIS — Z95.1 S/P CABG (CORONARY ARTERY BYPASS GRAFT): ICD-10-CM

## 2022-08-16 DIAGNOSIS — I49.5 SINOATRIAL NODE DYSFUNCTION (H): ICD-10-CM

## 2022-08-16 DIAGNOSIS — I25.83 CORONARY ATHEROSCLEROSIS DUE TO LIPID RICH PLAQUE: Primary | ICD-10-CM

## 2022-08-16 DIAGNOSIS — Z95.0 CARDIAC PACEMAKER IN SITU: ICD-10-CM

## 2022-08-16 DIAGNOSIS — K92.2 GASTROINTESTINAL HEMORRHAGE, UNSPECIFIED GASTROINTESTINAL HEMORRHAGE TYPE: ICD-10-CM

## 2022-08-16 DIAGNOSIS — Z95.2 H/O AORTIC VALVE REPLACEMENT: ICD-10-CM

## 2022-08-16 DIAGNOSIS — E78.00 PURE HYPERCHOLESTEROLEMIA: ICD-10-CM

## 2022-08-16 DIAGNOSIS — Z95.0 PACEMAKER: ICD-10-CM

## 2022-08-16 DIAGNOSIS — I48.20 CHRONIC ATRIAL FIBRILLATION (H): ICD-10-CM

## 2022-08-16 LAB
MDC_IDC_LEAD_IMPLANT_DT: NORMAL
MDC_IDC_LEAD_IMPLANT_DT: NORMAL
MDC_IDC_LEAD_LOCATION: NORMAL
MDC_IDC_LEAD_LOCATION: NORMAL
MDC_IDC_LEAD_MFG: NORMAL
MDC_IDC_LEAD_MFG: NORMAL
MDC_IDC_LEAD_MODEL: NORMAL
MDC_IDC_LEAD_MODEL: NORMAL
MDC_IDC_LEAD_POLARITY_TYPE: NORMAL
MDC_IDC_LEAD_POLARITY_TYPE: NORMAL
MDC_IDC_LEAD_SERIAL: NORMAL
MDC_IDC_LEAD_SERIAL: NORMAL
MDC_IDC_MSMT_BATTERY_DTM: NORMAL
MDC_IDC_MSMT_BATTERY_IMPEDANCE: 3510 OHM
MDC_IDC_MSMT_BATTERY_REMAINING_LONGEVITY: 24 MO
MDC_IDC_MSMT_BATTERY_STATUS: NORMAL
MDC_IDC_MSMT_BATTERY_VOLTAGE: 2.73 V
MDC_IDC_MSMT_LEADCHNL_RA_IMPEDANCE_VALUE: 67 OHM
MDC_IDC_MSMT_LEADCHNL_RV_IMPEDANCE_VALUE: 436 OHM
MDC_IDC_MSMT_LEADCHNL_RV_PACING_THRESHOLD_AMPLITUDE: 0.5 V
MDC_IDC_MSMT_LEADCHNL_RV_PACING_THRESHOLD_AMPLITUDE: 0.5 V
MDC_IDC_MSMT_LEADCHNL_RV_PACING_THRESHOLD_PULSEWIDTH: 0.4 MS
MDC_IDC_MSMT_LEADCHNL_RV_PACING_THRESHOLD_PULSEWIDTH: 0.4 MS
MDC_IDC_MSMT_LEADCHNL_RV_SENSING_INTR_AMPL: 8 MV
MDC_IDC_PG_IMPLANT_DTM: NORMAL
MDC_IDC_PG_MFG: NORMAL
MDC_IDC_PG_MODEL: NORMAL
MDC_IDC_PG_SERIAL: NORMAL
MDC_IDC_PG_TYPE: NORMAL
MDC_IDC_SESS_CLINIC_NAME: NORMAL
MDC_IDC_SESS_DTM: NORMAL
MDC_IDC_SESS_TYPE: NORMAL
MDC_IDC_SET_BRADY_HYSTRATE: NORMAL
MDC_IDC_SET_BRADY_LOWRATE: 50 {BEATS}/MIN
MDC_IDC_SET_BRADY_MAX_TRACKING_RATE: 110 {BEATS}/MIN
MDC_IDC_SET_BRADY_MODE: NORMAL
MDC_IDC_SET_LEADCHNL_RV_PACING_AMPLITUDE: 1.25 V
MDC_IDC_SET_LEADCHNL_RV_PACING_CAPTURE_MODE: NORMAL
MDC_IDC_SET_LEADCHNL_RV_PACING_POLARITY: NORMAL
MDC_IDC_SET_LEADCHNL_RV_PACING_PULSEWIDTH: 0.4 MS
MDC_IDC_SET_LEADCHNL_RV_SENSING_POLARITY: NORMAL
MDC_IDC_SET_LEADCHNL_RV_SENSING_SENSITIVITY: 4 MV
MDC_IDC_SET_ZONE_DETECTION_INTERVAL: 333.33 MS
MDC_IDC_SET_ZONE_TYPE: NORMAL
MDC_IDC_SET_ZONE_TYPE: NORMAL
MDC_IDC_STAT_AT_BURDEN_PERCENT: 0 %
MDC_IDC_STAT_AT_DTM_END: NORMAL
MDC_IDC_STAT_AT_DTM_START: NORMAL
MDC_IDC_STAT_BRADY_DTM_END: NORMAL
MDC_IDC_STAT_BRADY_DTM_START: NORMAL
MDC_IDC_STAT_BRADY_RV_PERCENT_PACED: 7 %
MDC_IDC_STAT_EPISODE_RECENT_COUNT: 0
MDC_IDC_STAT_EPISODE_RECENT_COUNT: 5
MDC_IDC_STAT_EPISODE_RECENT_COUNT_DTM_END: NORMAL
MDC_IDC_STAT_EPISODE_RECENT_COUNT_DTM_END: NORMAL
MDC_IDC_STAT_EPISODE_RECENT_COUNT_DTM_START: NORMAL
MDC_IDC_STAT_EPISODE_RECENT_COUNT_DTM_START: NORMAL
MDC_IDC_STAT_EPISODE_TYPE: NORMAL
MDC_IDC_STAT_EPISODE_TYPE: NORMAL

## 2022-08-16 PROCEDURE — 93280 PM DEVICE PROGR EVAL DUAL: CPT | Performed by: INTERNAL MEDICINE

## 2022-08-16 PROCEDURE — 99214 OFFICE O/P EST MOD 30 MIN: CPT | Performed by: INTERNAL MEDICINE

## 2022-08-16 NOTE — PROGRESS NOTES
[facility]  Heart Care Clinic Follow-up Note    Assessment & Plan        (I25.10,  I25.83) Coronary atherosclerosis due to lipid rich plaque  (primary encounter diagnosis)  Comment: Angiography September 2021 showed normal left main, significantly occluded proximal LAD, circumflex with a mid 40 to 50% stenosis, distal obtuse marginal artery 1 95 to 99% stenosis, right coronary artery with a mid 95% and distal tandem 85% lesions.  She had intervention on the circumflex.  Symptomatically doing well although initially continued of symptoms following the intervention on circumflex September 2021.  Symptoms however are better now.    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  She had aortic valve replacement at the same time.  There was no exclusion of the left atrial appendage at that time.     (Z95.2) H/O aortic valve replacement  Comment: Due to severe aortic stenosis she had a 21 mm Forte pericardial magna valve placed in 2005 and based on 2021 working well with a dimensionless index of 0.7, mean gradient of 6 mmHg and mean velocity of 1.2 m/s.  Given that this tissue valve was placed over 15 years ago we will recheck echo yearly. In addition, I have asked her to take aspirin daily and she declines telling me that she has horrible allergic reactions and stomach upset from this.   We had her on Effient which is since been discontinued now that she has not Eliquis.    (I48.20) Chronic atrial fibrillation (H)  Comment: Asymptomatic, not valvular, and now on Eliquis 2.5 mg p.o. twice daily given her lower weight and age over 82.    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  Rate responsiveness was made more aggressive and symptoms have resolved.     (K92.2) Gastrointestinal  "hemorrhage, unspecified gastrointestinal hemorrhage type  Comment: Work-up including EGD, colonoscopy, and PillCam entirely negative and hemoglobin stable.    (E78.00) Pure hypercholesterolemia  Comment: Total cholesterol excellent at 116 with an LDL of 46.    Plan  1.  Recheck echo given that valve was over 15 years old.  2.  Follow-up me 1 year or sooner if needed.    Subjective  CC: 87-year-old white female here for yearly follow-up today.  Since have seen her she completed a PillCam showing no bleeding, was placed on Eliquis 2.5 mg p.o. twice daily.  She is still going to the gym, walking, shopping, living independently with her  7 years her john paul who has prostate cancer.  She denies any chest pains, palpitations, shortness of breath, PND, with apnea or peripheral edema.    Medications  Current Outpatient Medications   Medication Sig Dispense Refill     acetaminophen (TYLENOL) 500 MG tablet Take 1,000 mg by mouth every 6 hours as needed for mild pain       apixaban ANTICOAGULANT (ELIQUIS ANTICOAGULANT) 2.5 MG tablet Take 1 tablet (2.5 mg) by mouth 2 times daily 60 tablet 2     atorvastatin (LIPITOR) 40 MG tablet TAKE 1 TABLET (40 MG) BY MOUTH DAILY 90 tablet 3     cholecalciferol, vitamin D3, (VITAMIN D3) 5,000 unit Tab [CHOLECALCIFEROL, VITAMIN D3, (VITAMIN D3) 5,000 UNIT TAB] Take 1 tablet by mouth daily.       cyanocobalamin (CYANOCOBALAMIN) 1000 MCG/ML injection [CYANOCOBALAMIN 1,000 MCG/ML INJECTION] INJECT 1 ML (1,000 MCG TOTAL) INTO THE SHOULDER, THIGH, OR BUTTOCKS EVERY 30 DAYS. **SYR 27G 1/2\" 1CC** 3 mL 3     gabapentin (NEURONTIN) 300 MG capsule TAKE 1 CAPSULE (300 MG TOTAL) BY MOUTH 2 TIMES A DAY. 180 capsule 3     ketoconazole (NIZORAL) 2 % external shampoo Apply topically daily as needed        metoprolol succinate ER (TOPROL-XL) 25 MG 24 hr tablet Take 1 tablet (25 mg) by mouth daily 90 tablet 2     sucralfate (CARAFATE) 1 GM tablet TAKE 1 TABLET BY MOUTH FOUR TIMES DAILY 360 tablet 3 "       Objective  /62 (BP Location: Right arm, Patient Position: Sitting, Cuff Size: Adult Regular)   Pulse 62   Resp 16   Wt 50.8 kg (112 lb)   BMI 21.16 kg/m      General Appearance:    Alert, cooperative, no distress, appears stated age   Head:    Normocephalic, without obvious abnormality, atraumatic   Throat:   Lips, mucosa, and tongue normal; teeth and gums normal   Neck:   Supple, symmetrical, trachea midline, no adenopathy;        thyroid:  No enlargement/tenderness/nodules; no carotid    bruit or JVD   Back:     Symmetric, no curvature, ROM normal, no CVA tenderness   Lungs:     Clear to auscultation bilaterally, respirations unlabored   Chest wall:    No tenderness, midline sternotomy scar and left-sided pacemaker   Heart:    Regular rate and rhythm, S1 normal and S2 loud, no murmur, rub   or gallop   Abdomen:     Soft, non-tender, bowel sounds active all four quadrants,     no masses, no organomegaly   Extremities:   Normal, atraumatic, no cyanosis or edema   Pulses:   2+ and symmetric all extremities   Skin:   Skin color, texture, turgor normal, no rashes or lesions     Results    Lab Results personally reviewed   Lab Results   Component Value Date    CHOL 116 11/09/2021    CHOL 142 11/14/2019     Lab Results   Component Value Date    HDL 53 11/09/2021    HDL 69 11/14/2019     No components found for: LDLCALC  Lab Results   Component Value Date    TRIG 84 11/09/2021    TRIG 96 11/14/2019     Lab Results   Component Value Date    WBC 7.9 07/27/2022    HGB 11.2 (L) 07/27/2022    HCT 35.9 07/27/2022     07/27/2022     Lab Results   Component Value Date    BUN 25.0 (H) 07/27/2022     07/27/2022    CO2 28 07/27/2022

## 2022-08-16 NOTE — LETTER
8/16/2022    Yun Jacinto MD  [address] [facility] [address]    RE: Constanza Coles       Dear Colleague,     I had the pleasure of seeing Constanza Coles in the [facility].      [facility]  Heart Care Clinic Follow-up Note    Assessment & Plan        (I25.10,  I25.83) Coronary atherosclerosis due to lipid rich plaque  (primary encounter diagnosis)  Comment: Angiography September 2021 showed normal left main, significantly occluded proximal LAD, circumflex with a mid 40 to 50% stenosis, distal obtuse marginal artery 1 95 to 99% stenosis, right coronary artery with a mid 95% and distal tandem 85% lesions.  She had intervention on the circumflex.  Symptomatically doing well although initially continued of symptoms following the intervention on circumflex September 2021.  Symptoms however are better now.    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  She had aortic valve replacement at the same time.  There was no exclusion of the left atrial appendage at that time.     (Z95.2) H/O aortic valve replacement  Comment: Due to severe aortic stenosis she had a 21 mm Forte pericardial magna valve placed in 2005 and based on 2021 working well with a dimensionless index of 0.7, mean gradient of 6 mmHg and mean velocity of 1.2 m/s.  Given that this tissue valve was placed over 15 years ago we will recheck echo yearly. In addition, I have asked her to take aspirin daily and she declines telling me that she has horrible allergic reactions and stomach upset from this.   We had her on Effient which is since been discontinued now that she has not Eliquis.    (I48.20) Chronic atrial fibrillation (H)  Comment: Asymptomatic, not valvular, and now on Eliquis 2.5 mg p.o. twice daily given her lower weight and age over 82.    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  Rate responsiveness was made more aggressive and symptoms have resolved.     (K92.2) Gastrointestinal hemorrhage, unspecified gastrointestinal hemorrhage type  Comment: Work-up including EGD, colonoscopy, and PillCam entirely negative and hemoglobin stable.    (E78.00) Pure hypercholesterolemia  Comment: Total cholesterol excellent at 116 with an LDL of 46.    Plan  1.  Recheck echo given that valve was over 15 years old.  2.  Follow-up me 1 year or sooner if needed.    Subjective  CC: 87-year-old white female here for yearly follow-up today.  Since have seen her she completed a PillCam showing no bleeding, was placed on Eliquis 2.5 mg p.o. twice daily.  She is still going to the gym, walking, shopping, living independently with her  7 years her john paul who has prostate cancer.  She denies any chest pains, palpitations, shortness of breath, PND, with apnea or peripheral edema.    Medications  Current Outpatient Medications   Medication Sig Dispense Refill     acetaminophen (TYLENOL) 500 MG tablet Take 1,000 mg by mouth every 6 hours as needed for mild pain       apixaban ANTICOAGULANT (ELIQUIS ANTICOAGULANT) 2.5 MG tablet Take 1 tablet (2.5 mg) by mouth 2 times daily 60 tablet 2     atorvastatin (LIPITOR) 40 MG tablet TAKE 1 TABLET (40 MG) BY MOUTH DAILY 90 tablet 3     cholecalciferol, vitamin D3, (VITAMIN D3) 5,000 unit Tab [CHOLECALCIFEROL, VITAMIN D3, (VITAMIN D3) 5,000 UNIT TAB] Take 1 tablet by mouth daily.       cyanocobalamin (CYANOCOBALAMIN) 1000 MCG/ML injection [CYANOCOBALAMIN 1,000 MCG/ML INJECTION] INJECT 1 ML (1,000 MCG TOTAL) INTO THE SHOULDER, THIGH, OR BUTTOCKS EVERY 30 DAYS. **SYR 27G 1/2\" 1CC** 3 mL 3     gabapentin (NEURONTIN) 300 MG capsule TAKE 1 CAPSULE (300 MG TOTAL) BY MOUTH 2 TIMES A DAY. 180 capsule 3     ketoconazole (NIZORAL) 2 % external shampoo Apply topically daily as needed "        metoprolol succinate ER (TOPROL-XL) 25 MG 24 hr tablet Take 1 tablet (25 mg) by mouth daily 90 tablet 2     sucralfate (CARAFATE) 1 GM tablet TAKE 1 TABLET BY MOUTH FOUR TIMES DAILY 360 tablet 3       Objective  /62 (BP Location: Right arm, Patient Position: Sitting, Cuff Size: Adult Regular)   Pulse 62   Resp 16   Wt 50.8 kg (112 lb)   BMI 21.16 kg/m      General Appearance:    Alert, cooperative, no distress, appears stated age   Head:    Normocephalic, without obvious abnormality, atraumatic   Throat:   Lips, mucosa, and tongue normal; teeth and gums normal   Neck:   Supple, symmetrical, trachea midline, no adenopathy;        thyroid:  No enlargement/tenderness/nodules; no carotid    bruit or JVD   Back:     Symmetric, no curvature, ROM normal, no CVA tenderness   Lungs:     Clear to auscultation bilaterally, respirations unlabored   Chest wall:    No tenderness, midline sternotomy scar and left-sided pacemaker   Heart:    Regular rate and rhythm, S1 normal and S2 loud, no murmur, rub   or gallop   Abdomen:     Soft, non-tender, bowel sounds active all four quadrants,     no masses, no organomegaly   Extremities:   Normal, atraumatic, no cyanosis or edema   Pulses:   2+ and symmetric all extremities   Skin:   Skin color, texture, turgor normal, no rashes or lesions     Results    Lab Results personally reviewed   Lab Results   Component Value Date    CHOL 116 11/09/2021    CHOL 142 11/14/2019     Lab Results   Component Value Date    HDL 53 11/09/2021    HDL 69 11/14/2019     No components found for: LDLCALC  Lab Results   Component Value Date    TRIG 84 11/09/2021    TRIG 96 11/14/2019     Lab Results   Component Value Date    WBC 7.9 07/27/2022    HGB 11.2 (L) 07/27/2022    HCT 35.9 07/27/2022     07/27/2022     Lab Results   Component Value Date    BUN 25.0 (H) 07/27/2022     07/27/2022    CO2 28 07/27/2022             Thank you for allowing me to participate in the care of your  patient.      Sincerely,     RY ANTOINE MD   [facility]  cc:   Ry Antoine MD  [address], [address]  [place] [address]

## 2022-08-16 NOTE — PATIENT INSTRUCTIONS
Ms Constanza Coles,  I enjoyed visiting with you again today.  I am glad to hear you are doing well.  Per our conversation let us get the echo of the valve.  I will plan on seeing you 1 year.  Mathew Antoine

## 2022-08-25 ENCOUNTER — HOSPITAL ENCOUNTER (OUTPATIENT)
Dept: CARDIOLOGY | Facility: CLINIC | Age: 87
Discharge: HOME | End: 2022-08-25
Attending: INTERNAL MEDICINE | Admitting: INTERNAL MEDICINE
Payer: COMMERCIAL

## 2022-08-25 DIAGNOSIS — Z95.1 S/P CABG (CORONARY ARTERY BYPASS GRAFT): ICD-10-CM

## 2022-08-25 DIAGNOSIS — I25.83 CORONARY ATHEROSCLEROSIS DUE TO LIPID RICH PLAQUE: ICD-10-CM

## 2022-08-25 DIAGNOSIS — Z95.2 H/O AORTIC VALVE REPLACEMENT: ICD-10-CM

## 2022-08-25 PROCEDURE — 93306 TTE W/DOPPLER COMPLETE: CPT | Mod: 26 | Performed by: INTERNAL MEDICINE

## 2022-08-25 PROCEDURE — 93306 TTE W/DOPPLER COMPLETE: CPT

## 2022-08-29 DIAGNOSIS — R00.0 TACHYCARDIA: ICD-10-CM

## 2022-08-29 RX ORDER — METOPROLOL SUCCINATE 25 MG/1
25 TABLET, EXTENDED RELEASE ORAL DAILY
Qty: 90 TABLET | Refills: 2 | OUTPATIENT
Start: 2022-08-29

## 2022-09-28 ENCOUNTER — TELEPHONE (OUTPATIENT)
Dept: INTERNAL MEDICINE | Facility: CLINIC | Age: 87
End: 2022-09-28

## 2022-09-28 DIAGNOSIS — J40 BRONCHITIS: Primary | ICD-10-CM

## 2022-09-28 DIAGNOSIS — J69.0 ASPIRATION PNEUMONIA DUE TO GASTRIC SECRETIONS, UNSPECIFIED LATERALITY, UNSPECIFIED PART OF LUNG (H): ICD-10-CM

## 2022-09-28 RX ORDER — LEVOFLOXACIN 250 MG/1
250 TABLET, FILM COATED ORAL DAILY
Qty: 10 TABLET | Refills: 3 | OUTPATIENT
Start: 2022-09-28

## 2022-09-28 RX ORDER — LEVOFLOXACIN 250 MG/1
250 TABLET, FILM COATED ORAL DAILY
Qty: 10 TABLET | Refills: 3 | Status: SHIPPED | OUTPATIENT
Start: 2022-09-28 | End: 2023-09-15

## 2022-09-28 NOTE — TELEPHONE ENCOUNTER
levofloxacin (LEVAQUIN) 250 MG tablet (Discontinued) 10 tablet 3 1/25/2022 3/4/2022 --   Sig - Route: Take 1 tablet (250 mg) by mouth daily - Oral   Sent to pharmacy as: levoFLOXacin 250 MG Oral Tablet (LEVAQUIN)   Class: E-Prescribe   Order: [id number]   E-Prescribing Status: Receipt

## 2022-09-28 NOTE — TELEPHONE ENCOUNTER
Medication Question or Refill        What medication are you calling about (include dose and sig)?:   levofloxacin (LEVAQUIN) 250 MG tablet (Discontinued) 10 tablet 3 1/25/2022 3/4/2022 --   Sig - Route: Take 1 tablet (250 mg) by mouth daily - Oral     Pt states she always some on hand and Dr Jacinto is very aware that she keeps them on hand.  She has bad lungs and it turns into pnemonia    Controlled Substance Agreement on file:   CSA -- Patient Level:    CSA: None found at the patient level.       Who prescribed the medication?: Dr Jacinto    Do you need a refill? Yes:       Preferred Pharmacy:   [facility] - [place], [place] - [address]  [address]  [facility] [address]  Phone: [phone] Fax: [phone]        Could we send this information to you in [facility] or would you prefer to receive a phone call?:   Patient would prefer a phone call   Okay to leave a detailed message?: Yes at Home number on file [phone] (home)

## 2022-10-09 ENCOUNTER — HEALTH MAINTENANCE LETTER (OUTPATIENT)
Age: 87
End: 2022-10-09

## 2022-11-11 ENCOUNTER — NURSE TRIAGE (OUTPATIENT)
Dept: NURSING | Facility: CLINIC | Age: 87
End: 2022-11-11

## 2022-11-11 NOTE — TELEPHONE ENCOUNTER
Pt is phoning stating that she has been sick all week     Pt has a sore throat, cough, decreased appetite     No fever     Pt is having chills     Message sent to care provider and pool team     Per Disposition: Discuss With PCP And Callback By Nurse Within 1 Hour    Care advice given per protocol and when to call back. Pt verbalized understanding and agrees to plan of care.    Monika Duff RN  [facility] Nurse Advisor  11:12 AM 11/11/2022      COVID 19 Nurse Triage Plan/Patient Instructions    Please be aware that novel coronavirus (COVID-19) may be circulating in the community. If you develop symptoms such as fever, cough, or SOB or if you have concerns about the presence of another infection including coronavirus (COVID-19), please contact your health care provider or visit [email or web address].     Disposition/Instructions    Virtual Visit with provider recommended. Reference Visit Selection Guide.    Thank you for taking steps to prevent the spread of this virus.  o Limit your contact with others.  o Wear a simple mask to cover your cough.  o Wash your hands well and often.    Resources    [facility]: About COVID-19: [email or web address]    CDC: What to Do If You're Sick: www.cdc.gov/coronavirus/2019-ncov/about/steps-when-sick.html    CDC: Ending Home Isolation: www.cdc.gov/coronavirus/2019-ncov/hcp/disposition-in-home-patients.html     CDC: Caring for Someone: www.cdc.gov/coronavirus/2019-ncov/if-you-are-sick/care-for-someone.html     [facility]: Interim Guidance for Hospital Discharge to Home: www.health.[facility].mn.us/diseases/coronavirus/hcp/hospdischarge.pdf    [facility] clinical trials (COVID-19 research studies): clinicalaffairs.[email or web address].edu/um-clinical-trials     Below are the COVID-19 hotlines at the [place] [facility] of [facility] ([facility]). Interpreters are available.   o For health questions: Call 276-014-5292 or 1-435.294.5481 (7 a.m. to 7 p.m.)  o For questions about  schools and childcare: Call 274-301-8298 or 1-982.894.4403 (7 a.m. to 7 p.m.)                           Reason for Disposition    [1] HIGH RISK for severe COVID complications (e.g., weak immune system, age > 64 years, obesity with BMI of 30 or higher, pregnant, chronic lung disease or other chronic medical condition) AND [2] COVID symptoms (e.g., cough, fever)  (Exceptions: Already seen by PCP and no new or worsening symptoms.)    Additional Information    Negative: SEVERE difficulty breathing (e.g., struggling for each breath, speaks in single words)    Negative: Difficult to awaken or acting confused (e.g., disoriented, slurred speech)    Negative: Bluish (or gray) lips or face now    Negative: Shock suspected (e.g., cold/pale/clammy skin, too weak to stand, low BP, rapid pulse)    Negative: Sounds like a life-threatening emergency to the triager    Negative: [1] Diagnosed or suspected COVID-19 AND [2] symptoms lasting 3 or more weeks    Negative: [1] COVID-19 exposure AND [2] no symptoms    Negative: COVID-19 vaccine reaction suspected (e.g., fever, headache, muscle aches) occurring 1 to 3 days after getting vaccine    Negative: COVID-19 vaccine, questions about    Negative: [1] Lives with someone known to have influenza (flu test positive) AND [2] flu-like symptoms (e.g., cough, runny nose, sore throat, SOB; with or without fever)    Negative: [1] Adult with possible COVID-19 symptoms AND [2] triager concerned about severity of symptoms or other causes    Negative: COVID-19 and breastfeeding, questions about    Negative: SEVERE or constant chest pain or pressure  (Exception: Mild central chest pain, present only when coughing.)    Negative: MODERATE difficulty breathing (e.g., speaks in phrases, SOB even at rest, pulse 100-120)    Negative: Headache and stiff neck (can't touch chin to chest)    Negative: Oxygen level (e.g., pulse oximetry) 90 percent or lower    Negative: Chest pain or pressure  (Exception: MILD  central chest pain, present only when coughing)    Negative: Patient sounds very sick or weak to the triager    Negative: MILD difficulty breathing (e.g., minimal/no SOB at rest, SOB with walking, pulse <100)    Negative: Fever > 103 F (39.4 C)    Negative: [1] Fever > 101 F (38.3 C) AND [2] over 60 years of age    Negative: [1] Fever > 100.0 F (37.8 C) AND [2] bedridden (e.g., nursing home patient, CVA, chronic illness, recovering from surgery)    Protocols used: CORONAVIRUS (COVID-19) DIAGNOSED OR [id number]

## 2022-11-11 NOTE — TELEPHONE ENCOUNTER
Spoke with patient, let her know that [facility] does not have any visits available today. I advised her to call central scheduling for a virtual visit appointment with a [facility] primary care provider and to take an at home covid test prior to appointment. Also let patient know that she could also go to a local walk-in clinic/urgent care.     Pt stated understanding and said that she would call for an appointment.

## 2022-11-15 ENCOUNTER — NURSE TRIAGE (OUTPATIENT)
Dept: NURSING | Facility: CLINIC | Age: 87
End: 2022-11-15

## 2022-11-15 DIAGNOSIS — J06.9 UPPER RESPIRATORY TRACT INFECTION, UNSPECIFIED TYPE: Primary | ICD-10-CM

## 2022-11-15 RX ORDER — BENZONATATE 200 MG/1
200 CAPSULE ORAL 3 TIMES DAILY PRN
Qty: 34 CAPSULE | Refills: 1 | Status: SHIPPED | OUTPATIENT
Start: 2022-11-15 | End: 2022-12-12

## 2022-11-15 RX ORDER — FLUTICASONE PROPIONATE 110 UG/1
1 AEROSOL, METERED RESPIRATORY (INHALATION) 2 TIMES DAILY
Qty: 12 G | Refills: 0 | Status: SHIPPED | OUTPATIENT
Start: 2022-11-15 | End: 2023-09-06

## 2022-11-15 RX ORDER — ALBUTEROL SULFATE 90 UG/1
2 AEROSOL, METERED RESPIRATORY (INHALATION) EVERY 6 HOURS
Qty: 18 G | Refills: 0 | Status: SHIPPED | OUTPATIENT
Start: 2022-11-15 | End: 2023-09-06

## 2022-11-15 NOTE — TELEPHONE ENCOUNTER
"Nurse Triage SBAR    Is this a 2nd Level Triage? YES, LICENSED PRACTITIONER REVIEW IS REQUIRED    Situation:   Ongoing flu-like symptoms.  Now on approx seven days.  Negative home covid test.  Covid test taken Nov 11, 2022 (four days ago).  Reports ongoing symptoms several days prior to using home covid test.  (See previous triage encounter of 11/11/22 for similar symptoms.)    Pt states \"I still have a bad cough.\"  \"I can hardly breathe.\"  Pt exhibits harsh, tight cough.  Exhibits weak voice, shortness of breath at rest.    Background:   History of COPD.  No inhalers available.    Assessment:   Pt states \"I'm mostly weak since unable to eat.\"  \"Haven't eaten for a week.\"  Reports vague nausea.  Loss of taste.  No vomiting or diarrhea.    Ongoing symptoms x 7 days:  - sore throat  - cough, shortness of breath  - decreased appetite (loss of taste)  - chills  - weakness    Still hydrated.  Taking water.  Urinating regularly.    Apparently severe weakness.  Pt states \"I can hardly stand up.\"  Pt states \"I still have a bad cough.\"  \"I can hardly breathe.\"  Pt exhibits harsh, tight cough.  Exhibits weak voice, shortness of breath at rest.    Protocol Recommended Disposition:   Go to ED Now  Pt declines ED advice.  No open appt slots for virtual provider visits today throughout entire [facility] system.  Therefore routing to PCP team per pt's request.  Pt prefers to receive Rx's (such as inhalers, antibiotics, possible antivirals, orders for influenza testing perhaps).    Routed to provider for advice.    Does the patient meet one of the following criteria for ADS visit consideration? 16+ years old, with an [facility] PCP -> Yes    TIP  Providers, please consider if this condition is appropriate for management at one of our Acute and Diagnostic Services sites.     If patient is a good candidate, please use dotphrase <dot>triageresponse and select Refer to ADS to document.    Callback # for pt -> [phone]     Thank " kristyn-    [facility] Nurse Advisor     Reason for Disposition    MODERATE difficulty breathing (e.g., speaks in phrases, SOB even at rest, pulse 100-120) and still present when not coughing    Additional Information    Negative: Bluish (or gray) lips or face    Negative: SEVERE difficulty breathing (e.g., struggling for each breath, speaks in single words)    Negative: Rapid onset of cough and has hives    Negative: Coughing started suddenly after medicine, an allergic food or bee sting    Negative: Difficulty breathing after exposure to flames, smoke, or fumes    Negative: Sounds like a life-threatening emergency to the triager    Negative: Previous asthma attacks and this feels like asthma attack    Negative: Dry cough (non-productive; no sputum or minimal clear sputum) and within 14 days of COVID-19 Exposure    Protocols used: COUGH-A-OH

## 2022-11-15 NOTE — TELEPHONE ENCOUNTER
"Called patient statedwith sore throat , cough , loss of appetite, weak , she already started levofloxacin.  Her biggest issue is calf but then she declines cough syrups.  Really does not have dyspnea she just is exhausted and weak because she has not eaten any solid food for a week.  She self describes herself as a \"difficult\" patient at any rate we will try Tessalon inhalers to help her open up her lungs I am not if she is already taking antibiotics I do not think prednisone is going to be helpful in her case she does not have a history of COPD her biggest issue is nutritional problems and she needs to work on that.  Doing a flu swab or strep test at this stage is probably not helpful either.  I did reiterate that if she discontinues to not eat she needs to go to the emergency room and agreed with plan    "

## 2022-11-23 ENCOUNTER — TRANSFERRED RECORDS (OUTPATIENT)
Dept: HEALTH INFORMATION MANAGEMENT | Facility: CLINIC | Age: 87
End: 2022-11-23

## 2022-11-23 LAB
CREATININE (EXTERNAL): 0.8 MG/DL (ref 0.57–1.11)
GFR ESTIMATED (EXTERNAL): 72 ML/MIN/1.73M2
GLUCOSE (EXTERNAL): 118 MG/DL (ref 65–100)
POTASSIUM (EXTERNAL): 2.5 MMOL/L (ref 3.5–5)
POTASSIUM (EXTERNAL): 3 MMOL/L (ref 3.5–5)

## 2022-11-24 LAB
CREATININE (EXTERNAL): 0.67 MG/DL (ref 0.57–1.11)
GFR ESTIMATED (EXTERNAL): 86 ML/MIN/1.73M2
GLUCOSE (EXTERNAL): 97 MG/DL (ref 65–100)
POTASSIUM (EXTERNAL): 3.7 MMOL/L (ref 3.5–5)

## 2022-11-25 LAB — POTASSIUM (EXTERNAL): 3.5 MMOL/L (ref 3.5–5)

## 2022-11-30 DIAGNOSIS — R00.0 TACHYCARDIA: ICD-10-CM

## 2022-12-01 RX ORDER — METOPROLOL SUCCINATE 25 MG/1
25 TABLET, EXTENDED RELEASE ORAL DAILY
Qty: 90 TABLET | Refills: 2 | Status: SHIPPED | OUTPATIENT
Start: 2022-12-01 | End: 2023-01-10

## 2022-12-01 NOTE — TELEPHONE ENCOUNTER
"Last Written Prescription Date:  3/4/22  Last Fill Quantity: 90,  # refills: 2   Last office visit provider:  7/27/22     Requested Prescriptions   Pending Prescriptions Disp Refills     metoprolol succinate ER (TOPROL XL) 25 MG 24 hr tablet [Pharmacy Med Name: METOPROLOL SUCC ER 25 TAB** 25 Tablet] 90 tablet 2     Sig: TAKE 1 TABLET (25 MG) BY MOUTH DAILY       Beta-Blockers Protocol Passed - 12/1/2022 11:27 AM        Passed - Blood pressure under 140/90 in past 12 months     BP Readings from Last 3 Encounters:   08/16/22 130/62   07/27/22 112/57   04/28/22 103/52                 Passed - Patient is age 6 or older        Passed - Recent (12 mo) or future (30 days) visit within the authorizing provider's specialty     Patient has had an office visit with the authorizing provider or a provider within the authorizing providers department within the previous 12 mos or has a future within next 30 days. See \"Patient Info\" tab in inbasket, or \"Choose Columns\" in Meds & Orders section of the refill encounter.              Passed - Medication is active on med list             Gray Bethea RN 12/01/22 11:27 AM  "

## 2022-12-12 ENCOUNTER — OFFICE VISIT (OUTPATIENT)
Dept: INTERNAL MEDICINE | Facility: CLINIC | Age: 87
End: 2022-12-12
Payer: COMMERCIAL

## 2022-12-12 VITALS
SYSTOLIC BLOOD PRESSURE: 118 MMHG | HEART RATE: 72 BPM | BODY MASS INDEX: 19.45 KG/M2 | HEIGHT: 61 IN | TEMPERATURE: 97.9 F | WEIGHT: 103 LBS | DIASTOLIC BLOOD PRESSURE: 64 MMHG | OXYGEN SATURATION: 98 %

## 2022-12-12 DIAGNOSIS — I48.20 CHRONIC ATRIAL FIBRILLATION (H): ICD-10-CM

## 2022-12-12 DIAGNOSIS — I25.118 ATHEROSCLEROSIS OF CORONARY ARTERY OF NATIVE HEART WITH OTHER FORM OF ANGINA PECTORIS, UNSPECIFIED VESSEL OR LESION TYPE (H): ICD-10-CM

## 2022-12-12 DIAGNOSIS — Z13.220 SCREENING FOR HYPERLIPIDEMIA: ICD-10-CM

## 2022-12-12 DIAGNOSIS — Z95.1 S/P CABG (CORONARY ARTERY BYPASS GRAFT): ICD-10-CM

## 2022-12-12 DIAGNOSIS — J44.9 CHRONIC OBSTRUCTIVE PULMONARY DISEASE, UNSPECIFIED COPD TYPE (H): ICD-10-CM

## 2022-12-12 DIAGNOSIS — D62 ANEMIA DUE TO BLOOD LOSS, ACUTE: Primary | ICD-10-CM

## 2022-12-12 DIAGNOSIS — M80.00XA AGE-RELATED OSTEOPOROSIS WITH CURRENT PATHOLOGICAL FRACTURE, INITIAL ENCOUNTER: ICD-10-CM

## 2022-12-12 LAB
ALBUMIN SERPL BCG-MCNC: 3.6 G/DL (ref 3.5–5.2)
ALP SERPL-CCNC: 60 U/L (ref 35–104)
ALT SERPL W P-5'-P-CCNC: 24 U/L (ref 10–35)
ANION GAP SERPL CALCULATED.3IONS-SCNC: 10 MMOL/L (ref 7–15)
AST SERPL W P-5'-P-CCNC: 36 U/L (ref 10–35)
BILIRUB SERPL-MCNC: 0.4 MG/DL
BUN SERPL-MCNC: 11.9 MG/DL (ref 8–23)
CALCIUM SERPL-MCNC: 9 MG/DL (ref 8.8–10.2)
CHLORIDE SERPL-SCNC: 104 MMOL/L (ref 98–107)
CHOLEST SERPL-MCNC: 141 MG/DL
CREAT SERPL-MCNC: 0.62 MG/DL (ref 0.51–0.95)
DEPRECATED HCO3 PLAS-SCNC: 26 MMOL/L (ref 22–29)
GFR SERPL CREATININE-BSD FRML MDRD: 87 ML/MIN/1.73M2
GLUCOSE SERPL-MCNC: 149 MG/DL (ref 70–99)
HDLC SERPL-MCNC: 70 MG/DL
LDLC SERPL CALC-MCNC: 38 MG/DL
NONHDLC SERPL-MCNC: 71 MG/DL
POTASSIUM SERPL-SCNC: 4.7 MMOL/L (ref 3.4–5.3)
PROT SERPL-MCNC: 6.6 G/DL (ref 6.4–8.3)
SODIUM SERPL-SCNC: 140 MMOL/L (ref 136–145)
TRIGL SERPL-MCNC: 163 MG/DL

## 2022-12-12 PROCEDURE — 99214 OFFICE O/P EST MOD 30 MIN: CPT | Mod: 25 | Performed by: INTERNAL MEDICINE

## 2022-12-12 PROCEDURE — 80053 COMPREHEN METABOLIC PANEL: CPT | Performed by: INTERNAL MEDICINE

## 2022-12-12 PROCEDURE — 36415 COLL VENOUS BLD VENIPUNCTURE: CPT | Performed by: INTERNAL MEDICINE

## 2022-12-12 PROCEDURE — G0008 ADMIN INFLUENZA VIRUS VAC: HCPCS | Performed by: INTERNAL MEDICINE

## 2022-12-12 PROCEDURE — 90662 IIV NO PRSV INCREASED AG IM: CPT | Performed by: INTERNAL MEDICINE

## 2022-12-12 PROCEDURE — 80061 LIPID PANEL: CPT | Performed by: INTERNAL MEDICINE

## 2022-12-12 NOTE — PROGRESS NOTES
Assessment & Plan     Chronic obstructive pulmonary disease, unspecified COPD type (H)  This is seen in imaging she really has no symptoms.  But of note her oxygen did drop when she was ill.  She is at high risk for future need in oxygen.  She no longer needs oxygen is 98% oxygen saturation at any rate she refused to take at home with her.  She denies shortness of breath on exertion she says she still a little tired but she is back to her normal chores and does all her own housework.  - Comprehensive metabolic panel  - Comprehensive metabolic panel    Screening for hyperlipidemia    - Lipid panel reflex to direct LDL Non-fasting  - Comprehensive metabolic panel  - Lipid panel reflex to direct LDL Non-fasting  - Comprehensive metabolic panel    Atherosclerosis of coronary artery of native heart with other form of angina pectoris, unspecified vessel or lesion type (H)    - Lipid panel reflex to direct LDL Non-fasting  - Lipid panel reflex to direct LDL Non-fasting    Anemia due to blood loss, acute  She had melena and GI blood loss had extensive evaluation with no cause found but that has normalized she was given blood transfusion.    Chronic atrial fibrillation (H)  She initially was not on Eliquis because of the GI blood loss but it has been restarted.  She had been contemplating the watchman but for some reason that is being put on hold.    S/P CABG (coronary artery bypass graft)  Of note her troponins were elevated.  But no urgent echo was done.  She is going to follow-up with her cardiologist at some point.    Age-related osteoporosis with current pathological fracture, initial encounter  She was diagnosed with osteoporosis she has been started on Fosamax she thinks that she stopped it because of GI problems.  She was strongly encouraged to start Prolia but she wants to think about it.  And recover her strength and weight.    She has lost weight due to this influenza infection.  We will give her the flu shot  now to cover any other influenza B.  Also she should get the COVID omicron booster but we did not have any available in the clinic    Weight loss is most likely due to her influenza CT chest abdomen showed no suspicious lesions  She had declined home care     MED REC REQUIRED  Post Medication Reconciliation Status: discharge medications reconciled, continue medications without change    Specifically to discuss osteoporosis  Return in about 3 months (around 3/12/2023) for Follow up.    Yun Jacinto MD  [facility]    oLlis Apodaca is a 87 year old, presenting for the following health issues:  Hospital F/U ([facility] (11/23-11/25 (Influenza A and Hypokalemia) )      HPI Hospital discharge note  Course     Constanza Coles is a 87 y.o. female with a history of persistent atrial fibrillation on anticoagulation, congestive heart failure, COPD with  atherosclerotic coronary artery disease, CKD 3 who has been feeling unwell for the past 3 weeks. It started with sore throat and general congestion, the sore throat has resolved. However, she has had loss of appetite and she is getting weaker. She is unable to take anything by mouth. S Her weakness has progressed that she cannot walk on her own. She denies fever, chest pain, dizziness, vomiting or diarrhea.    In the ED, she was noted to have heart rate of 100/min, blood pressure 195/79, O2 sat 87% on room air. It improved to 90s on nasal cannula. Blood work showed a normal WBC and procalcitonin, potassium noted to be 2.5, , troponin I noted to be 0.088-0.101. Influenza A positive. CT of the chest abdomen and pelvis shows nonspecific bronchiolitis otherwise no consolidation, pleural effusion or any acute findings in the abdomen. The patient was started on Oseltamivir 30 mg twice daily(renal dosing), weaned off oxygen started on 3 L down to 1L day prior to discharge. On discharge patient was saturating well on room air.   Troponins were elevated. Recommend obtaining a cardio which patient declined. Heart rate improved without adjusted metoprolol (Toprol-XL )at home dose of 25 mg daily.  Troponins were thought to be related to her pneumonia    Hospital Follow-up Visit:    Hospital/Nursing Home/IP Rehab Facility: [facility]   Date of Admission: 11/23/22  Date of Discharge: 11/25/22  Reason(s) for Admission: Influenza A and Hypokalemia      Was your hospitalization related to COVID-19? No   Problems taking medications regularly:  None  Medication changes since discharge: Yes, Potassium 20 meq   Problems adhering to non-medication therapy:  None    Summary of hospitalization:  [facility] discharge summary reviewed  Diagnostic Tests/Treatments reviewed.  Follow up needed: none  Other Healthcare Providers Involved in Patient s Care:         None  Update since discharge: improved.         Plan of care communicated with patient           Patient Active Problem List   Diagnosis     Coronary Artery Disease     Sick Sinus Syndrome     H/O aortic valve replacement     Atrial fibrillation (H)     Vitamin B 12 deficiency     Cardiac pacemaker in situ, dual chamber     S/P CABG (coronary artery bypass graft)     Pure hypercholesterolemia     Chronic kidney disease, stage 3 (H)     Percutaneous transluminal coronary angioplasty status     Gastrointestinal hemorrhage, unspecified gastrointestinal hemorrhage type     Melena     Anemia due to blood loss, acute     Tension headache     Anticoagulated     S/P percutaneous transluminal angioplasty (PTA) with stent placement     COPD (chronic obstructive pulmonary disease) (H)     CHF (congestive heart failure) (H)     Chronic atrial fibrillation (H)     Current Outpatient Medications   Medication     acetaminophen (TYLENOL) 500 MG tablet     albuterol (PROAIR HFA/PROVENTIL HFA/VENTOLIN HFA) 108 (90 Base) MCG/ACT inhaler     atorvastatin (LIPITOR) 40 MG tablet     cholecalciferol,  "vitamin D3, (VITAMIN D3) 5,000 unit Tab     cyanocobalamin (CYANOCOBALAMIN) 1000 MCG/ML injection     ELIQUIS ANTICOAGULANT 5 MG tablet     fluticasone (FLOVENT HFA) 110 MCG/ACT inhaler     gabapentin (NEURONTIN) 300 MG capsule     ketoconazole (NIZORAL) 2 % external shampoo     metoprolol succinate ER (TOPROL XL) 25 MG 24 hr tablet     sucralfate (CARAFATE) 1 GM tablet     levofloxacin (LEVAQUIN) 250 MG tablet     No current facility-administered medications for this visit.           Review of Systems   Constitutional, HEENT, cardiovascular, pulmonary, gi and gu systems are negative, except as otherwise noted.      Objective    /64 (BP Location: Left arm, Patient Position: Sitting, Cuff Size: Adult Regular)   Pulse 72   Temp 97.9  F (36.6  C)   Ht 1.549 m (5' 1\")   Wt 46.7 kg (103 lb)   SpO2 98%   BMI 19.46 kg/m    Body mass index is 19.46 kg/m .  Physical Exam   GENERAL: healthy, alert and no distress  NECK: no adenopathy, no asymmetry, masses, or scars and thyroid normal to palpation  RESP: lungs clear to auscultation - no rales, rhonchi or wheezes  CV: regular rate and rhythm, normal S1 S2, no S3 or S4, no murmur, click or rub, no peripheral edema and peripheral pulses strong  ABDOMEN: soft, nontender, no hepatosplenomegaly, no masses and bowel sounds normal  MS: no gross musculoskeletal defects noted, no edema                    "

## 2022-12-14 ENCOUNTER — TELEPHONE (OUTPATIENT)
Dept: INTERNAL MEDICINE | Facility: CLINIC | Age: 87
End: 2022-12-14

## 2022-12-14 NOTE — TELEPHONE ENCOUNTER
Test Results    Contacts       Type Contact Phone/Fax    12/14/2022 11:25 AM CST Phone (Incoming) Constanza Coles (Self) [phone] (H)          Who ordered the test:  Dr. Jacinto    Type of test: Lab    Date of test:  12/12/2022    Where was the test performed:  [facility]    What are your questions/concerns?:  Want to know about results    Could we send this information to you in [employer] or would you prefer to receive a phone call?:   Patient would prefer a phone call   Okay to leave a detailed message?: Yes at Home number on file [phone] (home)

## 2022-12-15 NOTE — TELEPHONE ENCOUNTER
Attempted to call pt, no answer. Left message stating that recent lab results were normal and to call clinic if she has questions.

## 2022-12-15 NOTE — TELEPHONE ENCOUNTER
Tell patient that her labs including her electrolytes and potassium were normal.  She can have a result letter sent.

## 2022-12-27 ENCOUNTER — TELEPHONE (OUTPATIENT)
Dept: CARDIOLOGY | Facility: CLINIC | Age: 87
End: 2022-12-27

## 2022-12-27 DIAGNOSIS — Z95.2 H/O AORTIC VALVE REPLACEMENT: Primary | ICD-10-CM

## 2022-12-27 NOTE — TELEPHONE ENCOUNTER
Health Call Center    Phone Message    May a detailed message be left on voicemail: yes     Reason for Call: Symptoms or Concerns     If patient has red-flag symptoms, warm transfer to triage line    Current symptom or concern: Pt states over the last week or two she has noticed increased swelling around her ankles, no other symptoms present.     Symptoms have been present for:  1-2 week(s)    Has patient previously been seen for this? No    Are there any new or worsening symptoms? No      Action Taken: Other: Cardiology    Travel Screening: Not Applicable     Thank you!  [facility]

## 2022-12-28 NOTE — TELEPHONE ENCOUNTER
===View-only below this line===  ----- Message -----  From: Mathew Antoine MD  Sent: 12/28/2022   3:03 PM CST  To: Edel García RN    Needs rac.  LF        ==Called Constanza and updated on recommendation from Dr. Antoine. She verbalized understanding and is agreeable to RAC appt. She also notes that today she felt really dizzy and lightheaded while at [employer] to get food. She was advised to seek out medical attention should this occur again prior to her RAC visit. Msg to schedulers and to device team to see if we can do remote check based on symptoms today. -elver

## 2022-12-28 NOTE — TELEPHONE ENCOUNTER
Called Constanza coleman to address her concerns. She states that she was hospitalized at [place] for Influenza A back in late November. She was there for 3 days and pretty ill. She was on O2 but was able to be weaned off. While hospitalized, she reportedly had faster high rates- she is chronically in Afib. She has slowly recovered but has lost about 10-15 lbs since being ill.    She calls in to report that she has noticed ankle swelling to both lower extremities by the end of the day for the last couple of weeks. It is gone in the morning. She is not on a diuretic. Again, she has been losing weight actually. She denies increased shortness of breath but admits that she is still quite deconditioned and fatigued from recovering from the flu. She doesn't feel like she has a rapid heart rate presently but has a tough time telling this. She is on Metoprolol + Eliquis. Will route to [facility] but due to this recent hospitalization, it would seem appropriately for her to be seen to evaluate. -[facility]       Dr. Antoine,  See above. Would you recommend any testing or schedule follow up with HF?  Her main reason for call was ankle swelling at the end of the day for the last couple of weeks but no weight gain, general deconditioning since recovering from the flu/hospitalization.  Thanks,  Robin

## 2022-12-30 DIAGNOSIS — I49.5 SICK SINUS SYNDROME (H): ICD-10-CM

## 2022-12-30 DIAGNOSIS — Z95.0 CARDIAC PACEMAKER IN SITU: Primary | ICD-10-CM

## 2023-01-02 ENCOUNTER — ANCILLARY PROCEDURE (OUTPATIENT)
Dept: CARDIOLOGY | Facility: CLINIC | Age: 88
End: 2023-01-02
Attending: INTERNAL MEDICINE
Payer: COMMERCIAL

## 2023-01-02 ENCOUNTER — OFFICE VISIT (OUTPATIENT)
Dept: CARDIOLOGY | Facility: CLINIC | Age: 88
End: 2023-01-02
Payer: COMMERCIAL

## 2023-01-02 VITALS
WEIGHT: 109.8 LBS | DIASTOLIC BLOOD PRESSURE: 60 MMHG | SYSTOLIC BLOOD PRESSURE: 162 MMHG | OXYGEN SATURATION: 97 % | RESPIRATION RATE: 20 BRPM | HEART RATE: 72 BPM | BODY MASS INDEX: 20.2 KG/M2 | HEIGHT: 62 IN

## 2023-01-02 DIAGNOSIS — Z95.0 CARDIAC PACEMAKER IN SITU: ICD-10-CM

## 2023-01-02 DIAGNOSIS — Z95.1 S/P CABG (CORONARY ARTERY BYPASS GRAFT): ICD-10-CM

## 2023-01-02 DIAGNOSIS — Z95.820 S/P PERCUTANEOUS TRANSLUMINAL ANGIOPLASTY (PTA) WITH STENT PLACEMENT: ICD-10-CM

## 2023-01-02 DIAGNOSIS — I49.5 SICK SINUS SYNDROME (H): ICD-10-CM

## 2023-01-02 DIAGNOSIS — Z95.2 H/O AORTIC VALVE REPLACEMENT: ICD-10-CM

## 2023-01-02 DIAGNOSIS — I48.20 CHRONIC ATRIAL FIBRILLATION (H): Primary | ICD-10-CM

## 2023-01-02 LAB
MDC_IDC_LEAD_IMPLANT_DT: NORMAL
MDC_IDC_LEAD_IMPLANT_DT: NORMAL
MDC_IDC_LEAD_LOCATION: NORMAL
MDC_IDC_LEAD_LOCATION: NORMAL
MDC_IDC_LEAD_MFG: NORMAL
MDC_IDC_LEAD_MFG: NORMAL
MDC_IDC_LEAD_MODEL: NORMAL
MDC_IDC_LEAD_MODEL: NORMAL
MDC_IDC_LEAD_POLARITY_TYPE: NORMAL
MDC_IDC_LEAD_POLARITY_TYPE: NORMAL
MDC_IDC_LEAD_SERIAL: NORMAL
MDC_IDC_LEAD_SERIAL: NORMAL
MDC_IDC_MSMT_BATTERY_DTM: NORMAL
MDC_IDC_MSMT_BATTERY_IMPEDANCE: 4132 OHM
MDC_IDC_MSMT_BATTERY_REMAINING_LONGEVITY: 20 MO
MDC_IDC_MSMT_BATTERY_STATUS: NORMAL
MDC_IDC_MSMT_BATTERY_VOLTAGE: 2.72 V
MDC_IDC_MSMT_LEADCHNL_RA_IMPEDANCE_VALUE: 67 OHM
MDC_IDC_MSMT_LEADCHNL_RV_IMPEDANCE_VALUE: 444 OHM
MDC_IDC_MSMT_LEADCHNL_RV_PACING_THRESHOLD_AMPLITUDE: 0.5 V
MDC_IDC_MSMT_LEADCHNL_RV_PACING_THRESHOLD_AMPLITUDE: 0.5 V
MDC_IDC_MSMT_LEADCHNL_RV_PACING_THRESHOLD_PULSEWIDTH: 0.4 MS
MDC_IDC_MSMT_LEADCHNL_RV_PACING_THRESHOLD_PULSEWIDTH: 0.4 MS
MDC_IDC_MSMT_LEADCHNL_RV_SENSING_INTR_AMPL: 8 MV
MDC_IDC_PG_IMPLANT_DTM: NORMAL
MDC_IDC_PG_MFG: NORMAL
MDC_IDC_PG_MODEL: NORMAL
MDC_IDC_PG_SERIAL: NORMAL
MDC_IDC_PG_TYPE: NORMAL
MDC_IDC_SESS_CLINIC_NAME: NORMAL
MDC_IDC_SESS_DTM: NORMAL
MDC_IDC_SESS_TYPE: NORMAL
MDC_IDC_SET_BRADY_HYSTRATE: NORMAL
MDC_IDC_SET_BRADY_LOWRATE: 50 {BEATS}/MIN
MDC_IDC_SET_BRADY_MAX_TRACKING_RATE: 110 {BEATS}/MIN
MDC_IDC_SET_BRADY_MODE: NORMAL
MDC_IDC_SET_LEADCHNL_RV_PACING_AMPLITUDE: 1.25 V
MDC_IDC_SET_LEADCHNL_RV_PACING_CAPTURE_MODE: NORMAL
MDC_IDC_SET_LEADCHNL_RV_PACING_POLARITY: NORMAL
MDC_IDC_SET_LEADCHNL_RV_PACING_PULSEWIDTH: 0.4 MS
MDC_IDC_SET_LEADCHNL_RV_SENSING_POLARITY: NORMAL
MDC_IDC_SET_LEADCHNL_RV_SENSING_SENSITIVITY: 2.8 MV
MDC_IDC_SET_ZONE_DETECTION_INTERVAL: 333.33 MS
MDC_IDC_SET_ZONE_TYPE: NORMAL
MDC_IDC_SET_ZONE_TYPE: NORMAL
MDC_IDC_STAT_AT_BURDEN_PERCENT: 0 %
MDC_IDC_STAT_AT_DTM_END: NORMAL
MDC_IDC_STAT_AT_DTM_START: NORMAL
MDC_IDC_STAT_BRADY_DTM_END: NORMAL
MDC_IDC_STAT_BRADY_DTM_START: NORMAL
MDC_IDC_STAT_BRADY_RV_PERCENT_PACED: 9 %
MDC_IDC_STAT_EPISODE_RECENT_COUNT: 0
MDC_IDC_STAT_EPISODE_RECENT_COUNT: 4
MDC_IDC_STAT_EPISODE_RECENT_COUNT_DTM_END: NORMAL
MDC_IDC_STAT_EPISODE_RECENT_COUNT_DTM_END: NORMAL
MDC_IDC_STAT_EPISODE_RECENT_COUNT_DTM_START: NORMAL
MDC_IDC_STAT_EPISODE_RECENT_COUNT_DTM_START: NORMAL
MDC_IDC_STAT_EPISODE_TYPE: NORMAL
MDC_IDC_STAT_EPISODE_TYPE: NORMAL

## 2023-01-02 PROCEDURE — 93280 PM DEVICE PROGR EVAL DUAL: CPT | Performed by: INTERNAL MEDICINE

## 2023-01-02 PROCEDURE — 99214 OFFICE O/P EST MOD 30 MIN: CPT | Performed by: INTERNAL MEDICINE

## 2023-01-02 NOTE — LETTER
1/2/2023    Yun Jacinto MD  [address] [facility] [address]    RE: Constanza Coles       Dear Colleague,     I had the pleasure of seeing Constanza Coles in the [facility].    CARDIOLOGY RAPID ACCESS CLINIC CONSULT NOTE     Assessment/Plan:   1.  Lower extremity edema, suspect secondary to acute right heart failure given her influenza 11/2022.  Now resolved.  2.  Positive troponins 11/2022 with normal LV function noted on echo and no anginal symptoms.  Suspect related to her acute pneumonia, a type II myocardial infarction.  With return to full activity tolerance without limitations, she is not interested in pursuing any further evaluation at this time.    3.  Mild RV dysfunction suggested on echo 8/2022, may reflect her chronic COPD, or may also suggest some sleep-related breathing disorder.  Unwilling to do further evaluation at this time.    Follow-up with Dr. Antoine as previously scheduled.     History of Present Illness:     It is my pleasure to see Constanza Coles at the [facility] RAPID ACCESS clinic for evaluation of lower extremity edema.    Constanza Coles is a 87 year old female with a past medical history of Coronary artery disease status post bypass revascularization 2005 with concurrent 21 mm Forte pericardial magna bioprosthesis.  She also has a history of chronic atrial fibrillation and is status post dual-chamber pacemaker placement 2011.  She has had problems with GI bleeding in 2022 has had a full GI work-up negative for source and was restarted on Eliquis 2.5 mg twice daily.  She was hospitalized 11/2022 for influenza A and was noted to have positive troponins during that hospitalization.    She noted after her hospitalization she had some lower extremity edema that is resolved over the last couple of weeks.  She has not experienced any chest pain, and is back at the gym working out on machines without difficulty.  She does report that her  sleep is chronically poor but denies that she snores or stops breathing.  She has never had a sleep apnea evaluation and is reluctant to do so.  She quit smoking decades ago.  She reports her blood pressure is generally well controlled at home and that her activity tolerance is stable.  She has occasionally noted left arm discomfort but this is not related to exertion.  She feels that her shortness of breath with activity is stable and gradually improving since her pneumonia.  She is occasionally aware of her heart racing irregularly, but is not willing to increase her low-dose metoprolol.    Past Medical History:     Patient Active Problem List   Diagnosis     Coronary Artery Disease     Sick Sinus Syndrome     H/O aortic valve replacement     Atrial fibrillation (H)     Vitamin B 12 deficiency     Cardiac pacemaker in situ, dual chamber     S/P CABG (coronary artery bypass graft)     Pure hypercholesterolemia     Chronic kidney disease, stage 3 (H)     Percutaneous transluminal coronary angioplasty status     Gastrointestinal hemorrhage, unspecified gastrointestinal hemorrhage type     Melena     Anemia due to blood loss, acute     Tension headache     Anticoagulated     S/P percutaneous transluminal angioplasty (PTA) with stent placement     COPD (chronic obstructive pulmonary disease) (H)     CHF (congestive heart failure) (H)     Chronic atrial fibrillation (H)       Past Surgical History:     Past Surgical History:   Procedure Laterality Date     ASCENDING AORTIC ANEURYSM REPAIR W/ TISSUE AORTIC VALVE REPLACEMENT  2005    Dacron graft, Forte pericardial Magna 21mm aortic valve      SECTION       COLONOSCOPY N/A 10/31/2021    Procedure: COLONOSCOPY;  Surgeon: Dario Thomas MD;  Location: [facility] Main OR     CV CORONARY ANGIOGRAM N/A 2021    Procedure: Coronary Angiogram;  Surgeon: Shane Mcclendon MD;  Location: [facility] CATH LAB CV     CV LEFT HEART CATH N/A 2021    Procedure:  Left Heart Cath;  Surgeon: Shane Mcclendon MD;  Location: [facility] CATH LAB CV     CV PCI N/A 9/27/2021    Procedure: Percutaneous Coronary Intervention;  Surgeon: Shane Mcclendon MD;  Location: [facility] CATH LAB CV     ESOPHAGOSCOPY, GASTROSCOPY, DUODENOSCOPY (EGD), COMBINED N/A 10/30/2021    Procedure: ESOPHAGOGASTRODUODENOSCOPY (EGD);  Surgeon: Dario Thomas MD;  Location: [facility] Main OR     GALLBLADDER SURGERY      partial colon removal     HYSTERECTOMY       IR AORTIC ARCH 4 VESSEL ANGIOGRAM  9/15/2011     IR MISCELLANEOUS PROCEDURE  1/6/2005     OTHER SURGICAL HISTORY  1981    antrectomy     PARTIAL GASTRECTOMY       AR PERC CLOS,BART INTERATRIAL COMMUN W/IMPL      Description: Patent Foramen Ovale Closure Percutaneous;  Recorded: 04/25/2012;     ZZC CABG, VEIN, SINGLE      Description: CABG (CABG);  Recorded: 04/25/2012;  Comments: LIMA^LAD, SVG^1st diag     ZZC REPLACE AORT VALV,PROSTH VALV      Aortic Valve Replacement with Forte pericardial Magna 21mm Jan 2005       Family History:     Family History   Problem Relation Age of Onset     Liver Disease Father      No Known Problems Mother      Thyroid Disease Sister      LUNG DISEASE Sister      Family history reviewed and is not pertinent to the chief complaint or presenting problem    Social History:    reports that she quit smoking about 43 years ago. Her smoking use included cigarettes. She has a 30.00 pack-year smoking history. She has never used smokeless tobacco. She reports current alcohol use. She reports that she does not use drugs.    Exercise: Works out on machines at the gym regularly with stable activity tolerance    Sleep: Chronically poorly restorative.  Denies snoring or apnea.    Meds:     Current Outpatient Medications   Medication Sig Dispense Refill     acetaminophen (TYLENOL) 500 MG tablet Take 1,000 mg by mouth every 6 hours as needed for mild pain       albuterol (PROAIR HFA/PROVENTIL HFA/VENTOLIN HFA) 108 (90 Base)  "MCG/ACT inhaler Inhale 2 puffs into the lungs every 6 hours 18 g 0     apixaban ANTICOAGULANT (ELIQUIS) 2.5 MG tablet Take 2.5 mg by mouth 2 times daily       atorvastatin (LIPITOR) 40 MG tablet TAKE 1 TABLET (40 MG) BY MOUTH DAILY 90 tablet 3     cholecalciferol, vitamin D3, (VITAMIN D3) 5,000 unit Tab [CHOLECALCIFEROL, VITAMIN D3, (VITAMIN D3) 5,000 UNIT TAB] Take 1 tablet by mouth daily.       cyanocobalamin (CYANOCOBALAMIN) 1000 MCG/ML injection [CYANOCOBALAMIN 1,000 MCG/ML INJECTION] INJECT 1 ML (1,000 MCG TOTAL) INTO THE SHOULDER, THIGH, OR BUTTOCKS EVERY 30 DAYS. **SYR 27G 1/2\" 1CC** 3 mL 3     gabapentin (NEURONTIN) 300 MG capsule TAKE 1 CAPSULE (300 MG TOTAL) BY MOUTH 2 TIMES A DAY. 180 capsule 3     ketoconazole (NIZORAL) 2 % external shampoo Apply topically daily as needed        levofloxacin (LEVAQUIN) 250 MG tablet Take 1 tablet (250 mg) by mouth daily (Patient taking differently: Take 250 mg by mouth daily As needed) 10 tablet 3     metoprolol succinate ER (TOPROL XL) 25 MG 24 hr tablet Take 1 tablet (25 mg) by mouth daily 90 tablet 2     sucralfate (CARAFATE) 1 GM tablet TAKE 1 TABLET BY MOUTH FOUR TIMES DAILY (Patient taking differently: As needed) 360 tablet 3     fluticasone (FLOVENT HFA) 110 MCG/ACT inhaler Inhale 1 puff into the lungs 2 times daily (Patient not taking: Reported on 1/2/2023) 12 g 0       Allergies:   Aspirin, Erythromycin base [erythromycin], Levaquin [levofloxacin], Sulfatolamide, Vancomycin, Xanax [alprazolam], and Sulfa (sulfonamide antibiotics) [sulfa drugs]    Review of Systems:     Positive for shortness of breath, arm pain, shortness of breath with activity, irregular heartbeat, leg swelling, palpitations, loss of balance, poor wound healing, easy bruising, anxiety.  12 point review of systems otherwise negative     Please refer above for cardiac ROS details.       Objective:      Physical Exam  49.8 kg (109 lb 12.8 oz)  1.575 m (5' 2\")  Body mass index is 20.08 kg/m .  BP " "(!) 162/60 (BP Location: Left arm, Patient Position: Sitting, Cuff Size: Adult Small)   Pulse 72   Resp 20   Ht 1.575 m (5' 2\")   Wt 49.8 kg (109 lb 12.8 oz)   SpO2 97%   BMI 20.08 kg/m    Blood pressure recheck 140/64      General Appearance : Awake, Alert, No acute distress  HEENT: No Scleral icterus; the mucous membranes were pink and moist.  Conjunctivae bilaterally injected  Neck:  No cervical bruits, jugular venous distention, or thyromegaly   Chest: The spine was straight. Chest wall incision well-healed without evidence of instability  Lungs: Respirations unlabored; the lungs are clear to auscultation.  No wheezing   Cardiovascular: Normal point of maximal impulse.  Auscultation reveals irregularly irregular first and second heart sounds with 2/6 blowing systolic murmur along left sternal border.  carotid, radial, and dorsalis pedal pulses are intact and symmetric.  Abdomen: No organomegaly, masses, bruits, or tenderness. Bowels sounds are present  Extremities: No edema  Skin: No xanthelasma. Warm, Dry.  Musculoskeletal: No tenderness.  Neurologic: Alert and oriented ×3. Speech is fluent, [place] accent.      EKG :  [facility] 11/23/2022:  Atrial fibrillation with rapid ventricular response   Moderate voltage criteria for LVH, may be normal variant   Nonspecific ST and T wave abnormality   Abnormal ECG   Similar by description to study dated 10/29/2021, though direct comparison not available.    Cardiac Imaging Studies:  Echocardiogram 8/2022:  Normal left ventricular size. Normal left ventricular wall thickness. Left  ventricular systolic function appears mildly reduced. Left ventricular  ejection fraction estimated at 50 to 55%.  Abnormal left ventricular septal motion potentially related to paced rhythm.  Normal right ventricular size. Mild reduction in right ventricular systolic  function suggested. Abnormal TAPSE.  Pacemaker catheter in the right heart.  Mild to moderate left atrial enlargement.  Mild " mitral regurgitation. Mild mitral stenosis. Mean gradient of  approximately 3 mmHg at a heart rate of approximately 80 bpm.  Bioprosthetic valve in aortic position. By report 21 mm  valve. Valve is well-seated. Peak velocity of 1.7 m/s. Mean gradient of 7  mmHg.  Mild to moderate tricuspid regurgitation. Estimate of RV systolic pressure is  mildly increased at 36 mmHg plus atrial pressure  Compared to the study dated Feb 2021 the peak velocity and mean gradient  across the prosthetic aortic valve is similar. Left ventricular ejection  fraction estimate is similar.      Coronary angiography with intervention 9/2021:  LM:no obstruction  LAD:severe disease proximally, fills distally competitively via patent L-LAD and V-D  Lcx:mid-vessel 40-50% narrowing including into OM, distal 95-99% narrowing supplying co-dominant lPDA  RCA:co-dominant, mid-vessel 95% Ca2+ narrowing, AM branch w/ tandem 85% lesions  Grafts:  L-LAD: patent but w/ at least mod-severe 60-70% narrowing. Of note, L ICA appears to come off of the L subclavian artery  V-D: patent  - multi-vessel CAD involving Lcx (s/p DESx1 today), mRCA, and ostial/proximal L-LAD; low-normal L-sided filling pressures; AV pullback gradient P/M 1/15 but visually no significant gradient by waveform, which would correspond w/ TTE findings  - she can't/refuses to take ASA, but also isn't a good re-do OHS candidate given age and co-morbidities      Lab Review   Lab Results   Component Value Date     12/12/2022    CO2 26 12/12/2022    CO2 29 11/09/2021    BUN 11.9 12/12/2022    BUN 12 11/09/2021     Lab Results   Component Value Date    WBC 7.9 07/27/2022    HGB 11.2 07/27/2022    HCT 35.9 07/27/2022    MCV 91 07/27/2022     07/27/2022     Lab Results   Component Value Date    CHOL 141 12/12/2022    TRIG 163 12/12/2022    HDL 70 12/12/2022     No results found for: TROPONINI  Lab Results   Component Value Date     03/30/2021     Lab Results   Component Value  Date    TSH 3.77 07/27/2022    TSH 2.16 08/18/2021       Channing Cam MD, MD [facility]      This note created using Dragon voice recognition software. Sound alike errors may have escaped editing.     Thank you for allowing me to participate in the care of your patient.      Sincerely,     Channing Cam MD     [facility]  cc:   No referring provider defined for this encounter.

## 2023-01-02 NOTE — PROGRESS NOTES
CARDIOLOGY RAPID ACCESS CLINIC CONSULT NOTE     Assessment/Plan:   1.  Lower extremity edema, suspect secondary to acute right heart failure given her influenza 11/2022.  Now resolved.  2.  Positive troponins 11/2022 with normal LV function noted on echo and no anginal symptoms.  Suspect related to her acute pneumonia, a type II myocardial infarction.  With return to full activity tolerance without limitations, she is not interested in pursuing any further evaluation at this time.    3.  Mild RV dysfunction suggested on echo 8/2022, may reflect her chronic COPD, or may also suggest some sleep-related breathing disorder.  Unwilling to do further evaluation at this time.    Follow-up with Dr. Antoine as previously scheduled.     History of Present Illness:     It is my pleasure to see Constanza Coles at the [facility] RAPID ACCESS clinic for evaluation of lower extremity edema.    Constanza Coles is a 87 year old female with a past medical history of Coronary artery disease status post bypass revascularization 2005 with concurrent 21 mm Forte pericardial magna bioprosthesis.  She also has a history of chronic atrial fibrillation and is status post dual-chamber pacemaker placement 2011.  She has had problems with GI bleeding in 2022 has had a full GI work-up negative for source and was restarted on Eliquis 2.5 mg twice daily.  She was hospitalized 11/2022 for influenza A and was noted to have positive troponins during that hospitalization.    She noted after her hospitalization she had some lower extremity edema that is resolved over the last couple of weeks.  She has not experienced any chest pain, and is back at the gym working out on machines without difficulty.  She does report that her sleep is chronically poor but denies that she snores or stops breathing.  She has never had a sleep apnea evaluation and is reluctant to do so.  She quit smoking decades ago.  She reports her blood pressure is  generally well controlled at home and that her activity tolerance is stable.  She has occasionally noted left arm discomfort but this is not related to exertion.  She feels that her shortness of breath with activity is stable and gradually improving since her pneumonia.  She is occasionally aware of her heart racing irregularly, but is not willing to increase her low-dose metoprolol.    Past Medical History:     Patient Active Problem List   Diagnosis     Coronary Artery Disease     Sick Sinus Syndrome     H/O aortic valve replacement     Atrial fibrillation (H)     Vitamin B 12 deficiency     Cardiac pacemaker in situ, dual chamber     S/P CABG (coronary artery bypass graft)     Pure hypercholesterolemia     Chronic kidney disease, stage 3 (H)     Percutaneous transluminal coronary angioplasty status     Gastrointestinal hemorrhage, unspecified gastrointestinal hemorrhage type     Melena     Anemia due to blood loss, acute     Tension headache     Anticoagulated     S/P percutaneous transluminal angioplasty (PTA) with stent placement     COPD (chronic obstructive pulmonary disease) (H)     CHF (congestive heart failure) (H)     Chronic atrial fibrillation (H)       Past Surgical History:     Past Surgical History:   Procedure Laterality Date     ASCENDING AORTIC ANEURYSM REPAIR W/ TISSUE AORTIC VALVE REPLACEMENT  2005    Dacron graft, Forte pericardial Magna 21mm aortic valve      SECTION       COLONOSCOPY N/A 10/31/2021    Procedure: COLONOSCOPY;  Surgeon: Dario Thomas MD;  Location: [facility] Main OR     CV CORONARY ANGIOGRAM N/A 2021    Procedure: Coronary Angiogram;  Surgeon: Shane Mcclendon MD;  Location: [facility] CATH LAB CV     CV LEFT HEART CATH N/A 2021    Procedure: Left Heart Cath;  Surgeon: Shane Mcclendon MD;  Location: [facility] CATH LAB CV     CV PCI N/A 2021    Procedure: Percutaneous Coronary Intervention;  Surgeon: Shane Mcclendon MD;  Location: [facility]  CATH LAB CV     ESOPHAGOSCOPY, GASTROSCOPY, DUODENOSCOPY (EGD), COMBINED N/A 10/30/2021    Procedure: ESOPHAGOGASTRODUODENOSCOPY (EGD);  Surgeon: Dario Thomas MD;  Location: [facility] Main OR     GALLBLADDER SURGERY      partial colon removal     HYSTERECTOMY       IR AORTIC ARCH 4 VESSEL ANGIOGRAM  9/15/2011     IR MISCELLANEOUS PROCEDURE  1/6/2005     OTHER SURGICAL HISTORY  1981    antrectomy     PARTIAL GASTRECTOMY       OK PERC CLOS,BART INTERATRIAL COMMUN W/IMPL      Description: Patent Foramen Ovale Closure Percutaneous;  Recorded: 04/25/2012;     ZZC CABG, VEIN, SINGLE      Description: CABG (CABG);  Recorded: 04/25/2012;  Comments: LIMA^LAD, SVG^1st diag     ZZC REPLACE AORT VALV,PROSTH VALV      Aortic Valve Replacement with Forte pericardial Magna 21mm Jan 2005       Family History:     Family History   Problem Relation Age of Onset     Liver Disease Father      No Known Problems Mother      Thyroid Disease Sister      LUNG DISEASE Sister      Family history reviewed and is not pertinent to the chief complaint or presenting problem    Social History:    reports that she quit smoking about 43 years ago. Her smoking use included cigarettes. She has a 30.00 pack-year smoking history. She has never used smokeless tobacco. She reports current alcohol use. She reports that she does not use drugs.    Exercise: Works out on machines at the gym regularly with stable activity tolerance    Sleep: Chronically poorly restorative.  Denies snoring or apnea.    Meds:     Current Outpatient Medications   Medication Sig Dispense Refill     acetaminophen (TYLENOL) 500 MG tablet Take 1,000 mg by mouth every 6 hours as needed for mild pain       albuterol (PROAIR HFA/PROVENTIL HFA/VENTOLIN HFA) 108 (90 Base) MCG/ACT inhaler Inhale 2 puffs into the lungs every 6 hours 18 g 0     apixaban ANTICOAGULANT (ELIQUIS) 2.5 MG tablet Take 2.5 mg by mouth 2 times daily       atorvastatin (LIPITOR) 40 MG tablet TAKE 1 TABLET (40  "MG) BY MOUTH DAILY 90 tablet 3     cholecalciferol, vitamin D3, (VITAMIN D3) 5,000 unit Tab [CHOLECALCIFEROL, VITAMIN D3, (VITAMIN D3) 5,000 UNIT TAB] Take 1 tablet by mouth daily.       cyanocobalamin (CYANOCOBALAMIN) 1000 MCG/ML injection [CYANOCOBALAMIN 1,000 MCG/ML INJECTION] INJECT 1 ML (1,000 MCG TOTAL) INTO THE SHOULDER, THIGH, OR BUTTOCKS EVERY 30 DAYS. **SYR 27G 1/2\" 1CC** 3 mL 3     gabapentin (NEURONTIN) 300 MG capsule TAKE 1 CAPSULE (300 MG TOTAL) BY MOUTH 2 TIMES A DAY. 180 capsule 3     ketoconazole (NIZORAL) 2 % external shampoo Apply topically daily as needed        levofloxacin (LEVAQUIN) 250 MG tablet Take 1 tablet (250 mg) by mouth daily (Patient taking differently: Take 250 mg by mouth daily As needed) 10 tablet 3     metoprolol succinate ER (TOPROL XL) 25 MG 24 hr tablet Take 1 tablet (25 mg) by mouth daily 90 tablet 2     sucralfate (CARAFATE) 1 GM tablet TAKE 1 TABLET BY MOUTH FOUR TIMES DAILY (Patient taking differently: As needed) 360 tablet 3     fluticasone (FLOVENT HFA) 110 MCG/ACT inhaler Inhale 1 puff into the lungs 2 times daily (Patient not taking: Reported on 1/2/2023) 12 g 0       Allergies:   Aspirin, Erythromycin base [erythromycin], Levaquin [levofloxacin], Sulfatolamide, Vancomycin, Xanax [alprazolam], and Sulfa (sulfonamide antibiotics) [sulfa drugs]    Review of Systems:     Positive for shortness of breath, arm pain, shortness of breath with activity, irregular heartbeat, leg swelling, palpitations, loss of balance, poor wound healing, easy bruising, anxiety.  12 point review of systems otherwise negative     Please refer above for cardiac ROS details.       Objective:      Physical Exam  49.8 kg (109 lb 12.8 oz)  1.575 m (5' 2\")  Body mass index is 20.08 kg/m .  BP (!) 162/60 (BP Location: Left arm, Patient Position: Sitting, Cuff Size: Adult Small)   Pulse 72   Resp 20   Ht 1.575 m (5' 2\")   Wt 49.8 kg (109 lb 12.8 oz)   SpO2 97%   BMI 20.08 kg/m    Blood pressure " recheck 140/64      General Appearance : Awake, Alert, No acute distress  HEENT: No Scleral icterus; the mucous membranes were pink and moist.  Conjunctivae bilaterally injected  Neck:  No cervical bruits, jugular venous distention, or thyromegaly   Chest: The spine was straight. Chest wall incision well-healed without evidence of instability  Lungs: Respirations unlabored; the lungs are clear to auscultation.  No wheezing   Cardiovascular: Normal point of maximal impulse.  Auscultation reveals irregularly irregular first and second heart sounds with 2/6 blowing systolic murmur along left sternal border.  carotid, radial, and dorsalis pedal pulses are intact and symmetric.  Abdomen: No organomegaly, masses, bruits, or tenderness. Bowels sounds are present  Extremities: No edema  Skin: No xanthelasma. Warm, Dry.  Musculoskeletal: No tenderness.  Neurologic: Alert and oriented ×3. Speech is fluent, [place] accent.      EKG :  [facility] 11/23/2022:  Atrial fibrillation with rapid ventricular response   Moderate voltage criteria for LVH, may be normal variant   Nonspecific ST and T wave abnormality   Abnormal ECG   Similar by description to study dated 10/29/2021, though direct comparison not available.    Cardiac Imaging Studies:  Echocardiogram 8/2022:  Normal left ventricular size. Normal left ventricular wall thickness. Left  ventricular systolic function appears mildly reduced. Left ventricular  ejection fraction estimated at 50 to 55%.  Abnormal left ventricular septal motion potentially related to paced rhythm.  Normal right ventricular size. Mild reduction in right ventricular systolic  function suggested. Abnormal TAPSE.  Pacemaker catheter in the right heart.  Mild to moderate left atrial enlargement.  Mild mitral regurgitation. Mild mitral stenosis. Mean gradient of  approximately 3 mmHg at a heart rate of approximately 80 bpm.  Bioprosthetic valve in aortic position. By report 21 mm  valve. Valve is well-seated.  Peak velocity of 1.7 m/s. Mean gradient of 7  mmHg.  Mild to moderate tricuspid regurgitation. Estimate of RV systolic pressure is  mildly increased at 36 mmHg plus atrial pressure  Compared to the study dated Feb 2021 the peak velocity and mean gradient  across the prosthetic aortic valve is similar. Left ventricular ejection  fraction estimate is similar.      Coronary angiography with intervention 9/2021:  LM:no obstruction  LAD:severe disease proximally, fills distally competitively via patent L-LAD and V-D  Lcx:mid-vessel 40-50% narrowing including into OM, distal 95-99% narrowing supplying co-dominant lPDA  RCA:co-dominant, mid-vessel 95% Ca2+ narrowing, AM branch w/ tandem 85% lesions  Grafts:  L-LAD: patent but w/ at least mod-severe 60-70% narrowing. Of note, L ICA appears to come off of the L subclavian artery  V-D: patent  - multi-vessel CAD involving Lcx (s/p DESx1 today), mRCA, and ostial/proximal L-LAD; low-normal L-sided filling pressures; AV pullback gradient P/M 1/15 but visually no significant gradient by waveform, which would correspond w/ TTE findings  - she can't/refuses to take ASA, but also isn't a good re-do OHS candidate given age and co-morbidities      Lab Review   Lab Results   Component Value Date     12/12/2022    CO2 26 12/12/2022    CO2 29 11/09/2021    BUN 11.9 12/12/2022    BUN 12 11/09/2021     Lab Results   Component Value Date    WBC 7.9 07/27/2022    HGB 11.2 07/27/2022    HCT 35.9 07/27/2022    MCV 91 07/27/2022     07/27/2022     Lab Results   Component Value Date    CHOL 141 12/12/2022    TRIG 163 12/12/2022    HDL 70 12/12/2022     No results found for: TROPONINI  Lab Results   Component Value Date     03/30/2021     Lab Results   Component Value Date    TSH 3.77 07/27/2022    TSH 2.16 08/18/2021       Channing Cam MD, MD [facility]      This note created using Dragon voice recognition software. Sound alike errors may have escaped editing.

## 2023-01-02 NOTE — PATIENT INSTRUCTIONS
No changes in your medications today.  Continue to monitor your blood pressure.  Consider sleep apnea evaluation as this could be contributing to your poor sleep, as well as the strain evident on your right heart.  Follow-up as previously scheduled with Dr. Antoine.

## 2023-01-10 ENCOUNTER — TELEPHONE (OUTPATIENT)
Dept: INTERNAL MEDICINE | Facility: CLINIC | Age: 88
End: 2023-01-10

## 2023-01-10 DIAGNOSIS — R00.0 TACHYCARDIA: ICD-10-CM

## 2023-01-10 RX ORDER — METOPROLOL SUCCINATE 25 MG/1
25 TABLET, EXTENDED RELEASE ORAL DAILY
Qty: 90 TABLET | Refills: 3 | Status: SHIPPED | OUTPATIENT
Start: 2023-01-10 | End: 2024-01-29

## 2023-01-10 NOTE — TELEPHONE ENCOUNTER
Pending Prescriptions:                       Disp   Refills    metoprolol succinate ER (TOPROL XL) 25 MG*90 tab*2            Sig: Take 1 tablet (25 mg) by mouth daily    Pt reports being told to increase dose to 50mg each day.

## 2023-01-12 ENCOUNTER — ALLIED HEALTH/NURSE VISIT (OUTPATIENT)
Dept: FAMILY MEDICINE | Facility: CLINIC | Age: 88
End: 2023-01-12
Payer: COMMERCIAL

## 2023-01-12 DIAGNOSIS — Z23 ENCOUNTER FOR IMMUNIZATION: Primary | ICD-10-CM

## 2023-01-12 PROCEDURE — 91312 COVID-19 VACCINE BIVALENT BOOSTER 12+ (PFIZER): CPT

## 2023-01-12 PROCEDURE — 99207 PR NO CHARGE NURSE ONLY: CPT

## 2023-01-12 PROCEDURE — 0124A COVID-19 VACCINE BIVALENT BOOSTER 12+ (PFIZER): CPT

## 2023-01-15 ENCOUNTER — TRANSFERRED RECORDS (OUTPATIENT)
Dept: HEALTH INFORMATION MANAGEMENT | Facility: CLINIC | Age: 88
End: 2023-01-15

## 2023-01-19 ENCOUNTER — TELEPHONE (OUTPATIENT)
Dept: INTERNAL MEDICINE | Facility: CLINIC | Age: 88
End: 2023-01-19
Payer: COMMERCIAL

## 2023-01-19 NOTE — TELEPHONE ENCOUNTER
The Home Care/Assisted Living/Nursing Facility is calling regarding an established patient.  Has the patient seen Home Care in the past or is currently residing in Assisted Living or Nursing Facility? Yes.     Janet calling from [facility] requesting the following orders that are within the Home Care, Assisted Living or Nursing Home Eval and Treatment standing order and can be signed as standing order signature required by RN.    Preferred Call Back Number: [phone]    PT/OT/Speech Therapy    Any additional Orders:  Are there any orders requested, not stated above, that are outside of the standing order and must be routed to a licensed practitioner for approval?    No    Writer has verified Requestor will send fax to have orders signed.

## 2023-01-19 NOTE — TELEPHONE ENCOUNTER
Order/Referral Request    Who is requesting: [facility]    Orders being requested:   Physical therapy  2 times weekly for 3 weeks  1 time weekly for 3 weeks    Reason service is needed/diagnosis: n/a    When are orders needed by: n/a    Has this been discussed with Provider: No    Does patient have a preference on a Group/Provider/Facility? n/a    Does patient have an appointment scheduled?: No    Where to send orders: N/A    Could we send this information to you in [employer] or would you prefer to receive a phone call?:   No preference   Okay to leave a detailed message?: Yes at Other phone number:  [phone]

## 2023-01-19 NOTE — TELEPHONE ENCOUNTER
January 19, 2023    [facility] Forms: Notification of home health care notes was received via received via fax for [facility] Providers: Dr. Jacinto to review.  Patient label was attached to paperwork and placed in provider's inbox.    Rowdy Alba III

## 2023-01-23 ENCOUNTER — TELEPHONE (OUTPATIENT)
Dept: INTERNAL MEDICINE | Facility: CLINIC | Age: 88
End: 2023-01-23
Payer: COMMERCIAL

## 2023-01-23 DIAGNOSIS — S72.001A CLOSED FRACTURE OF RIGHT HIP, INITIAL ENCOUNTER (H): Primary | ICD-10-CM

## 2023-01-23 RX ORDER — OXYCODONE HYDROCHLORIDE 5 MG/1
5 TABLET ORAL EVERY 6 HOURS PRN
Qty: 12 TABLET | Refills: 0 | Status: SHIPPED | OUTPATIENT
Start: 2023-01-23 | End: 2023-01-26

## 2023-01-23 NOTE — TELEPHONE ENCOUNTER
Spoke with patient and informed her of the prescription sent into [facility] pharmacy. Pt stated understanding that she will need a visit with Dr. Jacinto to discuss further medication options if needed.

## 2023-01-23 NOTE — TELEPHONE ENCOUNTER
General Call      Reason for Call:  Pt stating she needs with pain med after she has broken her hip - pt fell, was in hospital     Pharm:  [facility]    What are your questions or concerns:  n/a    Date of last appointment with provider: n/a    Could we send this information to you in [facility] or would you prefer to receive a phone call?:   Patient would prefer a phone call   Okay to leave a detailed message?: Yes at Home number on file [phone] (home)

## 2023-01-23 NOTE — TELEPHONE ENCOUNTER
I did refill the same prescription her surgeon had given her.  We are only allowed to give a limited amount for acute postoperative pain.  If she needs more longer term use she needs to come to the clinic to discuss the risks and benefits.

## 2023-01-24 ENCOUNTER — TELEPHONE (OUTPATIENT)
Dept: INTERNAL MEDICINE | Facility: CLINIC | Age: 88
End: 2023-01-24
Payer: COMMERCIAL

## 2023-01-24 ENCOUNTER — TELEPHONE (OUTPATIENT)
Dept: CARDIOLOGY | Facility: CLINIC | Age: 88
End: 2023-01-24
Payer: COMMERCIAL

## 2023-01-24 NOTE — TELEPHONE ENCOUNTER
Recommendations discussed with pt.  Pt verbalized understanding, plan made to follow-up with her later this week.  -rah    ===View-only below this line===  ----- Message -----  From: Mathew Antoine MD  Sent: 1/24/2023  12:25 PM CST  To: Jessica Mendoza RN    Agree with your assessment.  If no better by the end of this week please bring him for renal profile and BNP, if BNP elevated will start her on furosemide 20 mg twice a day as needed.  LF

## 2023-01-24 NOTE — TELEPHONE ENCOUNTER
Pt reports feet and ankle swelling, left greater than right.  Pt able to squeeze in to sandals.  Pt denies shortness of breath.  Weight is stable.  Pt did have surgery last week (left hip hemiarthroplasty 1/16/23) for broken hip - discussed that she would have gotten IVF during hospitalization and she may have a bit of extra fluid on board.    Advised pt to avoid sodium intake, elevate legs throughout the day, and try to be active.    Dr Antoine - any additional recommendations?  -[facility]

## 2023-01-24 NOTE — TELEPHONE ENCOUNTER
January 24, 2023    [facility] Forms: Home health care orders were received via fax for [facility] Prov[facility]: Dr. Jacinto to sign.  Patient label was attached to paperwork and placed in provider's inbox to be signed.    Rowdy Alba III

## 2023-01-24 NOTE — TELEPHONE ENCOUNTER
[facility] Call Center    Phone Message    May a detailed message be left on voicemail: yes     Reason for Call: Symptoms or Concerns     If patient has red-flag symptoms, warm transfer to triage line    Current symptom or concern: swollen feet and ankles, restricting movement, unable to put on shoes    Symptoms have been present for:  1 week(s)    Has patient previously been seen for this? Yes    By : DR Cam    Date: 1/2/2023    Are there any new or worsening symptoms? Yes:       Action Taken: Other: cardio    Travel Screening: Not Applicable     Thank you!  [facility]

## 2023-01-27 NOTE — TELEPHONE ENCOUNTER
Called pt for updates - she said feet still swell as soon as she stands up.  Pt also said when she elevates her feet the swelling goes down.  Pt denies shortness of breath.  Offered to do labs, pt said she will only have done at her orthopedic surgeons visit next week.  Informed pt that ortho clinics don't usually do blood work.  Pt said she is not going out at all otherwise.  Pt will call back with any other updates or concerns.  -amber

## 2023-01-30 ENCOUNTER — TELEPHONE (OUTPATIENT)
Dept: CARDIOLOGY | Facility: CLINIC | Age: 88
End: 2023-01-30
Payer: COMMERCIAL

## 2023-01-30 DIAGNOSIS — M79.89 LEG SWELLING: Primary | ICD-10-CM

## 2023-01-30 DIAGNOSIS — I50.9 CHF (CONGESTIVE HEART FAILURE) (H): ICD-10-CM

## 2023-01-30 NOTE — TELEPHONE ENCOUNTER
Lab orders entered.  -[facility]    ----- Message -----  From: Mathew Antoine MD  Sent: 1/24/2023  12:25 PM CST  To: Jessica Mendoza RN     Agree with your assessment.  If no better by the end of this week please bring him for renal profile and BNP, if BNP elevated will start her on furosemide 20 mg twice a day as needed.  LF

## 2023-01-30 NOTE — TELEPHONE ENCOUNTER
January 30, 2023    [facility] Forms: [facility]  received from outbox of [facility] Prov[facility]: Dr. Jacinto.  Paperwork has been reviewed and is complete.  Per initial initial request, this was sent via fax to [phone].     Chyna Liang

## 2023-01-30 NOTE — TELEPHONE ENCOUNTER
[facility] Call Center    Phone Message    May a detailed message be left on voicemail: no     Reason for Call: Other: Constanza called to speak with Jessica Mendoza RN, please reafer to TE dated 1/24/23. Constanza is requesting lab orders be placed in her chart so she can have them drawn at [facility] in [place] tomorrow 1/31/23. Please reach out to Constanza when complete.     Action Taken: Other: [facility]    Travel Screening: Not Applicable

## 2023-01-31 ENCOUNTER — TELEPHONE (OUTPATIENT)
Dept: INTERNAL MEDICINE | Facility: CLINIC | Age: 88
End: 2023-01-31

## 2023-01-31 ENCOUNTER — TELEPHONE (OUTPATIENT)
Dept: INTERNAL MEDICINE | Facility: CLINIC | Age: 88
End: 2023-01-31
Payer: COMMERCIAL

## 2023-01-31 NOTE — TELEPHONE ENCOUNTER
January 31, 2023    [facility] Forms: [facility] of care Plan ID:  [id number] were received via fax for [facility] Prov[facility]: Dr. Jacinto to sign.  Patient label was attached to paperwork and placed in provider's inbox to be signed.    Chyna Liang

## 2023-02-01 ENCOUNTER — TELEPHONE (OUTPATIENT)
Dept: INTERNAL MEDICINE | Facility: CLINIC | Age: 88
End: 2023-02-01
Payer: COMMERCIAL

## 2023-02-01 NOTE — TELEPHONE ENCOUNTER
February 1, 2023    [facility] Forms: [facility] order ID: [id number]  were received via fax for [facility] Prov[facility]: Dr. Jacinto to sign.  Patient label was attached to paperwork and placed in provider's inbox to be signed.    Chyna Liang

## 2023-02-02 ENCOUNTER — TELEPHONE (OUTPATIENT)
Dept: INTERNAL MEDICINE | Facility: CLINIC | Age: 88
End: 2023-02-02
Payer: COMMERCIAL

## 2023-02-02 NOTE — TELEPHONE ENCOUNTER
February 2, 2023    [facility] Forms: [facility] order number:  [id number] Home health care orders were received via fax for [facility] Prov[facility]: Dr. Jacinto to sign.  Patient label was attached to paperwork and placed in provider's inbox to be signed.    Chyna Liang

## 2023-02-02 NOTE — TELEPHONE ENCOUNTER
February 2, 2023    [facility] Forms: [facility] Order ID:  [id number] Home health care orders were received via fax for [facility] Providers: Dr. Jacinto to sign.  Patient label was attached to paperwork and placed in provider's inbox to be signed.    Chyna Liang

## 2023-02-06 NOTE — TELEPHONE ENCOUNTER
February 6, 2023    [facility] Forms: [id number] received from outbox of [facility] Prov[facility]: Dr. Jacinto.  Paperwork has been reviewed and is complete.  Per initial initial request, this was sent via fax to [phone].     Pam J. Behr

## 2023-02-06 NOTE — TELEPHONE ENCOUNTER
February 6, 2023    [facility] Forms: [facility] order number:  [id number] received from outbox of [facility] Providers: Dr. Jacinto.  Paperwork has been reviewed and is complete.  Per initial initial request, this was sent via fax to [phone].     Chyna Liang

## 2023-02-06 NOTE — TELEPHONE ENCOUNTER
February 6, 2023    [facility] Forms: [facility] order number:  [id number] received from outbox of [facility] Providers: Dr. Jacinto.  Paperwork has been reviewed and is complete.  Per initial initial request, this was sent via fax to [phone].     Chyna Liang

## 2023-02-06 NOTE — TELEPHONE ENCOUNTER
February 6, 2023    [facility] Forms: [facility] received from outbox of [facility] Prov[facility]: Dr. Jacinto.  Paperwork has been reviewed and is complete.  Per initial initial request, this was sent via fax to [phone].     Rowdy Alba III

## 2023-02-12 DIAGNOSIS — I48.11 LONGSTANDING PERSISTENT ATRIAL FIBRILLATION (H): Primary | ICD-10-CM

## 2023-02-13 RX ORDER — APIXABAN 2.5 MG/1
TABLET, FILM COATED ORAL
Qty: 60 TABLET | Refills: 3 | Status: SHIPPED | OUTPATIENT
Start: 2023-02-13 | End: 2023-07-14

## 2023-02-17 ENCOUNTER — TELEPHONE (OUTPATIENT)
Dept: INTERNAL MEDICINE | Facility: CLINIC | Age: 88
End: 2023-02-17
Payer: COMMERCIAL

## 2023-02-17 NOTE — TELEPHONE ENCOUNTER
February 17, 2023    [facility] Forms: [facility] received from outbox of [facility] Prov[facility]: Dr. Jacinto.  Paperwork has been reviewed and is complete.  Per initial initial request, this was sent via fax to [phone].     Marbella López

## 2023-02-17 NOTE — TELEPHONE ENCOUNTER
February 17, 2023    [facility] Forms: [facility] order # [id number] were received via fax for [facility] Prov[facility]: Dr. Jacinto to sign.  Patient label was attached to paperwork and placed in provider's inbox to be signed.    Marbella López

## 2023-03-02 ENCOUNTER — OFFICE VISIT (OUTPATIENT)
Dept: INTERNAL MEDICINE | Facility: CLINIC | Age: 88
End: 2023-03-02
Payer: COMMERCIAL

## 2023-03-02 VITALS
BODY MASS INDEX: 20.28 KG/M2 | RESPIRATION RATE: 13 BRPM | WEIGHT: 107.4 LBS | DIASTOLIC BLOOD PRESSURE: 64 MMHG | HEIGHT: 61 IN | OXYGEN SATURATION: 97 % | HEART RATE: 88 BPM | SYSTOLIC BLOOD PRESSURE: 116 MMHG | TEMPERATURE: 97.9 F

## 2023-03-02 DIAGNOSIS — S72.002A FRACTURE OF HIP, LEFT, CLOSED, INITIAL ENCOUNTER (H): ICD-10-CM

## 2023-03-02 DIAGNOSIS — I48.20 CHRONIC ATRIAL FIBRILLATION (H): ICD-10-CM

## 2023-03-02 DIAGNOSIS — Z95.2 H/O AORTIC VALVE REPLACEMENT: ICD-10-CM

## 2023-03-02 DIAGNOSIS — M80.00XA OSTEOPOROSIS WITH CURRENT PATHOLOGICAL FRACTURE, UNSPECIFIED OSTEOPOROSIS TYPE, INITIAL ENCOUNTER: ICD-10-CM

## 2023-03-02 DIAGNOSIS — J41.0 SIMPLE CHRONIC BRONCHITIS (H): Primary | ICD-10-CM

## 2023-03-02 DIAGNOSIS — Z95.1 S/P CABG (CORONARY ARTERY BYPASS GRAFT): ICD-10-CM

## 2023-03-02 DIAGNOSIS — E55.9 VITAMIN D INSUFFICIENCY: ICD-10-CM

## 2023-03-02 DIAGNOSIS — K92.1 MELENA: ICD-10-CM

## 2023-03-02 DIAGNOSIS — Z95.0 CARDIAC PACEMAKER IN SITU: ICD-10-CM

## 2023-03-02 PROCEDURE — 99213 OFFICE O/P EST LOW 20 MIN: CPT | Performed by: INTERNAL MEDICINE

## 2023-03-02 RX ORDER — ERGOCALCIFEROL 1.25 MG/1
50000 CAPSULE, LIQUID FILLED ORAL WEEKLY
Qty: 12 CAPSULE | Refills: 1 | Status: SHIPPED | OUTPATIENT
Start: 2023-03-02 | End: 2023-08-13

## 2023-03-02 NOTE — PROGRESS NOTES
Assessment & Plan     COPD (chronic obstructive pulmonary disease) (H)  Stable on no inhlaers     Chronic atrial fibrillation (H)  Rate is controlled . Is on elliquis    S/P CABG (coronary artery bypass graft)  Stable no symptoms   On a statin   Cardiac pacemaker in situ, dual chamber      H/O aortic valve replacement      Melena  No further blood loss .   Osteoporosis with current pathological fracture, unspecified osteoporosis type, initial encounter  Declines treatment . Even after her hip fracture     Fracture of hip, left, closed, initial encounter (H)  Pain is resolved     Vitamin D insufficiency    - vitamin D2 (ERGOCALCIFEROL) 85541 units (1250 mcg) capsule; Take 1 capsule (50,000 Units) by mouth once a week                 Return for next scheduled appointment.    Yun Jacinto MD  [facility]    Lolis Apodaca is a 87 year old accompanied by her spouse, presenting for the following health issues:  Recheck Medication and RECHECK    Current Outpatient Medications   Medication     acetaminophen (TYLENOL) 500 MG tablet     albuterol (PROAIR HFA/PROVENTIL HFA/VENTOLIN HFA) 108 (90 Base) MCG/ACT inhaler     atorvastatin (LIPITOR) 40 MG tablet     cholecalciferol, vitamin D3, (VITAMIN D3) 5,000 unit Tab     cyanocobalamin (CYANOCOBALAMIN) 1000 MCG/ML injection     ELIQUIS ANTICOAGULANT 2.5 MG tablet     fluticasone (FLOVENT HFA) 110 MCG/ACT inhaler     gabapentin (NEURONTIN) 300 MG capsule     ketoconazole (NIZORAL) 2 % external shampoo     levofloxacin (LEVAQUIN) 250 MG tablet     metoprolol succinate ER (TOPROL XL) 25 MG 24 hr tablet     sucralfate (CARAFATE) 1 GM tablet     vitamin D2 (ERGOCALCIFEROL) 76870 units (1250 mcg) capsule     No current facility-administered medications for this visit.         Patient Active Problem List:     Coronary Artery Disease     Sick Sinus Syndrome     H/O aortic valve replacement     Atrial fibrillation (H)     Vitamin B 12 deficiency    "  Cardiac pacemaker in situ, dual chamber     S/P CABG (coronary artery bypass graft)     Pure hypercholesterolemia     Chronic kidney disease, stage 3 (H)     Percutaneous transluminal coronary angioplasty status     Gastrointestinal hemorrhage, unspecified gastrointestinal hemorrhage type     Melena     Anemia due to blood loss, acute     Tension headache     Anticoagulated     S/P percutaneous transluminal angioplasty (PTA) with stent placement     COPD (chronic obstructive pulmonary disease) (H)     CHF (congestive heart failure) (H)     Chronic atrial fibrillation (H)     Fracture of hip, left, closed, initial encounter (H)     Osteoporosis with current pathological fracture, unspecified osteoporosis type, initial encounter    .m    History of Present Illness       Reason for visit:  FOLLOW UP    She eats 2-3 servings of fruits and vegetables daily.She consumes 0 sweetened beverage(s) daily.She exercises with enough effort to increase her heart rate 20 to 29 minutes per day.  She exercises with enough effort to increase her heart rate 5 days per week.   She is taking medications regularly.             Review of Systems         Objective    /64 (BP Location: Left arm, Patient Position: Sitting, Cuff Size: Adult Regular)   Pulse 88   Temp 97.9  F (36.6  C) (Tympanic)   Resp 13   Ht 1.549 m (5' 1\")   Wt 48.7 kg (107 lb 6.4 oz)   LMP  (LMP Unknown)   SpO2 97%   BMI 20.29 kg/m    There is no height or weight on file to calculate BMI.  Physical Exam   GENERAL: healthy, alert and no distress  NECK: no adenopathy, no asymmetry, masses, or scars and thyroid normal to palpation  RESP: lungs clear to auscultation - no rales, rhonchi or wheezes  CV: regular rate and rhythm, normal S1 S2, no S3 or S4, no murmur, click or rub, no peripheral edema and peripheral pulses strong  A         "

## 2023-04-04 ENCOUNTER — TELEPHONE (OUTPATIENT)
Dept: INTERNAL MEDICINE | Facility: CLINIC | Age: 88
End: 2023-04-04
Payer: COMMERCIAL

## 2023-04-04 NOTE — LETTER
April 5, 2023    [facility],    I do not recommend antibiotics for a routine dental cleaning or fillings for mutual patient Constanza Crouch (1935).  If patient has an infection, gingivitis or is in need of a root canal, antibiotics should be considered.    Please let us know if you have further questions or considerations.    Sincerely,        Yun Jacinto MD

## 2023-04-04 NOTE — TELEPHONE ENCOUNTER
She does not need amoxicillin for routine dental cleaning.  The new guidelines recommend against it.  If she has an infection or root canal or gingivitis then the dentist might prescribe it to her and she should let him know that she has new hip.

## 2023-04-04 NOTE — TELEPHONE ENCOUNTER
General Call      Reason for Call:  Pt had hip surgery 11 weeks ago, needs some dental work done  Asking of an antibiotic (Amoxicillin) would be needed    Pharm:  [facility]    Please advise    What are your questions or concerns:  n/a    Date of last appointment with provider: n/a    Could we send this information to you in [employer] [employer] or would you prefer to receive a phone call?:   Patient would prefer a phone call   Okay to leave a detailed message?: Yes at Home number on file [phone] (home)

## 2023-04-04 NOTE — TELEPHONE ENCOUNTER
Patient verbalized understanding.  No further questions at this time.Relayed provider recommendation to patient.

## 2023-04-05 NOTE — TELEPHONE ENCOUNTER
Writer spoke to Tia.  Tia states they would like a letter from PCP stating patient does not need antibiotics for her dental appointment.  Patient is having fillings, however Tia denies patient is in need of root canal and does not have gingivitis or infection.    Fax letter to [phone]    Tia states their Dentist does not prescribe antibiotics.

## 2023-04-05 NOTE — TELEPHONE ENCOUNTER
Tia from [facility] calling stating if they can get something in writing regarding the response from provider to patient about amoxicillin. Please fax to [phone] as patient will be set up for an appointment soon.

## 2023-04-06 NOTE — TELEPHONE ENCOUNTER
April 6, 2023    Letter was picked up from outbox of Dr. Jacinto.  Paperwork has been reviewed and is complete.  Per initial initial request, this was sent via fax to .     ARLENE DILLARD

## 2023-04-14 ENCOUNTER — ANCILLARY PROCEDURE (OUTPATIENT)
Dept: CARDIOLOGY | Facility: CLINIC | Age: 88
End: 2023-04-14
Attending: INTERNAL MEDICINE
Payer: COMMERCIAL

## 2023-04-14 DIAGNOSIS — I49.5 SICK SINUS SYNDROME (H): ICD-10-CM

## 2023-04-14 DIAGNOSIS — Z95.0 PACEMAKER: ICD-10-CM

## 2023-04-18 LAB
MDC_IDC_LEAD_IMPLANT_DT: NORMAL
MDC_IDC_LEAD_IMPLANT_DT: NORMAL
MDC_IDC_LEAD_LOCATION: NORMAL
MDC_IDC_LEAD_LOCATION: NORMAL
MDC_IDC_LEAD_MFG: NORMAL
MDC_IDC_LEAD_MFG: NORMAL
MDC_IDC_LEAD_MODEL: NORMAL
MDC_IDC_LEAD_MODEL: NORMAL
MDC_IDC_LEAD_POLARITY_TYPE: NORMAL
MDC_IDC_LEAD_POLARITY_TYPE: NORMAL
MDC_IDC_LEAD_SERIAL: NORMAL
MDC_IDC_LEAD_SERIAL: NORMAL
MDC_IDC_MSMT_BATTERY_DTM: NORMAL
MDC_IDC_MSMT_BATTERY_IMPEDANCE: 5129 OHM
MDC_IDC_MSMT_BATTERY_REMAINING_LONGEVITY: 14 MO
MDC_IDC_MSMT_BATTERY_STATUS: NORMAL
MDC_IDC_MSMT_BATTERY_VOLTAGE: 2.69 V
MDC_IDC_MSMT_LEADCHNL_RA_IMPEDANCE_VALUE: 67 OHM
MDC_IDC_MSMT_LEADCHNL_RV_IMPEDANCE_VALUE: 437 OHM
MDC_IDC_MSMT_LEADCHNL_RV_PACING_THRESHOLD_AMPLITUDE: 0.62 V
MDC_IDC_MSMT_LEADCHNL_RV_PACING_THRESHOLD_PULSEWIDTH: 0.4 MS
MDC_IDC_PG_IMPLANT_DTM: NORMAL
MDC_IDC_PG_MFG: NORMAL
MDC_IDC_PG_MODEL: NORMAL
MDC_IDC_PG_SERIAL: NORMAL
MDC_IDC_PG_TYPE: NORMAL
MDC_IDC_SESS_CLINIC_NAME: NORMAL
MDC_IDC_SESS_DTM: NORMAL
MDC_IDC_SESS_TYPE: NORMAL
MDC_IDC_SET_BRADY_HYSTRATE: NORMAL
MDC_IDC_SET_BRADY_LOWRATE: 50 {BEATS}/MIN
MDC_IDC_SET_BRADY_MAX_TRACKING_RATE: 110 {BEATS}/MIN
MDC_IDC_SET_BRADY_MODE: NORMAL
MDC_IDC_SET_LEADCHNL_RV_PACING_AMPLITUDE: 1.25 V
MDC_IDC_SET_LEADCHNL_RV_PACING_CAPTURE_MODE: NORMAL
MDC_IDC_SET_LEADCHNL_RV_PACING_POLARITY: NORMAL
MDC_IDC_SET_LEADCHNL_RV_PACING_PULSEWIDTH: 0.4 MS
MDC_IDC_SET_LEADCHNL_RV_SENSING_POLARITY: NORMAL
MDC_IDC_SET_LEADCHNL_RV_SENSING_SENSITIVITY: 2.8 MV
MDC_IDC_SET_ZONE_DETECTION_INTERVAL: 333.33 MS
MDC_IDC_SET_ZONE_TYPE: NORMAL
MDC_IDC_SET_ZONE_TYPE: NORMAL
MDC_IDC_STAT_AT_BURDEN_PERCENT: 0 %
MDC_IDC_STAT_AT_DTM_END: NORMAL
MDC_IDC_STAT_AT_DTM_START: NORMAL
MDC_IDC_STAT_BRADY_DTM_END: NORMAL
MDC_IDC_STAT_BRADY_DTM_START: NORMAL
MDC_IDC_STAT_BRADY_RV_PERCENT_PACED: 6 %
MDC_IDC_STAT_EPISODE_RECENT_COUNT: 0
MDC_IDC_STAT_EPISODE_RECENT_COUNT: 0
MDC_IDC_STAT_EPISODE_RECENT_COUNT_DTM_END: NORMAL
MDC_IDC_STAT_EPISODE_RECENT_COUNT_DTM_END: NORMAL
MDC_IDC_STAT_EPISODE_RECENT_COUNT_DTM_START: NORMAL
MDC_IDC_STAT_EPISODE_RECENT_COUNT_DTM_START: NORMAL
MDC_IDC_STAT_EPISODE_TYPE: NORMAL
MDC_IDC_STAT_EPISODE_TYPE: NORMAL

## 2023-04-18 PROCEDURE — 93296 REM INTERROG EVL PM/IDS: CPT | Performed by: INTERNAL MEDICINE

## 2023-04-18 PROCEDURE — 93294 REM INTERROG EVL PM/LDLS PM: CPT | Performed by: INTERNAL MEDICINE

## 2023-05-04 DIAGNOSIS — E53.8 VITAMIN B 12 DEFICIENCY: ICD-10-CM

## 2023-05-05 RX ORDER — CYANOCOBALAMIN 1000 UG/ML
INJECTION, SOLUTION INTRAMUSCULAR; SUBCUTANEOUS
Qty: 3 ML | Refills: 3 | Status: SHIPPED | OUTPATIENT
Start: 2023-05-05 | End: 2023-09-15

## 2023-05-05 NOTE — TELEPHONE ENCOUNTER
"    Last Written Prescription Date:  04/01/2022  Last Fill Quantity: 3 mL,  # refills: 3   Last office visit provider:  03/02/2023     Requested Prescriptions   Pending Prescriptions Disp Refills     cyanocobalamin (CYANOCOBALAMIN) 1000 MCG/ML injection [Pharmacy Med Name: CYANOCOBALAMIN 1,000 MCG/ML 1000 Solution] 3 mL 3     Sig: INJECT 1 ML (1,000 MCG) INTO THE SHOULDER, THIGH, OR BUTTOCKS EVERY 30 DAYS. **SYR 27G 1/2\" 1CC**       Vitamin Supplements (Adult) Protocol Passed - 5/5/2023  2:08 PM        Passed - High dose Vitamin D not ordered        Passed - Recent (12 mo) or future (30 days) visit within the authorizing provider's specialty     Patient has had an office visit with the authorizing provider or a provider within the authorizing providers department within the previous 12 mos or has a future within next 30 days. See \"Patient Info\" tab in inbasket, or \"Choose Columns\" in Meds & Orders section of the refill encounter.              Passed - Medication is active on med list             Gisell Perez RN 05/05/23 2:08 PM  "

## 2023-06-16 ENCOUNTER — TELEPHONE (OUTPATIENT)
Dept: CARDIOLOGY | Facility: CLINIC | Age: 88
End: 2023-06-16

## 2023-06-16 NOTE — TELEPHONE ENCOUNTER
[facility] Call Center    Phone Message    May a detailed message be left on voicemail: yes     Reason for Call: Other: Patient is due for follow up with in clinic device check and labs. Dr. Antoine is booking out to 2023 and the device clinic order will . Please extend orders and contact patient to schedule in [place].      Action Taken: Other: cardiology    Travel Screening: Not Applicable   Thank you!  [facility]                                                                         gradual onset

## 2023-06-23 ENCOUNTER — TRANSFERRED RECORDS (OUTPATIENT)
Dept: HEALTH INFORMATION MANAGEMENT | Facility: CLINIC | Age: 88
End: 2023-06-23
Payer: COMMERCIAL

## 2023-07-14 DIAGNOSIS — I48.11 LONGSTANDING PERSISTENT ATRIAL FIBRILLATION (H): ICD-10-CM

## 2023-07-14 RX ORDER — APIXABAN 2.5 MG/1
TABLET, FILM COATED ORAL
Qty: 60 TABLET | Refills: 3 | Status: SHIPPED | OUTPATIENT
Start: 2023-07-14 | End: 2023-11-14

## 2023-07-18 DIAGNOSIS — R20.8 BURNING SENSATION OF FEET: ICD-10-CM

## 2023-07-18 RX ORDER — GABAPENTIN 300 MG/1
CAPSULE ORAL
Qty: 180 CAPSULE | Refills: 3 | Status: SHIPPED | OUTPATIENT
Start: 2023-07-18 | End: 2024-08-20

## 2023-07-25 ENCOUNTER — ANCILLARY PROCEDURE (OUTPATIENT)
Dept: CARDIOLOGY | Facility: CLINIC | Age: 88
End: 2023-07-25
Attending: INTERNAL MEDICINE
Payer: COMMERCIAL

## 2023-07-25 DIAGNOSIS — I49.5 SICK SINUS SYNDROME (H): ICD-10-CM

## 2023-07-25 DIAGNOSIS — Z95.0 PACEMAKER: ICD-10-CM

## 2023-07-25 LAB
MDC_IDC_LEAD_IMPLANT_DT: NORMAL
MDC_IDC_LEAD_IMPLANT_DT: NORMAL
MDC_IDC_LEAD_LOCATION: NORMAL
MDC_IDC_LEAD_LOCATION: NORMAL
MDC_IDC_LEAD_MFG: NORMAL
MDC_IDC_LEAD_MFG: NORMAL
MDC_IDC_LEAD_MODEL: NORMAL
MDC_IDC_LEAD_MODEL: NORMAL
MDC_IDC_LEAD_POLARITY_TYPE: NORMAL
MDC_IDC_LEAD_POLARITY_TYPE: NORMAL
MDC_IDC_LEAD_SERIAL: NORMAL
MDC_IDC_LEAD_SERIAL: NORMAL
MDC_IDC_MSMT_BATTERY_DTM: NORMAL
MDC_IDC_MSMT_BATTERY_IMPEDANCE: 6079 OHM
MDC_IDC_MSMT_BATTERY_REMAINING_LONGEVITY: 10 MO
MDC_IDC_MSMT_BATTERY_STATUS: NORMAL
MDC_IDC_MSMT_BATTERY_VOLTAGE: 2.66 V
MDC_IDC_MSMT_LEADCHNL_RA_IMPEDANCE_VALUE: 67 OHM
MDC_IDC_MSMT_LEADCHNL_RV_IMPEDANCE_VALUE: 456 OHM
MDC_IDC_MSMT_LEADCHNL_RV_PACING_THRESHOLD_AMPLITUDE: 0.5 V
MDC_IDC_MSMT_LEADCHNL_RV_PACING_THRESHOLD_PULSEWIDTH: 0.4 MS
MDC_IDC_PG_IMPLANT_DTM: NORMAL
MDC_IDC_PG_MFG: NORMAL
MDC_IDC_PG_MODEL: NORMAL
MDC_IDC_PG_SERIAL: NORMAL
MDC_IDC_PG_TYPE: NORMAL
MDC_IDC_SESS_CLINIC_NAME: NORMAL
MDC_IDC_SESS_DTM: NORMAL
MDC_IDC_SESS_TYPE: NORMAL
MDC_IDC_SET_BRADY_HYSTRATE: NORMAL
MDC_IDC_SET_BRADY_LOWRATE: 50 {BEATS}/MIN
MDC_IDC_SET_BRADY_MAX_TRACKING_RATE: 110 {BEATS}/MIN
MDC_IDC_SET_BRADY_MODE: NORMAL
MDC_IDC_SET_LEADCHNL_RV_PACING_AMPLITUDE: 1.25 V
MDC_IDC_SET_LEADCHNL_RV_PACING_CAPTURE_MODE: NORMAL
MDC_IDC_SET_LEADCHNL_RV_PACING_POLARITY: NORMAL
MDC_IDC_SET_LEADCHNL_RV_PACING_PULSEWIDTH: 0.4 MS
MDC_IDC_SET_LEADCHNL_RV_SENSING_POLARITY: NORMAL
MDC_IDC_SET_LEADCHNL_RV_SENSING_SENSITIVITY: 2.8 MV
MDC_IDC_SET_ZONE_DETECTION_INTERVAL: 333.33 MS
MDC_IDC_SET_ZONE_TYPE: NORMAL
MDC_IDC_SET_ZONE_TYPE: NORMAL
MDC_IDC_STAT_AT_BURDEN_PERCENT: 0 %
MDC_IDC_STAT_AT_DTM_END: NORMAL
MDC_IDC_STAT_AT_DTM_START: NORMAL
MDC_IDC_STAT_BRADY_DTM_END: NORMAL
MDC_IDC_STAT_BRADY_DTM_START: NORMAL
MDC_IDC_STAT_BRADY_RV_PERCENT_PACED: 7 %
MDC_IDC_STAT_EPISODE_RECENT_COUNT: 0
MDC_IDC_STAT_EPISODE_RECENT_COUNT: 0
MDC_IDC_STAT_EPISODE_RECENT_COUNT_DTM_END: NORMAL
MDC_IDC_STAT_EPISODE_RECENT_COUNT_DTM_END: NORMAL
MDC_IDC_STAT_EPISODE_RECENT_COUNT_DTM_START: NORMAL
MDC_IDC_STAT_EPISODE_RECENT_COUNT_DTM_START: NORMAL
MDC_IDC_STAT_EPISODE_TYPE: NORMAL
MDC_IDC_STAT_EPISODE_TYPE: NORMAL

## 2023-07-25 PROCEDURE — 93296 REM INTERROG EVL PM/IDS: CPT | Performed by: INTERNAL MEDICINE

## 2023-07-25 PROCEDURE — 93294 REM INTERROG EVL PM/LDLS PM: CPT | Performed by: INTERNAL MEDICINE

## 2023-07-30 DIAGNOSIS — E55.9 VITAMIN D INSUFFICIENCY: ICD-10-CM

## 2023-07-31 RX ORDER — ERGOCALCIFEROL 1.25 MG/1
50000 CAPSULE, LIQUID FILLED ORAL WEEKLY
Qty: 12 CAPSULE | Refills: 1 | OUTPATIENT
Start: 2023-07-31

## 2023-08-11 DIAGNOSIS — E55.9 VITAMIN D INSUFFICIENCY: ICD-10-CM

## 2023-08-13 RX ORDER — ERGOCALCIFEROL 1.25 MG/1
1 CAPSULE ORAL WEEKLY
Qty: 12 CAPSULE | Refills: 1 | Status: ON HOLD | OUTPATIENT
Start: 2023-08-13 | End: 2024-01-15

## 2023-08-17 ENCOUNTER — TELEPHONE (OUTPATIENT)
Dept: CARDIOLOGY | Facility: CLINIC | Age: 88
End: 2023-08-17
Payer: COMMERCIAL

## 2023-08-17 DIAGNOSIS — Z95.1 S/P CABG (CORONARY ARTERY BYPASS GRAFT): ICD-10-CM

## 2023-08-17 RX ORDER — ATORVASTATIN CALCIUM 40 MG/1
40 TABLET, FILM COATED ORAL DAILY
Qty: 90 TABLET | Refills: 3 | Status: SHIPPED | OUTPATIENT
Start: 2023-08-17 | End: 2024-09-11

## 2023-08-17 NOTE — TELEPHONE ENCOUNTER
[facility] Call Center    Phone Message    May a detailed message be left on voicemail: yes     Reason for Call: Medication Refill Request    Has the patient contacted the pharmacy for the refill? Yes   Name of medication being requested: atorvastatin (LIPITOR) 40 MG tablet   Provider who prescribed the medication: Mathew Antoine MD   Pharmacy: [facility] - [place], [place] - [address]   Date medication is needed: ASAP     Action Taken: Other: cardio    Travel Screening: Not Applicable  Thank you!  [facility]

## 2023-08-23 DIAGNOSIS — K21.9 GASTROESOPHAGEAL REFLUX DISEASE: ICD-10-CM

## 2023-08-23 RX ORDER — SUCRALFATE 1 G/1
1 TABLET ORAL 4 TIMES DAILY
Qty: 360 TABLET | Refills: 3 | Status: SHIPPED | OUTPATIENT
Start: 2023-08-23

## 2023-08-23 NOTE — TELEPHONE ENCOUNTER
Routing refill request to provider for review/approval because:  Drug interaction warning medium     NENA Koch  [facility]

## 2023-09-06 ENCOUNTER — OFFICE VISIT (OUTPATIENT)
Dept: CARDIOLOGY | Facility: CLINIC | Age: 88
End: 2023-09-06
Payer: COMMERCIAL

## 2023-09-06 VITALS
HEIGHT: 61 IN | RESPIRATION RATE: 16 BRPM | WEIGHT: 109.9 LBS | DIASTOLIC BLOOD PRESSURE: 50 MMHG | OXYGEN SATURATION: 98 % | HEART RATE: 85 BPM | BODY MASS INDEX: 20.75 KG/M2 | SYSTOLIC BLOOD PRESSURE: 130 MMHG

## 2023-09-06 DIAGNOSIS — N18.30 STAGE 3 CHRONIC KIDNEY DISEASE, UNSPECIFIED WHETHER STAGE 3A OR 3B CKD (H): ICD-10-CM

## 2023-09-06 DIAGNOSIS — I10 BENIGN ESSENTIAL HYPERTENSION: ICD-10-CM

## 2023-09-06 DIAGNOSIS — Z95.2 H/O AORTIC VALVE REPLACEMENT: ICD-10-CM

## 2023-09-06 DIAGNOSIS — Z95.1 S/P CABG (CORONARY ARTERY BYPASS GRAFT): ICD-10-CM

## 2023-09-06 DIAGNOSIS — E78.00 PURE HYPERCHOLESTEROLEMIA: ICD-10-CM

## 2023-09-06 DIAGNOSIS — I48.21 PERMANENT ATRIAL FIBRILLATION (H): ICD-10-CM

## 2023-09-06 DIAGNOSIS — I25.118 ATHEROSCLEROSIS OF CORONARY ARTERY OF NATIVE HEART WITH OTHER FORM OF ANGINA PECTORIS, UNSPECIFIED VESSEL OR LESION TYPE (H): Primary | ICD-10-CM

## 2023-09-06 DIAGNOSIS — J41.0 SIMPLE CHRONIC BRONCHITIS (H): ICD-10-CM

## 2023-09-06 DIAGNOSIS — Z95.0 CARDIAC PACEMAKER IN SITU: ICD-10-CM

## 2023-09-06 PROBLEM — I48.20 CHRONIC ATRIAL FIBRILLATION (H): Status: RESOLVED | Noted: 2022-01-28 | Resolved: 2023-09-06

## 2023-09-06 PROBLEM — Z98.61 PERCUTANEOUS TRANSLUMINAL CORONARY ANGIOPLASTY STATUS: Status: RESOLVED | Noted: 2021-09-27 | Resolved: 2023-09-06

## 2023-09-06 PROCEDURE — 99214 OFFICE O/P EST MOD 30 MIN: CPT | Performed by: INTERNAL MEDICINE

## 2023-09-06 RX ORDER — NITROGLYCERIN 0.4 MG/1
TABLET SUBLINGUAL
Qty: 30 TABLET | Refills: 4 | Status: SHIPPED | OUTPATIENT
Start: 2023-09-06

## 2023-09-06 NOTE — PATIENT INSTRUCTIONS
Ms Constanza Coles,  I enjoyed visiting with you again today.  I am concerned with the neck pain and arm pain.  Per our conversation I renewed the nitroglycerin and will get the echo.  I will plan on seeing you 1 year.  Mathew Antoine

## 2023-09-06 NOTE — LETTER
9/6/2023    Yun Jacinto MD  [address] [facility] [address]    RE: Constanza Coles       Dear Colleague,     I had the pleasure of seeing Constanza Coles in the [facility].      [facility]  Heart Care Clinic Follow-up Note    Assessment & Plan        (I25.10,  I25.83) Coronary atherosclerosis due to lipid rich plaque  (primary encounter diagnosis)  Comment: Angiography September 2021 showed normal left main, significantly occluded proximal LAD, circumflex with a mid 40 to 50% stenosis, distal obtuse marginal artery 1 95 to 99% stenosis, right coronary artery with a mid 95% and distal tandem 85% lesions.  She had intervention on the circumflex.  Symptomatically doing well although has an atypical left shoulder discomfort that comes on at rest and goes away with nitroglycerin.  Discussed stress testing which she is not interested in.  In addition, discussed pursuing angiography and intervention and she also declines this.  She will let me know if she gets more intense discomfort at which point time she will consider angiography and intervention.     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 She had aortic valve replacement at the same time.  There was no exclusion of the left atrial appendage at that time.      (Z95.2) H/O aortic valve replacement  Comment: Due to severe aortic stenosis she had a 21 mm Forte pericardial magna valve placed in 2005 and based on 2021 working well with a dimensionless index of 0.7, mean gradient of 7 mmHg and mean velocity of 1.7 m/s.  Given that this tissue valve was placed over 15 years ago we will recheck echo yearly. In addition, I have asked her to take aspirin daily and she declines telling me that she has horrible allergic reactions and stomach upset from this.   We had her on Effient which is since been discontinued now that she is on Eliquis.      (I48.20) Chronic atrial fibrillation (H)  Comment: Asymptomatic, not valvular, and now on Eliquis 2.5 mg p.o. twice daily given her lower weight and age over 82.     (Z95.0) Cardiac pacemaker in situ, dual chamber  Comment:  Medtronic device with Medtronic leads placed in 2011 with only 7% ventricular pacing and noted to be in atrial fibrillation.  Rate responsiveness was made more aggressive and symptoms have resolved.     (I10) Benign essential hypertension  Comment: Under good control currently on metoprolol.  She does have a vasculitis, difficult to check blood pressures but use palpation.    (E78.00) Pure hypercholesterolemia  Comment: Total cholesterol 141 with an LDL of 38 which is excellent.    (N18.30) Stage 3 chronic kidney disease, unspecified whether stage 3a or 3b CKD (H)  Comment: Kidney shows normal creatinine is 0.62 with a good GFR of 87.    (J41.0) Simple chronic bronchitis (H)  Comment: So noted and not a current issue.    Plan  1.  Patient declines stress testing.  2.  Patient declines aspirin.  3.  Patient declines intervention or angiography.  She will let me know if she gets worsening symptoms at which point time she would consider this.  4.  Recheck echo.  5.  Follow-up me in 1 year or sooner if needed.    Subjective  CC: 88-year-old white female being seen in yearly follow-up today.  Since I seen her she has had influenza and was hospitalized for this.  She had some fluid retention was seen in the rapid access clinic.  She had a fall which resulted in femoral neck fracture on the left side she had a left hip replacement.  She comes in today working out regularly, tells me at rest while sitting she will get a left back discomfort rating up through her neck and down her left arm.  This will go away with the nitroglycerin occasionally.  There is no effort related angina, palpitations, shortness of breath, PND, orthopnea or peripheral edema.    Medications  Current Outpatient Medications  "  Medication Sig Dispense Refill    acetaminophen (TYLENOL) 500 MG tablet Take 1,000 mg by mouth every 6 hours as needed for mild pain      atorvastatin (LIPITOR) 40 MG tablet Take 1 tablet (40 mg) by mouth daily 90 tablet 3    cholecalciferol, vitamin D3, (VITAMIN D3) 5,000 unit Tab [CHOLECALCIFEROL, VITAMIN D3, (VITAMIN D3) 5,000 UNIT TAB] Take 1 tablet by mouth daily.      cyanocobalamin (CYANOCOBALAMIN) 1000 MCG/ML injection INJECT 1 ML (1,000 MCG) INTO THE SHOULDER, THIGH, OR BUTTOCKS EVERY 30 DAYS. **SYR 27G 1/2\" 1CC** 3 mL 3    ELIQUIS ANTICOAGULANT 2.5 MG tablet TAKE 1 TABLET(2.5 MG) BY MOUTH TWICE DAILY 60 tablet 3    gabapentin (NEURONTIN) 300 MG capsule TAKE 1 CAPSULE (300 MG TOTAL) BY MOUTH 2 TIMES A DAY. 180 capsule 3    ketoconazole (NIZORAL) 2 % external shampoo Apply topically daily as needed       levofloxacin (LEVAQUIN) 250 MG tablet Take 1 tablet (250 mg) by mouth daily (Patient taking differently: Take 250 mg by mouth daily As needed) 10 tablet 3    metoprolol succinate ER (TOPROL XL) 25 MG 24 hr tablet Take 1 tablet (25 mg) by mouth daily 90 tablet 3    nitroGLYcerin (NITROSTAT) 0.4 MG sublingual tablet For chest pain place 1 tablet under the tongue every 5 minutes for 3 doses. If symptoms persist 5 minutes after 1st dose call 911. 30 tablet 4    sucralfate (CARAFATE) 1 GM tablet Take 1 tablet (1 g) by mouth 4 times daily 360 tablet 3    Vitamin D, Ergocalciferol, 18030 units CAPS TAKE 1 CAPSULE (50,000 UNITS) BY MOUTH ONCE A WEEK. 12 capsule 1       Objective  /50 (BP Location: Right arm, Patient Position: Sitting, Cuff Size: Adult Regular)   Pulse 85   Resp 16   Ht 1.549 m (5' 1\")   Wt 49.9 kg (109 lb 14.4 oz)   SpO2 98%   BMI 20.77 kg/m      General Appearance:    Alert, cooperative, no distress, appears stated age   Head:    Normocephalic, without obvious abnormality, atraumatic   Throat:   Lips, mucosa, and tongue normal; teeth and gums normal   Neck:   Supple, symmetrical, " trachea midline, no adenopathy;        thyroid:  No enlargement/tenderness/nodules; no carotid    bruit or JVD   Back:     Symmetric, no curvature, ROM normal, no CVA tenderness   Lungs:     Clear to auscultation bilaterally, respirations unlabored   Chest wall:    No tenderness, midline sternotomy scar   Heart:    Irregularly irregular, S1 and S2 normal, no murmur, rub   or gallop   Abdomen:     Soft, non-tender, bowel sounds active all four quadrants,     no masses, no organomegaly   Extremities:   Normal, atraumatic, no cyanosis or edema   Pulses:   2+ and symmetric all extremities   Skin:   Skin color, texture, turgor normal, no rashes or lesions     Results    Lab Results personally reviewed   Lab Results   Component Value Date    CHOL 141 12/12/2022    CHOL 116 11/09/2021     Lab Results   Component Value Date    HDL 70 12/12/2022    HDL 53 11/09/2021     No components found for: LDLCALC  Lab Results   Component Value Date    TRIG 163 (H) 12/12/2022    TRIG 84 11/09/2021     Lab Results   Component Value Date    WBC 7.9 07/27/2022    HGB 11.2 (L) 07/27/2022    HCT 35.9 07/27/2022     07/27/2022     Lab Results   Component Value Date    BUN 11.9 12/12/2022     12/12/2022    CO2 26 12/12/2022             Thank you for allowing me to participate in the care of your patient.      Sincerely,     RY FRANCE MD     [facility]  cc:   No referring provider defined for this encounter.

## 2023-09-06 NOTE — PROGRESS NOTES
[facility]  Heart Care Clinic Follow-up Note    Assessment & Plan        (I25.10,  I25.83) Coronary atherosclerosis due to lipid rich plaque  (primary encounter diagnosis)  Comment: Angiography September 2021 showed normal left main, significantly occluded proximal LAD, circumflex with a mid 40 to 50% stenosis, distal obtuse marginal artery 1 95 to 99% stenosis, right coronary artery with a mid 95% and distal tandem 85% lesions.  She had intervention on the circumflex.  Symptomatically doing well although has an atypical left shoulder discomfort that comes on at rest and goes away with nitroglycerin.  Discussed stress testing which she is not interested in.  In addition, discussed pursuing angiography and intervention and she also declines this.  She will let me know if she gets more intense discomfort at which point time she will consider angiography and intervention.     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 She had aortic valve replacement at the same time.  There was no exclusion of the left atrial appendage at that time.      (Z95.2) H/O aortic valve replacement  Comment: Due to severe aortic stenosis she had a 21 mm Forte pericardial magna valve placed in 2005 and based on 2021 working well with a dimensionless index of 0.7, mean gradient of 7 mmHg and mean velocity of 1.7 m/s.  Given that this tissue valve was placed over 15 years ago we will recheck echo yearly. In addition, I have asked her to take aspirin daily and she declines telling me that she has horrible allergic reactions and stomach upset from this.   We had her on Effient which is since been discontinued now that she is on Eliquis.     (I48.20) Chronic atrial fibrillation (H)  Comment: Asymptomatic, not valvular, and now on Eliquis 2.5 mg p.o. twice daily given her lower weight and age over 82.     (Z95.0) Cardiac pacemaker in situ, dual  chamber  Comment:  Medtronic device with Medtronic leads placed in 2011 with only 7% ventricular pacing and noted to be in atrial fibrillation.  Rate responsiveness was made more aggressive and symptoms have resolved.     (I10) Benign essential hypertension  Comment: Under good control currently on metoprolol.  She does have a vasculitis, difficult to check blood pressures but use palpation.    (E78.00) Pure hypercholesterolemia  Comment: Total cholesterol 141 with an LDL of 38 which is excellent.    (N18.30) Stage 3 chronic kidney disease, unspecified whether stage 3a or 3b CKD (H)  Comment: Kidney shows normal creatinine is 0.62 with a good GFR of 87.    (J41.0) Simple chronic bronchitis (H)  Comment: So noted and not a current issue.    Plan  1.  Patient declines stress testing.  2.  Patient declines aspirin.  3.  Patient declines intervention or angiography.  She will let me know if she gets worsening symptoms at which point time she would consider this.  4.  Recheck echo.  5.  Follow-up me in 1 year or sooner if needed.    Subjective  CC: 88-year-old white female being seen in yearly follow-up today.  Since I seen her she has had influenza and was hospitalized for this.  She had some fluid retention was seen in the rapid access clinic.  She had a fall which resulted in femoral neck fracture on the left side she had a left hip replacement.  She comes in today working out regularly, tells me at rest while sitting she will get a left back discomfort rating up through her neck and down her left arm.  This will go away with the nitroglycerin occasionally.  There is no effort related angina, palpitations, shortness of breath, PND, orthopnea or peripheral edema.    Medications  Current Outpatient Medications   Medication Sig Dispense Refill    acetaminophen (TYLENOL) 500 MG tablet Take 1,000 mg by mouth every 6 hours as needed for mild pain      atorvastatin (LIPITOR) 40 MG tablet Take 1 tablet (40 mg) by mouth daily  "90 tablet 3    cholecalciferol, vitamin D3, (VITAMIN D3) 5,000 unit Tab [CHOLECALCIFEROL, VITAMIN D3, (VITAMIN D3) 5,000 UNIT TAB] Take 1 tablet by mouth daily.      cyanocobalamin (CYANOCOBALAMIN) 1000 MCG/ML injection INJECT 1 ML (1,000 MCG) INTO THE SHOULDER, THIGH, OR BUTTOCKS EVERY 30 DAYS. **SYR 27G 1/2\" 1CC** 3 mL 3    ELIQUIS ANTICOAGULANT 2.5 MG tablet TAKE 1 TABLET(2.5 MG) BY MOUTH TWICE DAILY 60 tablet 3    gabapentin (NEURONTIN) 300 MG capsule TAKE 1 CAPSULE (300 MG TOTAL) BY MOUTH 2 TIMES A DAY. 180 capsule 3    ketoconazole (NIZORAL) 2 % external shampoo Apply topically daily as needed       levofloxacin (LEVAQUIN) 250 MG tablet Take 1 tablet (250 mg) by mouth daily (Patient taking differently: Take 250 mg by mouth daily As needed) 10 tablet 3    metoprolol succinate ER (TOPROL XL) 25 MG 24 hr tablet Take 1 tablet (25 mg) by mouth daily 90 tablet 3    nitroGLYcerin (NITROSTAT) 0.4 MG sublingual tablet For chest pain place 1 tablet under the tongue every 5 minutes for 3 doses. If symptoms persist 5 minutes after 1st dose call 911. 30 tablet 4    sucralfate (CARAFATE) 1 GM tablet Take 1 tablet (1 g) by mouth 4 times daily 360 tablet 3    Vitamin D, Ergocalciferol, 21138 units CAPS TAKE 1 CAPSULE (50,000 UNITS) BY MOUTH ONCE A WEEK. 12 capsule 1       Objective  /50 (BP Location: Right arm, Patient Position: Sitting, Cuff Size: Adult Regular)   Pulse 85   Resp 16   Ht 1.549 m (5' 1\")   Wt 49.9 kg (109 lb 14.4 oz)   SpO2 98%   BMI 20.77 kg/m      General Appearance:    Alert, cooperative, no distress, appears stated age   Head:    Normocephalic, without obvious abnormality, atraumatic   Throat:   Lips, mucosa, and tongue normal; teeth and gums normal   Neck:   Supple, symmetrical, trachea midline, no adenopathy;        thyroid:  No enlargement/tenderness/nodules; no carotid    bruit or JVD   Back:     Symmetric, no curvature, ROM normal, no CVA tenderness   Lungs:     Clear to auscultation " bilaterally, respirations unlabored   Chest wall:    No tenderness, midline sternotomy scar   Heart:    Irregularly irregular, S1 and S2 normal, no murmur, rub   or gallop   Abdomen:     Soft, non-tender, bowel sounds active all four quadrants,     no masses, no organomegaly   Extremities:   Normal, atraumatic, no cyanosis or edema   Pulses:   2+ and symmetric all extremities   Skin:   Skin color, texture, turgor normal, no rashes or lesions     Results    Lab Results personally reviewed   Lab Results   Component Value Date    CHOL 141 12/12/2022    CHOL 116 11/09/2021     Lab Results   Component Value Date    HDL 70 12/12/2022    HDL 53 11/09/2021     No components found for: LDLCALC  Lab Results   Component Value Date    TRIG 163 (H) 12/12/2022    TRIG 84 11/09/2021     Lab Results   Component Value Date    WBC 7.9 07/27/2022    HGB 11.2 (L) 07/27/2022    HCT 35.9 07/27/2022     07/27/2022     Lab Results   Component Value Date    BUN 11.9 12/12/2022     12/12/2022    CO2 26 12/12/2022

## 2023-09-15 ENCOUNTER — OFFICE VISIT (OUTPATIENT)
Dept: INTERNAL MEDICINE | Facility: CLINIC | Age: 88
End: 2023-09-15
Payer: COMMERCIAL

## 2023-09-15 VITALS
SYSTOLIC BLOOD PRESSURE: 124 MMHG | RESPIRATION RATE: 14 BRPM | TEMPERATURE: 97.7 F | OXYGEN SATURATION: 100 % | DIASTOLIC BLOOD PRESSURE: 56 MMHG | WEIGHT: 110.6 LBS | BODY MASS INDEX: 20.88 KG/M2 | HEART RATE: 73 BPM | HEIGHT: 61 IN

## 2023-09-15 DIAGNOSIS — M80.00XA OSTEOPOROSIS WITH CURRENT PATHOLOGICAL FRACTURE, UNSPECIFIED OSTEOPOROSIS TYPE, INITIAL ENCOUNTER: ICD-10-CM

## 2023-09-15 DIAGNOSIS — J44.9 CHRONIC OBSTRUCTIVE PULMONARY DISEASE, UNSPECIFIED COPD TYPE (H): Primary | ICD-10-CM

## 2023-09-15 DIAGNOSIS — Z23 NEED FOR SHINGLES VACCINE: ICD-10-CM

## 2023-09-15 DIAGNOSIS — N18.31 STAGE 3A CHRONIC KIDNEY DISEASE (H): ICD-10-CM

## 2023-09-15 DIAGNOSIS — I25.118 ATHEROSCLEROSIS OF CORONARY ARTERY OF NATIVE HEART WITH OTHER FORM OF ANGINA PECTORIS, UNSPECIFIED VESSEL OR LESION TYPE (H): ICD-10-CM

## 2023-09-15 DIAGNOSIS — D64.9 ANEMIA, UNSPECIFIED TYPE: ICD-10-CM

## 2023-09-15 DIAGNOSIS — J40 BRONCHITIS: ICD-10-CM

## 2023-09-15 DIAGNOSIS — I48.21 PERMANENT ATRIAL FIBRILLATION (H): ICD-10-CM

## 2023-09-15 DIAGNOSIS — E55.9 VITAMIN D DEFICIENCY: ICD-10-CM

## 2023-09-15 DIAGNOSIS — I50.9 CONGESTIVE HEART FAILURE, UNSPECIFIED HF CHRONICITY, UNSPECIFIED HEART FAILURE TYPE (H): ICD-10-CM

## 2023-09-15 DIAGNOSIS — E53.8 VITAMIN B 12 DEFICIENCY: ICD-10-CM

## 2023-09-15 LAB
ALBUMIN SERPL BCG-MCNC: 4.5 G/DL (ref 3.5–5.2)
ALP SERPL-CCNC: 70 U/L (ref 35–104)
ALT SERPL W P-5'-P-CCNC: 14 U/L (ref 0–50)
ANION GAP SERPL CALCULATED.3IONS-SCNC: 11 MMOL/L (ref 7–15)
AST SERPL W P-5'-P-CCNC: 30 U/L (ref 0–45)
BILIRUB SERPL-MCNC: 0.5 MG/DL
BUN SERPL-MCNC: 16.5 MG/DL (ref 8–23)
CALCIUM SERPL-MCNC: 9.8 MG/DL (ref 8.8–10.2)
CHLORIDE SERPL-SCNC: 101 MMOL/L (ref 98–107)
CREAT SERPL-MCNC: 0.77 MG/DL (ref 0.51–0.95)
CREAT UR-MCNC: 12.8 MG/DL
DEPRECATED HCO3 PLAS-SCNC: 28 MMOL/L (ref 22–29)
EGFRCR SERPLBLD CKD-EPI 2021: 75 ML/MIN/1.73M2
ERYTHROCYTE [DISTWIDTH] IN BLOOD BY AUTOMATED COUNT: 18.9 % (ref 10–15)
FERRITIN SERPL-MCNC: 45 NG/ML (ref 11–328)
GLUCOSE SERPL-MCNC: 84 MG/DL (ref 70–99)
HCT VFR BLD AUTO: 34.9 % (ref 35–47)
HGB BLD-MCNC: 10.9 G/DL (ref 11.7–15.7)
IRON BINDING CAPACITY (ROCHE): 351 UG/DL (ref 240–430)
IRON SATN MFR SERPL: 11 % (ref 15–46)
IRON SERPL-MCNC: 39 UG/DL (ref 37–145)
MCH RBC QN AUTO: 27.1 PG (ref 26.5–33)
MCHC RBC AUTO-ENTMCNC: 31.2 G/DL (ref 31.5–36.5)
MCV RBC AUTO: 87 FL (ref 78–100)
MICROALBUMIN UR-MCNC: <12 MG/L
MICROALBUMIN/CREAT UR: NORMAL MG/G{CREAT}
NT-PROBNP SERPL-MCNC: 2749 PG/ML (ref 0–1800)
PLATELET # BLD AUTO: 194 10E3/UL (ref 150–450)
POTASSIUM SERPL-SCNC: 4.4 MMOL/L (ref 3.4–5.3)
PROT SERPL-MCNC: 7.7 G/DL (ref 6.4–8.3)
RBC # BLD AUTO: 4.02 10E6/UL (ref 3.8–5.2)
SODIUM SERPL-SCNC: 140 MMOL/L (ref 136–145)
WBC # BLD AUTO: 8.9 10E3/UL (ref 4–11)

## 2023-09-15 PROCEDURE — 82043 UR ALBUMIN QUANTITATIVE: CPT | Performed by: INTERNAL MEDICINE

## 2023-09-15 PROCEDURE — 99214 OFFICE O/P EST MOD 30 MIN: CPT | Mod: 25 | Performed by: INTERNAL MEDICINE

## 2023-09-15 PROCEDURE — 83540 ASSAY OF IRON: CPT | Performed by: INTERNAL MEDICINE

## 2023-09-15 PROCEDURE — G0008 ADMIN INFLUENZA VIRUS VAC: HCPCS | Performed by: INTERNAL MEDICINE

## 2023-09-15 PROCEDURE — 83880 ASSAY OF NATRIURETIC PEPTIDE: CPT | Performed by: INTERNAL MEDICINE

## 2023-09-15 PROCEDURE — 82728 ASSAY OF FERRITIN: CPT | Performed by: INTERNAL MEDICINE

## 2023-09-15 PROCEDURE — 82570 ASSAY OF URINE CREATININE: CPT | Performed by: INTERNAL MEDICINE

## 2023-09-15 PROCEDURE — 90662 IIV NO PRSV INCREASED AG IM: CPT | Performed by: INTERNAL MEDICINE

## 2023-09-15 PROCEDURE — 82306 VITAMIN D 25 HYDROXY: CPT | Performed by: INTERNAL MEDICINE

## 2023-09-15 PROCEDURE — 80053 COMPREHEN METABOLIC PANEL: CPT | Performed by: INTERNAL MEDICINE

## 2023-09-15 PROCEDURE — 83550 IRON BINDING TEST: CPT | Performed by: INTERNAL MEDICINE

## 2023-09-15 PROCEDURE — 83921 ORGANIC ACID SINGLE QUANT: CPT | Performed by: INTERNAL MEDICINE

## 2023-09-15 PROCEDURE — 36415 COLL VENOUS BLD VENIPUNCTURE: CPT | Performed by: INTERNAL MEDICINE

## 2023-09-15 PROCEDURE — 85027 COMPLETE CBC AUTOMATED: CPT | Performed by: INTERNAL MEDICINE

## 2023-09-15 RX ORDER — LEVOFLOXACIN 250 MG/1
250 TABLET, FILM COATED ORAL DAILY
Qty: 10 TABLET | Refills: 3 | Status: SHIPPED | OUTPATIENT
Start: 2023-09-15 | End: 2024-04-04

## 2023-09-15 RX ORDER — CYANOCOBALAMIN 1000 UG/ML
INJECTION, SOLUTION INTRAMUSCULAR; SUBCUTANEOUS
Qty: 3 ML | Refills: 3 | Status: SHIPPED | OUTPATIENT
Start: 2023-09-15

## 2023-09-15 ASSESSMENT — PAIN SCALES - GENERAL: PAINLEVEL: NO PAIN (0)

## 2023-09-15 NOTE — LETTER
September 23, 2023      Constanza Coles  [address] [place] [address]        Dear ,    We are writing to inform you of your test results.    Your test results fall within the expected range(s) or remain unchanged from previous results.  Iron levels are still low and you could benefit from more iron infusions  kidney tests are normal   The heart failure test is elevated but stable   Resulted Orders   CBC with Platelets   Result Value Ref Range    WBC Count 8.9 4.0 - 11.0 10e3/uL    RBC Count 4.02 3.80 - 5.20 10e6/uL    Hemoglobin 10.9 (L) 11.7 - 15.7 g/dL    Hematocrit 34.9 (L) 35.0 - 47.0 %    MCV 87 78 - 100 fL    MCH 27.1 26.5 - 33.0 pg    MCHC 31.2 (L) 31.5 - 36.5 g/dL    RDW 18.9 (H) 10.0 - 15.0 %    Platelet Count 194 150 - 450 10e3/uL   Comprehensive metabolic panel   Result Value Ref Range    Sodium 140 136 - 145 mmol/L    Potassium 4.4 3.4 - 5.3 mmol/L    Chloride 101 98 - 107 mmol/L    Carbon Dioxide (CO2) 28 22 - 29 mmol/L    Anion Gap 11 7 - 15 mmol/L    Urea Nitrogen 16.5 8.0 - 23.0 mg/dL    Creatinine 0.77 0.51 - 0.95 mg/dL    Calcium 9.8 8.8 - 10.2 mg/dL    Glucose 84 70 - 99 mg/dL    Alkaline Phosphatase 70 35 - 104 U/L    AST 30 0 - 45 U/L      Comment:      Reference intervals for this test were updated on 6/12/2023 to more accurately reflect our healthy population. There may be differences in the flagging of prior results with similar values performed with this method. Interpretation of those prior results can be made in the context of the updated reference intervals.    ALT 14 0 - 50 U/L      Comment:      Reference intervals for this test were updated on 6/12/2023 to more accurately reflect our healthy population. There may be differences in the flagging of prior results with similar values performed with this method. Interpretation of those prior results can be made in the context of the updated reference intervals.      Protein Total 7.7 6.4 - 8.3 g/dL    Albumin 4.5 3.5 -  5.2 g/dL    Bilirubin Total 0.5 <=1.2 mg/dL    GFR Estimate 75 >60 mL/min/1.73m2   BNP-N terminal pro   Result Value Ref Range    N Terminal Pro BNP Outpatient 2,749 (H) 0 - 1,800 pg/mL      Comment:      Reference range shown and results flagged as abnormal are for the outpatient, non acute settings. Establishing a baseline value for each individual patient is useful for follow-up.    Suggested inpatient cut points for confirming diagnosis of CHF in an acute setting are:  >450 pg/mL (age 18 to less than 50)  >900 pg/mL (age 50 to less than 75)  >1800 pg/mL (75 yrs and older)    An inpatient or emergency department NT-proPBNP <300 pg/mL effectively rules out acute CHF, with 99% negative predictive value.       Vitamin D Deficiency   Result Value Ref Range    Vitamin D, Total (25-Hydroxy) 64 20 - 75 ug/L    Narrative    Season, race, dietary intake, and treatment affect the concentration of 25-hydroxy-Vitamin D. Values may decrease during winter months and increase during summer months. Values 20-29 ug/L may indicate Vitamin D insufficiency and values <20 ug/L may indicate Vitamin D deficiency.    Vitamin D determination is routinely performed by an immunoassay specific for 25 hydroxyvitamin D3.  If an individual is on vitamin D2(ergocalciferol) supplementation, please specify 25 OH vitamin D2 and D3 level determination by LCMSMS test [id number].     Iron and iron binding capacity   Result Value Ref Range    Iron 39 37 - 145 ug/dL    Iron Binding Capacity 351 240 - 430 ug/dL    Iron Sat Index 11 (L) 15 - 46 %   Ferritin   Result Value Ref Range    Ferritin 45 11 - 328 ng/mL   Methylmalonic Acid   Result Value Ref Range    Methylmalonic Acid 0.23 0.00 - 0.40 umol/L      Comment:      INTERPRETIVE INFORMATION:    Slight elevation 0.41-0.99 umol/L is consistent with mild vitamin B12 deficiency, renal insufficiency, or intravascular volume contraction.    Moderate elevation 1.00-9.99 umol/L is consistent with mild vitamin  B12 deficiency.    Massive elevation 10.00 umol/L or greater is consistent with significant vitamin B12 deficiency or with inborn errors of metabolism.    Narrative    This test was developed and its performance characteristics determined by the [facility],  Special Chemistry Laboratory. It has not been cleared or approved by the FDA. The laboratory is regulated under CLIA as qualified to perform high-complexity testing. This test is used for clinical purposes. It should not be regarded as investigational or for research.   Albumin Random Urine Quantitative with Creat Ratio   Result Value Ref Range    Creatinine Urine mg/dL 12.8 mg/dL      Comment:      The reference ranges have not been established in urine creatinine. The results should be integrated into the clinical context for interpretation.    Albumin Urine mg/L <12.0 mg/L      Comment:      The reference ranges have not been established in urine albumin. The results should be integrated into the clinical context for interpretation.    Albumin Urine mg/g Cr        Comment:      Unable to calculate, urine albumin and/or urine creatinine is outside detectable limits.  Microalbuminuria is defined as an albumin:creatinine ratio of 17 to 299 for males and 25 to 299 for females. A ratio of albumin:creatinine of 300 or higher is indicative of overt proteinuria.  Due to biologic variability, positive results should be confirmed by a second, first-morning random or 24-hour timed urine specimen. If there is discrepancy, a third specimen is recommended. When 2 out of 3 results are in the microalbuminuria range, this is evidence for incipient nephropathy and warrants increased efforts at glucose control, blood pressure control, and institution of therapy with an angiotensin-converting-enzyme (ACE) inhibitor (if the patient can tolerate it).         If you have any questions or concerns, please call the clinic at the number listed above.        Sincerely,      Yun Jacinto MD

## 2023-09-15 NOTE — PROGRESS NOTES
"  Assessment & Plan     Vitamin B 12 deficiency  Gives shots to herself  - cyanocobalamin (CYANOCOBALAMIN) 1000 MCG/ML injection; INJECT 1 ML (1,000 MCG) INTO THE SHOULDER, THIGH, OR BUTTOCKS EVERY 30 DAYS. **SYR 27G 1/2\" 1CC**    Need for shingles vaccine  Discussed immunizations she will get the flu shot will consider COVID and RSV  - zoster vaccine recombinant adjuvanted (SHINGRIX) injection; Inject 0.5 mLs into the muscle once for 1 dose Pharmacist administered    Chronic obstructive pulmonary disease, unspecified COPD type (H)  She says her shortness of breath is stable    Stage 3a chronic kidney disease (H)    - Albumin Random Urine Quantitative with Creat Ratio; Future  - Albumin Random Urine Quantitative with Creat Ratio    Congestive heart failure, unspecified HF chronicity, unspecified heart failure type (H)    - CBC with Platelets; Future  - CBC with Platelets    Bronchitis  She keeps levofloxacin on hand for flareups of pneumonia she has had aspiration pneumonia in the past.  - levofloxacin (LEVAQUIN) 250 MG tablet; Take 1 tablet (250 mg) by mouth daily    Anemia, unspecified type  GI blood loss causing anemia was given about 2 iron infusions.  She did not go back for follow-up.  Recent endoscopy colonoscopy were done in 2021.  Watchman procedure was discussed but she opted not to get it.  She might consider more iron infusions if her hemoglobin drops below 10.  She said she is happy with attending that  - CBC with platelets; Future  - Comprehensive metabolic panel; Future  - BNP-N terminal pro; Future  - Iron and iron binding capacity; Future  - Ferritin; Future  - Methylmalonic Acid; Future  - Comprehensive metabolic panel  - BNP-N terminal pro  - Iron and iron binding capacity  - Ferritin  - Methylmalonic Acid    Vitamin D deficiency    - Vitamin D Deficiency; Future  - Vitamin D Deficiency    Osteoporosis with current pathological fracture, unspecified osteoporosis type, initial encounter  Declined " medications    Permanent atrial fibrillation (H)  Controlled on Eliquis  Doses.  She is above 55 mg and renal function is    Atherosclerosis of coronary artery of native heart with other form of angina pectoris, unspecified vessel or lesion type (H)  She has a history of coronary artery plaque and stenosis but has opted to not get stress testing done or and a repeat angiogram            He declines wellness visits      Yun Jacinto MD  [facility]    Lolis Apodaca is a 88 year old, presenting for the following health issues:  Recheck Medication and Follow Up (Would like blood work done)      9/15/2023    11:14 AM   Additional Questions   Roomed by kadeem castillo       History of Present Illness       She eats 0-1 servings of fruits and vegetables daily.She consumes 0 sweetened beverage(s) daily.She exercises with enough effort to increase her heart rate 60 or more minutes per day.  She exercises with enough effort to increase her heart rate 3 or less days per week.   She is taking medications regularly.         Patient Active Problem List   Diagnosis    Coronary Artery Disease    Sick Sinus Syndrome    H/O aortic valve replacement    Atrial fibrillation (H)    Vitamin B 12 deficiency    Cardiac pacemaker in situ, dual chamber    S/P CABG (coronary artery bypass graft)    Pure hypercholesterolemia    Chronic kidney disease, stage 3 (H)    Gastrointestinal hemorrhage, unspecified gastrointestinal hemorrhage type    Melena    Anemia due to blood loss, acute    Tension headache    Anticoagulated    S/P percutaneous transluminal angioplasty (PTA) with stent placement    COPD (chronic obstructive pulmonary disease) (H)    CHF (congestive heart failure) (H)    Fracture of hip, left, closed, initial encounter (H)    Osteoporosis with current pathological fracture, unspecified osteoporosis type, initial encounter    Benign essential hypertension     Current Outpatient Medications   Medication  "   acetaminophen (TYLENOL) 500 MG tablet    atorvastatin (LIPITOR) 40 MG tablet    cholecalciferol, vitamin D3, (VITAMIN D3) 5,000 unit Tab    cyanocobalamin (CYANOCOBALAMIN) 1000 MCG/ML injection    ELIQUIS ANTICOAGULANT 2.5 MG tablet    gabapentin (NEURONTIN) 300 MG capsule    levofloxacin (LEVAQUIN) 250 MG tablet    metoprolol succinate ER (TOPROL XL) 25 MG 24 hr tablet    nitroGLYcerin (NITROSTAT) 0.4 MG sublingual tablet    sucralfate (CARAFATE) 1 GM tablet    Vitamin D, Ergocalciferol, 46059 units CAPS    zoster vaccine recombinant adjuvanted (SHINGRIX) injection     No current facility-administered medications for this visit.           Review of Systems         Objective    /56 (BP Location: Left arm, Patient Position: Sitting, Cuff Size: Adult Small)   Pulse 73   Temp 97.7  F (36.5  C)   Resp 14   Ht 1.549 m (5' 1\")   Wt 50.2 kg (110 lb 9.6 oz)   LMP  (LMP Unknown)   SpO2 100%   BMI 20.90 kg/m    Body mass index is 20.9 kg/m .  Physical Exam   GENERAL: healthy, alert and no distress  NECK: no adenopathy, no asymmetry, masses, or scars and thyroid normal to palpation  RESP: lungs clear to auscultation - no rales, rhonchi or wheezes  CV: irregular rate and rhythm, normal S1 S2, no S3 or S4, no murmur, click or rub, no peripheral edema and peripheral pulses strong  MS: no gross musculoskeletal defects noted, no edema                      "

## 2023-09-15 NOTE — PATIENT INSTRUCTIONS
You are eligible for the new covid booster    New RSV vaccine   BOTH given at the pharmacy   Take TWO TUMS a day for calcium supplement

## 2023-09-19 LAB — METHYLMALONATE SERPL-SCNC: 0.23 UMOL/L (ref 0–0.4)

## 2023-09-20 LAB — DEPRECATED CALCIDIOL+CALCIFEROL SERPL-MC: 64 UG/L (ref 20–75)

## 2023-09-22 ENCOUNTER — TELEPHONE (OUTPATIENT)
Dept: INTERNAL MEDICINE | Facility: CLINIC | Age: 88
End: 2023-09-22
Payer: COMMERCIAL

## 2023-09-22 NOTE — TELEPHONE ENCOUNTER
Test Results    Contacts         Type Contact Phone/Fax    09/22/2023 09:44 AM CDT Phone (Incoming) Constanza Coles (Self) [phone] ()            Who ordered the test:  Dr Jacinto    Type of test: Lab    Date of test:  9/15/23    Where was the test performed:  [place]    What are your questions/concerns?:  pt is looking for results please    Okay to leave a detailed message?: Yes at Home number on file [phone] ([place])

## 2023-09-22 NOTE — TELEPHONE ENCOUNTER
Hbg is 10.9 which is stable and good for her and the kidney tests are perfect . Will send her a letter with all the results

## 2023-09-25 ENCOUNTER — HOSPITAL ENCOUNTER (OUTPATIENT)
Dept: CARDIOLOGY | Facility: CLINIC | Age: 88
Discharge: HOME | End: 2023-09-25
Attending: INTERNAL MEDICINE | Admitting: INTERNAL MEDICINE
Payer: COMMERCIAL

## 2023-09-25 DIAGNOSIS — Z95.2 H/O AORTIC VALVE REPLACEMENT: ICD-10-CM

## 2023-09-25 LAB — LVEF ECHO: NORMAL

## 2023-09-25 PROCEDURE — 93306 TTE W/DOPPLER COMPLETE: CPT

## 2023-09-25 PROCEDURE — 93306 TTE W/DOPPLER COMPLETE: CPT | Mod: 26 | Performed by: INTERNAL MEDICINE

## 2023-10-26 ENCOUNTER — ANCILLARY PROCEDURE (OUTPATIENT)
Dept: CARDIOLOGY | Facility: CLINIC | Age: 88
End: 2023-10-26
Attending: INTERNAL MEDICINE
Payer: COMMERCIAL

## 2023-10-26 DIAGNOSIS — Z95.0 PACEMAKER: ICD-10-CM

## 2023-10-26 DIAGNOSIS — I49.5 SICK SINUS SYNDROME (H): ICD-10-CM

## 2023-10-26 PROCEDURE — 93294 REM INTERROG EVL PM/LDLS PM: CPT | Performed by: INTERNAL MEDICINE

## 2023-10-26 PROCEDURE — 93296 REM INTERROG EVL PM/IDS: CPT | Performed by: INTERNAL MEDICINE

## 2023-11-06 LAB
MDC_IDC_LEAD_CONNECTION_STATUS: NORMAL
MDC_IDC_LEAD_CONNECTION_STATUS: NORMAL
MDC_IDC_LEAD_IMPLANT_DT: NORMAL
MDC_IDC_LEAD_IMPLANT_DT: NORMAL
MDC_IDC_LEAD_LOCATION: NORMAL
MDC_IDC_LEAD_LOCATION: NORMAL
MDC_IDC_LEAD_MFG: NORMAL
MDC_IDC_LEAD_MFG: NORMAL
MDC_IDC_LEAD_MODEL: NORMAL
MDC_IDC_LEAD_MODEL: NORMAL
MDC_IDC_LEAD_POLARITY_TYPE: NORMAL
MDC_IDC_LEAD_POLARITY_TYPE: NORMAL
MDC_IDC_LEAD_SERIAL: NORMAL
MDC_IDC_LEAD_SERIAL: NORMAL
MDC_IDC_MSMT_BATTERY_DTM: NORMAL
MDC_IDC_MSMT_BATTERY_IMPEDANCE: 8585 OHM
MDC_IDC_MSMT_BATTERY_REMAINING_LONGEVITY: 2 MO
MDC_IDC_MSMT_BATTERY_STATUS: NORMAL
MDC_IDC_MSMT_BATTERY_VOLTAGE: 2.6 V
MDC_IDC_MSMT_LEADCHNL_RA_IMPEDANCE_VALUE: 67 OHM
MDC_IDC_MSMT_LEADCHNL_RV_IMPEDANCE_VALUE: 473 OHM
MDC_IDC_MSMT_LEADCHNL_RV_PACING_THRESHOLD_AMPLITUDE: 0.62 V
MDC_IDC_MSMT_LEADCHNL_RV_PACING_THRESHOLD_PULSEWIDTH: 0.4 MS
MDC_IDC_PG_IMPLANT_DTM: NORMAL
MDC_IDC_PG_MFG: NORMAL
MDC_IDC_PG_MODEL: NORMAL
MDC_IDC_PG_SERIAL: NORMAL
MDC_IDC_PG_TYPE: NORMAL
MDC_IDC_SESS_CLINIC_NAME: NORMAL
MDC_IDC_SESS_DTM: NORMAL
MDC_IDC_SESS_TYPE: NORMAL
MDC_IDC_SET_BRADY_HYSTRATE: NORMAL
MDC_IDC_SET_BRADY_LOWRATE: 50 {BEATS}/MIN
MDC_IDC_SET_BRADY_MAX_TRACKING_RATE: 110 {BEATS}/MIN
MDC_IDC_SET_BRADY_MODE: NORMAL
MDC_IDC_SET_LEADCHNL_RV_PACING_AMPLITUDE: 1.25 V
MDC_IDC_SET_LEADCHNL_RV_PACING_CAPTURE_MODE: NORMAL
MDC_IDC_SET_LEADCHNL_RV_PACING_POLARITY: NORMAL
MDC_IDC_SET_LEADCHNL_RV_PACING_PULSEWIDTH: 0.4 MS
MDC_IDC_SET_LEADCHNL_RV_SENSING_POLARITY: NORMAL
MDC_IDC_SET_LEADCHNL_RV_SENSING_SENSITIVITY: 2.8 MV
MDC_IDC_SET_ZONE_DETECTION_INTERVAL: 333.33 MS
MDC_IDC_SET_ZONE_STATUS: NORMAL
MDC_IDC_SET_ZONE_STATUS: NORMAL
MDC_IDC_SET_ZONE_TYPE: NORMAL
MDC_IDC_SET_ZONE_TYPE: NORMAL
MDC_IDC_SET_ZONE_VENDOR_TYPE: NORMAL
MDC_IDC_SET_ZONE_VENDOR_TYPE: NORMAL
MDC_IDC_STAT_AT_BURDEN_PERCENT: 0 %
MDC_IDC_STAT_AT_DTM_END: NORMAL
MDC_IDC_STAT_AT_DTM_START: NORMAL
MDC_IDC_STAT_BRADY_DTM_END: NORMAL
MDC_IDC_STAT_BRADY_DTM_START: NORMAL
MDC_IDC_STAT_BRADY_RV_PERCENT_PACED: 7 %
MDC_IDC_STAT_EPISODE_RECENT_COUNT: 0
MDC_IDC_STAT_EPISODE_RECENT_COUNT: 0
MDC_IDC_STAT_EPISODE_RECENT_COUNT_DTM_END: NORMAL
MDC_IDC_STAT_EPISODE_RECENT_COUNT_DTM_END: NORMAL
MDC_IDC_STAT_EPISODE_RECENT_COUNT_DTM_START: NORMAL
MDC_IDC_STAT_EPISODE_RECENT_COUNT_DTM_START: NORMAL
MDC_IDC_STAT_EPISODE_TYPE: NORMAL
MDC_IDC_STAT_EPISODE_TYPE: NORMAL

## 2023-11-14 DIAGNOSIS — I48.11 LONGSTANDING PERSISTENT ATRIAL FIBRILLATION (H): ICD-10-CM

## 2023-11-14 RX ORDER — APIXABAN 2.5 MG/1
TABLET, FILM COATED ORAL
Qty: 60 TABLET | Refills: 3 | Status: SHIPPED | OUTPATIENT
Start: 2023-11-14 | End: 2024-03-15

## 2023-12-29 ENCOUNTER — TELEPHONE (OUTPATIENT)
Dept: CARDIOLOGY | Facility: CLINIC | Age: 88
End: 2023-12-29

## 2023-12-29 ENCOUNTER — ANCILLARY PROCEDURE (OUTPATIENT)
Dept: CARDIOLOGY | Facility: CLINIC | Age: 88
End: 2023-12-29
Attending: INTERNAL MEDICINE
Payer: COMMERCIAL

## 2023-12-29 DIAGNOSIS — Z95.0 PACEMAKER: ICD-10-CM

## 2023-12-29 DIAGNOSIS — I49.5 SICK SINUS SYNDROME (H): ICD-10-CM

## 2023-12-29 LAB
MDC_IDC_LEAD_CONNECTION_STATUS: NORMAL
MDC_IDC_LEAD_CONNECTION_STATUS: NORMAL
MDC_IDC_LEAD_IMPLANT_DT: NORMAL
MDC_IDC_LEAD_IMPLANT_DT: NORMAL
MDC_IDC_LEAD_LOCATION: NORMAL
MDC_IDC_LEAD_LOCATION: NORMAL
MDC_IDC_LEAD_MFG: NORMAL
MDC_IDC_LEAD_MFG: NORMAL
MDC_IDC_LEAD_MODEL: NORMAL
MDC_IDC_LEAD_MODEL: NORMAL
MDC_IDC_LEAD_POLARITY_TYPE: NORMAL
MDC_IDC_LEAD_POLARITY_TYPE: NORMAL
MDC_IDC_LEAD_SERIAL: NORMAL
MDC_IDC_LEAD_SERIAL: NORMAL
MDC_IDC_MSMT_BATTERY_DTM: NORMAL
MDC_IDC_MSMT_BATTERY_IMPEDANCE: NORMAL OHM
MDC_IDC_MSMT_BATTERY_STATUS: NORMAL
MDC_IDC_MSMT_BATTERY_VOLTAGE: 2.57 V
MDC_IDC_MSMT_LEADCHNL_RA_IMPEDANCE_VALUE: 67 OHM
MDC_IDC_MSMT_LEADCHNL_RV_IMPEDANCE_VALUE: 468 OHM
MDC_IDC_PG_IMPLANT_DTM: NORMAL
MDC_IDC_PG_MFG: NORMAL
MDC_IDC_PG_MODEL: NORMAL
MDC_IDC_PG_SERIAL: NORMAL
MDC_IDC_PG_TYPE: NORMAL
MDC_IDC_SESS_CLINIC_NAME: NORMAL
MDC_IDC_SESS_DTM: NORMAL
MDC_IDC_SESS_TYPE: NORMAL
MDC_IDC_SET_BRADY_HYSTRATE: NORMAL
MDC_IDC_SET_BRADY_LOWRATE: 65 {BEATS}/MIN
MDC_IDC_SET_BRADY_MAX_TRACKING_RATE: 110 {BEATS}/MIN
MDC_IDC_SET_BRADY_MODE: NORMAL
MDC_IDC_SET_LEADCHNL_RV_PACING_AMPLITUDE: 1.25 V
MDC_IDC_SET_LEADCHNL_RV_PACING_CAPTURE_MODE: NORMAL
MDC_IDC_SET_LEADCHNL_RV_PACING_POLARITY: NORMAL
MDC_IDC_SET_LEADCHNL_RV_PACING_PULSEWIDTH: 0.4 MS
MDC_IDC_SET_LEADCHNL_RV_SENSING_POLARITY: NORMAL
MDC_IDC_SET_LEADCHNL_RV_SENSING_SENSITIVITY: 2.8 MV
MDC_IDC_SET_ZONE_DETECTION_INTERVAL: 333.33 MS
MDC_IDC_SET_ZONE_STATUS: NORMAL
MDC_IDC_SET_ZONE_STATUS: NORMAL
MDC_IDC_SET_ZONE_TYPE: NORMAL
MDC_IDC_SET_ZONE_TYPE: NORMAL
MDC_IDC_SET_ZONE_VENDOR_TYPE: NORMAL
MDC_IDC_SET_ZONE_VENDOR_TYPE: NORMAL
MDC_IDC_STAT_AT_BURDEN_PERCENT: 0 %
MDC_IDC_STAT_AT_DTM_END: NORMAL
MDC_IDC_STAT_AT_DTM_START: NORMAL
MDC_IDC_STAT_BRADY_DTM_END: NORMAL
MDC_IDC_STAT_BRADY_DTM_START: NORMAL
MDC_IDC_STAT_BRADY_RV_PERCENT_PACED: 7 %
MDC_IDC_STAT_EPISODE_RECENT_COUNT: 0
MDC_IDC_STAT_EPISODE_RECENT_COUNT: 0
MDC_IDC_STAT_EPISODE_RECENT_COUNT_DTM_END: NORMAL
MDC_IDC_STAT_EPISODE_RECENT_COUNT_DTM_END: NORMAL
MDC_IDC_STAT_EPISODE_RECENT_COUNT_DTM_START: NORMAL
MDC_IDC_STAT_EPISODE_RECENT_COUNT_DTM_START: NORMAL
MDC_IDC_STAT_EPISODE_TYPE: NORMAL
MDC_IDC_STAT_EPISODE_TYPE: NORMAL

## 2023-12-29 NOTE — TELEPHONE ENCOUNTER
Heart Care Device Change-Out Checklist (POLLO Checklist)    Device Data  Pacemaker    :  Medtronic  Model:  Versa  Serial Number:  [id number]  Implant location: Left Chest    Implant Date: 8/9/2011  POLLO Date:  11/2/2023  Device Diagnosis:  Afib/flutter, SSS    Device Alert(s):  No    Lead Data  (Attach Device History)  Last Interrogation Date: 1/2/2023 in clini    Atrium: Medtronic 4076  SN [id number]   Lead Imp:  67 Ohms, Pacing Threshold:  n/a, and Sensing Threshold:  n/a    Right Ventricle: Medtronic 4076,  SN [id number]   Lead Imp:  444Ohms, Pacing Threshold:  0.5V@0.4ms, and Sensing Threshold:  8.0   Shock Imp: n/a    Left Ventricle: n/a       Old Leads Present/Abandoned: No    Lead Alert(s):  No    Lead Issues/Concerns: no    Diagnostic Information  Intrinsic Rhythm:  AF with VR 50s-70s      Atrial Fibrillation:  Permanent Atrial Fibrillation  Takes Anticoagulant or LAAO? Yes,  Eliquis    Pacing Percentages  Ventricle Pacing 7-9%  Pacemaker Dependent? No        Ejection Fraction  Last EF Date:  8/25/2022    By Echocardiogram  Last EF Measurement:  50-55%      Special Instructions/Timeframe for change-out:  Due before 2/2/2024    Routed to EP:  Dr Grimm    Device RN: Kourtney Gill

## 2024-01-03 ENCOUNTER — DOCUMENTATION ONLY (OUTPATIENT)
Dept: CARDIOLOGY | Facility: CLINIC | Age: 89
End: 2024-01-03
Payer: COMMERCIAL

## 2024-01-03 DIAGNOSIS — I49.5 SICK SINUS SYNDROME (H): Primary | ICD-10-CM

## 2024-01-03 RX ORDER — FENTANYL CITRATE 50 UG/ML
25 INJECTION, SOLUTION INTRAMUSCULAR; INTRAVENOUS
Status: CANCELLED | OUTPATIENT
Start: 2024-01-15

## 2024-01-03 RX ORDER — DEXMEDETOMIDINE HYDROCHLORIDE 4 UG/ML
.1-1.5 INJECTION, SOLUTION INTRAVENOUS CONTINUOUS
Status: CANCELLED | OUTPATIENT
Start: 2024-01-15

## 2024-01-03 RX ORDER — SODIUM CHLORIDE 9 MG/ML
100 INJECTION, SOLUTION INTRAVENOUS CONTINUOUS
Status: CANCELLED | OUTPATIENT
Start: 2024-01-15

## 2024-01-03 RX ORDER — VANCOMYCIN HYDROCHLORIDE 1 G/200ML
1000 INJECTION, SOLUTION INTRAVENOUS
Status: CANCELLED | OUTPATIENT
Start: 2024-01-03

## 2024-01-03 RX ORDER — LIDOCAINE 40 MG/G
CREAM TOPICAL
Status: CANCELLED | OUTPATIENT
Start: 2024-01-03

## 2024-01-03 NOTE — PROGRESS NOTES
H&P  PMD: []  Received [] Card OV: []  Date:  Teach  []   Orders  I [x] P  [x]  Letter []   AC: Eliquis- Continue Diuretics: None  DM Meds: None  GLP-1:None     1935  Home:[phone] (home) Cell:[phone] (mobile)  Emergency Contact: Jesse Coles [phone]  PCP: Yun Jacinto, [phone]      Important patient information for CSC/Cath Lab staff : None    [facility] EP Cath Lab Procedure Order     Device Implant/Revision:  Procedure: Generator Change Out  Current Device/Device Co Needed for Procedure: Dual Pacemaker Medtronic  Ordering Provider: Dr Alfa Sams Ordered and Prepped: 1/3/2024 Emily Vargas RN  Diagnosis:  Generator Change- Device at POLLO  Scheduling Timeframe:   needs prior to 2/2 /24  Scheduling Restrictions: None  Scheduling Contact: Please contact pt to schedule, if you are unable to schedule date within the next 24 hours please contact pt to update on scheduling process  Cardiology Follow Up Apt s/p:  Standard Device follow up General Card @ 6mo (does not include New CRT/HIS)  Pre-Procedural Testing needed: None  Anesthesia:  Conscious Sedation- CV RN to administer    [facility] EP Cath Lab Prep   H&P:  Pt to schedule with PMD to complete  Pre-Procedure Labs/T&S: For SICD & MICRA Devices only schedule lab visit at [facility] lab within 3 days prior to procedure for T&S, BMP, CBC, HcG is appropriate, and INR if on warfarin. All other Devices pre-procedure lab work will be done the morning of the procedure.  Medical Records Pertinent for Procedure:  N/A  Iodinated Contrast Dye Allergies (Does not include Shellfish, Egg, and/or Iodine Allergy)- excludes ILR/SICD:None  Renal Protocol (GFR < 40ml/min, IV contrast past 2 days, EF < or = 25%)- excludes ILR/SICD: No  GLP-1 Protocol: If on Dulaglutide (Trulicity) (weekly)- Injection hold 7 days prior to procedure  , Exenatide extended release (Bydureon bcise) (weekly)- Injection hold 7 days prior to procedure, Exenatide (Byetta) (twice daily)- Oral Tablet  "hold day prior and morning of procedure and for Injection hold 7 days prior to procedure, Semaglutide (Ozempic) (weekly)- Injection and Oral hold 7 days prior to procedure, Liraglutide (Victoza, Saxenda) (daily)- Injection hold day prior and morning of procedure    Allergies   Allergen Reactions    Aspirin Nausea and Vomiting     Vomiting. Pt will NOT take Aspirin.     Erythromycin Base [Erythromycin] Unknown     Pt is not sure she is allergic to this    Sulfatolamide     Vancomycin Other (See Comments)     Red man syndrome    Xanax [Alprazolam] Other (See Comments)     confusion    Sulfa (Sulfonamide Antibiotics) [Sulfa Antibiotics] Rash       Current Outpatient Medications:     acetaminophen (TYLENOL) 500 MG tablet, Take 1,000 mg by mouth every 6 hours as needed for mild pain, Disp: , Rfl:     atorvastatin (LIPITOR) 40 MG tablet, Take 1 tablet (40 mg) by mouth daily, Disp: 90 tablet, Rfl: 3    cholecalciferol, vitamin D3, (VITAMIN D3) 5,000 unit Tab, [CHOLECALCIFEROL, VITAMIN D3, (VITAMIN D3) 5,000 UNIT TAB] Take 1 tablet by mouth daily., Disp: , Rfl:     cyanocobalamin (CYANOCOBALAMIN) 1000 MCG/ML injection, INJECT 1 ML (1,000 MCG) INTO THE SHOULDER, THIGH, OR BUTTOCKS EVERY 30 DAYS. **SYR 27G 1/2\" 1CC**, Disp: 3 mL, Rfl: 3    ELIQUIS ANTICOAGULANT 2.5 MG tablet, TAKE 1 TABLET(2.5 MG) BY MOUTH TWICE DAILY, Disp: 60 tablet, Rfl: 3    gabapentin (NEURONTIN) 300 MG capsule, TAKE 1 CAPSULE (300 MG TOTAL) BY MOUTH 2 TIMES A DAY., Disp: 180 capsule, Rfl: 3    levofloxacin (LEVAQUIN) 250 MG tablet, Take 1 tablet (250 mg) by mouth daily, Disp: 10 tablet, Rfl: 3    metoprolol succinate ER (TOPROL XL) 25 MG 24 hr tablet, Take 1 tablet (25 mg) by mouth daily, Disp: 90 tablet, Rfl: 3    nitroGLYcerin (NITROSTAT) 0.4 MG sublingual tablet, For chest pain place 1 tablet under the tongue every 5 minutes for 3 doses. If symptoms persist 5 minutes after 1st dose call 911., Disp: 30 tablet, Rfl: 4    sucralfate (CARAFATE) 1 GM " tablet, Take 1 tablet (1 g) by mouth 4 times daily, Disp: 360 tablet, Rfl: 3    Vitamin D, Ergocalciferol, 55422 units CAPS, TAKE 1 CAPSULE (50,000 UNITS) BY MOUTH ONCE A WEEK., Disp: 12 capsule, Rfl: 1    Documentation Date:1/3/2024 3:37 PM  Emily Vargas RN

## 2024-01-03 NOTE — TELEPHONE ENCOUNTER
EP MD/EP NC POLLO Review  Dr Grimm reviewed POLLO checklist  Reviewed by Emily Vargas, RN 1/3/2024 3:44 PM    Order for procedure placed in EPIC and  [place]

## 2024-01-04 NOTE — PROGRESS NOTES
Patient called concerned that she hadn't heard from anyone regarding her pacemaker generator change out. Reassured patient that she should be hearing from scheduling soon as the orders for the procedures were just completed yesterday. Procedure scheduling number also provided to patient. Instructed patient to contact them directly if she has not heard from them by the end of next week. Patient verbalized understanding.     Isabela Rees RN   Device Nurse

## 2024-01-08 ENCOUNTER — TELEPHONE (OUTPATIENT)
Dept: CARDIOLOGY | Facility: CLINIC | Age: 89
End: 2024-01-08
Payer: COMMERCIAL

## 2024-01-08 NOTE — TELEPHONE ENCOUNTER
Pre-Procedure Education    Procedure: PPM Generator Change with Dr Grimm on 1/15 with arrival time 7:00 am    COVID: COVID policy- if pt develops COVID like symptoms prior to procedure, he/she would need to complete an at home with a rapid antigen COVID test 1-2 days prior to your procedure date. If COVID + pt is aware the procedure will need to be rescheduled, and to contact CV scheduling as soon as possible    Pre-Op H&P: scheduled on 1/11 with Caitlin Ruggiero - Available in Epic    Education:   Contact: Attempted to contact pt via phone, VM was left with request that pt return call to review above/below information  Pre-Procedure Instruction: NPO after midnight pre procedure, Defined NPO with pt, Remove all jewelry and leave all valuables at home, Shower prior to arrival, Sedation plan/orders, Transportation requirements and arrangements post procedure, Post-procedure follow up process, Post-procedure restrictions/expectations, and Pre-procedure letter sent- letter tab  Risks:  Permanent Programable Pacemaker (PPM) Risks  1% Pneumothorax  1-2% Infection, Pocket erosion  1-2% Major bleeding, Hematoma (increased with anticoagulation therapy); 5-10% Minor bleeding  1-2% Acute subclavian vein thrombosis & 10% Chronic subclavian vein thrombosis  <1% Cardiac perforation, Cardiac tamponade, Arrythmias, Diaphragmatic stimulation  <2% % Lead dislodgment, <1% Lead fracture or Generator malfunction  < 0.1% Death  <2% Tricuspid valve dysfunction  < 5% Need for ICD system revision  If external defibrillation or CV is needed, 25% risk for superficial burn.  Risk for electromagnetic interference (ANKIT), and psychological and social aspects of having an PPM.       Medication:   Instructions regarding anticoagulants: Eliquis- To continue anticoagulation uninterrupted through their procedure  Instructions regarding antiarrhythmic medication: N/A for this type of procedure; N/A  Instructions given to pt regarding diuretics  medication: None  Instructions given to pt regarding DM/GLP-1 medication:   DM- None  GLP-1- None  Instructions for medication, other than anticoagulants and antiarrhythmics listed above, given to pt: Take all medication AM of procedure with small sips of water     Important patient information for staff: None    1/8/2024 4:14 PM  Emily Vargas RN

## 2024-01-10 NOTE — PROGRESS NOTES
[facility] [address], [place] [address]   [email or web address]   OFFICE: [phone]        Primary Care: Yun Jacinto MD     Assessment/Recommendations     Sinus node dysfunction status post SND: POLLO 11/2/2023, not pacemaker dependent.  We discussed generator replacement procedure and reviewed the risks of such including but not limited to bleeding complications and infection, as well as expected recovery and follow up. Her questions were answered to her satisfaction and she is ready to proceed.     Medical, past medical, surgical and social history reviewed and updated. Current medications and allergies reviewed. No personal or family history of adverse reactions to anesthesia or abnormal bleeding with surgery.     Permanent/chronic atrial fibrillation: Asymptomatic. Ventricular response well controlled    [id number] score of 4 for age >75, gender, and CAD; HAS-BLED score of 3 for age >65, bleeding predisposition and indication for concurrent antiplatelet therapy.  Percutaneous left atrial appendage closure via Watchman implant has been discussed in the past which she declined due to side effects on aspirin    CAD: back pain radiating to shoulder relieved nitroglycerin may represent atypical angina.  Stress testing and angiography was discussed at her last visit with Dr. Antoine which she declined pending clinical course    Hypertension: elevated today on current regimen, historically well controlled     CKD IIIa    Plan:   -Continue Eliquis 2.5 mg twice a day for stroke prophylaxis  -Continue metoprolol succinate 25 mg daily  -Device RN follow-up post generator replacement as scheduled, EP follow-up PRN      History of Present Illness/Subjective    Constanza JORDI Coles is a 88 year old female who is seen today for history and physical prior to generator replacement. She has a past medical history significant for permanent atrial fibrillation, SND status  post PPM 2011, CAD status post CABG and AVR 2005 with subsequent PCI, hypertension, hyperlipidemia, COPD, CKD, GI bleed requiring transfusion.  Pacemaker reached POLLO approximately 2 months ago, historically ventricular paced <10%..  Recent echocardiogram shows stable LVEF 50-55%.  She continues on metoprolol succinate 25 mg daily for chronic atrial fibrillation.  Ventricular response appears well-controlled.    She has been feeling generally well. She has occasional dizziness, occasional sensation of heart racing.  She goes to the gym about 3 times a week.  She has back pain most prominent in the evenings and at rest, at times radiating to her left arm relieved with nitroglycerin.  No better or worse as of late.  She denies chest discomfort, palpitations, peripheral edema, shortness of breath, paroxysmal nocturnal dyspnea, orthopnea, pre-syncope, or syncope.     Data Review     TTE/[place]: 9/25/2023  Left ventricular function is decreased. The ejection fraction is 50-55%  (borderline).  There is a bioprosthetic aortic valve.  The gradient is normal for this prosthetic aortic valve.  Normal right ventricle size and systolic function.  The left atrium is mild to moderately dilated.    DEVICE: MDT Keila 2011 (SND)  POLLO as of 11/2/2023  VVI 50, chronic AF  Historically <10% ventricular pacing  Controlled ventricular response    I have reviewed and updated the patient's past medical history, allergies and medication list.          25 minutes spent reviewing prior records (including documentation, laboratory studies, cardiac testing/imaging), history and physical exam, planning, and subsequent documentation.        Physical Examination Review of Systems   BP (!) 160/73 (BP Location: Left arm, Patient Position: Sitting, Cuff Size: Adult Small)   Pulse 65   Resp 18   Wt 48.5 kg (107 lb)   LMP  (LMP Unknown)   SpO2 100%   BMI 20.22 kg/m      Body mass index is 20.22 kg/m .    Wt Readings from Last 3 Encounters:   01/11/24  48.5 kg (107 lb)   09/15/23 50.2 kg (110 lb 9.6 oz)   23 49.9 kg (109 lb 14.4 oz)       General   Appearance:   Alert and oriented, in no acute distress.    HEENT:  Normocephalic and atraumatic. Conjunctiva and sclera are clear. Moist oral mucosa.    Neck: No JVP, carotid bruit or obvious thyromegaly.   Lungs:   Respirations unlabored. Clear bilaterally with no rales, rhonchi, or wheezes.     Cardiovascular:   Rhythm is regular. S1 and S2 are normal. No significant murmur is present. Lower extremities demonstrate no significant edema. Posterior tibial pulses are intact bilaterally.   Extremities: No cyanosis or clubbing   Skin: Skin is warm, dry, and otherwise intact.   Neurologic: Gait not assessed. Mood and affect appropriate.                                                Medical History  Surgical History Family History Social History   Past Medical History:   Diagnosis Date    Anemia     iron deficiency    Chronic atrial fibrillation (H)     GERD (gastroesophageal reflux disease)     Hypertension     IHD (ischemic heart disease)     PUD (peptic ulcer disease)     Billroth 1    Recurrent cystitis     SSS (sick sinus syndrome) (H)     post pacemaker placement    Valvular heart disease     Vasculitis (H24)     Past Surgical History:   Procedure Laterality Date    ASCENDING AORTIC ANEURYSM REPAIR W/ TISSUE AORTIC VALVE REPLACEMENT  2005    Dacron graft, Forte pericardial Magna 21mm aortic valve     SECTION      COLONOSCOPY N/A 10/31/2021    Procedure: COLONOSCOPY;  Surgeon: Dario Thomas MD;  Location: [facility] Main OR    CV CORONARY ANGIOGRAM N/A 2021    Procedure: Coronary Angiogram;  Surgeon: Shane Mcclendon MD;  Location: [facility] LAB CV    CV LEFT HEART CATH N/A 2021    Procedure: Left Heart Cath;  Surgeon: Shane Mcclendon MD;  Location: [facility] LAB CV    CV PCI N/A 2021    Procedure: Percutaneous Coronary Intervention;  Surgeon: Shane Mcclendon MD;   "Location: [facility] CATH LAB CV    ESOPHAGOSCOPY, GASTROSCOPY, DUODENOSCOPY (EGD), COMBINED N/A 10/30/2021    Procedure: ESOPHAGOGASTRODUODENOSCOPY (EGD);  Surgeon: Dario Thomas MD;  Location: [facility] Main OR    GALLBLADDER SURGERY      partial colon removal    HYSTERECTOMY      IR AORTIC ARCH 4 VESSEL ANGIOGRAM  9/15/2011    IR MISCELLANEOUS PROCEDURE  2005    OTHER SURGICAL HISTORY      antrectomy    PARTIAL GASTRECTOMY      SC PERC CLOS,BART INTERATRIAL COMMUN W/IMPL      Description: Patent Foramen Ovale Closure Percutaneous;  Recorded: 2012;    ZZC CABG, VEIN, SINGLE      Description: CABG (CABG);  Recorded: 2012;  Comments: LIMA^LAD, SVG^1st diag    ZZC REPLACE AORT VALV,PROSTH VALV      Aortic Valve Replacement with Forte pericardial Magna 21mm 2005    Family History   Problem Relation Age of Onset    Liver Disease Father     No Known Problems Mother     Thyroid Disease Sister     LUNG DISEASE Sister     Social History     Tobacco Use    Smoking status: Former     Packs/day: 1.50     Years: 20.00     Additional pack years: 0.00     Total pack years: 30.00     Types: Cigarettes     Quit date: 10/29/1979     Years since quittin.2    Smokeless tobacco: Never   Vaping Use    Vaping Use: Never used   Substance Use Topics    Alcohol use: Yes     Comment: 1-2 week    Drug use: No          Medications  Allergies   Current Outpatient Medications   Medication Sig Dispense Refill    acetaminophen (TYLENOL) 500 MG tablet Take 1,000 mg by mouth every 6 hours as needed for mild pain      atorvastatin (LIPITOR) 40 MG tablet Take 1 tablet (40 mg) by mouth daily 90 tablet 3    cholecalciferol, vitamin D3, (VITAMIN D3) 5,000 unit Tab [CHOLECALCIFEROL, VITAMIN D3, (VITAMIN D3) 5,000 UNIT TAB] Take 1 tablet by mouth daily.      cyanocobalamin (CYANOCOBALAMIN) 1000 MCG/ML injection INJECT 1 ML (1,000 MCG) INTO THE SHOULDER, THIGH, OR BUTTOCKS EVERY 30 DAYS. **SYR 27G 1/2\" 1CC** 3 mL 3    " "ELIQUIS ANTICOAGULANT 2.5 MG tablet TAKE 1 TABLET(2.5 MG) BY MOUTH TWICE DAILY 60 tablet 3    gabapentin (NEURONTIN) 300 MG capsule TAKE 1 CAPSULE (300 MG TOTAL) BY MOUTH 2 TIMES A DAY. 180 capsule 3    levofloxacin (LEVAQUIN) 250 MG tablet Take 1 tablet (250 mg) by mouth daily 10 tablet 3    metoprolol succinate ER (TOPROL XL) 25 MG 24 hr tablet Take 1 tablet (25 mg) by mouth daily 90 tablet 3    nitroGLYcerin (NITROSTAT) 0.4 MG sublingual tablet For chest pain place 1 tablet under the tongue every 5 minutes for 3 doses. If symptoms persist 5 minutes after 1st dose call 911. 30 tablet 4    sucralfate (CARAFATE) 1 GM tablet Take 1 tablet (1 g) by mouth 4 times daily (Patient taking differently: Take 1 g by mouth 4 times daily as needed) 360 tablet 3    Vitamin D, Ergocalciferol, 33455 units CAPS TAKE 1 CAPSULE (50,000 UNITS) BY MOUTH ONCE A WEEK. 12 capsule 1    Allergies   Allergen Reactions    Aspirin Nausea and Vomiting     Vomiting. Pt will NOT take Aspirin.     Erythromycin Base [Erythromycin] Unknown     Pt is not sure she is allergic to this    Sulfatolamide     Vancomycin Other (See Comments)     Red man syndrome    Xanax [Alprazolam] Other (See Comments)     confusion    Sulfa (Sulfonamide Antibiotics) [Sulfa Antibiotics] Rash         Lab Results    Chemistry/lipid CBC Cardiac Enzymes/BNP/TSH/INR   Lab Results   Component Value Date    BUN 16.5 09/15/2023     09/15/2023    CO2 28 09/15/2023     No results found for: \"CREATININE\"    Lab Results   Component Value Date    CHOL 141 12/12/2022    HDL 70 12/12/2022    LDL 38 12/12/2022      Lab Results   Component Value Date    WBC 8.9 09/15/2023    HGB 10.9 (L) 09/15/2023    HCT 34.9 (L) 09/15/2023    MCV 87 09/15/2023     09/15/2023    Lab Results   Component Value Date     (H) 03/30/2021    TSH 3.77 07/27/2022    INR 2.2 (H) 04/05/2022        This note has been dictated using voice recognition software. Any grammatical, typographical, or " context distortions are unintentional and inherent to the software.    Caitlin Cabrera, CNP  Clinical Cardiac Electrophysiology  [address]  [place] [address]   Office: [phone]  Fax: [phone]

## 2024-01-10 NOTE — H&P (VIEW-ONLY)
[facility]ART [facility] [address], [place] [address]   [email or web address]   OFFICE: [phone]        Primary Care: Yun Jacinto MD     Assessment/Recommendations     Sinus node dysfunction status post SND: POLLO 11/2/2023, not pacemaker dependent.  We discussed generator replacement procedure and reviewed the risks of such including but not limited to bleeding complications and infection, as well as expected recovery and follow up. Her questions were answered to her satisfaction and she is ready to proceed.     Medical, past medical, surgical and social history reviewed and updated. Current medications and allergies reviewed. No personal or family history of adverse reactions to anesthesia or abnormal bleeding with surgery.     Permanent/chronic atrial fibrillation: Asymptomatic. Ventricular response well controlled    [id number] score of 4 for age >75, gender, and CAD; HAS-BLED score of 3 for age >65, bleeding predisposition and indication for concurrent antiplatelet therapy.  Percutaneous left atrial appendage closure via Watchman implant has been discussed in the past which she declined due to side effects on aspirin    CAD: back pain radiating to shoulder relieved nitroglycerin may represent atypical angina.  Stress testing and angiography was discussed at her last visit with Dr. Antoine which she declined pending clinical course    Hypertension: elevated today on current regimen, historically well controlled     CKD IIIa    Plan:   -Continue Eliquis 2.5 mg twice a day for stroke prophylaxis  -Continue metoprolol succinate 25 mg daily  -Device RN follow-up post generator replacement as scheduled, EP follow-up PRN      History of Present Illness/Subjective    Constanza JORDI Coles is a 88 year old female who is seen today for history and physical prior to generator replacement. She has a past medical history significant for permanent atrial fibrillation, SND status  post PPM 2011, CAD status post CABG and AVR 2005 with subsequent PCI, hypertension, hyperlipidemia, COPD, CKD, GI bleed requiring transfusion.  Pacemaker reached POLLO approximately 2 months ago, historically ventricular paced <10%..  Recent echocardiogram shows stable LVEF 50-55%.  She continues on metoprolol succinate 25 mg daily for chronic atrial fibrillation.  Ventricular response appears well-controlled.    She has been feeling generally well. She has occasional dizziness, occasional sensation of heart racing.  She goes to the gym about 3 times a week.  She has back pain most prominent in the evenings and at rest, at times radiating to her left arm relieved with nitroglycerin.  No better or worse as of late.  She denies chest discomfort, palpitations, peripheral edema, shortness of breath, paroxysmal nocturnal dyspnea, orthopnea, pre-syncope, or syncope.     Data Review     TTE/[place]: 9/25/2023  Left ventricular function is decreased. The ejection fraction is 50-55%  (borderline).  There is a bioprosthetic aortic valve.  The gradient is normal for this prosthetic aortic valve.  Normal right ventricle size and systolic function.  The left atrium is mild to moderately dilated.    DEVICE: MDT Keila 2011 (SND)  POLLO as of 11/2/2023  VVI 50, chronic AF  Historically <10% ventricular pacing  Controlled ventricular response    I have reviewed and updated the patient's past medical history, allergies and medication list.          25 minutes spent reviewing prior records (including documentation, laboratory studies, cardiac testing/imaging), history and physical exam, planning, and subsequent documentation.        Physical Examination Review of Systems   BP (!) 160/73 (BP Location: Left arm, Patient Position: Sitting, Cuff Size: Adult Small)   Pulse 65   Resp 18   Wt 48.5 kg (107 lb)   LMP  (LMP Unknown)   SpO2 100%   BMI 20.22 kg/m      Body mass index is 20.22 kg/m .    Wt Readings from Last 3 Encounters:   01/11/24  48.5 kg (107 lb)   09/15/23 50.2 kg (110 lb 9.6 oz)   23 49.9 kg (109 lb 14.4 oz)       General   Appearance:   Alert and oriented, in no acute distress.    HEENT:  Normocephalic and atraumatic. Conjunctiva and sclera are clear. Moist oral mucosa.    Neck: No JVP, carotid bruit or obvious thyromegaly.   Lungs:   Respirations unlabored. Clear bilaterally with no rales, rhonchi, or wheezes.     Cardiovascular:   Rhythm is regular. S1 and S2 are normal. No significant murmur is present. Lower extremities demonstrate no significant edema. Posterior tibial pulses are intact bilaterally.   Extremities: No cyanosis or clubbing   Skin: Skin is warm, dry, and otherwise intact.   Neurologic: Gait not assessed. Mood and affect appropriate.                                                Medical History  Surgical History Family History Social History   Past Medical History:   Diagnosis Date    Anemia     iron deficiency    Chronic atrial fibrillation (H)     GERD (gastroesophageal reflux disease)     Hypertension     IHD (ischemic heart disease)     PUD (peptic ulcer disease)     Billroth 1    Recurrent cystitis     SSS (sick sinus syndrome) (H)     post pacemaker placement    Valvular heart disease     Vasculitis (H24)     Past Surgical History:   Procedure Laterality Date    ASCENDING AORTIC ANEURYSM REPAIR W/ TISSUE AORTIC VALVE REPLACEMENT  2005    Dacron graft, Forte pericardial Magna 21mm aortic valve     SECTION      COLONOSCOPY N/A 10/31/2021    Procedure: COLONOSCOPY;  Surgeon: Dario Thomas MD;  Location: [facility] Main OR    CV CORONARY ANGIOGRAM N/A 2021    Procedure: Coronary Angiogram;  Surgeon: Shane Mcclendon MD;  Location: [facility] LAB CV    CV LEFT HEART CATH N/A 2021    Procedure: Left Heart Cath;  Surgeon: Shane Mcclendon MD;  Location: [facility] LAB CV    CV PCI N/A 2021    Procedure: Percutaneous Coronary Intervention;  Surgeon: Shane Mcclendon MD;   "Location: [facility] CATH LAB CV    ESOPHAGOSCOPY, GASTROSCOPY, DUODENOSCOPY (EGD), COMBINED N/A 10/30/2021    Procedure: ESOPHAGOGASTRODUODENOSCOPY (EGD);  Surgeon: Dario Thomas MD;  Location: [facility] Main OR    GALLBLADDER SURGERY      partial colon removal    HYSTERECTOMY      IR AORTIC ARCH 4 VESSEL ANGIOGRAM  9/15/2011    IR MISCELLANEOUS PROCEDURE  2005    OTHER SURGICAL HISTORY      antrectomy    PARTIAL GASTRECTOMY      NE PERC CLOS,BART INTERATRIAL COMMUN W/IMPL      Description: Patent Foramen Ovale Closure Percutaneous;  Recorded: 2012;    ZZC CABG, VEIN, SINGLE      Description: CABG (CABG);  Recorded: 2012;  Comments: LIMA^LAD, SVG^1st diag    ZZC REPLACE AORT VALV,PROSTH VALV      Aortic Valve Replacement with Forte pericardial Magna 21mm 2005    Family History   Problem Relation Age of Onset    Liver Disease Father     No Known Problems Mother     Thyroid Disease Sister     LUNG DISEASE Sister     Social History     Tobacco Use    Smoking status: Former     Packs/day: 1.50     Years: 20.00     Additional pack years: 0.00     Total pack years: 30.00     Types: Cigarettes     Quit date: 10/29/1979     Years since quittin.2    Smokeless tobacco: Never   Vaping Use    Vaping Use: Never used   Substance Use Topics    Alcohol use: Yes     Comment: 1-2 week    Drug use: No          Medications  Allergies   Current Outpatient Medications   Medication Sig Dispense Refill    acetaminophen (TYLENOL) 500 MG tablet Take 1,000 mg by mouth every 6 hours as needed for mild pain      atorvastatin (LIPITOR) 40 MG tablet Take 1 tablet (40 mg) by mouth daily 90 tablet 3    cholecalciferol, vitamin D3, (VITAMIN D3) 5,000 unit Tab [CHOLECALCIFEROL, VITAMIN D3, (VITAMIN D3) 5,000 UNIT TAB] Take 1 tablet by mouth daily.      cyanocobalamin (CYANOCOBALAMIN) 1000 MCG/ML injection INJECT 1 ML (1,000 MCG) INTO THE SHOULDER, THIGH, OR BUTTOCKS EVERY 30 DAYS. **SYR 27G 1/2\" 1CC** 3 mL 3    " "ELIQUIS ANTICOAGULANT 2.5 MG tablet TAKE 1 TABLET(2.5 MG) BY MOUTH TWICE DAILY 60 tablet 3    gabapentin (NEURONTIN) 300 MG capsule TAKE 1 CAPSULE (300 MG TOTAL) BY MOUTH 2 TIMES A DAY. 180 capsule 3    levofloxacin (LEVAQUIN) 250 MG tablet Take 1 tablet (250 mg) by mouth daily 10 tablet 3    metoprolol succinate ER (TOPROL XL) 25 MG 24 hr tablet Take 1 tablet (25 mg) by mouth daily 90 tablet 3    nitroGLYcerin (NITROSTAT) 0.4 MG sublingual tablet For chest pain place 1 tablet under the tongue every 5 minutes for 3 doses. If symptoms persist 5 minutes after 1st dose call 911. 30 tablet 4    sucralfate (CARAFATE) 1 GM tablet Take 1 tablet (1 g) by mouth 4 times daily (Patient taking differently: Take 1 g by mouth 4 times daily as needed) 360 tablet 3    Vitamin D, Ergocalciferol, 14298 units CAPS TAKE 1 CAPSULE (50,000 UNITS) BY MOUTH ONCE A WEEK. 12 capsule 1    Allergies   Allergen Reactions    Aspirin Nausea and Vomiting     Vomiting. Pt will NOT take Aspirin.     Erythromycin Base [Erythromycin] Unknown     Pt is not sure she is allergic to this    Sulfatolamide     Vancomycin Other (See Comments)     Red man syndrome    Xanax [Alprazolam] Other (See Comments)     confusion    Sulfa (Sulfonamide Antibiotics) [Sulfa Antibiotics] Rash         Lab Results    Chemistry/lipid CBC Cardiac Enzymes/BNP/TSH/INR   Lab Results   Component Value Date    BUN 16.5 09/15/2023     09/15/2023    CO2 28 09/15/2023     No results found for: \"CREATININE\"    Lab Results   Component Value Date    CHOL 141 12/12/2022    HDL 70 12/12/2022    LDL 38 12/12/2022      Lab Results   Component Value Date    WBC 8.9 09/15/2023    HGB 10.9 (L) 09/15/2023    HCT 34.9 (L) 09/15/2023    MCV 87 09/15/2023     09/15/2023    Lab Results   Component Value Date     (H) 03/30/2021    TSH 3.77 07/27/2022    INR 2.2 (H) 04/05/2022        This note has been dictated using voice recognition software. Any grammatical, typographical, or " context distortions are unintentional and inherent to the software.    Caitlin Cabrera, CNP  Clinical Cardiac Electrophysiology  [address]  [place] [address]   Office: [phone]  Fax: [phone]

## 2024-01-10 NOTE — TELEPHONE ENCOUNTER
Pt was called, corresponding information/recommendations reviewed, verbalized understanding, has no further questions at this time, and contact information was given for further concerns/questions   1/10/2024 9:47 AM  Emily Richter RN

## 2024-01-11 ENCOUNTER — OFFICE VISIT (OUTPATIENT)
Dept: CARDIOLOGY | Facility: CLINIC | Age: 89
End: 2024-01-11
Payer: COMMERCIAL

## 2024-01-11 VITALS
WEIGHT: 107 LBS | DIASTOLIC BLOOD PRESSURE: 73 MMHG | HEART RATE: 65 BPM | BODY MASS INDEX: 20.22 KG/M2 | OXYGEN SATURATION: 100 % | RESPIRATION RATE: 18 BRPM | SYSTOLIC BLOOD PRESSURE: 160 MMHG

## 2024-01-11 DIAGNOSIS — I25.10 ATHEROSCLEROSIS OF NATIVE CORONARY ARTERY OF NATIVE HEART WITHOUT ANGINA PECTORIS: ICD-10-CM

## 2024-01-11 DIAGNOSIS — I48.21 PERMANENT ATRIAL FIBRILLATION (H): ICD-10-CM

## 2024-01-11 DIAGNOSIS — I10 BENIGN ESSENTIAL HYPERTENSION: ICD-10-CM

## 2024-01-11 DIAGNOSIS — I49.5 SINOATRIAL NODE DYSFUNCTION (H): Primary | ICD-10-CM

## 2024-01-11 DIAGNOSIS — Z95.0 CARDIAC PACEMAKER IN SITU: ICD-10-CM

## 2024-01-11 PROCEDURE — 99214 OFFICE O/P EST MOD 30 MIN: CPT | Performed by: NURSE PRACTITIONER

## 2024-01-11 NOTE — LETTER
1/11/2024    Yun Jacinto MD  [address] [facility] [address]    RE: Constanza Coles       Dear Colleague,     I had the pleasure of seeing Constanza Coles in the [facility].      [facility]   [address], [place] [address]   [email or web address]   OFFICE: [phone]        Primary Care: Yun Jacinto MD     Assessment/Recommendations     Sinus node dysfunction status post SND: POLLO 11/2/2023, not pacemaker dependent.  We discussed generator replacement procedure and reviewed the risks of such including but not limited to bleeding complications and infection, as well as expected recovery and follow up. Her questions were answered to her satisfaction and she is ready to proceed.     Medical, past medical, surgical and social history reviewed and updated. Current medications and allergies reviewed. No personal or family history of adverse reactions to anesthesia or abnormal bleeding with surgery.     Permanent/chronic atrial fibrillation: Asymptomatic. Ventricular response well controlled    [id number] score of 4 for age >75, gender, and CAD; HAS-BLED score of 3 for age >65, bleeding predisposition and indication for concurrent antiplatelet therapy.  Percutaneous left atrial appendage closure via Watchman implant has been discussed in the past which she declined due to side effects on aspirin    CAD: back pain radiating to shoulder relieved nitroglycerin may represent atypical angina.  Stress testing and angiography was discussed at her last visit with Dr. Antoine which she declined pending clinical course    Hypertension: elevated today on current regimen, historically well controlled     CKD IIIa    Plan:   -Continue Eliquis 2.5 mg twice a day for stroke prophylaxis  -Continue metoprolol succinate 25 mg daily  -Device RN follow-up post generator replacement as scheduled, EP follow-up PRN      History of Present Illness/Subjective     Constanza Coles is a 88 year old female who is seen today for history and physical prior to generator replacement. She has a past medical history significant for permanent atrial fibrillation, SND status post PPM 2011, CAD status post CABG and AVR 2005 with subsequent PCI, hypertension, hyperlipidemia, COPD, CKD, GI bleed requiring transfusion.  Pacemaker reached POLLO approximately 2 months ago, historically ventricular paced <10%..  Recent echocardiogram shows stable LVEF 50-55%.  She continues on metoprolol succinate 25 mg daily for chronic atrial fibrillation.  Ventricular response appears well-controlled.    She has been feeling generally well. She has occasional dizziness, occasional sensation of heart racing.  She goes to the gym about 3 times a week.  She has back pain most prominent in the evenings and at rest, at times radiating to her left arm relieved with nitroglycerin.  No better or worse as of late.  She denies chest discomfort, palpitations, peripheral edema, shortness of breath, paroxysmal nocturnal dyspnea, orthopnea, pre-syncope, or syncope.     Data Review     TTE/[place]: 9/25/2023  Left ventricular function is decreased. The ejection fraction is 50-55%  (borderline).  There is a bioprosthetic aortic valve.  The gradient is normal for this prosthetic aortic valve.  Normal right ventricle size and systolic function.  The left atrium is mild to moderately dilated.    DEVICE: MDGUSTAVO Pedraza 2011 (SND)  POLLO as of 11/2/2023  VVI 50, chronic AF  Historically <10% ventricular pacing  Controlled ventricular response    I have reviewed and updated the patient's past medical history, allergies and medication list.          25 minutes spent reviewing prior records (including documentation, laboratory studies, cardiac testing/imaging), history and physical exam, planning, and subsequent documentation.        Physical Examination Review of Systems   BP (!) 160/73 (BP Location: Left arm, Patient Position: Sitting,  Cuff Size: Adult Small)   Pulse 65   Resp 18   Wt 48.5 kg (107 lb)   LMP  (LMP Unknown)   SpO2 100%   BMI 20.22 kg/m      Body mass index is 20.22 kg/m .    Wt Readings from Last 3 Encounters:   24 48.5 kg (107 lb)   09/15/23 50.2 kg (110 lb 9.6 oz)   23 49.9 kg (109 lb 14.4 oz)       General   Appearance:   Alert and oriented, in no acute distress.    HEENT:  Normocephalic and atraumatic. Conjunctiva and sclera are clear. Moist oral mucosa.    Neck: No JVP, carotid bruit or obvious thyromegaly.   Lungs:   Respirations unlabored. Clear bilaterally with no rales, rhonchi, or wheezes.     Cardiovascular:   Rhythm is regular. S1 and S2 are normal. No significant murmur is present. Lower extremities demonstrate no significant edema. Posterior tibial pulses are intact bilaterally.   Extremities: No cyanosis or clubbing   Skin: Skin is warm, dry, and otherwise intact.   Neurologic: Gait not assessed. Mood and affect appropriate.                                                Medical History  Surgical History Family History Social History   Past Medical History:   Diagnosis Date    Anemia     iron deficiency    Chronic atrial fibrillation (H)     GERD (gastroesophageal reflux disease)     Hypertension     IHD (ischemic heart disease)     PUD (peptic ulcer disease)     Billroth 1    Recurrent cystitis     SSS (sick sinus syndrome) (H)     post pacemaker placement    Valvular heart disease     Vasculitis (H24)     Past Surgical History:   Procedure Laterality Date    ASCENDING AORTIC ANEURYSM REPAIR W/ TISSUE AORTIC VALVE REPLACEMENT  2005    Dacron graft, Forte pericardial Magna 21mm aortic valve     SECTION      COLONOSCOPY N/A 10/31/2021    Procedure: COLONOSCOPY;  Surgeon: Dario Thomas MD;  Location: [facility] Main OR    CV CORONARY ANGIOGRAM N/A 2021    Procedure: Coronary Angiogram;  Surgeon: Shane Mcclendon MD;  Location: [facility] CATH LAB CV    CV LEFT HEART CATH N/A  2021    Procedure: Left Heart Cath;  Surgeon: Shane Mcclendon MD;  Location: [facility] LAB CV    CV PCI N/A 2021    Procedure: Percutaneous Coronary Intervention;  Surgeon: Shane Mcclendon MD;  Location: [facility] LAB CV    ESOPHAGOSCOPY, GASTROSCOPY, DUODENOSCOPY (EGD), COMBINED N/A 10/30/2021    Procedure: ESOPHAGOGASTRODUODENOSCOPY (EGD);  Surgeon: Dario Thomas MD;  Location: [facility] Main OR    GALLBLADDER SURGERY      partial colon removal    HYSTERECTOMY      IR AORTIC ARCH 4 VESSEL ANGIOGRAM  9/15/2011    IR MISCELLANEOUS PROCEDURE  2005    OTHER SURGICAL HISTORY      antrectomy    PARTIAL GASTRECTOMY      VA PERC CLOS,BART INTERATRIAL COMMUN W/IMPL      Description: Patent Foramen Ovale Closure Percutaneous;  Recorded: 2012;    ZZC CABG, VEIN, SINGLE      Description: CABG (CABG);  Recorded: 2012;  Comments: LIMA^LAD, SVG^1st diag    ZZC REPLACE AORT VALV,PROSTH VALV      Aortic Valve Replacement with Forte pericardial Magna 21mm 2005    Family History   Problem Relation Age of Onset    Liver Disease Father     No Known Problems Mother     Thyroid Disease Sister     LUNG DISEASE Sister     Social History     Tobacco Use    Smoking status: Former     Packs/day: 1.50     Years: 20.00     Additional pack years: 0.00     Total pack years: 30.00     Types: Cigarettes     Quit date: 10/29/1979     Years since quittin.2    Smokeless tobacco: Never   Vaping Use    Vaping Use: Never used   Substance Use Topics    Alcohol use: Yes     Comment: 1-2 week    Drug use: No          Medications  Allergies   Current Outpatient Medications   Medication Sig Dispense Refill    acetaminophen (TYLENOL) 500 MG tablet Take 1,000 mg by mouth every 6 hours as needed for mild pain      atorvastatin (LIPITOR) 40 MG tablet Take 1 tablet (40 mg) by mouth daily 90 tablet 3    cholecalciferol, vitamin D3, (VITAMIN D3) 5,000 unit Tab [CHOLECALCIFEROL, VITAMIN D3, (VITAMIN D3)  "5,000 UNIT TAB] Take 1 tablet by mouth daily.      cyanocobalamin (CYANOCOBALAMIN) 1000 MCG/ML injection INJECT 1 ML (1,000 MCG) INTO THE SHOULDER, THIGH, OR BUTTOCKS EVERY 30 DAYS. **SYR 27G 1/2\" 1CC** 3 mL 3    ELIQUIS ANTICOAGULANT 2.5 MG tablet TAKE 1 TABLET(2.5 MG) BY MOUTH TWICE DAILY 60 tablet 3    gabapentin (NEURONTIN) 300 MG capsule TAKE 1 CAPSULE (300 MG TOTAL) BY MOUTH 2 TIMES A DAY. 180 capsule 3    levofloxacin (LEVAQUIN) 250 MG tablet Take 1 tablet (250 mg) by mouth daily 10 tablet 3    metoprolol succinate ER (TOPROL XL) 25 MG 24 hr tablet Take 1 tablet (25 mg) by mouth daily 90 tablet 3    nitroGLYcerin (NITROSTAT) 0.4 MG sublingual tablet For chest pain place 1 tablet under the tongue every 5 minutes for 3 doses. If symptoms persist 5 minutes after 1st dose call 911. 30 tablet 4    sucralfate (CARAFATE) 1 GM tablet Take 1 tablet (1 g) by mouth 4 times daily (Patient taking differently: Take 1 g by mouth 4 times daily as needed) 360 tablet 3    Vitamin D, Ergocalciferol, 49327 units CAPS TAKE 1 CAPSULE (50,000 UNITS) BY MOUTH ONCE A WEEK. 12 capsule 1    Allergies   Allergen Reactions    Aspirin Nausea and Vomiting     Vomiting. Pt will NOT take Aspirin.     Erythromycin Base [Erythromycin] Unknown     Pt is not sure she is allergic to this    Sulfatolamide     Vancomycin Other (See Comments)     Red man syndrome    Xanax [Alprazolam] Other (See Comments)     confusion    Sulfa (Sulfonamide Antibiotics) [Sulfa Antibiotics] Rash         Lab Results    Chemistry/lipid CBC Cardiac Enzymes/BNP/TSH/INR   Lab Results   Component Value Date    BUN 16.5 09/15/2023     09/15/2023    CO2 28 09/15/2023     No results found for: \"CREATININE\"    Lab Results   Component Value Date    CHOL 141 12/12/2022    HDL 70 12/12/2022    LDL 38 12/12/2022      Lab Results   Component Value Date    WBC 8.9 09/15/2023    HGB 10.9 (L) 09/15/2023    HCT 34.9 (L) 09/15/2023    MCV 87 09/15/2023     09/15/2023    Lab " Results   Component Value Date     (H) 03/30/2021    TSH 3.77 07/27/2022    INR 2.2 (H) 04/05/2022        This note has been dictated using voice recognition software. Any grammatical, typographical, or context distortions are unintentional and inherent to the software.    Caitlin Cabrera CNP  Clinical Cardiac Electrophysiology  [facility]  [address]  [place] [address]   Office: [phone]  Fax: [phone]        Thank you for allowing me to participate in the care of your patient.      Sincerely,     ALMAS SQUIRES CNP     [facility]  cc:   No referring provider defined for this encounter.

## 2024-01-14 DIAGNOSIS — E55.9 VITAMIN D INSUFFICIENCY: ICD-10-CM

## 2024-01-15 ENCOUNTER — HOSPITAL ENCOUNTER (OUTPATIENT)
Facility: HOSPITAL | Age: 89
Discharge: HOME | End: 2024-01-15
Attending: INTERNAL MEDICINE | Admitting: INTERNAL MEDICINE
Payer: COMMERCIAL

## 2024-01-15 VITALS
RESPIRATION RATE: 18 BRPM | OXYGEN SATURATION: 95 % | SYSTOLIC BLOOD PRESSURE: 145 MMHG | HEART RATE: 93 BPM | TEMPERATURE: 97.5 F | HEIGHT: 61 IN | WEIGHT: 107 LBS | BODY MASS INDEX: 20.2 KG/M2 | DIASTOLIC BLOOD PRESSURE: 65 MMHG

## 2024-01-15 DIAGNOSIS — I49.5 SICK SINUS SYNDROME (H): ICD-10-CM

## 2024-01-15 LAB
ANION GAP SERPL CALCULATED.3IONS-SCNC: 8 MMOL/L (ref 7–15)
BUN SERPL-MCNC: 18.8 MG/DL (ref 8–23)
CALCIUM SERPL-MCNC: 9.4 MG/DL (ref 8.8–10.2)
CHLORIDE SERPL-SCNC: 100 MMOL/L (ref 98–107)
CREAT SERPL-MCNC: 0.88 MG/DL (ref 0.51–0.95)
DEPRECATED HCO3 PLAS-SCNC: 29 MMOL/L (ref 22–29)
EGFRCR SERPLBLD CKD-EPI 2021: 64 ML/MIN/1.73M2
ERYTHROCYTE [DISTWIDTH] IN BLOOD BY AUTOMATED COUNT: 18.5 % (ref 10–15)
GLUCOSE SERPL-MCNC: 101 MG/DL (ref 70–99)
HCT VFR BLD AUTO: 34.9 % (ref 35–47)
HGB BLD-MCNC: 10.9 G/DL (ref 11.7–15.7)
MCH RBC QN AUTO: 26.8 PG (ref 26.5–33)
MCHC RBC AUTO-ENTMCNC: 31.2 G/DL (ref 31.5–36.5)
MCV RBC AUTO: 86 FL (ref 78–100)
PLATELET # BLD AUTO: 204 10E3/UL (ref 150–450)
POTASSIUM SERPL-SCNC: 3.9 MMOL/L (ref 3.4–5.3)
RBC # BLD AUTO: 4.07 10E6/UL (ref 3.8–5.2)
SODIUM SERPL-SCNC: 137 MMOL/L (ref 135–145)
WBC # BLD AUTO: 8.3 10E3/UL (ref 4–11)

## 2024-01-15 PROCEDURE — 85027 COMPLETE CBC AUTOMATED: CPT | Performed by: INTERNAL MEDICINE

## 2024-01-15 PROCEDURE — 33228 REMV&REPLC PM GEN DUAL LEAD: CPT | Performed by: INTERNAL MEDICINE

## 2024-01-15 PROCEDURE — 80048 BASIC METABOLIC PNL TOTAL CA: CPT | Performed by: INTERNAL MEDICINE

## 2024-01-15 PROCEDURE — 99152 MOD SED SAME PHYS/QHP 5/>YRS: CPT | Performed by: INTERNAL MEDICINE

## 2024-01-15 PROCEDURE — 36415 COLL VENOUS BLD VENIPUNCTURE: CPT | Performed by: INTERNAL MEDICINE

## 2024-01-15 PROCEDURE — 33263 RMVL & RPLCMT DFB GEN 2 LEAD: CPT | Performed by: INTERNAL MEDICINE

## 2024-01-15 PROCEDURE — 258N000003 HC RX IP 258 OP 636: Performed by: INTERNAL MEDICINE

## 2024-01-15 PROCEDURE — 99153 MOD SED SAME PHYS/QHP EA: CPT | Performed by: INTERNAL MEDICINE

## 2024-01-15 PROCEDURE — 250N000009 HC RX 250: Performed by: INTERNAL MEDICINE

## 2024-01-15 PROCEDURE — 272N000001 HC OR GENERAL SUPPLY STERILE: Performed by: INTERNAL MEDICINE

## 2024-01-15 PROCEDURE — C1785 PMKR, DUAL, RATE-RESP: HCPCS | Performed by: INTERNAL MEDICINE

## 2024-01-15 PROCEDURE — 250N000011 HC RX IP 250 OP 636: Performed by: INTERNAL MEDICINE

## 2024-01-15 DEVICE — PACEMAKER AZURE MRI XT DR: Type: IMPLANTABLE DEVICE | Site: CHEST  WALL | Status: FUNCTIONAL

## 2024-01-15 RX ORDER — LIDOCAINE 40 MG/G
CREAM TOPICAL
Status: DISCONTINUED | OUTPATIENT
Start: 2024-01-15 | End: 2024-01-15 | Stop reason: HOSPADM

## 2024-01-15 RX ORDER — ERGOCALCIFEROL 1.25 MG/1
1 CAPSULE ORAL WEEKLY
Qty: 12 CAPSULE | Refills: 11 | Status: SHIPPED | OUTPATIENT
Start: 2024-01-15

## 2024-01-15 RX ORDER — ACETAMINOPHEN 325 MG/1
650 TABLET ORAL EVERY 4 HOURS PRN
Status: DISCONTINUED | OUTPATIENT
Start: 2024-01-15 | End: 2024-01-15 | Stop reason: HOSPADM

## 2024-01-15 RX ORDER — FENTANYL CITRATE 50 UG/ML
25 INJECTION, SOLUTION INTRAMUSCULAR; INTRAVENOUS
Status: DISCONTINUED | OUTPATIENT
Start: 2024-01-15 | End: 2024-01-15 | Stop reason: HOSPADM

## 2024-01-15 RX ORDER — FENTANYL CITRATE 50 UG/ML
INJECTION, SOLUTION INTRAMUSCULAR; INTRAVENOUS
Status: DISCONTINUED | OUTPATIENT
Start: 2024-01-15 | End: 2024-01-15 | Stop reason: HOSPADM

## 2024-01-15 RX ORDER — VANCOMYCIN HYDROCHLORIDE 1 G/200ML
1000 INJECTION, SOLUTION INTRAVENOUS
Status: COMPLETED | OUTPATIENT
Start: 2024-01-15 | End: 2024-01-15

## 2024-01-15 RX ORDER — SODIUM CHLORIDE 9 MG/ML
100 INJECTION, SOLUTION INTRAVENOUS CONTINUOUS
Status: DISCONTINUED | OUTPATIENT
Start: 2024-01-15 | End: 2024-01-15 | Stop reason: HOSPADM

## 2024-01-15 RX ORDER — DEXMEDETOMIDINE HYDROCHLORIDE 4 UG/ML
.1-1.5 INJECTION, SOLUTION INTRAVENOUS CONTINUOUS
Status: DISCONTINUED | OUTPATIENT
Start: 2024-01-15 | End: 2024-01-15 | Stop reason: HOSPADM

## 2024-01-15 RX ADMIN — VANCOMYCIN HYDROCHLORIDE 1000 MG: 1 INJECTION, SOLUTION INTRAVENOUS at 07:46

## 2024-01-15 RX ADMIN — SODIUM CHLORIDE 100 ML/HR: 9 INJECTION, SOLUTION INTRAVENOUS at 07:52

## 2024-01-15 ASSESSMENT — ACTIVITIES OF DAILY LIVING (ADL)
ADLS_ACUITY_SCORE: 35

## 2024-01-15 NOTE — DISCHARGE INSTRUCTIONS
[facility]  Cardiac Electrophysiology  [address]  [place] [address]   Office: [phone]  Fax: [phone]       Cardiac Electrophysiology - Post Pacemaker or Defibrillator Generator Replacement Discharge Instructions      PROCEDURE   Pacemaker generator replacement         MEDICATION INSTRUCTIONS   Continue taking your prior to procedure medications.         WOUND CARE   Leave the large overlying dressing in place until 2 days after discharge - this dressing can thereafter be removed by gently pulling off.  Underneath the large dressing will be steri-strips. DO NOT REMOVE these strips; please leave these in place until you are seen in clinic.  DO NOT COVER the site with any bandages or dressings. The site should be kept dry for 7 days - please avoid traditional showers or baths during this time.  Keep the site clean and dry.  No swimming, sauna, or hot tub use until incision is completely healed.         ACTIVITY   It takes approximately 1-2 weeks for your incision to heal. During this time use extra precautions.  Avoid the following:  Raising your arm over your head or stretching behind your back (no hyperflexion)  Pushing or pulling (mowing the lawn, vacuuming)  Lifting anything heavier than 10 pounds or a gallon of milk  Sudden or extreme motions  Be physically active every day.  Moving your arm is important       REMOTE MONITORING   You will receive a remote transmission station to monitor your device from home  Please set the transmitter up as soon as you get home from the hospital.  Directions are included in the box, and you may call our office or the company technical support line if you need assistance connecting your transmitter.  Please send a transmission the day after you go home  Automated transmissions will be sent every 3 months or sooner if device issues are detected.  You may manually send in transmissions if you are having arrhythmia symptoms or  think there may be a problem with your device.  Please call our office at [phone] if you send in a transmission off-schedule         WHAT TO WATCH OUT FOR   Contact our office or seek emergency care for any of the following:  Drainage, bleeding or oozing from the site  Redness, increased swelling, or warmth around the device site  Pain not treated with prescribed pain medication  Temperature greater than 100.4F  Chest pain, shortness of breath, dizziness/fainting         FOLLOW-UP   Our office will coordinate a follow-up visit visit in clinic for a device interrogation and an incision check 7-14 days after your procedure.   Please contact us at [phone] with any issues during your recovery.

## 2024-01-15 NOTE — PRE-PROCEDURE
GENERAL PRE-PROCEDURE:   Procedure:  PPM gen change  Date/Time:  1/15/2024 10:02 AM    Risks and benefits: Risks, benefits and alternatives were discussed    Patient states understanding of procedure being performed: Yes    Patient's understanding of procedure matches consent: Yes    Procedure consent matches procedure scheduled: Yes    Appropriately NPO:  Yes  ASA Class:  3 (PPM at PLOLO, CAD s/p CABG/AVR, s/p PCI, hx GIB, HTN, CKD)  Mallampati  :  Grade 2- soft palate, base of uvula, tonsillar pillars, and portion of posterior pharyngeal wall visible  Lungs:  Lungs clear with good breath sounds bilaterally  Heart:  Normal heart sounds and rate  History & Physical reviewed:  History and physical reviewed and updates made (see comment)  H&P Comments:  Clinically Significant Risk Factors Present on Admission    Cardiovascular : CAD s/p CABG and PCI, s/p AVR, HTN,     Fluid & Electrolyte Disorders : Not present on admission    Gastroenterology : Not present on admission    Hematology/Oncology : Not present on admission    Nephrology : CKD, stage 3a    Neurology : Not present on admission    Pulmonology : COPD    Systemic : Not present on admission    [unfilled]    Statement of review:  I have reviewed the lab findings, diagnostic data, medications, and the plan for sedation

## 2024-01-16 DIAGNOSIS — I48.21 PERMANENT ATRIAL FIBRILLATION (H): ICD-10-CM

## 2024-01-16 DIAGNOSIS — I49.5 SINOATRIAL NODE DYSFUNCTION (H): Primary | ICD-10-CM

## 2024-01-16 DIAGNOSIS — Z95.0 CARDIAC PACEMAKER IN SITU: ICD-10-CM

## 2024-01-22 ENCOUNTER — ANCILLARY PROCEDURE (OUTPATIENT)
Dept: CARDIOLOGY | Facility: CLINIC | Age: 89
End: 2024-01-22
Attending: INTERNAL MEDICINE
Payer: COMMERCIAL

## 2024-01-22 DIAGNOSIS — I49.5 SINOATRIAL NODE DYSFUNCTION (H): ICD-10-CM

## 2024-01-22 DIAGNOSIS — I48.21 PERMANENT ATRIAL FIBRILLATION (H): ICD-10-CM

## 2024-01-22 DIAGNOSIS — Z95.0 PACEMAKER: ICD-10-CM

## 2024-01-22 DIAGNOSIS — Z95.0 CARDIAC PACEMAKER IN SITU: Primary | ICD-10-CM

## 2024-01-22 LAB
MDC_IDC_LEAD_CONNECTION_STATUS: NORMAL
MDC_IDC_LEAD_CONNECTION_STATUS: NORMAL
MDC_IDC_LEAD_IMPLANT_DT: NORMAL
MDC_IDC_LEAD_IMPLANT_DT: NORMAL
MDC_IDC_LEAD_LOCATION: NORMAL
MDC_IDC_LEAD_LOCATION: NORMAL
MDC_IDC_LEAD_MFG: NORMAL
MDC_IDC_LEAD_MFG: NORMAL
MDC_IDC_LEAD_MODEL: NORMAL
MDC_IDC_LEAD_MODEL: NORMAL
MDC_IDC_LEAD_POLARITY_TYPE: NORMAL
MDC_IDC_LEAD_POLARITY_TYPE: NORMAL
MDC_IDC_LEAD_SERIAL: NORMAL
MDC_IDC_LEAD_SERIAL: NORMAL
MDC_IDC_MSMT_BATTERY_DTM: NORMAL
MDC_IDC_MSMT_BATTERY_REMAINING_LONGEVITY: 183 MO
MDC_IDC_MSMT_BATTERY_RRT_TRIGGER: 2.62
MDC_IDC_MSMT_BATTERY_STATUS: NORMAL
MDC_IDC_MSMT_BATTERY_VOLTAGE: 3.23 V
MDC_IDC_MSMT_LEADCHNL_RA_IMPEDANCE_VALUE: 342 OHM
MDC_IDC_MSMT_LEADCHNL_RA_IMPEDANCE_VALUE: 418 OHM
MDC_IDC_MSMT_LEADCHNL_RV_IMPEDANCE_VALUE: 323 OHM
MDC_IDC_MSMT_LEADCHNL_RV_IMPEDANCE_VALUE: 418 OHM
MDC_IDC_MSMT_LEADCHNL_RV_PACING_THRESHOLD_AMPLITUDE: 0.62 V
MDC_IDC_MSMT_LEADCHNL_RV_PACING_THRESHOLD_PULSEWIDTH: 0.4 MS
MDC_IDC_MSMT_LEADCHNL_RV_SENSING_INTR_AMPL: 6.38 MV
MDC_IDC_MSMT_LEADCHNL_RV_SENSING_INTR_AMPL: 7.75 MV
MDC_IDC_PG_IMPLANT_DTM: NORMAL
MDC_IDC_PG_MFG: NORMAL
MDC_IDC_PG_MODEL: NORMAL
MDC_IDC_PG_SERIAL: NORMAL
MDC_IDC_PG_TYPE: NORMAL
MDC_IDC_SESS_CLINIC_NAME: NORMAL
MDC_IDC_SESS_DTM: NORMAL
MDC_IDC_SESS_TYPE: NORMAL
MDC_IDC_SET_BRADY_HYSTRATE: NORMAL
MDC_IDC_SET_BRADY_LOWRATE: 50 {BEATS}/MIN
MDC_IDC_SET_BRADY_MODE: NORMAL
MDC_IDC_SET_LEADCHNL_RA_SENSING_ANODE_ELECTRODE_1: NORMAL
MDC_IDC_SET_LEADCHNL_RA_SENSING_ANODE_LOCATION_1: NORMAL
MDC_IDC_SET_LEADCHNL_RA_SENSING_CATHODE_ELECTRODE_1: NORMAL
MDC_IDC_SET_LEADCHNL_RA_SENSING_CATHODE_LOCATION_1: NORMAL
MDC_IDC_SET_LEADCHNL_RA_SENSING_POLARITY: NORMAL
MDC_IDC_SET_LEADCHNL_RA_SENSING_SENSITIVITY: NORMAL
MDC_IDC_SET_LEADCHNL_RV_PACING_AMPLITUDE: 1 V
MDC_IDC_SET_LEADCHNL_RV_PACING_ANODE_ELECTRODE_1: NORMAL
MDC_IDC_SET_LEADCHNL_RV_PACING_ANODE_LOCATION_1: NORMAL
MDC_IDC_SET_LEADCHNL_RV_PACING_CAPTURE_MODE: NORMAL
MDC_IDC_SET_LEADCHNL_RV_PACING_CATHODE_ELECTRODE_1: NORMAL
MDC_IDC_SET_LEADCHNL_RV_PACING_CATHODE_LOCATION_1: NORMAL
MDC_IDC_SET_LEADCHNL_RV_PACING_POLARITY: NORMAL
MDC_IDC_SET_LEADCHNL_RV_PACING_PULSEWIDTH: 0.4 MS
MDC_IDC_SET_LEADCHNL_RV_SENSING_ANODE_ELECTRODE_1: NORMAL
MDC_IDC_SET_LEADCHNL_RV_SENSING_ANODE_LOCATION_1: NORMAL
MDC_IDC_SET_LEADCHNL_RV_SENSING_CATHODE_ELECTRODE_1: NORMAL
MDC_IDC_SET_LEADCHNL_RV_SENSING_CATHODE_LOCATION_1: NORMAL
MDC_IDC_SET_LEADCHNL_RV_SENSING_POLARITY: NORMAL
MDC_IDC_SET_LEADCHNL_RV_SENSING_SENSITIVITY: 0.9 MV
MDC_IDC_SET_ZONE_DETECTION_INTERVAL: 400 MS
MDC_IDC_SET_ZONE_STATUS: NORMAL
MDC_IDC_SET_ZONE_TYPE: NORMAL
MDC_IDC_SET_ZONE_VENDOR_TYPE: NORMAL
MDC_IDC_STAT_BRADY_AP_VP_PERCENT: 0 %
MDC_IDC_STAT_BRADY_AP_VS_PERCENT: 0 %
MDC_IDC_STAT_BRADY_AS_VP_PERCENT: 13.89 %
MDC_IDC_STAT_BRADY_AS_VS_PERCENT: 86.11 %
MDC_IDC_STAT_BRADY_DTM_END: NORMAL
MDC_IDC_STAT_BRADY_DTM_START: NORMAL
MDC_IDC_STAT_BRADY_RA_PERCENT_PACED: 0 %
MDC_IDC_STAT_BRADY_RV_PERCENT_PACED: 13.89 %
MDC_IDC_STAT_EPISODE_RECENT_COUNT: 0
MDC_IDC_STAT_EPISODE_RECENT_COUNT_DTM_END: NORMAL
MDC_IDC_STAT_EPISODE_RECENT_COUNT_DTM_START: NORMAL
MDC_IDC_STAT_EPISODE_TOTAL_COUNT: 0
MDC_IDC_STAT_EPISODE_TOTAL_COUNT_DTM_END: NORMAL
MDC_IDC_STAT_EPISODE_TOTAL_COUNT_DTM_START: NORMAL
MDC_IDC_STAT_EPISODE_TYPE: NORMAL
MDC_IDC_STAT_TACHYTHERAPY_RECENT_DTM_END: NORMAL
MDC_IDC_STAT_TACHYTHERAPY_RECENT_DTM_START: NORMAL
MDC_IDC_STAT_TACHYTHERAPY_TOTAL_DTM_END: NORMAL
MDC_IDC_STAT_TACHYTHERAPY_TOTAL_DTM_START: NORMAL

## 2024-01-22 PROCEDURE — 93279 PRGRMG DEV EVAL PM/LDLS PM: CPT | Performed by: INTERNAL MEDICINE

## 2024-01-28 DIAGNOSIS — R00.0 TACHYCARDIA: ICD-10-CM

## 2024-01-29 RX ORDER — METOPROLOL SUCCINATE 25 MG/1
25 TABLET, EXTENDED RELEASE ORAL DAILY
Qty: 90 TABLET | Refills: 3 | Status: SHIPPED | OUTPATIENT
Start: 2024-01-29

## 2024-03-14 DIAGNOSIS — I48.11 LONGSTANDING PERSISTENT ATRIAL FIBRILLATION (H): ICD-10-CM

## 2024-03-15 RX ORDER — APIXABAN 2.5 MG/1
TABLET, FILM COATED ORAL
Qty: 60 TABLET | Refills: 3 | Status: SHIPPED | OUTPATIENT
Start: 2024-03-15 | End: 2024-07-09

## 2024-04-04 ENCOUNTER — OFFICE VISIT (OUTPATIENT)
Dept: INTERNAL MEDICINE | Facility: CLINIC | Age: 89
End: 2024-04-04
Payer: COMMERCIAL

## 2024-04-04 VITALS
SYSTOLIC BLOOD PRESSURE: 124 MMHG | HEIGHT: 61 IN | DIASTOLIC BLOOD PRESSURE: 72 MMHG | BODY MASS INDEX: 20.2 KG/M2 | TEMPERATURE: 98.1 F | OXYGEN SATURATION: 96 % | WEIGHT: 107 LBS | HEART RATE: 82 BPM | RESPIRATION RATE: 12 BRPM

## 2024-04-04 DIAGNOSIS — J40 BRONCHITIS: ICD-10-CM

## 2024-04-04 DIAGNOSIS — D50.9 IRON DEFICIENCY ANEMIA, UNSPECIFIED IRON DEFICIENCY ANEMIA TYPE: ICD-10-CM

## 2024-04-04 DIAGNOSIS — Z23 NEED FOR SHINGLES VACCINE: Primary | ICD-10-CM

## 2024-04-04 DIAGNOSIS — I25.83 CORONARY ATHEROSCLEROSIS DUE TO LIPID RICH PLAQUE: ICD-10-CM

## 2024-04-04 DIAGNOSIS — I48.21 PERMANENT ATRIAL FIBRILLATION (H): ICD-10-CM

## 2024-04-04 PROBLEM — N18.30 CHRONIC KIDNEY DISEASE, STAGE 3 (H): Status: RESOLVED | Noted: 2021-08-18 | Resolved: 2024-04-04

## 2024-04-04 PROCEDURE — 99214 OFFICE O/P EST MOD 30 MIN: CPT | Performed by: INTERNAL MEDICINE

## 2024-04-04 RX ORDER — LEVOFLOXACIN 250 MG/1
250 TABLET, FILM COATED ORAL DAILY
Qty: 10 TABLET | Refills: 3 | Status: SHIPPED | OUTPATIENT
Start: 2024-04-04

## 2024-04-04 RX ORDER — RESPIRATORY SYNCYTIAL VISUS VACCINE RECOMBINANT, ADJUVANTED 120MCG/0.5
KIT INTRAMUSCULAR
COMMUNITY
Start: 2023-09-28 | End: 2024-09-06

## 2024-04-04 RX ORDER — NEEDLES, SAFETY 22GX1 1/2"
NEEDLE, DISPOSABLE MISCELLANEOUS SEE ADMIN INSTRUCTIONS
COMMUNITY
Start: 2024-01-05

## 2024-04-04 NOTE — PROGRESS NOTES
Assessment & Plan     Need for shingles vaccine      Coronary atherosclerosis due to lipid rich plaque  Stents in place   LDL Cholesterol Calculated   Date Value Ref Range Status   12/12/2022 38 <=100 mg/dL Final     LDL Cholesterol Direct   Date Value Ref Range Status   03/30/2021 54 <=129 mg/dl Final     On statin     Iron deficiency anemia, unspecified iron deficiency anemia type  Stable hbg at 10 . She feels good  no ongoing melena  she did get blood transfusions . Dost tolerate oral iron. Continue to monitor . On lower dose eliquis due to age an weight     Bronchitis  On intermittent levofloxacin   - levofloxacin (LEVAQUIN) 250 MG tablet; Take 1 tablet (250 mg) by mouth daily    Permanent atrial fibrillation (H)  Rate controlled     glomerular filtration rate  GFR Estimate   Date Value Ref Range Status   01/15/2024 64 >60 mL/min/1.73m2 Final   04/29/2021 57 (L) >60 mL/min/1.73m2 Final     GFR Estimate If Black   Date Value Ref Range Status   04/29/2021 >60 >60 mL/min/1.73m2 Final     Creatinine   Date Value Ref Range Status   01/15/2024 0.88 0.51 - 0.95 mg/dL Final     Microlabumin normal    No real evidence of early kidney disease . Reassured               Overall doing well   Declines RX for osteoporosis   Declines booster shots     Lolis Apodaca is a 88 year old, presenting for the following health issues:  H/o a fib , had GI bleed with anemia 6 months ago   Pacemaker in place  doing well   No concerns   Follow Up (6 months follow up )      4/4/2024    11:27 AM   Additional Questions   Roomed by NEERAJ Cardoza   Accompanied by alone     History of Present Illness       Reason for visit:  6 months follow up    She eats 2-3 servings of fruits and vegetables daily.She consumes 0 sweetened beverage(s) daily.She exercises with enough effort to increase her heart rate 10 to 19 minutes per day.  She exercises with enough effort to increase her heart rate 4 days per week.   She is taking medications regularly.    "    Current Outpatient Medications   Medication Sig Dispense Refill    AREXVY injection       B-D TB SYRINGE 27G X 1/2\" 1 ML MISC See Admin Instructions      levofloxacin (LEVAQUIN) 250 MG tablet Take 1 tablet (250 mg) by mouth daily 10 tablet 3    acetaminophen (TYLENOL) 500 MG tablet Take 1,000 mg by mouth every 6 hours as needed for mild pain      atorvastatin (LIPITOR) 40 MG tablet Take 1 tablet (40 mg) by mouth daily 90 tablet 3    cholecalciferol, vitamin D3, (VITAMIN D3) 5,000 unit Tab [CHOLECALCIFEROL, VITAMIN D3, (VITAMIN D3) 5,000 UNIT TAB] Take 1 tablet by mouth daily.      cyanocobalamin (CYANOCOBALAMIN) 1000 MCG/ML injection INJECT 1 ML (1,000 MCG) INTO THE SHOULDER, THIGH, OR BUTTOCKS EVERY 30 DAYS. **SYR 27G 1/2\" 1CC** 3 mL 3    ELIQUIS ANTICOAGULANT 2.5 MG tablet TAKE 1 TABLET(2.5 MG) BY MOUTH TWICE DAILY 60 tablet 3    gabapentin (NEURONTIN) 300 MG capsule TAKE 1 CAPSULE (300 MG TOTAL) BY MOUTH 2 TIMES A DAY. 180 capsule 3    metoprolol succinate ER (TOPROL XL) 25 MG 24 hr tablet TAKE 1 TABLET (25 MG) BY MOUTH DAILY 90 tablet 3    nitroGLYcerin (NITROSTAT) 0.4 MG sublingual tablet For chest pain place 1 tablet under the tongue every 5 minutes for 3 doses. If symptoms persist 5 minutes after 1st dose call 911. 30 tablet 4    sucralfate (CARAFATE) 1 GM tablet Take 1 tablet (1 g) by mouth 4 times daily (Patient taking differently: Take 1 g by mouth 4 times daily as needed) 360 tablet 3    Vitamin D, Ergocalciferol, 91054 units CAPS TAKE 1 CAPSULE (50,000 UNITS) BY MOUTH ONCE A WEEK. 12 capsule 11     No current facility-administered medications for this visit.                   Objective    /72 (BP Location: Left arm, Patient Position: Sitting, Cuff Size: Adult Regular)   Pulse 82   Temp 98.1  F (36.7  C) (Tympanic)   Resp 12   Ht 1.549 m (5' 1\")   Wt 48.5 kg (107 lb)   LMP  (LMP Unknown)   SpO2 96%   BMI 20.22 kg/m    Body mass index is 20.22 kg/m .  Physical Exam   GENERAL: alert and no " distress  NECK: no adenopathy, no asymmetry, masses, or scars  RESP: lungs clear to auscultation - no rales, rhonchi or wheezes  CV: irregularly irregular rhythm, normal S1 S2, no S3 or S4, no murmur, click or rub, peripheral pulses strong, and no peripheral edema  MS: no gross musculoskeletal defects noted, no edema            Signed Electronically by: Yun Jacinto MD

## 2024-04-08 ENCOUNTER — TRANSFERRED RECORDS (OUTPATIENT)
Dept: HEALTH INFORMATION MANAGEMENT | Facility: CLINIC | Age: 89
End: 2024-04-08
Payer: COMMERCIAL

## 2024-04-23 ENCOUNTER — ANCILLARY PROCEDURE (OUTPATIENT)
Dept: CARDIOLOGY | Facility: CLINIC | Age: 89
End: 2024-04-23
Attending: INTERNAL MEDICINE
Payer: COMMERCIAL

## 2024-04-23 DIAGNOSIS — Z95.0 CARDIAC PACEMAKER IN SITU: ICD-10-CM

## 2024-04-23 DIAGNOSIS — I49.5 SINOATRIAL NODE DYSFUNCTION (H): ICD-10-CM

## 2024-04-23 DIAGNOSIS — I48.21 PERMANENT ATRIAL FIBRILLATION (H): ICD-10-CM

## 2024-04-23 LAB
MDC_IDC_LEAD_CONNECTION_STATUS: NORMAL
MDC_IDC_LEAD_CONNECTION_STATUS: NORMAL
MDC_IDC_LEAD_IMPLANT_DT: NORMAL
MDC_IDC_LEAD_IMPLANT_DT: NORMAL
MDC_IDC_LEAD_LOCATION: NORMAL
MDC_IDC_LEAD_LOCATION: NORMAL
MDC_IDC_LEAD_MFG: NORMAL
MDC_IDC_LEAD_MFG: NORMAL
MDC_IDC_LEAD_MODEL: NORMAL
MDC_IDC_LEAD_MODEL: NORMAL
MDC_IDC_LEAD_POLARITY_TYPE: NORMAL
MDC_IDC_LEAD_POLARITY_TYPE: NORMAL
MDC_IDC_LEAD_SERIAL: NORMAL
MDC_IDC_LEAD_SERIAL: NORMAL
MDC_IDC_MSMT_BATTERY_DTM: NORMAL
MDC_IDC_MSMT_BATTERY_REMAINING_LONGEVITY: 181 MO
MDC_IDC_MSMT_BATTERY_RRT_TRIGGER: 2.62
MDC_IDC_MSMT_BATTERY_STATUS: NORMAL
MDC_IDC_MSMT_BATTERY_VOLTAGE: 3.21 V
MDC_IDC_MSMT_LEADCHNL_RA_IMPEDANCE_VALUE: 342 OHM
MDC_IDC_MSMT_LEADCHNL_RA_IMPEDANCE_VALUE: 418 OHM
MDC_IDC_MSMT_LEADCHNL_RV_IMPEDANCE_VALUE: 304 OHM
MDC_IDC_MSMT_LEADCHNL_RV_IMPEDANCE_VALUE: 380 OHM
MDC_IDC_MSMT_LEADCHNL_RV_PACING_THRESHOLD_AMPLITUDE: 0.62 V
MDC_IDC_MSMT_LEADCHNL_RV_PACING_THRESHOLD_PULSEWIDTH: 0.4 MS
MDC_IDC_MSMT_LEADCHNL_RV_SENSING_INTR_AMPL: 5.88 MV
MDC_IDC_MSMT_LEADCHNL_RV_SENSING_INTR_AMPL: 5.88 MV
MDC_IDC_PG_IMPLANT_DTM: NORMAL
MDC_IDC_PG_MFG: NORMAL
MDC_IDC_PG_MODEL: NORMAL
MDC_IDC_PG_SERIAL: NORMAL
MDC_IDC_PG_TYPE: NORMAL
MDC_IDC_SESS_CLINIC_NAME: NORMAL
MDC_IDC_SESS_DTM: NORMAL
MDC_IDC_SESS_TYPE: NORMAL
MDC_IDC_SET_BRADY_HYSTRATE: NORMAL
MDC_IDC_SET_BRADY_LOWRATE: 50 {BEATS}/MIN
MDC_IDC_SET_BRADY_MODE: NORMAL
MDC_IDC_SET_LEADCHNL_RA_SENSING_ANODE_ELECTRODE_1: NORMAL
MDC_IDC_SET_LEADCHNL_RA_SENSING_ANODE_LOCATION_1: NORMAL
MDC_IDC_SET_LEADCHNL_RA_SENSING_CATHODE_ELECTRODE_1: NORMAL
MDC_IDC_SET_LEADCHNL_RA_SENSING_CATHODE_LOCATION_1: NORMAL
MDC_IDC_SET_LEADCHNL_RA_SENSING_POLARITY: NORMAL
MDC_IDC_SET_LEADCHNL_RA_SENSING_SENSITIVITY: NORMAL
MDC_IDC_SET_LEADCHNL_RV_PACING_AMPLITUDE: 1 V
MDC_IDC_SET_LEADCHNL_RV_PACING_ANODE_ELECTRODE_1: NORMAL
MDC_IDC_SET_LEADCHNL_RV_PACING_ANODE_LOCATION_1: NORMAL
MDC_IDC_SET_LEADCHNL_RV_PACING_CAPTURE_MODE: NORMAL
MDC_IDC_SET_LEADCHNL_RV_PACING_CATHODE_ELECTRODE_1: NORMAL
MDC_IDC_SET_LEADCHNL_RV_PACING_CATHODE_LOCATION_1: NORMAL
MDC_IDC_SET_LEADCHNL_RV_PACING_POLARITY: NORMAL
MDC_IDC_SET_LEADCHNL_RV_PACING_PULSEWIDTH: 0.4 MS
MDC_IDC_SET_LEADCHNL_RV_SENSING_ANODE_ELECTRODE_1: NORMAL
MDC_IDC_SET_LEADCHNL_RV_SENSING_ANODE_LOCATION_1: NORMAL
MDC_IDC_SET_LEADCHNL_RV_SENSING_CATHODE_ELECTRODE_1: NORMAL
MDC_IDC_SET_LEADCHNL_RV_SENSING_CATHODE_LOCATION_1: NORMAL
MDC_IDC_SET_LEADCHNL_RV_SENSING_POLARITY: NORMAL
MDC_IDC_SET_LEADCHNL_RV_SENSING_SENSITIVITY: 0.9 MV
MDC_IDC_SET_ZONE_DETECTION_INTERVAL: 400 MS
MDC_IDC_SET_ZONE_STATUS: NORMAL
MDC_IDC_SET_ZONE_TYPE: NORMAL
MDC_IDC_SET_ZONE_VENDOR_TYPE: NORMAL
MDC_IDC_STAT_BRADY_AP_VP_PERCENT: 0 %
MDC_IDC_STAT_BRADY_AP_VS_PERCENT: 0 %
MDC_IDC_STAT_BRADY_AS_VP_PERCENT: 13.12 %
MDC_IDC_STAT_BRADY_AS_VS_PERCENT: 86.88 %
MDC_IDC_STAT_BRADY_DTM_END: NORMAL
MDC_IDC_STAT_BRADY_DTM_START: NORMAL
MDC_IDC_STAT_BRADY_RA_PERCENT_PACED: 0 %
MDC_IDC_STAT_BRADY_RV_PERCENT_PACED: 13.12 %
MDC_IDC_STAT_EPISODE_RECENT_COUNT: 0
MDC_IDC_STAT_EPISODE_RECENT_COUNT_DTM_END: NORMAL
MDC_IDC_STAT_EPISODE_RECENT_COUNT_DTM_START: NORMAL
MDC_IDC_STAT_EPISODE_TOTAL_COUNT: 0
MDC_IDC_STAT_EPISODE_TOTAL_COUNT_DTM_END: NORMAL
MDC_IDC_STAT_EPISODE_TOTAL_COUNT_DTM_START: NORMAL
MDC_IDC_STAT_EPISODE_TYPE: NORMAL
MDC_IDC_STAT_TACHYTHERAPY_RECENT_DTM_END: NORMAL
MDC_IDC_STAT_TACHYTHERAPY_RECENT_DTM_START: NORMAL
MDC_IDC_STAT_TACHYTHERAPY_TOTAL_DTM_END: NORMAL
MDC_IDC_STAT_TACHYTHERAPY_TOTAL_DTM_START: NORMAL

## 2024-04-23 PROCEDURE — 93296 REM INTERROG EVL PM/IDS: CPT | Performed by: INTERNAL MEDICINE

## 2024-04-23 PROCEDURE — 93294 REM INTERROG EVL PM/LDLS PM: CPT | Performed by: INTERNAL MEDICINE

## 2024-05-31 ENCOUNTER — TRANSFERRED RECORDS (OUTPATIENT)
Dept: HEALTH INFORMATION MANAGEMENT | Facility: CLINIC | Age: 89
End: 2024-05-31
Payer: COMMERCIAL

## 2024-07-09 DIAGNOSIS — I48.11 LONGSTANDING PERSISTENT ATRIAL FIBRILLATION (H): ICD-10-CM

## 2024-07-09 RX ORDER — APIXABAN 2.5 MG/1
TABLET, FILM COATED ORAL
Qty: 60 TABLET | Refills: 3 | Status: SHIPPED | OUTPATIENT
Start: 2024-07-09

## 2024-07-19 ENCOUNTER — TRANSFERRED RECORDS (OUTPATIENT)
Dept: HEALTH INFORMATION MANAGEMENT | Facility: CLINIC | Age: 89
End: 2024-07-19
Payer: COMMERCIAL

## 2024-08-05 ENCOUNTER — ANCILLARY PROCEDURE (OUTPATIENT)
Dept: CARDIOLOGY | Facility: CLINIC | Age: 89
End: 2024-08-05
Attending: INTERNAL MEDICINE
Payer: COMMERCIAL

## 2024-08-05 DIAGNOSIS — Z95.0 CARDIAC PACEMAKER IN SITU: ICD-10-CM

## 2024-08-05 DIAGNOSIS — I48.21 PERMANENT ATRIAL FIBRILLATION (H): ICD-10-CM

## 2024-08-05 DIAGNOSIS — I49.5 SINOATRIAL NODE DYSFUNCTION (H): ICD-10-CM

## 2024-08-05 LAB
MDC_IDC_EPISODE_DTM: NORMAL
MDC_IDC_EPISODE_DURATION: 0 S
MDC_IDC_EPISODE_DURATION: 1 S
MDC_IDC_EPISODE_DURATION: 2 S
MDC_IDC_EPISODE_DURATION: 2 S
MDC_IDC_EPISODE_DURATION: 3 S
MDC_IDC_EPISODE_DURATION: 63 S
MDC_IDC_EPISODE_ID: 26
MDC_IDC_EPISODE_ID: 52
MDC_IDC_EPISODE_ID: 53
MDC_IDC_EPISODE_ID: 54
MDC_IDC_EPISODE_ID: 55
MDC_IDC_EPISODE_ID: 56
MDC_IDC_EPISODE_ID: 57
MDC_IDC_EPISODE_ID: 58
MDC_IDC_EPISODE_ID: 59
MDC_IDC_EPISODE_ID: 60
MDC_IDC_EPISODE_ID: 61
MDC_IDC_EPISODE_ID: 62
MDC_IDC_EPISODE_ID: 63
MDC_IDC_EPISODE_ID: 64
MDC_IDC_EPISODE_ID: 65
MDC_IDC_EPISODE_ID: 66
MDC_IDC_EPISODE_TYPE: NORMAL
MDC_IDC_LEAD_CONNECTION_STATUS: NORMAL
MDC_IDC_LEAD_CONNECTION_STATUS: NORMAL
MDC_IDC_LEAD_IMPLANT_DT: NORMAL
MDC_IDC_LEAD_IMPLANT_DT: NORMAL
MDC_IDC_LEAD_LOCATION: NORMAL
MDC_IDC_LEAD_LOCATION: NORMAL
MDC_IDC_LEAD_MFG: NORMAL
MDC_IDC_LEAD_MFG: NORMAL
MDC_IDC_LEAD_MODEL: NORMAL
MDC_IDC_LEAD_MODEL: NORMAL
MDC_IDC_LEAD_POLARITY_TYPE: NORMAL
MDC_IDC_LEAD_POLARITY_TYPE: NORMAL
MDC_IDC_LEAD_SERIAL: NORMAL
MDC_IDC_LEAD_SERIAL: NORMAL
MDC_IDC_MSMT_BATTERY_DTM: NORMAL
MDC_IDC_MSMT_BATTERY_REMAINING_LONGEVITY: 177 MO
MDC_IDC_MSMT_BATTERY_RRT_TRIGGER: 2.62
MDC_IDC_MSMT_BATTERY_STATUS: NORMAL
MDC_IDC_MSMT_BATTERY_VOLTAGE: 3.19 V
MDC_IDC_MSMT_LEADCHNL_RA_IMPEDANCE_VALUE: 342 OHM
MDC_IDC_MSMT_LEADCHNL_RA_IMPEDANCE_VALUE: 418 OHM
MDC_IDC_MSMT_LEADCHNL_RV_IMPEDANCE_VALUE: 323 OHM
MDC_IDC_MSMT_LEADCHNL_RV_IMPEDANCE_VALUE: 399 OHM
MDC_IDC_MSMT_LEADCHNL_RV_PACING_THRESHOLD_AMPLITUDE: 0.62 V
MDC_IDC_MSMT_LEADCHNL_RV_PACING_THRESHOLD_PULSEWIDTH: 0.4 MS
MDC_IDC_MSMT_LEADCHNL_RV_SENSING_INTR_AMPL: 6.75 MV
MDC_IDC_MSMT_LEADCHNL_RV_SENSING_INTR_AMPL: 6.75 MV
MDC_IDC_PG_IMPLANT_DTM: NORMAL
MDC_IDC_PG_MFG: NORMAL
MDC_IDC_PG_MODEL: NORMAL
MDC_IDC_PG_SERIAL: NORMAL
MDC_IDC_PG_TYPE: NORMAL
MDC_IDC_SESS_CLINIC_NAME: NORMAL
MDC_IDC_SESS_DTM: NORMAL
MDC_IDC_SESS_TYPE: NORMAL
MDC_IDC_SET_BRADY_HYSTRATE: NORMAL
MDC_IDC_SET_BRADY_LOWRATE: 50 {BEATS}/MIN
MDC_IDC_SET_BRADY_MODE: NORMAL
MDC_IDC_SET_LEADCHNL_RA_SENSING_ANODE_ELECTRODE_1: NORMAL
MDC_IDC_SET_LEADCHNL_RA_SENSING_ANODE_LOCATION_1: NORMAL
MDC_IDC_SET_LEADCHNL_RA_SENSING_CATHODE_ELECTRODE_1: NORMAL
MDC_IDC_SET_LEADCHNL_RA_SENSING_CATHODE_LOCATION_1: NORMAL
MDC_IDC_SET_LEADCHNL_RA_SENSING_POLARITY: NORMAL
MDC_IDC_SET_LEADCHNL_RA_SENSING_SENSITIVITY: NORMAL
MDC_IDC_SET_LEADCHNL_RV_PACING_AMPLITUDE: 1 V
MDC_IDC_SET_LEADCHNL_RV_PACING_ANODE_ELECTRODE_1: NORMAL
MDC_IDC_SET_LEADCHNL_RV_PACING_ANODE_LOCATION_1: NORMAL
MDC_IDC_SET_LEADCHNL_RV_PACING_CAPTURE_MODE: NORMAL
MDC_IDC_SET_LEADCHNL_RV_PACING_CATHODE_ELECTRODE_1: NORMAL
MDC_IDC_SET_LEADCHNL_RV_PACING_CATHODE_LOCATION_1: NORMAL
MDC_IDC_SET_LEADCHNL_RV_PACING_POLARITY: NORMAL
MDC_IDC_SET_LEADCHNL_RV_PACING_PULSEWIDTH: 0.4 MS
MDC_IDC_SET_LEADCHNL_RV_SENSING_ANODE_ELECTRODE_1: NORMAL
MDC_IDC_SET_LEADCHNL_RV_SENSING_ANODE_LOCATION_1: NORMAL
MDC_IDC_SET_LEADCHNL_RV_SENSING_CATHODE_ELECTRODE_1: NORMAL
MDC_IDC_SET_LEADCHNL_RV_SENSING_CATHODE_LOCATION_1: NORMAL
MDC_IDC_SET_LEADCHNL_RV_SENSING_POLARITY: NORMAL
MDC_IDC_SET_LEADCHNL_RV_SENSING_SENSITIVITY: 0.9 MV
MDC_IDC_SET_ZONE_DETECTION_INTERVAL: 400 MS
MDC_IDC_SET_ZONE_STATUS: NORMAL
MDC_IDC_SET_ZONE_TYPE: NORMAL
MDC_IDC_SET_ZONE_VENDOR_TYPE: NORMAL
MDC_IDC_STAT_BRADY_AP_VP_PERCENT: 0 %
MDC_IDC_STAT_BRADY_AP_VS_PERCENT: 0 %
MDC_IDC_STAT_BRADY_AS_VP_PERCENT: 11.48 %
MDC_IDC_STAT_BRADY_AS_VS_PERCENT: 88.52 %
MDC_IDC_STAT_BRADY_DTM_END: NORMAL
MDC_IDC_STAT_BRADY_DTM_START: NORMAL
MDC_IDC_STAT_BRADY_RA_PERCENT_PACED: 0 %
MDC_IDC_STAT_BRADY_RV_PERCENT_PACED: 11.48 %
MDC_IDC_STAT_EPISODE_RECENT_COUNT: 0
MDC_IDC_STAT_EPISODE_RECENT_COUNT: 66
MDC_IDC_STAT_EPISODE_RECENT_COUNT_DTM_END: NORMAL
MDC_IDC_STAT_EPISODE_RECENT_COUNT_DTM_START: NORMAL
MDC_IDC_STAT_EPISODE_TOTAL_COUNT: 0
MDC_IDC_STAT_EPISODE_TOTAL_COUNT: 1
MDC_IDC_STAT_EPISODE_TOTAL_COUNT: 65
MDC_IDC_STAT_EPISODE_TOTAL_COUNT_DTM_END: NORMAL
MDC_IDC_STAT_EPISODE_TOTAL_COUNT_DTM_START: NORMAL
MDC_IDC_STAT_EPISODE_TYPE: NORMAL
MDC_IDC_STAT_TACHYTHERAPY_RECENT_DTM_END: NORMAL
MDC_IDC_STAT_TACHYTHERAPY_RECENT_DTM_START: NORMAL
MDC_IDC_STAT_TACHYTHERAPY_TOTAL_DTM_END: NORMAL
MDC_IDC_STAT_TACHYTHERAPY_TOTAL_DTM_START: NORMAL

## 2024-08-05 PROCEDURE — 93296 REM INTERROG EVL PM/IDS: CPT | Performed by: INTERNAL MEDICINE

## 2024-08-05 PROCEDURE — 93294 REM INTERROG EVL PM/LDLS PM: CPT | Performed by: INTERNAL MEDICINE

## 2024-08-20 DIAGNOSIS — R20.8 BURNING SENSATION OF FEET: ICD-10-CM

## 2024-08-20 RX ORDER — GABAPENTIN 300 MG/1
300 CAPSULE ORAL 2 TIMES DAILY
Qty: 180 CAPSULE | Refills: 3 | Status: SHIPPED | OUTPATIENT
Start: 2024-08-20

## 2024-09-06 ENCOUNTER — ANCILLARY PROCEDURE (OUTPATIENT)
Dept: CARDIOLOGY | Facility: CLINIC | Age: 89
End: 2024-09-06
Attending: INTERNAL MEDICINE
Payer: COMMERCIAL

## 2024-09-06 ENCOUNTER — OFFICE VISIT (OUTPATIENT)
Dept: CARDIOLOGY | Facility: CLINIC | Age: 89
End: 2024-09-06
Payer: COMMERCIAL

## 2024-09-06 VITALS
DIASTOLIC BLOOD PRESSURE: 81 MMHG | OXYGEN SATURATION: 98 % | HEART RATE: 69 BPM | WEIGHT: 104 LBS | BODY MASS INDEX: 19.65 KG/M2 | SYSTOLIC BLOOD PRESSURE: 182 MMHG

## 2024-09-06 DIAGNOSIS — I48.21 PERMANENT ATRIAL FIBRILLATION (H): ICD-10-CM

## 2024-09-06 DIAGNOSIS — Z95.2 H/O AORTIC VALVE REPLACEMENT: ICD-10-CM

## 2024-09-06 DIAGNOSIS — Z95.0 CARDIAC PACEMAKER IN SITU: ICD-10-CM

## 2024-09-06 DIAGNOSIS — I49.5 SINOATRIAL NODE DYSFUNCTION (H): ICD-10-CM

## 2024-09-06 DIAGNOSIS — Z95.1 S/P CABG (CORONARY ARTERY BYPASS GRAFT): ICD-10-CM

## 2024-09-06 DIAGNOSIS — I10 BENIGN ESSENTIAL HYPERTENSION: ICD-10-CM

## 2024-09-06 DIAGNOSIS — I25.83 CORONARY ATHEROSCLEROSIS DUE TO LIPID RICH PLAQUE: Primary | ICD-10-CM

## 2024-09-06 DIAGNOSIS — E78.00 PURE HYPERCHOLESTEROLEMIA: ICD-10-CM

## 2024-09-06 DIAGNOSIS — I77.6 VASCULITIS (H): ICD-10-CM

## 2024-09-06 PROCEDURE — 99214 OFFICE O/P EST MOD 30 MIN: CPT | Performed by: INTERNAL MEDICINE

## 2024-09-06 PROCEDURE — G2211 COMPLEX E/M VISIT ADD ON: HCPCS | Performed by: INTERNAL MEDICINE

## 2024-09-06 RX ORDER — SODIUM FLUORIDE 5 MG/G
GEL, DENTIFRICE DENTAL
COMMUNITY
Start: 2024-05-09

## 2024-09-06 NOTE — PATIENT INSTRUCTIONS
Ms Fariaica JORDI Coles,  I enjoyed visiting with you again today.  I am glad to hear you are doing well.  Per our conversation increase the METOPROLOL to 1 and 1/2 a day and if no issues go to 2 a day and let me know how it goes so I could get you a 50 tablet.  This will help with pressure and fast heart beat and maybe breathing.  I will look at old angio pictures.  Let me get the echo first.  I will plan on seeing you  1 year.  Mathew Antoine

## 2024-09-06 NOTE — LETTER
9/6/2024    Yun Jacinto MD  [address] [facility] [address]    RE: Constanza Coles       Dear Colleague,     I had the pleasure of seeing Constanza Coles in the [facility].      [facility]  Heart Care Clinic Follow-up Note    Assessment & Plan        (I25.10,  I25.83) Coronary atherosclerosis due to lipid rich plaque  (primary encounter diagnosis)  Comment: Angiography September 2021 showed normal left main, occluded proximal LAD, circumflex with a mid 40 to 50% stenosis, distal obtuse marginal artery one 95 to 99% stenosis, right coronary artery with a mid 95% and distal tandem 85% lesions.  She had intervention on the circumflex.  Symptomatically atypical left shoulder discomfort that comes on at rest and goes away with nitroglycerin.  Discussed stress testing which she is not interested in.  In addition, discussed pursuing angiography and intervention and will review echocardiogram, if significant abnormalities are seen consider invasive angiography and possible valve issues.      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 She had aortic valve replacement at the same time.  There was no exclusion of the left atrial appendage at that time.      (Z95.2) H/O aortic valve replacement  Comment: Due to severe aortic stenosis she had a 21 mm Forte pericardial magna valve placed in 2005 and based on 2023 echo working well. Given that this tissue valve was placed over 15 years ago we will recheck echo yearly. In addition, I have asked her to take aspirin daily and she declines telling me that she has horrible allergic reactions and stomach upset from this.   We had her on Effient which is since been discontinued now that she is on Eliquis.     (I48.20) Chronic atrial fibrillation (H)  Comment: Asymptomatic, not valvular, and now on Eliquis 2.5 mg p.o. twice daily given her lower weight and  age over 82.     (Z95.0) Cardiac pacemaker in situ, dual chamber  Comment:  Medtronic device with Medtronic leads placed in 2011 with recent device change out January of this year and currently 12% ventricular pacing, but some tachycardia seen.  Will increase beta-blocker.     (I10) Benign essential hypertension  Comment: Blood pressure elevated, given this we will slowly increase metoprolol from 25-37.5 and then 50 mg a day.    (E78.00) Pure hypercholesterolemia  Comment: Total cholesterol 141 with an LDL of 38 which is excellent.  Due to get repeat lipids this coming month.     (N18.30) Stage 3 chronic kidney disease, unspecified whether stage 3a or 3b CKD (H)  Comment: Kidney shows normal creatinine is 0. 0.88 and GFR 64 so consider this resolved.      (I77.6) Vasculitis (H24)  Comment: Remote diagnosis and not a current issue.    Plan  1.  Given shortness of breath will recheck echo looking at valve.  2.  Will discuss most recent angiogram films with primary.  3.  Consider angiography depending on the above.  4.  Increase metoprolol from 25-37.5 and then eventually 50 mg a day.  5.  Follow-up with me in 1 year or sooner if needed.    The longitudinal plan of care for the diagnosis(es)/condition(s) as documented were addressed during this visit. Due to the added complexity in care, I will continue to support Constanza in the subsequent management and with ongoing continuity of care.     Subjective  CC: 89-year-old white female being seen in yearly follow-up.  Still living in a 100-year-old house with her , lives on the main floor but does go up and down steps occasionally.  Does still go to workout, does go to the bar.  Tells me that when she works out she does well but at rest she gets a discomfort in her left neck, she will sometimes take a nitroglycerin and will go away.  She also does admit to an increased shortness of breath and going up and down a flight of steps.  No PND, orthopnea, effort related  "angina, syncope, dizziness or peripheral edema.    Medications  Current Outpatient Medications   Medication Sig Dispense Refill     acetaminophen (TYLENOL) 500 MG tablet Take 1,000 mg by mouth every 6 hours as needed for mild pain       atorvastatin (LIPITOR) 40 MG tablet Take 1 tablet (40 mg) by mouth daily 90 tablet 3     B-D TB SYRINGE 27G X 1/2\" 1 ML MISC See Admin Instructions       cholecalciferol, vitamin D3, (VITAMIN D3) 5,000 unit Tab [CHOLECALCIFEROL, VITAMIN D3, (VITAMIN D3) 5,000 UNIT TAB] Take 1 tablet by mouth daily.       cyanocobalamin (CYANOCOBALAMIN) 1000 MCG/ML injection INJECT 1 ML (1,000 MCG) INTO THE SHOULDER, THIGH, OR BUTTOCKS EVERY 30 DAYS. **SYR 27G 1/2\" 1CC** 3 mL 3     ELIQUIS ANTICOAGULANT 2.5 MG tablet TAKE 1 TABLET(2.5 MG) BY MOUTH TWICE DAILY 60 tablet 3     gabapentin (NEURONTIN) 300 MG capsule Take 1 capsule (300 mg) by mouth 2 times daily 180 capsule 3     metoprolol succinate ER (TOPROL XL) 25 MG 24 hr tablet TAKE 1 TABLET (25 MG) BY MOUTH DAILY 90 tablet 3     nitroGLYcerin (NITROSTAT) 0.4 MG sublingual tablet For chest pain place 1 tablet under the tongue every 5 minutes for 3 doses. If symptoms persist 5 minutes after 1st dose call 911. 30 tablet 4     sodium fluoride dental gel (PREVIDENT) 1.1 % GEL topical gel ONE TEASPOON IN FLUORIDE TRAY APPLY TO TEETH FOR 3-5 MINUTES ONCE DAILY       sucralfate (CARAFATE) 1 GM tablet Take 1 tablet (1 g) by mouth 4 times daily (Patient taking differently: Take 1 g by mouth 4 times daily as needed.) 360 tablet 3     Vitamin D, Ergocalciferol, 59994 units CAPS TAKE 1 CAPSULE (50,000 UNITS) BY MOUTH ONCE A WEEK. 12 capsule 11     levofloxacin (LEVAQUIN) 250 MG tablet Take 1 tablet (250 mg) by mouth daily 10 tablet 3       Objective  BP (!) 182/81 (BP Location: Left arm, Patient Position: Sitting, Cuff Size: Adult Small)   Pulse 69   Wt 47.2 kg (104 lb)   LMP  (LMP Unknown)   SpO2 98%   BMI 19.65 kg/m      General Appearance:    Alert, " "cooperative, no distress, appears stated age   Head:    Normocephalic, without obvious abnormality, atraumatic   Throat:   Lips, mucosa, and tongue normal; teeth and gums normal   Neck:   Supple, symmetrical, trachea midline, no adenopathy;        thyroid:  No enlargement/tenderness/nodules; no carotid    bruit or JVD   Back:     Symmetric, no curvature, ROM normal, no CVA tenderness   Lungs:     Clear to auscultation bilaterally, respirations unlabored   Chest wall:    No tenderness, midline sternotomy scar as well as a left-sided pacemaker   Heart:    Regular rate and rhythm, S1 normal and S2 is loud, 1-2/6 systolic ejection murmur, no rub   or gallop   Abdomen:     Soft, non-tender, bowel sounds active all four quadrants,     no masses, no organomegaly   Extremities:   Normal, atraumatic, no cyanosis or edema   Pulses:   2+ and symmetric all extremities   Skin:   Skin color, texture, turgor normal, no rashes or lesions     Results    Lab Results personally reviewed   Lab Results   Component Value Date    CHOL 141 12/12/2022    CHOL 116 11/09/2021     Lab Results   Component Value Date    HDL 70 12/12/2022    HDL 53 11/09/2021     No components found for: \"LDLCALC\"  Lab Results   Component Value Date    TRIG 163 (H) 12/12/2022    TRIG 84 11/09/2021     Lab Results   Component Value Date    WBC 8.3 01/15/2024    HGB 10.9 (L) 01/15/2024    HCT 34.9 (L) 01/15/2024     01/15/2024     Lab Results   Component Value Date    BUN 18.8 01/15/2024     01/15/2024    CO2 29 01/15/2024             Thank you for allowing me to participate in the care of your patient.      Sincerely,     RY ANTOINE MD     [facility]  cc:   Mariel Antoine MD  [address], [address]  [place] [address]      "

## 2024-09-06 NOTE — PROGRESS NOTES
[facility]  Heart Care Clinic Follow-up Note    Assessment & Plan        (I25.10,  I25.83) Coronary atherosclerosis due to lipid rich plaque  (primary encounter diagnosis)  Comment: Angiography September 2021 showed normal left main, occluded proximal LAD, circumflex with a mid 40 to 50% stenosis, distal obtuse marginal artery one 95 to 99% stenosis, right coronary artery with a mid 95% and distal tandem 85% lesions.  She had intervention on the circumflex.  Symptomatically atypical left shoulder discomfort that comes on at rest and goes away with nitroglycerin.  Discussed stress testing which she is not interested in.  In addition, discussed pursuing angiography and intervention and will review echocardiogram, if significant abnormalities are seen consider invasive angiography and possible valve issues.      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 She had aortic valve replacement at the same time.  There was no exclusion of the left atrial appendage at that time.      (Z95.2) H/O aortic valve replacement  Comment: Due to severe aortic stenosis she had a 21 mm Forte pericardial magna valve placed in 2005 and based on 2023 echo working well. Given that this tissue valve was placed over 15 years ago we will recheck echo yearly. In addition, I have asked her to take aspirin daily and she declines telling me that she has horrible allergic reactions and stomach upset from this.   We had her on Effient which is since been discontinued now that she is on Eliquis.     (I48.20) Chronic atrial fibrillation (H)  Comment: Asymptomatic, not valvular, and now on Eliquis 2.5 mg p.o. twice daily given her lower weight and age over 82.     (Z95.0) Cardiac pacemaker in situ, dual chamber  Comment:  Medtronic device with Medtronic leads placed in 2011 with recent device change out January of this year and currently 12%  ventricular pacing, but some tachycardia seen.  Will increase beta-blocker.     (I10) Benign essential hypertension  Comment: Blood pressure elevated, given this we will slowly increase metoprolol from 25-37.5 and then 50 mg a day.    (E78.00) Pure hypercholesterolemia  Comment: Total cholesterol 141 with an LDL of 38 which is excellent.  Due to get repeat lipids this coming month.     (N18.30) Stage 3 chronic kidney disease, unspecified whether stage 3a or 3b CKD (H)  Comment: Kidney shows normal creatinine is 0. 0.88 and GFR 64 so consider this resolved.      (I77.6) Vasculitis (H24)  Comment: Remote diagnosis and not a current issue.    Plan  1.  Given shortness of breath will recheck echo looking at valve.  2.  Will discuss most recent angiogram films - RCA is amenable to PCI. L-LAD may actually be okay w/ some moderate disease and spasm, but can always re-look at it if doing another angio. If valve is good and she is open to procedures, could offer angio +/- PCI and likely work on RCA.   If valve is deteriorating, can offer v-I-v TAVR w/up.   3.  Consider angiography depending on the above.  4.  Increase metoprolol from 25-37.5 and then eventually 50 mg a day.  5.  Follow-up with me in 1 year or sooner if needed.    The longitudinal plan of care for the diagnosis(es)/condition(s) as documented were addressed during this visit. Due to the added complexity in care, I will continue to support Constanza in the subsequent management and with ongoing continuity of care.     Subjective  CC: 89-year-old white female being seen in yearly follow-up.  Still living in a 100-year-old house with her , lives on the main floor but does go up and down steps occasionally.  Does still go to workout, does go to the bar.  Tells me that when she works out she does well but at rest she gets a discomfort in her left neck, she will sometimes take a nitroglycerin and will go away.  She also does admit to an increased shortness of  "breath and going up and down a flight of steps.  No PND, orthopnea, effort related angina, syncope, dizziness or peripheral edema.    Medications  Current Outpatient Medications   Medication Sig Dispense Refill    acetaminophen (TYLENOL) 500 MG tablet Take 1,000 mg by mouth every 6 hours as needed for mild pain      atorvastatin (LIPITOR) 40 MG tablet Take 1 tablet (40 mg) by mouth daily 90 tablet 3    B-D TB SYRINGE 27G X 1/2\" 1 ML MISC See Admin Instructions      cholecalciferol, vitamin D3, (VITAMIN D3) 5,000 unit Tab [CHOLECALCIFEROL, VITAMIN D3, (VITAMIN D3) 5,000 UNIT TAB] Take 1 tablet by mouth daily.      cyanocobalamin (CYANOCOBALAMIN) 1000 MCG/ML injection INJECT 1 ML (1,000 MCG) INTO THE SHOULDER, THIGH, OR BUTTOCKS EVERY 30 DAYS. **SYR 27G 1/2\" 1CC** 3 mL 3    ELIQUIS ANTICOAGULANT 2.5 MG tablet TAKE 1 TABLET(2.5 MG) BY MOUTH TWICE DAILY 60 tablet 3    gabapentin (NEURONTIN) 300 MG capsule Take 1 capsule (300 mg) by mouth 2 times daily 180 capsule 3    metoprolol succinate ER (TOPROL XL) 25 MG 24 hr tablet TAKE 1 TABLET (25 MG) BY MOUTH DAILY 90 tablet 3    nitroGLYcerin (NITROSTAT) 0.4 MG sublingual tablet For chest pain place 1 tablet under the tongue every 5 minutes for 3 doses. If symptoms persist 5 minutes after 1st dose call 911. 30 tablet 4    sodium fluoride dental gel (PREVIDENT) 1.1 % GEL topical gel ONE TEASPOON IN FLUORIDE TRAY APPLY TO TEETH FOR 3-5 MINUTES ONCE DAILY      sucralfate (CARAFATE) 1 GM tablet Take 1 tablet (1 g) by mouth 4 times daily (Patient taking differently: Take 1 g by mouth 4 times daily as needed.) 360 tablet 3    Vitamin D, Ergocalciferol, 73378 units CAPS TAKE 1 CAPSULE (50,000 UNITS) BY MOUTH ONCE A WEEK. 12 capsule 11    levofloxacin (LEVAQUIN) 250 MG tablet Take 1 tablet (250 mg) by mouth daily 10 tablet 3     Family history:  Negative for coronary artery disease.    Social history:  Rare alcohol use, negative tobacco use, lives independently with her  in " "[place].      Objective  BP (!) 182/81 (BP Location: Left arm, Patient Position: Sitting, Cuff Size: Adult Small)   Pulse 69   Wt 47.2 kg (104 lb)   LMP  (LMP Unknown)   SpO2 98%   BMI 19.65 kg/m      General Appearance:    Alert, cooperative, no distress, appears stated age   Head:    Normocephalic, without obvious abnormality, atraumatic   Throat:   Lips, mucosa, and tongue normal; teeth and gums normal   Neck:   Supple, symmetrical, trachea midline, no adenopathy;        thyroid:  No enlargement/tenderness/nodules; no carotid    bruit or JVD   Back:     Symmetric, no curvature, ROM normal, no CVA tenderness   Lungs:     Clear to auscultation bilaterally, respirations unlabored   Chest wall:    No tenderness, midline sternotomy scar as well as a left-sided pacemaker   Heart:    Regular rate and rhythm, S1 normal and S2 is loud, 1-2/6 systolic ejection murmur, no rub   or gallop   Abdomen:     Soft, non-tender, bowel sounds active all four quadrants,     no masses, no organomegaly   Extremities:   Normal, atraumatic, no cyanosis or edema   Pulses:   2+ and symmetric all extremities   Skin:   Skin color, texture, turgor normal, no rashes or lesions     Results    Lab Results personally reviewed   Lab Results   Component Value Date    CHOL 141 12/12/2022    CHOL 116 11/09/2021     Lab Results   Component Value Date    HDL 70 12/12/2022    HDL 53 11/09/2021     No components found for: \"LDLCALC\"  Lab Results   Component Value Date    TRIG 163 (H) 12/12/2022    TRIG 84 11/09/2021     Lab Results   Component Value Date    WBC 8.3 01/15/2024    HGB 10.9 (L) 01/15/2024    HCT 34.9 (L) 01/15/2024     01/15/2024     Lab Results   Component Value Date    BUN 18.8 01/15/2024     01/15/2024    CO2 29 01/15/2024         "

## 2024-09-11 ENCOUNTER — OFFICE VISIT (OUTPATIENT)
Dept: INTERNAL MEDICINE | Facility: CLINIC | Age: 89
End: 2024-09-11
Payer: COMMERCIAL

## 2024-09-11 VITALS
SYSTOLIC BLOOD PRESSURE: 158 MMHG | WEIGHT: 103.8 LBS | DIASTOLIC BLOOD PRESSURE: 75 MMHG | TEMPERATURE: 97.7 F | HEART RATE: 74 BPM | BODY MASS INDEX: 19.6 KG/M2 | HEIGHT: 61 IN

## 2024-09-11 DIAGNOSIS — Z23 NEED FOR SHINGLES VACCINE: ICD-10-CM

## 2024-09-11 DIAGNOSIS — I25.83 CORONARY ATHEROSCLEROSIS DUE TO LIPID RICH PLAQUE: ICD-10-CM

## 2024-09-11 DIAGNOSIS — Z79.01 ANTICOAGULATED: ICD-10-CM

## 2024-09-11 DIAGNOSIS — I48.91 ATRIAL FIBRILLATION, UNSPECIFIED TYPE (H): ICD-10-CM

## 2024-09-11 DIAGNOSIS — R79.9 ABNORMAL FINDING OF BLOOD CHEMISTRY, UNSPECIFIED: ICD-10-CM

## 2024-09-11 DIAGNOSIS — J44.9 CHRONIC OBSTRUCTIVE PULMONARY DISEASE, UNSPECIFIED COPD TYPE (H): Primary | ICD-10-CM

## 2024-09-11 DIAGNOSIS — D62 ANEMIA DUE TO BLOOD LOSS, ACUTE: ICD-10-CM

## 2024-09-11 DIAGNOSIS — E78.00 PURE HYPERCHOLESTEROLEMIA: ICD-10-CM

## 2024-09-11 DIAGNOSIS — M80.072S: ICD-10-CM

## 2024-09-11 DIAGNOSIS — Z95.1 S/P CABG (CORONARY ARTERY BYPASS GRAFT): ICD-10-CM

## 2024-09-11 DIAGNOSIS — I10 BENIGN ESSENTIAL HYPERTENSION: ICD-10-CM

## 2024-09-11 DIAGNOSIS — R93.89 ABNORMAL FINDINGS ON DIAGNOSTIC IMAGING OF OTHER SPECIFIED BODY STRUCTURES: ICD-10-CM

## 2024-09-11 DIAGNOSIS — Z95.2 H/O AORTIC VALVE REPLACEMENT: ICD-10-CM

## 2024-09-11 LAB
ALBUMIN SERPL BCG-MCNC: 4.2 G/DL (ref 3.5–5.2)
ALP SERPL-CCNC: 67 U/L (ref 40–150)
ALT SERPL W P-5'-P-CCNC: 20 U/L (ref 0–50)
ANION GAP SERPL CALCULATED.3IONS-SCNC: 9 MMOL/L (ref 7–15)
AST SERPL W P-5'-P-CCNC: 34 U/L (ref 0–45)
BILIRUB SERPL-MCNC: 0.5 MG/DL
BUN SERPL-MCNC: 15.8 MG/DL (ref 8–23)
CALCIUM SERPL-MCNC: 9.5 MG/DL (ref 8.8–10.4)
CHLORIDE SERPL-SCNC: 101 MMOL/L (ref 98–107)
CHOLEST SERPL-MCNC: 113 MG/DL
CREAT SERPL-MCNC: 0.97 MG/DL (ref 0.51–0.95)
EGFRCR SERPLBLD CKD-EPI 2021: 56 ML/MIN/1.73M2
ERYTHROCYTE [DISTWIDTH] IN BLOOD BY AUTOMATED COUNT: 21.9 % (ref 10–15)
FASTING STATUS PATIENT QL REPORTED: YES
FASTING STATUS PATIENT QL REPORTED: YES
GLUCOSE SERPL-MCNC: 157 MG/DL (ref 70–99)
HBA1C MFR BLD: 5.7 % (ref 0–5.6)
HCO3 SERPL-SCNC: 27 MMOL/L (ref 22–29)
HCT VFR BLD AUTO: 32.9 % (ref 35–47)
HDLC SERPL-MCNC: 64 MG/DL
HGB BLD-MCNC: 10.3 G/DL (ref 11.7–15.7)
LDLC SERPL CALC-MCNC: 19 MG/DL
MCH RBC QN AUTO: 25.4 PG (ref 26.5–33)
MCHC RBC AUTO-ENTMCNC: 31.3 G/DL (ref 31.5–36.5)
MCV RBC AUTO: 81 FL (ref 78–100)
NONHDLC SERPL-MCNC: 49 MG/DL
PLATELET # BLD AUTO: 196 10E3/UL (ref 150–450)
POTASSIUM SERPL-SCNC: 5.2 MMOL/L (ref 3.4–5.3)
PROT SERPL-MCNC: 7.3 G/DL (ref 6.4–8.3)
RBC # BLD AUTO: 4.06 10E6/UL (ref 3.8–5.2)
SODIUM SERPL-SCNC: 137 MMOL/L (ref 135–145)
TRIGL SERPL-MCNC: 149 MG/DL
TSH SERPL DL<=0.005 MIU/L-ACNC: 2.8 UIU/ML (ref 0.3–4.2)
VIT D+METAB SERPL-MCNC: 91 NG/ML (ref 20–50)
WBC # BLD AUTO: 9.1 10E3/UL (ref 4–11)

## 2024-09-11 PROCEDURE — 99214 OFFICE O/P EST MOD 30 MIN: CPT | Performed by: INTERNAL MEDICINE

## 2024-09-11 PROCEDURE — G2211 COMPLEX E/M VISIT ADD ON: HCPCS | Performed by: INTERNAL MEDICINE

## 2024-09-11 PROCEDURE — 84443 ASSAY THYROID STIM HORMONE: CPT | Performed by: INTERNAL MEDICINE

## 2024-09-11 PROCEDURE — 36415 COLL VENOUS BLD VENIPUNCTURE: CPT | Performed by: INTERNAL MEDICINE

## 2024-09-11 PROCEDURE — 90662 IIV NO PRSV INCREASED AG IM: CPT | Performed by: INTERNAL MEDICINE

## 2024-09-11 PROCEDURE — 85027 COMPLETE CBC AUTOMATED: CPT | Performed by: INTERNAL MEDICINE

## 2024-09-11 PROCEDURE — 83036 HEMOGLOBIN GLYCOSYLATED A1C: CPT | Performed by: INTERNAL MEDICINE

## 2024-09-11 PROCEDURE — 82306 VITAMIN D 25 HYDROXY: CPT | Performed by: INTERNAL MEDICINE

## 2024-09-11 PROCEDURE — 80061 LIPID PANEL: CPT | Performed by: INTERNAL MEDICINE

## 2024-09-11 PROCEDURE — G0008 ADMIN INFLUENZA VIRUS VAC: HCPCS | Performed by: INTERNAL MEDICINE

## 2024-09-11 PROCEDURE — 80053 COMPREHEN METABOLIC PANEL: CPT | Performed by: INTERNAL MEDICINE

## 2024-09-11 RX ORDER — ATORVASTATIN CALCIUM 40 MG/1
40 TABLET, FILM COATED ORAL DAILY
Qty: 90 TABLET | Refills: 3 | Status: SHIPPED | OUTPATIENT
Start: 2024-09-11

## 2024-09-11 ASSESSMENT — PAIN SCALES - GENERAL: PAINLEVEL: NO PAIN (0)

## 2024-09-11 NOTE — PATIENT INSTRUCTIONS
Send us ior call with your readings for blood pressure in 2-3 weeks   Echo to be done   New covid vaccine is due at the pharamcy

## 2024-09-11 NOTE — LETTER
September 19, 2024      Constanza Doansupriya  [address]  [place] [address]        Dear ,    We are writing to inform you of your test results.    Tests are essentially unremarkable and unchanged .  Only thing is your vitamin D is too high, I think you are over supplementing.  Recommend stopping all vitamin D supplements for 3 months and then restarting at half the dose of what you are currently taking. The other important thing to notice is your hemoglobin is stable at 10.3. It is still on the lower side but it has not gone down.  Your diabetes test is in the very mildly prediabetic range but I am not concerned about that and it can be monitored annually.  Cholesterol levels are perfect    Resulted Orders   Lipid panel reflex to direct LDL Fasting   Result Value Ref Range    Cholesterol 113 <200 mg/dL    Triglycerides 149 <150 mg/dL    Direct Measure HDL 64 >=50 mg/dL    LDL Cholesterol Calculated 19 <100 mg/dL    Non HDL Cholesterol 49 <130 mg/dL    Patient Fasting > 8hrs? Yes     Narrative    Cholesterol  Desirable: < 200 mg/dL  Borderline High: 200 - 239 mg/dL  High: >= 240 mg/dL    Triglycerides  Normal: < 150 mg/dL  Borderline High: 150 - 199 mg/dL  High: 200-499 mg/dL  Very High: >= 500 mg/dL    Direct Measure HDL  Female: >= 50 mg/dL   Male: >= 40 mg/dL    LDL Cholesterol  Desirable: < 100 mg/dL  Above Desirable: 100 - 129 mg/dL   Borderline High: 130 - 159 mg/dL   High:  160 - 189 mg/dL   Very High: >= 190 mg/dL    Non HDL Cholesterol  Desirable: < 130 mg/dL  Above Desirable: 130 - 159 mg/dL  Borderline High: 160 - 189 mg/dL  High: 190 - 219 mg/dL  Very High: >= 220 mg/dL   Hemoglobin A1c   Result Value Ref Range    Hemoglobin A1C 5.7 (H) 0.0 - 5.6 %      Comment:      Normal <5.7%   Prediabetes 5.7-6.4%    Diabetes 6.5% or higher     Note: Adopted from ADA consensus guidelines.   Comprehensive metabolic panel   Result Value Ref Range    Sodium 137 135 - 145 mmol/L    Potassium 5.2 3.4 - 5.3  mmol/L    Carbon Dioxide (CO2) 27 22 - 29 mmol/L    Anion Gap 9 7 - 15 mmol/L    Urea Nitrogen 15.8 8.0 - 23.0 mg/dL    Creatinine 0.97 (H) 0.51 - 0.95 mg/dL    GFR Estimate 56 (L) >60 mL/min/1.73m2      Comment:      eGFR calculated using 2021 CKD-EPI equation.    Calcium 9.5 8.8 - 10.4 mg/dL      Comment:      Reference intervals for this test were updated on 7/16/2024 to reflect our healthy population more accurately. There may be differences in the flagging of prior results with similar values performed with this method. Those prior results can be interpreted in the context of the updated reference intervals.    Chloride 101 98 - 107 mmol/L    Glucose 157 (H) 70 - 99 mg/dL    Alkaline Phosphatase 67 40 - 150 U/L    AST 34 0 - 45 U/L    ALT 20 0 - 50 U/L    Protein Total 7.3 6.4 - 8.3 g/dL    Albumin 4.2 3.5 - 5.2 g/dL    Bilirubin Total 0.5 <=1.2 mg/dL    Patient Fasting > 8hrs? Yes    Vitamin D Deficiency   Result Value Ref Range    Vitamin D, Total (25-Hydroxy) 91 (H) 20 - 50 ng/mL      Comment:      toxicity possible    Narrative    Season, race, dietary intake, and treatment affect the concentration of 25-hydroxy-Vitamin D. Values may decrease during winter months and increase during summer months.    Vitamin D determination is routinely performed by an immunoassay specific for 25 hydroxyvitamin D3.  If an individual is on vitamin D2(ergocalciferol) supplementation, please specify 25 OH vitamin D2 and D3 level determination by LCMSMS test [id number].     TSH   Result Value Ref Range    TSH 2.80 0.30 - 4.20 uIU/mL   CBC with platelets   Result Value Ref Range    WBC Count 9.1 4.0 - 11.0 10e3/uL    RBC Count 4.06 3.80 - 5.20 10e6/uL    Hemoglobin 10.3 (L) 11.7 - 15.7 g/dL    Hematocrit 32.9 (L) 35.0 - 47.0 %    MCV 81 78 - 100 fL    MCH 25.4 (L) 26.5 - 33.0 pg    MCHC 31.3 (L) 31.5 - 36.5 g/dL    RDW 21.9 (H) 10.0 - 15.0 %    Platelet Count 196 150 - 450 10e3/uL       If you have any questions or concerns, please  call the clinic at the number listed above.       Sincerely,      Yun Jacinto MD

## 2024-09-11 NOTE — PROGRESS NOTES
Assessment & Plan     Need for shingles vaccine    - zoster vaccine recombinant adjuvanted (SHINGRIX) injection; Inject 0.5 mLs into the muscle once for 1 dose. Pharmacist administered    Chronic obstructive pulmonary disease, unspecified COPD type (H)  She has no active symptoms requiring inhaler  - Lipid panel reflex to direct LDL Fasting; Future  - Hemoglobin A1c; Future  - Comprehensive metabolic panel; Future  - Vitamin D Deficiency; Future  - TSH; Future  - Lipid panel reflex to direct LDL Fasting  - Hemoglobin A1c  - Comprehensive metabolic panel  - Vitamin D Deficiency  - TSH    Coronary atherosclerosis due to lipid rich plaque  She has no angina she is very active  She is on high-dose statin and metoprolol.  She is not on baby aspirin due to history of GI blood loss  Benign essential hypertension  Blood pressure is suboptimal.  But has been good in the past she is due for echocardiogram to check arctic valve replacement.  She needs to send me her blood pressure readings over the next few weeks.  Pure hypercholesterolemia  On statin    Abnormal finding of blood chemistry, unspecified    - Hemoglobin A1c; Future  - Hemoglobin A1c    Age-related osteoporosis with current pathological fracture, left ankle and foot, sequela  She declines medication- Vitamin D Deficiency; Future  - Vitamin D Deficiency    Abnormal findings on diagnostic imaging of other specified body structures    - TSH; Future  - CBC with platelets; Future  - TSH  - CBC with platelets    S/P CABG (coronary artery bypass graft)    - atorvastatin (LIPITOR) 40 MG tablet; Take 1 tablet (40 mg) by mouth daily.    Anticoagulated  Tolerating Eliquis    Anemia due to blood loss, acute  Had episode of GI blood loss with needing packed RBC transfusion.  Had Eliquis held for a while then it was restarted.  She did not go ahead with the watchman.  She is subsequently maintained hemoglobin of 10.  H/O aortic valve replacement      Atrial fibrillation,  "unspecified type (H)          She declines wellness visits        Subjective   Constanza is a 89 year old, presenting for the following health issues:  FELT LIGHhheaded andafter increasing metoprolol , and she took her bp and it was 94 /52 tis taking one 1/2 tabelt of the 37.5 mg for a week   Takes 50.000 vit d   Follow Up and Recheck Medication (PT REPORTS THAT SHE IS HERE FOR HER FOLLOW UP OF GENERAL HEALTH AND LABS.)      9/11/2024     2:20 PM   Additional Questions   Roomed by ISABEL   Accompanied by ALONE         9/11/2024     2:20 PM   Patient Reported Additional Medications   Patient reports taking the following new medications NONE     History of Present Illness       Reason for visit:  LABS    She eats 0-1 servings of fruits and vegetables daily.She consumes 1 sweetened beverage(s) daily.She exercises with enough effort to increase her heart rate 9 or less minutes per day.  She exercises with enough effort to increase her heart rate 3 or less days per week.   She is taking medications regularly.                     Objective    BP (!) 158/75   Pulse 74   Temp 97.7  F (36.5  C) (Axillary)   Ht 1.549 m (5' 1\")   Wt 47.1 kg (103 lb 12.8 oz)   LMP  (LMP Unknown)   Breastfeeding No   BMI 19.61 kg/m    Body mass index is 19.61 kg/m .  Physical Exam   GENERAL: alert and no distress  NECK: no adenopathy, no asymmetry, masses, or scars  RESP: lungs clear to auscultation - no rales, rhonchi or wheezes  CV: regular rate and rhythm, normal S1 S2, no S3 or S4, no murmur, click or rub, no peripheral edema  ABDOMEN: soft, nontender, no hepatosplenomegaly, no masses and bowel sounds normal  MS: no gross musculoskeletal defects noted, no edema            Signed Electronically by: Yun Jacinto MD    "

## 2024-09-17 ENCOUNTER — HOSPITAL ENCOUNTER (OUTPATIENT)
Dept: CARDIOLOGY | Facility: CLINIC | Age: 89
Discharge: HOME | End: 2024-09-17
Attending: INTERNAL MEDICINE | Admitting: INTERNAL MEDICINE
Payer: COMMERCIAL

## 2024-09-17 DIAGNOSIS — Z95.2 H/O AORTIC VALVE REPLACEMENT: ICD-10-CM

## 2024-09-17 LAB — LVEF ECHO: NORMAL

## 2024-09-17 PROCEDURE — 93306 TTE W/DOPPLER COMPLETE: CPT | Mod: 26 | Performed by: INTERNAL MEDICINE

## 2024-09-17 PROCEDURE — 93306 TTE W/DOPPLER COMPLETE: CPT

## 2024-09-18 ENCOUNTER — TELEPHONE (OUTPATIENT)
Dept: INTERNAL MEDICINE | Facility: CLINIC | Age: 89
End: 2024-09-18
Payer: COMMERCIAL

## 2024-09-18 NOTE — TELEPHONE ENCOUNTER
Test Results        Who ordered the test:  Ophelia    Type of test: Lab    Date of test: last week    Where was the test performed:  [place]    What are your questions/concerns?:  explain results    Okay to leave a detailed message?: No at Home number on file [phone] (home)

## 2024-09-19 ENCOUNTER — TELEPHONE (OUTPATIENT)
Dept: CARDIOLOGY | Facility: CLINIC | Age: 89
End: 2024-09-19
Payer: COMMERCIAL

## 2024-09-19 DIAGNOSIS — E78.00 PURE HYPERCHOLESTEROLEMIA: ICD-10-CM

## 2024-09-19 DIAGNOSIS — Z95.2 H/O AORTIC VALVE REPLACEMENT: Primary | ICD-10-CM

## 2024-09-19 DIAGNOSIS — Z95.1 S/P CABG (CORONARY ARTERY BYPASS GRAFT): ICD-10-CM

## 2024-09-19 DIAGNOSIS — I25.118 ATHEROSCLEROSIS OF CORONARY ARTERY OF NATIVE HEART WITH OTHER FORM OF ANGINA PECTORIS, UNSPECIFIED VESSEL OR LESION TYPE (H): ICD-10-CM

## 2024-09-19 DIAGNOSIS — I25.10 ATHEROSCLEROSIS OF NATIVE CORONARY ARTERY OF NATIVE HEART WITHOUT ANGINA PECTORIS: ICD-10-CM

## 2024-09-19 DIAGNOSIS — I25.83 CORONARY ATHEROSCLEROSIS DUE TO LIPID RICH PLAQUE: ICD-10-CM

## 2024-09-19 NOTE — TELEPHONE ENCOUNTER
Noted- will set up. Clarifying case request type- add [facility]? And then will arrange with patient. -[facility]

## 2024-09-19 NOTE — RESULT ENCOUNTER NOTE
Apologize I thought I had sent her a letter but obviously I did not.  For her labs tell her that her tests are essentially unremarkable and unchanged .  Only thing is her vitamin D is too high I think she is over supplementing.  Recommend stopping all vitamin D supplements for 3 months and then restarting at half the dose of what she is currently taking.  If she can confirm how much she is taking then I can give her the exact amount  There are important thing to notice her hemoglobin is stable at 10.3 it is still on the lower side but it has not gone down  Her diabetes test is in the very mildly prediabetic range but I am not concerned about that and it can be monitored annually  Cholesterol levels are perfect  These send that letter with just the results as well thank you

## 2024-09-19 NOTE — TELEPHONE ENCOUNTER
----- Message from RY ANTOINE sent at 9/19/2024 12:50 PM CDT -----  I will include my team in on this, can we set this lady up for invasive angiography and possible intervention?  She has a bioprosthetic valve and thus bridging not necessarily needed.  LF  ----- Message -----  From: Janis Argueta RN  Sent: 9/19/2024   8:34 AM CDT  To: Mariel Antoine MD    Would you like me to place orders and take over or have you sent this to your nursing team?     Thanks,  Renard  ----- Message -----  From: Mariel Antoine MD  Sent: 9/18/2024   9:44 PM CDT  To: Janis Argueta RN; Shane Mcclendon MD    Yes as given her symptoms she is now amenable to angio.  LF  ----- Message -----  From: Janis Argueta RN  Sent: 9/18/2024   3:38 PM CDT  To: Mariel Antoine MD; Shane Mcclendon MD    Hi Dr. Antoine,  Repeat echo looks improved. Would you like our team to reach out about angiogram or how would you like to proceed?     Thanks,  Renard Argueta RN BSN  Structural Heart Coordinator   [facility]  [phone]  ----- Message -----  From: Shane Mcclendon MD  Sent: 9/18/2024   3:04 PM CDT  To: Janis Argueta RN    Pleasant surprise - yes.  Would get a RH a the time of angio.    Thank you!  Shane  ----- Message -----  From: Janis Argueta RN  Sent: 9/18/2024  11:22 AM CDT  To: Shane Mcclendon MD    Her valve looks fine, yes?  ----- Message -----  From: Kaylyn Molina RN  Sent: 9/18/2024   8:00 AM CDT  To: [facility]    Check Echo- VC follow up?  ----- Message -----  From: Shane Mcclendon MD  Sent: 9/6/2024  12:53 PM CDT  To: Mariel Antoine MD; #    Les,    Re-looked at the angio - RCA is amenable to PCI. L-LAD may actually be okay w/ some moderate disease and spasm, but can always re-look at it if doing another angio.    My sense is that if valve is good and she is open to procedures, could offer angio +/- PCI and likely work on RCA.  If valve is  deteriorating, can offer v-I-v TAVR w/up.    Thank you!  Shane  ----- Message -----  From: Mariel Antoine MD  Sent: 9/6/2024  11:20 AM CDT  To: Shane Mcclendon MD    M,  You did intervention in this lady in 2021, somewhat complicated with vasculitis.  Atypical symptoms but increased shortness of breath and she does have a tissue valve that is close to 19 years old.  I am checking echo, I would appreciate if you could look at her films and give me your opinion if you think further intervention is possible?  Feel free to let me know your thoughts.  LF

## 2024-09-20 NOTE — TELEPHONE ENCOUNTER
Mariel Antoine MD Caswell, Mallory J, RN  Right heart cath is okay.  Yes you can let lady know that her valve, given his age, is working very well.  She is aware the fact that I said if echo looked good we would proceed with angiography.  LF

## 2024-09-25 NOTE — TELEPHONE ENCOUNTER
LM to discuss results and recommendations for cors poss pci with RHC per LBF/MY recommendations. Re:echo test results. -[facility]

## 2024-09-27 NOTE — TELEPHONE ENCOUNTER
Attempted to call patient- she had called back and left a voicemail. Writer unable to reach  her- left another voicemail and will try again this afternoon. -elver

## 2024-09-27 NOTE — TELEPHONE ENCOUNTER
Attempted to call patient again- left another voicemail- 4th attempt to reach. -[facility]

## 2024-09-29 LAB
MDC_IDC_LEAD_CONNECTION_STATUS: NORMAL
MDC_IDC_LEAD_CONNECTION_STATUS: NORMAL
MDC_IDC_LEAD_IMPLANT_DT: NORMAL
MDC_IDC_LEAD_IMPLANT_DT: NORMAL
MDC_IDC_LEAD_LOCATION: NORMAL
MDC_IDC_LEAD_LOCATION: NORMAL
MDC_IDC_LEAD_MFG: NORMAL
MDC_IDC_LEAD_MFG: NORMAL
MDC_IDC_LEAD_MODEL: NORMAL
MDC_IDC_LEAD_MODEL: NORMAL
MDC_IDC_LEAD_POLARITY_TYPE: NORMAL
MDC_IDC_LEAD_POLARITY_TYPE: NORMAL
MDC_IDC_LEAD_SERIAL: NORMAL
MDC_IDC_LEAD_SERIAL: NORMAL
MDC_IDC_MSMT_BATTERY_DTM: NORMAL
MDC_IDC_MSMT_BATTERY_REMAINING_LONGEVITY: 176 MO
MDC_IDC_MSMT_BATTERY_RRT_TRIGGER: 2.62
MDC_IDC_MSMT_BATTERY_STATUS: NORMAL
MDC_IDC_MSMT_BATTERY_VOLTAGE: 3.18 V
MDC_IDC_MSMT_LEADCHNL_RA_IMPEDANCE_VALUE: 361 OHM
MDC_IDC_MSMT_LEADCHNL_RA_IMPEDANCE_VALUE: 418 OHM
MDC_IDC_MSMT_LEADCHNL_RV_IMPEDANCE_VALUE: 342 OHM
MDC_IDC_MSMT_LEADCHNL_RV_IMPEDANCE_VALUE: 418 OHM
MDC_IDC_MSMT_LEADCHNL_RV_PACING_THRESHOLD_AMPLITUDE: 0.62 V
MDC_IDC_MSMT_LEADCHNL_RV_PACING_THRESHOLD_AMPLITUDE: 0.75 V
MDC_IDC_MSMT_LEADCHNL_RV_PACING_THRESHOLD_PULSEWIDTH: 0.4 MS
MDC_IDC_MSMT_LEADCHNL_RV_PACING_THRESHOLD_PULSEWIDTH: 0.4 MS
MDC_IDC_MSMT_LEADCHNL_RV_SENSING_INTR_AMPL: 5.88 MV
MDC_IDC_MSMT_LEADCHNL_RV_SENSING_INTR_AMPL: 7.12 MV
MDC_IDC_PG_IMPLANT_DTM: NORMAL
MDC_IDC_PG_MFG: NORMAL
MDC_IDC_PG_MODEL: NORMAL
MDC_IDC_PG_SERIAL: NORMAL
MDC_IDC_PG_TYPE: NORMAL
MDC_IDC_SESS_CLINIC_NAME: NORMAL
MDC_IDC_SESS_DTM: NORMAL
MDC_IDC_SESS_TYPE: NORMAL
MDC_IDC_SET_BRADY_HYSTRATE: NORMAL
MDC_IDC_SET_BRADY_LOWRATE: 50 {BEATS}/MIN
MDC_IDC_SET_BRADY_MODE: NORMAL
MDC_IDC_SET_LEADCHNL_RA_SENSING_ANODE_ELECTRODE_1: NORMAL
MDC_IDC_SET_LEADCHNL_RA_SENSING_ANODE_LOCATION_1: NORMAL
MDC_IDC_SET_LEADCHNL_RA_SENSING_CATHODE_ELECTRODE_1: NORMAL
MDC_IDC_SET_LEADCHNL_RA_SENSING_CATHODE_LOCATION_1: NORMAL
MDC_IDC_SET_LEADCHNL_RA_SENSING_POLARITY: NORMAL
MDC_IDC_SET_LEADCHNL_RA_SENSING_SENSITIVITY: NORMAL
MDC_IDC_SET_LEADCHNL_RV_PACING_AMPLITUDE: 1 V
MDC_IDC_SET_LEADCHNL_RV_PACING_ANODE_ELECTRODE_1: NORMAL
MDC_IDC_SET_LEADCHNL_RV_PACING_ANODE_LOCATION_1: NORMAL
MDC_IDC_SET_LEADCHNL_RV_PACING_CAPTURE_MODE: NORMAL
MDC_IDC_SET_LEADCHNL_RV_PACING_CATHODE_ELECTRODE_1: NORMAL
MDC_IDC_SET_LEADCHNL_RV_PACING_CATHODE_LOCATION_1: NORMAL
MDC_IDC_SET_LEADCHNL_RV_PACING_POLARITY: NORMAL
MDC_IDC_SET_LEADCHNL_RV_PACING_PULSEWIDTH: 0.4 MS
MDC_IDC_SET_LEADCHNL_RV_SENSING_ANODE_ELECTRODE_1: NORMAL
MDC_IDC_SET_LEADCHNL_RV_SENSING_ANODE_LOCATION_1: NORMAL
MDC_IDC_SET_LEADCHNL_RV_SENSING_CATHODE_ELECTRODE_1: NORMAL
MDC_IDC_SET_LEADCHNL_RV_SENSING_CATHODE_LOCATION_1: NORMAL
MDC_IDC_SET_LEADCHNL_RV_SENSING_POLARITY: NORMAL
MDC_IDC_SET_LEADCHNL_RV_SENSING_SENSITIVITY: 0.9 MV
MDC_IDC_SET_ZONE_DETECTION_INTERVAL: 400 MS
MDC_IDC_SET_ZONE_STATUS: NORMAL
MDC_IDC_SET_ZONE_TYPE: NORMAL
MDC_IDC_SET_ZONE_VENDOR_TYPE: NORMAL
MDC_IDC_STAT_BRADY_AP_VP_PERCENT: 0 %
MDC_IDC_STAT_BRADY_AP_VS_PERCENT: 0 %
MDC_IDC_STAT_BRADY_AS_VP_PERCENT: 12.05 %
MDC_IDC_STAT_BRADY_AS_VS_PERCENT: 87.95 %
MDC_IDC_STAT_BRADY_DTM_END: NORMAL
MDC_IDC_STAT_BRADY_DTM_START: NORMAL
MDC_IDC_STAT_BRADY_RA_PERCENT_PACED: 0 %
MDC_IDC_STAT_BRADY_RV_PERCENT_PACED: 12.05 %
MDC_IDC_STAT_EPISODE_RECENT_COUNT: 0
MDC_IDC_STAT_EPISODE_RECENT_COUNT: 1
MDC_IDC_STAT_EPISODE_RECENT_COUNT: 65
MDC_IDC_STAT_EPISODE_RECENT_COUNT_DTM_END: NORMAL
MDC_IDC_STAT_EPISODE_RECENT_COUNT_DTM_START: NORMAL
MDC_IDC_STAT_EPISODE_TOTAL_COUNT: 0
MDC_IDC_STAT_EPISODE_TOTAL_COUNT: 1
MDC_IDC_STAT_EPISODE_TOTAL_COUNT: 65
MDC_IDC_STAT_EPISODE_TOTAL_COUNT_DTM_END: NORMAL
MDC_IDC_STAT_EPISODE_TOTAL_COUNT_DTM_START: NORMAL
MDC_IDC_STAT_EPISODE_TYPE: NORMAL
MDC_IDC_STAT_TACHYTHERAPY_RECENT_DTM_END: NORMAL
MDC_IDC_STAT_TACHYTHERAPY_RECENT_DTM_START: NORMAL
MDC_IDC_STAT_TACHYTHERAPY_TOTAL_DTM_END: NORMAL
MDC_IDC_STAT_TACHYTHERAPY_TOTAL_DTM_START: NORMAL

## 2024-09-29 PROCEDURE — 93280 PM DEVICE PROGR EVAL DUAL: CPT | Performed by: INTERNAL MEDICINE

## 2024-09-30 ENCOUNTER — TELEPHONE (OUTPATIENT)
Dept: CARDIOLOGY | Facility: CLINIC | Age: 89
End: 2024-09-30
Payer: COMMERCIAL

## 2024-09-30 NOTE — TELEPHONE ENCOUNTER
Health Call Center    Phone Message    May a detailed message be left on voicemail: yes     Reason for Call: Other: Constanza called requesting to speak with her team about her echo results. Constanza has been unable to reach nurse Edel who has been trying to contact her to go over her results. Please reach out to Constanza to discuss. Thank you!     Action Taken: Other: Cardiology    Travel Screening: Not Applicable    Thank you!  [facility]       Date of Service:

## 2024-09-30 NOTE — TELEPHONE ENCOUNTER
Spoke with pt regarding discussion between Dr. Antoine and Dr. Mcclendon and the recommendation for CA poss PCI and RHC. Pt disappointed but agreeable to moving forward. Reviewed pre-procedure checklist and pt is in need of a H&P prior to scheduling angio. Pt will call PCP office to arrange and call me back. Provided pt my direct callback number. aj

## 2024-09-30 NOTE — TELEPHONE ENCOUNTER
Spoke with pt who wasn't able to get a pre-op appt scheduled with her PCP until 10/16 -- was able to sched pre-op with Dr. Antoine on 10/1/24. Reviewed medications. Case request placed and msg sent to procedure schedulers to arrange. Pt aware we will callback once proc scheduled. She also has my direct number should she need anything. aj

## 2024-10-02 ENCOUNTER — TELEPHONE (OUTPATIENT)
Dept: INTERNAL MEDICINE | Facility: CLINIC | Age: 89
End: 2024-10-02
Payer: COMMERCIAL

## 2024-10-03 NOTE — TELEPHONE ENCOUNTER
Case Type: Coronary angiogram with possible percutaneous cardiac intervention and RHC   Ordering Provider: Dr. Antoine  H&P: needs.. 9/6 was last visit with [facility]BF 9/11 with PMD office   Alerts: 2 day eliquis hold, grafts, bioprosthetic valve, pacemaker   Additional Info/Urgency: somewhat difficult to get ahold of- please with MY only - potentially new H&P if after 10/11  Orders for Pre-Procedure Labs? Day of

## 2024-10-08 ENCOUNTER — TELEPHONE (OUTPATIENT)
Dept: CARDIOLOGY | Facility: CLINIC | Age: 89
End: 2024-10-08
Payer: COMMERCIAL

## 2024-10-08 NOTE — TELEPHONE ENCOUNTER
----- Message from RY ANTOINE sent at 10/8/2024 10:14 AM CDT -----  Regarding: RE: review for urgency  Not urgent, can wait and also then would want to cancel pre op PE with me?  LF  ----- Message -----  From: Edel García, RN  Sent: 10/8/2024   9:46 AM CDT  To: Mariel Antoine MD  Subject: review for urgency                               Morning,  We received notification that all elective cases this week need to be cancelled due to IV fluid shortage. Constanza is scheduled for Thursday. Please let me know if you deem this case urgent (defined as unstable angina per meeting this am), in which case it would need to be forwarded to Dr. Mcclendon for review.  Also- I have her holding her Eliquis in preparation, so if not urgent, I will instruct her to resume as we have no timeline yet for rescheduling.  Thanks,  Mal       --------------------------------------------------------------------------------------------------------------------------------      angio 10/10  Received: 5 days ago  Edel García, Edel Kramer, RN  FW: [facility] pt  Received: Today  Shelby Medley Mallory J, RN  Case type: CORS POSS PCI, RHC    Procedure Physician(s): DAGOBERTO/ADAM    Procedure Date and Patient Arrival Time: Thursday 10/10, with arrival time of 0930    H&P: Pt scheduled on 10/9 WITH [facility]    Pre-Procedure Lab Appt: AT [facility] APPT? OR ON ADMISSION    Alerts/Important Info: 48 HOUR HOLD FOR ELITammi Zavaleta

## 2024-10-08 NOTE — TELEPHONE ENCOUNTER
Called Constanza and updated on need to cancel and postpone angiogram on Thursday. She was told to resume her Eliquis. Understanding verbalized.     She had a pre-op with Dr. Antoine scheduled tomorrow. After discussion with patient and her , it was decided just to cancel the visit. This is in the event that her angiogram could possibly be pushed out further and potentially having to do it again after 30 days. With the situation being very fluid, we are just very unsure of what the timeline will look like. Pt in agreement with plan. -[facility]

## 2024-10-11 ENCOUNTER — PREP FOR PROCEDURE (OUTPATIENT)
Dept: CARDIOLOGY | Facility: CLINIC | Age: 89
End: 2024-10-11
Payer: COMMERCIAL

## 2024-10-11 DIAGNOSIS — I25.83 CORONARY ATHEROSCLEROSIS DUE TO LIPID RICH PLAQUE: Primary | ICD-10-CM

## 2024-10-11 RX ORDER — LIDOCAINE 40 MG/G
CREAM TOPICAL
Status: CANCELLED | OUTPATIENT
Start: 2024-10-11

## 2024-10-11 RX ORDER — SODIUM CHLORIDE 9 MG/ML
INJECTION, SOLUTION INTRAVENOUS CONTINUOUS
Status: CANCELLED | OUTPATIENT
Start: 2024-10-16

## 2024-10-11 RX ORDER — FENTANYL CITRATE 50 UG/ML
25 INJECTION, SOLUTION INTRAMUSCULAR; INTRAVENOUS
Status: CANCELLED | OUTPATIENT
Start: 2024-10-16

## 2024-10-11 NOTE — PROGRESS NOTES
"Aspirin not ordered due to patient reported significant allergy and \"she will not take\". -[facility]  "

## 2024-10-14 ENCOUNTER — OFFICE VISIT (OUTPATIENT)
Dept: FAMILY MEDICINE | Facility: CLINIC | Age: 89
End: 2024-10-14
Payer: COMMERCIAL

## 2024-10-14 VITALS
DIASTOLIC BLOOD PRESSURE: 68 MMHG | HEIGHT: 61 IN | RESPIRATION RATE: 21 BRPM | HEART RATE: 76 BPM | TEMPERATURE: 98.2 F | OXYGEN SATURATION: 99 % | SYSTOLIC BLOOD PRESSURE: 116 MMHG | BODY MASS INDEX: 19.28 KG/M2 | WEIGHT: 102.1 LBS

## 2024-10-14 DIAGNOSIS — R73.03 PREDIABETES: ICD-10-CM

## 2024-10-14 DIAGNOSIS — Z01.818 PREOP GENERAL PHYSICAL EXAM: Primary | ICD-10-CM

## 2024-10-14 DIAGNOSIS — Z95.0 CARDIAC PACEMAKER IN SITU: ICD-10-CM

## 2024-10-14 DIAGNOSIS — Z79.01 ANTICOAGULATED: ICD-10-CM

## 2024-10-14 DIAGNOSIS — Z95.2 H/O AORTIC VALVE REPLACEMENT: ICD-10-CM

## 2024-10-14 DIAGNOSIS — I25.83 CORONARY ATHEROSCLEROSIS DUE TO LIPID RICH PLAQUE: ICD-10-CM

## 2024-10-14 DIAGNOSIS — Z95.1 S/P CABG (CORONARY ARTERY BYPASS GRAFT): ICD-10-CM

## 2024-10-14 DIAGNOSIS — I48.91 ATRIAL FIBRILLATION, UNSPECIFIED TYPE (H): ICD-10-CM

## 2024-10-14 DIAGNOSIS — I49.5 SICK SINUS SYNDROME (H): ICD-10-CM

## 2024-10-14 DIAGNOSIS — Z23 NEED FOR COVID-19 VACCINE: ICD-10-CM

## 2024-10-14 DIAGNOSIS — E78.00 PURE HYPERCHOLESTEROLEMIA: ICD-10-CM

## 2024-10-14 DIAGNOSIS — E53.8 VITAMIN B 12 DEFICIENCY: ICD-10-CM

## 2024-10-14 DIAGNOSIS — M80.00XA OSTEOPOROSIS WITH CURRENT PATHOLOGICAL FRACTURE, UNSPECIFIED OSTEOPOROSIS TYPE, INITIAL ENCOUNTER: ICD-10-CM

## 2024-10-14 DIAGNOSIS — Z95.820 S/P PERCUTANEOUS TRANSLUMINAL ANGIOPLASTY (PTA) WITH STENT PLACEMENT: ICD-10-CM

## 2024-10-14 DIAGNOSIS — I50.9 CONGESTIVE HEART FAILURE, UNSPECIFIED HF CHRONICITY, UNSPECIFIED HEART FAILURE TYPE (H): ICD-10-CM

## 2024-10-14 DIAGNOSIS — R06.09 DOE (DYSPNEA ON EXERTION): ICD-10-CM

## 2024-10-14 DIAGNOSIS — Z86.2 HISTORY OF ANEMIA: ICD-10-CM

## 2024-10-14 DIAGNOSIS — I10 BENIGN ESSENTIAL HYPERTENSION: ICD-10-CM

## 2024-10-14 PROBLEM — S72.002A FRACTURE OF HIP, LEFT, CLOSED, INITIAL ENCOUNTER (H): Status: RESOLVED | Noted: 2023-03-02 | Resolved: 2024-10-14

## 2024-10-14 PROBLEM — K92.2 GASTROINTESTINAL HEMORRHAGE, UNSPECIFIED GASTROINTESTINAL HEMORRHAGE TYPE: Status: RESOLVED | Noted: 2021-10-29 | Resolved: 2024-10-14

## 2024-10-14 PROBLEM — K92.1 MELENA: Status: RESOLVED | Noted: 2021-10-30 | Resolved: 2024-10-14

## 2024-10-14 LAB
ALBUMIN SERPL BCG-MCNC: 4.1 G/DL (ref 3.5–5.2)
ALP SERPL-CCNC: 59 U/L (ref 40–150)
ALT SERPL W P-5'-P-CCNC: 15 U/L (ref 0–50)
ANION GAP SERPL CALCULATED.3IONS-SCNC: 9 MMOL/L (ref 7–15)
AST SERPL W P-5'-P-CCNC: 28 U/L (ref 0–45)
BASOPHILS # BLD AUTO: 0.1 10E3/UL (ref 0–0.2)
BASOPHILS NFR BLD AUTO: 0 %
BILIRUB SERPL-MCNC: 0.7 MG/DL
BUN SERPL-MCNC: 14.6 MG/DL (ref 8–23)
BURR CELLS BLD QL SMEAR: SLIGHT
CALCIUM SERPL-MCNC: 9.6 MG/DL (ref 8.8–10.4)
CHLORIDE SERPL-SCNC: 100 MMOL/L (ref 98–107)
CREAT SERPL-MCNC: 0.79 MG/DL (ref 0.51–0.95)
EGFRCR SERPLBLD CKD-EPI 2021: 72 ML/MIN/1.73M2
ELLIPTOCYTES BLD QL SMEAR: SLIGHT
EOSINOPHIL # BLD AUTO: 0.5 10E3/UL (ref 0–0.7)
EOSINOPHIL NFR BLD AUTO: 4 %
ERYTHROCYTE [DISTWIDTH] IN BLOOD BY AUTOMATED COUNT: 22.3 % (ref 10–15)
FRAGMENTS BLD QL SMEAR: SLIGHT
GLUCOSE SERPL-MCNC: 111 MG/DL (ref 70–99)
HCO3 SERPL-SCNC: 27 MMOL/L (ref 22–29)
HCT VFR BLD AUTO: 33.3 % (ref 35–47)
HGB BLD-MCNC: 10.2 G/DL (ref 11.7–15.7)
IMM GRANULOCYTES # BLD: 0.1 10E3/UL
IMM GRANULOCYTES NFR BLD: 1 %
LYMPHOCYTES # BLD AUTO: 1.7 10E3/UL (ref 0.8–5.3)
LYMPHOCYTES NFR BLD AUTO: 15 %
MCH RBC QN AUTO: 24.9 PG (ref 26.5–33)
MCHC RBC AUTO-ENTMCNC: 30.6 G/DL (ref 31.5–36.5)
MCV RBC AUTO: 81 FL (ref 78–100)
MONOCYTES # BLD AUTO: 1.2 10E3/UL (ref 0–1.3)
MONOCYTES NFR BLD AUTO: 10 %
NEUTROPHILS # BLD AUTO: 7.8 10E3/UL (ref 1.6–8.3)
NEUTROPHILS NFR BLD AUTO: 70 %
NRBC # BLD AUTO: 0 10E3/UL
NRBC BLD AUTO-RTO: 0 /100
PLAT MORPH BLD: ABNORMAL
PLATELET # BLD AUTO: 156 10E3/UL (ref 150–450)
POLYCHROMASIA BLD QL SMEAR: SLIGHT
POTASSIUM SERPL-SCNC: 4.6 MMOL/L (ref 3.4–5.3)
PROT SERPL-MCNC: 7.1 G/DL (ref 6.4–8.3)
RBC # BLD AUTO: 4.1 10E6/UL (ref 3.8–5.2)
RBC MORPH BLD: ABNORMAL
SODIUM SERPL-SCNC: 136 MMOL/L (ref 135–145)
WBC # BLD AUTO: 11.3 10E3/UL (ref 4–11)

## 2024-10-14 PROCEDURE — 90480 ADMN SARSCOV2 VAC 1/ONLY CMP: CPT | Performed by: FAMILY MEDICINE

## 2024-10-14 PROCEDURE — 91320 SARSCV2 VAC 30MCG TRS-SUC IM: CPT | Performed by: FAMILY MEDICINE

## 2024-10-14 PROCEDURE — 80053 COMPREHEN METABOLIC PANEL: CPT | Performed by: FAMILY MEDICINE

## 2024-10-14 PROCEDURE — 85025 COMPLETE CBC W/AUTO DIFF WBC: CPT | Performed by: FAMILY MEDICINE

## 2024-10-14 PROCEDURE — 36415 COLL VENOUS BLD VENIPUNCTURE: CPT | Performed by: FAMILY MEDICINE

## 2024-10-14 PROCEDURE — 99215 OFFICE O/P EST HI 40 MIN: CPT | Performed by: FAMILY MEDICINE

## 2024-10-14 ASSESSMENT — PAIN SCALES - GENERAL: PAINLEVEL: NO PAIN (0)

## 2024-10-14 NOTE — LETTER
October 14, 2024      Constanza Coles  [address] [place] [address]        Dear ,    We are writing to inform you of your test results.      Resulted Orders   CBC with platelets and differential   Result Value Ref Range    WBC Count 11.3 (H) 4.0 - 11.0 10e3/uL    RBC Count 4.10 3.80 - 5.20 10e6/uL    Hemoglobin 10.2 (L) 11.7 - 15.7 g/dL    Hematocrit 33.3 (L) 35.0 - 47.0 %    MCV 81 78 - 100 fL    MCH 24.9 (L) 26.5 - 33.0 pg    MCHC 30.6 (L) 31.5 - 36.5 g/dL    RDW 22.3 (H) 10.0 - 15.0 %    Platelet Count 156 150 - 450 10e3/uL    % Neutrophils 70 %    % Lymphocytes 15 %    % Monocytes 10 %    % Eosinophils 4 %    % Basophils 0 %    % Immature Granulocytes 1 %    NRBCs per 100 WBC 0 <1 /100    Absolute Neutrophils 7.8 1.6 - 8.3 10e3/uL    Absolute Lymphocytes 1.7 0.8 - 5.3 10e3/uL    Absolute Monocytes 1.2 0.0 - 1.3 10e3/uL    Absolute Eosinophils 0.5 0.0 - 0.7 10e3/uL    Absolute Basophils 0.1 0.0 - 0.2 10e3/uL    Absolute Immature Granulocytes 0.1 <=0.4 10e3/uL    Absolute NRBCs 0.0 10e3/uL   RBC and Platelet Morphology   Result Value Ref Range    RBC Morphology Confirmed RBC Indices     Platelet Assessment  Automated Count Confirmed. Platelet morphology is normal.     Automated Count Confirmed. Platelet morphology is normal.    Topaz Cells Slight (A) None Seen    Elliptocytes Slight (A) None Seen    Polychromasia Slight (A) None Seen    RBC Fragments Slight (A) None Seen       If you have any questions or concerns, please call the clinic at the number listed above.       Sincerely,      SINDI/ Pooja Champion MD

## 2024-10-14 NOTE — PROGRESS NOTES
The below note was dictated using voice recognition. Although reviewed after completion, some word and grammatical error may remain .      Preoperative Evaluation  [id number] [facility]  [address]  [place] [address]  Phone: [phone]  Fax: [phone]  Primary Provider: Yun Jacinto MD  Pre-op Performing Provider: Pooja Champion MD  Oct 14, 2024             10/14/2024   Surgical Information   What procedure is being done? Stent placed   Facility or Hospital where procedure/surgery will be performed: [facility]   Who is doing the procedure / surgery? Not sure   Date of surgery / procedure: 10/16/2024   Time of surgery / procedure: 6:30 am   Where do you plan to recover after surgery? at home with family      Fax number for surgical facility: Note does not need to be faxed, will be available electronically in Epic.    Assessment & Plan     The proposed surgical procedure is considered INTERMEDIATE risk.    Preop general physical exam  Coronary atherosclerosis due to lipid rich plaque  S/P CABG (coronary artery bypass graft)  S/P percutaneous transluminal angioplasty (PTA) with stent placement  H/O aortic valve replacement  Atrial fibrillation, unspecified type (H)  Anticoagulated- CBC with Platelets & Differential; Future  Sick sinus syndrome (H)  Cardiac pacemaker in situ, dual chamber  Congestive heart failure, unspecified HF chronicity, unspecified heart failure type (H)  Chronic dyspnea on exertion  Benign essential hypertension- Comprehensive metabolic panel; Future  Pure hypercholesterolemia  Prediabetes- Comprehensive metabolic panel; Future  Here today for Preop for coronary angiogram and possible right heart cath and intervention on 10/16 in 2 days.  New to this provider and under care of her cardiologist who they saw on 6 September and primary care provider who they saw on 11 September. Recently seen by her cardiologist for dyspnea on exertion and  episodes of left leg pain that resolved with nitro use and echo showed borderline global hypokinesis EF of 50% and is to be further evaluated with a coronary angiogram.    Labs and diagnostics today   If stable will clear for surgery otherwise may need additional work up   EKG required for known coronary heart disease, significant arrhythmia, and structural heart disease and not completed in the last 90 days.   Recently had an echocardiogram for which is to get a coronary angiogram, gets regular cardiac pacemaker checks, cardiologist will be doing an EKG prior to angiogram.  Currently asymptomatic so deferred getting an EKG today  How to Take Your Medication Before Surgery  Preoperative Medication Instructions   Antiplatelet or Anticoagulation Medication Instructions- apixaban (Eliquis), edoxaban (Savaysa), rivaroxaban (Xarelto): Bleeding risk is moderate or high for this procedure AND CrCl  (>=) 50 mL/min. DO NOT TAKE 2 days before surgery.   Chronic A-fib and atrial valve placed more than 15 years ago, bridging with Lovenox not indicated also at risk of bleed  Take only metoprolol am of surgery    - Beta Blockers: Continue taking on the day of surgery.   - Statins: Continue taking on the day of surgery.    - SBE prophylaxis if needed per cardiology   Hold aspirin, antiinflammatories like motrin aleve etc, fish oil and glucosamine preferable 5 to 7 days prior to surgery  Will fax /send note to surgeon once completed   She is advised to discuss use of levaquin, findings of copd on prior cxr in [facility] in 2023 & dx in chart with her PCP dr Jacinto  She is advised to discuss chronic anemia hemoglobin of 10 with primary care provider as this may be contributing to shortness of breath on exertion and ideally for coronary artery disease hemoglobin should be more than 11  - CBC with Platelets & Differential; Future  - Comprehensive metabolic panel; Future  - CBC with Platelets & Differential  - Comprehensive metabolic  panel    History of anemia  History of partial gastrectomy for bleeding gastric ulcers in 1980, then seen for GI bleed 3 years ago noted EGD colonoscopy capsule endoscopy did not find a source was treated with transfusion and iron infusions.  Also reported history of B12 deficiency.  Has been on no supplements currently and hemoglobin has been stable around 10 most recently in September.  Agreeable to rechecking hemoglobin today to assess stability.  Unclear if following up with hematologist.She is advised to discuss chronic anemia hemoglobin of 10 with primary care provider as this may be contributing to shortness of breath on exertion and ideally for coronary artery disease hemoglobin should be more than 11  - CBC with Platelets & Differential; Future  - CBC with Platelets & Differential    Vitamin B 12 deficiency  Prior gastrectomy on B12 injectables, hemoglobin recently 10  - CBC with Platelets & Differential; Future  - CBC with Platelets & Differential    Osteoporosis with current pathological fracture, unspecified osteoporosis type, initial encounter  History of osteoporosis in chart currently on no medications may discuss with primary    Need for COVID-19 vaccine  COVID-vaccine given today  - COVID-19 12+ (PFIZER)     Implanted Device   - Type of device: Tronic cardiac pacemaker Patient advised to bring device information on day of surgery.     - No identified additional risk factors other than previously addressed    Preoperative Medication Instructions  Antiplatelet or Anticoagulation Medication Instructions   - apixaban (Eliquis), edoxaban (Savaysa), rivaroxaban (Xarelto): Bleeding risk is moderate or high for this procedure AND CrCl  (>=) 50 mL/min. DO NOT TAKE 2 days before surgery.     Additional Medication Instructions    - Beta Blockers: Continue taking on the day of surgery.   - Statins: Continue taking on the day of surgery.     Recommendation  Approval given to proceed with proposed procedure,  PENDING LABS ARE STABLE     Subjective   Constanza is a 89 year old, presenting for the following:  Pre-Op Exam (Having stent placed)          10/14/2024    10:00 AM   Additional Questions   Roomed by Pastora PICKARD   Accompanied by self         10/14/2024    10:00 AM   Patient Reported Additional Medications   Patient reports taking the following new medications None     HPI related to upcoming procedure: Here today for Preop for coronary angiogram and possible right heart cath and intervention on 10/16 in 2 days.  New to this provider and under care of her cardiologist who they saw on 6 September and primary care provider who they saw on 11 September.        10/14/2024   Pre-Op Questionnaire   Have you ever had a heart attack or stroke? No   Have you ever had surgery on your heart or blood vessels, such as a stent placement, a coronary artery bypass, or surgery on an artery in your head, neck, heart, or legs? (!) YES , prior CBAG, stent x 1 in past, has a aortic replaced, reports has a pigs valve, replaced in 2005   Do you have chest pain with activity? No   Do you have a history of heart failure? (!) YES see below , hx of CHF , no leg swelling has dyspnea on exertion, echo showed borderline global hypokinesis   Do you currently have a cold, bronchitis or symptoms of other infection? No   Do you have a cough, shortness of breath, or wheezing? (!) YES no cough or wheezing, no new shortness of breath   Do you or anyone in your family have previous history of blood clots? No   Do you or does anyone in your family have a serious bleeding problem such as prolonged bleeding following surgeries or cuts? No   Have you ever had problems with anemia or been told to take iron pills? (!) YES hx of chronic anemia, hb 10 , prior gi bleed, hb down to 5.73  s/p egd & colonoscopy, & cap endoscopy, reports not seen, s/p iron infusion and no blood transfusions . Reported stable at 10/4 1 yr   Have you had any abnormal blood loss such as  black, tarry or bloody stools, or abnormal vaginal bleeding? (!) YES in past, not currently    Have you ever had a blood transfusion? (!) YES, in past    Have you ever had a transfusion reaction? No   Are you willing to have a blood transfusion if it is medically needed before, during, or after your surgery? Yes   Have you or any of your relatives ever had problems with anesthesia? No   Do you have sleep apnea, excessive snoring or daytime drowsiness? No   Do you have any artifical heart valves or other implanted medical devices like a pacemaker, defibrillator, or continuous glucose monitor? (!) YES aortic valve    What type of device do you have? Pace Maker   Name of the clinic that manages your device [facility]   Do you have artificial joints? (!) YES   Are you allergic to latex? No      89-year-old former smoker, no inhalers, with Coronary atherosclerosis due to lipid rich plaque,  S/P CABG (coronary artery bypass graft), In January 2005 she had a LIMA to the LAD and a vein graft to the diagonal. She had aortic valve replacement at the same time.  There was no exclusion of the left atrial appendage at that time. H/O aortic valve replacement (  Due to severe aortic stenosis she had a 21 mm Forte pericardial magna valve placed in 2005 and  Angiography September 2021 showed normal left main, occluded proximal LAD, circumflex with a mid 40 to 50% stenosis, distal obtuse marginal artery one 95 to 99% stenosis, right coronary artery with a mid 95% and distal tandem 85% lesions.  She had intervention on the circumflex, based on 2023 echo working well. Given that this tissue valve was placed over 15 years ago cardio checks echo yearly. Was advised to take aspirin daily due to prior GI bleed and history of stomach upset on this is not on it previously on Effient which annelise since been discontinued now that she is on Eliquis, on statin beta-blocker nitro as needed, hx of chronic atrial fibrillation.  Asymptomatic, not valvular, and now on Eliquis 2.5 mg p.o. twice daily given her lower weight and age over 82, hx of sick sinus syndrome, cardiac pacemaker in situ, dual chamber. Medtronic device with Medtronic leads placed in  with recent device change out January of this year and currently 12% ventricular pacing, but some tachycardia seen, history of CHF, EF 50 to 55% on echo which showed borderline global hypokinesis in 2024, benign essential hypertension,  metoprolol increased by cards recently  from 25-37.5 and then 50 mg a day, Pure hypercholesterolemia ( Total cholesterol 141 with an LDL of 38), prediabetes hemoglobin A1c 5.7 in September, hx of resolved Stage 3 chronic kidney disease, unspecified whether stage 3a or 3b CKD (H) recent  normal creatinine is 0. 0.88 and GFR 64 so consider this resolved.  Hx of remote vasculitis  and not a current issue, history of prior GI bleed in  with resultant anemia on Carafate as needed hemoglobin has been stable around 10 unclear if has had a hematological workup for this, history of B12 deficiency, on vitamin B12, history of osteoporosis, left hip fracture, history of elevated vitamin D was until recently getting 5000 daily vitamin D as well as 50,000 once a week and that was put on hold with the primary care provider, hyperinflation noted on cxr in 2023 [facility], only had inhalers for a flu in past , ? history of COPD on no inhalers but PCP giving Levaquin a standing order as needed basis for flareups, patient reported history of bile acid aspiration causing pneumonia in the past for which prescribed Levaquin as needed but has not used in over a year understands risks, history of prior endoscopy colonoscopies hernia repair , gallstones, hysterectomy, partial gastrectomy excetra, had a pacemaker check 2024,   Seen by cardiology  when noted symptomatically atypical left shoulder discomfort that comes on at rest and goes away with  nitroglycerin.  Discussed stress testing which she was not interested in.  In addition, discussed pursuing angiography and intervention and if significant abnormalities on echo to consider invasive angiography and possible valve issues.  Given shortness of breath done to recheck the valve. RCA is amenable to PCI. L-LAD may actually be okay w/ some moderate disease and spasm, but can always re-look at it if doing another angio. If valve is good and she is open to procedures, cards to offer angio +/- PCI and likely work on RCA.  If valve deteriorating, to offer v-I-v TAVR w/up  Echo done 9/6 showed borderline global hypokinesis EF 50 to 55% left ventricular hypertrophy, mild MR and TR  Seen by PCP 9/11/2024 when noted vitamin D elevated advised to hold 3 months hemoglobin 10.3 prediabetes.    Reviewed today for Preop for coronary angiogram right heart cath possible intervention on 10/16 in 2 days new to this provider    Health Care Directive  Patient has a Health Care Directive on file    Preoperative Review of    reviewed - no record of controlled substances prescribed.    But on gabapentin given previously     Status of Chronic Conditions:  A-FIB - Patient has a longstanding history of chronic A-fib currently on rate control. Current treatment regimen includes Apixaban for stroke prevention and denies significant symptoms of lightheadedness, palpitations or dyspnea.     ANEMIA - Patient has a recent history of anemia, which has not been symptomatic. Work up to date has revealed unclear cause.  1980 had a partial gastrectomy for gastric ulcers.  EGD, capsule endoscopy and colonoscopy done couple years ago for GI bleed did not reveal any cause.  Uses sucralfate as needed.  Hemoglobin has been around the tens in the past 1 year.  Has received iron infusion and transfusion in the past with hemoglobin down to 5.  Not clear if seen a hematologist and encouraged to discuss with primary care provider    CAD - Patient  has a longstanding history of moderate-severe CAD. Patient denies recent chest pain or NTG use, denies exercise induced dyspnea or PND. Last Stress test recently.  See cardiac notes     CHF - Patient has a longstanding history of moderate-severe CHF. Exacerbating conditions include ischemic heart disease. Currently the patient's condition is same. Current treatment regimen includes beta blocker. The patient denies chest pain, edema, orthopnea, SOB or recent weight gain. Has chronic dyspnea on exertion.  Last Echocardiogram 9/2026  EKG on file    Has a pacemaker check every 3 months    HYPERLIPIDEMIA - Patient has a long history of significant Hyperlipidemia requiring medication for treatment with recent good control. Patient reports no problems or side effects with the medication.     HYPERTENSION - Patient has longstanding history of HTN , currently denies any symptoms referable to elevated blood pressure. Specifically denies chest pain, palpitations, dyspnea, orthopnea, PND or peripheral edema. Blood pressure readings have been in normal range. Current medication regimen is as listed below. Patient denies any side effects of medication. Cardiology increase ddose and tolerating.     Prediabetes   Reports has symptoms of low glucose and treat with sugar  Never checked   HBA1c 5.7    Patient Active Problem List    Diagnosis Date Noted    Prediabetes 10/14/2024     Priority: Medium    Benign essential hypertension 09/06/2023     Priority: Medium    Osteoporosis with current pathological fracture, unspecified osteoporosis type, initial encounter 03/02/2023     Priority: Medium    COPD (chronic obstructive pulmonary disease) (H) 01/25/2022     Priority: Medium    CHF (congestive heart failure) (H) 01/25/2022     Priority: Medium    S/P percutaneous transluminal angioplasty (PTA) with stent placement 11/09/2021     Priority: Medium    History of anemia 10/30/2021     Priority: Medium    Tension headache 10/30/2021      Priority: Medium    Anticoagulated 10/30/2021     Priority: Medium    S/P CABG (coronary artery bypass graft) 2019     Priority: Medium    Pure hypercholesterolemia 2019     Priority: Medium    Cardiac pacemaker in situ, dual chamber 2017     Priority: Medium     Medtronic [id number] Versa, DOI 2011. RA and RV Leads: Medtronic 4076   CapSureFix Novus, DOI 2005.        Coronary Artery Disease      Priority: Medium     Created by Conversion  Replacement Utility updated for latest IMO load        Vasculitis (H) 2016     Priority: Medium    Vitamin B 12 deficiency 2016     Priority: Medium    Atrial fibrillation (H) 2015     Priority: Medium    H/O aortic valve replacement 2014     Priority: Medium    Sick sinus syndrome (H)      Priority: Medium     Created by Conversion          Past Medical History:   Diagnosis Date    Anemia     iron deficiency    Chronic atrial fibrillation (H)     Fracture of hip, left, closed, initial encounter (H) 2023    Gastrointestinal hemorrhage, unspecified gastrointestinal hemorrhage type 10/29/2021    GERD (gastroesophageal reflux disease)     Hypertension     IHD (ischemic heart disease)     Melena 10/30/2021    PUD (peptic ulcer disease)     Billroth 1    Recurrent cystitis     SSS (sick sinus syndrome) (H)     post pacemaker placement    Valvular heart disease     Vasculitis (H)      Past Surgical History:   Procedure Laterality Date    ASCENDING AORTIC ANEURYSM REPAIR W/ TISSUE AORTIC VALVE REPLACEMENT  2005    Dacron graft, Forte pericardial Magna 21mm aortic valve     SECTION      COLONOSCOPY N/A 10/31/2021    Procedure: COLONOSCOPY;  Surgeon: Dario Thomas MD;  Location: [facility] Main OR    CV CORONARY ANGIOGRAM N/A 2021    Procedure: Coronary Angiogram;  Surgeon: Shane Mcclendon MD;  Location: [facility] CATH LAB CV    CV LEFT HEART CATH N/A 2021    Procedure: Left Heart Cath;  Surgeon:  "Shane Mcclendon MD;  Location: [facility] CV    CV PCI N/A 9/27/2021    Procedure: Percutaneous Coronary Intervention;  Surgeon: Shane Mcclendon MD;  Location: [facility] CATH LAB CV    EP PACEMAKER GENERATOR REPLACEMENT- DUAL N/A 1/15/2024    Procedure: Pacemaker Generator Replacement Dual;  Surgeon: Edward Grimm MD;  Location: [facility] CV    ESOPHAGOSCOPY, GASTROSCOPY, DUODENOSCOPY (EGD), COMBINED N/A 10/30/2021    Procedure: ESOPHAGOGASTRODUODENOSCOPY (EGD);  Surgeon: Dario Thomas MD;  Location: [facility] Main OR    GALLBLADDER SURGERY      partial colon removal    HYSTERECTOMY      IR AORTIC ARCH 4 VESSEL ANGIOGRAM  9/15/2011    IR MISCELLANEOUS PROCEDURE  1/6/2005    OTHER SURGICAL HISTORY  1981    antrectomy    PARTIAL GASTRECTOMY      WA PERC CLOS,BART INTERATRIAL COMMUN W/IMPL      Description: Patent Foramen Ovale Closure Percutaneous;  Recorded: 04/25/2012;    ZZC CABG, VEIN, SINGLE      Description: CABG (CABG);  Recorded: 04/25/2012;  Comments: LIMA^LAD, SVG^1st diag    ZZC REPLACE AORT VALV,PROSTH VALV      Aortic Valve Replacement with Forte pericardial Magna 21mm Jan 2005     Current Outpatient Medications   Medication Sig Dispense Refill    acetaminophen (TYLENOL) 500 MG tablet Take 1,000 mg by mouth every 6 hours as needed for mild pain      atorvastatin (LIPITOR) 40 MG tablet Take 1 tablet (40 mg) by mouth daily. 90 tablet 3    B-D TB SYRINGE 27G X 1/2\" 1 ML MISC See Admin Instructions      cyanocobalamin (CYANOCOBALAMIN) 1000 MCG/ML injection INJECT 1 ML (1,000 MCG) INTO THE SHOULDER, THIGH, OR BUTTOCKS EVERY 30 DAYS. **SYR 27G 1/2\" 1CC** 3 mL 3    ELIQUIS ANTICOAGULANT 2.5 MG tablet TAKE 1 TABLET(2.5 MG) BY MOUTH TWICE DAILY 60 tablet 3    gabapentin (NEURONTIN) 300 MG capsule Take 1 capsule (300 mg) by mouth 2 times daily 180 capsule 3    levofloxacin (LEVAQUIN) 250 MG tablet Take 1 tablet (250 mg) by mouth daily 10 tablet 3    metoprolol succinate ER " (TOPROL XL) 25 MG 24 hr tablet TAKE 1 TABLET (25 MG) BY MOUTH DAILY 90 tablet 3    nitroGLYcerin (NITROSTAT) 0.4 MG sublingual tablet For chest pain place 1 tablet under the tongue every 5 minutes for 3 doses. If symptoms persist 5 minutes after 1st dose call 911. 30 tablet 4    sodium fluoride dental gel (PREVIDENT) 1.1 % GEL topical gel ONE TEASPOON IN FLUORIDE TRAY APPLY TO TEETH FOR 3-5 MINUTES ONCE DAILY      sucralfate (CARAFATE) 1 GM tablet Take 1 tablet (1 g) by mouth 4 times daily (Patient taking differently: Take 1 g by mouth 4 times daily as needed.) 360 tablet 3    cholecalciferol, vitamin D3, (VITAMIN D3) 5,000 unit Tab [CHOLECALCIFEROL, VITAMIN D3, (VITAMIN D3) 5,000 UNIT TAB] Take 1 tablet by mouth daily. (Patient not taking: Reported on 10/14/2024)         Allergies   Allergen Reactions    Aspirin Nausea and Vomiting     Vomiting. Pt will NOT take Aspirin.     Erythromycin Base [Erythromycin] Unknown     Pt is not sure she is allergic to this    Sulfatolamide     Vancomycin Other (See Comments)     Red man syndrome    Xanax [Alprazolam] Other (See Comments)     confusion    Sulfa (Sulfonamide Antibiotics) [Sulfa Antibiotics] Rash        Social History     Tobacco Use    Smoking status: Former     Current packs/day: 0.00     Average packs/day: 1.5 packs/day for 20.0 years (30.0 ttl pk-yrs)     Types: Cigarettes     Start date: 10/29/1959     Quit date: 10/29/1979     Years since quittin.9    Smokeless tobacco: Never   Substance Use Topics    Alcohol use: Yes     Comment: 1-2 week     Family History   Problem Relation Age of Onset    Liver Disease Father     No Known Problems Mother     Thyroid Disease Sister     LUNG DISEASE Sister      History   Drug Use No             Review of Systems  Constitutional, HEENT, cardiovascular, pulmonary, GI, , musculoskeletal, neuro, skin, endocrine and psych systems are negative, except as otherwise noted.    Objective    /68 (BP Location: Right arm,  "Patient Position: Sitting, Cuff Size: Adult Regular)   Pulse 76   Temp 98.2  F (36.8  C) (Temporal)   Resp 21   Ht 1.55 m (5' 1.02\")   Wt 46.3 kg (102 lb 1.6 oz)   LMP  (LMP Unknown)   SpO2 99%   BMI 19.28 kg/m     Estimated body mass index is 19.28 kg/m  as calculated from the following:    Height as of this encounter: 1.55 m (5' 1.02\").    Weight as of this encounter: 46.3 kg (102 lb 1.6 oz).  Physical Exam  GENERAL: alert and no distress  EYES: Eyes grossly normal to inspection, PERRL and conjunctivae and sclerae normal  HENT: ear canals and TM's normal, nose and mouth without ulcers or lesions  NECK: no adenopathy, no asymmetry, masses, or scars  RESP: decreased breath sounds all over but lungs clear to auscultation - no rales, rhonchi or wheezes  CV: regular rate and rhythm, normal S1 S2, no S3 or S4, no murmur, click or rub, no peripheral edema, this maker in place left upper chest  ABDOMEN: soft, nontender, no hepatosplenomegaly, no masses and bowel sounds normal, midline upper abdominal scar  MS: no gross musculoskeletal defects noted, no edema  SKIN: no suspicious lesions or rashes  NEURO: Normal strength and tone, mentation intact and speech normal  PSYCH: mentation appears normal, affect normal/bright    Recent Labs   Lab Test 09/11/24  1506 01/15/24  0732   HGB 10.3* 10.9*    204    137   POTASSIUM 5.2 3.9   CR 0.97* 0.88   A1C 5.7*  --         Diagnostics  Recent Results (from the past 720 hour(s))   Echocardiogram Complete    Collection Time: 09/17/24  2:00 PM   Result Value Ref Range    LVEF  50-55%       EKG required for known coronary heart disease, significant arrhythmia, and structural heart disease and not completed in the last 90 days.   Recently had an echocardiogram, gets regular cardiac pacemaker checks, cardiologist will be doing an EKG prior to angiogram.  Currently asymptomatic so deferred getting an EKG today    Revised Cardiac Risk Index (RCRI)  The patient has the " following serious cardiovascular risks for perioperative complications:   - High risk surgery (>5% cardiac complication risk) = 1 point     RCRI Interpretation: 1 point: Class II (low risk - 0.9% complication rate)         Signed Electronically by: Pooja Champion MD

## 2024-10-14 NOTE — PATIENT INSTRUCTIONS
How to Take Your Medication Before Surgery  Preoperative Medication Instructions   Antiplatelet or Anticoagulation Medication Instructions   - apixaban (Eliquis), edoxaban (Savaysa), rivaroxaban (Xarelto): Bleeding risk is moderate or high for this procedure AND CrCl  (>=) 50 mL/min. DO NOT TAKE 2 days before surgery.     Additional Medication Instructions    - Beta Blockers: Continue taking on the day of surgery.   - Statins: Continue taking on the day of surgery.    - SBE prophylaxis if needed per cardiology     Labs and diagnostics today   If stable will clear for surgery otherwise may need additional work up   EKG required for known coronary heart disease, significant arrhythmia, and structural heart disease and not completed in the last 90 days.   Recently had an echocardiogram for which is to get a coronary angiogram, gets regular cardiac pacemaker checks, cardiologist will be doing an EKG prior to angiogram.  Currently asymptomatic so deferred getting an EKG today  Take only metoprolol am of surgery  Hold aspirin, antiinflammatories like motrin aleve etc, fish oil and glucosamine preferable 5 to 7 days prior to surgery  Will fax /send note to surgeon once completed   Discuss use of levaquin, findings of copo on prior cxr in [facility] in 2023 & dx in chart with your PCP dr wilkerson  Discussed chronic anemia hemoglobin of 10 with primary care provider as this may be contributing to shortness of breath on exertion and ideally for coronary artery disease hemoglobin should be more than 11    The above note was dictated using voice recognition. Although reviewed after completion, some word and grammatical error may remain .       Patient Education   Preparing for Your Surgery  For Adults  Getting started  In most cases, a nurse will call to review your health history and instructions. They will give you an arrival time based on your scheduled surgery time. Please be ready to share:  Your doctor's clinic name and phone  number  Your medical, surgical, and anesthesia history  A list of allergies and sensitivities  A list of medicines, including herbal treatments and over-the-counter drugs  Whether the patient has a legal guardian (ask how to send us the papers in advance)  Note: You may not receive a call if you were seen at our PAC (Preoperative Assessment Center).  Please tell us if you're pregnant--or if there's any chance you might be pregnant. Some surgeries may injure a fetus (unborn baby), so they require a pregnancy test. Surgeries that are safe for a fetus don't always need a test, and you can choose whether to have one.   Preparing for surgery  Within 10 to 30 days of surgery: Have a pre-op exam (sometimes called an H&P, or History and Physical). This can be done at a clinic or pre-operative center.  If you're having a , you may not need this exam. Talk to your care team.  At your pre-op exam, talk to your care team about all medicines you take. (This includes CBD oil and any drugs, such as THC, marijuana, and other forms of cannabis.) If you need to stop any medicine before surgery, ask when to start taking it again.  This is for your safety. Many medicines and drugs can make you bleed too much during surgery. Some change how well surgery (anesthesia) drugs work.  Call your insurance company to let them know you're having surgery. (If you don't have insurance, call [phone].)  Call your clinic if there's any change in your health. This includes a scrape or scratch near the surgery site, or any signs of a cold (sore throat, runny nose, cough, rash, fever).  Eating and drinking guidelines  For your safety: Unless your surgeon tells you otherwise, follow the guidelines below.  Eat and drink as normal until 8 hours before you arrive for surgery. After that, no food or milk. You can spit out gum when you arrive.  Drink clear liquids until 2 hours before you arrive. These are liquids you can see through, like  water, Gatorade, and Propel Water. They also include plain black coffee and tea (no cream or milk).  No alcohol for 24 hours before you arrive. The night before surgery, stop any drinks that contain THC.  If your care team tells you to take medicine on the morning of surgery, it's okay to take it with a sip of water. No other medicines or drugs are allowed (including CBD oil)--follow your care team's instructions.  If you have questions the day of surgery, call your hospital or surgery center.   Preventing infection  Shower or bathe the night before and the morning of surgery. Follow the instructions your clinic gave you. (If no instructions, use regular soap.)  Don't shave or clip hair near your surgery site. We'll remove the hair if needed.  Don't smoke or vape the morning of surgery. No chewing tobacco for 6 hours before you arrive. A nicotine patch is okay. You may spit out nicotine gum when you arrive.  For some surgeries, the surgeon will tell you to fully quit smoking and nicotine.  We will make every effort to keep you safe from infection. We will:  Clean our hands often with soap and water (or an alcohol-based hand rub).  Clean the skin at your surgery site with a special soap that kills germs.  Give you a special gown to keep you warm. (Cold raises the risk of infection.)  Wear hair covers, masks, gowns, and gloves during surgery.  Give antibiotic medicine, if prescribed. Not all surgeries need this medicine.  What to bring on the day of surgery  Photo ID and insurance card  Copy of your health care directive, if you have one  Glasses and hearing aids (bring cases)  You can't wear contacts during surgery  Inhaler and eye drops, if you use them (tell us about these when you arrive)  CPAP machine or breathing device, if you use them  A few personal items, if spending the night  If you have . . .  A pacemaker, ICD (cardiac defibrillator), or other implant: Bring the ID card.  An implanted stimulator: Bring  the remote control.  A legal guardian: Bring a copy of the certified (court-stamped) guardianship papers.  Please remove any jewelry, including body piercings. Leave jewelry and other valuables at home.  If you're going home the day of surgery  You must have a responsible adult drive you home. They should stay with you overnight as well.  If you don't have someone to stay with you, and you aren't safe to go home alone, we may keep you overnight. Insurance often won't pay for this.  After surgery  If it's hard to control your pain or you need more pain medicine, please call your surgeon's office.  Questions?   If you have any questions for your care team, list them here:   ____________________________________________________________________________________________________________________________________________________________________________________________________________________________________________________________  For informational purposes only. Not to replace the advice of your health care provider. Copyright   2003, 2019 [employer]. All rights reserved. Clinically reviewed by Michael Lr MD. [employer] [id number] - REV 08/24.

## 2024-10-14 NOTE — H&P (VIEW-ONLY)
The below note was dictated using voice recognition. Although reviewed after completion, some word and grammatical error may remain .      Preoperative Evaluation  [address] [id number] [facility]  [address]  [place] [address]  Phone: [phone]  Fax: [phone]  Primary Provider: Yun Jacinto MD  Pre-op Performing Provider: Pooja Champion MD  Oct 14, 2024             10/14/2024   Surgical Information   What procedure is being done? Stent placed   Facility or Hospital where procedure/surgery will be performed: [facility]   Who is doing the procedure / surgery? Not sure   Date of surgery / procedure: 10/16/2024   Time of surgery / procedure: 6:30 am   Where do you plan to recover after surgery? at home with family      Fax number for surgical facility: Note does not need to be faxed, will be available electronically in Epic.    Assessment & Plan     The proposed surgical procedure is considered INTERMEDIATE risk.    Preop general physical exam  Coronary atherosclerosis due to lipid rich plaque  S/P CABG (coronary artery bypass graft)  S/P percutaneous transluminal angioplasty (PTA) with stent placement  H/O aortic valve replacement  Atrial fibrillation, unspecified type (H)  Anticoagulated- CBC with Platelets & Differential; Future  Sick sinus syndrome (H)  Cardiac pacemaker in situ, dual chamber  Congestive heart failure, unspecified HF chronicity, unspecified heart failure type (H)  Chronic dyspnea on exertion  Benign essential hypertension- Comprehensive metabolic panel; Future  Pure hypercholesterolemia  Prediabetes- Comprehensive metabolic panel; Future  Here today for Preop for coronary angiogram and possible right heart cath and intervention on 10/16 in 2 days.  New to this provider and under care of her cardiologist who they saw on 6 September and primary care provider who they saw on 11 September. Recently seen by her cardiologist for dyspnea on exertion and  episodes of left leg pain that resolved with nitro use and echo showed borderline global hypokinesis EF of 50% and is to be further evaluated with a coronary angiogram.    Labs and diagnostics today   If stable will clear for surgery otherwise may need additional work up   EKG required for known coronary heart disease, significant arrhythmia, and structural heart disease and not completed in the last 90 days.   Recently had an echocardiogram for which is to get a coronary angiogram, gets regular cardiac pacemaker checks, cardiologist will be doing an EKG prior to angiogram.  Currently asymptomatic so deferred getting an EKG today  How to Take Your Medication Before Surgery  Preoperative Medication Instructions   Antiplatelet or Anticoagulation Medication Instructions- apixaban (Eliquis), edoxaban (Savaysa), rivaroxaban (Xarelto): Bleeding risk is moderate or high for this procedure AND CrCl  (>=) 50 mL/min. DO NOT TAKE 2 days before surgery.   Chronic A-fib and atrial valve placed more than 15 years ago, bridging with Lovenox not indicated also at risk of bleed  Take only metoprolol am of surgery    - Beta Blockers: Continue taking on the day of surgery.   - Statins: Continue taking on the day of surgery.    - SBE prophylaxis if needed per cardiology   Hold aspirin, antiinflammatories like motrin aleve etc, fish oil and glucosamine preferable 5 to 7 days prior to surgery  Will fax /send note to surgeon once completed   She is advised to discuss use of levaquin, findings of copd on prior cxr in [facility] in 2023 & dx in chart with her PCP dr Jacinto  She is advised to discuss chronic anemia hemoglobin of 10 with primary care provider as this may be contributing to shortness of breath on exertion and ideally for coronary artery disease hemoglobin should be more than 11  - CBC with Platelets & Differential; Future  - Comprehensive metabolic panel; Future  - CBC with Platelets & Differential  - Comprehensive metabolic  panel    History of anemia  History of partial gastrectomy for bleeding gastric ulcers in 1980, then seen for GI bleed 3 years ago noted EGD colonoscopy capsule endoscopy did not find a source was treated with transfusion and iron infusions.  Also reported history of B12 deficiency.  Has been on no supplements currently and hemoglobin has been stable around 10 most recently in September.  Agreeable to rechecking hemoglobin today to assess stability.  Unclear if following up with hematologist.She is advised to discuss chronic anemia hemoglobin of 10 with primary care provider as this may be contributing to shortness of breath on exertion and ideally for coronary artery disease hemoglobin should be more than 11  - CBC with Platelets & Differential; Future  - CBC with Platelets & Differential    Vitamin B 12 deficiency  Prior gastrectomy on B12 injectables, hemoglobin recently 10  - CBC with Platelets & Differential; Future  - CBC with Platelets & Differential    Osteoporosis with current pathological fracture, unspecified osteoporosis type, initial encounter  History of osteoporosis in chart currently on no medications may discuss with primary    Need for COVID-19 vaccine  COVID-vaccine given today  - COVID-19 12+ (PFIZER)     Implanted Device   - Type of device: Tronic cardiac pacemaker Patient advised to bring device information on day of surgery.     - No identified additional risk factors other than previously addressed    Preoperative Medication Instructions  Antiplatelet or Anticoagulation Medication Instructions   - apixaban (Eliquis), edoxaban (Savaysa), rivaroxaban (Xarelto): Bleeding risk is moderate or high for this procedure AND CrCl  (>=) 50 mL/min. DO NOT TAKE 2 days before surgery.     Additional Medication Instructions    - Beta Blockers: Continue taking on the day of surgery.   - Statins: Continue taking on the day of surgery.     Recommendation  Approval given to proceed with proposed procedure,  PENDING LABS ARE STABLE     Subjective   Constanza is a 89 year old, presenting for the following:  Pre-Op Exam (Having stent placed)          10/14/2024    10:00 AM   Additional Questions   Roomed by Pastora PICKARD   Accompanied by self         10/14/2024    10:00 AM   Patient Reported Additional Medications   Patient reports taking the following new medications None     HPI related to upcoming procedure: Here today for Preop for coronary angiogram and possible right heart cath and intervention on 10/16 in 2 days.  New to this provider and under care of her cardiologist who they saw on 6 September and primary care provider who they saw on 11 September.        10/14/2024   Pre-Op Questionnaire   Have you ever had a heart attack or stroke? No   Have you ever had surgery on your heart or blood vessels, such as a stent placement, a coronary artery bypass, or surgery on an artery in your head, neck, heart, or legs? (!) YES , prior CBAG, stent x 1 in past, has a aortic replaced, reports has a pigs valve, replaced in 2005   Do you have chest pain with activity? No   Do you have a history of heart failure? (!) YES see below , hx of CHF , no leg swelling has dyspnea on exertion, echo showed borderline global hypokinesis   Do you currently have a cold, bronchitis or symptoms of other infection? No   Do you have a cough, shortness of breath, or wheezing? (!) YES no cough or wheezing, no new shortness of breath   Do you or anyone in your family have previous history of blood clots? No   Do you or does anyone in your family have a serious bleeding problem such as prolonged bleeding following surgeries or cuts? No   Have you ever had problems with anemia or been told to take iron pills? (!) YES hx of chronic anemia, hb 10 , prior gi bleed, hb down to 5.73  s/p egd & colonoscopy, & cap endoscopy, reports not seen, s/p iron infusion and no blood transfusions . Reported stable at 10/4 1 yr   Have you had any abnormal blood loss such as  black, tarry or bloody stools, or abnormal vaginal bleeding? (!) YES in past, not currently    Have you ever had a blood transfusion? (!) YES, in past    Have you ever had a transfusion reaction? No   Are you willing to have a blood transfusion if it is medically needed before, during, or after your surgery? Yes   Have you or any of your relatives ever had problems with anesthesia? No   Do you have sleep apnea, excessive snoring or daytime drowsiness? No   Do you have any artifical heart valves or other implanted medical devices like a pacemaker, defibrillator, or continuous glucose monitor? (!) YES aortic valve    What type of device do you have? Pace Maker   Name of the clinic that manages your device [facility]   Do you have artificial joints? (!) YES   Are you allergic to latex? No      89-year-old former smoker, no inhalers, with Coronary atherosclerosis due to lipid rich plaque,  S/P CABG (coronary artery bypass graft), In January 2005 she had a LIMA to the LAD and a vein graft to the diagonal. She had aortic valve replacement at the same time.  There was no exclusion of the left atrial appendage at that time. H/O aortic valve replacement (  Due to severe aortic stenosis she had a 21 mm Forte pericardial magna valve placed in 2005 and  Angiography September 2021 showed normal left main, occluded proximal LAD, circumflex with a mid 40 to 50% stenosis, distal obtuse marginal artery one 95 to 99% stenosis, right coronary artery with a mid 95% and distal tandem 85% lesions.  She had intervention on the circumflex, based on 2023 echo working well. Given that this tissue valve was placed over 15 years ago cardio checks echo yearly. Was advised to take aspirin daily due to prior GI bleed and history of stomach upset on this is not on it previously on Effient which annelise since been discontinued now that she is on Eliquis, on statin beta-blocker nitro as needed, hx of chronic atrial fibrillation.  Asymptomatic, not valvular, and now on Eliquis 2.5 mg p.o. twice daily given her lower weight and age over 82, hx of sick sinus syndrome, cardiac pacemaker in situ, dual chamber. Medtronic device with Medtronic leads placed in  with recent device change out January of this year and currently 12% ventricular pacing, but some tachycardia seen, history of CHF, EF 50 to 55% on echo which showed borderline global hypokinesis in 2024, benign essential hypertension,  metoprolol increased by cards recently  from 25-37.5 and then 50 mg a day, Pure hypercholesterolemia ( Total cholesterol 141 with an LDL of 38), prediabetes hemoglobin A1c 5.7 in September, hx of resolved Stage 3 chronic kidney disease, unspecified whether stage 3a or 3b CKD (H) recent  normal creatinine is 0. 0.88 and GFR 64 so consider this resolved.  Hx of remote vasculitis  and not a current issue, history of prior GI bleed in  with resultant anemia on Carafate as needed hemoglobin has been stable around 10 unclear if has had a hematological workup for this, history of B12 deficiency, on vitamin B12, history of osteoporosis, left hip fracture, history of elevated vitamin D was until recently getting 5000 daily vitamin D as well as 50,000 once a week and that was put on hold with the primary care provider, hyperinflation noted on cxr in 2023 [facility], only had inhalers for a flu in past , ? history of COPD on no inhalers but PCP giving Levaquin a standing order as needed basis for flareups, patient reported history of bile acid aspiration causing pneumonia in the past for which prescribed Levaquin as needed but has not used in over a year understands risks, history of prior endoscopy colonoscopies hernia repair , gallstones, hysterectomy, partial gastrectomy excetra, had a pacemaker check 2024,   Seen by cardiology  when noted symptomatically atypical left shoulder discomfort that comes on at rest and goes away with  nitroglycerin.  Discussed stress testing which she was not interested in.  In addition, discussed pursuing angiography and intervention and if significant abnormalities on echo to consider invasive angiography and possible valve issues.  Given shortness of breath done to recheck the valve. RCA is amenable to PCI. L-LAD may actually be okay w/ some moderate disease and spasm, but can always re-look at it if doing another angio. If valve is good and she is open to procedures, cards to offer angio +/- PCI and likely work on RCA.  If valve deteriorating, to offer v-I-v TAVR w/up  Echo done 9/6 showed borderline global hypokinesis EF 50 to 55% left ventricular hypertrophy, mild MR and TR  Seen by PCP 9/11/2024 when noted vitamin D elevated advised to hold 3 months hemoglobin 10.3 prediabetes.    Reviewed today for Preop for coronary angiogram right heart cath possible intervention on 10/16 in 2 days new to this provider    Health Care Directive  Patient has a Health Care Directive on file    Preoperative Review of    reviewed - no record of controlled substances prescribed.    But on gabapentin given previously     Status of Chronic Conditions:  A-FIB - Patient has a longstanding history of chronic A-fib currently on rate control. Current treatment regimen includes Apixaban for stroke prevention and denies significant symptoms of lightheadedness, palpitations or dyspnea.     ANEMIA - Patient has a recent history of anemia, which has not been symptomatic. Work up to date has revealed unclear cause.  1980 had a partial gastrectomy for gastric ulcers.  EGD, capsule endoscopy and colonoscopy done couple years ago for GI bleed did not reveal any cause.  Uses sucralfate as needed.  Hemoglobin has been around the tens in the past 1 year.  Has received iron infusion and transfusion in the past with hemoglobin down to 5.  Not clear if seen a hematologist and encouraged to discuss with primary care provider    CAD - Patient  has a longstanding history of moderate-severe CAD. Patient denies recent chest pain or NTG use, denies exercise induced dyspnea or PND. Last Stress test recently.  See cardiac notes     CHF - Patient has a longstanding history of moderate-severe CHF. Exacerbating conditions include ischemic heart disease. Currently the patient's condition is same. Current treatment regimen includes beta blocker. The patient denies chest pain, edema, orthopnea, SOB or recent weight gain. Has chronic dyspnea on exertion.  Last Echocardiogram 9/2026  EKG on file    Has a pacemaker check every 3 months    HYPERLIPIDEMIA - Patient has a long history of significant Hyperlipidemia requiring medication for treatment with recent good control. Patient reports no problems or side effects with the medication.     HYPERTENSION - Patient has longstanding history of HTN , currently denies any symptoms referable to elevated blood pressure. Specifically denies chest pain, palpitations, dyspnea, orthopnea, PND or peripheral edema. Blood pressure readings have been in normal range. Current medication regimen is as listed below. Patient denies any side effects of medication. Cardiology increase ddose and tolerating.     Prediabetes   Reports has symptoms of low glucose and treat with sugar  Never checked   HBA1c 5.7    Patient Active Problem List    Diagnosis Date Noted    Prediabetes 10/14/2024     Priority: Medium    Benign essential hypertension 09/06/2023     Priority: Medium    Osteoporosis with current pathological fracture, unspecified osteoporosis type, initial encounter 03/02/2023     Priority: Medium    COPD (chronic obstructive pulmonary disease) (H) 01/25/2022     Priority: Medium    CHF (congestive heart failure) (H) 01/25/2022     Priority: Medium    S/P percutaneous transluminal angioplasty (PTA) with stent placement 11/09/2021     Priority: Medium    History of anemia 10/30/2021     Priority: Medium    Tension headache 10/30/2021      Priority: Medium    Anticoagulated 10/30/2021     Priority: Medium    S/P CABG (coronary artery bypass graft) 2019     Priority: Medium    Pure hypercholesterolemia 2019     Priority: Medium    Cardiac pacemaker in situ, dual chamber 2017     Priority: Medium     Medtronic [id number] Versa, DOI 2011. RA and RV Leads: Medtronic 4076   CapSureFix Novus, DOI 2005.        Coronary Artery Disease      Priority: Medium     Created by Conversion  Replacement Utility updated for latest IMO load        Vasculitis (H) 2016     Priority: Medium    Vitamin B 12 deficiency 2016     Priority: Medium    Atrial fibrillation (H) 2015     Priority: Medium    H/O aortic valve replacement 2014     Priority: Medium    Sick sinus syndrome (H)      Priority: Medium     Created by Conversion          Past Medical History:   Diagnosis Date    Anemia     iron deficiency    Chronic atrial fibrillation (H)     Fracture of hip, left, closed, initial encounter (H) 2023    Gastrointestinal hemorrhage, unspecified gastrointestinal hemorrhage type 10/29/2021    GERD (gastroesophageal reflux disease)     Hypertension     IHD (ischemic heart disease)     Melena 10/30/2021    PUD (peptic ulcer disease)     Billroth 1    Recurrent cystitis     SSS (sick sinus syndrome) (H)     post pacemaker placement    Valvular heart disease     Vasculitis (H)      Past Surgical History:   Procedure Laterality Date    ASCENDING AORTIC ANEURYSM REPAIR W/ TISSUE AORTIC VALVE REPLACEMENT  2005    Dacron graft, Forte pericardial Magna 21mm aortic valve     SECTION      COLONOSCOPY N/A 10/31/2021    Procedure: COLONOSCOPY;  Surgeon: Dario Thomas MD;  Location: [facility] Main OR    CV CORONARY ANGIOGRAM N/A 2021    Procedure: Coronary Angiogram;  Surgeon: Shane Mcclendon MD;  Location: [facility] CATH LAB CV    CV LEFT HEART CATH N/A 2021    Procedure: Left Heart Cath;  Surgeon:  "Shane Mcclendon MD;  Location: [facility] CV    CV PCI N/A 9/27/2021    Procedure: Percutaneous Coronary Intervention;  Surgeon: Shane Mcclendon MD;  Location: [facility] CATH LAB CV    EP PACEMAKER GENERATOR REPLACEMENT- DUAL N/A 1/15/2024    Procedure: Pacemaker Generator Replacement Dual;  Surgeon: Edward Grimm MD;  Location: [facility] CV    ESOPHAGOSCOPY, GASTROSCOPY, DUODENOSCOPY (EGD), COMBINED N/A 10/30/2021    Procedure: ESOPHAGOGASTRODUODENOSCOPY (EGD);  Surgeon: Dario Thomas MD;  Location: [facility] Main OR    GALLBLADDER SURGERY      partial colon removal    HYSTERECTOMY      IR AORTIC ARCH 4 VESSEL ANGIOGRAM  9/15/2011    IR MISCELLANEOUS PROCEDURE  1/6/2005    OTHER SURGICAL HISTORY  1981    antrectomy    PARTIAL GASTRECTOMY      OR PERC CLOS,BART INTERATRIAL COMMUN W/IMPL      Description: Patent Foramen Ovale Closure Percutaneous;  Recorded: 04/25/2012;    ZZC CABG, VEIN, SINGLE      Description: CABG (CABG);  Recorded: 04/25/2012;  Comments: LIMA^LAD, SVG^1st diag    ZZC REPLACE AORT VALV,PROSTH VALV      Aortic Valve Replacement with Forte pericardial Magna 21mm Jan 2005     Current Outpatient Medications   Medication Sig Dispense Refill    acetaminophen (TYLENOL) 500 MG tablet Take 1,000 mg by mouth every 6 hours as needed for mild pain      atorvastatin (LIPITOR) 40 MG tablet Take 1 tablet (40 mg) by mouth daily. 90 tablet 3    B-D TB SYRINGE 27G X 1/2\" 1 ML MISC See Admin Instructions      cyanocobalamin (CYANOCOBALAMIN) 1000 MCG/ML injection INJECT 1 ML (1,000 MCG) INTO THE SHOULDER, THIGH, OR BUTTOCKS EVERY 30 DAYS. **SYR 27G 1/2\" 1CC** 3 mL 3    ELIQUIS ANTICOAGULANT 2.5 MG tablet TAKE 1 TABLET(2.5 MG) BY MOUTH TWICE DAILY 60 tablet 3    gabapentin (NEURONTIN) 300 MG capsule Take 1 capsule (300 mg) by mouth 2 times daily 180 capsule 3    levofloxacin (LEVAQUIN) 250 MG tablet Take 1 tablet (250 mg) by mouth daily 10 tablet 3    metoprolol succinate ER " (TOPROL XL) 25 MG 24 hr tablet TAKE 1 TABLET (25 MG) BY MOUTH DAILY 90 tablet 3    nitroGLYcerin (NITROSTAT) 0.4 MG sublingual tablet For chest pain place 1 tablet under the tongue every 5 minutes for 3 doses. If symptoms persist 5 minutes after 1st dose call 911. 30 tablet 4    sodium fluoride dental gel (PREVIDENT) 1.1 % GEL topical gel ONE TEASPOON IN FLUORIDE TRAY APPLY TO TEETH FOR 3-5 MINUTES ONCE DAILY      sucralfate (CARAFATE) 1 GM tablet Take 1 tablet (1 g) by mouth 4 times daily (Patient taking differently: Take 1 g by mouth 4 times daily as needed.) 360 tablet 3    cholecalciferol, vitamin D3, (VITAMIN D3) 5,000 unit Tab [CHOLECALCIFEROL, VITAMIN D3, (VITAMIN D3) 5,000 UNIT TAB] Take 1 tablet by mouth daily. (Patient not taking: Reported on 10/14/2024)         Allergies   Allergen Reactions    Aspirin Nausea and Vomiting     Vomiting. Pt will NOT take Aspirin.     Erythromycin Base [Erythromycin] Unknown     Pt is not sure she is allergic to this    Sulfatolamide     Vancomycin Other (See Comments)     Red man syndrome    Xanax [Alprazolam] Other (See Comments)     confusion    Sulfa (Sulfonamide Antibiotics) [Sulfa Antibiotics] Rash        Social History     Tobacco Use    Smoking status: Former     Current packs/day: 0.00     Average packs/day: 1.5 packs/day for 20.0 years (30.0 ttl pk-yrs)     Types: Cigarettes     Start date: 10/29/1959     Quit date: 10/29/1979     Years since quittin.9    Smokeless tobacco: Never   Substance Use Topics    Alcohol use: Yes     Comment: 1-2 week     Family History   Problem Relation Age of Onset    Liver Disease Father     No Known Problems Mother     Thyroid Disease Sister     LUNG DISEASE Sister      History   Drug Use No             Review of Systems  Constitutional, HEENT, cardiovascular, pulmonary, GI, , musculoskeletal, neuro, skin, endocrine and psych systems are negative, except as otherwise noted.    Objective    /68 (BP Location: Right arm,  "Patient Position: Sitting, Cuff Size: Adult Regular)   Pulse 76   Temp 98.2  F (36.8  C) (Temporal)   Resp 21   Ht 1.55 m (5' 1.02\")   Wt 46.3 kg (102 lb 1.6 oz)   LMP  (LMP Unknown)   SpO2 99%   BMI 19.28 kg/m     Estimated body mass index is 19.28 kg/m  as calculated from the following:    Height as of this encounter: 1.55 m (5' 1.02\").    Weight as of this encounter: 46.3 kg (102 lb 1.6 oz).  Physical Exam  GENERAL: alert and no distress  EYES: Eyes grossly normal to inspection, PERRL and conjunctivae and sclerae normal  HENT: ear canals and TM's normal, nose and mouth without ulcers or lesions  NECK: no adenopathy, no asymmetry, masses, or scars  RESP: decreased breath sounds all over but lungs clear to auscultation - no rales, rhonchi or wheezes  CV: regular rate and rhythm, normal S1 S2, no S3 or S4, no murmur, click or rub, no peripheral edema, this maker in place left upper chest  ABDOMEN: soft, nontender, no hepatosplenomegaly, no masses and bowel sounds normal, midline upper abdominal scar  MS: no gross musculoskeletal defects noted, no edema  SKIN: no suspicious lesions or rashes  NEURO: Normal strength and tone, mentation intact and speech normal  PSYCH: mentation appears normal, affect normal/bright    Recent Labs   Lab Test 09/11/24  1506 01/15/24  0732   HGB 10.3* 10.9*    204    137   POTASSIUM 5.2 3.9   CR 0.97* 0.88   A1C 5.7*  --         Diagnostics  Recent Results (from the past 720 hour(s))   Echocardiogram Complete    Collection Time: 09/17/24  2:00 PM   Result Value Ref Range    LVEF  50-55%       EKG required for known coronary heart disease, significant arrhythmia, and structural heart disease and not completed in the last 90 days.   Recently had an echocardiogram, gets regular cardiac pacemaker checks, cardiologist will be doing an EKG prior to angiogram.  Currently asymptomatic so deferred getting an EKG today    Revised Cardiac Risk Index (RCRI)  The patient has the " following serious cardiovascular risks for perioperative complications:   - High risk surgery (>5% cardiac complication risk) = 1 point     RCRI Interpretation: 1 point: Class II (low risk - 0.9% complication rate)         Signed Electronically by: Pooja Champion MD

## 2024-10-14 NOTE — RESULT ENCOUNTER NOTE
Anemia hemoglobin stable at 10.2 okay to proceed with coronary angiogram with cardiologist.  White count mildly elevated no sign of infection noted on exam today.  Continues with what appears to be iron deficiency anemia  This may be contributing to shortness of breath on exertion as well  Recommend considering seeing a hematologist may get that referral from your regular primary care provider

## 2024-10-14 NOTE — LETTER
October 15, 2024      Constanza Coles  [address] [place] [address]        Dear ,    We are writing to inform you of your test results.    Results within acceptable limits.  -Liver and gallbladder tests (ALT,AST, Alk phos,bilirubin) are normal.   -Kidney function (GFR) is normal.   -Sodium is normal.   -Potassium is normal.   -Calcium is normal.   -Glucose is slight elevated and may be a sign of prediabetes   See prior note from CBC result from today as well.     Resulted Orders   Comprehensive metabolic panel   Result Value Ref Range    Sodium 136 135 - 145 mmol/L    Potassium 4.6 3.4 - 5.3 mmol/L    Carbon Dioxide (CO2) 27 22 - 29 mmol/L    Anion Gap 9 7 - 15 mmol/L    Urea Nitrogen 14.6 8.0 - 23.0 mg/dL    Creatinine 0.79 0.51 - 0.95 mg/dL    GFR Estimate 72 >60 mL/min/1.73m2      Comment:      eGFR calculated using 2021 CKD-EPI equation.    Calcium 9.6 8.8 - 10.4 mg/dL      Comment:      Reference intervals for this test were updated on 7/16/2024 to reflect our healthy population more accurately. There may be differences in the flagging of prior results with similar values performed with this method. Those prior results can be interpreted in the context of the updated reference intervals.    Chloride 100 98 - 107 mmol/L    Glucose 111 (H) 70 - 99 mg/dL    Alkaline Phosphatase 59 40 - 150 U/L    AST 28 0 - 45 U/L    ALT 15 0 - 50 U/L    Protein Total 7.1 6.4 - 8.3 g/dL    Albumin 4.1 3.5 - 5.2 g/dL    Bilirubin Total 0.7 <=1.2 mg/dL   CBC with platelets and differential   Result Value Ref Range    WBC Count 11.3 (H) 4.0 - 11.0 10e3/uL    RBC Count 4.10 3.80 - 5.20 10e6/uL    Hemoglobin 10.2 (L) 11.7 - 15.7 g/dL    Hematocrit 33.3 (L) 35.0 - 47.0 %    MCV 81 78 - 100 fL    MCH 24.9 (L) 26.5 - 33.0 pg    MCHC 30.6 (L) 31.5 - 36.5 g/dL    RDW 22.3 (H) 10.0 - 15.0 %    Platelet Count 156 150 - 450 10e3/uL    % Neutrophils 70 %    % Lymphocytes 15 %    % Monocytes 10 %    % Eosinophils 4 %    %  Basophils 0 %    % Immature Granulocytes 1 %    NRBCs per 100 WBC 0 <1 /100    Absolute Neutrophils 7.8 1.6 - 8.3 10e3/uL    Absolute Lymphocytes 1.7 0.8 - 5.3 10e3/uL    Absolute Monocytes 1.2 0.0 - 1.3 10e3/uL    Absolute Eosinophils 0.5 0.0 - 0.7 10e3/uL    Absolute Basophils 0.1 0.0 - 0.2 10e3/uL    Absolute Immature Granulocytes 0.1 <=0.4 10e3/uL    Absolute NRBCs 0.0 10e3/uL   RBC and Platelet Morphology   Result Value Ref Range    RBC Morphology Confirmed RBC Indices     Platelet Assessment  Automated Count Confirmed. Platelet morphology is normal.     Automated Count Confirmed. Platelet morphology is normal.    Hutchins Cells Slight (A) None Seen    Elliptocytes Slight (A) None Seen    Polychromasia Slight (A) None Seen    RBC Fragments Slight (A) None Seen       If you have any questions or concerns, please call the clinic at the number listed above.       Sincerely,      SINDI/ Pooja Champion MD

## 2024-10-14 NOTE — RESULT ENCOUNTER NOTE
Results within acceptable limits.  -Liver and gallbladder tests (ALT,AST, Alk phos,bilirubin) are normal.  -Kidney function (GFR) is normal.  -Sodium is normal.  -Potassium is normal.  -Calcium is normal.  -Glucose is slight elevated and may be a sign of prediabetes  See prior note from CBC result from today as well.

## 2024-10-15 ENCOUNTER — TELEPHONE (OUTPATIENT)
Dept: CARDIOLOGY | Facility: CLINIC | Age: 89
End: 2024-10-15
Payer: COMMERCIAL

## 2024-10-15 NOTE — TELEPHONE ENCOUNTER
----- Message from Edel MEDINA sent at 10/11/2024 11:28 AM CDT -----  Regarding: Angio 10/16  ----- Message -----   From: Lian Lemon   Sent: 10/10/2024   4:21 PM CDT   To: Vivian Gonzales RN   Subject: RE: LBF pt                                       Case type: CA Poss PCI, RHC   Procedure Physician(s): WILIAN/RADHA   Procedure Date and Patient Arrival Time: Wednesday 10/16, with arrival time of 0630   H&P: Pt will scheduled with PMD   Pre-Procedure Lab Appt: on admission - Please place lab orders if you haven't already!   Alerts/Important Info: Eliquis 48 hr hold   Interpretor Requested: n/a     Thank you,   Lian

## 2024-10-15 NOTE — TELEPHONE ENCOUNTER
Constanza JORDI [facility]  [address] [place] [address]  [phone] (home)     PCP:  Yun Jacinto  H&P completed by:  pmd 10/14  Admit date 10/16 Arrival time:  0630  Anticoagulation:  apixaban (ELIQUIS)  Previous PCI: Yes  Bypass Grafts: Yes  Renal Issues: No  Diabetic?: No  Device?: Yes  Type:  pacemaker   Ambulation status: ambulatory     Reason for Visit:  Telephone call to discuss pre-procedure education in preparation for: Coronary Angiogram with Possible PCI ---education done prior to re-scheduling due to IV fluid shortage     Procedure Prep:  EKG results obtained, dated: To be completed on day of cath lab procedure  Hemogram results obtained: To be completed on day of cath lab procedure  Basic Metabolic Panel results obtained: To be completed on day of cath lab procedure   Pertinent cardiac test results: see 9/19 telephone encounter + 9/17 echo results       Patient Education    Your arrival time is 0630.  [place] [address] - Main Entrance of the Hospital  Please plan on being at the hospital all day.  At any time, emergencies and/or urgent cases may come up which could delay the start of your procedure.    COVID Testing Instructions  *Mandatory COVID Testing: ALL Patients will need to complete a COVID test no sooner than 4 days prior to their procedure (regardless of vaccination status).    To schedule COVID testing Please call [phone]  If you want to complete this at an outside facility please call them to find out if they will have the results within the appropriate time frame and their fax number.  You will need to provide us with that information so we can send the order.  The facility completing the test will need to fax the results to [phone]  If you are running into and issues please call us.     Pre-procedure instructions  Take your temperature in the morning prior to coming in.  If your temperature is 100 F  please call [facility] [phone] and notify them.  If you do not have access to a thermometer at home, please come in for testing.  If you are running a temperature your procedure may be rescheduled.  Patient instructed to not Eat or Drink after midnight.  Patient instructed to shower the evening before or the morning of the procedure.  Patient instructed to arrange for transportation home following procedure from a responsible family member or friend. No driving for at least 24 hours.  Patient instructed to have a responsible adult with them for 24 hours post-procedure.  Post-procedure follow up process.  Conscious sedation discussed.      Pre-procedure medication instructions.  Continue medications as scheduled, with a small amount of water on the day of the procedure unless indicated. (NO Diabetic Medications or Blood Thinners)  Pt instructed not to consume Alcohol, Tobacco, Caffeine, or Carbonated beverages 12 hours prior to procedure.  Patient instructed to take 324 mg of Aspirin the night before and morning of procedure: No------Pt reports allergy and will not take!!   Other medication: instructed to only take metoprolol + gabapentin  a.m. of the procedure.              Diabetic Medication Instructions  DO NOT take any oral diabetic medication, short-acting diabetes medications/insulin, humalog or regular insulin the morning of your test  Take   dose of long-acting insulin (Lantus, Levemir) the day of your test  Hold Oral Diabetic on the day of the procedure and for 48 hours after IV contrast given  Remember to  bring your glucometer and insulin with you to take after your test if needed              Anticoagulation Medication Instructions     apixaban (ELIQUIS) - Hold 48 hours prior to procedure    Patient states understanding of procedure and agrees to proceed.    *PATIENTS RECORDS AVAILABLE IN [facility] UNLESS OTHERWISE INDICATED*      Patient Active Problem List   Diagnosis    Coronary Artery Disease    Sick  "sinus syndrome (H)    H/O aortic valve replacement    Atrial fibrillation (H)    Vitamin B 12 deficiency    Vasculitis (H)    Cardiac pacemaker in situ, dual chamber    S/P CABG (coronary artery bypass graft)    Pure hypercholesterolemia    History of anemia    Tension headache    Anticoagulated    S/P percutaneous transluminal angioplasty (PTA) with stent placement    COPD (chronic obstructive pulmonary disease) (H)    CHF (congestive heart failure) (H)    Osteoporosis with current pathological fracture, unspecified osteoporosis type, initial encounter    Benign essential hypertension    Prediabetes       Current Outpatient Medications   Medication Sig Dispense Refill    acetaminophen (TYLENOL) 500 MG tablet Take 1,000 mg by mouth every 6 hours as needed for mild pain      atorvastatin (LIPITOR) 40 MG tablet Take 1 tablet (40 mg) by mouth daily. 90 tablet 3    B-D TB SYRINGE 27G X 1/2\" 1 ML MISC See Admin Instructions      cholecalciferol, vitamin D3, (VITAMIN D3) 5,000 unit Tab [CHOLECALCIFEROL, VITAMIN D3, (VITAMIN D3) 5,000 UNIT TAB] Take 1 tablet by mouth daily. (Patient not taking: Reported on 10/14/2024)      cyanocobalamin (CYANOCOBALAMIN) 1000 MCG/ML injection INJECT 1 ML (1,000 MCG) INTO THE SHOULDER, THIGH, OR BUTTOCKS EVERY 30 DAYS. **SYR 27G 1/2\" 1CC** 3 mL 3    ELIQUIS ANTICOAGULANT 2.5 MG tablet TAKE 1 TABLET(2.5 MG) BY MOUTH TWICE DAILY 60 tablet 3    gabapentin (NEURONTIN) 300 MG capsule Take 1 capsule (300 mg) by mouth 2 times daily 180 capsule 3    levofloxacin (LEVAQUIN) 250 MG tablet Take 1 tablet (250 mg) by mouth daily 10 tablet 3    metoprolol succinate ER (TOPROL XL) 25 MG 24 hr tablet TAKE 1 TABLET (25 MG) BY MOUTH DAILY 90 tablet 3    nitroGLYcerin (NITROSTAT) 0.4 MG sublingual tablet For chest pain place 1 tablet under the tongue every 5 minutes for 3 doses. If symptoms persist 5 minutes after 1st dose call 911. 30 tablet 4    sodium fluoride dental gel (PREVIDENT) 1.1 % GEL topical gel " ONE TEASPOON IN FLUORIDE TRAY APPLY TO TEETH FOR 3-5 MINUTES ONCE DAILY      sucralfate (CARAFATE) 1 GM tablet Take 1 tablet (1 g) by mouth 4 times daily (Patient taking differently: Take 1 g by mouth 4 times daily as needed.) 360 tablet 3       Allergies   Allergen Reactions    Aspirin Nausea and Vomiting     Vomiting. Pt will NOT take Aspirin.     Erythromycin Base [Erythromycin] Unknown     Pt is not sure she is allergic to this    Sulfatolamide     Vancomycin Other (See Comments)     Red man syndrome    Xanax [Alprazolam] Other (See Comments)     confusion    Sulfa (Sulfonamide Antibiotics) [Sulfa Antibiotics] Rash       Edel García RN on 10/15/2024 at 3:13 PM            Pt refuses aspirin due to allergy. 2 day eliquis hold.

## 2024-10-15 NOTE — TELEPHONE ENCOUNTER
Update- pt requesting to see [facility] closer to home post intervention tomorrow- if able. She requests [place] location. -elver

## 2024-10-16 ENCOUNTER — HOSPITAL ENCOUNTER (OUTPATIENT)
Facility: HOSPITAL | Age: 89
Discharge: HOME | End: 2024-10-16
Attending: STUDENT IN AN ORGANIZED HEALTH CARE EDUCATION/TRAINING PROGRAM | Admitting: STUDENT IN AN ORGANIZED HEALTH CARE EDUCATION/TRAINING PROGRAM
Payer: COMMERCIAL

## 2024-10-16 VITALS
HEIGHT: 61 IN | DIASTOLIC BLOOD PRESSURE: 67 MMHG | HEART RATE: 81 BPM | OXYGEN SATURATION: 92 % | WEIGHT: 102 LBS | RESPIRATION RATE: 18 BRPM | TEMPERATURE: 98 F | SYSTOLIC BLOOD PRESSURE: 147 MMHG | BODY MASS INDEX: 19.26 KG/M2

## 2024-10-16 DIAGNOSIS — Z95.5 PRESENCE OF CORONARY ARTERY BYPASS GRAFT STENT: Primary | ICD-10-CM

## 2024-10-16 DIAGNOSIS — I25.118 ATHEROSCLEROSIS OF CORONARY ARTERY OF NATIVE HEART WITH OTHER FORM OF ANGINA PECTORIS, UNSPECIFIED VESSEL OR LESION TYPE (H): ICD-10-CM

## 2024-10-16 DIAGNOSIS — I25.83 CORONARY ATHEROSCLEROSIS DUE TO LIPID RICH PLAQUE: ICD-10-CM

## 2024-10-16 DIAGNOSIS — I25.10 ATHEROSCLEROSIS OF NATIVE CORONARY ARTERY OF NATIVE HEART WITHOUT ANGINA PECTORIS: ICD-10-CM

## 2024-10-16 DIAGNOSIS — E78.00 PURE HYPERCHOLESTEROLEMIA: ICD-10-CM

## 2024-10-16 DIAGNOSIS — Z95.1 PRESENCE OF CORONARY ARTERY BYPASS GRAFT STENT: Primary | ICD-10-CM

## 2024-10-16 DIAGNOSIS — Z95.2 H/O AORTIC VALVE REPLACEMENT: ICD-10-CM

## 2024-10-16 DIAGNOSIS — Z95.1 S/P CABG (CORONARY ARTERY BYPASS GRAFT): ICD-10-CM

## 2024-10-16 PROBLEM — Z98.890 STATUS POST CORONARY ANGIOGRAM: Status: ACTIVE | Noted: 2024-10-16

## 2024-10-16 LAB
ABO/RH(D): NORMAL
ACT BLD: 123 SECONDS (ref 74–150)
ACT BLD: 199 SECONDS (ref 74–150)
ACT BLD: 245 SECONDS (ref 74–150)
ANTIBODY SCREEN: NEGATIVE
ATRIAL RATE - MUSE: 86 BPM
ATRIAL RATE - MUSE: 89 BPM
BASE EXCESS BLDA CALC-SCNC: -0.6 MMOL/L (ref -3–3)
BASE EXCESS BLDV CALC-SCNC: 1.8 MMOL/L (ref -3–3)
CHOLEST SERPL-MCNC: 103 MG/DL
DIASTOLIC BLOOD PRESSURE - MUSE: NORMAL MMHG
DIASTOLIC BLOOD PRESSURE - MUSE: NORMAL MMHG
FASTING STATUS PATIENT QL REPORTED: YES
GLUCOSE BLDC GLUCOMTR-MCNC: 111 MG/DL (ref 70–99)
HCO3 BLDA-SCNC: 24 MMOL/L (ref 21–28)
HCO3 BLDV-SCNC: 25 MMOL/L (ref 21–28)
HDLC SERPL-MCNC: 67 MG/DL
INTERPRETATION ECG - MUSE: NORMAL
INTERPRETATION ECG - MUSE: NORMAL
LDLC SERPL CALC-MCNC: 20 MG/DL
NONHDLC SERPL-MCNC: 36 MG/DL
OXYHGB MFR BLDA: 79 % (ref 92–100)
OXYHGB MFR BLDV: 49 % (ref 70–75)
P AXIS - MUSE: NORMAL DEGREES
P AXIS - MUSE: NORMAL DEGREES
PCO2 BLDA: 49 MM HG (ref 35–45)
PCO2 BLDV: 49 MM HG (ref 40–50)
PH BLDA: 7.32 [PH] (ref 7.35–7.45)
PH BLDV: 7.36 [PH] (ref 7.32–7.43)
PO2 BLDA: 47 MM HG (ref 80–105)
PO2 BLDV: 30 MM HG (ref 25–47)
PR INTERVAL - MUSE: NORMAL MS
PR INTERVAL - MUSE: NORMAL MS
QRS DURATION - MUSE: 116 MS
QRS DURATION - MUSE: 120 MS
QT - MUSE: 390 MS
QT - MUSE: 398 MS
QTC - MUSE: 444 MS
QTC - MUSE: 474 MS
R AXIS - MUSE: -57 DEGREES
R AXIS - MUSE: -61 DEGREES
SAO2 % BLDA: 80 % (ref 95–96)
SAO2 % BLDV: 50 % (ref 70–75)
SPECIMEN EXPIRATION DATE: NORMAL
SYSTOLIC BLOOD PRESSURE - MUSE: NORMAL MMHG
SYSTOLIC BLOOD PRESSURE - MUSE: NORMAL MMHG
T AXIS - MUSE: 102 DEGREES
T AXIS - MUSE: 93 DEGREES
TRIGL SERPL-MCNC: 81 MG/DL
VENTRICULAR RATE- MUSE: 75 BPM
VENTRICULAR RATE- MUSE: 89 BPM

## 2024-10-16 PROCEDURE — C1894 INTRO/SHEATH, NON-LASER: HCPCS | Performed by: STUDENT IN AN ORGANIZED HEALTH CARE EDUCATION/TRAINING PROGRAM

## 2024-10-16 PROCEDURE — 82805 BLOOD GASES W/O2 SATURATION: CPT

## 2024-10-16 PROCEDURE — 92978 ENDOLUMINL IVUS OCT C 1ST: CPT | Mod: 26 | Performed by: STUDENT IN AN ORGANIZED HEALTH CARE EDUCATION/TRAINING PROGRAM

## 2024-10-16 PROCEDURE — C1751 CATH, INF, PER/CENT/MIDLINE: HCPCS | Performed by: STUDENT IN AN ORGANIZED HEALTH CARE EDUCATION/TRAINING PROGRAM

## 2024-10-16 PROCEDURE — 82962 GLUCOSE BLOOD TEST: CPT

## 2024-10-16 PROCEDURE — 250N000013 HC RX MED GY IP 250 OP 250 PS 637: Performed by: NURSE PRACTITIONER

## 2024-10-16 PROCEDURE — 93457 R HRT ART/GRFT ANGIO: CPT | Mod: 26 | Performed by: STUDENT IN AN ORGANIZED HEALTH CARE EDUCATION/TRAINING PROGRAM

## 2024-10-16 PROCEDURE — C1874 STENT, COATED/COV W/DEL SYS: HCPCS | Performed by: STUDENT IN AN ORGANIZED HEALTH CARE EDUCATION/TRAINING PROGRAM

## 2024-10-16 PROCEDURE — C1769 GUIDE WIRE: HCPCS | Performed by: STUDENT IN AN ORGANIZED HEALTH CARE EDUCATION/TRAINING PROGRAM

## 2024-10-16 PROCEDURE — 99152 MOD SED SAME PHYS/QHP 5/>YRS: CPT | Performed by: STUDENT IN AN ORGANIZED HEALTH CARE EDUCATION/TRAINING PROGRAM

## 2024-10-16 PROCEDURE — 99153 MOD SED SAME PHYS/QHP EA: CPT | Performed by: STUDENT IN AN ORGANIZED HEALTH CARE EDUCATION/TRAINING PROGRAM

## 2024-10-16 PROCEDURE — 80061 LIPID PANEL: CPT | Performed by: INTERNAL MEDICINE

## 2024-10-16 PROCEDURE — 999N000054 HC STATISTIC EKG NON-CHARGEABLE

## 2024-10-16 PROCEDURE — 85347 COAGULATION TIME ACTIVATED: CPT

## 2024-10-16 PROCEDURE — 93005 ELECTROCARDIOGRAM TRACING: CPT

## 2024-10-16 PROCEDURE — C1887 CATHETER, GUIDING: HCPCS | Performed by: STUDENT IN AN ORGANIZED HEALTH CARE EDUCATION/TRAINING PROGRAM

## 2024-10-16 PROCEDURE — C1760 CLOSURE DEV, VASC: HCPCS | Performed by: STUDENT IN AN ORGANIZED HEALTH CARE EDUCATION/TRAINING PROGRAM

## 2024-10-16 PROCEDURE — C9600 PERC DRUG-EL COR STENT SING: HCPCS | Performed by: STUDENT IN AN ORGANIZED HEALTH CARE EDUCATION/TRAINING PROGRAM

## 2024-10-16 PROCEDURE — 93457 R HRT ART/GRFT ANGIO: CPT | Performed by: STUDENT IN AN ORGANIZED HEALTH CARE EDUCATION/TRAINING PROGRAM

## 2024-10-16 PROCEDURE — 92978 ENDOLUMINL IVUS OCT C 1ST: CPT | Mod: LD | Performed by: STUDENT IN AN ORGANIZED HEALTH CARE EDUCATION/TRAINING PROGRAM

## 2024-10-16 PROCEDURE — 255N000002 HC RX 255 OP 636: Performed by: STUDENT IN AN ORGANIZED HEALTH CARE EDUCATION/TRAINING PROGRAM

## 2024-10-16 PROCEDURE — 250N000013 HC RX MED GY IP 250 OP 250 PS 637: Performed by: STUDENT IN AN ORGANIZED HEALTH CARE EDUCATION/TRAINING PROGRAM

## 2024-10-16 PROCEDURE — 86850 RBC ANTIBODY SCREEN: CPT | Performed by: INTERNAL MEDICINE

## 2024-10-16 PROCEDURE — 250N000011 HC RX IP 250 OP 636: Performed by: STUDENT IN AN ORGANIZED HEALTH CARE EDUCATION/TRAINING PROGRAM

## 2024-10-16 PROCEDURE — 86900 BLOOD TYPING SEROLOGIC ABO: CPT | Performed by: INTERNAL MEDICINE

## 2024-10-16 PROCEDURE — 36415 COLL VENOUS BLD VENIPUNCTURE: CPT | Performed by: INTERNAL MEDICINE

## 2024-10-16 PROCEDURE — 93010 ELECTROCARDIOGRAM REPORT: CPT | Mod: 77 | Performed by: INTERNAL MEDICINE

## 2024-10-16 PROCEDURE — C1753 CATH, INTRAVAS ULTRASOUND: HCPCS | Performed by: STUDENT IN AN ORGANIZED HEALTH CARE EDUCATION/TRAINING PROGRAM

## 2024-10-16 PROCEDURE — 272N000001 HC OR GENERAL SUPPLY STERILE: Performed by: STUDENT IN AN ORGANIZED HEALTH CARE EDUCATION/TRAINING PROGRAM

## 2024-10-16 PROCEDURE — C1725 CATH, TRANSLUMIN NON-LASER: HCPCS | Performed by: STUDENT IN AN ORGANIZED HEALTH CARE EDUCATION/TRAINING PROGRAM

## 2024-10-16 PROCEDURE — C9604 PERC D-E COR REVASC T CABG S: HCPCS | Mod: LD | Performed by: STUDENT IN AN ORGANIZED HEALTH CARE EDUCATION/TRAINING PROGRAM

## 2024-10-16 PROCEDURE — 258N000003 HC RX IP 258 OP 636: Performed by: INTERNAL MEDICINE

## 2024-10-16 PROCEDURE — 250N000011 HC RX IP 250 OP 636: Performed by: NURSE PRACTITIONER

## 2024-10-16 PROCEDURE — 92937 PRQ TRLUML REVSC CAB GRF 1: CPT | Mod: LD | Performed by: STUDENT IN AN ORGANIZED HEALTH CARE EDUCATION/TRAINING PROGRAM

## 2024-10-16 PROCEDURE — 250N000009 HC RX 250: Performed by: STUDENT IN AN ORGANIZED HEALTH CARE EDUCATION/TRAINING PROGRAM

## 2024-10-16 PROCEDURE — 93010 ELECTROCARDIOGRAM REPORT: CPT | Mod: 59 | Performed by: STUDENT IN AN ORGANIZED HEALTH CARE EDUCATION/TRAINING PROGRAM

## 2024-10-16 DEVICE — STENT CORONARY DES SYNERGY XD MR US 2.50X38MM H7493941838250: Type: IMPLANTABLE DEVICE | Status: FUNCTIONAL

## 2024-10-16 RX ORDER — FENTANYL CITRATE 50 UG/ML
25 INJECTION, SOLUTION INTRAMUSCULAR; INTRAVENOUS
Status: DISCONTINUED | OUTPATIENT
Start: 2024-10-16 | End: 2024-10-16 | Stop reason: HOSPADM

## 2024-10-16 RX ORDER — NALOXONE HYDROCHLORIDE 0.4 MG/ML
0.4 INJECTION, SOLUTION INTRAMUSCULAR; INTRAVENOUS; SUBCUTANEOUS
Status: DISCONTINUED | OUTPATIENT
Start: 2024-10-16 | End: 2024-10-16 | Stop reason: HOSPADM

## 2024-10-16 RX ORDER — IODIXANOL 320 MG/ML
INJECTION, SOLUTION INTRAVASCULAR
Status: DISCONTINUED | OUTPATIENT
Start: 2024-10-16 | End: 2024-10-16 | Stop reason: HOSPADM

## 2024-10-16 RX ORDER — NALOXONE HYDROCHLORIDE 0.4 MG/ML
0.2 INJECTION, SOLUTION INTRAMUSCULAR; INTRAVENOUS; SUBCUTANEOUS
Status: DISCONTINUED | OUTPATIENT
Start: 2024-10-16 | End: 2024-10-16 | Stop reason: HOSPADM

## 2024-10-16 RX ORDER — ACETAMINOPHEN 325 MG/1
650 TABLET ORAL EVERY 4 HOURS PRN
Status: DISCONTINUED | OUTPATIENT
Start: 2024-10-16 | End: 2024-10-16 | Stop reason: HOSPADM

## 2024-10-16 RX ORDER — ONDANSETRON 2 MG/ML
4 INJECTION INTRAMUSCULAR; INTRAVENOUS EVERY 6 HOURS PRN
Status: DISCONTINUED | OUTPATIENT
Start: 2024-10-16 | End: 2024-10-16 | Stop reason: HOSPADM

## 2024-10-16 RX ORDER — ATROPINE SULFATE 0.1 MG/ML
0.5 INJECTION INTRAVENOUS
Status: DISCONTINUED | OUTPATIENT
Start: 2024-10-16 | End: 2024-10-16 | Stop reason: HOSPADM

## 2024-10-16 RX ORDER — ONDANSETRON 4 MG/1
4 TABLET, ORALLY DISINTEGRATING ORAL EVERY 6 HOURS PRN
Status: DISCONTINUED | OUTPATIENT
Start: 2024-10-16 | End: 2024-10-16 | Stop reason: HOSPADM

## 2024-10-16 RX ORDER — CLOPIDOGREL BISULFATE 75 MG/1
TABLET ORAL
Status: DISCONTINUED | OUTPATIENT
Start: 2024-10-16 | End: 2024-10-16 | Stop reason: HOSPADM

## 2024-10-16 RX ORDER — METOPROLOL TARTRATE 1 MG/ML
5 INJECTION, SOLUTION INTRAVENOUS
Status: DISCONTINUED | OUTPATIENT
Start: 2024-10-16 | End: 2024-10-16 | Stop reason: HOSPADM

## 2024-10-16 RX ORDER — METOPROLOL SUCCINATE 25 MG/1
25 TABLET, EXTENDED RELEASE ORAL ONCE
Status: DISCONTINUED | OUTPATIENT
Start: 2024-10-16 | End: 2024-10-16

## 2024-10-16 RX ORDER — NITROGLYCERIN 0.4 MG/1
0.4 TABLET SUBLINGUAL EVERY 5 MIN PRN
Status: DISCONTINUED | OUTPATIENT
Start: 2024-10-16 | End: 2024-10-16 | Stop reason: HOSPADM

## 2024-10-16 RX ORDER — LIDOCAINE 40 MG/G
CREAM TOPICAL
Status: DISCONTINUED | OUTPATIENT
Start: 2024-10-16 | End: 2024-10-16 | Stop reason: HOSPADM

## 2024-10-16 RX ORDER — HYDRALAZINE HYDROCHLORIDE 20 MG/ML
10 INJECTION INTRAMUSCULAR; INTRAVENOUS EVERY 4 HOURS PRN
Status: DISCONTINUED | OUTPATIENT
Start: 2024-10-16 | End: 2024-10-16 | Stop reason: HOSPADM

## 2024-10-16 RX ORDER — HEPARIN SODIUM 1000 [USP'U]/ML
INJECTION, SOLUTION INTRAVENOUS; SUBCUTANEOUS
Status: DISCONTINUED | OUTPATIENT
Start: 2024-10-16 | End: 2024-10-16 | Stop reason: HOSPADM

## 2024-10-16 RX ORDER — OXYCODONE HYDROCHLORIDE 5 MG/1
5 TABLET ORAL EVERY 4 HOURS PRN
Status: DISCONTINUED | OUTPATIENT
Start: 2024-10-16 | End: 2024-10-16 | Stop reason: HOSPADM

## 2024-10-16 RX ORDER — FENTANYL CITRATE 50 UG/ML
INJECTION, SOLUTION INTRAMUSCULAR; INTRAVENOUS
Status: DISCONTINUED | OUTPATIENT
Start: 2024-10-16 | End: 2024-10-16 | Stop reason: HOSPADM

## 2024-10-16 RX ORDER — SODIUM CHLORIDE 9 MG/ML
INJECTION, SOLUTION INTRAVENOUS CONTINUOUS
Status: DISCONTINUED | OUTPATIENT
Start: 2024-10-16 | End: 2024-10-16 | Stop reason: HOSPADM

## 2024-10-16 RX ORDER — OXYCODONE HYDROCHLORIDE 5 MG/1
10 TABLET ORAL EVERY 4 HOURS PRN
Status: DISCONTINUED | OUTPATIENT
Start: 2024-10-16 | End: 2024-10-16 | Stop reason: HOSPADM

## 2024-10-16 RX ORDER — CLOPIDOGREL BISULFATE 75 MG/1
75 TABLET ORAL DAILY
Qty: 90 TABLET | Refills: 3 | Status: SHIPPED | OUTPATIENT
Start: 2024-10-17

## 2024-10-16 RX ORDER — CLOPIDOGREL BISULFATE 75 MG/1
75 TABLET ORAL DAILY
Status: DISCONTINUED | OUTPATIENT
Start: 2024-10-17 | End: 2024-10-16 | Stop reason: HOSPADM

## 2024-10-16 RX ORDER — METOPROLOL TARTRATE 1 MG/ML
INJECTION, SOLUTION INTRAVENOUS
Status: DISCONTINUED | OUTPATIENT
Start: 2024-10-16 | End: 2024-10-16 | Stop reason: HOSPADM

## 2024-10-16 RX ORDER — FLUMAZENIL 0.1 MG/ML
0.2 INJECTION, SOLUTION INTRAVENOUS
Status: DISCONTINUED | OUTPATIENT
Start: 2024-10-16 | End: 2024-10-16 | Stop reason: HOSPADM

## 2024-10-16 RX ADMIN — SODIUM CHLORIDE: 9 INJECTION, SOLUTION INTRAVENOUS at 07:16

## 2024-10-16 RX ADMIN — ACETAMINOPHEN 650 MG: 325 TABLET ORAL at 11:40

## 2024-10-16 RX ADMIN — HYDRALAZINE HYDROCHLORIDE 10 MG: 20 INJECTION INTRAMUSCULAR; INTRAVENOUS at 10:59

## 2024-10-16 ASSESSMENT — ACTIVITIES OF DAILY LIVING (ADL)
ADLS_ACUITY_SCORE: 35

## 2024-10-16 NOTE — Clinical Note
Max pressure = 10 teto. Total duration = 4 seconds.     Max pressure = 14 teto. Total duration = 5 seconds.    Balloon reinflated a second time: Max pressure = 14 teto. Total duration = 5 seconds.  Balloon reinflated a third time: Max pressure = 10 teto. Total duration = 5 seconds.  Balloon reinflated a fourth time:

## 2024-10-16 NOTE — INTERVAL H&P NOTE
I have reviewed the surgical (or preoperative) H&P that is linked to this encounter, and examined the patient. There are no significant changes    Clinical Conditions Present on Arrival:  Clinically Significant Risk Factors Present on Admission                 # Drug Induced Coagulation Defect: home medication list includes an anticoagulant medication

## 2024-10-16 NOTE — PLAN OF CARE
Pt alert and oriented. Vss on room air. Pt right femoral procedural site cdi. Cms+ transparent dressing and gauze in place. Pt tolerated procedure and recover well. Ate meal. Able to ambulate to bathroom and to hallway without complication. Pt ready to be discharged to home via family. All belongings remain with patient and family. Discharge education given to patient and family. Pt and family understand discharge teaching.

## 2024-10-16 NOTE — PLAN OF CARE
Goal Outcome Evaluation:       Pt admitted for [facility] CA/P.PCi due to hx of CAD, GRANDE. Pt prepped and ready for procedure. Pt last took her eliquis on 10/13. Pt's  Jesse is here for support and ride.      Lexie Leyva RN

## 2024-10-16 NOTE — PRE-PROCEDURE
GENERAL PRE-PROCEDURE:   Procedure:  Coronary angiogram with possible PCI, [facility]  Date/Time:  10/16/2024 7:05 AM    Written consent obtained?: Yes    Risks and benefits: Risks, benefits and alternatives were discussed    Consent given by:  Patient  Patient states understanding of procedure being performed: Yes    Patient's understanding of procedure matches consent: Yes    Procedure consent matches procedure scheduled: Yes    Expected level of sedation:  Moderate  Appropriately NPO:  Yes  ASA Class:  3 (CAD; s/p 2vCABG (L-LAD, V-Diag), s/p AVR, HTN, Hypercholesterolemia, chronic AF; on NOAC, PPM)  Mallampati  :  Grade 3- soft palate visible, posterior pharyngeal wall not visible  Lungs:  Lungs clear with good breath sounds bilaterally  Heart:  A-fib  History & Physical reviewed:  History and physical reviewed and updates made (see comment)  H&P Comments:  Clinically Significant Risk Factors Present on Admission    Cardiovascular : CAD; s/p 2vCABG (L-LAD, V-Diag), s/p AVR, HTN, Hypercholesterolemia, chronic AF; on NOAC, PPM    Fluid & Electrolyte Disorders : Not present on admission    Gastroenterology : Not present on admission    Hematology/Oncology : Not present on admission    Nephrology : Not present on admission    Neurology : Not present on admission    Pulmonology : Not present on admission    Systemic : Not present on admission    Statement of review:  I have reviewed the lab findings, diagnostic data, medications, and the plan for sedation

## 2024-10-16 NOTE — DISCHARGE INSTRUCTIONS
Interventional Cardiology  Coronary Angiogram/Angioplasty/Stent/Atherectomy Discharge Instructions -   Femoral Approach    The instructions below are to help you understand how to take care of yourself. There is also information about when to call the doctor or emergency services    **Do not stop your aspirin or platelet inhibitor unless directed by your Cardiologist.  These medications help to prevent platelets in your blood from sticking together and forming a clot.  Examples of these medications are:  Ticagrelor (Brilinta), Clopidigrel (Plavix), Prasugrel (Effient)          For the first 72 hours after your procedure:  No driving for 24 hours  Do not lift more than 15 pounds.  If you usually lift 50 pounds or more daily, please contact your cardiologist  Avoid any hard work or tiring activities, this includes, yard work, jogging, biking, sexual activity  During the day get up and walk around every 2 hours  You may return to work after 72 hours if you are feeling well and your job does not involve heavy lifting          Groin Site / Wound Care / Bleeding    You may take off the dressing on your groin the day after your procedure  Keep the area dry and clean  It is ok to shower with regular soap.  Pat dry, do not rub  You do not need to use a bandage  No tub/pool or hot tub for 1 week  If your groin starts to bleed or begins to swell suddenly after leaving the hospital, lie flat and apply firm pressure above the site for 15 minutes.  If bleeding continues, call 9-1-1  Bruising around the groin area is normal.  It may take 2-3 weeks for this to go away.  It is normal for the bruised area to turn green and/or yellow as it is healing.  A small lump may also be present and may last 2-3 months.  Your leg may be sore or stiff for a few days.  You may take Tylenol or a pain medicine recommended by your doctor  If you have a fever over 100.4, that lasts more than one day - call your cardiologist.      Our Cardiac Rehab  staff may visit briefly with you while your in the hospital.  If they miss you, someone will contact you after you are home.  You are encouraged to enroll in an Outpatient Cardiac Rehab Program     No elective dental work for 6 weeks after having a stent.     No driving for 24 hours      Your Procedural Physician was: Dr. Dustin Prince  the phone number is: [phone]      [facility]:  [phone]  If you are calling after hours, please listen to the entire voicemail, a live  will answer at the end of the message

## 2024-10-16 NOTE — Clinical Note
Potential access sites were evaluated for patency using ultrasound.   The right femoral artery  and veinwas selected. Access was obtained under with Sonosite guidance using a micropuncture 21 gauge needle with direct visualization of needle entry.

## 2024-10-16 NOTE — Clinical Note
The first balloon was inserted into the internal mammary artery.Max pressure = 8 teto. Total duration = 6 seconds.     Max pressure = 10 teto. Total duration = 6 seconds.    Balloon reinflated a second time: Max pressure = 10 teto. Total duration = 6 seconds.  Balloon reinflated a third time: Max pressure = 12 teto. Total duration = 7 seconds.  Balloon reinflated a fourth time: Max pressure = 10 teto. Total duration = 5 seconds.  Balloon ruthy nflated a fourth time: Max pressure = 12 teto. Total duration = 5 seconds.

## 2024-10-21 DIAGNOSIS — E53.8 VITAMIN B 12 DEFICIENCY: ICD-10-CM

## 2024-10-21 NOTE — TELEPHONE ENCOUNTER
" Patient calls regarding her office visit note from visit with Dr. Jacinto 9/11/24. Patient questions why the following diagnoses are in the note:  -Chronic obstructive pulmonary disease, unspecified COPD type (H)  -Age-related osteoporosis with current pathological fracture, left ankle and foot, sequela   -Abnormal findings on diagnostic imaging of other specified body structures     Patient states she does not have COPD or any fractures that she knows of. Patient states \"I want these removed from my chart\". Informed patient that her concern would be forwarded to Dr. Jacinto.   "

## 2024-10-24 PROBLEM — M81.0 AGE-RELATED OSTEOPOROSIS WITHOUT CURRENT PATHOLOGICAL FRACTURE: Status: RESOLVED | Noted: 2023-03-02 | Resolved: 2024-10-24

## 2024-10-24 PROBLEM — J44.9 COPD (CHRONIC OBSTRUCTIVE PULMONARY DISEASE) (H): Status: RESOLVED | Noted: 2022-01-25 | Resolved: 2024-10-24

## 2024-10-24 PROBLEM — M81.0 AGE-RELATED OSTEOPOROSIS WITHOUT CURRENT PATHOLOGICAL FRACTURE: Status: ACTIVE | Noted: 2023-03-02

## 2024-10-24 RX ORDER — CYANOCOBALAMIN 1000 UG/ML
INJECTION, SOLUTION INTRAMUSCULAR; SUBCUTANEOUS
Qty: 3 ML | Refills: 3 | Status: SHIPPED | OUTPATIENT
Start: 2024-10-24

## 2024-10-24 NOTE — TELEPHONE ENCOUNTER
Please tell patient I did review the note. 1. She does have osteoporosis I have changed it to without fracture.  That was an error.    #2 she does have chronic lung disease it is not the classic emphysema but she has chronic bronchial secretions noted in the CT scan.  I can delete it but it is something that she has evidence of and could flareup in the future.   3. Normal findings on diagnostic imaging is a nonspecific term that we put in so that allows Medicare to be cover certain test that we want done.  She has evidence of x-rays and chest CTs that are abnormal.  Otherwise screening tests are often not covered by Medicare it is all diseased based.  It is not put in her problem list or history it is just order you put to order labs at that clinic visit time

## 2024-10-24 NOTE — TELEPHONE ENCOUNTER
"Spoke with patient and relayed information below from Dr Jacinto. Patient verbalized understanding and states she still wants all of these diagnoses removed from her chart. States she knows about COPD especially since her sister  from it and is adamant that she does not have \"evidence of COPD.\"    She would also like to request refills on her B12 injections that she does at home.  "

## 2024-10-25 ENCOUNTER — OFFICE VISIT (OUTPATIENT)
Dept: CARDIOLOGY | Facility: CLINIC | Age: 89
End: 2024-10-25
Payer: COMMERCIAL

## 2024-10-25 VITALS
WEIGHT: 104 LBS | BODY MASS INDEX: 19.65 KG/M2 | SYSTOLIC BLOOD PRESSURE: 158 MMHG | HEART RATE: 72 BPM | RESPIRATION RATE: 18 BRPM | DIASTOLIC BLOOD PRESSURE: 72 MMHG

## 2024-10-25 DIAGNOSIS — I48.21 PERMANENT ATRIAL FIBRILLATION (H): ICD-10-CM

## 2024-10-25 DIAGNOSIS — Z95.0 CARDIAC PACEMAKER IN SITU: ICD-10-CM

## 2024-10-25 DIAGNOSIS — E78.00 PURE HYPERCHOLESTEROLEMIA: ICD-10-CM

## 2024-10-25 DIAGNOSIS — I10 BENIGN ESSENTIAL HYPERTENSION: ICD-10-CM

## 2024-10-25 DIAGNOSIS — I25.118 ATHEROSCLEROSIS OF CORONARY ARTERY OF NATIVE HEART WITH OTHER FORM OF ANGINA PECTORIS, UNSPECIFIED VESSEL OR LESION TYPE (H): Primary | ICD-10-CM

## 2024-10-25 PROCEDURE — 99215 OFFICE O/P EST HI 40 MIN: CPT | Performed by: PHYSICIAN ASSISTANT

## 2024-10-25 RX ORDER — METOPROLOL SUCCINATE 50 MG/1
50 TABLET, EXTENDED RELEASE ORAL DAILY
Qty: 90 TABLET | Refills: 3 | Status: SHIPPED | OUTPATIENT
Start: 2024-10-25 | End: 2024-10-28

## 2024-10-25 NOTE — PROGRESS NOTES
Assessment/Recommendations   Assessment:    1.  Coronary artery disease: Status post CABG January 2005 (LIMA-LAD, VG-Diagonal), PCI Lcx in 2021.  Recent coronary angiogram showed 99% subtotal occlusion of the mid LAD, 20% occlusion vein graft to diagonal, 90% stenosis of the RCA,  of LCx.  She is status post successful recannulization and stenting of the LIMA to LAD graft. Stenting of mid RCA can be pursued for refractory symptoms    - Plavix 75 mg daily and Eliquis 2.5 mg daily  - patient reports feeling similar from prior to the procedure  - She does not plan to attend cardiac rehab as she goes to the gym 3 days/weel  - Reviewed most recent BMP, Hgb, platelet- stable.    2.  Dyslipidemia with LDL goal <70: LDL 20    3.  Hypertension: Blood pressure is elevated today    4.  Severe aortic stenosis status post AVR in 2005: mean gradient 9 mmHg    5.  Permanent atrial fibrillation: rate controlled    6.  Sick sinus syndrome status post PPM        Plan:  - We discussed the importance of antiplatelet therapy and talking with her cardiologist prior to stopping these medications for any reason.  We discussed about utilization of as needed nitroglycerin.   - Encouraged to seek medical attention if recurrent chest pain or shortness of breath.    - Metoprolol dosing was recently increased and she has been taking metoprolol succinate 50 mg daily - this has been refilled    - Continue atorvastatin    - We discussed a diet low in saturated fat, weight loss, and exercise along with medication for better control of cholesterol.   - Risk factor modification and lifestyle management topics were discussed including managing comorbidities, weight loss, heart healthy diet, and exercise.        Follow up with Dr. Antoine December 5, or sooner if needed      History of Present Illness/Subjective    Ms. Constanza Coles is a 89 year old female with a past medical history of CAD, HTN, SSS status post PPM, permanent AF who is  seen at [facility] for post coronary intervention follow up.     Overall she reports feeling okay and similar to as she was prior to her PCI.  She is still short of breath with going up with 1 flight of stairs.  Prior to her PCI she would also have neck pain that sometimes radiated to her shoulder/arms and this would occur 2-3 times a week.  She states this is maybe happened once since the procedure.  She has not needed to take any sublingual nitroglycerin since the procedure.  She reports that she still gets winded when she gets to the top of the stairs.  She goes to the gym 3 times a week doing exercises for her legs, arms, and back.  She is still able to do this without issue and she is not noticed any change in doing these activities since the procedure.  She is not short of breath walking into clinic.  She denies chest discomfort, dizziness, lightheadedness, presyncope, syncope.  She does report sleeping propped up with 3 pillows but she states this is for reflux and not for breathing issues.  She denies PND.  She reports her appetite has been okay.  She denies abdominal fullness or bloating.  She denies leg swelling.  Her weight has been stable.  She is currently taking Eliquis and Plavix and she denies current bleeding issues.  She denies pain at her femoral access site.       Coronary Angiogram 10/16/2024 reviewed:  Conclusions:      1.  Normal right and mildly elevated left heart filling pressures.  2.  Normal systemic flow estimation by Tigre.  3.  Moderate pre and postcapillary pulmonary hypertension (PVR 5.65 by Tigre).  4.  Interval occlusion of the left circumflex coronary artery.  5.  Patent saphenous venous conduit to the diagonal artery.  6.  Severe subtotal occlusion of the origin of the arterial conduit to the left anterior descending artery.  7.  Successful recanalization and stenting of the internal mammary artery to left anterior descending arterial conduit  utilizing IVUS guidance.     Recommendations:      1.  Medical therapy to include Plavix 75 mg daily and apixaban 5 mg twice daily.  Resume apixaban tomorrow morning.  2.  Stenting of the mid right coronary artery can be pursued for refractory symptoms.    ECHO 9/17/2024 reviewed:   Interpretation Summary     The left ventricle is normal in size.  The visual ejection fraction is 50-55%.  There is borderline global hypokinesia of the left ventricle.  Diastolic Doppler findings (E/E' ratio and/or other parameters) suggest left  ventricular filling pressures are increased.  There is mild to moderate (1-2+) mitral regurgitation.  There is mild to moderate (1-2+) tricuspid regurgitation.  There is a 21 mm Forte pericardial magna prosthesis in the aortic position  with a mean gradient of 9mmHg, BECKY 1.6 cm^2/m^2, DI 0.45,  ms. No  significant valvular or paravalvular regurgitation. Implant date 2005.  RVSP 59mmHg + RAP  Compared to the prior study dated 9/25/2023 (direct dvgm-pv-qxgi comparison of  images) the MR and TR and pulmonary pressure are increased    Cardiac device check 9/6/2024  Encounter Type: Patient seen in clinic for annual device evaluation and iterative programming  Device: Medtronic Arpita (D) PPM  Pacing %/Programmed: 12%  in VVI 50bpm  Lead(s): Stable  Battery longevity: 14.7 years  Presenting rhythm: AFVS/ 50-90bpm  Underlying rhythm: AF with VR between 40s-90sbpm  Heart rates: Excellent variability with rates primarily between 60-110s bpm   Atrial High rates: History of permanent AF  Anticoagulant: Eliquis  Ventricular High rates: 66 VHR; EGMs available are all suggestive of AF with RVR. VR>/=120~5-10%  Comments: Normal device function. Monitor VT alert turned on. EGM storage programmed from EGM 2+3 to EGM 1+3. Time updated  Plan: Routine remote scheduled 12/13/2024; letter given to patient. LELO Rees RN           Physical Examination Review of Systems   BP (!) 158/72 (BP Location: Right  arm, Patient Position: Sitting, Cuff Size: Adult Small)   Pulse 72   Resp 18   Wt 47.2 kg (104 lb)   LMP  (LMP Unknown)   BMI 19.65 kg/m    Body mass index is 19.65 kg/m .  Wt Readings from Last 3 Encounters:   10/25/24 47.2 kg (104 lb)   10/16/24 46.3 kg (102 lb)   10/14/24 46.3 kg (102 lb 1.6 oz)     General Appearance:   no distress, normal body habitus   ENT/Mouth: membranes moist, no oral lesions or bleeding gums.      EYES:  no scleral icterus, normal conjunctivae   Neck: no carotid bruits or thyromegaly   Chest/Lungs:   lungs are clear to auscultation, no rales or wheezing, equal chest wall expansion    Cardiovascular:   Regular. Normal first and second heart sounds with 2/6 murmur, no rubs or gallops; the carotid, radial and posterior tibial pulses are intact, no edema bilaterally    Abdomen:  no organomegaly, masses, bruits, or tenderness   Extremities  Puncture Site: no cyanosis or clubbing  Right femoral site is soft with ecchymosis surrounding her access site and across the mons pubis.  Pedal pulses intact and symmetrical.  CMS intact.   Skin: no xanthelasma, warm.    Neurologic: normal  bilateral, no tremors     Psychiatric: alert and oriented x3, calm                                                        Negative unless noted in HPI     Medical History  Surgical History Family History Social History   Past Medical History:   Diagnosis Date    Age-related osteoporosis without current pathological fracture 03/02/2023    Anemia     iron deficiency    Chronic atrial fibrillation (H)     COPD (chronic obstructive pulmonary disease) (H) 01/25/2022    Fracture of hip, left, closed, initial encounter (H) 03/02/2023    Gastrointestinal hemorrhage, unspecified gastrointestinal hemorrhage type 10/29/2021    GERD (gastroesophageal reflux disease)     Hypertension     IHD (ischemic heart disease)     Melena 10/30/2021    PUD (peptic ulcer disease)     Billroth 1    Recurrent cystitis     SSS (sick sinus  syndrome) (H)     post pacemaker placement    Valvular heart disease     Vasculitis (H)     Past Surgical History:   Procedure Laterality Date    ASCENDING AORTIC ANEURYSM REPAIR W/ TISSUE AORTIC VALVE REPLACEMENT  2005    Dacron graft, Forte pericardial Magna 21mm aortic valve     SECTION      COLONOSCOPY N/A 10/31/2021    Procedure: COLONOSCOPY;  Surgeon: Dario Thomas MD;  Location: [facility] Main OR    CV CORONARY ANGIOGRAM N/A 2021    Procedure: Coronary Angiogram;  Surgeon: Shane Mcclendon MD;  Location: [facility] CATH LAB CV    CV CORONARY ANGIOGRAM N/A 10/16/2024    Procedure: Coronary Angiogram;  Surgeon: Dustin Prince MD;  Location: [facility] CATH LAB CV    CV INTRAVASULAR ULTRASOUND N/A 10/16/2024    Procedure: Intravascular Ultrasound;  Surgeon: Dustin Prince MD;  Location: [facility] LAB CV    CV LEFT HEART CATH N/A 2021    Procedure: Left Heart Cath;  Surgeon: Shane Mcclendon MD;  Location: [facility] LAB CV    CV PCI N/A 2021    Procedure: Percutaneous Coronary Intervention;  Surgeon: Shane Mcclendon MD;  Location: [facility] CATH LAB CV    CV PCI N/A 10/16/2024    Procedure: Percutaneous Coronary Intervention;  Surgeon: Dustin Prince MD;  Location: [facility]    CV RIGHT HEART CATH MEASUREMENTS RECORDED N/A 10/16/2024    Procedure: Right Heart Catheterization;  Surgeon: Dustin Prince MD;  Location: [facility] CV    EP PACEMAKER GENERATOR REPLACEMENT- DUAL N/A 1/15/2024    Procedure: Pacemaker Generator Replacement Dual;  Surgeon: Edward Grimm MD;  Location: [facility] LAB CV    ESOPHAGOSCOPY, GASTROSCOPY, DUODENOSCOPY (EGD), COMBINED N/A 10/30/2021    Procedure: ESOPHAGOGASTRODUODENOSCOPY (EGD);  Surgeon: Dario Thomas MD;  Location: [facility] Main OR    GALLBLADDER SURGERY      partial colon removal    HYSTERECTOMY      IR AORTIC ARCH 4 VESSEL ANGIOGRAM  9/15/2011    IR MISCELLANEOUS PROCEDURE  2005    OTHER  SURGICAL HISTORY      antrectomy    PARTIAL GASTRECTOMY      AK PERC CLOS,BART INTERATRIAL COMMUN W/IMPL      Description: Patent Foramen Ovale Closure Percutaneous;  Recorded: 2012;    ZZC CABG, VEIN, SINGLE      Description: CABG (CABG);  Recorded: 2012;  Comments: LIMA^LAD, SVG^1st diag    ZZC REPLACE AORT VALV,PROSTH VALV      Aortic Valve Replacement with Forte pericardial Magna 21mm 2005    Family History   Problem Relation Age of Onset    Liver Disease Father     No Known Problems Mother     Thyroid Disease Sister     LUNG DISEASE Sister     Social History     Socioeconomic History    Marital status:      Spouse name: Not on file    Number of children: Not on file    Years of education: Not on file    Highest education level: Not on file   Occupational History    Not on file   Tobacco Use    Smoking status: Former     Current packs/day: 0.00     Average packs/day: 1.5 packs/day for 20.0 years (30.0 ttl pk-yrs)     Types: Cigarettes     Start date: 10/29/1959     Quit date: 10/29/1979     Years since quittin.0    Smokeless tobacco: Never   Vaping Use    Vaping status: Never Used   Substance and Sexual Activity    Alcohol use: Yes     Comment: 1-2 week    Drug use: No    Sexual activity: Never   Other Topics Concern    Parent/sibling w/ CABG, MI or angioplasty before 65F 55M? Not Asked   Social History Narrative    10/2024: lives with , moved form [place] in . No pets. Grown children, has grandchildren and grandchildren     Social Drivers of Health     Financial Resource Strain: Not on file   Food Insecurity: Not on file   Transportation Needs: Not on file   Physical Activity: Not on file   Stress: Not on file   Social Connections: Unknown (2023)    [facility]    Social Connections     Frequency of Communication with Friends and Family: Not on file   Interpersonal  "Safety: High Risk (10/16/2024)    Interpersonal Safety     Do you feel physically and emotionally safe where you currently live?: No     Within the past 12 months, have you been hit, slapped, kicked or otherwise physically hurt by someone?: No     Within the past 12 months, have you been humiliated or emotionally abused in other ways by your partner or ex-partner?: No   Housing Stability: Not on file          Medications  Allergies   Current Outpatient Medications   Medication Sig Dispense Refill    acetaminophen (TYLENOL) 500 MG tablet Take 1,000 mg by mouth every 6 hours as needed for mild pain      atorvastatin (LIPITOR) 40 MG tablet Take 1 tablet (40 mg) by mouth daily. 90 tablet 3    B-D TB SYRINGE 27G X 1/2\" 1 ML MISC See Admin Instructions      clopidogrel (PLAVIX) 75 MG tablet Take 1 tablet (75 mg) by mouth daily. 90 tablet 3    cyanocobalamin (CYANOCOBALAMIN) 1000 mcg/mL injection INJECT 1 ML (1,000 MCG) INTO THE SHOULDER, THIGH, OR BUTTOCKS EVERY 30 DAYS. **SYR 27G 1/2\" 1CC** 3 mL 3    ELIQUIS ANTICOAGULANT 2.5 MG tablet TAKE 1 TABLET(2.5 MG) BY MOUTH TWICE DAILY 60 tablet 3    gabapentin (NEURONTIN) 300 MG capsule Take 1 capsule (300 mg) by mouth 2 times daily 180 capsule 3    metoprolol succinate ER (TOPROL XL) 50 MG 24 hr tablet Take 1 tablet (50 mg) by mouth daily. 90 tablet 3    nitroGLYcerin (NITROSTAT) 0.4 MG sublingual tablet For chest pain place 1 tablet under the tongue every 5 minutes for 3 doses. If symptoms persist 5 minutes after 1st dose call 911. 30 tablet 4    sodium fluoride dental gel (PREVIDENT) 1.1 % GEL topical gel ONE TEASPOON IN FLUORIDE TRAY APPLY TO TEETH FOR 3-5 MINUTES ONCE DAILY      sucralfate (CARAFATE) 1 GM tablet Take 1 tablet (1 g) by mouth 4 times daily (Patient taking differently: Take 1 g by mouth 4 times daily as needed.) 360 tablet 3    cholecalciferol, vitamin D3, (VITAMIN D3) 5,000 unit Tab Take 1 tablet by mouth daily. (Patient not taking: Reported on 10/25/2024)   "    levofloxacin (LEVAQUIN) 250 MG tablet Take 1 tablet (250 mg) by mouth daily (Patient not taking: Reported on 10/25/2024) 10 tablet 3    Allergies   Allergen Reactions    Aspirin Nausea and Vomiting     Vomiting. Pt will NOT take Aspirin.     Erythromycin Base [Erythromycin] Unknown     Pt is not sure she is allergic to this    Sulfatolamide     Vancomycin Other (See Comments)     Red man syndrome    Xanax [Alprazolam] Other (See Comments)     confusion    Sulfa (Sulfonamide Antibiotics) [Sulfa Antibiotics] Rash         Lab Results    Chemistry/lipid CBC Cardiac Enzymes/BNP/TSH/INR   Lab Results   Component Value Date    CHOL 103 10/16/2024    HDL 67 10/16/2024    TRIG 81 10/16/2024    BUN 14.6 10/14/2024     10/14/2024    CO2 27 10/14/2024    Lab Results   Component Value Date    WBC 11.3 (H) 10/14/2024    HGB 10.2 (L) 10/14/2024    HCT 33.3 (L) 10/14/2024    MCV 81 10/14/2024     10/14/2024    Lab Results   Component Value Date     (H) 03/30/2021    TSH 2.80 09/11/2024    INR 2.2 (H) 04/05/2022        40 minutes spent on the date of encounter doing chart review, review of test results, interpretation with above tests, patient visit, documentation, and discussion with other provider.        This note has been dictated using voice recognition software. Any grammatical, typographical, or context distortions are unintentional and inherent to the software    Lizbeth Pena PA-C

## 2024-10-25 NOTE — PATIENT INSTRUCTIONS
Constanza Coles,    It was a pleasure to see you today in the clinic regarding your follow-up after stent placement.     My recommendations after this visit include:     - no medication changes today. Continue Eliquis and Plavix - do not stop taking these medications without speaking to your cardiologist   - a new metoprolol prescription has been sent to your pharmacy   - participate in regular exercise, heart healthy diet    You should followup with Elina in December as scheduled or sooner if needed. Please follow-up sooner if you notice worsening or persistent symptoms      If you have questions or concerns, please call using the numbers below:      After Hours/Scheduling  [phone]      Lizbeth Pena PA-C  Structural Heart Program  [facility]

## 2024-10-25 NOTE — LETTER
10/25/2024    Yun Jacinto MD  [address] [facility] [address]    RE: Constanza Coles       Dear Colleague,     I had the pleasure of seeing Constanza Coles in the [facility].          Assessment/Recommendations   Assessment:    1.  Coronary artery disease: Status post CABG January 2005 (LIMA-LAD, VG-Diagonal), PCI Lcx in 2021.  Recent coronary angiogram showed 99% subtotal occlusion of the mid LAD, 20% occlusion vein graft to diagonal, 90% stenosis of the RCA,  of LCx.  She is status post successful recannulization and stenting of the LIMA to LAD graft. Stenting of mid RCA can be pursued for refractory symptoms    - Plavix 75 mg daily and Eliquis 2.5 mg daily  - patient reports feeling similar from prior to the procedure  - She does not plan to attend cardiac rehab as she goes to the gym 3 days/weel  - Reviewed most recent BMP, Hgb, platelet- stable.    2.  Dyslipidemia with LDL goal <70: LDL 20    3.  Hypertension: Blood pressure is elevated today    4.  Severe aortic stenosis status post AVR in 2005: mean gradient 9 mmHg    5.  Permanent atrial fibrillation: rate controlled    6.  Sick sinus syndrome status post PPM        Plan:  - We discussed the importance of antiplatelet therapy and talking with her cardiologist prior to stopping these medications for any reason.  We discussed about utilization of as needed nitroglycerin.   - Encouraged to seek medical attention if recurrent chest pain or shortness of breath.    - Metoprolol dosing was recently increased and she has been taking metoprolol succinate 50 mg daily - this has been refilled    - Continue atorvastatin    - We discussed a diet low in saturated fat, weight loss, and exercise along with medication for better control of cholesterol.   - Risk factor modification and lifestyle management topics were discussed including managing comorbidities, weight loss, heart healthy diet, and exercise.        Follow up with Dr. Antoine  December 5, or sooner if needed      History of Present Illness/Subjective    Ms. Constanza Coles is a 89 year old female with a past medical history of CAD, HTN, SSS status post PPM, permanent AF who is seen at [facility] for post coronary intervention follow up.     Overall she reports feeling okay and similar to as she was prior to her PCI.  She is still short of breath with going up with 1 flight of stairs.  Prior to her PCI she would also have neck pain that sometimes radiated to her shoulder/arms and this would occur 2-3 times a week.  She states this is maybe happened once since the procedure.  She has not needed to take any sublingual nitroglycerin since the procedure.  She reports that she still gets winded when she gets to the top of the stairs.  She goes to the gym 3 times a week doing exercises for her legs, arms, and back.  She is still able to do this without issue and she is not noticed any change in doing these activities since the procedure.  She is not short of breath walking into clinic.  She denies chest discomfort, dizziness, lightheadedness, presyncope, syncope.  She does report sleeping propped up with 3 pillows but she states this is for reflux and not for breathing issues.  She denies PND.  She reports her appetite has been okay.  She denies abdominal fullness or bloating.  She denies leg swelling.  Her weight has been stable.  She is currently taking Eliquis and Plavix and she denies current bleeding issues.  She denies pain at her femoral access site.       Coronary Angiogram 10/16/2024 reviewed:  Conclusions:      1.  Normal right and mildly elevated left heart filling pressures.  2.  Normal systemic flow estimation by Tigre.  3.  Moderate pre and postcapillary pulmonary hypertension (PVR 5.65 by Tigre).  4.  Interval occlusion of the left circumflex coronary artery.  5.  Patent saphenous venous conduit to the diagonal artery.  6.  Severe subtotal  occlusion of the origin of the arterial conduit to the left anterior descending artery.  7.  Successful recanalization and stenting of the internal mammary artery to left anterior descending arterial conduit utilizing IVUS guidance.     Recommendations:      1.  Medical therapy to include Plavix 75 mg daily and apixaban 5 mg twice daily.  Resume apixaban tomorrow morning.  2.  Stenting of the mid right coronary artery can be pursued for refractory symptoms.    ECHO 9/17/2024 reviewed:   Interpretation Summary     The left ventricle is normal in size.  The visual ejection fraction is 50-55%.  There is borderline global hypokinesia of the left ventricle.  Diastolic Doppler findings (E/E' ratio and/or other parameters) suggest left  ventricular filling pressures are increased.  There is mild to moderate (1-2+) mitral regurgitation.  There is mild to moderate (1-2+) tricuspid regurgitation.  There is a 21 mm Forte pericardial magna prosthesis in the aortic position  with a mean gradient of 9mmHg, BECKY 1.6 cm^2/m^2, DI 0.45,  ms. No  significant valvular or paravalvular regurgitation. Implant date 2005.  RVSP 59mmHg + RAP  Compared to the prior study dated 9/25/2023 (direct [id number] comparison of  images) the MR and TR and pulmonary pressure are increased    Cardiac device check 9/6/2024  Encounter Type: Patient seen in clinic for annual device evaluation and iterative programming  Device: Medtronic Arpita (D) PPM  Pacing %/Programmed: 12%  in VVI 50bpm  Lead(s): Stable  Battery longevity: 14.7 years  Presenting rhythm: AFVS/ 50-90bpm  Underlying rhythm: AF with VR between 40s-90sbpm  Heart rates: Excellent variability with rates primarily between 60-110s bpm   Atrial High rates: History of permanent AF  Anticoagulant: Eliquis  Ventricular High rates: 66 VHR; EGMs available are all suggestive of AF with RVR. VR>/=120~5-10%  Comments: Normal device function. Monitor VT alert turned on. EGM storage programmed  from EGM 2+3 to EGM 1+3. Time updated  Plan: Routine remote scheduled 12/13/2024; letter given to patient. LELO Rees RN           Physical Examination Review of Systems   BP (!) 158/72 (BP Location: Right arm, Patient Position: Sitting, Cuff Size: Adult Small)   Pulse 72   Resp 18   Wt 47.2 kg (104 lb)   LMP  (LMP Unknown)   BMI 19.65 kg/m    Body mass index is 19.65 kg/m .  Wt Readings from Last 3 Encounters:   10/25/24 47.2 kg (104 lb)   10/16/24 46.3 kg (102 lb)   10/14/24 46.3 kg (102 lb 1.6 oz)     General Appearance:   no distress, normal body habitus   ENT/Mouth: membranes moist, no oral lesions or bleeding gums.      EYES:  no scleral icterus, normal conjunctivae   Neck: no carotid bruits or thyromegaly   Chest/Lungs:   lungs are clear to auscultation, no rales or wheezing, equal chest wall expansion    Cardiovascular:   Regular. Normal first and second heart sounds with 2/6 murmur, no rubs or gallops; the carotid, radial and posterior tibial pulses are intact, no edema bilaterally    Abdomen:  no organomegaly, masses, bruits, or tenderness   Extremities  Puncture Site: no cyanosis or clubbing  Right femoral site is soft with ecchymosis surrounding her access site and across the mons pubis.  Pedal pulses intact and symmetrical.  CMS intact.   Skin: no xanthelasma, warm.    Neurologic: normal  bilateral, no tremors     Psychiatric: alert and oriented x3, calm                                                        Negative unless noted in HPI     Medical History  Surgical History Family History Social History   Past Medical History:   Diagnosis Date     Age-related osteoporosis without current pathological fracture 03/02/2023     Anemia     iron deficiency     Chronic atrial fibrillation (H)      COPD (chronic obstructive pulmonary disease) (H) 01/25/2022     Fracture of hip, left, closed, initial encounter (H) 03/02/2023     Gastrointestinal hemorrhage, unspecified gastrointestinal hemorrhage type  10/29/2021     GERD (gastroesophageal reflux disease)      Hypertension      IHD (ischemic heart disease)      Melena 10/30/2021     PUD (peptic ulcer disease)     Billroth 1     Recurrent cystitis      SSS (sick sinus syndrome) (H)     post pacemaker placement     Valvular heart disease      Vasculitis (H)     Past Surgical History:   Procedure Laterality Date     ASCENDING AORTIC ANEURYSM REPAIR W/ TISSUE AORTIC VALVE REPLACEMENT  2005    Dacron graft, Forte pericardial Magna 21mm aortic valve      SECTION       COLONOSCOPY N/A 10/31/2021    Procedure: COLONOSCOPY;  Surgeon: Dario Thomas MD;  Location: [facility] Main OR     CV CORONARY ANGIOGRAM N/A 2021    Procedure: Coronary Angiogram;  Surgeon: Shane Mcclendon MD;  Location: [facility] LAB CV     CV CORONARY ANGIOGRAM N/A 10/16/2024    Procedure: Coronary Angiogram;  Surgeon: Dustin Prince MD;  Location: [facility] CV     CV INTRAVASULAR ULTRASOUND N/A 10/16/2024    Procedure: Intravascular Ultrasound;  Surgeon: Dustin Prince MD;  Location: [facility] CV     CV LEFT HEART CATH N/A 2021    Procedure: Left Heart Cath;  Surgeon: Shane cMclendon MD;  Location: [facility] LAB CV     CV PCI N/A 2021    Procedure: Percutaneous Coronary Intervention;  Surgeon: Shane Mcclendon MD;  Location: [facility] LAB CV     CV PCI N/A 10/16/2024    Procedure: Percutaneous Coronary Intervention;  Surgeon: Dustin Prince MD;  Location: [facility]     CV RIGHT HEART CATH MEASUREMENTS RECORDED N/A 10/16/2024    Procedure: Right Heart Catheterization;  Surgeon: Dustin Prince MD;  Location: [facility]     EP PACEMAKER GENERATOR REPLACEMENT- DUAL N/A 1/15/2024    Procedure: Pacemaker Generator Replacement Dual;  Surgeon: Edward Grimm MD;  Location: [facility]     ESOPHAGOSCOPY, GASTROSCOPY, DUODENOSCOPY (EGD), COMBINED N/A 10/30/2021    Procedure: ESOPHAGOGASTRODUODENOSCOPY  (EGD);  Surgeon: Dario Thomas MD;  Location: [facility] Main OR     GALLBLADDER SURGERY      partial colon removal     HYSTERECTOMY       IR AORTIC ARCH 4 VESSEL ANGIOGRAM  9/15/2011     IR MISCELLANEOUS PROCEDURE  2005     OTHER SURGICAL HISTORY      antrectomy     PARTIAL GASTRECTOMY       FL PERC CLOS,BART INTERATRIAL COMMUN W/IMPL      Description: Patent Foramen Ovale Closure Percutaneous;  Recorded: 2012;     ZZC CABG, VEIN, SINGLE      Description: CABG (CABG);  Recorded: 2012;  Comments: LIMA^LAD, SVG^1st diag     ZZC REPLACE AORT VALV,PROSTH VALV      Aortic Valve Replacement with Forte pericardial Magna 21mm 2005    Family History   Problem Relation Age of Onset     Liver Disease Father      No Known Problems Mother      Thyroid Disease Sister      LUNG DISEASE Sister     Social History     Socioeconomic History     Marital status:      Spouse name: Not on file     Number of children: Not on file     Years of education: Not on file     Highest education level: Not on file   Occupational History     Not on file   Tobacco Use     Smoking status: Former     Current packs/day: 0.00     Average packs/day: 1.5 packs/day for 20.0 years (30.0 ttl pk-yrs)     Types: Cigarettes     Start date: 10/29/1959     Quit date: 10/29/1979     Years since quittin.0     Smokeless tobacco: Never   Vaping Use     Vaping status: Never Used   Substance and Sexual Activity     Alcohol use: Yes     Comment: 1-2 week     Drug use: No     Sexual activity: Never   Other Topics Concern     Parent/sibling w/ CABG, MI or angioplasty before 65F 55M? Not Asked   Social History Narrative    10/2024: lives with , moved form [place] in . No pets. Grown children, has grandchildren and grandchildren     Social Drivers of Health     Financial Resource Strain: Not on file   Food Insecurity: Not on file   Transportation Needs: Not on file   Physical Activity: Not on file   Stress: Not on file  "  Social Connections: Unknown (2/2/2023)    Received from [facility], [facility]    Social Connections      Frequency of Communication with Friends and Family: Not on file   Interpersonal Safety: High Risk (10/16/2024)    Interpersonal Safety      Do you feel physically and emotionally safe where you currently live?: No      Within the past 12 months, have you been hit, slapped, kicked or otherwise physically hurt by someone?: No      Within the past 12 months, have you been humiliated or emotionally abused in other ways by your partner or ex-partner?: No   Housing Stability: Not on file          Medications  Allergies   Current Outpatient Medications   Medication Sig Dispense Refill     acetaminophen (TYLENOL) 500 MG tablet Take 1,000 mg by mouth every 6 hours as needed for mild pain       atorvastatin (LIPITOR) 40 MG tablet Take 1 tablet (40 mg) by mouth daily. 90 tablet 3     B-D TB SYRINGE 27G X 1/2\" 1 ML MISC See Admin Instructions       clopidogrel (PLAVIX) 75 MG tablet Take 1 tablet (75 mg) by mouth daily. 90 tablet 3     cyanocobalamin (CYANOCOBALAMIN) 1000 mcg/mL injection INJECT 1 ML (1,000 MCG) INTO THE SHOULDER, THIGH, OR BUTTOCKS EVERY 30 DAYS. **SYR 27G 1/2\" 1CC** 3 mL 3     ELIQUIS ANTICOAGULANT 2.5 MG tablet TAKE 1 TABLET(2.5 MG) BY MOUTH TWICE DAILY 60 tablet 3     gabapentin (NEURONTIN) 300 MG capsule Take 1 capsule (300 mg) by mouth 2 times daily 180 capsule 3     metoprolol succinate ER (TOPROL XL) 50 MG 24 hr tablet Take 1 tablet (50 mg) by mouth daily. 90 tablet 3     nitroGLYcerin (NITROSTAT) 0.4 MG sublingual tablet For chest pain place 1 tablet under the tongue every 5 minutes for 3 doses. If symptoms persist 5 minutes after 1st dose call 911. 30 tablet 4     sodium fluoride dental gel (PREVIDENT) 1.1 % GEL topical gel ONE TEASPOON IN FLUORIDE TRAY APPLY TO TEETH FOR 3-5 MINUTES ONCE DAILY       sucralfate (CARAFATE) 1 GM " tablet Take 1 tablet (1 g) by mouth 4 times daily (Patient taking differently: Take 1 g by mouth 4 times daily as needed.) 360 tablet 3     cholecalciferol, vitamin D3, (VITAMIN D3) 5,000 unit Tab Take 1 tablet by mouth daily. (Patient not taking: Reported on 10/25/2024)       levofloxacin (LEVAQUIN) 250 MG tablet Take 1 tablet (250 mg) by mouth daily (Patient not taking: Reported on 10/25/2024) 10 tablet 3    Allergies   Allergen Reactions     Aspirin Nausea and Vomiting     Vomiting. Pt will NOT take Aspirin.      Erythromycin Base [Erythromycin] Unknown     Pt is not sure she is allergic to this     Sulfatolamide      Vancomycin Other (See Comments)     Red man syndrome     Xanax [Alprazolam] Other (See Comments)     confusion     Sulfa (Sulfonamide Antibiotics) [Sulfa Antibiotics] Rash         Lab Results    Chemistry/lipid CBC Cardiac Enzymes/BNP/TSH/INR   Lab Results   Component Value Date    CHOL 103 10/16/2024    HDL 67 10/16/2024    TRIG 81 10/16/2024    BUN 14.6 10/14/2024     10/14/2024    CO2 27 10/14/2024    Lab Results   Component Value Date    WBC 11.3 (H) 10/14/2024    HGB 10.2 (L) 10/14/2024    HCT 33.3 (L) 10/14/2024    MCV 81 10/14/2024     10/14/2024    Lab Results   Component Value Date     (H) 03/30/2021    TSH 2.80 09/11/2024    INR 2.2 (H) 04/05/2022        40 minutes spent on the date of encounter doing chart review, review of test results, interpretation with above tests, patient visit, documentation, and discussion with other provider.        This note has been dictated using voice recognition software. Any grammatical, typographical, or context distortions are unintentional and inherent to the software    Lizbeth Pena PA-C       Thank you for allowing me to participate in the care of your patient.      Sincerely,     Lizbeth Pena PA-C     [facility]  cc:   Lou Hu, CNP  [address] [place] [address]

## 2024-10-28 ENCOUNTER — TELEPHONE (OUTPATIENT)
Dept: CARDIOLOGY | Facility: CLINIC | Age: 89
End: 2024-10-28
Payer: COMMERCIAL

## 2024-10-28 DIAGNOSIS — I48.21 PERMANENT ATRIAL FIBRILLATION (H): ICD-10-CM

## 2024-10-28 RX ORDER — METOPROLOL SUCCINATE 50 MG/1
50 TABLET, EXTENDED RELEASE ORAL DAILY
Qty: 90 TABLET | Refills: 3 | Status: SHIPPED | OUTPATIENT
Start: 2024-10-28

## 2024-10-28 NOTE — TELEPHONE ENCOUNTER
[facility] Call Center    Phone Message    May a detailed message be left on voicemail: yes     Reason for Call: Medication Refill Request    Has the patient contacted the pharmacy for the refill? Yes   Name of medication being requested: metoprolol succinate ER (TOPROL XL) 50 MG 24 hr tablet   Provider who prescribed the medication: Lizbeth Pena   Pharmacy: [id number] - [place], [place] - [address] AT [facility] [address]    Date medication is needed: ASAP   Patient stated when they were seen on 10/25, they were told orders will be sent. Patient called pharmacy who stated they have not received any orders. Please review and send orders as needed. Thank you!    Action Taken: Other: Cardiology    Travel Screening: Not Applicable     Thank you!  [facility]

## 2024-10-28 NOTE — TELEPHONE ENCOUNTER
Contacted [facility]. Confirmed, despite provider having send med to pharmacy of Friday, they do not have new prescription for metoprolol on file.   Resent prescription.   Left message for patient notifying of completion. -jack

## 2024-10-29 DIAGNOSIS — E53.8 VITAMIN B 12 DEFICIENCY: Primary | ICD-10-CM

## 2024-10-30 RX ORDER — SAFETY SYRNG,NDL,KIT-TRAY 1 ML 27GX1/2"
TRAY MISCELLANEOUS
Qty: 90 EACH | Refills: 3 | Status: SHIPPED | OUTPATIENT
Start: 2024-10-30

## 2024-10-30 NOTE — TELEPHONE ENCOUNTER
Attempted to contact patient to gather more information. No answer. Left message to call clinic.    When patient calls back we need to know what she uses these syringes for and how often.

## 2024-11-04 ENCOUNTER — NURSE TRIAGE (OUTPATIENT)
Dept: INTERNAL MEDICINE | Facility: CLINIC | Age: 89
End: 2024-11-04
Payer: COMMERCIAL

## 2024-11-04 NOTE — TELEPHONE ENCOUNTER
Huddled with Dr. Jacinto who states that she agrees with the ER disposition. Dr. Jacinto ordering labs for AM if patient still refusing. Notified patient to hold eliquis for now as well.     Discussed with patient who opted to schedule labs for morning. Advised patient if any worsening bleeding or worsening dizziness she should still present to ED tonight. Patient verbalizes understanding and has no further questions or concerns.    dysuria

## 2024-11-04 NOTE — TELEPHONE ENCOUNTER
"Nurse Triage SBAR    Is this a 2nd Level Triage? YES, LICENSED PRACTITIONER REVIEW IS REQUIRED    Situation:  Black stools x 1 week.     Background:  On eliquis. Patient reports this happened once about 2 years ago as well.     Assessment:  Hard black stools x 1 week. Denies abdominal pain. States that she does feel slightly dizzy and light headed at times.     Protocol Recommended Disposition:   Go to ED Now    Recommendation:  Declines disposition. Would rather just \"get hemoglobin checked to see if it's going down.\"      Routed to provider    Does the patient meet one of the following criteria for ADS visit consideration? 16+ years old, with an [facility] PCP     TIP  Providers, please consider if this condition is appropriate for management at one of our Acute and Diagnostic Services sites.     If patient is a good candidate, please use dotphrase <dot>triageresponse and select Refer to ADS to document.      Reason for Disposition   Bloody, black, or tarry bowel movements  (Exception: Chronic-unchanged black-grey bowel movements and is taking iron pills or Pepto-Bismol.)    Additional Information   Negative: Passed out (i.e., fainted, collapsed and was not responding)   Negative: Shock suspected (e.g., cold/pale/clammy skin, too weak to stand, low BP, rapid pulse)   Negative: Vomiting red blood or black (coffee ground) material   Negative: Sounds like a life-threatening emergency to the triager   Negative: Diarrhea is main symptom   Negative: Rectal symptoms   Negative: SEVERE rectal bleeding (large blood clots; constant or on and off bleeding)   Negative: SEVERE dizziness (e.g., unable to stand, requires support to walk, feels like passing out now)   Negative: MODERATE rectal bleeding (small blood clots, passing blood without stool, or toilet water turns red) more than once a day    Answer Assessment - Initial Assessment Questions  1. APPEARANCE of BLOOD: \"What color is it?\" \"Is it passed separately, on the surface " "of the stool, or mixed in with the stool?\"       Black stool mixed in  2. AMOUNT: \"How much blood was passed?\"       Hard formed stool. Once a day.   3. FREQUENCY: \"How many times has blood been passed with the stools?\"       6-7x  4. ONSET: \"When was the blood first seen in the stools?\" (Days or weeks)       NA  5. DIARRHEA: \"Is there also some diarrhea?\" If Yes, ask: \"How many diarrhea stools in the past 24 hours?\"       NA  6. CONSTIPATION: \"Do you have constipation?\" If Yes, ask: \"How bad is it?\"      yes  7. RECURRENT SYMPTOMS: \"Have you had blood in your stools before?\" If Yes, ask: \"When was the last time?\" and \"What happened that time?\"       Yes 2 years ago.   8. BLOOD THINNERS: \"Do you take any blood thinners?\" (e.g., Coumadin/warfarin, Pradaxa/dabigatran, aspirin)      Eliquis.   9. OTHER SYMPTOMS: \"Do you have any other symptoms?\"  (e.g., abdomen pain, vomiting, dizziness, fever)      Today I am having a hard time maneuvering. I feel a little dizzy,   10. PREGNANCY: \"Is there any chance you are pregnant?\" \"When was your last menstrual period?\"        No    Protocols used: Rectal Bleeding-A-[place]    "

## 2024-11-05 ENCOUNTER — E-CONSULT (OUTPATIENT)
Dept: GASTROENTEROLOGY | Facility: CLINIC | Age: 89
End: 2024-11-05
Payer: COMMERCIAL

## 2024-11-05 ENCOUNTER — LAB (OUTPATIENT)
Dept: LAB | Facility: CLINIC | Age: 89
End: 2024-11-05
Payer: COMMERCIAL

## 2024-11-05 ENCOUNTER — TELEPHONE (OUTPATIENT)
Dept: INTERNAL MEDICINE | Facility: CLINIC | Age: 89
End: 2024-11-05

## 2024-11-05 DIAGNOSIS — K92.1 MELENA: Primary | ICD-10-CM

## 2024-11-05 DIAGNOSIS — K92.1 MELENA: ICD-10-CM

## 2024-11-05 DIAGNOSIS — K92.1 GASTROINTESTINAL HEMORRHAGE WITH MELENA: Primary | ICD-10-CM

## 2024-11-05 LAB
ERYTHROCYTE [DISTWIDTH] IN BLOOD BY AUTOMATED COUNT: 21.6 % (ref 10–15)
HCT VFR BLD AUTO: 24.8 % (ref 35–47)
HGB BLD-MCNC: 7.3 G/DL (ref 11.7–15.7)
INR PPP: 1.16 (ref 0.85–1.15)
MCH RBC QN AUTO: 24.4 PG (ref 26.5–33)
MCHC RBC AUTO-ENTMCNC: 29.4 G/DL (ref 31.5–36.5)
MCV RBC AUTO: 83 FL (ref 78–100)
PLATELET # BLD AUTO: 265 10E3/UL (ref 150–450)
RBC # BLD AUTO: 2.99 10E6/UL (ref 3.8–5.2)
WBC # BLD AUTO: 6.6 10E3/UL (ref 4–11)

## 2024-11-05 PROCEDURE — 36415 COLL VENOUS BLD VENIPUNCTURE: CPT

## 2024-11-05 PROCEDURE — 99451 NTRPROF PH1/NTRNET/EHR 5/>: CPT | Performed by: INTERNAL MEDICINE

## 2024-11-05 PROCEDURE — 99207 E-CONSULT TO GASTROENTEROLOGY (ADULT OUTPT PROVIDER TO SPECIALIST WRITTEN QUESTION & RESPONSE): CPT | Performed by: INTERNAL MEDICINE

## 2024-11-05 PROCEDURE — 85027 COMPLETE CBC AUTOMATED: CPT

## 2024-11-05 PROCEDURE — 85610 PROTHROMBIN TIME: CPT

## 2024-11-05 RX ORDER — HEPARIN SODIUM (PORCINE) LOCK FLUSH IV SOLN 100 UNIT/ML 100 UNIT/ML
5 SOLUTION INTRAVENOUS
Status: CANCELLED | OUTPATIENT
Start: 2024-11-05

## 2024-11-05 RX ORDER — EPINEPHRINE 1 MG/ML
0.3 INJECTION, SOLUTION, CONCENTRATE INTRAVENOUS EVERY 5 MIN PRN
Status: CANCELLED | OUTPATIENT
Start: 2024-11-05

## 2024-11-05 RX ORDER — HEPARIN SODIUM,PORCINE 10 UNIT/ML
5-20 VIAL (ML) INTRAVENOUS DAILY PRN
Status: CANCELLED | OUTPATIENT
Start: 2024-11-05

## 2024-11-05 RX ORDER — DIPHENHYDRAMINE HYDROCHLORIDE 50 MG/ML
50 INJECTION INTRAMUSCULAR; INTRAVENOUS
Status: CANCELLED
Start: 2024-11-05

## 2024-11-05 NOTE — TELEPHONE ENCOUNTER
Had stopped taking it iron two weeks    Black stools appenng for the four weeks and two weeks off iron   No diarrhea    Feeling tired but not dizzy or light headed , no abdominla kriss or SOB    3 weeks ago had a stent put and plaix was added to eliquis   She declines to go to the ER   Doing scopes and holding Plavix would be not recommended so close to a stent insertion.  She is hemodynamically stable will recommend blood transfusions and GI consult.  Only concern is the timing cannot guarantee urgent time recommend going to the ER but because she declines we will put in the orders and she will be connected tomorrow to see if we can get her in as soon as possible.

## 2024-11-05 NOTE — PROGRESS NOTES
11/5/2024     E-Consult has been accepted.    Interprofessional consultation requested by:  Yun Jacinto MD      Clinical Question/Purpose: MY CLINICAL QUESTION IS: 88 yrold woman with a history of melena three years ago on anticoagulation and a  drop in hemglobin resulting in transfusions had upper lower endoscopies plus capsule enteroscopy.  No source of bleeding was found.  She subsequently normalized her hemoglobin was restarted on anticoagulation and serial follow-up hemoglobins were normal.  Patient had no symptoms.  She now has had a stent put in 3 weeks ago and Plavix was added to the Eliquis.  And she started noticing melena she was on iron pills she stopped those on her own but the melena persisted.  Repeat hemoglobin today shows a sharp drop in her hemoglobin indicating recurrence of her GI bleed.  She is hemodynamically stable otherwise very active woman and she does not have diabetes, congestive heart failure history or chronic kidney disease . Only symptom is mild fatigue .  She declines going to the emergency room.  I am trying to arrange for blood transfusion.  I have held her Eliquis.  Unclear if I can hold her Plavix Plavix so close to a stent insertion.  But will check with cardiology she wants to know if she can just do another capsule enteroscopy.  She does not want to get more scopes done.  Is that a reasonable request?    Patient assessment and information reviewed:   Melena - 3 gram drop in hgb  Need for anticoagulation  Prior GI bleed in 2021 with EGD/colonoscopy and capsule endoscopy not showing source  Multiple comorbidities with recent coronary stent on plavix and eliquis.  5. Pt requests to minimize endoscopic evaluation    Given age and comorbidities and stability you could stop some of patient's anticoagulation and monitor for stability/resolution. Given very recent stent stopping the plavix is not likely an option. Would consider holding Eliquis.    However, given melena and 3  gram hgb drop and advanced age would have very low threshold for admission to the hospital to monitor and consider endoscopic evaluation. Given it has been 3 years since her endoscopies should would require repeat EGD and colonoscopy prior to insurance approving a repeat capsule endoscopy.    Recommendations:   -see above discussion  -given 3 gram hgb drop and age and comorbidities would have low threshold for hospital evaluation   -given timing since last endoscopic evaluation would not jump right to capsule endoscopy - she would need EGd/colonoscopy first  -if patient refuses hospital evaluation then could consider monitoring off Eliquis to see if melena resolves (appears to be on ELiquis for a fib)      The recommendations provided in this E-Consult are based on a review of clinical data pertinent to the clinical question presented, without a review of the patient's complete medical record or, the benefit of a comprehensive in-person or virtual patient evaluation. This consultation should not replace the clinical judgement and evaluation of the provider ordering this E-Consult. Any new clinical issues, or changes in patient status since the filing of this E-Consult will need to be taken into account when assessing these recommendations. Please contact me if you have further questions.    My total time spent reviewing clinical information and formulating assessment was 10 minutes.        Amish Clinton MD

## 2024-11-06 ENCOUNTER — TELEPHONE (OUTPATIENT)
Dept: CARDIOLOGY | Facility: CLINIC | Age: 89
End: 2024-11-06
Payer: COMMERCIAL

## 2024-11-06 ENCOUNTER — INFUSION THERAPY VISIT (OUTPATIENT)
Dept: INFUSION THERAPY | Facility: HOSPITAL | Age: 89
End: 2024-11-06
Attending: INTERNAL MEDICINE
Payer: COMMERCIAL

## 2024-11-06 VITALS
RESPIRATION RATE: 16 BRPM | SYSTOLIC BLOOD PRESSURE: 135 MMHG | OXYGEN SATURATION: 96 % | HEART RATE: 72 BPM | DIASTOLIC BLOOD PRESSURE: 60 MMHG | TEMPERATURE: 98.4 F

## 2024-11-06 DIAGNOSIS — K92.1 GASTROINTESTINAL HEMORRHAGE WITH MELENA: Primary | ICD-10-CM

## 2024-11-06 PROCEDURE — 86923 COMPATIBILITY TEST ELECTRIC: CPT | Performed by: INTERNAL MEDICINE

## 2024-11-06 PROCEDURE — 36415 COLL VENOUS BLD VENIPUNCTURE: CPT | Performed by: INTERNAL MEDICINE

## 2024-11-06 PROCEDURE — 86900 BLOOD TYPING SEROLOGIC ABO: CPT | Performed by: INTERNAL MEDICINE

## 2024-11-06 PROCEDURE — P9016 RBC LEUKOCYTES REDUCED: HCPCS | Performed by: INTERNAL MEDICINE

## 2024-11-06 PROCEDURE — 36430 TRANSFUSION BLD/BLD COMPNT: CPT

## 2024-11-06 PROCEDURE — 86901 BLOOD TYPING SEROLOGIC RH(D): CPT | Performed by: INTERNAL MEDICINE

## 2024-11-06 PROCEDURE — 258N000003 HC RX IP 258 OP 636: Performed by: INTERNAL MEDICINE

## 2024-11-06 RX ORDER — HEPARIN SODIUM,PORCINE 10 UNIT/ML
5-20 VIAL (ML) INTRAVENOUS DAILY PRN
OUTPATIENT
Start: 2024-11-06

## 2024-11-06 RX ORDER — EPINEPHRINE 1 MG/ML
0.3 INJECTION, SOLUTION INTRAMUSCULAR; SUBCUTANEOUS EVERY 5 MIN PRN
OUTPATIENT
Start: 2024-11-06

## 2024-11-06 RX ORDER — EPINEPHRINE 1 MG/ML
0.3 INJECTION, SOLUTION INTRAMUSCULAR; SUBCUTANEOUS EVERY 5 MIN PRN
Status: DISCONTINUED | OUTPATIENT
Start: 2024-11-06 | End: 2024-11-06 | Stop reason: HOSPADM

## 2024-11-06 RX ORDER — HEPARIN SODIUM (PORCINE) LOCK FLUSH IV SOLN 100 UNIT/ML 100 UNIT/ML
5 SOLUTION INTRAVENOUS
OUTPATIENT
Start: 2024-11-06

## 2024-11-06 RX ORDER — DIPHENHYDRAMINE HYDROCHLORIDE 50 MG/ML
50 INJECTION INTRAMUSCULAR; INTRAVENOUS
Start: 2024-11-06

## 2024-11-06 RX ORDER — DIPHENHYDRAMINE HYDROCHLORIDE 50 MG/ML
50 INJECTION INTRAMUSCULAR; INTRAVENOUS
Status: DISCONTINUED | OUTPATIENT
Start: 2024-11-06 | End: 2024-11-06 | Stop reason: HOSPADM

## 2024-11-06 RX ADMIN — SODIUM CHLORIDE 250 ML: 9 INJECTION, SOLUTION INTRAVENOUS at 10:15

## 2024-11-06 NOTE — PROGRESS NOTES
Infusion Nursing Note:  Constanza Coles presents today for T&S/ 1u PRBC.    Patient seen by provider today: No   present during visit today: Not Applicable.    Note: Constanza arrives A&OX4 ambulatory and stable, confirms she is here for blood transfusion. Pt states she had a stent replaced and that since then her Hgb has been dropping, the same she states as when she had the stent done 3yrs ago. She reports symptoms of feeling fatigued, easily SOB, tired, and weakness.      Intravenous Access:  Peripheral IV placed.    Treatment Conditions:  Hemoglobin   Date Value Ref Range Status   11/05/2024 7.3 (LL) 11.7 - 15.7 g/dL Final     Blood transfusion consent signed 10/16/2024      Post Infusion Assessment:  Patient tolerated infusion without incident.  Blood return noted pre and post infusion.  Site patent and intact, free from redness, edema or discomfort.  Access discontinued per protocol.       Discharge Plan:   Patient and/or family verbalized understanding of discharge instructions and all questions answered.  Copy of AVS reviewed with patient and/or family.  Patient will follow up with PCP as directed.  Patient discharged in stable condition accompanied by: .  Departure Mode: Ambulatory.      Becky Niño RN

## 2024-11-06 NOTE — TELEPHONE ENCOUNTER
----- Message from RY MCCRAY sent at 11/5/2024  4:41 PM CST -----  Thanks for letting me know, I would agree emergency room evaluation.  I would not discontinue the Plavix this close to her stent implantation.  Keep me posted.  Lastly, she is having still vague cardiovascular symptoms and given this I really am very hesitant to proceed with any other cardiovascular procedures.  I will involve my nursing team in on this to look into other issues.  LF  ----- Message -----  From: Yun aJcinto MD  Sent: 11/5/2024   1:40 PM CST  To: Mariel Mccray MD    Hello Dr mccray , I would appreciate your thoughts / Constanza  as you know just had a stent put in and Plavix was added.  she now reports painless  melena ( she has not come to the clinic to see me ) but we repeated her hemoglobin and she has a sharp drop  She is otherwise hemodynamically stable just feeling tired.  This exact same scenario happened 3 years ago and upper lower endoscopies and capsule enteroscopy was negative.  She was given blood transfusions and her anemia resolved and was maintained even afterwards off iron.  She refuses to go to the emergency room .I am trying to get an outpatient blood transfusion and I have held the Eliquis.  I am concerned about holding Plavix so close to her stent insertion.  Do you have any other recommendations?  I also wonder that even if I sent her to the ER for urgent colonoscopy/  endoscopy would they not require her Plavix to be held?  Please let me know your thoughts thank you Yun

## 2024-11-07 ENCOUNTER — TELEPHONE (OUTPATIENT)
Dept: INTERNAL MEDICINE | Facility: CLINIC | Age: 89
End: 2024-11-07
Payer: COMMERCIAL

## 2024-11-07 ENCOUNTER — TELEPHONE (OUTPATIENT)
Dept: CARDIOLOGY | Facility: CLINIC | Age: 89
End: 2024-11-07
Payer: COMMERCIAL

## 2024-11-07 NOTE — TELEPHONE ENCOUNTER
[facility]    Phone Message    May a detailed message be left on voicemail: yes     Reason for Call: Other: patient is calling requesting to speak to the care team as she has been seen in the hospital and seen her pcp, patient needed a blood transfusion due to her black stool which is blood, patient is looking to speak to the care team to see if she needs another transfusion as this all happened after her stent placement 3 weeks ago, patients blood level was 7.3 and now 8.3, please call patient to advise, thank you    Pcp on vacation now   Action Taken: Other: cardiology    Travel Screening: Not Applicable     Date of Service:

## 2024-11-07 NOTE — TELEPHONE ENCOUNTER
11/07 I called and talked to pt she rather come in to talk to you so gave her an approval slot on Tues Nov 12th

## 2024-11-07 NOTE — TELEPHONE ENCOUNTER
I called pat and left message  she needs virtual or in person appointment next week with me . You can call me on my cell phone if she calls back    Spoke to dr mccray he said not to stop plavix and he wouldn't want her to get any procedures ,also spoke to GI and they said they cannot do capusle enteroscopy without doing repeat egd and colonoscopy first . Meanwile stop elliqis and we repeat hbg nxt week

## 2024-11-07 NOTE — TELEPHONE ENCOUNTER
----- Message from Yun Jacinto sent at 11/7/2024  2:45 PM CST -----  Please let pt know that I would like to see her to discuss next steps . It can be a virtual

## 2024-11-07 NOTE — TELEPHONE ENCOUNTER
Called Constanza to address her concerns. She reports that she had 1 unit of blood yesterday as ordered by Dr. Jacinto. She wonders about next steps as her Eliquis is still on hold. She says Dr. Jacinto is out of the office, but explained that someone should be covering or be aware of the plan. Likely she will need repeat hgb drawn. She notes just 1 stool today that was still melena, she believes, but lighter in color. Informed her that PMD is managing this and we should involve them and they should address her questions for next steps. Will route. -[facility]        Chart reviewed-  Blood transfusion was set up by PMD office, not Dr. Antoine in cardiology.  Routed to PMD team. -[facility]

## 2024-11-08 NOTE — TELEPHONE ENCOUNTER
Spoke with patient and relayed recommendation from Dr. Jacinto.     Patient hesitant to start eliquis. States she is constipated and had not had a normal stool for 1 week. Did have a small stool yesterday that was dark brown.     Reports she may take a laxative. Advised I would send to covering provider with history to advise on recommended next steps.

## 2024-11-08 NOTE — TELEPHONE ENCOUNTER
O'K to try Miralax once a day.  If she is taking any calcium supplements, O'K to hold it until constipation is better since calcium can constipate.   Makes ure to stay hydrated.     If black stool recur (even without symptoms) come to ER , or having any symptoms, come to ER

## 2024-11-08 NOTE — TELEPHONE ENCOUNTER
"Secure chat with PCP to inform of dark brown stool and continued fatigue:    \" I will tell her the plan on 11/12 and my discussions with GI and cardiology. If stool are brown she can resume eliquis but if they become black again to hold.\"  "

## 2024-11-08 NOTE — TELEPHONE ENCOUNTER
Spoke with patient and relayed message from Dr. Jacinto below.     Reports stool is now a dark brown, not black. Denies other new symptoms at time of call (dizziness, lightheadedness, feeling faint, ect.) Does confirm continued fatigue.     Appt scheduled for 11/12. Will keep appointment.       Discussed if feeling faint, dizzy, light headed, worsening fatigue, or any bleeding noted to contact nurse triage and/or present to the emergency room.       Patient verbalized understanding.

## 2024-11-08 NOTE — TELEPHONE ENCOUNTER
Notified patient of Dr. Sarabia's recommendations. Patient verbalized understanding. Patient states she is not taking any calcium supplements.

## 2024-11-12 ENCOUNTER — OFFICE VISIT (OUTPATIENT)
Dept: INTERNAL MEDICINE | Facility: CLINIC | Age: 89
End: 2024-11-12
Payer: COMMERCIAL

## 2024-11-12 VITALS
HEART RATE: 68 BPM | RESPIRATION RATE: 15 BRPM | SYSTOLIC BLOOD PRESSURE: 120 MMHG | OXYGEN SATURATION: 97 % | HEIGHT: 61 IN | BODY MASS INDEX: 19.73 KG/M2 | DIASTOLIC BLOOD PRESSURE: 70 MMHG | WEIGHT: 104.5 LBS | TEMPERATURE: 97.3 F

## 2024-11-12 DIAGNOSIS — K92.1 GASTROINTESTINAL HEMORRHAGE WITH MELENA: ICD-10-CM

## 2024-11-12 DIAGNOSIS — D50.0 IRON DEFICIENCY ANEMIA DUE TO CHRONIC BLOOD LOSS: ICD-10-CM

## 2024-11-12 DIAGNOSIS — E55.9 VITAMIN D DEFICIENCY: ICD-10-CM

## 2024-11-12 DIAGNOSIS — Z95.5 PRESENCE OF CORONARY ARTERY BYPASS GRAFT STENT: ICD-10-CM

## 2024-11-12 DIAGNOSIS — Z95.1 PRESENCE OF CORONARY ARTERY BYPASS GRAFT STENT: ICD-10-CM

## 2024-11-12 DIAGNOSIS — I25.118 ATHEROSCLEROSIS OF CORONARY ARTERY OF NATIVE HEART WITH OTHER FORM OF ANGINA PECTORIS, UNSPECIFIED VESSEL OR LESION TYPE (H): ICD-10-CM

## 2024-11-12 DIAGNOSIS — D50.9 IRON DEFICIENCY ANEMIA, UNSPECIFIED IRON DEFICIENCY ANEMIA TYPE: ICD-10-CM

## 2024-11-12 DIAGNOSIS — Z23 NEED FOR SHINGLES VACCINE: Primary | ICD-10-CM

## 2024-11-12 DIAGNOSIS — K92.1 MELENA: ICD-10-CM

## 2024-11-12 DIAGNOSIS — R79.1 ABNORMAL COAGULATION PROFILE: ICD-10-CM

## 2024-11-12 PROBLEM — I25.83 CORONARY ATHEROSCLEROSIS DUE TO LIPID RICH PLAQUE: Status: RESOLVED | Noted: 2024-10-16 | Resolved: 2024-11-12

## 2024-11-12 PROBLEM — G44.209 TENSION HEADACHE: Status: RESOLVED | Noted: 2021-10-30 | Resolved: 2024-11-12

## 2024-11-12 PROBLEM — R73.03 PREDIABETES: Status: RESOLVED | Noted: 2024-10-14 | Resolved: 2024-11-12

## 2024-11-12 PROBLEM — Z98.890 STATUS POST CORONARY ANGIOGRAM: Status: RESOLVED | Noted: 2024-10-16 | Resolved: 2024-11-12

## 2024-11-12 PROBLEM — I25.10 ATHEROSCLEROSIS OF NATIVE CORONARY ARTERY OF NATIVE HEART WITHOUT ANGINA PECTORIS: Status: RESOLVED | Noted: 2024-10-16 | Resolved: 2024-11-12

## 2024-11-12 LAB
BASOPHILS # BLD AUTO: 0.1 10E3/UL (ref 0–0.2)
BASOPHILS NFR BLD AUTO: 1 %
CHOLEST SERPL-MCNC: 110 MG/DL
EOSINOPHIL # BLD AUTO: 0.6 10E3/UL (ref 0–0.7)
EOSINOPHIL NFR BLD AUTO: 9 %
ERYTHROCYTE [DISTWIDTH] IN BLOOD BY AUTOMATED COUNT: 20.8 % (ref 10–15)
FASTING STATUS PATIENT QL REPORTED: NORMAL
FERRITIN SERPL-MCNC: 26 NG/ML (ref 11–328)
FOLATE SERPL-MCNC: 15.8 NG/ML (ref 4.6–34.8)
HCT VFR BLD AUTO: 30.2 % (ref 35–47)
HDLC SERPL-MCNC: 71 MG/DL
HGB BLD-MCNC: 9.1 G/DL (ref 11.7–15.7)
IMM GRANULOCYTES # BLD: 0 10E3/UL
IMM GRANULOCYTES NFR BLD: 0 %
IRON BINDING CAPACITY (ROCHE): 395 UG/DL (ref 240–430)
IRON SATN MFR SERPL: 4 % (ref 15–46)
IRON SERPL-MCNC: 17 UG/DL (ref 37–145)
LDLC SERPL CALC-MCNC: 27 MG/DL
LYMPHOCYTES # BLD AUTO: 1.4 10E3/UL (ref 0.8–5.3)
LYMPHOCYTES NFR BLD AUTO: 19 %
MCH RBC QN AUTO: 25.7 PG (ref 26.5–33)
MCHC RBC AUTO-ENTMCNC: 30.1 G/DL (ref 31.5–36.5)
MCV RBC AUTO: 85 FL (ref 78–100)
MONOCYTES # BLD AUTO: 0.7 10E3/UL (ref 0–1.3)
MONOCYTES NFR BLD AUTO: 9 %
NEUTROPHILS # BLD AUTO: 4.4 10E3/UL (ref 1.6–8.3)
NEUTROPHILS NFR BLD AUTO: 62 %
NONHDLC SERPL-MCNC: 39 MG/DL
PLATELET # BLD AUTO: 204 10E3/UL (ref 150–450)
RBC # BLD AUTO: 3.54 10E6/UL (ref 3.8–5.2)
TRIGL SERPL-MCNC: 59 MG/DL
WBC # BLD AUTO: 7.2 10E3/UL (ref 4–11)

## 2024-11-12 PROCEDURE — 80061 LIPID PANEL: CPT | Performed by: INTERNAL MEDICINE

## 2024-11-12 PROCEDURE — 83921 ORGANIC ACID SINGLE QUANT: CPT | Performed by: INTERNAL MEDICINE

## 2024-11-12 PROCEDURE — 82728 ASSAY OF FERRITIN: CPT | Performed by: INTERNAL MEDICINE

## 2024-11-12 PROCEDURE — 99000 SPECIMEN HANDLING OFFICE-LAB: CPT | Performed by: INTERNAL MEDICINE

## 2024-11-12 PROCEDURE — 83550 IRON BINDING TEST: CPT | Performed by: INTERNAL MEDICINE

## 2024-11-12 PROCEDURE — 85025 COMPLETE CBC W/AUTO DIFF WBC: CPT | Performed by: INTERNAL MEDICINE

## 2024-11-12 PROCEDURE — 99213 OFFICE O/P EST LOW 20 MIN: CPT | Performed by: INTERNAL MEDICINE

## 2024-11-12 PROCEDURE — 84238 ASSAY NONENDOCRINE RECEPTOR: CPT | Mod: 90 | Performed by: INTERNAL MEDICINE

## 2024-11-12 PROCEDURE — 82746 ASSAY OF FOLIC ACID SERUM: CPT | Performed by: INTERNAL MEDICINE

## 2024-11-12 PROCEDURE — 83540 ASSAY OF IRON: CPT | Performed by: INTERNAL MEDICINE

## 2024-11-12 PROCEDURE — 36415 COLL VENOUS BLD VENIPUNCTURE: CPT | Performed by: INTERNAL MEDICINE

## 2024-11-12 RX ORDER — DIPHENHYDRAMINE HYDROCHLORIDE 50 MG/ML
50 INJECTION INTRAMUSCULAR; INTRAVENOUS
Start: 2024-11-19

## 2024-11-12 RX ORDER — ALBUTEROL SULFATE 0.83 MG/ML
2.5 SOLUTION RESPIRATORY (INHALATION)
OUTPATIENT
Start: 2024-11-19

## 2024-11-12 RX ORDER — MEPERIDINE HYDROCHLORIDE 25 MG/ML
25 INJECTION INTRAMUSCULAR; INTRAVENOUS; SUBCUTANEOUS
OUTPATIENT
Start: 2024-11-19

## 2024-11-12 RX ORDER — ALBUTEROL SULFATE 90 UG/1
1-2 INHALANT RESPIRATORY (INHALATION)
Start: 2024-11-19

## 2024-11-12 RX ORDER — METHYLPREDNISOLONE SODIUM SUCCINATE 40 MG/ML
40 INJECTION INTRAMUSCULAR; INTRAVENOUS
Start: 2024-11-19

## 2024-11-12 RX ORDER — DIPHENHYDRAMINE HYDROCHLORIDE 50 MG/ML
25 INJECTION INTRAMUSCULAR; INTRAVENOUS
Start: 2024-11-19

## 2024-11-12 RX ORDER — EPINEPHRINE 1 MG/ML
0.3 INJECTION, SOLUTION, CONCENTRATE INTRAVENOUS EVERY 5 MIN PRN
OUTPATIENT
Start: 2024-11-19

## 2024-11-12 RX ORDER — HEPARIN SODIUM,PORCINE 10 UNIT/ML
5-20 VIAL (ML) INTRAVENOUS DAILY PRN
OUTPATIENT
Start: 2024-11-19

## 2024-11-12 RX ORDER — HEPARIN SODIUM (PORCINE) LOCK FLUSH IV SOLN 100 UNIT/ML 100 UNIT/ML
5 SOLUTION INTRAVENOUS
OUTPATIENT
Start: 2024-11-19

## 2024-11-12 NOTE — PROGRESS NOTES
Assessment & Plan     Need for shingles vaccine      Atherosclerosis of coronary artery of native heart with other form of angina pectoris, unspecified vessel or lesion type (H)  S/p PCI stent    Melena      Vitamin D deficiency  Elevated vit d   Iron deficiency anemia, unspecified iron deficiency anemia type  Her symptoms of drop in hemoglobin coincided with black stools so she has classic melena signifying an higher GI bleed.  Her hemoglobin went up to 1:09 blood transfusion.  She is still feeling tired we will give her IV iron she does not want to take oral iron which will also confuse the picture with melanotic looking stools.  After that she can collect a FIT card and keep an eye on the change in color in stool it really does correlate with the color she now has brown stools.    Serg with GI and cardiology at this stage to do intervention like upper and and lower colonoscopy would not be prudent straight after stent insertion and Plavix cannot be held she is hemodynamically stable and if hemoglobin is stable with IV iron or periodic blood transfusions and she has no other symptoms I would think that would be good maintenance therapy.  The question remains whether she should stop Eliquis.  We did stop and now is she has restarted with no recurrence and bleed if she does not have a recurrence that can be the plan otherwise she may not be able to take Eliquis and it would have to be stopped indefinitely.    Was asking whether she could take a lower dose I did tell her she is already on the lower dose.  At any rate she does not want to do any further invent intervention other than transfusions or IV iron.  - CBC with platelets and differential; Future  - Iron and iron binding capacity; Future  - Soluble transferrin receptor; Future  - Methylmalonic Acid; Future  - Folate; Future  - Ferritin; Future  - Fecal colorectal cancer screen (FIT); Future  - CBC with platelets and differential  - Iron and iron binding  "capacity  - Soluble transferrin receptor  - Methylmalonic Acid  - Folate  - Ferritin  - Fecal colorectal cancer screen (FIT)    Abnormal coagulation profile    - Vitamin D Deficiency; Future    Presence of coronary artery bypass graft stent    - Lipid Profile    Gastrointestinal hemorrhage with melena                  Lolis Apodaca is a 89 year old, presenting for the following health issues:  Lab Results   Component Value Date    A1C 5.7 09/11/2024       Follow Up (Hx of melena and recent blood transfusion. Discuss next steps and cardiology/GI recommendations. Reports recent constipation has confirmed. No recent black/tarry stools.)      11/12/2024    11:05 AM   Additional Questions   Roomed by NENA Blake     History of Present Illness       Reason for visit:  GI/Cardiology recommendations and next steps    She eats 0-1 servings of fruits and vegetables daily.She consumes 0 sweetened beverage(s) daily.She exercises with enough effort to increase her heart rate 30 to 60 minutes per day.  She exercises with enough effort to increase her heart rate 3 or less days per week.   She is taking medications regularly.                     Objective    /70 (BP Location: Left arm, Patient Position: Sitting, Cuff Size: Adult Regular)   Pulse 68   Temp 97.3  F (36.3  C) (Oral)   Resp 15   Ht 1.549 m (5' 1\")   Wt 47.4 kg (104 lb 8 oz)   LMP  (LMP Unknown)   SpO2 97%   BMI 19.75 kg/m    Body mass index is 19.75 kg/m .  Physical Exam               Signed Electronically by: Yun Jacinto MD    "

## 2024-11-12 NOTE — PATIENT INSTRUCTIONS
Plan is to continue eliquis    Iron infusions times three   If you bleed again then eliquis will be stopped indefinitely   Keep the FIT test for the stool

## 2024-11-13 LAB — METHYLMALONATE SERPL-SCNC: 0.18 UMOL/L (ref 0–0.4)

## 2024-11-14 PROBLEM — D50.0 IRON DEFICIENCY ANEMIA DUE TO CHRONIC BLOOD LOSS: Status: ACTIVE | Noted: 2024-11-14

## 2024-11-14 LAB — STFR SERPL-MCNC: 5.5 MG/L

## 2024-11-14 RX ORDER — DIPHENHYDRAMINE HYDROCHLORIDE 50 MG/ML
25 INJECTION INTRAMUSCULAR; INTRAVENOUS
Start: 2024-11-21

## 2024-11-14 RX ORDER — METHYLPREDNISOLONE SODIUM SUCCINATE 40 MG/ML
40 INJECTION INTRAMUSCULAR; INTRAVENOUS
Start: 2024-11-21

## 2024-11-14 RX ORDER — ALBUTEROL SULFATE 0.83 MG/ML
2.5 SOLUTION RESPIRATORY (INHALATION)
OUTPATIENT
Start: 2024-11-21

## 2024-11-14 RX ORDER — HEPARIN SODIUM,PORCINE 10 UNIT/ML
5-20 VIAL (ML) INTRAVENOUS DAILY PRN
OUTPATIENT
Start: 2024-11-21

## 2024-11-14 RX ORDER — ALBUTEROL SULFATE 90 UG/1
1-2 INHALANT RESPIRATORY (INHALATION)
Start: 2024-11-21

## 2024-11-14 RX ORDER — EPINEPHRINE 1 MG/ML
0.3 INJECTION, SOLUTION, CONCENTRATE INTRAVENOUS EVERY 5 MIN PRN
OUTPATIENT
Start: 2024-11-21

## 2024-11-14 RX ORDER — DIPHENHYDRAMINE HYDROCHLORIDE 50 MG/ML
50 INJECTION INTRAMUSCULAR; INTRAVENOUS
Start: 2024-11-21

## 2024-11-14 RX ORDER — HEPARIN SODIUM (PORCINE) LOCK FLUSH IV SOLN 100 UNIT/ML 100 UNIT/ML
5 SOLUTION INTRAVENOUS
OUTPATIENT
Start: 2024-11-21

## 2024-11-14 RX ORDER — MEPERIDINE HYDROCHLORIDE 25 MG/ML
25 INJECTION INTRAMUSCULAR; INTRAVENOUS; SUBCUTANEOUS
OUTPATIENT
Start: 2024-11-21

## 2024-11-25 ENCOUNTER — INFUSION THERAPY VISIT (OUTPATIENT)
Dept: INFUSION THERAPY | Facility: HOSPITAL | Age: 89
End: 2024-11-25
Attending: INTERNAL MEDICINE
Payer: COMMERCIAL

## 2024-11-25 VITALS
HEART RATE: 77 BPM | SYSTOLIC BLOOD PRESSURE: 148 MMHG | TEMPERATURE: 97.9 F | DIASTOLIC BLOOD PRESSURE: 66 MMHG | RESPIRATION RATE: 16 BRPM | OXYGEN SATURATION: 96 %

## 2024-11-25 DIAGNOSIS — D50.0 IRON DEFICIENCY ANEMIA DUE TO CHRONIC BLOOD LOSS: Primary | ICD-10-CM

## 2024-11-25 PROCEDURE — 258N000003 HC RX IP 258 OP 636: Performed by: INTERNAL MEDICINE

## 2024-11-25 PROCEDURE — 250N000011 HC RX IP 250 OP 636: Performed by: INTERNAL MEDICINE

## 2024-11-25 PROCEDURE — 96366 THER/PROPH/DIAG IV INF ADDON: CPT

## 2024-11-25 PROCEDURE — 96365 THER/PROPH/DIAG IV INF INIT: CPT

## 2024-11-25 RX ORDER — MEPERIDINE HYDROCHLORIDE 25 MG/ML
25 INJECTION INTRAMUSCULAR; INTRAVENOUS; SUBCUTANEOUS
Status: DISCONTINUED | OUTPATIENT
Start: 2024-11-25 | End: 2024-11-25 | Stop reason: HOSPADM

## 2024-11-25 RX ORDER — HEPARIN SODIUM (PORCINE) LOCK FLUSH IV SOLN 100 UNIT/ML 100 UNIT/ML
5 SOLUTION INTRAVENOUS
OUTPATIENT
Start: 2024-11-27

## 2024-11-25 RX ORDER — DIPHENHYDRAMINE HYDROCHLORIDE 50 MG/ML
25 INJECTION INTRAMUSCULAR; INTRAVENOUS
Start: 2024-11-27

## 2024-11-25 RX ORDER — MEPERIDINE HYDROCHLORIDE 25 MG/ML
25 INJECTION INTRAMUSCULAR; INTRAVENOUS; SUBCUTANEOUS
OUTPATIENT
Start: 2024-11-27

## 2024-11-25 RX ORDER — DIPHENHYDRAMINE HYDROCHLORIDE 50 MG/ML
50 INJECTION INTRAMUSCULAR; INTRAVENOUS
Status: DISCONTINUED | OUTPATIENT
Start: 2024-11-25 | End: 2024-11-25 | Stop reason: HOSPADM

## 2024-11-25 RX ORDER — METHYLPREDNISOLONE SODIUM SUCCINATE 40 MG/ML
40 INJECTION INTRAMUSCULAR; INTRAVENOUS
Start: 2024-11-27

## 2024-11-25 RX ORDER — EPINEPHRINE 1 MG/ML
0.3 INJECTION, SOLUTION INTRAMUSCULAR; SUBCUTANEOUS EVERY 5 MIN PRN
OUTPATIENT
Start: 2024-11-27

## 2024-11-25 RX ORDER — HEPARIN SODIUM,PORCINE 10 UNIT/ML
5-20 VIAL (ML) INTRAVENOUS DAILY PRN
OUTPATIENT
Start: 2024-11-27

## 2024-11-25 RX ORDER — ALBUTEROL SULFATE 0.83 MG/ML
2.5 SOLUTION RESPIRATORY (INHALATION)
OUTPATIENT
Start: 2024-11-27

## 2024-11-25 RX ORDER — METHYLPREDNISOLONE SODIUM SUCCINATE 40 MG/ML
40 INJECTION INTRAMUSCULAR; INTRAVENOUS
Status: DISCONTINUED | OUTPATIENT
Start: 2024-11-25 | End: 2024-11-25 | Stop reason: HOSPADM

## 2024-11-25 RX ORDER — ALBUTEROL SULFATE 0.83 MG/ML
2.5 SOLUTION RESPIRATORY (INHALATION)
Status: DISCONTINUED | OUTPATIENT
Start: 2024-11-25 | End: 2024-11-25 | Stop reason: HOSPADM

## 2024-11-25 RX ORDER — EPINEPHRINE 1 MG/ML
0.3 INJECTION, SOLUTION INTRAMUSCULAR; SUBCUTANEOUS EVERY 5 MIN PRN
Status: DISCONTINUED | OUTPATIENT
Start: 2024-11-25 | End: 2024-11-25 | Stop reason: HOSPADM

## 2024-11-25 RX ORDER — ALBUTEROL SULFATE 90 UG/1
1-2 INHALANT RESPIRATORY (INHALATION)
Start: 2024-11-27

## 2024-11-25 RX ORDER — ALBUTEROL SULFATE 90 UG/1
1-2 INHALANT RESPIRATORY (INHALATION)
Status: DISCONTINUED | OUTPATIENT
Start: 2024-11-25 | End: 2024-11-25 | Stop reason: HOSPADM

## 2024-11-25 RX ORDER — DIPHENHYDRAMINE HYDROCHLORIDE 50 MG/ML
50 INJECTION INTRAMUSCULAR; INTRAVENOUS
Start: 2024-11-27

## 2024-11-25 RX ORDER — DIPHENHYDRAMINE HYDROCHLORIDE 50 MG/ML
25 INJECTION INTRAMUSCULAR; INTRAVENOUS
Status: DISCONTINUED | OUTPATIENT
Start: 2024-11-25 | End: 2024-11-25 | Stop reason: HOSPADM

## 2024-11-25 RX ADMIN — IRON SUCROSE 300 MG: 20 INJECTION, SOLUTION INTRAVENOUS at 12:32

## 2024-11-25 ASSESSMENT — PAIN SCALES - GENERAL: PAINLEVEL_OUTOF10: NO PAIN (0)

## 2024-11-25 NOTE — PROGRESS NOTES
Infusion Nursing Note:  Constanza Coles presents today for Venofer1/2.    Patient seen by provider today: No   present during visit today: Not Applicable.    Note: Constanza arrived into the infusion center ambulatory in a stable condition accompanied by her  for 1/2 Venofer 300 mg, VSS. Plan of care reviewed, they verbalized understanding of her plan of care and return to clinic. She acknowledged she ad received Iron in the past.      Intravenous Access:  Peripheral IV placed.    Treatment Conditions:  Not Applicable.    Post Infusion Assessment:  Patient tolerated infusion without incident.  Patient observed for 30 minutes post Iron infusion per protocol.  Site patent and intact, free from redness, edema or discomfort.  Access discontinued per protocol.     Discharge Plan:   Discharge instructions reviewed with: Patient.  Patient and/or family verbalized understanding of discharge instructions and all questions answered.  Patient discharged in stable condition accompanied by: .  Departure Mode: Ambulatory.    Rosa Ansari RN

## 2024-11-30 DIAGNOSIS — I48.11 LONGSTANDING PERSISTENT ATRIAL FIBRILLATION (H): ICD-10-CM

## 2024-12-02 ENCOUNTER — INFUSION THERAPY VISIT (OUTPATIENT)
Dept: INFUSION THERAPY | Facility: HOSPITAL | Age: 89
End: 2024-12-02
Attending: INTERNAL MEDICINE
Payer: COMMERCIAL

## 2024-12-02 VITALS
HEART RATE: 88 BPM | SYSTOLIC BLOOD PRESSURE: 132 MMHG | TEMPERATURE: 97.5 F | OXYGEN SATURATION: 99 % | RESPIRATION RATE: 16 BRPM | DIASTOLIC BLOOD PRESSURE: 60 MMHG

## 2024-12-02 DIAGNOSIS — D50.0 IRON DEFICIENCY ANEMIA DUE TO CHRONIC BLOOD LOSS: Primary | ICD-10-CM

## 2024-12-02 PROCEDURE — 96365 THER/PROPH/DIAG IV INF INIT: CPT

## 2024-12-02 PROCEDURE — 96366 THER/PROPH/DIAG IV INF ADDON: CPT

## 2024-12-02 PROCEDURE — 250N000011 HC RX IP 250 OP 636: Performed by: INTERNAL MEDICINE

## 2024-12-02 PROCEDURE — 258N000003 HC RX IP 258 OP 636: Performed by: INTERNAL MEDICINE

## 2024-12-02 RX ORDER — METHYLPREDNISOLONE SODIUM SUCCINATE 40 MG/ML
40 INJECTION INTRAMUSCULAR; INTRAVENOUS
Start: 2024-12-03

## 2024-12-02 RX ORDER — MEPERIDINE HYDROCHLORIDE 25 MG/ML
25 INJECTION INTRAMUSCULAR; INTRAVENOUS; SUBCUTANEOUS
OUTPATIENT
Start: 2024-12-03

## 2024-12-02 RX ORDER — ALBUTEROL SULFATE 0.83 MG/ML
2.5 SOLUTION RESPIRATORY (INHALATION)
OUTPATIENT
Start: 2024-12-03

## 2024-12-02 RX ORDER — HEPARIN SODIUM (PORCINE) LOCK FLUSH IV SOLN 100 UNIT/ML 100 UNIT/ML
5 SOLUTION INTRAVENOUS
OUTPATIENT
Start: 2024-12-03

## 2024-12-02 RX ORDER — HEPARIN SODIUM,PORCINE 10 UNIT/ML
5-20 VIAL (ML) INTRAVENOUS DAILY PRN
OUTPATIENT
Start: 2024-12-03

## 2024-12-02 RX ORDER — EPINEPHRINE 1 MG/ML
0.3 INJECTION, SOLUTION INTRAMUSCULAR; SUBCUTANEOUS EVERY 5 MIN PRN
OUTPATIENT
Start: 2024-12-03

## 2024-12-02 RX ORDER — DIPHENHYDRAMINE HYDROCHLORIDE 50 MG/ML
25 INJECTION INTRAMUSCULAR; INTRAVENOUS
Start: 2024-12-03

## 2024-12-02 RX ORDER — DIPHENHYDRAMINE HYDROCHLORIDE 50 MG/ML
50 INJECTION INTRAMUSCULAR; INTRAVENOUS
Start: 2024-12-03

## 2024-12-02 RX ORDER — APIXABAN 2.5 MG/1
TABLET, FILM COATED ORAL
Qty: 60 TABLET | Refills: 3 | Status: SHIPPED | OUTPATIENT
Start: 2024-12-02

## 2024-12-02 RX ORDER — ALBUTEROL SULFATE 90 UG/1
1-2 INHALANT RESPIRATORY (INHALATION)
Start: 2024-12-03

## 2024-12-02 RX ADMIN — IRON SUCROSE 300 MG: 20 INJECTION, SOLUTION INTRAVENOUS at 12:23

## 2024-12-02 RX ADMIN — SODIUM CHLORIDE 250 ML: 9 INJECTION, SOLUTION INTRAVENOUS at 12:20

## 2024-12-02 NOTE — PROGRESS NOTES
Infusion Nursing Note:  Constanza Coles presents today for #2/2 Venofer 300 mg infusions.    Patient seen by provider today: No   present during visit today: Not Applicable.    Note: Constanza arrived ambulatory in stable condition with her . She states she did not have any side effects from her first dose of iron. She is reporting fatigue but no pain. Venofer infused over 90 minutes followed with saline rinse. Vital signs stable. She verbalized understanding this was her last iron infusion.       Intravenous Access:  Peripheral IV placed.    Treatment Conditions:  Not Applicable.      Post Infusion Assessment:  Patient tolerated infusion without incident.  Site patent and intact, free from redness, edema or discomfort.  Access discontinued per protocol.       Discharge Plan:   Patient discharged in stable condition accompanied by: .  Departure Mode: Ambulatory.      Eun Chacon RN

## 2024-12-05 ENCOUNTER — OFFICE VISIT (OUTPATIENT)
Dept: CARDIOLOGY | Facility: CLINIC | Age: 89
End: 2024-12-05
Payer: COMMERCIAL

## 2024-12-05 VITALS
DIASTOLIC BLOOD PRESSURE: 70 MMHG | SYSTOLIC BLOOD PRESSURE: 165 MMHG | BODY MASS INDEX: 19.65 KG/M2 | RESPIRATION RATE: 16 BRPM | WEIGHT: 104 LBS | HEART RATE: 70 BPM | OXYGEN SATURATION: 96 %

## 2024-12-05 DIAGNOSIS — I25.83 CORONARY ARTERIOSCLEROSIS DUE TO LIPID RICH PLAQUE: Primary | ICD-10-CM

## 2024-12-05 DIAGNOSIS — Z95.0 CARDIAC PACEMAKER IN SITU: ICD-10-CM

## 2024-12-05 DIAGNOSIS — E78.00 PURE HYPERCHOLESTEROLEMIA: ICD-10-CM

## 2024-12-05 DIAGNOSIS — Z95.2 H/O AORTIC VALVE REPLACEMENT: ICD-10-CM

## 2024-12-05 DIAGNOSIS — I50.30 DIASTOLIC CHF WITH PRESERVED LEFT VENTRICULAR FUNCTION, NYHA CLASS 2 (H): ICD-10-CM

## 2024-12-05 DIAGNOSIS — K92.1 GASTROINTESTINAL HEMORRHAGE WITH MELENA: ICD-10-CM

## 2024-12-05 DIAGNOSIS — Z95.1 S/P CABG (CORONARY ARTERY BYPASS GRAFT): ICD-10-CM

## 2024-12-05 DIAGNOSIS — I48.21 PERMANENT ATRIAL FIBRILLATION (H): ICD-10-CM

## 2024-12-05 DIAGNOSIS — I10 BENIGN ESSENTIAL HYPERTENSION: ICD-10-CM

## 2024-12-05 LAB
ANION GAP SERPL CALCULATED.3IONS-SCNC: 7 MMOL/L (ref 7–15)
BUN SERPL-MCNC: 11.4 MG/DL (ref 8–23)
CALCIUM SERPL-MCNC: 9.1 MG/DL (ref 8.8–10.4)
CHLORIDE SERPL-SCNC: 103 MMOL/L (ref 98–107)
CREAT SERPL-MCNC: 0.72 MG/DL (ref 0.51–0.95)
EGFRCR SERPLBLD CKD-EPI 2021: 80 ML/MIN/1.73M2
GLUCOSE SERPL-MCNC: 97 MG/DL (ref 70–99)
HCO3 SERPL-SCNC: 29 MMOL/L (ref 22–29)
HGB BLD-MCNC: 9.1 G/DL (ref 11.7–15.7)
NT-PROBNP SERPL-MCNC: 5527 PG/ML (ref 0–1800)
POTASSIUM SERPL-SCNC: 5 MMOL/L (ref 3.4–5.3)
SODIUM SERPL-SCNC: 139 MMOL/L (ref 135–145)

## 2024-12-05 NOTE — PATIENT INSTRUCTIONS
Ms Constanza Coles,  I enjoyed visiting with you again today.  I am  concerned and sorry to hear of the shortness of breath and that you possibly feel worse.    Per our conversation I will check the hemoglobin today as well as the BNP which is a a sign of fluid retention as well as kidney function and get those results to you either today or tomorrow.  Worst-case scenario is we will try a diuretic.  I will plan on seeing you 6 months or sooner if needed.  Mathew Antoine

## 2024-12-05 NOTE — LETTER
12/5/2024    Yun Jacinto MD  [address] [facility] [address] [address]    RE: Constanza Coles       Dear Colleague,     I had the pleasure of seeing Constanza Coles in the [facility].      [facility]  Heart Care Clinic Follow-up Note    Assessment & Plan        (I25.83) Coronary arteriosclerosis due to lipid rich plaque  (primary encounter diagnosis)  Comment: Angiography secondary to worsening shortness of breath showed left main 60%, proximal LAD 99 with a mid total occlusion and distal 20% lesion with first diagonal totally occluded, circumflex with a proximal total occlusion, and right coronary artery with a proximal 40%, mid 90% and distal 20% mid PDA 20% lesion.    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 She had aortic valve replacement at the same time.  There was no exclusion of the left atrial appendage at that time.  Recent angiography during above, October 2024, showed subtotal occlusion of the distal LIMA graft to the LAD which received a 2.5 x 38 mm Synergy stent.  Will consider Plavix for 6 months.     (Z95.2) H/O aortic valve replacement  Comment: Due to severe aortic stenosis she had a 21 mm Forte pericardial magna valve placed in 2005 and based on 2023 echo working well. Given that this tissue valve was placed over 15 years ago we will recheck echo yearly. In addition, I have asked her to take aspirin daily and she declines telling me that she has horrible allergic reactions and stomach upset from this.      (I48.20) Chronic atrial fibrillation (H)  Comment: Asymptomatic, not valvular, and now on Eliquis 2.5 mg p.o. twice daily given her lower weight and age over 82.     (Z95.0) Cardiac pacemaker in situ, dual chamber  Comment:  Medtronic device with Medtronic leads placed in 2011 with recent device change out January 2024 and currently 12% ventricular pacing, but some  tachycardia seen.  Will increase beta-blocker.     (I10) Benign essential hypertension  Comment: Blood pressure elevated, given this increase metoprolol although she tells me at home they are much better.    (E78.00) Pure hypercholesterolemia  Comment: Total cholesterol 141 with an LDL of 38 which is excellent.  Due to get repeat lipids this coming month.     (N18.30) Stage 3 chronic kidney disease, unspecified whether stage 3a or 3b CKD (H)  Comment: Kidney shows normal creatinine is normal at 0.79 with a GFR 72 and we will recheck today in consideration of diuretic.    (I50.30) Diastolic CHF with preserved left ventricular function, NYHA class 2 (H)  Comment: Given edema only 1 extremity with increased shortness of breath, suspect this could be heart failure, will arrange for BNP and if elevated consider low-dose furosemide.    (K92.1) Gastrointestinal hemorrhage with melena  Comment: Hemoglobin was down to 7.3, has had 2 iron infusions, will recheck hemoglobin today.    Plan  1.  Recheck hemoglobin, BNP and BMP today.  Adjust with diuretics if need be.  2.  Confirm how long we will keep her on Plavix.  3.  Follow-up me in 6 months or sooner if needed.    The longitudinal plan of care for the diagnosis(es)/condition(s) as documented were addressed during this visit. Due to the added complexity in care, I will continue to support Constanza in the subsequent management and with ongoing continuity of care.     Subjective  CC: 89-year-old white female being seen in posthospital discharge follow-up.  Since I seen her she had intervention on her distal LAD LIMA graft insertion.  She states her shortness of breath of anything is worse, she then developed a GI bleed.  She had melena.  Currently she complains of shortness with minimal activity and feels fatigued.  No PND, orthopnea, chest pains, major palpitations, but does admit to edema involving the right lower extremity.    Medications  Current Outpatient Medications  "  Medication Sig Dispense Refill     acetaminophen (TYLENOL) 500 MG tablet Take 1,000 mg by mouth every 6 hours as needed for mild pain       atorvastatin (LIPITOR) 40 MG tablet Take 1 tablet (40 mg) by mouth daily. 90 tablet 3     clopidogrel (PLAVIX) 75 MG tablet Take 1 tablet (75 mg) by mouth daily. 90 tablet 3     cyanocobalamin (CYANOCOBALAMIN) 1000 mcg/mL injection INJECT 1 ML (1,000 MCG) INTO THE SHOULDER, THIGH, OR BUTTOCKS EVERY 30 DAYS. **SYR 27G 1/2\" 1CC** 3 mL 3     ELIQUIS ANTICOAGULANT 2.5 MG tablet TAKE 1 TABLET(2.5 MG) BY MOUTH TWICE DAILY 60 tablet 3     gabapentin (NEURONTIN) 300 MG capsule Take 1 capsule (300 mg) by mouth 2 times daily 180 capsule 3     metoprolol succinate ER (TOPROL XL) 50 MG 24 hr tablet Take 1 tablet (50 mg) by mouth daily. 90 tablet 3     sodium fluoride dental gel (PREVIDENT) 1.1 % GEL topical gel ONE TEASPOON IN FLUORIDE TRAY APPLY TO TEETH FOR 3-5 MINUTES ONCE DAILY       tuberculin-allergy syringes (EASY TOUCH ALLERGY SYRINGE) 27G X 1/2\" 1 ML MISC Using once a month for b12 injection \s 90 each 3     nitroGLYcerin (NITROSTAT) 0.4 MG sublingual tablet For chest pain place 1 tablet under the tongue every 5 minutes for 3 doses. If symptoms persist 5 minutes after 1st dose call 911. 30 tablet 4       Objective  BP (!) 165/70 (BP Location: Right arm, Patient Position: Sitting, Cuff Size: Adult Regular)   Pulse 70   Resp 16   Wt 47.2 kg (104 lb)   LMP  (LMP Unknown)   SpO2 96%   BMI 19.65 kg/m      General Appearance:    Alert, cooperative, no distress, appears stated age   Head:    Normocephalic, without obvious abnormality, atraumatic   Throat:   Lips, mucosa, and tongue normal; teeth and gums normal   Neck:   Supple, symmetrical, trachea midline, no adenopathy;        thyroid:  No enlargement/tenderness/nodules; no carotid    bruit or JVD   Back:     Symmetric, no curvature, ROM normal, no CVA tenderness   Lungs:     Clear to auscultation bilaterally, respirations " "unlabored   Chest wall:    No tenderness, midline sternotomy scar and left-sided pacemaker   Heart:    Regular rate and rhythm, S1 and S2 normal, no murmur, rub   or gallop   Abdomen:     Soft, non-tender, bowel sounds active all four quadrants,     no masses, no organomegaly   Extremities:   Normal, atraumatic, no cyanosis, right lower extremity 1/4 edema   Pulses:   2+ and symmetric all extremities   Skin:   Skin color, texture, turgor normal, no rashes or lesions     Results    Lab Results personally reviewed   Lab Results   Component Value Date    CHOL 110 11/12/2024    CHOL 103 10/16/2024     Lab Results   Component Value Date    HDL 71 11/12/2024    HDL 67 10/16/2024     No components found for: \"LDLCALC\"  Lab Results   Component Value Date    TRIG 59 11/12/2024    TRIG 81 10/16/2024     Lab Results   Component Value Date    WBC 7.2 11/12/2024    HGB 9.1 (L) 11/12/2024    HCT 30.2 (L) 11/12/2024     11/12/2024     Lab Results   Component Value Date    BUN 14.6 10/14/2024     10/14/2024    CO2 27 10/14/2024               Thank you for allowing me to participate in the care of your patient.      Sincerely,     RY FRANCE MD     [facility]  cc:   Lou Hu, CNP  [address] [place] [address]      "

## 2024-12-05 NOTE — PROGRESS NOTES
[facility]  Heart Care Clinic Follow-up Note    Assessment & Plan        (I25.83) Coronary arteriosclerosis due to lipid rich plaque  (primary encounter diagnosis)  Comment: Angiography secondary to worsening shortness of breath showed left main 60%, proximal LAD 99 with a mid total occlusion and distal 20% lesion with first diagonal totally occluded, circumflex with a proximal total occlusion, and right coronary artery with a proximal 40%, mid 90% and distal 20% mid PDA 20% lesion.    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 She had aortic valve replacement at the same time.  There was no exclusion of the left atrial appendage at that time.  Recent angiography during above, October 2024, showed subtotal occlusion of the distal LIMA graft to the LAD which received a 2.5 x 38 mm Synergy stent.  Will consider Plavix for 6 months.     (Z95.2) H/O aortic valve replacement  Comment: Due to severe aortic stenosis she had a 21 mm Forte pericardial magna valve placed in 2005 and based on 2023 echo working well. Given that this tissue valve was placed over 15 years ago we will recheck echo yearly. In addition, I have asked her to take aspirin daily and she declines telling me that she has horrible allergic reactions and stomach upset from this.      (I48.20) Chronic atrial fibrillation (H)  Comment: Asymptomatic, not valvular, and now on Eliquis 2.5 mg p.o. twice daily given her lower weight and age over 82.     (Z95.0) Cardiac pacemaker in situ, dual chamber  Comment:  Medtronic device with Medtronic leads placed in 2011 with recent device change out January 2024 and currently 12% ventricular pacing, but some tachycardia seen.  Will increase beta-blocker.     (I10) Benign essential hypertension  Comment: Blood pressure elevated, given this increase metoprolol although she tells me at home they are much  better.    (E78.00) Pure hypercholesterolemia  Comment: Total cholesterol 141 with an LDL of 38 which is excellent.  Due to get repeat lipids this coming month.     (N18.30) Stage 3 chronic kidney disease, unspecified whether stage 3a or 3b CKD (H)  Comment: Kidney shows normal creatinine is normal at 0.79 with a GFR 72 and we will recheck today in consideration of diuretic.    (I50.30) Diastolic CHF with preserved left ventricular function, NYHA class 2 (H)  Comment: Given edema only 1 extremity with increased shortness of breath, suspect this could be heart failure, will arrange for BNP and if elevated consider low-dose furosemide.    (K92.1) Gastrointestinal hemorrhage with melena  Comment: Hemoglobin was down to 7.3, has had 2 iron infusions, will recheck hemoglobin today.    Plan  1.  Recheck hemoglobin, BNP and BMP today.  Adjust with diuretics if need be.  2.  Confirm how long we will keep her on Plavix.  3.  Follow-up me in 6 months or sooner if needed.    The longitudinal plan of care for the diagnosis(es)/condition(s) as documented were addressed during this visit. Due to the added complexity in care, I will continue to support Constanza in the subsequent management and with ongoing continuity of care.     Subjective  CC: 89-year-old white female being seen in posthospital discharge follow-up.  Since I seen her she had intervention on her distal LAD LIMA graft insertion.  She states her shortness of breath of anything is worse, she then developed a GI bleed.  She had melena.  Currently she complains of shortness with minimal activity and feels fatigued.  No PND, orthopnea, chest pains, major palpitations, but does admit to edema involving the right lower extremity.    Medications  Current Outpatient Medications   Medication Sig Dispense Refill    acetaminophen (TYLENOL) 500 MG tablet Take 1,000 mg by mouth every 6 hours as needed for mild pain      atorvastatin (LIPITOR) 40 MG tablet Take 1 tablet (40 mg) by  "mouth daily. 90 tablet 3    clopidogrel (PLAVIX) 75 MG tablet Take 1 tablet (75 mg) by mouth daily. 90 tablet 3    cyanocobalamin (CYANOCOBALAMIN) 1000 mcg/mL injection INJECT 1 ML (1,000 MCG) INTO THE SHOULDER, THIGH, OR BUTTOCKS EVERY 30 DAYS. **SYR 27G 1/2\" 1CC** 3 mL 3    ELIQUIS ANTICOAGULANT 2.5 MG tablet TAKE 1 TABLET(2.5 MG) BY MOUTH TWICE DAILY 60 tablet 3    gabapentin (NEURONTIN) 300 MG capsule Take 1 capsule (300 mg) by mouth 2 times daily 180 capsule 3    metoprolol succinate ER (TOPROL XL) 50 MG 24 hr tablet Take 1 tablet (50 mg) by mouth daily. 90 tablet 3    sodium fluoride dental gel (PREVIDENT) 1.1 % GEL topical gel ONE TEASPOON IN FLUORIDE TRAY APPLY TO TEETH FOR 3-5 MINUTES ONCE DAILY      tuberculin-allergy syringes (EASY TOUCH ALLERGY SYRINGE) 27G X 1/2\" 1 ML MISC Using once a month for b12 injection \s 90 each 3    nitroGLYcerin (NITROSTAT) 0.4 MG sublingual tablet For chest pain place 1 tablet under the tongue every 5 minutes for 3 doses. If symptoms persist 5 minutes after 1st dose call 911. 30 tablet 4       Objective  BP (!) 165/70 (BP Location: Right arm, Patient Position: Sitting, Cuff Size: Adult Regular)   Pulse 70   Resp 16   Wt 47.2 kg (104 lb)   LMP  (LMP Unknown)   SpO2 96%   BMI 19.65 kg/m      General Appearance:    Alert, cooperative, no distress, appears stated age   Head:    Normocephalic, without obvious abnormality, atraumatic   Throat:   Lips, mucosa, and tongue normal; teeth and gums normal   Neck:   Supple, symmetrical, trachea midline, no adenopathy;        thyroid:  No enlargement/tenderness/nodules; no carotid    bruit or JVD   Back:     Symmetric, no curvature, ROM normal, no CVA tenderness   Lungs:     Clear to auscultation bilaterally, respirations unlabored   Chest wall:    No tenderness, midline sternotomy scar and left-sided pacemaker   Heart:    Regular rate and rhythm, S1 and S2 normal, no murmur, rub   or gallop   Abdomen:     Soft, non-tender, bowel " "sounds active all four quadrants,     no masses, no organomegaly   Extremities:   Normal, atraumatic, no cyanosis, right lower extremity 1/4 edema   Pulses:   2+ and symmetric all extremities   Skin:   Skin color, texture, turgor normal, no rashes or lesions     Results    Lab Results personally reviewed   Lab Results   Component Value Date    CHOL 110 11/12/2024    CHOL 103 10/16/2024     Lab Results   Component Value Date    HDL 71 11/12/2024    HDL 67 10/16/2024     No components found for: \"LDLCALC\"  Lab Results   Component Value Date    TRIG 59 11/12/2024    TRIG 81 10/16/2024     Lab Results   Component Value Date    WBC 7.2 11/12/2024    HGB 9.1 (L) 11/12/2024    HCT 30.2 (L) 11/12/2024     11/12/2024     Lab Results   Component Value Date    BUN 14.6 10/14/2024     10/14/2024    CO2 27 10/14/2024           "

## 2024-12-13 ENCOUNTER — ANCILLARY PROCEDURE (OUTPATIENT)
Dept: CARDIOLOGY | Facility: CLINIC | Age: 89
End: 2024-12-13
Attending: INTERNAL MEDICINE
Payer: COMMERCIAL

## 2024-12-13 DIAGNOSIS — Z95.0 CARDIAC PACEMAKER IN SITU: ICD-10-CM

## 2024-12-13 DIAGNOSIS — I49.5 SINOATRIAL NODE DYSFUNCTION (H): ICD-10-CM

## 2024-12-13 DIAGNOSIS — I48.21 PERMANENT ATRIAL FIBRILLATION (H): ICD-10-CM

## 2024-12-13 LAB
MDC_IDC_EPISODE_DTM: NORMAL
MDC_IDC_EPISODE_DURATION: 0 S
MDC_IDC_EPISODE_DURATION: 1 S
MDC_IDC_EPISODE_DURATION: 2 S
MDC_IDC_EPISODE_DURATION: 2 S
MDC_IDC_EPISODE_ID: 209
MDC_IDC_EPISODE_ID: 210
MDC_IDC_EPISODE_ID: 211
MDC_IDC_EPISODE_ID: 212
MDC_IDC_EPISODE_ID: 213
MDC_IDC_EPISODE_ID: 214
MDC_IDC_EPISODE_ID: 215
MDC_IDC_EPISODE_ID: 216
MDC_IDC_EPISODE_ID: 217
MDC_IDC_EPISODE_ID: 218
MDC_IDC_EPISODE_ID: 219
MDC_IDC_EPISODE_ID: 220
MDC_IDC_EPISODE_ID: 221
MDC_IDC_EPISODE_ID: 222
MDC_IDC_EPISODE_ID: 223
MDC_IDC_EPISODE_TYPE: NORMAL
MDC_IDC_LEAD_CONNECTION_STATUS: NORMAL
MDC_IDC_LEAD_CONNECTION_STATUS: NORMAL
MDC_IDC_LEAD_IMPLANT_DT: NORMAL
MDC_IDC_LEAD_IMPLANT_DT: NORMAL
MDC_IDC_LEAD_LOCATION: NORMAL
MDC_IDC_LEAD_LOCATION: NORMAL
MDC_IDC_LEAD_MFG: NORMAL
MDC_IDC_LEAD_MFG: NORMAL
MDC_IDC_LEAD_MODEL: NORMAL
MDC_IDC_LEAD_MODEL: NORMAL
MDC_IDC_LEAD_POLARITY_TYPE: NORMAL
MDC_IDC_LEAD_POLARITY_TYPE: NORMAL
MDC_IDC_LEAD_SERIAL: NORMAL
MDC_IDC_LEAD_SERIAL: NORMAL
MDC_IDC_MSMT_BATTERY_DTM: NORMAL
MDC_IDC_MSMT_BATTERY_REMAINING_LONGEVITY: 173 MO
MDC_IDC_MSMT_BATTERY_RRT_TRIGGER: 2.62
MDC_IDC_MSMT_BATTERY_STATUS: NORMAL
MDC_IDC_MSMT_BATTERY_VOLTAGE: 3.16 V
MDC_IDC_MSMT_LEADCHNL_RA_IMPEDANCE_VALUE: 361 OHM
MDC_IDC_MSMT_LEADCHNL_RA_IMPEDANCE_VALUE: 418 OHM
MDC_IDC_MSMT_LEADCHNL_RV_IMPEDANCE_VALUE: 342 OHM
MDC_IDC_MSMT_LEADCHNL_RV_IMPEDANCE_VALUE: 399 OHM
MDC_IDC_MSMT_LEADCHNL_RV_PACING_THRESHOLD_AMPLITUDE: 0.62 V
MDC_IDC_MSMT_LEADCHNL_RV_PACING_THRESHOLD_PULSEWIDTH: 0.4 MS
MDC_IDC_MSMT_LEADCHNL_RV_SENSING_INTR_AMPL: 8.38 MV
MDC_IDC_MSMT_LEADCHNL_RV_SENSING_INTR_AMPL: 8.38 MV
MDC_IDC_PG_IMPLANT_DTM: NORMAL
MDC_IDC_PG_MFG: NORMAL
MDC_IDC_PG_MODEL: NORMAL
MDC_IDC_PG_SERIAL: NORMAL
MDC_IDC_PG_TYPE: NORMAL
MDC_IDC_SESS_CLINIC_NAME: NORMAL
MDC_IDC_SESS_DTM: NORMAL
MDC_IDC_SESS_TYPE: NORMAL
MDC_IDC_SET_BRADY_HYSTRATE: NORMAL
MDC_IDC_SET_BRADY_LOWRATE: 50 {BEATS}/MIN
MDC_IDC_SET_BRADY_MODE: NORMAL
MDC_IDC_SET_LEADCHNL_RA_SENSING_ANODE_ELECTRODE_1: NORMAL
MDC_IDC_SET_LEADCHNL_RA_SENSING_ANODE_LOCATION_1: NORMAL
MDC_IDC_SET_LEADCHNL_RA_SENSING_CATHODE_ELECTRODE_1: NORMAL
MDC_IDC_SET_LEADCHNL_RA_SENSING_CATHODE_LOCATION_1: NORMAL
MDC_IDC_SET_LEADCHNL_RA_SENSING_POLARITY: NORMAL
MDC_IDC_SET_LEADCHNL_RA_SENSING_SENSITIVITY: NORMAL
MDC_IDC_SET_LEADCHNL_RV_PACING_AMPLITUDE: 1 V
MDC_IDC_SET_LEADCHNL_RV_PACING_ANODE_ELECTRODE_1: NORMAL
MDC_IDC_SET_LEADCHNL_RV_PACING_ANODE_LOCATION_1: NORMAL
MDC_IDC_SET_LEADCHNL_RV_PACING_CAPTURE_MODE: NORMAL
MDC_IDC_SET_LEADCHNL_RV_PACING_CATHODE_ELECTRODE_1: NORMAL
MDC_IDC_SET_LEADCHNL_RV_PACING_CATHODE_LOCATION_1: NORMAL
MDC_IDC_SET_LEADCHNL_RV_PACING_POLARITY: NORMAL
MDC_IDC_SET_LEADCHNL_RV_PACING_PULSEWIDTH: 0.4 MS
MDC_IDC_SET_LEADCHNL_RV_SENSING_ANODE_ELECTRODE_1: NORMAL
MDC_IDC_SET_LEADCHNL_RV_SENSING_ANODE_LOCATION_1: NORMAL
MDC_IDC_SET_LEADCHNL_RV_SENSING_CATHODE_ELECTRODE_1: NORMAL
MDC_IDC_SET_LEADCHNL_RV_SENSING_CATHODE_LOCATION_1: NORMAL
MDC_IDC_SET_LEADCHNL_RV_SENSING_POLARITY: NORMAL
MDC_IDC_SET_LEADCHNL_RV_SENSING_SENSITIVITY: 0.9 MV
MDC_IDC_SET_ZONE_DETECTION_INTERVAL: 400 MS
MDC_IDC_SET_ZONE_STATUS: NORMAL
MDC_IDC_SET_ZONE_TYPE: NORMAL
MDC_IDC_SET_ZONE_VENDOR_TYPE: NORMAL
MDC_IDC_STAT_BRADY_AP_VP_PERCENT: 0 %
MDC_IDC_STAT_BRADY_AP_VS_PERCENT: 0 %
MDC_IDC_STAT_BRADY_AS_VP_PERCENT: 11.52 %
MDC_IDC_STAT_BRADY_AS_VS_PERCENT: 88.48 %
MDC_IDC_STAT_BRADY_DTM_END: NORMAL
MDC_IDC_STAT_BRADY_DTM_START: NORMAL
MDC_IDC_STAT_BRADY_RA_PERCENT_PACED: 0 %
MDC_IDC_STAT_BRADY_RV_PERCENT_PACED: 11.52 %
MDC_IDC_STAT_EPISODE_RECENT_COUNT: 0
MDC_IDC_STAT_EPISODE_RECENT_COUNT: 157
MDC_IDC_STAT_EPISODE_RECENT_COUNT_DTM_END: NORMAL
MDC_IDC_STAT_EPISODE_RECENT_COUNT_DTM_START: NORMAL
MDC_IDC_STAT_EPISODE_TOTAL_COUNT: 0
MDC_IDC_STAT_EPISODE_TOTAL_COUNT: 1
MDC_IDC_STAT_EPISODE_TOTAL_COUNT: 222
MDC_IDC_STAT_EPISODE_TOTAL_COUNT_DTM_END: NORMAL
MDC_IDC_STAT_EPISODE_TOTAL_COUNT_DTM_START: NORMAL
MDC_IDC_STAT_EPISODE_TYPE: NORMAL
MDC_IDC_STAT_TACHYTHERAPY_RECENT_DTM_END: NORMAL
MDC_IDC_STAT_TACHYTHERAPY_RECENT_DTM_START: NORMAL
MDC_IDC_STAT_TACHYTHERAPY_TOTAL_DTM_END: NORMAL
MDC_IDC_STAT_TACHYTHERAPY_TOTAL_DTM_START: NORMAL

## 2024-12-13 PROCEDURE — 93296 REM INTERROG EVL PM/IDS: CPT | Performed by: INTERNAL MEDICINE

## 2024-12-13 PROCEDURE — 93294 REM INTERROG EVL PM/LDLS PM: CPT | Performed by: INTERNAL MEDICINE

## 2025-01-02 ENCOUNTER — OFFICE VISIT (OUTPATIENT)
Dept: URGENT CARE | Facility: URGENT CARE | Age: 89
End: 2025-01-02
Payer: COMMERCIAL

## 2025-01-02 ENCOUNTER — NURSE TRIAGE (OUTPATIENT)
Dept: INTERNAL MEDICINE | Facility: CLINIC | Age: 89
End: 2025-01-02
Payer: COMMERCIAL

## 2025-01-02 VITALS
OXYGEN SATURATION: 98 % | RESPIRATION RATE: 16 BRPM | SYSTOLIC BLOOD PRESSURE: 172 MMHG | DIASTOLIC BLOOD PRESSURE: 98 MMHG | BODY MASS INDEX: 18.89 KG/M2 | TEMPERATURE: 98.2 F | WEIGHT: 100 LBS | HEART RATE: 85 BPM

## 2025-01-02 DIAGNOSIS — D50.9 IRON DEFICIENCY ANEMIA, UNSPECIFIED IRON DEFICIENCY ANEMIA TYPE: Primary | ICD-10-CM

## 2025-01-02 PROCEDURE — 99214 OFFICE O/P EST MOD 30 MIN: CPT | Performed by: FAMILY MEDICINE

## 2025-01-02 PROCEDURE — 36415 COLL VENOUS BLD VENIPUNCTURE: CPT | Performed by: FAMILY MEDICINE

## 2025-01-02 PROCEDURE — 85025 COMPLETE CBC W/AUTO DIFF WBC: CPT | Performed by: FAMILY MEDICINE

## 2025-01-02 ASSESSMENT — PAIN SCALES - GENERAL: PAINLEVEL_OUTOF10: NO PAIN (0)

## 2025-01-02 NOTE — TELEPHONE ENCOUNTER
"Notified patient of recommendation for evaluation from Dr. Zuniga. Patient states \"I am not going down to no hospital. Trust me on that one. Not unless I am half dead.\"  Patient expresses frustration that she cannot just have a hemoglobin done. Patient agreeable to go to Urgent Care.       "

## 2025-01-02 NOTE — TELEPHONE ENCOUNTER
"Patient calling to request a hemoglobin check. She says she began having black stools on Tuesday. Tuesday they were jet black and yesterday still black but not jet black like the day prior. She has had this happen before and, per patient, was worked up for it with no cause for the black stools. The last time this happened was in October which led to a blood transfusion and then iron infusions x 2 (last one December 2). She denies any bright red blood. She denies any other symptoms other than fatigue. She is asking to have her hemoglobin checked. She declined going to the ER and reiterates that this has happened before and she just wants a hemoglobin check.    Reason for Disposition   Black or tarry bowel movements   Bloody, black, or tarry bowel movements  (Exception: Chronic-unchanged black-grey bowel movements and is taking iron pills or Pepto-Bismol.)    Additional Information   Negative: MODERATE rectal bleeding (small blood clots, passing blood without stool, or toilet water turns red) more than once a day   Negative: SEVERE rectal bleeding (large blood clots; constant or on and off bleeding)   Negative: SEVERE dizziness (e.g., unable to stand, requires support to walk, feels like passing out now)   Negative: Diarrhea is main symptom   Negative: Rectal symptoms   Negative: Passed out (e.g., fainted, lost consciousness, blacked out and was not responding)   Negative: Shock suspected (e.g., cold/pale/clammy skin, too weak to stand, low BP, rapid pulse)   Negative: Vomiting red blood or black (coffee ground) material   Negative: Sounds like a life-threatening emergency to the triager    Answer Assessment - Initial Assessment Questions  1. APPEARANCE of BLOOD: \"What color is it?\" \"Is it passed separately, on the surface of the stool, or mixed in with the stool?\"       Black stools started Tuesday  2. AMOUNT: \"How much blood was passed?\"       *No Answer*  3. FREQUENCY: \"How many times has blood been passed with the " "stools?\"       *No Answer*  4. ONSET: \"When was the blood first seen in the stools?\" (Days or weeks)       Tuesday noticed black stools  5. DIARRHEA: \"Is there also some diarrhea?\" If Yes, ask: \"How many diarrhea stools in the past 24 hours?\"       *No Answer*  6. CONSTIPATION: \"Do you have constipation?\" If Yes, ask: \"How bad is it?\"      *No Answer*  7. RECURRENT SYMPTOMS: \"Have you had blood in your stools before?\" If Yes, ask: \"When was the last time?\" and \"What happened that time?\"       Yes, has had black stool before  8. BLOOD THINNERS: \"Do you take any blood thinners?\" (e.g., Coumadin/warfarin, Pradaxa/dabigatran, aspirin)      Yes, Eliquis  9. OTHER SYMPTOMS: \"Do you have any other symptoms?\"  (e.g., abdomen pain, vomiting, dizziness, fever)      Fatigue  10. PREGNANCY: \"Is there any chance you are pregnant?\" \"When was your last menstrual period?\"        NA    Protocols used: Stools - Unusual Color-A-OH, Rectal Bleeding-A-OH    "

## 2025-01-02 NOTE — TELEPHONE ENCOUNTER
Patient needs to be evaluated in person.  I am not going to just order a hemoglobin.  That would be inappropriate

## 2025-01-03 LAB
BASOPHILS # BLD AUTO: 0.1 10E3/UL (ref 0–0.2)
BASOPHILS NFR BLD AUTO: 1 %
BURR CELLS BLD QL SMEAR: SLIGHT
ELLIPTOCYTES BLD QL SMEAR: SLIGHT
EOSINOPHIL # BLD AUTO: 0.4 10E3/UL (ref 0–0.7)
EOSINOPHIL NFR BLD AUTO: 5 %
ERYTHROCYTE [DISTWIDTH] IN BLOOD BY AUTOMATED COUNT: 21.2 % (ref 10–15)
FRAGMENTS BLD QL SMEAR: SLIGHT
HCT VFR BLD AUTO: 31 % (ref 35–47)
HGB BLD-MCNC: 9.6 G/DL (ref 11.7–15.7)
IMM GRANULOCYTES # BLD: 0 10E3/UL
IMM GRANULOCYTES NFR BLD: 1 %
LYMPHOCYTES # BLD AUTO: 1.2 10E3/UL (ref 0.8–5.3)
LYMPHOCYTES NFR BLD AUTO: 18 %
MCH RBC QN AUTO: 26.5 PG (ref 26.5–33)
MCHC RBC AUTO-ENTMCNC: 31 G/DL (ref 31.5–36.5)
MCV RBC AUTO: 86 FL (ref 78–100)
MONOCYTES # BLD AUTO: 0.6 10E3/UL (ref 0–1.3)
MONOCYTES NFR BLD AUTO: 9 %
NEUTROPHILS # BLD AUTO: 4.6 10E3/UL (ref 1.6–8.3)
NEUTROPHILS NFR BLD AUTO: 67 %
NRBC # BLD AUTO: 0 10E3/UL
NRBC BLD AUTO-RTO: 0 /100
PLAT MORPH BLD: ABNORMAL
PLATELET # BLD AUTO: 178 10E3/UL (ref 150–450)
RBC # BLD AUTO: 3.62 10E6/UL (ref 3.8–5.2)
RBC MORPH BLD: ABNORMAL
WBC # BLD AUTO: 7 10E3/UL (ref 4–11)

## 2025-01-10 ENCOUNTER — TELEPHONE (OUTPATIENT)
Dept: CARDIOLOGY | Facility: CLINIC | Age: 89
End: 2025-01-10
Payer: COMMERCIAL

## 2025-01-10 DIAGNOSIS — Z95.5 PRESENCE OF CORONARY ARTERY BYPASS GRAFT STENT: ICD-10-CM

## 2025-01-10 DIAGNOSIS — Z95.1 PRESENCE OF CORONARY ARTERY BYPASS GRAFT STENT: ICD-10-CM

## 2025-01-10 DIAGNOSIS — I50.30 DIASTOLIC CHF WITH PRESERVED LEFT VENTRICULAR FUNCTION, NYHA CLASS 2 (H): Primary | ICD-10-CM

## 2025-01-10 NOTE — TELEPHONE ENCOUNTER
[facility] Call Center    Phone Message    May a detailed message be left on voicemail: yes     Reason for Call: Other: patient was asked to go on a 4 day regimen of water pills due to swelling and water retention. She stated the swelling went down. Now the swelling has returned. She would like to know if she should be on diuretics.      Action Taken: Other: cardio    Travel Screening: Not Applicable     Date of Service:

## 2025-01-10 NOTE — TELEPHONE ENCOUNTER
, swelling returns once off furosemide. Recommendations? NENA RHODES   _____________________________________________    Swelling returned right before [place]. Took her extra 4 pills leftover from previous Rx. Swelling resolved.   Now it has returned, actually right foot/ankle edema, and show isn't fitting again. More winded/SOB. Insomnia for 30 years. Not related to breathing issues.     Also, of note, patient is having black, tarry stools. Received iron infusion beginning of December. Went to Urgent Care on 1/2/25. CBC, Hgb 9.6. Encouraged to follow-up with PCP for ongoing black stool, for evaluation/work-up. Of note, previous work-up EGD/Colonoscopy/pill cam study, negative. Pt will contact her PCP. Will return call with further recs. CMM,Rn

## 2025-01-13 RX ORDER — FUROSEMIDE 20 MG/1
20 TABLET ORAL DAILY
Qty: 90 TABLET | Refills: 1 | Status: SHIPPED | OUTPATIENT
Start: 2025-01-13

## 2025-01-13 RX ORDER — CLOPIDOGREL BISULFATE 75 MG/1
75 TABLET ORAL DAILY
Status: SHIPPED
Start: 2025-01-13

## 2025-01-13 NOTE — TELEPHONE ENCOUNTER
Dr Kendrick, previous Rx furosemide 20 mg BID x 4 days.   Start meaintenance dose of Furosemide 20 mg once daily. With BMP in one week?   Also, discontinue Plavix at 6 month anniversary date of 4/16/25, correct? Thank you MEAGAN,RN

## 2025-01-13 NOTE — TELEPHONE ENCOUNTER
===View-only below this line===  ----- Message -----  From: Mariel Antoine MD  Sent: 1/13/2025   1:17 PM CST  To: Arnoldo Peoples RN    Agree, maintenance furosemide 20 mg a day, recheck renal profile after 1 week.  I agree, April, 6 months out from stent discontinue Plavix.  LF

## 2025-01-13 NOTE — TELEPHONE ENCOUNTER
===View-only below this line===  ----- Message -----  From: Mariel Antoine MD  Sent: 1/10/2025   5:07 PM CST  To: Arnoldo Peoples RN    Given the concerns of bleeding I would suggest discontinuing the Plavix in April.  Can we find out what diuretic she was on in the past?  I think the Lasix 20 mg daily would be reasonable and recheck renal profile a week after this.  LF

## 2025-01-13 NOTE — TELEPHONE ENCOUNTER
Return incoming call from patient. Review of response. Rx sent to verified pharmacy location. Lab ordered. Will call PCP office closest to home to have lab appointment. Has direct office number to call if questions or concerns. Already aware that she could stop the Plavix after 6 months, 4/16/25. Perico,Rn

## 2025-01-23 ENCOUNTER — LAB (OUTPATIENT)
Dept: LAB | Facility: CLINIC | Age: 89
End: 2025-01-23
Payer: COMMERCIAL

## 2025-01-23 DIAGNOSIS — I50.30 DIASTOLIC CHF WITH PRESERVED LEFT VENTRICULAR FUNCTION, NYHA CLASS 2 (H): ICD-10-CM

## 2025-01-23 LAB
ANION GAP SERPL CALCULATED.3IONS-SCNC: 9 MMOL/L (ref 7–15)
BUN SERPL-MCNC: 21.2 MG/DL (ref 8–23)
CALCIUM SERPL-MCNC: 9.5 MG/DL (ref 8.8–10.4)
CHLORIDE SERPL-SCNC: 100 MMOL/L (ref 98–107)
CREAT SERPL-MCNC: 0.9 MG/DL (ref 0.51–0.95)
EGFRCR SERPLBLD CKD-EPI 2021: 62 ML/MIN/1.73M2
GLUCOSE SERPL-MCNC: 109 MG/DL (ref 70–99)
HCO3 SERPL-SCNC: 29 MMOL/L (ref 22–29)
POTASSIUM SERPL-SCNC: 4.6 MMOL/L (ref 3.4–5.3)
SODIUM SERPL-SCNC: 138 MMOL/L (ref 135–145)

## 2025-01-24 NOTE — PROGRESS NOTES
"  Office Visit - Follow Up   Constanza Coles   85 y.o. female    Date of Visit: 1/15/2021         Assessment and Plan   1. Persistent atrial fibrillation (H)  Has been maintaining labile INRs.  We will check today.  Consider Eliquis and see if it would be covered if it continues to happen  - INR    2. Acute on chronic congestive heart failure, unspecified heart failure type (H)  Is euvolemic on exam her exercise tolerance is unchanged she has no pedal edema.  Continue current management.  She needs no other labs today.            6 months for annual wellness visit     History of Present Illness   This 85 y.o. old   Chief Complaint   Patient presents with     Follow-up     6 month follow up, needs INR   She had suboptimal blood pressure control but is now doing better.  She had some stress stress and adjustment issues with due to family problems but is actually much better.  She is coping well with the Covid isolation.  She denies chest pain, shortness of breath, pedal edema,.  Has not had any falls has not had any problems with her IADLs.  Is not in chronic pain.  Review of Systems: A comprehensive review of systems was negative except as noted.     Medications, Allergies and Problem List   Reviewed, reconciled and updated  Patient Active Problem List   Diagnosis     Coronary Artery Disease     Aortic Stenosis     Sick Sinus Syndrome     Congestive Heart Failure     H/O aortic valve replacement     Atrial fibrillation (H)     Vitamin B 12 deficiency     Vasculitis (H)     Cardiac pacemaker in situ, dual chamber     Decreased radial pulse     IHD (ischemic heart disease)     S/P CABG (coronary artery bypass graft)     Pure hypercholesterolemia     GRANDE (dyspnea on exertion)     Edema        Physical Exam   General Appearance:       /78 (Patient Site: Left Arm, Patient Position: Sitting, Cuff Size: Adult Large)   Pulse 85   Temp 97.6  F (36.4  C) (Tympanic)   Ht 5' 3\" (1.6 m)   Wt 124 lb (56.2 kg)   LMP  " (LMP Unknown)   SpO2 97%   BMI 21.97 kg/m      General appearance - alert, well appearing, and in no distress  Mental status - alert, oriented to person, place, and time  Neck - supple, no significant adenopathy  Lymphatics - no palpable lymphadenopathy, no hepatosplenomegaly  Chest - clear to auscultation, no wheezes, rales or rhonchi, symmetric air entry  Heart - normal rate, regular rhythm, normal S1, S2, no murmurs, rubs, clicks or gallops  Abdomen - soft, nontender, nondistended, no masses or organomegaly  Extremities - peripheral pulses normal, no pedal edema, no clubbing or cyanosis  Skin - normal coloration and turgor, no rashes, no suspicious skin lesions noted                                                                                       Additional Information   Current Outpatient Medications   Medication Sig Dispense Refill     atorvastatin (LIPITOR) 10 MG tablet TAKE 1 TABLET (10 MG TOTAL) BY MOUTH DAILY. 30 tablet 11     cyanocobalamin 1,000 mcg/mL injection INJECT 1 ML (1,000 MCG TOTAL) INTO THE SHOULDER, THIGH, OR BUTTOCKS EVERY 30 DAYS. 3 mL 3     gabapentin (NEURONTIN) 300 MG capsule TAKE 1 CAPSULE (300 MG TOTAL) BY MOUTH 2 TIMES A DAY. 60 capsule 11     metoprolol succinate (TOPROL-XL) 25 MG TAKE 1/2 TABLET (12.5MG) BY MOUTH DAILY 45 tablet 1     nitroglycerin (NITROSTAT) 0.4 MG SL tablet DISSOLVE 1 TABLET UNDER THE TONGUE AS NEEDED FOR CHEST PAIN 90 tablet 12     sucralfate (CARAFATE) 1 gram tablet TAKE 1 TABLET BY MOUTH FOUR TIMES DAILY 360 tablet 11     triamterene-hydrochlorothiazide (DYAZIDE) 37.5-25 mg per capsule TAKE 1 CAPSULE BY MOUTH AS NEEDED. 90 capsule 3     warfarin ANTICOAGULANT (COUMADIN/JANTOVEN) 1 MG tablet Take 1 tablet (1mg) to 2 tablets (2mg) by mouth daily, as directed.  Adjust dose based on INR results. 110 tablet 1     No current facility-administered medications for this visit.      Allergies   Allergen Reactions     Erythromycin Base      Pt is not sure she is  allergic to this     Levaquin [Levofloxacin] Nausea And Vomiting     Vancomycin Other (See Comments)     Red man syndrome     Xanax [Alprazolam] Other (See Comments)     confusion     Sulfa (Sulfonamide Antibiotics) Rash     Social History     Social History Narrative     Jesse    Retired-[employer]    2 children:Casey/Constanza    4 grandchildren    1 great grandchild    Native of [place]    Araseli-COPD/hospice and organic brain syndrome              reports that she quit smoking about 41 years ago. She has a 30.00 pack-year smoking history. She has never used smokeless tobacco. She reports current alcohol use. She reports that she does not use drugs.   Past Medical History:   Diagnosis Date     Anemia     iron deficiency     Chronic atrial fibrillation (H)      GERD (gastroesophageal reflux disease)      Hypertension      IHD (ischemic heart disease)      PUD (peptic ulcer disease)     [place] 1     Recurrent cystitis      SSS (sick sinus syndrome) (H)     post pacemaker placement     Valvular heart disease      Vasculitis (H)            Time:      Yun Jacinto MD     35

## 2025-02-19 DIAGNOSIS — E55.9 VITAMIN D DEFICIENCY: Primary | ICD-10-CM

## 2025-02-20 RX ORDER — ERGOCALCIFEROL 1.25 MG/1
50000 CAPSULE, LIQUID FILLED ORAL WEEKLY
Qty: 12 CAPSULE | Refills: 1 | Status: SHIPPED | OUTPATIENT
Start: 2025-02-20

## 2025-03-28 ENCOUNTER — ANCILLARY PROCEDURE (OUTPATIENT)
Dept: CARDIOLOGY | Facility: CLINIC | Age: 89
End: 2025-03-28
Attending: INTERNAL MEDICINE
Payer: COMMERCIAL

## 2025-03-28 DIAGNOSIS — I49.5 SINOATRIAL NODE DYSFUNCTION (H): ICD-10-CM

## 2025-03-28 DIAGNOSIS — I48.21 PERMANENT ATRIAL FIBRILLATION (H): ICD-10-CM

## 2025-03-28 DIAGNOSIS — Z95.0 CARDIAC PACEMAKER IN SITU: ICD-10-CM

## 2025-04-16 LAB
MDC_IDC_EPISODE_DTM: NORMAL
MDC_IDC_EPISODE_DURATION: 0 S
MDC_IDC_EPISODE_DURATION: 0 S
MDC_IDC_EPISODE_DURATION: 1 S
MDC_IDC_EPISODE_DURATION: 2 S
MDC_IDC_EPISODE_DURATION: 2 S
MDC_IDC_EPISODE_ID: 471
MDC_IDC_EPISODE_ID: 472
MDC_IDC_EPISODE_ID: 473
MDC_IDC_EPISODE_ID: 474
MDC_IDC_EPISODE_ID: 475
MDC_IDC_EPISODE_ID: 476
MDC_IDC_EPISODE_ID: 477
MDC_IDC_EPISODE_ID: 478
MDC_IDC_EPISODE_ID: 479
MDC_IDC_EPISODE_ID: 480
MDC_IDC_EPISODE_ID: 481
MDC_IDC_EPISODE_ID: 482
MDC_IDC_EPISODE_ID: 483
MDC_IDC_EPISODE_ID: 484
MDC_IDC_EPISODE_ID: 485
MDC_IDC_EPISODE_TYPE: NORMAL
MDC_IDC_LEAD_CONNECTION_STATUS: NORMAL
MDC_IDC_LEAD_CONNECTION_STATUS: NORMAL
MDC_IDC_LEAD_IMPLANT_DT: NORMAL
MDC_IDC_LEAD_IMPLANT_DT: NORMAL
MDC_IDC_LEAD_LOCATION: NORMAL
MDC_IDC_LEAD_LOCATION: NORMAL
MDC_IDC_LEAD_MFG: NORMAL
MDC_IDC_LEAD_MFG: NORMAL
MDC_IDC_LEAD_MODEL: NORMAL
MDC_IDC_LEAD_MODEL: NORMAL
MDC_IDC_LEAD_POLARITY_TYPE: NORMAL
MDC_IDC_LEAD_POLARITY_TYPE: NORMAL
MDC_IDC_LEAD_SERIAL: NORMAL
MDC_IDC_LEAD_SERIAL: NORMAL
MDC_IDC_MSMT_BATTERY_DTM: NORMAL
MDC_IDC_MSMT_BATTERY_REMAINING_LONGEVITY: 169 MO
MDC_IDC_MSMT_BATTERY_RRT_TRIGGER: 2.62
MDC_IDC_MSMT_BATTERY_STATUS: NORMAL
MDC_IDC_MSMT_BATTERY_VOLTAGE: 3.13 V
MDC_IDC_MSMT_LEADCHNL_RA_IMPEDANCE_VALUE: 361 OHM
MDC_IDC_MSMT_LEADCHNL_RA_IMPEDANCE_VALUE: 418 OHM
MDC_IDC_MSMT_LEADCHNL_RV_IMPEDANCE_VALUE: 285 OHM
MDC_IDC_MSMT_LEADCHNL_RV_IMPEDANCE_VALUE: 342 OHM
MDC_IDC_MSMT_LEADCHNL_RV_PACING_THRESHOLD_AMPLITUDE: 0.62 V
MDC_IDC_MSMT_LEADCHNL_RV_PACING_THRESHOLD_PULSEWIDTH: 0.4 MS
MDC_IDC_MSMT_LEADCHNL_RV_SENSING_INTR_AMPL: 5.62 MV
MDC_IDC_MSMT_LEADCHNL_RV_SENSING_INTR_AMPL: 5.62 MV
MDC_IDC_PG_IMPLANT_DTM: NORMAL
MDC_IDC_PG_MFG: NORMAL
MDC_IDC_PG_MODEL: NORMAL
MDC_IDC_PG_SERIAL: NORMAL
MDC_IDC_PG_TYPE: NORMAL
MDC_IDC_SESS_CLINIC_NAME: NORMAL
MDC_IDC_SESS_DTM: NORMAL
MDC_IDC_SESS_TYPE: NORMAL
MDC_IDC_SET_BRADY_HYSTRATE: NORMAL
MDC_IDC_SET_BRADY_LOWRATE: 50 {BEATS}/MIN
MDC_IDC_SET_BRADY_MODE: NORMAL
MDC_IDC_SET_LEADCHNL_RA_SENSING_ANODE_ELECTRODE_1: NORMAL
MDC_IDC_SET_LEADCHNL_RA_SENSING_ANODE_LOCATION_1: NORMAL
MDC_IDC_SET_LEADCHNL_RA_SENSING_CATHODE_ELECTRODE_1: NORMAL
MDC_IDC_SET_LEADCHNL_RA_SENSING_CATHODE_LOCATION_1: NORMAL
MDC_IDC_SET_LEADCHNL_RA_SENSING_POLARITY: NORMAL
MDC_IDC_SET_LEADCHNL_RA_SENSING_SENSITIVITY: NORMAL
MDC_IDC_SET_LEADCHNL_RV_PACING_AMPLITUDE: 1 V
MDC_IDC_SET_LEADCHNL_RV_PACING_ANODE_ELECTRODE_1: NORMAL
MDC_IDC_SET_LEADCHNL_RV_PACING_ANODE_LOCATION_1: NORMAL
MDC_IDC_SET_LEADCHNL_RV_PACING_CAPTURE_MODE: NORMAL
MDC_IDC_SET_LEADCHNL_RV_PACING_CATHODE_ELECTRODE_1: NORMAL
MDC_IDC_SET_LEADCHNL_RV_PACING_CATHODE_LOCATION_1: NORMAL
MDC_IDC_SET_LEADCHNL_RV_PACING_POLARITY: NORMAL
MDC_IDC_SET_LEADCHNL_RV_PACING_PULSEWIDTH: 0.4 MS
MDC_IDC_SET_LEADCHNL_RV_SENSING_ANODE_ELECTRODE_1: NORMAL
MDC_IDC_SET_LEADCHNL_RV_SENSING_ANODE_LOCATION_1: NORMAL
MDC_IDC_SET_LEADCHNL_RV_SENSING_CATHODE_ELECTRODE_1: NORMAL
MDC_IDC_SET_LEADCHNL_RV_SENSING_CATHODE_LOCATION_1: NORMAL
MDC_IDC_SET_LEADCHNL_RV_SENSING_POLARITY: NORMAL
MDC_IDC_SET_LEADCHNL_RV_SENSING_SENSITIVITY: 0.9 MV
MDC_IDC_SET_ZONE_DETECTION_INTERVAL: 400 MS
MDC_IDC_SET_ZONE_STATUS: NORMAL
MDC_IDC_SET_ZONE_TYPE: NORMAL
MDC_IDC_SET_ZONE_VENDOR_TYPE: NORMAL
MDC_IDC_STAT_BRADY_AP_VP_PERCENT: 0 %
MDC_IDC_STAT_BRADY_AP_VS_PERCENT: 0 %
MDC_IDC_STAT_BRADY_AS_VP_PERCENT: 10.56 %
MDC_IDC_STAT_BRADY_AS_VS_PERCENT: 89.44 %
MDC_IDC_STAT_BRADY_DTM_END: NORMAL
MDC_IDC_STAT_BRADY_DTM_START: NORMAL
MDC_IDC_STAT_BRADY_RA_PERCENT_PACED: 0 %
MDC_IDC_STAT_BRADY_RV_PERCENT_PACED: 10.56 %
MDC_IDC_STAT_EPISODE_RECENT_COUNT: 0
MDC_IDC_STAT_EPISODE_RECENT_COUNT: 137
MDC_IDC_STAT_EPISODE_RECENT_COUNT_DTM_END: NORMAL
MDC_IDC_STAT_EPISODE_RECENT_COUNT_DTM_START: NORMAL
MDC_IDC_STAT_EPISODE_TOTAL_COUNT: 0
MDC_IDC_STAT_EPISODE_TOTAL_COUNT: 2
MDC_IDC_STAT_EPISODE_TOTAL_COUNT: 483
MDC_IDC_STAT_EPISODE_TOTAL_COUNT_DTM_END: NORMAL
MDC_IDC_STAT_EPISODE_TOTAL_COUNT_DTM_START: NORMAL
MDC_IDC_STAT_EPISODE_TYPE: NORMAL
MDC_IDC_STAT_TACHYTHERAPY_RECENT_DTM_END: NORMAL
MDC_IDC_STAT_TACHYTHERAPY_RECENT_DTM_START: NORMAL
MDC_IDC_STAT_TACHYTHERAPY_TOTAL_DTM_END: NORMAL
MDC_IDC_STAT_TACHYTHERAPY_TOTAL_DTM_START: NORMAL

## 2025-04-16 PROCEDURE — 93296 REM INTERROG EVL PM/IDS: CPT | Performed by: INTERNAL MEDICINE

## 2025-04-16 PROCEDURE — 93294 REM INTERROG EVL PM/LDLS PM: CPT | Performed by: INTERNAL MEDICINE

## 2025-04-21 DIAGNOSIS — I48.91 ATRIAL FIBRILLATION, UNSPECIFIED TYPE (H): Primary | ICD-10-CM

## 2025-04-23 RX ORDER — METOPROLOL SUCCINATE 25 MG/1
25 TABLET, EXTENDED RELEASE ORAL
Qty: 90 TABLET | Refills: 3 | Status: SHIPPED | OUTPATIENT
Start: 2025-04-23

## 2025-04-23 NOTE — TELEPHONE ENCOUNTER
"Patient returns call. Patient reports taking metoprolol succinate 25 mg daily.     Per Chart review, prescription provided by Cardiology 10/25/24 for metoprolol succinate 50 mg daily. 10/25/24 progress note by Cardiology states \"Metoprolol dosing was recently increased and she has been taking metoprolol succinate 50 mg daily - this has been refilled\"    Patient denies recent decrease in metoprolol. Informed patient that Cardiology was last to prescribe, offered to notify Cardiology. Patient states Dr. Jacinto is prescribing this medication and she would like Dr. Jacinto to be notified. Patient denies monitoring blood pressure at home.     NENA Donnelly   [facility]       "

## 2025-06-06 ENCOUNTER — TRANSFERRED RECORDS (OUTPATIENT)
Dept: HEALTH INFORMATION MANAGEMENT | Facility: CLINIC | Age: 89
End: 2025-06-06
Payer: COMMERCIAL

## 2025-06-11 ENCOUNTER — TELEPHONE (OUTPATIENT)
Dept: MRI IMAGING | Facility: HOSPITAL | Age: 89
End: 2025-06-11
Payer: COMMERCIAL

## 2025-06-11 NOTE — TELEPHONE ENCOUNTER
Is the implanted device safe for MRI Exam? Yes  Is this device 3T compatible? Yes  Device Type: Pacemaker    Device Information: Medtronic, PPM Arpita (D)    Cardiology Orders for Device Programming     -- Yes -- The patient has a MRI conditional pulse generator and leads from the same     -- Yes -- The pulse generator and leads have been implanted for at least 6 weeks. (Does not apply to [employer] devices)    -- Yes-- The device is implanted in the right or left pectoral region    -- Yes  - There are not any additional active cardiac devices, abandoned leads, lead extenders or adapters    -- Yes -- The device lead impedance measurements are within the normal range per  guidelines    -- N/A -- If the patient is pacemaker dependent the thresholds are less than or equal to 2.0V @ 0.4ms.    -- Yes -- No evidence of a fractured lead or compromised pulse generator-lead system integrity.    -- Yes -- RA and RV leads are programmed to bipolar pacing operation or pacing off. If no, CONTACT THE DEVICE REP FOR PROGRAMMING      Date of last Remote Device check: March 27th, 2025  Results of last Device check:  1.   Right atrium impedance: N/A  2.   Right ventricle impedance: 342 Ohms  3.   Left ventricle impedance: N/A     4.   Right atrium threshold: N/A  5.   Right ventricle threshold: 0.625 V at 0.4 ms  6.   Left ventricle threshold: N/A     Device programming during the scan guidelines   Pacing Mode (check one): Use the recommended pacing mode per Medtronic MRI Access Application    If remote reprogramming is applicable, please contact the Rep to assist     Winnie Curry RN

## 2025-06-11 NOTE — TELEPHONE ENCOUNTER
Reason for call: MR Device Safety Clearance    Please create a MR Device Safety order  Other: [facility] is reporting patient has Pacemaker  Type of MR exam: MR Thoracic    Do you get your Device checked at [facility]?: Yes - [facility]

## 2025-06-13 PROBLEM — Z86.2 HISTORY OF ANEMIA: Status: RESOLVED | Noted: 2021-10-30 | Resolved: 2025-06-13

## 2025-06-13 PROBLEM — N18.31 STAGE 3A CHRONIC KIDNEY DISEASE (H): Status: RESOLVED | Noted: 2021-08-18 | Resolved: 2025-06-13

## 2025-06-13 PROBLEM — M81.0 OSTEOPOROSIS, UNSPECIFIED OSTEOPOROSIS TYPE, UNSPECIFIED PATHOLOGICAL FRACTURE PRESENCE: Status: ACTIVE | Noted: 2023-03-02

## 2025-06-13 PROBLEM — N18.31 STAGE 3A CHRONIC KIDNEY DISEASE (H): Status: ACTIVE | Noted: 2021-08-18

## 2025-06-18 ENCOUNTER — RESULTS FOLLOW-UP (OUTPATIENT)
Dept: INTERNAL MEDICINE | Facility: CLINIC | Age: 89
End: 2025-06-18

## 2025-06-18 DIAGNOSIS — K92.1 GASTROINTESTINAL HEMORRHAGE WITH MELENA: Primary | ICD-10-CM

## 2025-06-18 RX ORDER — ALBUTEROL SULFATE 90 UG/1
1-2 INHALANT RESPIRATORY (INHALATION)
Start: 2025-06-25

## 2025-06-18 RX ORDER — HEPARIN SODIUM,PORCINE 10 UNIT/ML
5-20 VIAL (ML) INTRAVENOUS DAILY PRN
OUTPATIENT
Start: 2025-06-18

## 2025-06-18 RX ORDER — ALBUTEROL SULFATE 0.83 MG/ML
2.5 SOLUTION RESPIRATORY (INHALATION)
OUTPATIENT
Start: 2025-06-18

## 2025-06-18 RX ORDER — ALBUTEROL SULFATE 0.83 MG/ML
2.5 SOLUTION RESPIRATORY (INHALATION)
OUTPATIENT
Start: 2025-06-25

## 2025-06-18 RX ORDER — DIPHENHYDRAMINE HYDROCHLORIDE 50 MG/ML
25 INJECTION, SOLUTION INTRAMUSCULAR; INTRAVENOUS
Start: 2025-06-25

## 2025-06-18 RX ORDER — DIPHENHYDRAMINE HYDROCHLORIDE 50 MG/ML
50 INJECTION, SOLUTION INTRAMUSCULAR; INTRAVENOUS
Start: 2025-06-25

## 2025-06-18 RX ORDER — METHYLPREDNISOLONE SODIUM SUCCINATE 40 MG/ML
40 INJECTION INTRAMUSCULAR; INTRAVENOUS
Start: 2025-06-18

## 2025-06-18 RX ORDER — EPINEPHRINE 1 MG/ML
0.3 INJECTION, SOLUTION, CONCENTRATE INTRAVENOUS EVERY 5 MIN PRN
OUTPATIENT
Start: 2025-06-18

## 2025-06-18 RX ORDER — DIPHENHYDRAMINE HYDROCHLORIDE 50 MG/ML
25 INJECTION, SOLUTION INTRAMUSCULAR; INTRAVENOUS
Start: 2025-06-18

## 2025-06-18 RX ORDER — DIPHENHYDRAMINE HYDROCHLORIDE 50 MG/ML
50 INJECTION, SOLUTION INTRAMUSCULAR; INTRAVENOUS
Start: 2025-06-18

## 2025-06-18 RX ORDER — HEPARIN SODIUM (PORCINE) LOCK FLUSH IV SOLN 100 UNIT/ML 100 UNIT/ML
5 SOLUTION INTRAVENOUS
OUTPATIENT
Start: 2025-06-25

## 2025-06-18 RX ORDER — HEPARIN SODIUM (PORCINE) LOCK FLUSH IV SOLN 100 UNIT/ML 100 UNIT/ML
5 SOLUTION INTRAVENOUS
OUTPATIENT
Start: 2025-06-18

## 2025-06-18 RX ORDER — MEPERIDINE HYDROCHLORIDE 25 MG/ML
25 INJECTION INTRAMUSCULAR; INTRAVENOUS; SUBCUTANEOUS
OUTPATIENT
Start: 2025-06-25

## 2025-06-18 RX ORDER — METHYLPREDNISOLONE SODIUM SUCCINATE 40 MG/ML
40 INJECTION INTRAMUSCULAR; INTRAVENOUS
Start: 2025-06-25

## 2025-06-18 RX ORDER — ALBUTEROL SULFATE 90 UG/1
1-2 INHALANT RESPIRATORY (INHALATION)
Start: 2025-06-18

## 2025-06-18 RX ORDER — MEPERIDINE HYDROCHLORIDE 25 MG/ML
25 INJECTION INTRAMUSCULAR; INTRAVENOUS; SUBCUTANEOUS
OUTPATIENT
Start: 2025-06-18

## 2025-06-18 RX ORDER — EPINEPHRINE 1 MG/ML
0.3 INJECTION, SOLUTION, CONCENTRATE INTRAVENOUS EVERY 5 MIN PRN
OUTPATIENT
Start: 2025-06-25

## 2025-06-18 RX ORDER — HEPARIN SODIUM,PORCINE 10 UNIT/ML
5-20 VIAL (ML) INTRAVENOUS DAILY PRN
OUTPATIENT
Start: 2025-06-25

## 2025-06-19 ENCOUNTER — TELEPHONE (OUTPATIENT)
Dept: INTERNAL MEDICINE | Facility: CLINIC | Age: 89
End: 2025-06-19
Payer: COMMERCIAL

## 2025-06-19 DIAGNOSIS — K92.2 GASTROINTESTINAL HEMORRHAGE, UNSPECIFIED GASTROINTESTINAL HEMORRHAGE TYPE: Primary | ICD-10-CM

## 2025-06-19 NOTE — TELEPHONE ENCOUNTER
Order/Referral Request    Who is requesting: PT     Orders being requested: PT SAID FOR CAPSULE ENDOSCOPY    Reason service is needed/diagnosis: PTS BLOODCOUNT LOWERING.     When are orders needed by: ASAP, PT SAID ORDER IS ALREADY PUT IN FOR HER BUT SHE WOULD LIKE IT TO GO TO SOMEWHERE ELSE. PT IS NOT WANTING TO GO TO [facility], SHE WANTS THE ORDER TO GO TO [facility] [place], THEIR PHN -[phone]    Has this been discussed with Provider: Yes    Does patient have a preference on a Group/Provider/Facility? [facility] [place]    Does patient have an appointment scheduled?: No    Where to send orders: Fax    Okay to leave a detailed message?: Yes at [phone]

## 2025-06-25 ENCOUNTER — TELEPHONE (OUTPATIENT)
Dept: INTERNAL MEDICINE | Facility: CLINIC | Age: 89
End: 2025-06-25
Payer: COMMERCIAL

## 2025-06-25 NOTE — TELEPHONE ENCOUNTER
FYI - Status Update    Who is Calling: calvin mckin[place]/ [place]    Update: in re: to the pill cam for GERD-provider needs to order upper endosc w/ colonoscopy  Needs to be sent to [facility] at [phone]    Does caller want a call/response back: No

## 2025-06-25 NOTE — TELEPHONE ENCOUNTER
Left message for Juana to return call to clarify message and order request.     Per telephone counter from 6/18/25 it was indicated patient  was willing to only do capsule endoscopy. Capsule endoscopy referral was faxed to [facility] on 6/19/25 at [phone].      If call is return pleases clarify the reason for the Upper Endoscopy and Colonoscopy request, thank you.

## 2025-07-01 NOTE — TELEPHONE ENCOUNTER
Attempted to call Juana regarding this message.     Left message to return call - please also refer to telephone encounter  on 6/19/25

## 2025-07-06 DIAGNOSIS — I50.30 DIASTOLIC CHF WITH PRESERVED LEFT VENTRICULAR FUNCTION, NYHA CLASS 2 (H): ICD-10-CM

## 2025-07-07 DIAGNOSIS — N18.30 CKD (CHRONIC KIDNEY DISEASE) STAGE 3, GFR 30-59 ML/MIN (H): Primary | ICD-10-CM

## 2025-07-08 ENCOUNTER — PATIENT OUTREACH (OUTPATIENT)
Dept: CARE COORDINATION | Facility: CLINIC | Age: 89
End: 2025-07-08
Payer: COMMERCIAL

## 2025-07-08 RX ORDER — FUROSEMIDE 20 MG/1
20 TABLET ORAL DAILY
Qty: 90 TABLET | Refills: 1 | Status: SHIPPED | OUTPATIENT
Start: 2025-07-08

## 2025-07-10 ENCOUNTER — HOSPITAL ENCOUNTER (OUTPATIENT)
Dept: MRI IMAGING | Facility: CLINIC | Age: 89
Discharge: HOME | End: 2025-07-10
Attending: PHYSICAL MEDICINE & REHABILITATION
Payer: COMMERCIAL

## 2025-07-10 ENCOUNTER — ANCILLARY PROCEDURE (OUTPATIENT)
Dept: CARDIOLOGY | Facility: CLINIC | Age: 89
End: 2025-07-10
Attending: INTERNAL MEDICINE
Payer: COMMERCIAL

## 2025-07-10 ENCOUNTER — HOSPITAL ENCOUNTER (OUTPATIENT)
Dept: RADIOLOGY | Facility: CLINIC | Age: 89
Discharge: HOME | End: 2025-07-10
Attending: PHYSICAL MEDICINE & REHABILITATION
Payer: COMMERCIAL

## 2025-07-10 VITALS — HEART RATE: 84 BPM | OXYGEN SATURATION: 93 %

## 2025-07-10 DIAGNOSIS — Z95.0 CARDIAC PACEMAKER IN SITU: ICD-10-CM

## 2025-07-10 DIAGNOSIS — M54.6 THORACIC BACK PAIN: ICD-10-CM

## 2025-07-10 DIAGNOSIS — I48.21 PERMANENT ATRIAL FIBRILLATION (H): ICD-10-CM

## 2025-07-10 DIAGNOSIS — I49.5 SINOATRIAL NODE DYSFUNCTION (H): ICD-10-CM

## 2025-07-10 LAB
MDC_IDC_LEAD_CONNECTION_STATUS: NORMAL
MDC_IDC_LEAD_CONNECTION_STATUS: NORMAL
MDC_IDC_LEAD_IMPLANT_DT: NORMAL
MDC_IDC_LEAD_IMPLANT_DT: NORMAL
MDC_IDC_LEAD_LOCATION: NORMAL
MDC_IDC_LEAD_LOCATION: NORMAL
MDC_IDC_LEAD_MFG: NORMAL
MDC_IDC_LEAD_MFG: NORMAL
MDC_IDC_LEAD_MODEL: NORMAL
MDC_IDC_LEAD_MODEL: NORMAL
MDC_IDC_LEAD_POLARITY_TYPE: NORMAL
MDC_IDC_LEAD_POLARITY_TYPE: NORMAL
MDC_IDC_LEAD_SERIAL: NORMAL
MDC_IDC_LEAD_SERIAL: NORMAL
MDC_IDC_MSMT_BATTERY_DTM: NORMAL
MDC_IDC_MSMT_BATTERY_REMAINING_LONGEVITY: 166 MO
MDC_IDC_MSMT_BATTERY_RRT_TRIGGER: 2.62
MDC_IDC_MSMT_BATTERY_STATUS: NORMAL
MDC_IDC_MSMT_BATTERY_VOLTAGE: 3.09 V
MDC_IDC_MSMT_LEADCHNL_RA_IMPEDANCE_VALUE: 361 OHM
MDC_IDC_MSMT_LEADCHNL_RA_IMPEDANCE_VALUE: 418 OHM
MDC_IDC_MSMT_LEADCHNL_RV_IMPEDANCE_VALUE: 304 OHM
MDC_IDC_MSMT_LEADCHNL_RV_IMPEDANCE_VALUE: 380 OHM
MDC_IDC_MSMT_LEADCHNL_RV_PACING_THRESHOLD_AMPLITUDE: 0.5 V
MDC_IDC_MSMT_LEADCHNL_RV_PACING_THRESHOLD_PULSEWIDTH: 0.4 MS
MDC_IDC_MSMT_LEADCHNL_RV_SENSING_INTR_AMPL: 5.88 MV
MDC_IDC_MSMT_LEADCHNL_RV_SENSING_INTR_AMPL: 5.88 MV
MDC_IDC_PG_IMPLANT_DTM: NORMAL
MDC_IDC_PG_MFG: NORMAL
MDC_IDC_PG_MODEL: NORMAL
MDC_IDC_PG_SERIAL: NORMAL
MDC_IDC_PG_TYPE: NORMAL
MDC_IDC_SESS_CLINIC_NAME: NORMAL
MDC_IDC_SESS_DTM: NORMAL
MDC_IDC_SESS_TYPE: NORMAL
MDC_IDC_SET_BRADY_HYSTRATE: NORMAL
MDC_IDC_SET_BRADY_LOWRATE: 50 {BEATS}/MIN
MDC_IDC_SET_BRADY_MODE: NORMAL
MDC_IDC_SET_LEADCHNL_RA_SENSING_ANODE_ELECTRODE_1: NORMAL
MDC_IDC_SET_LEADCHNL_RA_SENSING_ANODE_LOCATION_1: NORMAL
MDC_IDC_SET_LEADCHNL_RA_SENSING_CATHODE_ELECTRODE_1: NORMAL
MDC_IDC_SET_LEADCHNL_RA_SENSING_CATHODE_LOCATION_1: NORMAL
MDC_IDC_SET_LEADCHNL_RA_SENSING_POLARITY: NORMAL
MDC_IDC_SET_LEADCHNL_RA_SENSING_SENSITIVITY: NORMAL
MDC_IDC_SET_LEADCHNL_RV_PACING_AMPLITUDE: 1 V
MDC_IDC_SET_LEADCHNL_RV_PACING_ANODE_ELECTRODE_1: NORMAL
MDC_IDC_SET_LEADCHNL_RV_PACING_ANODE_LOCATION_1: NORMAL
MDC_IDC_SET_LEADCHNL_RV_PACING_CAPTURE_MODE: NORMAL
MDC_IDC_SET_LEADCHNL_RV_PACING_CATHODE_ELECTRODE_1: NORMAL
MDC_IDC_SET_LEADCHNL_RV_PACING_CATHODE_LOCATION_1: NORMAL
MDC_IDC_SET_LEADCHNL_RV_PACING_POLARITY: NORMAL
MDC_IDC_SET_LEADCHNL_RV_PACING_PULSEWIDTH: 0.4 MS
MDC_IDC_SET_LEADCHNL_RV_SENSING_ANODE_ELECTRODE_1: NORMAL
MDC_IDC_SET_LEADCHNL_RV_SENSING_ANODE_LOCATION_1: NORMAL
MDC_IDC_SET_LEADCHNL_RV_SENSING_CATHODE_ELECTRODE_1: NORMAL
MDC_IDC_SET_LEADCHNL_RV_SENSING_CATHODE_LOCATION_1: NORMAL
MDC_IDC_SET_LEADCHNL_RV_SENSING_POLARITY: NORMAL
MDC_IDC_SET_LEADCHNL_RV_SENSING_SENSITIVITY: 0.9 MV
MDC_IDC_SET_ZONE_DETECTION_INTERVAL: 400 MS
MDC_IDC_SET_ZONE_STATUS: NORMAL
MDC_IDC_SET_ZONE_TYPE: NORMAL
MDC_IDC_SET_ZONE_VENDOR_TYPE: NORMAL
MDC_IDC_STAT_BRADY_AP_VP_PERCENT: 0 %
MDC_IDC_STAT_BRADY_AP_VS_PERCENT: 0 %
MDC_IDC_STAT_BRADY_AS_VP_PERCENT: 10.01 %
MDC_IDC_STAT_BRADY_AS_VS_PERCENT: 89.99 %
MDC_IDC_STAT_BRADY_DTM_END: NORMAL
MDC_IDC_STAT_BRADY_DTM_START: NORMAL
MDC_IDC_STAT_BRADY_RA_PERCENT_PACED: 0 %
MDC_IDC_STAT_BRADY_RV_PERCENT_PACED: 10.01 %
MDC_IDC_STAT_EPISODE_RECENT_COUNT: 0
MDC_IDC_STAT_EPISODE_RECENT_COUNT_DTM_END: NORMAL
MDC_IDC_STAT_EPISODE_RECENT_COUNT_DTM_START: NORMAL
MDC_IDC_STAT_EPISODE_TOTAL_COUNT: 0
MDC_IDC_STAT_EPISODE_TOTAL_COUNT: 2
MDC_IDC_STAT_EPISODE_TOTAL_COUNT: 483
MDC_IDC_STAT_EPISODE_TOTAL_COUNT_DTM_END: NORMAL
MDC_IDC_STAT_EPISODE_TOTAL_COUNT_DTM_START: NORMAL
MDC_IDC_STAT_EPISODE_TYPE: NORMAL
MDC_IDC_STAT_TACHYTHERAPY_RECENT_DTM_END: NORMAL
MDC_IDC_STAT_TACHYTHERAPY_RECENT_DTM_START: NORMAL
MDC_IDC_STAT_TACHYTHERAPY_TOTAL_DTM_END: NORMAL
MDC_IDC_STAT_TACHYTHERAPY_TOTAL_DTM_START: NORMAL

## 2025-07-10 PROCEDURE — 72146 MRI CHEST SPINE W/O DYE: CPT

## 2025-07-10 PROCEDURE — 71045 X-RAY EXAM CHEST 1 VIEW: CPT

## 2025-07-10 NOTE — PROGRESS NOTES
"Patient's device placed into MRI Surescan mode prior to scan with [facility]/Device.  Device check \"passed\" per report. Patient monitored by RN via ECG and pulse oximetry throughout procedure.  Patient stable throughout.  Device placed back into previous mode at completion of MRI with [facility] [facility].    "

## 2025-07-14 ENCOUNTER — TRANSFERRED RECORDS (OUTPATIENT)
Dept: ADMINISTRATIVE | Facility: CLINIC | Age: 89
End: 2025-07-14
Payer: COMMERCIAL

## 2025-07-15 NOTE — PROCEDURES
2025        Constanza Coles   [address] [place] [address]      Constanza Sanket,  :  1935    I am writing to let you know the results of the tests that were done the other day.   Thank you for allowing [facility] the opportunity to take part in your healthcare.  At [facility] we strive to provide each patient with the finest gastroenterology care available.  We hope your experience was pleasant and informative.    The test for celiac returned negative.  Hemoglobin 2025 15:36   Description Result Units Flags Range   Hemoglobin 7.7 g/dL L 11.1-15.9   Comments   Performed At: [facility], [facility] [address] [place], [place], [id number] Tate MD, Phone: [phone]   Ferritin 2025 15:36   Description Result Units Flags Range   Ferritin 15 ng/mL     Comments   Performed At: [facility], [facility] [address] [place], [place], [id number] Tate MD, Phone: [phone]   Vitamin B12 2025 15:36   Description Result Units Flags Range   Vitamin B12 1666 pg/mL H 232-1245   Comments   Performed At: [facility], [facility] [address] [place], [place], [id number]  Bernardo Varma MD, Phone: [phone]   Folate (Folic Acid), Serum 2025 15:36   Description Result Units Flags Range   Folate (Folic Acid), Serum 13.7 ng/mL  >3.0   Comments   Performed At: [facility], [facility] [address] [place], [place], [id number] Tate MD, Phone: [phone]   Folate (Folic Acid), Serum:  A serum folate concentration of less than 3.1 ng/mL is  considered to represent clinical deficiency.   Iron and TIBC 2025 15:36   Description Result Units Flags Range   Iron 15 ug/dL L    Iron Bind.Cap.(TIBC) 408 ug/dL  250-450   Iron Saturation 4 % LL 15-55   UIBC 393 ug/dL H 118-369   Comments   Performed At: [facility], [facility] [address] [place], [place], [id number]  Bernardo Varma MD, Phone: [phone]     IgA+t-Carpio 2025 15:36   Description Result Units Flags  Range   Immunoglobulin A, Qn, Serum 481 mg/dL H    t-Transglutaminase (tTG) IgA <2 U/mL  0-3   Comments   Performed At: , [facility]  [address], [place], [id number]  Bernardo Varma MD, Phone: [phone]  Performed At: , [facility] [address], [id number]  Kamran Bundy MD, Phone: [phone]   t-Transglutaminase (tTG) IgA:                                               Negative        0 -  3                                               Weak Positive   4 - 10                                               Positive           >10                                                                    .                  Tissue Transglutaminase (tTG) has been identified                  as the endomysial antigen.  Studies have demonstr-                  ated that endomysial IgA antibodies have over 99%                  specificity for gluten sensitive enteropathy.           Thank you.    Electronically signed by:  Sreedhar JEFF 07/14/2025 04:22 PM  Document generated by:  Sreedhar JEFF  07/14/2025  If your provider ordered multiple tests; the results may not become available at the same time.  If multiple test results are received within 14 days of one another, you may receive a duplicate.  cc:  Yun ORDONEZ  cc:  Hayder Bailey MD

## 2025-07-17 ENCOUNTER — INFUSION THERAPY VISIT (OUTPATIENT)
Dept: INFUSION THERAPY | Facility: HOSPITAL | Age: 89
End: 2025-07-17
Attending: INTERNAL MEDICINE
Payer: COMMERCIAL

## 2025-07-17 VITALS
SYSTOLIC BLOOD PRESSURE: 174 MMHG | OXYGEN SATURATION: 96 % | TEMPERATURE: 98.1 F | DIASTOLIC BLOOD PRESSURE: 63 MMHG | HEART RATE: 60 BPM | RESPIRATION RATE: 16 BRPM

## 2025-07-17 DIAGNOSIS — N18.30 CKD (CHRONIC KIDNEY DISEASE) STAGE 3, GFR 30-59 ML/MIN (H): Primary | ICD-10-CM

## 2025-07-17 DIAGNOSIS — K92.1 GASTROINTESTINAL HEMORRHAGE WITH MELENA: ICD-10-CM

## 2025-07-17 DIAGNOSIS — D50.0 IRON DEFICIENCY ANEMIA DUE TO CHRONIC BLOOD LOSS: ICD-10-CM

## 2025-07-17 PROCEDURE — 93296 REM INTERROG EVL PM/IDS: CPT | Performed by: INTERNAL MEDICINE

## 2025-07-17 PROCEDURE — 93294 REM INTERROG EVL PM/LDLS PM: CPT | Performed by: INTERNAL MEDICINE

## 2025-07-17 PROCEDURE — 258N000003 HC RX IP 258 OP 636: Performed by: INTERNAL MEDICINE

## 2025-07-17 PROCEDURE — 250N000011 HC RX IP 250 OP 636: Performed by: INTERNAL MEDICINE

## 2025-07-17 RX ORDER — MEPERIDINE HYDROCHLORIDE 25 MG/ML
25 INJECTION INTRAMUSCULAR; INTRAVENOUS; SUBCUTANEOUS
OUTPATIENT
Start: 2025-07-19

## 2025-07-17 RX ORDER — EPINEPHRINE 1 MG/ML
0.3 INJECTION, SOLUTION INTRAMUSCULAR; SUBCUTANEOUS EVERY 5 MIN PRN
OUTPATIENT
Start: 2025-07-19

## 2025-07-17 RX ORDER — ALBUTEROL SULFATE 0.83 MG/ML
2.5 SOLUTION RESPIRATORY (INHALATION)
Status: DISCONTINUED | OUTPATIENT
Start: 2025-07-17 | End: 2025-07-17 | Stop reason: HOSPADM

## 2025-07-17 RX ORDER — DIPHENHYDRAMINE HYDROCHLORIDE 50 MG/ML
50 INJECTION, SOLUTION INTRAMUSCULAR; INTRAVENOUS
Status: DISCONTINUED | OUTPATIENT
Start: 2025-07-17 | End: 2025-07-17 | Stop reason: HOSPADM

## 2025-07-17 RX ORDER — DIPHENHYDRAMINE HYDROCHLORIDE 50 MG/ML
25 INJECTION, SOLUTION INTRAMUSCULAR; INTRAVENOUS
Status: DISCONTINUED | OUTPATIENT
Start: 2025-07-17 | End: 2025-07-17 | Stop reason: HOSPADM

## 2025-07-17 RX ORDER — HEPARIN SODIUM (PORCINE) LOCK FLUSH IV SOLN 100 UNIT/ML 100 UNIT/ML
5 SOLUTION INTRAVENOUS
OUTPATIENT
Start: 2025-07-19

## 2025-07-17 RX ORDER — DIPHENHYDRAMINE HYDROCHLORIDE 50 MG/ML
25 INJECTION, SOLUTION INTRAMUSCULAR; INTRAVENOUS
Start: 2025-07-19

## 2025-07-17 RX ORDER — ALBUTEROL SULFATE 90 UG/1
1-2 INHALANT RESPIRATORY (INHALATION)
Status: DISCONTINUED | OUTPATIENT
Start: 2025-07-17 | End: 2025-07-17 | Stop reason: HOSPADM

## 2025-07-17 RX ORDER — METHYLPREDNISOLONE SODIUM SUCCINATE 40 MG/ML
40 INJECTION INTRAMUSCULAR; INTRAVENOUS
Start: 2025-07-19

## 2025-07-17 RX ORDER — DIPHENHYDRAMINE HYDROCHLORIDE 50 MG/ML
50 INJECTION, SOLUTION INTRAMUSCULAR; INTRAVENOUS
Start: 2025-07-19

## 2025-07-17 RX ORDER — METHYLPREDNISOLONE SODIUM SUCCINATE 40 MG/ML
40 INJECTION INTRAMUSCULAR; INTRAVENOUS
Status: DISCONTINUED | OUTPATIENT
Start: 2025-07-17 | End: 2025-07-17 | Stop reason: HOSPADM

## 2025-07-17 RX ORDER — MEPERIDINE HYDROCHLORIDE 25 MG/ML
25 INJECTION INTRAMUSCULAR; INTRAVENOUS; SUBCUTANEOUS
Status: DISCONTINUED | OUTPATIENT
Start: 2025-07-17 | End: 2025-07-17 | Stop reason: HOSPADM

## 2025-07-17 RX ORDER — ALBUTEROL SULFATE 90 UG/1
1-2 INHALANT RESPIRATORY (INHALATION)
Start: 2025-07-19

## 2025-07-17 RX ORDER — ALBUTEROL SULFATE 0.83 MG/ML
2.5 SOLUTION RESPIRATORY (INHALATION)
OUTPATIENT
Start: 2025-07-19

## 2025-07-17 RX ORDER — HEPARIN SODIUM,PORCINE 10 UNIT/ML
5-20 VIAL (ML) INTRAVENOUS DAILY PRN
OUTPATIENT
Start: 2025-07-19

## 2025-07-17 RX ORDER — EPINEPHRINE 1 MG/ML
0.3 INJECTION, SOLUTION INTRAMUSCULAR; SUBCUTANEOUS EVERY 5 MIN PRN
Status: DISCONTINUED | OUTPATIENT
Start: 2025-07-17 | End: 2025-07-17 | Stop reason: HOSPADM

## 2025-07-17 RX ADMIN — IRON SUCROSE 300 MG: 20 INJECTION, SOLUTION INTRAVENOUS at 09:32

## 2025-07-17 RX ADMIN — SODIUM CHLORIDE 250 ML: 0.9 INJECTION, SOLUTION INTRAVENOUS at 09:32

## 2025-07-17 NOTE — PROGRESS NOTES
Infusion Nursing Note:  Constanza Coles presents today for Venofer 300 mg, 1 of 3.    Patient seen by provider today: No   present during visit today: Not Applicable.    Note: Constanza comes in today for another round of Venofer. She has tolerated Venofer in the past. I went over the plan of care with Constanza and she verbalized understanding. PIV was placed wit great blood return throughout. Venofer was hung over 90 minutes and then flushed wit normal saline. PIV removed and covered with gauze and coban. Constanza left ambulatory and in stable condition to the [facility] and plans on returning on 07/21/2025.      Intravenous Access:  Peripheral IV placed.    Treatment Conditions:  Not Applicable.      Post Infusion Assessment:  Patient tolerated infusion without incident.  Blood return noted pre and post infusion.  Site patent and intact, free from redness, edema or discomfort.  No evidence of extravasations.  Access discontinued per protocol.       Discharge Plan:   AVS to patient via [facility].  Patient will return 07/21/2025 for next appointment.   Patient discharged in stable condition accompanied by: self.  Departure Mode: Ambulatory.      BEBE TORRE RN

## 2025-07-18 ENCOUNTER — TRANSFERRED RECORDS (OUTPATIENT)
Dept: HEALTH INFORMATION MANAGEMENT | Facility: CLINIC | Age: 89
End: 2025-07-18
Payer: COMMERCIAL

## 2025-07-21 ENCOUNTER — INFUSION THERAPY VISIT (OUTPATIENT)
Dept: INFUSION THERAPY | Facility: HOSPITAL | Age: 89
End: 2025-07-21
Payer: COMMERCIAL

## 2025-07-21 VITALS
TEMPERATURE: 98 F | SYSTOLIC BLOOD PRESSURE: 161 MMHG | HEART RATE: 61 BPM | RESPIRATION RATE: 16 BRPM | OXYGEN SATURATION: 98 % | DIASTOLIC BLOOD PRESSURE: 64 MMHG

## 2025-07-21 DIAGNOSIS — D50.0 IRON DEFICIENCY ANEMIA DUE TO CHRONIC BLOOD LOSS: ICD-10-CM

## 2025-07-21 DIAGNOSIS — N18.30 CKD (CHRONIC KIDNEY DISEASE) STAGE 3, GFR 30-59 ML/MIN (H): Primary | ICD-10-CM

## 2025-07-21 DIAGNOSIS — K92.1 GASTROINTESTINAL HEMORRHAGE WITH MELENA: ICD-10-CM

## 2025-07-21 PROCEDURE — 96365 THER/PROPH/DIAG IV INF INIT: CPT

## 2025-07-21 PROCEDURE — 258N000003 HC RX IP 258 OP 636: Performed by: INTERNAL MEDICINE

## 2025-07-21 PROCEDURE — 96366 THER/PROPH/DIAG IV INF ADDON: CPT

## 2025-07-21 PROCEDURE — 250N000011 HC RX IP 250 OP 636: Performed by: INTERNAL MEDICINE

## 2025-07-21 RX ORDER — DIPHENHYDRAMINE HYDROCHLORIDE 50 MG/ML
50 INJECTION, SOLUTION INTRAMUSCULAR; INTRAVENOUS
Start: 2025-07-23

## 2025-07-21 RX ORDER — HEPARIN SODIUM,PORCINE 10 UNIT/ML
5-20 VIAL (ML) INTRAVENOUS DAILY PRN
OUTPATIENT
Start: 2025-07-23

## 2025-07-21 RX ORDER — HEPARIN SODIUM (PORCINE) LOCK FLUSH IV SOLN 100 UNIT/ML 100 UNIT/ML
5 SOLUTION INTRAVENOUS
OUTPATIENT
Start: 2025-07-23

## 2025-07-21 RX ORDER — METHYLPREDNISOLONE SODIUM SUCCINATE 40 MG/ML
40 INJECTION INTRAMUSCULAR; INTRAVENOUS
Status: DISCONTINUED | OUTPATIENT
Start: 2025-07-21 | End: 2025-07-21 | Stop reason: HOSPADM

## 2025-07-21 RX ORDER — MEPERIDINE HYDROCHLORIDE 25 MG/ML
25 INJECTION INTRAMUSCULAR; INTRAVENOUS; SUBCUTANEOUS
OUTPATIENT
Start: 2025-07-23

## 2025-07-21 RX ORDER — ALBUTEROL SULFATE 0.83 MG/ML
2.5 SOLUTION RESPIRATORY (INHALATION)
OUTPATIENT
Start: 2025-07-23

## 2025-07-21 RX ORDER — ALBUTEROL SULFATE 90 UG/1
1-2 INHALANT RESPIRATORY (INHALATION)
Status: DISCONTINUED | OUTPATIENT
Start: 2025-07-21 | End: 2025-07-21 | Stop reason: HOSPADM

## 2025-07-21 RX ORDER — ALBUTEROL SULFATE 0.83 MG/ML
2.5 SOLUTION RESPIRATORY (INHALATION)
Status: DISCONTINUED | OUTPATIENT
Start: 2025-07-21 | End: 2025-07-21 | Stop reason: HOSPADM

## 2025-07-21 RX ORDER — METHYLPREDNISOLONE SODIUM SUCCINATE 40 MG/ML
40 INJECTION INTRAMUSCULAR; INTRAVENOUS
Start: 2025-07-23

## 2025-07-21 RX ORDER — MEPERIDINE HYDROCHLORIDE 25 MG/ML
25 INJECTION INTRAMUSCULAR; INTRAVENOUS; SUBCUTANEOUS
Status: DISCONTINUED | OUTPATIENT
Start: 2025-07-21 | End: 2025-07-21 | Stop reason: HOSPADM

## 2025-07-21 RX ORDER — EPINEPHRINE 1 MG/ML
0.3 INJECTION, SOLUTION INTRAMUSCULAR; SUBCUTANEOUS EVERY 5 MIN PRN
OUTPATIENT
Start: 2025-07-23

## 2025-07-21 RX ORDER — DIPHENHYDRAMINE HYDROCHLORIDE 50 MG/ML
25 INJECTION, SOLUTION INTRAMUSCULAR; INTRAVENOUS
Start: 2025-07-23

## 2025-07-21 RX ORDER — DIPHENHYDRAMINE HYDROCHLORIDE 50 MG/ML
50 INJECTION, SOLUTION INTRAMUSCULAR; INTRAVENOUS
Status: DISCONTINUED | OUTPATIENT
Start: 2025-07-21 | End: 2025-07-21 | Stop reason: HOSPADM

## 2025-07-21 RX ORDER — EPINEPHRINE 1 MG/ML
0.3 INJECTION, SOLUTION INTRAMUSCULAR; SUBCUTANEOUS EVERY 5 MIN PRN
Status: DISCONTINUED | OUTPATIENT
Start: 2025-07-21 | End: 2025-07-21 | Stop reason: HOSPADM

## 2025-07-21 RX ORDER — ALBUTEROL SULFATE 90 UG/1
1-2 INHALANT RESPIRATORY (INHALATION)
Start: 2025-07-23

## 2025-07-21 RX ORDER — DIPHENHYDRAMINE HYDROCHLORIDE 50 MG/ML
25 INJECTION, SOLUTION INTRAMUSCULAR; INTRAVENOUS
Status: DISCONTINUED | OUTPATIENT
Start: 2025-07-21 | End: 2025-07-21 | Stop reason: HOSPADM

## 2025-07-21 RX ADMIN — SODIUM CHLORIDE 250 ML: 0.9 INJECTION, SOLUTION INTRAVENOUS at 12:13

## 2025-07-21 RX ADMIN — IRON SUCROSE 300 MG: 20 INJECTION, SOLUTION INTRAVENOUS at 12:14

## 2025-07-21 ASSESSMENT — PAIN SCALES - GENERAL: PAINLEVEL_OUTOF10: NO PAIN (0)

## 2025-07-21 NOTE — PROGRESS NOTES
Infusion Nursing Note:  Constanza Coles presents today for Venofer 300 mg 2/3.    Patient seen by provider today: No   present during visit today: Not Applicable.    Note: Constanza arrived into the infusion center ambulatory and in a stable condition for Iron Venofer 300 mg accompanied by her , ELMA. Her BP runs high and she says it is good at home. Venofer administered per order with no ill effect noted.      Intravenous Access:  Peripheral IV placed.    Treatment Conditions:  Not Applicable.    Post Infusion Assessment:  Patient tolerated infusion without incident.  Site patent and intact, free from redness, edema or discomfort.  No evidence of extravasations.  Access discontinued per protocol.     Discharge Plan:   Discharge instructions reviewed with: Patient and Family.  Patient and/or family verbalized understanding of discharge instructions and all questions answered.  Patient discharged in stable condition accompanied by: .  Departure Mode: Ambulatory.    Rosa Ansari RN

## 2025-07-29 DIAGNOSIS — I48.91 ATRIAL FIBRILLATION, UNSPECIFIED TYPE (H): ICD-10-CM

## 2025-07-29 RX ORDER — METOPROLOL SUCCINATE 25 MG/1
25 TABLET, EXTENDED RELEASE ORAL DAILY
Qty: 90 TABLET | Refills: 3 | OUTPATIENT
Start: 2025-07-29

## 2025-08-06 ENCOUNTER — DOCUMENTATION ONLY (OUTPATIENT)
Dept: CARDIOLOGY | Facility: CLINIC | Age: 89
End: 2025-08-06

## 2025-08-06 ENCOUNTER — ANCILLARY PROCEDURE (OUTPATIENT)
Dept: CARDIOLOGY | Facility: CLINIC | Age: 89
End: 2025-08-06
Attending: INTERNAL MEDICINE
Payer: COMMERCIAL

## 2025-08-06 ENCOUNTER — OFFICE VISIT (OUTPATIENT)
Dept: CARDIOLOGY | Facility: CLINIC | Age: 89
End: 2025-08-06
Payer: COMMERCIAL

## 2025-08-06 VITALS
OXYGEN SATURATION: 100 % | WEIGHT: 99.7 LBS | RESPIRATION RATE: 16 BRPM | DIASTOLIC BLOOD PRESSURE: 74 MMHG | BODY MASS INDEX: 18.35 KG/M2 | HEART RATE: 67 BPM | SYSTOLIC BLOOD PRESSURE: 184 MMHG | HEIGHT: 62 IN

## 2025-08-06 DIAGNOSIS — I48.21 PERMANENT ATRIAL FIBRILLATION (H): ICD-10-CM

## 2025-08-06 DIAGNOSIS — Z95.0 CARDIAC PACEMAKER IN SITU: ICD-10-CM

## 2025-08-06 DIAGNOSIS — Z95.2 H/O AORTIC VALVE REPLACEMENT: ICD-10-CM

## 2025-08-06 DIAGNOSIS — I48.11 LONGSTANDING PERSISTENT ATRIAL FIBRILLATION (H): ICD-10-CM

## 2025-08-06 DIAGNOSIS — I25.83 CORONARY ARTERIOSCLEROSIS DUE TO LIPID RICH PLAQUE: Primary | ICD-10-CM

## 2025-08-06 DIAGNOSIS — I10 BENIGN ESSENTIAL HYPERTENSION: ICD-10-CM

## 2025-08-06 DIAGNOSIS — I48.21 PERMANENT ATRIAL FIBRILLATION (H): Primary | ICD-10-CM

## 2025-08-06 DIAGNOSIS — I50.30 DIASTOLIC CHF WITH PRESERVED LEFT VENTRICULAR FUNCTION, NYHA CLASS 2 (H): ICD-10-CM

## 2025-08-06 DIAGNOSIS — I49.5 SINOATRIAL NODE DYSFUNCTION (H): ICD-10-CM

## 2025-08-06 DIAGNOSIS — E78.00 PURE HYPERCHOLESTEROLEMIA: ICD-10-CM

## 2025-08-06 DIAGNOSIS — K21.00 GASTROESOPHAGEAL REFLUX DISEASE WITH ESOPHAGITIS, UNSPECIFIED WHETHER HEMORRHAGE: ICD-10-CM

## 2025-08-06 DIAGNOSIS — Z95.1 S/P CABG (CORONARY ARTERY BYPASS GRAFT): ICD-10-CM

## 2025-08-06 LAB
MDC_IDC_EPISODE_DTM: NORMAL
MDC_IDC_EPISODE_DURATION: 0 S
MDC_IDC_EPISODE_DURATION: 1 S
MDC_IDC_EPISODE_DURATION: 2 S
MDC_IDC_EPISODE_ID: 486
MDC_IDC_EPISODE_ID: 487
MDC_IDC_EPISODE_ID: 488
MDC_IDC_EPISODE_ID: 489
MDC_IDC_EPISODE_ID: 490
MDC_IDC_EPISODE_ID: 491
MDC_IDC_EPISODE_ID: 492
MDC_IDC_EPISODE_ID: 493
MDC_IDC_EPISODE_ID: 494
MDC_IDC_EPISODE_ID: 495
MDC_IDC_EPISODE_ID: 496
MDC_IDC_EPISODE_ID: 497
MDC_IDC_EPISODE_ID: 498
MDC_IDC_EPISODE_ID: 499
MDC_IDC_EPISODE_TYPE: NORMAL
MDC_IDC_EPISODE_TYPE_INDUCED: NO
MDC_IDC_LEAD_CONNECTION_STATUS: NORMAL
MDC_IDC_LEAD_CONNECTION_STATUS: NORMAL
MDC_IDC_LEAD_IMPLANT_DT: NORMAL
MDC_IDC_LEAD_IMPLANT_DT: NORMAL
MDC_IDC_LEAD_LOCATION: NORMAL
MDC_IDC_LEAD_LOCATION: NORMAL
MDC_IDC_LEAD_MFG: NORMAL
MDC_IDC_LEAD_MFG: NORMAL
MDC_IDC_LEAD_MODEL: NORMAL
MDC_IDC_LEAD_MODEL: NORMAL
MDC_IDC_LEAD_POLARITY_TYPE: NORMAL
MDC_IDC_LEAD_POLARITY_TYPE: NORMAL
MDC_IDC_LEAD_SERIAL: NORMAL
MDC_IDC_LEAD_SERIAL: NORMAL
MDC_IDC_MSMT_BATTERY_DTM: NORMAL
MDC_IDC_MSMT_BATTERY_REMAINING_LONGEVITY: 165 MO
MDC_IDC_MSMT_BATTERY_RRT_TRIGGER: 2.62
MDC_IDC_MSMT_BATTERY_STATUS: NORMAL
MDC_IDC_MSMT_BATTERY_VOLTAGE: 3.08 V
MDC_IDC_MSMT_LEADCHNL_RA_IMPEDANCE_VALUE: 361 OHM
MDC_IDC_MSMT_LEADCHNL_RA_IMPEDANCE_VALUE: 418 OHM
MDC_IDC_MSMT_LEADCHNL_RV_IMPEDANCE_VALUE: 342 OHM
MDC_IDC_MSMT_LEADCHNL_RV_IMPEDANCE_VALUE: 418 OHM
MDC_IDC_MSMT_LEADCHNL_RV_PACING_THRESHOLD_AMPLITUDE: 0.5 V
MDC_IDC_MSMT_LEADCHNL_RV_PACING_THRESHOLD_AMPLITUDE: 0.5 V
MDC_IDC_MSMT_LEADCHNL_RV_PACING_THRESHOLD_PULSEWIDTH: 0.4 MS
MDC_IDC_MSMT_LEADCHNL_RV_PACING_THRESHOLD_PULSEWIDTH: 0.4 MS
MDC_IDC_MSMT_LEADCHNL_RV_SENSING_INTR_AMPL: 6.12 MV
MDC_IDC_MSMT_LEADCHNL_RV_SENSING_INTR_AMPL: 6.5 MV
MDC_IDC_PG_IMPLANT_DTM: NORMAL
MDC_IDC_PG_MFG: NORMAL
MDC_IDC_PG_MODEL: NORMAL
MDC_IDC_PG_SERIAL: NORMAL
MDC_IDC_PG_TYPE: NORMAL
MDC_IDC_SESS_CLINIC_NAME: NORMAL
MDC_IDC_SESS_DTM: NORMAL
MDC_IDC_SESS_TYPE: NORMAL
MDC_IDC_SET_BRADY_HYSTRATE: NORMAL
MDC_IDC_SET_BRADY_LOWRATE: 50 {BEATS}/MIN
MDC_IDC_SET_BRADY_MODE: NORMAL
MDC_IDC_SET_LEADCHNL_RA_SENSING_ANODE_ELECTRODE_1: NORMAL
MDC_IDC_SET_LEADCHNL_RA_SENSING_ANODE_LOCATION_1: NORMAL
MDC_IDC_SET_LEADCHNL_RA_SENSING_CATHODE_ELECTRODE_1: NORMAL
MDC_IDC_SET_LEADCHNL_RA_SENSING_CATHODE_LOCATION_1: NORMAL
MDC_IDC_SET_LEADCHNL_RA_SENSING_POLARITY: NORMAL
MDC_IDC_SET_LEADCHNL_RA_SENSING_SENSITIVITY: NORMAL
MDC_IDC_SET_LEADCHNL_RV_PACING_AMPLITUDE: NORMAL
MDC_IDC_SET_LEADCHNL_RV_PACING_ANODE_ELECTRODE_1: NORMAL
MDC_IDC_SET_LEADCHNL_RV_PACING_ANODE_LOCATION_1: NORMAL
MDC_IDC_SET_LEADCHNL_RV_PACING_CAPTURE_MODE: NORMAL
MDC_IDC_SET_LEADCHNL_RV_PACING_CATHODE_ELECTRODE_1: NORMAL
MDC_IDC_SET_LEADCHNL_RV_PACING_CATHODE_LOCATION_1: NORMAL
MDC_IDC_SET_LEADCHNL_RV_PACING_POLARITY: NORMAL
MDC_IDC_SET_LEADCHNL_RV_PACING_PULSEWIDTH: 0.4 MS
MDC_IDC_SET_LEADCHNL_RV_SENSING_ANODE_ELECTRODE_1: NORMAL
MDC_IDC_SET_LEADCHNL_RV_SENSING_ANODE_LOCATION_1: NORMAL
MDC_IDC_SET_LEADCHNL_RV_SENSING_CATHODE_ELECTRODE_1: NORMAL
MDC_IDC_SET_LEADCHNL_RV_SENSING_CATHODE_LOCATION_1: NORMAL
MDC_IDC_SET_LEADCHNL_RV_SENSING_POLARITY: NORMAL
MDC_IDC_SET_LEADCHNL_RV_SENSING_SENSITIVITY: 0.9 MV
MDC_IDC_SET_ZONE_DETECTION_INTERVAL: 400 MS
MDC_IDC_SET_ZONE_STATUS: NORMAL
MDC_IDC_SET_ZONE_TYPE: NORMAL
MDC_IDC_SET_ZONE_VENDOR_TYPE: NORMAL
MDC_IDC_STAT_BRADY_AP_VP_PERCENT: 0 %
MDC_IDC_STAT_BRADY_AP_VS_PERCENT: 0 %
MDC_IDC_STAT_BRADY_AS_VP_PERCENT: 11.08 %
MDC_IDC_STAT_BRADY_AS_VS_PERCENT: 88.92 %
MDC_IDC_STAT_BRADY_DTM_END: NORMAL
MDC_IDC_STAT_BRADY_DTM_START: NORMAL
MDC_IDC_STAT_BRADY_RA_PERCENT_PACED: 0 %
MDC_IDC_STAT_BRADY_RV_PERCENT_PACED: 11.08 %
MDC_IDC_STAT_EPISODE_RECENT_COUNT: 0
MDC_IDC_STAT_EPISODE_RECENT_COUNT: 1
MDC_IDC_STAT_EPISODE_RECENT_COUNT: 432
MDC_IDC_STAT_EPISODE_RECENT_COUNT_DTM_END: NORMAL
MDC_IDC_STAT_EPISODE_RECENT_COUNT_DTM_START: NORMAL
MDC_IDC_STAT_EPISODE_TOTAL_COUNT: 0
MDC_IDC_STAT_EPISODE_TOTAL_COUNT: 2
MDC_IDC_STAT_EPISODE_TOTAL_COUNT: 497
MDC_IDC_STAT_EPISODE_TOTAL_COUNT_DTM_END: NORMAL
MDC_IDC_STAT_EPISODE_TOTAL_COUNT_DTM_START: NORMAL
MDC_IDC_STAT_EPISODE_TYPE: NORMAL
MDC_IDC_STAT_TACHYTHERAPY_RECENT_DTM_END: NORMAL
MDC_IDC_STAT_TACHYTHERAPY_RECENT_DTM_START: NORMAL
MDC_IDC_STAT_TACHYTHERAPY_TOTAL_DTM_END: NORMAL
MDC_IDC_STAT_TACHYTHERAPY_TOTAL_DTM_START: NORMAL

## 2025-08-06 PROCEDURE — 93279 PRGRMG DEV EVAL PM/LDLS PM: CPT | Performed by: INTERNAL MEDICINE

## 2025-08-06 RX ORDER — SUCRALFATE 1 G/1
1 TABLET ORAL 4 TIMES DAILY PRN
Status: SHIPPED
Start: 2025-08-06

## 2025-08-11 ENCOUNTER — TELEPHONE (OUTPATIENT)
Dept: INTERNAL MEDICINE | Facility: CLINIC | Age: 89
End: 2025-08-11
Payer: COMMERCIAL

## 2025-08-11 DIAGNOSIS — I48.11 LONGSTANDING PERSISTENT ATRIAL FIBRILLATION (H): ICD-10-CM

## (undated) DEVICE — CATH BALLOON NC EMERGE 3.00X20MM H7493926720300

## (undated) DEVICE — SHEATH 4FR ULTIMUM 407840

## (undated) DEVICE — DEVICE INFLATION SYR W/ HEMOSTASIS VALVE 12IN EXT IN4904

## (undated) DEVICE — CUSTOM PACK CORONARY SAN5BCRHEA

## (undated) DEVICE — SHTH INTRO 0.021IN ID 6FR DIA

## (undated) DEVICE — CATH ANGIO INFINITI JL5 4FRX100CM 538422

## (undated) DEVICE — CATH BALLOON EMERGE 3.0X12MM H7493918912300

## (undated) DEVICE — GUIDEWIRE VASC 0.014"X190CML 3CML TIP AHW14R104S

## (undated) DEVICE — CATH ANGIO INFINITI INTERNAL MAMMARY 6FRX100CM 534-660T

## (undated) DEVICE — SYR ANGIOGRAPHY MULTIUSE KIT ACIST 014612

## (undated) DEVICE — FORCEP BIOPSY DISP 000386

## (undated) DEVICE — CATH IVUS OPTICROSS HD 6 3.6FR 1.18MM DIA 135CML H7493935408

## (undated) DEVICE — POUCH TYRX ABSOB ANTIBACTERIAL MED

## (undated) DEVICE — CATH PULM ART 7FR X 110CM, SWA

## (undated) DEVICE — CATH 6FR X 100CM, OPTITORQUE C

## (undated) DEVICE — CATH ANGIO JUDKINS JL4 6FRX100CM INFINITI 534620T

## (undated) DEVICE — INTRO SHEATH 7FRX10CM PINNACLE RSS702

## (undated) DEVICE — CATH ANGIO INFINITI JL4 4FRX100CM 538420

## (undated) DEVICE — INTRO MICRO MINI STICK 4FR STIFF NITINOL 45-753

## (undated) DEVICE — TUBING SUCTION MEDI-VAC 1/4"X20' N620A - HE

## (undated) DEVICE — EXCHANGE WIRE .035 260 STAR/JFC/035/260/ M001491681

## (undated) DEVICE — CATH BALLOON EMERGE 2.0X12MM H7493918912200

## (undated) DEVICE — Device

## (undated) DEVICE — SHEATH ULTIMUM 6FR 12CM 407845

## (undated) DEVICE — VALVE HEMOSTASIS .096" COPILOT MECH 1003331

## (undated) DEVICE — MANIFOLD KIT ANGIO AUTOMATED 014613

## (undated) DEVICE — ELECTRODE DEFIB CADENCE 22550R

## (undated) DEVICE — CLOSURE ANGIOSEAL 6FR 610130

## (undated) DEVICE — CATH DIAG 4FR ANG PIG 538453S

## (undated) DEVICE — CATH ANGIO INFINITI JR4 4FRX100CM 538421

## (undated) DEVICE — DEVICE INFLATION W/KIT & 6IN TUBING

## (undated) DEVICE — SOL WATER IRRIG 1000ML BOTTLE 2F7114

## (undated) DEVICE — CATH ANGIO INFINITI AL2 4FRX100CM 538446

## (undated) DEVICE — GUIDEWIRE FORTE FLOPPY J TOP 34949-05J

## (undated) DEVICE — CLOSURE DEVICE 6FR VASC PROGLIDE MEDICATED SUTURE 12673-03

## (undated) DEVICE — CUSTOM PACK PACER ICD SAN5BPCHEA

## (undated) DEVICE — ESU BLADE PEAK PLASMA 3.0S PS210-030S

## (undated) DEVICE — CATH BALLOON NC EMERGE 3.00X12MM H7493926712300

## (undated) DEVICE — KIT HAND CONTROL ACIST 014644 AR-P54

## (undated) DEVICE — INTRO SHEATH 6FRX10CM PINNACLE RSS602

## (undated) DEVICE — CATH ANGIO INFINITI IM 4FRX100CM 538460

## (undated) DEVICE — CATH BALLOON EMERGE 2.5X12MM H7493918912250

## (undated) DEVICE — CATH LAUNCHER 6FR EBU 30 LA6EBU30

## (undated) DEVICE — SUCTION MANIFOLD NEPTUNE 2 SYS 1 PORT 702-025-000

## (undated) DEVICE — BAG EQUIPMENT PERMANENT SLED BAG

## (undated) DEVICE — CATH ANGIO JUDKINS R4 6FRX100CM INFINITI 534621T

## (undated) DEVICE — ELECTRODE ADULT PACING MULTI P-211-M1

## (undated) DEVICE — GUIDEWIRE STRT .025 SCN M001491001

## (undated) RX ORDER — ACETAMINOPHEN 325 MG/1
TABLET ORAL
Status: DISPENSED
Start: 2024-10-16

## (undated) RX ORDER — VANCOMYCIN HYDROCHLORIDE 1 G/200ML
INJECTION, SOLUTION INTRAVENOUS
Status: DISPENSED
Start: 2024-01-15

## (undated) RX ORDER — DIAZEPAM 5 MG
TABLET ORAL
Status: DISPENSED
Start: 2021-09-27

## (undated) RX ORDER — METOPROLOL TARTRATE 1 MG/ML
INJECTION, SOLUTION INTRAVENOUS
Status: DISPENSED
Start: 2024-10-16

## (undated) RX ORDER — PRASUGREL 10 MG/1
TABLET, FILM COATED ORAL
Status: DISPENSED
Start: 2021-09-27

## (undated) RX ORDER — EPTIFIBATIDE 2 MG/ML
INJECTION, SOLUTION INTRAVENOUS
Status: DISPENSED
Start: 2021-09-27

## (undated) RX ORDER — CLOPIDOGREL 300 MG/1
TABLET, FILM COATED ORAL
Status: DISPENSED
Start: 2024-10-16

## (undated) RX ORDER — HYDRALAZINE HYDROCHLORIDE 20 MG/ML
INJECTION INTRAMUSCULAR; INTRAVENOUS
Status: DISPENSED
Start: 2024-10-16

## (undated) RX ORDER — FENTANYL CITRATE 50 UG/ML
INJECTION, SOLUTION INTRAMUSCULAR; INTRAVENOUS
Status: DISPENSED
Start: 2024-01-15

## (undated) RX ORDER — FENTANYL CITRATE 50 UG/ML
INJECTION, SOLUTION INTRAMUSCULAR; INTRAVENOUS
Status: DISPENSED
Start: 2024-10-16

## (undated) RX ORDER — FENTANYL CITRATE 50 UG/ML
INJECTION, SOLUTION INTRAMUSCULAR; INTRAVENOUS
Status: DISPENSED
Start: 2021-09-27

## (undated) RX ORDER — ACETAMINOPHEN 325 MG/1
TABLET ORAL
Status: DISPENSED
Start: 2021-09-27

## (undated) RX ORDER — DEXMEDETOMIDINE HYDROCHLORIDE 4 UG/ML
INJECTION, SOLUTION INTRAVENOUS
Status: DISPENSED
Start: 2024-01-15